# Patient Record
Sex: FEMALE | Race: WHITE | Employment: UNEMPLOYED | URBAN - METROPOLITAN AREA
[De-identification: names, ages, dates, MRNs, and addresses within clinical notes are randomized per-mention and may not be internally consistent; named-entity substitution may affect disease eponyms.]

---

## 2020-05-21 ENCOUNTER — HOSPITAL ENCOUNTER (OUTPATIENT)
Age: 70
Setting detail: SPECIMEN
Discharge: HOME | End: 2020-05-21
Payer: MEDICARE

## 2020-05-21 LAB
INR BLD: 1.5
PROTHROMBIN TIME: 18.3 SEC (ref 11.5–14.2)

## 2020-05-21 PROCEDURE — 85610 PROTHROMBIN TIME: CPT

## 2020-11-03 PROBLEM — I25.10 CAD (CORONARY ARTERY DISEASE): Status: RESOLVED | Noted: 2020-11-03 | Resolved: 2020-11-03

## 2021-10-14 ENCOUNTER — HOSPITAL ENCOUNTER (OUTPATIENT)
Age: 71
Setting detail: SPECIMEN
Discharge: HOME | End: 2021-10-14

## 2021-10-14 LAB
INR BLD: 1.4
PROTHROMBIN TIME: 17 SEC (ref 11.5–14.2)

## 2021-10-14 PROCEDURE — P9603 ONE-WAY ALLOW PRORATED MILES: HCPCS

## 2021-10-14 PROCEDURE — 85610 PROTHROMBIN TIME: CPT

## 2021-10-14 PROCEDURE — 36415 COLL VENOUS BLD VENIPUNCTURE: CPT

## 2021-10-15 ENCOUNTER — HOSPITAL ENCOUNTER (OUTPATIENT)
Age: 71
Setting detail: SPECIMEN
Discharge: HOME | End: 2021-10-15

## 2021-10-15 LAB
ANION GAP SERPL CALCULATED.3IONS-SCNC: 15 MMOL/L (ref 9–17)
BNP INTERPRETATION: ABNORMAL
BUN BLDV-MCNC: 64 MG/DL (ref 8–23)
BUN/CREAT BLD: ABNORMAL (ref 9–20)
CALCIUM SERPL-MCNC: 9.2 MG/DL (ref 8.6–10.4)
CHLORIDE BLD-SCNC: 96 MMOL/L (ref 98–107)
CO2: 33 MMOL/L (ref 20–31)
CREAT SERPL-MCNC: 2.09 MG/DL (ref 0.5–0.9)
GFR AFRICAN AMERICAN: 28 ML/MIN
GFR NON-AFRICAN AMERICAN: 23 ML/MIN
GFR SERPL CREATININE-BSD FRML MDRD: ABNORMAL ML/MIN/{1.73_M2}
GFR SERPL CREATININE-BSD FRML MDRD: ABNORMAL ML/MIN/{1.73_M2}
GLUCOSE BLD-MCNC: 237 MG/DL (ref 70–99)
HCT VFR BLD CALC: 32.9 % (ref 36.3–47.1)
HEMOGLOBIN: 9.2 G/DL (ref 11.9–15.1)
MCH RBC QN AUTO: 30.9 PG (ref 25.2–33.5)
MCHC RBC AUTO-ENTMCNC: 28 G/DL (ref 28.4–34.8)
MCV RBC AUTO: 110.4 FL (ref 82.6–102.9)
NRBC AUTOMATED: 0 PER 100 WBC
PDW BLD-RTO: 15.8 % (ref 11.8–14.4)
PLATELET # BLD: 195 K/UL (ref 138–453)
PMV BLD AUTO: 11.1 FL (ref 8.1–13.5)
POTASSIUM SERPL-SCNC: 5.4 MMOL/L (ref 3.7–5.3)
PRO-BNP: ABNORMAL PG/ML
RBC # BLD: 2.98 M/UL (ref 3.95–5.11)
SODIUM BLD-SCNC: 144 MMOL/L (ref 135–144)
WBC # BLD: 9.1 K/UL (ref 3.5–11.3)

## 2021-10-15 PROCEDURE — 80048 BASIC METABOLIC PNL TOTAL CA: CPT

## 2021-10-15 PROCEDURE — 85027 COMPLETE CBC AUTOMATED: CPT

## 2021-10-15 PROCEDURE — 36415 COLL VENOUS BLD VENIPUNCTURE: CPT

## 2021-10-15 PROCEDURE — P9603 ONE-WAY ALLOW PRORATED MILES: HCPCS

## 2021-10-15 PROCEDURE — 83880 ASSAY OF NATRIURETIC PEPTIDE: CPT

## 2021-10-18 ENCOUNTER — HOSPITAL ENCOUNTER (OUTPATIENT)
Age: 71
Setting detail: SPECIMEN
Discharge: HOME | End: 2021-10-18
Payer: COMMERCIAL

## 2021-10-18 LAB
INR BLD: 1.2
PROTHROMBIN TIME: 12.8 SEC (ref 9.1–12.3)

## 2021-10-18 PROCEDURE — 36415 COLL VENOUS BLD VENIPUNCTURE: CPT

## 2021-10-18 PROCEDURE — 85610 PROTHROMBIN TIME: CPT

## 2021-10-18 PROCEDURE — P9603 ONE-WAY ALLOW PRORATED MILES: HCPCS

## 2021-10-21 ENCOUNTER — HOSPITAL ENCOUNTER (OUTPATIENT)
Age: 71
Setting detail: SPECIMEN
Discharge: HOME | End: 2021-10-21
Payer: COMMERCIAL

## 2021-10-21 LAB
INR BLD: 1.1
PROTHROMBIN TIME: 11.5 SEC (ref 9.1–12.3)

## 2021-10-21 PROCEDURE — 85610 PROTHROMBIN TIME: CPT

## 2021-10-21 PROCEDURE — 36415 COLL VENOUS BLD VENIPUNCTURE: CPT

## 2021-10-21 PROCEDURE — P9603 ONE-WAY ALLOW PRORATED MILES: HCPCS

## 2021-10-25 ENCOUNTER — HOSPITAL ENCOUNTER (OUTPATIENT)
Age: 71
Setting detail: SPECIMEN
Discharge: HOME | End: 2021-10-25
Payer: COMMERCIAL

## 2021-10-25 LAB
ANION GAP SERPL CALCULATED.3IONS-SCNC: 13 MMOL/L (ref 9–17)
BUN BLDV-MCNC: 67 MG/DL (ref 8–23)
BUN/CREAT BLD: ABNORMAL (ref 9–20)
CALCIUM SERPL-MCNC: 9.5 MG/DL (ref 8.6–10.4)
CHLORIDE BLD-SCNC: 100 MMOL/L (ref 98–107)
CO2: 33 MMOL/L (ref 20–31)
CREAT SERPL-MCNC: 1.81 MG/DL (ref 0.5–0.9)
GFR AFRICAN AMERICAN: 33 ML/MIN
GFR NON-AFRICAN AMERICAN: 27 ML/MIN
GFR SERPL CREATININE-BSD FRML MDRD: ABNORMAL ML/MIN/{1.73_M2}
GFR SERPL CREATININE-BSD FRML MDRD: ABNORMAL ML/MIN/{1.73_M2}
GLUCOSE BLD-MCNC: 133 MG/DL (ref 70–99)
INR BLD: 1.6
POTASSIUM SERPL-SCNC: 5.3 MMOL/L (ref 3.7–5.3)
PROTHROMBIN TIME: 16.5 SEC (ref 9.1–12.3)
SODIUM BLD-SCNC: 146 MMOL/L (ref 135–144)

## 2021-10-25 PROCEDURE — 36415 COLL VENOUS BLD VENIPUNCTURE: CPT

## 2021-10-25 PROCEDURE — P9603 ONE-WAY ALLOW PRORATED MILES: HCPCS

## 2021-10-25 PROCEDURE — 80048 BASIC METABOLIC PNL TOTAL CA: CPT

## 2021-10-25 PROCEDURE — 85610 PROTHROMBIN TIME: CPT

## 2021-10-28 ENCOUNTER — HOSPITAL ENCOUNTER (OUTPATIENT)
Age: 71
Setting detail: SPECIMEN
Discharge: HOME | End: 2021-10-28
Payer: COMMERCIAL

## 2021-10-28 LAB
ANION GAP SERPL CALCULATED.3IONS-SCNC: 12 MMOL/L (ref 9–17)
BUN BLDV-MCNC: 83 MG/DL (ref 8–23)
BUN/CREAT BLD: ABNORMAL (ref 9–20)
CALCIUM SERPL-MCNC: 9.1 MG/DL (ref 8.6–10.4)
CHLORIDE BLD-SCNC: 105 MMOL/L (ref 98–107)
CO2: 28 MMOL/L (ref 20–31)
CREAT SERPL-MCNC: 2 MG/DL (ref 0.5–0.9)
GFR AFRICAN AMERICAN: 30 ML/MIN
GFR NON-AFRICAN AMERICAN: 24 ML/MIN
GFR SERPL CREATININE-BSD FRML MDRD: ABNORMAL ML/MIN/{1.73_M2}
GFR SERPL CREATININE-BSD FRML MDRD: ABNORMAL ML/MIN/{1.73_M2}
GLUCOSE BLD-MCNC: 193 MG/DL (ref 70–99)
HCT VFR BLD CALC: 27.7 % (ref 36.3–47.1)
HEMOGLOBIN: 8 G/DL (ref 11.9–15.1)
INR BLD: 2.5
MCH RBC QN AUTO: 30.7 PG (ref 25.2–33.5)
MCHC RBC AUTO-ENTMCNC: 28.9 G/DL (ref 28.4–34.8)
MCV RBC AUTO: 106.1 FL (ref 82.6–102.9)
NRBC AUTOMATED: 0 PER 100 WBC
PDW BLD-RTO: 14.8 % (ref 11.8–14.4)
PLATELET # BLD: 137 K/UL (ref 138–453)
PMV BLD AUTO: 11.2 FL (ref 8.1–13.5)
POTASSIUM SERPL-SCNC: 4.9 MMOL/L (ref 3.7–5.3)
PROTHROMBIN TIME: 25.2 SEC (ref 9.1–12.3)
RBC # BLD: 2.61 M/UL (ref 3.95–5.11)
SODIUM BLD-SCNC: 145 MMOL/L (ref 135–144)
WBC # BLD: 4.8 K/UL (ref 3.5–11.3)

## 2021-10-28 PROCEDURE — 80048 BASIC METABOLIC PNL TOTAL CA: CPT

## 2021-10-28 PROCEDURE — 85610 PROTHROMBIN TIME: CPT

## 2021-10-28 PROCEDURE — P9603 ONE-WAY ALLOW PRORATED MILES: HCPCS

## 2021-10-28 PROCEDURE — 36415 COLL VENOUS BLD VENIPUNCTURE: CPT

## 2021-10-28 PROCEDURE — 85027 COMPLETE CBC AUTOMATED: CPT

## 2021-11-01 ENCOUNTER — HOSPITAL ENCOUNTER (OUTPATIENT)
Age: 71
Setting detail: SPECIMEN
Discharge: HOME | End: 2021-11-01
Payer: COMMERCIAL

## 2021-11-01 LAB
ANION GAP SERPL CALCULATED.3IONS-SCNC: 11 MMOL/L (ref 9–17)
BUN BLDV-MCNC: 84 MG/DL (ref 8–23)
BUN/CREAT BLD: ABNORMAL (ref 9–20)
CALCIUM SERPL-MCNC: 9.3 MG/DL (ref 8.6–10.4)
CHLORIDE BLD-SCNC: 102 MMOL/L (ref 98–107)
CO2: 29 MMOL/L (ref 20–31)
CREAT SERPL-MCNC: 2.06 MG/DL (ref 0.5–0.9)
GFR AFRICAN AMERICAN: 29 ML/MIN
GFR NON-AFRICAN AMERICAN: 24 ML/MIN
GFR SERPL CREATININE-BSD FRML MDRD: ABNORMAL ML/MIN/{1.73_M2}
GFR SERPL CREATININE-BSD FRML MDRD: ABNORMAL ML/MIN/{1.73_M2}
GLUCOSE BLD-MCNC: 120 MG/DL (ref 70–99)
INR BLD: 3.3
POTASSIUM SERPL-SCNC: 5 MMOL/L (ref 3.7–5.3)
PROTHROMBIN TIME: 31.9 SEC (ref 9.1–12.3)
SODIUM BLD-SCNC: 142 MMOL/L (ref 135–144)

## 2021-11-01 PROCEDURE — 85610 PROTHROMBIN TIME: CPT

## 2021-11-01 PROCEDURE — 36415 COLL VENOUS BLD VENIPUNCTURE: CPT

## 2021-11-01 PROCEDURE — P9603 ONE-WAY ALLOW PRORATED MILES: HCPCS

## 2021-11-01 PROCEDURE — 80048 BASIC METABOLIC PNL TOTAL CA: CPT

## 2021-11-02 ENCOUNTER — HOSPITAL ENCOUNTER (OUTPATIENT)
Age: 71
Setting detail: SPECIMEN
Discharge: HOME | End: 2021-11-02
Payer: COMMERCIAL

## 2021-11-02 LAB
INR BLD: 3.9
PROTHROMBIN TIME: 37.1 SEC (ref 9.1–12.3)

## 2021-11-02 PROCEDURE — 85610 PROTHROMBIN TIME: CPT

## 2021-11-02 PROCEDURE — 36415 COLL VENOUS BLD VENIPUNCTURE: CPT

## 2021-11-02 PROCEDURE — P9603 ONE-WAY ALLOW PRORATED MILES: HCPCS

## 2021-11-03 ENCOUNTER — HOSPITAL ENCOUNTER (OUTPATIENT)
Age: 71
Setting detail: SPECIMEN
Discharge: HOME | End: 2021-11-03
Payer: COMMERCIAL

## 2021-11-03 LAB
INR BLD: 2.9
PROTHROMBIN TIME: 28.8 SEC (ref 9.1–12.3)

## 2021-11-03 PROCEDURE — P9603 ONE-WAY ALLOW PRORATED MILES: HCPCS

## 2021-11-03 PROCEDURE — 36415 COLL VENOUS BLD VENIPUNCTURE: CPT

## 2021-11-03 PROCEDURE — 85610 PROTHROMBIN TIME: CPT

## 2021-11-04 ENCOUNTER — HOSPITAL ENCOUNTER (OUTPATIENT)
Age: 71
Setting detail: SPECIMEN
Discharge: HOME | End: 2021-11-04
Payer: COMMERCIAL

## 2021-11-04 LAB
INR BLD: 2.4
PROTHROMBIN TIME: 24.2 SEC (ref 9.1–12.3)

## 2021-11-04 PROCEDURE — 36415 COLL VENOUS BLD VENIPUNCTURE: CPT

## 2021-11-04 PROCEDURE — P9603 ONE-WAY ALLOW PRORATED MILES: HCPCS

## 2021-11-04 PROCEDURE — 85610 PROTHROMBIN TIME: CPT

## 2021-11-08 ENCOUNTER — HOSPITAL ENCOUNTER (OUTPATIENT)
Age: 71
Setting detail: SPECIMEN
Discharge: HOME | End: 2021-11-08
Payer: COMMERCIAL

## 2021-11-08 LAB
INR BLD: 3.8
PROTHROMBIN TIME: 36 SEC (ref 9.1–12.3)

## 2021-11-08 PROCEDURE — P9603 ONE-WAY ALLOW PRORATED MILES: HCPCS

## 2021-11-08 PROCEDURE — 36415 COLL VENOUS BLD VENIPUNCTURE: CPT

## 2021-11-08 PROCEDURE — 85610 PROTHROMBIN TIME: CPT

## 2021-11-09 ENCOUNTER — HOSPITAL ENCOUNTER (OUTPATIENT)
Age: 71
Setting detail: SPECIMEN
Discharge: HOME | End: 2021-11-09
Payer: COMMERCIAL

## 2021-11-09 LAB
ANION GAP SERPL CALCULATED.3IONS-SCNC: 13 MMOL/L (ref 9–17)
BUN BLDV-MCNC: 104 MG/DL (ref 8–23)
BUN/CREAT BLD: ABNORMAL (ref 9–20)
CALCIUM SERPL-MCNC: 8.8 MG/DL (ref 8.6–10.4)
CHLORIDE BLD-SCNC: 104 MMOL/L (ref 98–107)
CO2: 21 MMOL/L (ref 20–31)
CREAT SERPL-MCNC: 2.28 MG/DL (ref 0.5–0.9)
GFR AFRICAN AMERICAN: 25 ML/MIN
GFR NON-AFRICAN AMERICAN: 21 ML/MIN
GFR SERPL CREATININE-BSD FRML MDRD: ABNORMAL ML/MIN/{1.73_M2}
GFR SERPL CREATININE-BSD FRML MDRD: ABNORMAL ML/MIN/{1.73_M2}
GLUCOSE BLD-MCNC: 155 MG/DL (ref 70–99)
HCT VFR BLD CALC: 26.3 % (ref 36.3–47.1)
HEMOGLOBIN: 8 G/DL (ref 11.9–15.1)
INR BLD: 4.4
MCH RBC QN AUTO: 31 PG (ref 25.2–33.5)
MCHC RBC AUTO-ENTMCNC: 30.4 G/DL (ref 28.4–34.8)
MCV RBC AUTO: 101.9 FL (ref 82.6–102.9)
NRBC AUTOMATED: 0 PER 100 WBC
PDW BLD-RTO: 14.4 % (ref 11.8–14.4)
PLATELET # BLD: 185 K/UL (ref 138–453)
PMV BLD AUTO: 11.2 FL (ref 8.1–13.5)
POTASSIUM SERPL-SCNC: 5.5 MMOL/L (ref 3.7–5.3)
PROTHROMBIN TIME: 41.9 SEC (ref 9.1–12.3)
RBC # BLD: 2.58 M/UL (ref 3.95–5.11)
SODIUM BLD-SCNC: 138 MMOL/L (ref 135–144)
WBC # BLD: 6 K/UL (ref 3.5–11.3)

## 2021-11-09 PROCEDURE — 80048 BASIC METABOLIC PNL TOTAL CA: CPT

## 2021-11-09 PROCEDURE — P9603 ONE-WAY ALLOW PRORATED MILES: HCPCS

## 2021-11-09 PROCEDURE — 85027 COMPLETE CBC AUTOMATED: CPT

## 2021-11-09 PROCEDURE — 85610 PROTHROMBIN TIME: CPT

## 2021-11-09 PROCEDURE — 36415 COLL VENOUS BLD VENIPUNCTURE: CPT

## 2021-11-10 ENCOUNTER — HOSPITAL ENCOUNTER (INPATIENT)
Age: 71
LOS: 34 days | Discharge: OTHER INSTITUTION | DRG: 004 | End: 2021-12-14
Attending: EMERGENCY MEDICINE | Admitting: FAMILY MEDICINE
Payer: COMMERCIAL

## 2021-11-10 ENCOUNTER — HOSPITAL ENCOUNTER (OUTPATIENT)
Age: 71
Setting detail: SPECIMEN
Discharge: HOME | End: 2021-11-10
Payer: COMMERCIAL

## 2021-11-10 DIAGNOSIS — M48.02 STENOSIS OF CERVICAL SPINE: ICD-10-CM

## 2021-11-10 DIAGNOSIS — N39.0 URINARY TRACT INFECTION IN ELDERLY PATIENT: ICD-10-CM

## 2021-11-10 DIAGNOSIS — N17.9 AKI (ACUTE KIDNEY INJURY) (HCC): Primary | ICD-10-CM

## 2021-11-10 DIAGNOSIS — D64.9 ANEMIA, UNSPECIFIED TYPE: ICD-10-CM

## 2021-11-10 DIAGNOSIS — M54.16 LUMBAR RADICULOPATHY: ICD-10-CM

## 2021-11-10 LAB
-: NORMAL
ABSOLUTE EOS #: 0.3 K/UL (ref 0–0.4)
ABSOLUTE IMMATURE GRANULOCYTE: ABNORMAL K/UL (ref 0–0.3)
ABSOLUTE LYMPH #: 1.5 K/UL (ref 1–4.8)
ABSOLUTE MONO #: 0.7 K/UL (ref 0.1–1.3)
AMORPHOUS: NORMAL
ANION GAP SERPL CALCULATED.3IONS-SCNC: 8 MMOL/L (ref 9–17)
BACTERIA: NORMAL
BASOPHILS # BLD: 0 % (ref 0–2)
BASOPHILS ABSOLUTE: 0 K/UL (ref 0–0.2)
BILIRUBIN URINE: NEGATIVE
BUN BLDV-MCNC: 109 MG/DL (ref 8–23)
BUN/CREAT BLD: ABNORMAL (ref 9–20)
CALCIUM SERPL-MCNC: 8.7 MG/DL (ref 8.6–10.4)
CASTS UA: NORMAL /LPF
CHLORIDE BLD-SCNC: 105 MMOL/L (ref 98–107)
CO2: 26 MMOL/L (ref 20–31)
COLOR: YELLOW
COMMENT UA: ABNORMAL
CREAT SERPL-MCNC: 2.66 MG/DL (ref 0.5–0.9)
CRYSTALS, UA: NORMAL /HPF
DIFFERENTIAL TYPE: ABNORMAL
EOSINOPHILS RELATIVE PERCENT: 4 % (ref 0–4)
EPITHELIAL CELLS UA: NORMAL /HPF
GFR AFRICAN AMERICAN: 21 ML/MIN
GFR NON-AFRICAN AMERICAN: 18 ML/MIN
GFR SERPL CREATININE-BSD FRML MDRD: ABNORMAL ML/MIN/{1.73_M2}
GFR SERPL CREATININE-BSD FRML MDRD: ABNORMAL ML/MIN/{1.73_M2}
GLUCOSE BLD-MCNC: 204 MG/DL (ref 70–99)
GLUCOSE URINE: NEGATIVE
HCT VFR BLD CALC: 22.4 % (ref 36–46)
HEMOGLOBIN: 7.2 G/DL (ref 12–16)
IMMATURE GRANULOCYTES: ABNORMAL %
INR BLD: 3.1
KETONES, URINE: NEGATIVE
LEUKOCYTE ESTERASE, URINE: ABNORMAL
LYMPHOCYTES # BLD: 24 % (ref 24–44)
MAGNESIUM: 1.9 MG/DL (ref 1.6–2.6)
MCH RBC QN AUTO: 30.7 PG (ref 26–34)
MCHC RBC AUTO-ENTMCNC: 32.3 G/DL (ref 31–37)
MCV RBC AUTO: 95 FL (ref 80–100)
MONOCYTES # BLD: 11 % (ref 1–7)
MUCUS: NORMAL
NITRITE, URINE: NEGATIVE
NRBC AUTOMATED: ABNORMAL PER 100 WBC
OTHER OBSERVATIONS UA: NORMAL
PDW BLD-RTO: 15.5 % (ref 11.5–14.9)
PH UA: 6 (ref 5–8)
PLATELET # BLD: 207 K/UL (ref 150–450)
PLATELET ESTIMATE: ABNORMAL
PMV BLD AUTO: 8.1 FL (ref 6–12)
POTASSIUM SERPL-SCNC: 5 MMOL/L (ref 3.7–5.3)
PROTEIN UA: ABNORMAL
PROTHROMBIN TIME: 30.6 SEC (ref 9.1–12.3)
RBC # BLD: 2.36 M/UL (ref 4–5.2)
RBC # BLD: ABNORMAL 10*6/UL
RBC UA: NORMAL /HPF
RENAL EPITHELIAL, UA: NORMAL /HPF
SEG NEUTROPHILS: 61 % (ref 36–66)
SEGMENTED NEUTROPHILS ABSOLUTE COUNT: 3.9 K/UL (ref 1.3–9.1)
SODIUM BLD-SCNC: 139 MMOL/L (ref 135–144)
SPECIFIC GRAVITY UA: 1.01 (ref 1–1.03)
TRICHOMONAS: NORMAL
TURBIDITY: ABNORMAL
URINE HGB: ABNORMAL
UROBILINOGEN, URINE: NORMAL
WBC # BLD: 6.4 K/UL (ref 3.5–11)
WBC # BLD: ABNORMAL 10*3/UL
WBC UA: NORMAL /HPF
YEAST: NORMAL

## 2021-11-10 PROCEDURE — 6360000002 HC RX W HCPCS: Performed by: STUDENT IN AN ORGANIZED HEALTH CARE EDUCATION/TRAINING PROGRAM

## 2021-11-10 PROCEDURE — 81001 URINALYSIS AUTO W/SCOPE: CPT

## 2021-11-10 PROCEDURE — 96365 THER/PROPH/DIAG IV INF INIT: CPT

## 2021-11-10 PROCEDURE — 83735 ASSAY OF MAGNESIUM: CPT

## 2021-11-10 PROCEDURE — 2580000003 HC RX 258: Performed by: STUDENT IN AN ORGANIZED HEALTH CARE EDUCATION/TRAINING PROGRAM

## 2021-11-10 PROCEDURE — 36415 COLL VENOUS BLD VENIPUNCTURE: CPT

## 2021-11-10 PROCEDURE — 99285 EMERGENCY DEPT VISIT HI MDM: CPT

## 2021-11-10 PROCEDURE — 85025 COMPLETE CBC W/AUTO DIFF WBC: CPT

## 2021-11-10 PROCEDURE — 80048 BASIC METABOLIC PNL TOTAL CA: CPT

## 2021-11-10 PROCEDURE — 85610 PROTHROMBIN TIME: CPT

## 2021-11-10 PROCEDURE — 2060000000 HC ICU INTERMEDIATE R&B

## 2021-11-10 PROCEDURE — 87086 URINE CULTURE/COLONY COUNT: CPT

## 2021-11-10 PROCEDURE — P9603 ONE-WAY ALLOW PRORATED MILES: HCPCS

## 2021-11-10 PROCEDURE — 93005 ELECTROCARDIOGRAM TRACING: CPT | Performed by: STUDENT IN AN ORGANIZED HEALTH CARE EDUCATION/TRAINING PROGRAM

## 2021-11-10 RX ORDER — ACETAMINOPHEN 325 MG/1
650 TABLET ORAL EVERY 4 HOURS PRN
Status: ON HOLD | COMMUNITY
End: 2021-12-14 | Stop reason: HOSPADM

## 2021-11-10 RX ORDER — WARFARIN SODIUM 3 MG/1
3 TABLET ORAL NIGHTLY
Status: ON HOLD | COMMUNITY
End: 2021-12-14 | Stop reason: HOSPADM

## 2021-11-10 RX ORDER — INSULIN GLARGINE 100 [IU]/ML
22 INJECTION, SOLUTION SUBCUTANEOUS NIGHTLY
COMMUNITY

## 2021-11-10 RX ORDER — HYDROCODONE BITARTRATE AND ACETAMINOPHEN 5; 325 MG/1; MG/1
1 TABLET ORAL EVERY 6 HOURS PRN
Status: ON HOLD | COMMUNITY
End: 2021-12-14 | Stop reason: HOSPADM

## 2021-11-10 RX ORDER — ALBUTEROL SULFATE 2.5 MG/3ML
2.5 SOLUTION RESPIRATORY (INHALATION) EVERY 6 HOURS PRN
Status: ON HOLD | COMMUNITY
End: 2021-12-14 | Stop reason: HOSPADM

## 2021-11-10 RX ORDER — FERROUS SULFATE 325(65) MG
325 TABLET ORAL
COMMUNITY

## 2021-11-10 RX ORDER — MECLIZINE HCL 12.5 MG/1
12.5 TABLET ORAL 2 TIMES DAILY PRN
Status: ON HOLD | COMMUNITY
End: 2021-12-14 | Stop reason: HOSPADM

## 2021-11-10 RX ORDER — FAMOTIDINE 20 MG/1
20 TABLET, FILM COATED ORAL DAILY
COMMUNITY

## 2021-11-10 RX ORDER — 0.9 % SODIUM CHLORIDE 0.9 %
500 INTRAVENOUS SOLUTION INTRAVENOUS ONCE
Status: COMPLETED | OUTPATIENT
Start: 2021-11-10 | End: 2021-11-10

## 2021-11-10 RX ORDER — ATORVASTATIN CALCIUM 80 MG/1
80 TABLET, FILM COATED ORAL DAILY
Status: ON HOLD | COMMUNITY
End: 2021-12-14 | Stop reason: HOSPADM

## 2021-11-10 RX ADMIN — CEFTRIAXONE SODIUM 1000 MG: 1 INJECTION, POWDER, FOR SOLUTION INTRAMUSCULAR; INTRAVENOUS at 19:12

## 2021-11-10 RX ADMIN — SODIUM CHLORIDE 500 ML: 0.9 INJECTION, SOLUTION INTRAVENOUS at 18:17

## 2021-11-10 NOTE — ED NOTES
Patient resident at [facility]. Patient being treated for non healing wound right groin due to angiogram surgery back in Feb. Nursing staff reports patient's BUN and CR has steadily increasing over the past several weeks. Nephrologist wanted patient seen at ED. Patient came in with no complaints at this time. Patient arrived with PICC line to MANAV no longer receiving iv antibiotics but has been receiving iv fluids through line.      Cally Leon RN  11/10/21 1858

## 2021-11-10 NOTE — ED PROVIDER NOTES
[facility] ED  Emergency Department Encounter  Emergency Medicine Resident     Pt Name: Leilani Golden  MRN: [id number]  [place] 1950  Date of evaluation: 11/10/21  PCP:  Chuyita Harrell, [address] [place] [address]       Chief Complaint   Patient presents with    Abnormal Lab     low hemoglobin and high CR       HISTORY OFPRESENT ILLNESS  (Location/Symptom, Timing/Onset, Context/Setting, Quality, Duration, Modifying Ricky Mcbride.)      Leilani Golden is a 66 y.o. female who presents with slowly increasing BUN/creatinine and low hemoglobin. Patient is a resident at [facility] due to chronic wounds of the right groin from angio. She gets labs daily at [facility] and the providers have noticed that her BUN and creatinine have been increasing above her baseline. She has CKD with baseline creatinine 1.5 but yesterday her creatinine was 2.28,  and hemoglobin 8. The providers at the facility contacted her nephrologist who recommended evaluation in the emergency department. Patient herself is a poor historian, but her  says that she has had some increasing confusion over the past few days. Patient denies any abdominal pain, nausea, vomiting, fevers, chills, chest pain, shortness of breath.     PAST MEDICAL / SURGICAL / SOCIAL / FAMILY HISTORY      has a past medical history of Anemia, unspecified, CAD (coronary artery disease), Chronic kidney disease, CKD (chronic kidney disease) stage 3, GFR 30-59 ml/min ([facility]), Closed fracture of dorsal (thoracic) vertebra without mention of spinal cord injury, Coronary atherosclerosis of unspecified type of vessel, native or graft, Depression with anxiety, Esophageal reflux, HTN (hypertension), Hyperkalemia, Hyperlipidemia, Intervertebral cervical disc disorder with myelopathy, cervical region, Lower back pain, Lumbar radiculopathy, Microalbuminuria, Muscle weakness, Pain in limb, Stenosis of cervical spine, Tethered cord syndrome 12.5 mg by mouth 2 times daily as needed for Dizziness   Yes Historical Provider, MD   warfarin (COUMADIN) 3 MG tablet Take 3 mg by mouth nightly   Yes Historical Provider, MD   carvedilol (COREG) 6.25 MG tablet Take 6.25 mg by mouth 2 times daily (with meals). Yes Historical Provider, MD   aspirin 81 MG chewable tablet Take 81 mg by mouth daily    Yes Historical Provider, MD   bumetanide (BUMEX) 1 MG tablet Take 1 mg by mouth daily   11/10/21  Historical Provider, MD       REVIEW OFSYSTEMS    (2-9 systems for level 4, 10 or more for level 5)      Review of Systems   Constitutional: Negative for appetite change, chills, fatigue and fever. HENT: Negative for congestion, rhinorrhea, sneezing and sore throat. Eyes: Negative for visual disturbance. Respiratory: Negative for cough and shortness of breath. Cardiovascular: Negative for chest pain and leg swelling. Gastrointestinal: Negative for abdominal pain, diarrhea, nausea and vomiting. Genitourinary: Negative for dysuria. Musculoskeletal: Negative for myalgias, neck pain and neck stiffness. Skin: Negative for rash and wound. Neurological: Negative for dizziness, syncope, light-headedness and headaches. Psychiatric/Behavioral: Negative for dysphoric mood and suicidal ideas. PHYSICAL EXAM   (up to 7 for level 4, 8 or more forlevel 5)      INITIAL VITALS:   Vitals:    11/10/21 1625   BP: (!) 126/55   Pulse: 88   Resp: 18   Temp: 97.8 °F (36.6 °C)   SpO2: 100%        Physical Exam  Vitals and nursing note reviewed. Constitutional:       General: She is not in acute distress. Appearance: Normal appearance. She is not ill-appearing or diaphoretic. HENT:      Head: Normocephalic. Nose: Nose normal.      Mouth/Throat:      Mouth: Mucous membranes are moist.      Pharynx: Oropharynx is clear. Eyes:      Extraocular Movements: Extraocular movements intact.       Conjunctiva/sclera: Conjunctivae normal.      Pupils: Pupils are equal, round, and reactive to light. Cardiovascular:      Rate and Rhythm: Normal rate and regular rhythm. Pulses: Normal pulses. Heart sounds: Normal heart sounds. Pulmonary:      Effort: Pulmonary effort is normal. No respiratory distress. Breath sounds: Normal breath sounds. No wheezing or rales. Chest:      Chest wall: No tenderness. Abdominal:      General: There is no distension. Palpations: Abdomen is soft. Tenderness: There is no abdominal tenderness. There is no guarding or rebound. Musculoskeletal:         General: Normal range of motion. Cervical back: Normal range of motion and neck supple. Skin:     General: Skin is warm. Capillary Refill: Capillary refill takes less than 2 seconds. Comments: Chronic wound to right inguinal region that has no evidence of infection, no erythema, edema or discharge   Neurological:      General: No focal deficit present. Mental Status: She is alert and oriented to person, place, and time. Psychiatric:         Mood and Affect: Mood normal.         Behavior: Behavior normal.         DIFFERENTIAL  DIAGNOSIS     Initial MDM/Plan: 66 y.o. female who presents with worsening kidney function found on incidental labs at nursing home. Patient was sent in by her nephrologist for evaluation due to increasing creatinine and BUN. Patient has no complaints. Vital signs within normal limits. Physical examination notable only for chronic right inguinal wound with no evidence of infection. Plan to obtain CBC, BMP, urinalysis to reassess kidney function. Will discuss patient with nephrology and consider admission. Patient has a medical history of CKD with baseline creatinine 1.5 and is on Bumex, so it is possible that this her worsening kidney function is due to nephrotoxic effects of Bumex. Also considering urinary retention as a patient says that she had a Kaur catheter in place recently due to urinary retention.   We will do bedside bladder scan    DIAGNOSTIC RESULTS / EMERGENCYDEPARTMENT COURSE / MDM     LABS:  Labs Reviewed   CBC WITH AUTO DIFFERENTIAL - Abnormal; Notable for the following components:       Result Value    RBC 2.36 (*)     Hemoglobin 7.2 (*)     Hematocrit 22.4 (*)     RDW 15.5 (*)     Monocytes 11 (*)     All other components within normal limits   BASIC METABOLIC PANEL - Abnormal; Notable for the following components:    Glucose 204 (*)      (*)     CREATININE 2.66 (*)     Anion Gap 8 (*)     GFR Non- 18 (*)     GFR  21 (*)     All other components within normal limits   URINE RT REFLEX TO CULTURE - Abnormal; Notable for the following components:    Turbidity UA Cloudy (*)     Urine Hgb LARGE (*)     Protein, UA 2+ (*)     Leukocyte Esterase, Urine LARGE (*)     All other components within normal limits   CULTURE, URINE   MAGNESIUM   MICROSCOPIC URINALYSIS         RADIOLOGY:  No results found. EKG  EKG Interpretation    Interpreted by emergency department physician    Rhythm: atrial fibrillation - controlled  Rate: normal  Axis: left  Ectopy: none  Conduction: right bundle branch block (complete) and left anterior fasciclar block  ST Segments: normal  T Waves: non specific changes  Q Waves: none    Clinical Impression: non-specific EKG    Haroldo Sampson MD      All EKG's are interpreted by the Emergency Department Physicianwho either signs or Co-signs this chart in the absence of a cardiologist.    EMERGENCY DEPARTMENT COURSE:  ED Course as of 11/10/21 2021   Wed Nov 10, 2021   1728 Creatinine(!): 2.66  Bedside bladder scan showing 81 cc of urine within the bladder [JT]   1728 BUN(!!): 109 [JT]   2017 Hemoglobin Quant(!): 7.2  Suspect secondary to chronic kidney disease. Patient has no evidence of active bleeding at this time, will not transfuse [JT]   2018 Urinalysis showing too numerous to count white blood cells but no bacteria.   The patient has been on antibiotics so I suspect the patient still has a urinary tract infection despite lack of bacteria. Initiated treatment of Rocephin [JT]   2019 Plan to admit to internal medicine for JAYLYN, urinary tract infection and anemia [JT]      ED Course User Index  [JT] Tristan Servin MD       PROCEDURES:  None    CONSULTS:  IP CONSULT TO NEPHROLOGY      FINAL IMPRESSION      1. JAYLYN (acute kidney injury) ([facility] [address]    2. Urinary tract infection in elderly patient    3. Anemia, unspecified type        DISPOSITION / PLAN     DISPOSITION Decision To Admit 11/10/2021 08:10:05 PM      PATIENT REFERRED TO:  No follow-up provider specified.     DISCHARGE MEDICATIONS:  Current Discharge Medication List          Tristan Servin MD  Emergency Medicine Resident    (Please note that portions of this note were completed with a voice recognition program.Efforts were made to edit the dictations but occasionally words are mis-transcribed.)        Tristan Servin MD  Resident  11/11/21 0140

## 2021-11-11 LAB
CHLORIDE, UR: 37 MMOL/L
CREATININE URINE: 63.2 MG/DL (ref 28–217)
CULTURE: NORMAL
EKG ATRIAL RATE: 105 BPM
EKG Q-T INTERVAL: 376 MS
EKG QRS DURATION: 144 MS
EKG QTC CALCULATION (BAZETT): 477 MS
EKG R AXIS: -58 DEGREES
EKG T AXIS: 107 DEGREES
EKG VENTRICULAR RATE: 97 BPM
EOSINOPHIL,URINE: NORMAL
GLUCOSE BLD-MCNC: 164 MG/DL (ref 65–105)
GLUCOSE BLD-MCNC: 186 MG/DL (ref 65–105)
GLUCOSE BLD-MCNC: 193 MG/DL (ref 65–105)
GLUCOSE BLD-MCNC: 209 MG/DL (ref 65–105)
GLUCOSE BLD-MCNC: 247 MG/DL (ref 65–105)
Lab: NORMAL
SODIUM,UR: 51 MMOL/L
SPECIMEN DESCRIPTION: NORMAL
TOTAL CK: 50 U/L (ref 26–192)
TOTAL PROTEIN, URINE: 91 MG/DL
URINE TOTAL PROTEIN CREATININE RATIO: 1.44 (ref 0–0.2)

## 2021-11-11 PROCEDURE — 82436 ASSAY OF URINE CHLORIDE: CPT

## 2021-11-11 PROCEDURE — 2580000003 HC RX 258: Performed by: FAMILY MEDICINE

## 2021-11-11 PROCEDURE — 6370000000 HC RX 637 (ALT 250 FOR IP): Performed by: FAMILY MEDICINE

## 2021-11-11 PROCEDURE — 86225 DNA ANTIBODY NATIVE: CPT

## 2021-11-11 PROCEDURE — 82550 ASSAY OF CK (CPK): CPT

## 2021-11-11 PROCEDURE — 84300 ASSAY OF URINE SODIUM: CPT

## 2021-11-11 PROCEDURE — 86038 ANTINUCLEAR ANTIBODIES: CPT

## 2021-11-11 PROCEDURE — 36415 COLL VENOUS BLD VENIPUNCTURE: CPT

## 2021-11-11 PROCEDURE — 82570 ASSAY OF URINE CREATININE: CPT

## 2021-11-11 PROCEDURE — 87205 SMEAR GRAM STAIN: CPT

## 2021-11-11 PROCEDURE — 82947 ASSAY GLUCOSE BLOOD QUANT: CPT

## 2021-11-11 PROCEDURE — 2060000000 HC ICU INTERMEDIATE R&B

## 2021-11-11 PROCEDURE — 93010 ELECTROCARDIOGRAM REPORT: CPT | Performed by: INTERNAL MEDICINE

## 2021-11-11 PROCEDURE — C9113 INJ PANTOPRAZOLE SODIUM, VIA: HCPCS | Performed by: INTERNAL MEDICINE

## 2021-11-11 PROCEDURE — 2580000003 HC RX 258: Performed by: INTERNAL MEDICINE

## 2021-11-11 PROCEDURE — 84156 ASSAY OF PROTEIN URINE: CPT

## 2021-11-11 PROCEDURE — 6360000002 HC RX W HCPCS: Performed by: INTERNAL MEDICINE

## 2021-11-11 RX ORDER — ASPIRIN 81 MG/1
81 TABLET, CHEWABLE ORAL DAILY
Status: DISCONTINUED | OUTPATIENT
Start: 2021-11-11 | End: 2021-12-14 | Stop reason: HOSPADM

## 2021-11-11 RX ORDER — FAMOTIDINE 20 MG/1
10 TABLET, FILM COATED ORAL DAILY
Status: DISCONTINUED | OUTPATIENT
Start: 2021-11-11 | End: 2021-11-11

## 2021-11-11 RX ORDER — INSULIN GLARGINE 100 [IU]/ML
22 INJECTION, SOLUTION SUBCUTANEOUS NIGHTLY
Status: DISCONTINUED | OUTPATIENT
Start: 2021-11-11 | End: 2021-12-14 | Stop reason: HOSPADM

## 2021-11-11 RX ORDER — SODIUM CHLORIDE 9 MG/ML
25 INJECTION, SOLUTION INTRAVENOUS PRN
Status: DISCONTINUED | OUTPATIENT
Start: 2021-11-11 | End: 2021-12-14 | Stop reason: HOSPADM

## 2021-11-11 RX ORDER — SODIUM CHLORIDE 0.9 % (FLUSH) 0.9 %
5-40 SYRINGE (ML) INJECTION EVERY 12 HOURS SCHEDULED
Status: DISCONTINUED | OUTPATIENT
Start: 2021-11-11 | End: 2021-12-14 | Stop reason: HOSPADM

## 2021-11-11 RX ORDER — ACETAMINOPHEN 325 MG/1
650 TABLET ORAL EVERY 4 HOURS PRN
Status: DISCONTINUED | OUTPATIENT
Start: 2021-11-11 | End: 2021-12-09 | Stop reason: SDUPTHER

## 2021-11-11 RX ORDER — PANTOPRAZOLE SODIUM 40 MG/10ML
40 INJECTION, POWDER, LYOPHILIZED, FOR SOLUTION INTRAVENOUS DAILY
Status: DISCONTINUED | OUTPATIENT
Start: 2021-11-11 | End: 2021-11-15

## 2021-11-11 RX ORDER — ATORVASTATIN CALCIUM 80 MG/1
80 TABLET, FILM COATED ORAL DAILY
Status: DISCONTINUED | OUTPATIENT
Start: 2021-11-11 | End: 2021-12-03

## 2021-11-11 RX ORDER — SODIUM CHLORIDE 9 MG/ML
INJECTION, SOLUTION INTRAVENOUS CONTINUOUS
Status: DISCONTINUED | OUTPATIENT
Start: 2021-11-11 | End: 2021-11-16

## 2021-11-11 RX ORDER — OXYCODONE HYDROCHLORIDE AND ACETAMINOPHEN 5; 325 MG/1; MG/1
1 TABLET ORAL EVERY 6 HOURS PRN
Status: DISCONTINUED | OUTPATIENT
Start: 2021-11-11 | End: 2021-12-14 | Stop reason: HOSPADM

## 2021-11-11 RX ORDER — SODIUM CHLORIDE 0.9 % (FLUSH) 0.9 %
5-40 SYRINGE (ML) INJECTION PRN
Status: DISCONTINUED | OUTPATIENT
Start: 2021-11-11 | End: 2021-12-14 | Stop reason: HOSPADM

## 2021-11-11 RX ORDER — ONDANSETRON 4 MG/1
4 TABLET, ORALLY DISINTEGRATING ORAL EVERY 8 HOURS PRN
Status: DISCONTINUED | OUTPATIENT
Start: 2021-11-11 | End: 2021-12-14 | Stop reason: HOSPADM

## 2021-11-11 RX ORDER — SODIUM CHLORIDE 9 MG/ML
10 INJECTION INTRAVENOUS DAILY
Status: DISCONTINUED | OUTPATIENT
Start: 2021-11-11 | End: 2021-11-15

## 2021-11-11 RX ORDER — CARVEDILOL 6.25 MG/1
6.25 TABLET ORAL 2 TIMES DAILY WITH MEALS
Status: DISCONTINUED | OUTPATIENT
Start: 2021-11-11 | End: 2021-11-19

## 2021-11-11 RX ORDER — ONDANSETRON 2 MG/ML
4 INJECTION INTRAMUSCULAR; INTRAVENOUS EVERY 6 HOURS PRN
Status: DISCONTINUED | OUTPATIENT
Start: 2021-11-11 | End: 2021-12-14 | Stop reason: HOSPADM

## 2021-11-11 RX ADMIN — ATORVASTATIN CALCIUM 80 MG: 80 TABLET, FILM COATED ORAL at 08:37

## 2021-11-11 RX ADMIN — CARVEDILOL 6.25 MG: 6.25 TABLET, FILM COATED ORAL at 08:37

## 2021-11-11 RX ADMIN — SODIUM CHLORIDE, PRESERVATIVE FREE 10 ML: 5 INJECTION INTRAVENOUS at 21:05

## 2021-11-11 RX ADMIN — CEFTRIAXONE SODIUM 1000 MG: 1 INJECTION, POWDER, FOR SOLUTION INTRAMUSCULAR; INTRAVENOUS at 15:09

## 2021-11-11 RX ADMIN — SODIUM CHLORIDE, PRESERVATIVE FREE 10 ML: 5 INJECTION INTRAVENOUS at 08:37

## 2021-11-11 RX ADMIN — ASPIRIN 81 MG: 81 TABLET, CHEWABLE ORAL at 08:37

## 2021-11-11 RX ADMIN — INSULIN LISPRO 4 UNITS: 100 INJECTION, SOLUTION INTRAVENOUS; SUBCUTANEOUS at 11:32

## 2021-11-11 RX ADMIN — FAMOTIDINE 10 MG: 20 TABLET, FILM COATED ORAL at 08:37

## 2021-11-11 RX ADMIN — INSULIN LISPRO 2 UNITS: 100 INJECTION, SOLUTION INTRAVENOUS; SUBCUTANEOUS at 17:53

## 2021-11-11 RX ADMIN — INSULIN GLARGINE 22 UNITS: 100 INJECTION, SOLUTION SUBCUTANEOUS at 01:16

## 2021-11-11 RX ADMIN — INSULIN LISPRO 2 UNITS: 100 INJECTION, SOLUTION INTRAVENOUS; SUBCUTANEOUS at 01:16

## 2021-11-11 RX ADMIN — PANTOPRAZOLE SODIUM 40 MG: 40 INJECTION, POWDER, FOR SOLUTION INTRAVENOUS at 21:02

## 2021-11-11 RX ADMIN — INSULIN GLARGINE 22 UNITS: 100 INJECTION, SOLUTION SUBCUTANEOUS at 23:05

## 2021-11-11 RX ADMIN — CARVEDILOL 6.25 MG: 6.25 TABLET, FILM COATED ORAL at 17:53

## 2021-11-11 RX ADMIN — INSULIN LISPRO 1 UNITS: 100 INJECTION, SOLUTION INTRAVENOUS; SUBCUTANEOUS at 23:04

## 2021-11-11 RX ADMIN — Medication 10 ML: at 21:03

## 2021-11-11 RX ADMIN — SODIUM CHLORIDE: 9 INJECTION, SOLUTION INTRAVENOUS at 13:44

## 2021-11-11 RX ADMIN — INSULIN LISPRO 2 UNITS: 100 INJECTION, SOLUTION INTRAVENOUS; SUBCUTANEOUS at 08:38

## 2021-11-11 ASSESSMENT — ENCOUNTER SYMPTOMS
ABDOMINAL PAIN: 0
RHINORRHEA: 0
VOMITING: 0
COUGH: 0
SHORTNESS OF BREATH: 0
NAUSEA: 0
SORE THROAT: 0
DIARRHEA: 0

## 2021-11-11 ASSESSMENT — PAIN SCALES - GENERAL
PAINLEVEL_OUTOF10: 0

## 2021-11-11 NOTE — CONSULTS
Department of Internal Medicine  Nephrology Harpreet Chowdhury MD   Consult Note    Reason for consultation: Management of acute kidney injury. Consulting physician: Corrina Marie MD.    History of present illness: This is a 66 y.o. female with a significant past medical history of Type 2 diabetes mellitus, coronary artery disease, lumbar radiculopathy, Hyperlipidemia and chronic kidney disease stage III [baseline serum creatinine 1.5 mg/dL], who presented from [facility] for further evaluation of acute kidney injury. She was sent to [facility] after developing nonhealing wound s/p cardiac catheterization via right femoral approach with right groin wound. She had been on Bumex in the ECF and creatinine and BUN have been rising and Bumex was decreased to a lowest dose of 0.5 mg p.o. twice daily. She has had regular blood work performed in the [facility] and F contacted nephrologist after finding abnormal laboratory studies. She was noted to have a BUN/creatinine 104/2.28 mg/dL with hemoglobin 8 g/dL. She has mild confusion but is not in obvious respiratory distress. Patient has no known allergies.     Past Medical History:   Diagnosis Date    Anemia, unspecified     CAD (coronary artery disease)     Chronic kidney disease     CKD (chronic kidney disease) stage 3, GFR 30-59 ml/min ([facility])     Closed fracture of dorsal (thoracic) vertebra without mention of spinal cord injury     Coronary atherosclerosis of unspecified type of vessel, native or graft     Depression with anxiety     Esophageal reflux     HTN (hypertension)     Hyperkalemia     Hyperlipidemia     Intervertebral cervical disc disorder with myelopathy, cervical region     Lower back pain     Lumbar radiculopathy     Microalbuminuria     Muscle weakness     Pain in limb     Stenosis of cervical spine     Tethered cord syndrome (HCC)     Type II or unspecified type diabetes mellitus without mention of complication, not stated as uncontrolled     Urinary calculus     UTI (lower urinary tract infection)        Scheduled Meds:   aspirin  81 mg Oral Daily    atorvastatin  80 mg Oral Daily    carvedilol  6.25 mg Oral BID WC    famotidine  10 mg Oral Daily    insulin glargine  22 Units SubCUTAneous Nightly    insulin lispro  0-12 Units SubCUTAneous TID WC    insulin lispro  0-6 Units SubCUTAneous Nightly    sodium chloride flush  5-40 mL IntraVENous 2 times per day     Continuous Infusions:   sodium chloride       PRN Meds:.sodium chloride flush, sodium chloride, acetaminophen, ondansetron **OR** ondansetron, oxyCODONE-acetaminophen    Family History   Problem Relation Age of Onset    High Blood Pressure Other         Social History     Socioeconomic History    Marital status:      Spouse name: None    Number of children: None    Years of education: None    Highest education level: None   Occupational History    None   Tobacco Use    Smoking status: Never Smoker    Smokeless tobacco: None   Substance and Sexual Activity    Alcohol use: No     Alcohol/week: 0.0 standard drinks    Drug use: No    Sexual activity: None   Other Topics Concern    None   Social History Narrative    None     Social Determinants of Health     Financial Resource Strain:     Difficulty of Paying Living Expenses: Not on file   Food Insecurity:     Worried About Running Out of Food in the Last Year: Not on file    Katya of Food in the Last Year: Not on file   Transportation Needs:     Lack of Transportation (Medical): Not on file    Lack of Transportation (Non-Medical):  Not on file   Physical Activity:     Days of Exercise per Week: Not on file    Minutes of Exercise per Session: Not on file   Stress:     Feeling of Stress : Not on file   Social Connections:     Frequency of Communication with Friends and Family: Not on file    Frequency of Social Gatherings with Friends and Family: Not on file    Attends Sikhism Services: Not on file    Active Member of Clubs or Organizations: Not on file    Attends Club or Organization Meetings: Not on file    Marital Status: Not on file   Intimate Partner Violence:     Fear of Current or Ex-Partner: Not on file    Emotionally Abused: Not on file    Physically Abused: Not on file    Sexually Abused: Not on file   Housing Stability:     Unable to Pay for Housing in the Last Year: Not on file    Number of Ivettmorajeev in the Last Year: Not on file    Unstable Housing in the Last Year: Not on file     Review of systems: Unobtainable due to confusion. Physical Exam:    VITALS:  /60   Pulse 81   Temp 97.4 °F (36.3 °C) (Oral)   Resp 16   Wt 188 lb (85.3 kg)   SpO2 100%   BMI 33.30 kg/m²   24HR INTAKE/OUTPUT:    Intake/Output Summary (Last 24 hours) at 11/11/2021 1242  Last data filed at 11/11/2021 1204  Gross per 24 hour   Intake 840 ml   Output 945 ml   Net -105 ml       Constitutional: alert, appears stated age and cooperative    Skin: Skin color, texture, turgor normal. No rashes or lesions    Head: Normocephalic, without obvious abnormality, atraumatic     Cardiovascular/Edema: regular rate and rhythm, S1, S2 normal, no murmur, click, rub or gallop    Respiratory: Lungs: clear to auscultation bilaterally    Abdomen: soft, non-tender; bowel sounds normal; no masses,  no organomegaly    Back: symmetric, no curvature. ROM normal. No CVA tenderness. Extremities: edema 1+; Right groin nondraining ulcer.     Neuro:  Grossly normal      CBC:   Recent Labs     11/09/21  0710 11/10/21  1700   WBC 6.0 6.4   HGB 8.0* 7.2*    207     BMP:    Recent Labs     11/09/21  0710 11/10/21  1700    139   K 5.5* 5.0    105   CO2 21 26   * 109*   CREATININE 2.28* 2.66*   GLUCOSE 155* 204*       Lab Results   Component Value Date    NITRU NEGATIVE 11/10/2021    COLORU Yellow 11/10/2021    PHUR 6.0 11/10/2021    WBCUA TOO NUMEROUS TO COUNT 11/10/2021    RBCUA None 11/10/2021    MUCUS NOT REPORTED 11/10/2021    TRICHOMONAS NOT REPORTED 11/10/2021    YEAST NOT REPORTED 11/10/2021    BACTERIA NOT REPORTED 11/10/2021    CLARITYU Clear 09/07/2016    SPECGRAV 1.011 11/10/2021    LEUKOCYTESUR LARGE 11/10/2021    UROBILINOGEN Normal 11/10/2021    BILIRUBINUR NEGATIVE 11/10/2021    BLOODU Positive 09/07/2016    GLUCOSEU NEGATIVE 11/10/2021    KETUA NEGATIVE 11/10/2021    AMORPHOUS NOT REPORTED 11/10/2021     IMPRESSION/RECOMMENDATIONS:      1. Acute kidney injury - most consistent with prerenal azotemia from diuretic therapy. She does not have indications for acute hemodialysis. Plan: Hold Bumex. IV fluid 0.9 normal saline at 50 mL/h. Avoid nephrotoxic agents. Strict input and output documentation. Basic metabolic profile daily. 2.  Systemic hypertension - blood pressure control is adequate. Prognosis is guarded. Thank you very much for the courtesy of this consultation.     Bianca Granados MD FACP  Attending Nephrologist  11/11/2021 12:32 PM

## 2021-11-11 NOTE — H&P
[facility]    HISTORY AND PHYSICAL EXAMINATION            Date:   11/11/2021  Patient name:  Weston Martinez  Date of admission:  11/10/2021  4:23 PM  MRN:   [id number]  Account:  [de-identified]  YOB: 1950  PCP:    Araceli Marinelli DO  Room:   2095/2095-01  Code Status:    Full Code    Chief Complaint:     Chief Complaint   Patient presents with    Abnormal Lab     low hemoglobin and high CR       History Obtained From:     patient, electronic medical record    History of Present Illness:     Weston Martinez is a 66 y.o. Non- / non  female who presents with Abnormal Lab (low hemoglobin and high CR)   Patient was sent to the hospital from SNF because she was found to have elevated creatinine and low hemoglobin. Patient does have h/o CKD stage 3, creatinine was found to be 2.66. Patient states that she feels fatigued but denies any vomiting or diarrhea. Hemoglobin was 7.2, it was 8 late last month and 9.2 before that. She does have history of GI bleed, was worked up  At [address] [place] [address],[address] in May 2020. She underwent capsule endoscopy and enteroscopy. She denies any active bleeding. Patient has history of nonhealing wound of the left groin and also congestive heart failure  Iron studies were done 4 weeks ago, iron and iron saturation were mildly low. TIBC and ferritin normal. Folate and B12 levels normal.  Urinalysis positive for leucocyte esterase.  Does have h/o recurrent UTI      Past Medical History:     Past Medical History:   Diagnosis Date    Anemia, unspecified     CAD (coronary artery disease)     Chronic kidney disease     CKD (chronic kidney disease) stage 3, GFR 30-59 ml/min ([facility])     Closed fracture of dorsal (thoracic) vertebra without mention of spinal cord injury     Coronary atherosclerosis of unspecified type of vessel, native or graft     Depression with anxiety     Esophageal reflux     HTN (hypertension)  Hyperkalemia     Hyperlipidemia     Intervertebral cervical disc disorder with myelopathy, cervical region     Lower back pain     Lumbar radiculopathy     Microalbuminuria     Muscle weakness     Pain in limb     Stenosis of cervical spine     Tethered cord syndrome (HCC)     Type II or unspecified type diabetes mellitus without mention of complication, not stated as uncontrolled     Urinary calculus     UTI (lower urinary tract infection)         Past Surgical History:     Past Surgical History:   Procedure Laterality Date    ANGIOPLASTY      COLON SURGERY      JOINT REPLACEMENT          Medications Prior to Admission:     Prior to Admission medications    Medication Sig Start Date End Date Taking? Authorizing Provider   acetaminophen (TYLENOL) 325 MG tablet Take 650 mg by mouth every 4 hours as needed for Pain (do not exceed 4000 mg daily)   Yes Historical Provider, MD   albuterol (PROVENTIL) (2.5 MG/3ML) 0.083% nebulizer solution Take 2.5 mg by nebulization every 6 hours as needed for Shortness of Breath   Yes Historical Provider, MD   atorvastatin (LIPITOR) 80 MG tablet Take 80 mg by mouth daily   Yes Historical Provider, MD   famotidine (PEPCID) 20 MG tablet Take 20 mg by mouth daily   Yes Historical Provider, MD   ferrous sulfate (IRON 325) 325 (65 Fe) MG tablet Take 325 mg by mouth daily (with breakfast)   Yes Historical Provider, MD   HYDROcodone-acetaminophen (NORCO) 5-325 MG per tablet Take 1 tablet by mouth every 6 hours as needed for Pain.  Indications: until 11/13/21   Yes Historical Provider, MD   insulin lispro (HUMALOG) 100 UNIT/ML injection vial Inject into the skin 4 times daily (before meals and nightly) Per sliding scale:   150-200 = 2 units  201-250 = 4 units  251-300 = 6 units  301-350 = 8 units  351-400 = 10 units   Yes Historical Provider, MD   insulin glargine (LANTUS SOLOSTAR) 100 UNIT/ML injection pen Inject 22 Units into the skin nightly   Yes Historical Provider, MD meclizine (ANTIVERT) 12.5 MG tablet Take 12.5 mg by mouth 2 times daily as needed for Dizziness   Yes Historical Provider, MD   warfarin (COUMADIN) 3 MG tablet Take 3 mg by mouth nightly   Yes Historical Provider, MD   carvedilol (COREG) 6.25 MG tablet Take 6.25 mg by mouth 2 times daily (with meals). Yes Historical Provider, MD   aspirin 81 MG chewable tablet Take 81 mg by mouth daily    Yes Historical Provider, MD   bumetanide (BUMEX) 1 MG tablet Take 1 mg by mouth daily   11/10/21  Historical Provider, MD        Allergies:     Patient has no known allergies. Social History:     Tobacco:    reports that she has never smoked. She does not have any smokeless tobacco history on file. Alcohol:      reports no history of alcohol use. Drug Use:  reports no history of drug use. Family History:     Family History   Problem Relation Age of Onset    High Blood Pressure Other        Review of Systems:     Positive and Negative as described in HPI.     CONSTITUTIONAL:  negative for fevers, chills  RESPIRATORY:  negative for shortness of breath, cough, congestion, wheezing  CARDIOVASCULAR:  negative for chest pain, palpitations  GASTROINTESTINAL:  negative for  vomiting, diarrhea, change in bowel habits, abdominal pain   GENITOURINARY:  negative for hematuria  HEMATOLOGIC/LYMPHATIC:  Positive  for swelling/edema   ALLERGIC/IMMUNOLOGIC:  negative for urticaria , itching  ENDOCRINE:  negative increase in drinking, increase in urination, hot or cold intolerance  NEUROLOGICAL:  negative for headaches, dizziness, lightheadedness, numbness, pain, tingling extremities  BEHAVIOR/PSYCH:  negative for depression, anxiety    Physical Exam:   /60   Pulse 81   Temp 97.4 °F (36.3 °C) (Oral)   Resp 16   Wt 188 lb (85.3 kg)   SpO2 100%   BMI 33.30 kg/m²   Temp (24hrs), Av.6 °F (36.4 °C), Min:97.3 °F (36.3 °C), Max:97.8 °F (36.6 °C)    Recent Labs     21  0056 21  0832   POCGLU 247* 186* Intake/Output Summary (Last 24 hours) at 11/11/2021 0844  Last data filed at 11/11/2021 0755  Gross per 24 hour   Intake 480 ml   Output 925 ml   Net -445 ml       General Appearance: alert, in no acute distress.  On oxygen via nasal cannula  Mental status: oriented to person, place, and time  Head: normocephalic, atraumatic  Eye: no icterus, redness, pupils equal and reactive  Mouth: mucous membranes moist  Neck: supple,   Lungs: Bilateral equal air entry, clear to ausculation, no wheezing, rales or rhonchi, normal effort  Cardiovascular: normal rate, regular rhythm,   Abdomen: Soft, ostomy present  Neurologic: There are no new focal motor or sensory deficits  Extremities: LLE edema present  Psych: normal affect    Investigations:      Laboratory Testing:  Recent Results (from the past 24 hour(s))   CBC Auto Differential    Collection Time: 11/10/21  5:00 PM   Result Value Ref Range    WBC 6.4 3.5 - 11.0 k/uL    RBC 2.36 (L) 4.0 - 5.2 m/uL    Hemoglobin 7.2 (L) 12.0 - 16.0 g/dL    Hematocrit 22.4 (L) 36 - 46 %    MCV 95.0 80 - 100 fL    MCH 30.7 26 - 34 pg    MCHC 32.3 31 - 37 g/dL    RDW 15.5 (H) 11.5 - 14.9 %    Platelets 105 033 - 074 k/uL    MPV 8.1 6.0 - 12.0 fL    NRBC Automated NOT REPORTED per 100 WBC    Differential Type NOT REPORTED     Immature Granulocytes NOT REPORTED 0 %    Absolute Immature Granulocyte NOT REPORTED 0.00 - 0.30 k/uL    WBC Morphology NOT REPORTED     RBC Morphology NOT REPORTED     Platelet Estimate NOT REPORTED     Seg Neutrophils 61 36 - 66 %    Lymphocytes 24 24 - 44 %    Monocytes 11 (H) 1 - 7 %    Eosinophils % 4 0 - 4 %    Basophils 0 0 - 2 %    Segs Absolute 3.90 1.3 - 9.1 k/uL    Absolute Lymph # 1.50 1.0 - 4.8 k/uL    Absolute Mono # 0.70 0.1 - 1.3 k/uL    Absolute Eos # 0.30 0.0 - 0.4 k/uL    Basophils Absolute 0.00 0.0 - 0.2 k/uL   Basic Metabolic Prof    Collection Time: 11/10/21  5:00 PM   Result Value Ref Range    Glucose 204 (H) 70 - 99 mg/dL     (HH) 8 - 23 mg/dL    CREATININE 2.66 (H) 0.50 - 0.90 mg/dL    Bun/Cre Ratio NOT REPORTED 9 - 20    Calcium 8.7 8.6 - 10.4 mg/dL    Sodium 139 135 - 144 mmol/L    Potassium 5.0 3.7 - 5.3 mmol/L    Chloride 105 98 - 107 mmol/L    CO2 26 20 - 31 mmol/L    Anion Gap 8 (L) 9 - 17 mmol/L    GFR Non-African American 18 (L) >60 mL/min    GFR  21 (L) >60 mL/min    GFR Comment          GFR Staging NOT REPORTED    Magnesium    Collection Time: 11/10/21  5:00 PM   Result Value Ref Range    Magnesium 1.9 1.6 - 2.6 mg/dL   EKG 12 Lead    Collection Time: 11/10/21  5:33 PM   Result Value Ref Range    Ventricular Rate 97 BPM    Atrial Rate 105 BPM    QRS Duration 144 ms    Q-T Interval 376 ms    QTc Calculation (Bazett) 477 ms    R Axis -58 degrees    T Axis 107 degrees   Urinalysis Reflex to Culture    Collection Time: 11/10/21  6:11 PM    Specimen: Urine, clean catch   Result Value Ref Range    Color, UA Yellow Yellow    Turbidity UA Cloudy (A) Clear    Glucose, Ur NEGATIVE NEGATIVE    Bilirubin Urine NEGATIVE NEGATIVE    Ketones, Urine NEGATIVE NEGATIVE    Specific [place], UA 1.011 1.000 - 1.030    Urine Hgb LARGE (A) NEGATIVE    pH, UA 6.0 5.0 - 8.0    Protein, UA 2+ (A) NEGATIVE    Urobilinogen, Urine Normal Normal    Nitrite, Urine NEGATIVE NEGATIVE    Leukocyte Esterase, Urine LARGE (A) NEGATIVE    Urinalysis Comments NOT REPORTED    Microscopic Urinalysis    Collection Time: 11/10/21  6:11 PM   Result Value Ref Range    -          WBC, UA TOO NUMEROUS TO COUNT /HPF    RBC, UA None /HPF    Casts UA NOT REPORTED /LPF    Crystals, UA NOT REPORTED None /HPF    Epithelial Cells UA NOT REPORTED /HPF    Renal Epithelial, UA NOT REPORTED 0 /HPF    Bacteria, UA NOT REPORTED None    Mucus, UA NOT REPORTED None    Trichomonas, UA NOT REPORTED None    Amorphous, UA NOT REPORTED None    Other Observations UA NOT REPORTED NOT REQ.     Yeast, UA NOT REPORTED None   POC Glucose Fingerstick    Collection Time: 11/11/21 12:56 AM   Result Value Ref Range    POC Glucose 247 (H) 65 - 105 mg/dL   POC Glucose Fingerstick    Collection Time: 11/11/21  8:32 AM   Result Value Ref Range    POC Glucose 186 (H) 65 - 105 mg/dL       Imaging/Diagnostics:  No results found. Assessment :      Hospital Problems           Last Modified POA    JAYLYN (acute kidney injury) ([facility] [facility] 11/10/2021 Yes          Plan:     Patient status inpatient in the Progressive Unit/Step down     acute kidney injury, history of chronic kidney disease, currently on normal saline at 50 mL/hour per Nephrology. Appreciate recommendations. CKD workup in progress. UTI, history of recurrent urinary tract infection, continue Rocephin, urine culture pending   anemia, based on previous iron studies appear to be of chronic disease. Does have history of previous GI bleed, was worked up at [address] [place] [address],[address] in May 2020. Check FOBT. Change Pepcid to Protonix. Hold ASA for now   nonhealing left groin wound-  Wound care  5. DM- Lantus 22 units +ISS  6. Chronic CHF- Coreg  7. Dyslipidemia- Lipitor  8. Supportive care    Consultations:   IP CONSULT TO NEPHROLOGY  IP CONSULT TO PRIMARY CARE PROVIDER     Patient is admitted as inpatient status because of co-morbidities listed above, severity of signs and symptoms as outlined, requirement for current medical therapies and most importantly because of direct risk to patient if care not provided in a hospital setting. Expected length of stay > 48 hours.     Zara Samaniego MD  11/11/2021  8:44 AM    Copy sent to Dr. Megan Johnson DO

## 2021-11-11 NOTE — ED PROVIDER NOTES
[facility] EMERGENCY DEPARTMENT ENCOUNTER   ATTENDING ATTESTATION     Pt Name: Smitha Cavazos  MRN: [id number]  [place] 1950  Date of evaluation: 11/10/21       Smitha Cavazos is a 66 y.o. female who presents with Abnormal Lab (low hemoglobin and high CR)      MDM: Patient presents with worsening creatinine on diuretics. Patient appears to have whites in her urine and is still on antibiotics. Considering worsening renal function with history of urinary retention and recent urinary tract infection as well as diuresis the patient will be admitted for further monitoring of her renal function and nephrology evaluation      Vitals:   Vitals:    11/10/21 1625   BP: (!) 126/55   Pulse: 88   Resp: 18   Temp: 97.8 °F (36.6 °C)   TempSrc: Oral   SpO2: 100%   Weight: 188 lb (85.3 kg)         I personally evaluated and examined the patient in conjunction with the resident and agree with the assessment, treatment plan, and disposition of the patient as recorded by the resident. I performed a history and physical examination of the patient and discussed management with the resident. I reviewed the residents note and agree with the documented findings and plan of care. Any areas of disagreement are noted on the chart. I was personally present for the key portions of any procedures. I have documented in the chart those procedures where I was not present during the key portions. I have personally reviewed all images and agree with the resident's interpretation. I have reviewed the emergency nurses triage note. I agree with the chief complaint, past medical history, past surgical history, allergies, medications, social and family history as documented unless otherwise noted.     Impression:   Acute on chronic renal failure, UTI  Anemia    Disposition:  Admit medically stable condition    The care is provided during an unprecedented national emergency due to the novel coronavirus, COVID [facility], DO  Attending Emergency Physician         Aime Warren,   11/10/21 2041

## 2021-11-11 NOTE — PROGRESS NOTES
Medication History completed:    New medications: warfarin, meclizine, Humalog, Lantus, Norco, ferrous sulfate, famotidine, atorvastatin, albuterol nebulizer solution, acetaminophen    Medications discontinued: Januvia, pravastatin, Levemir, esomeprazole, clopidogrel, amlodipine    Changes to dosing: none    Stated allergies: NKDA    Other pertinent information: Medications confirmed with facility list [facility]).      Thank you,  Jeff Roberts, PharmD, BCPS  [phone]

## 2021-11-11 NOTE — CARE COORDINATION
CASE MANAGEMENT NOTE:    Admission Date:  11/10/2021 Michael Pacheco is a 66 y.o.  female    Admitted for : JAYLYN (acute kidney injury) ([facility] [facility] [N17.9]  Urinary tract infection in elderly patient [N39.0]  Anemia, unspecified type [D64.9]    Writer reviewed chart. PCP:  Dr. Rachel Moreira:  Joslyn Proffer:  in nursing home - [facility]. Does patient go to outpatient dialysis: No  If yes, location and chair time: n/a    Is patient currently receiving oral anticoagulation therapy? Yes - Coumadin        Discharge Plan:  LSW following for return to [facility]. Pre-cert needed to return. PT/OT ordered.                  Electronically signed by: Mich Marin RN on 11/11/2021 at 4:32 PM

## 2021-11-11 NOTE — ED NOTES
Report given to Alexus Doan RN from PCU. Report method by phone   The following was reviewed with receiving RN:   Current vital signs:  BP (!) 106/59   Pulse 99   Temp 97.7 °F (36.5 °C) (Oral)   Resp 27   Wt 188 lb (85.3 kg)   SpO2 100%   BMI 33.30 kg/m²                MEWS Score: 2     Any medication or safety alerts were reviewed. Any pending diagnostics and notifications were also reviewed, as well as any safety concerns or issues, abnormal labs, abnormal imaging, and abnormal assessment findings. Questions were answered.             Octavio Byrd RN  11/10/21 7029

## 2021-11-11 NOTE — DISCHARGE INSTR - COC
Continuity of Care Form    Patient Name: Marely Garnica   :  1950  MRN:  [id number]    Admit date:  11/10/2021  Discharge date:  21    Code Status Order: Full Code   Advance Directives:      Admitting Physician:  Lorena Addison MD  PCP: Eulalio Murillo DO    Discharging Nurse: [facility] Unit/Room#: 2014  Discharging Unit Phone Number: [phone]    Emergency Contact:   Extended Emergency Contact Information  Primary Emergency Contact: Marlon Franklin  Address: [address] [place] [address], [address] [place] [address]  Home Phone: [phone]  Relation: Spouse  Secondary Emergency Contact: georgia dumont  Mobile Phone: [phone]  Relation: Brother/Sister    Past Surgical History:  Past Surgical History:   Procedure Laterality Date    ANGIOPLASTY      COLON SURGERY      JOINT REPLACEMENT         Immunization History: There is no immunization history on file for this patient.     Active Problems:  Patient Active Problem List   Diagnosis Code    Chronic kidney disease (CKD), stage III (moderate) ([facility]) N18.30    Hyperkalemia E87.5    Type II or unspecified type diabetes mellitus without mention of complication, not stated as uncontrolled E11.9    Hyperlipidemia E78.5    CKD (chronic kidney disease) stage 3, GFR 30-59 ml/min ([facility]) N18.30    Microalbuminuria R80.9    Urinary calculus N20.9    Tethered cord syndrome ([facility]) Q06.8    Stenosis of cervical spine M48.02    Lumbar radiculopathy M54.16    Lower back pain M54.50    HTN (hypertension) I10    Esophageal reflux K21.9    Depression with anxiety F41.8    Coronary atherosclerosis I25.10    Closed fracture of thoracic vertebra ([facility]) S22.009A    Intervertebral cervical disc disorder with myelopathy, cervical region M50.00    Pain in limb M79.609    Anemia D64.9    JAYLYN (acute kidney injury) ([facility] [address] [facility] N17.9       Isolation/Infection:   Isolation            No Isolation          Patient Infection Status       None to display            Nurse 1210   Dressing/Treatment Betadine swabs/povidone iodine 11/29/21 1210   Offloading for Diabetic Foot Ulcers Yes (type) 11/29/21 0800   Dressing Change Due 11/23/21 11/27/21 1600   Wound Length (cm) 0.8 cm 11/29/21 1210   Wound Width (cm) 1.1 cm 11/29/21 1210   Wound Depth (cm) 0 cm 11/29/21 1210   Wound Surface Area (cm^2) 0.88 cm^2 11/29/21 1210   Change in Wound Size % (l*w) 47.62 11/29/21 1210   Wound Volume (cm^3) 0 cm^3 11/29/21 1210   Wound Healing % 100 11/29/21 1210   Wound Assessment Eschar dry 11/29/21 1210   Drainage Amount None 11/29/21 1210   Drainage Description Other (Comment) 11/22/21 0400   Odor None 11/29/21 1210   Yudith-wound Assessment Dry/flaky 11/29/21 1210   Margins Attached edges 11/29/21 1210   Wound Thickness Description not for Pressure Injury Partial thickness 11/29/21 0800   Number of days: 16       Wound 11/12/21 Foot Left; Medial (Active)   Wound Image   11/29/21 1210   Wound Etiology Arterial 11/29/21 1210   Dressing Status New dressing applied 11/29/21 1210   Wound Cleansed Betadine/povidone iodine 11/29/21 1210   Dressing/Treatment Betadine swabs/povidone iodine 11/29/21 1210   Offloading for Diabetic Foot Ulcers Yes (type) 11/29/21 0800   Dressing Change Due 11/23/21 11/27/21 1600   Wound Length (cm) 2.4 cm 11/29/21 1210   Wound Width (cm) 3.2 cm 11/29/21 1210   Wound Depth (cm) 0 cm 11/29/21 1210   Wound Surface Area (cm^2) 7.68 cm^2 11/29/21 1210   Change in Wound Size % (l*w) 8.57 11/29/21 1210   Wound Volume (cm^3) 0 cm^3 11/29/21 1210   Wound Healing % 100 11/29/21 1210   Wound Assessment Eschar dry 11/29/21 1210   Drainage Amount None 11/29/21 1210   Drainage Description Other (Comment) 11/22/21 0400   Odor None 11/29/21 1210   Yudith-wound Assessment Dry/flaky 11/29/21 1210   Margins Attached edges 11/29/21 1210   Wound Thickness Description not for Pressure Injury Partial thickness 11/29/21 0800   Number of days: 16       Wound 11/12/21 Heel Left; Proximal (Active)   Wound 1210   Margins Defined edges 11/29/21 1210   Number of days: 6       Right groin wound: Cleanse with saline, pat dry. Apply opticell ag to wound, cover with island dressing. Change daily and as needed if loose or soiled. Left medial foot, left lateral foot: Keep dry. Paint with betadine swab daily. No foam dressings- we want to keep these intact due to ischemia    Bilateral arms (weeping edema): Cleanse with soap and water, pat dry. Apply ABD pads and wrap with roll gauze. Change daily and as needed if loose or soiled. Left heel proximal/distal wounds: cleanse with saline, pat dry. Apply opticell ag to wounds, cover with ABD, wrap with roll gauze. Change daily and as needed if loose or soiled. Elimination:  Continence: Bowel: No  Bladder: No  Urinary Catheter: None   Colostomy/Ileostomy/Ileal Conduit: Yes  Colostomy RLQ-Stomal Appliance: 1 piece  Colostomy RLQ-Stoma  Assessment: Pink  Colostomy RLQ-Peristomal Assessment: Clean, Intact  Colostomy RLQ-Stool Appearance: Watery  Colostomy RLQ-Stool Color: Black  Colostomy RLQ-Stool Amount: Small  Colostomy RLQ-Output (mL): 20 ml    Date of Last BM: ***    Intake/Output Summary (Last 24 hours) at 11/11/2021 1636  Last data filed at 11/11/2021 1622  Gross per 24 hour   Intake 840 ml   Output 1245 ml   Net -405 ml     I/O last 3 completed shifts:   In: 840 [P.O.:840]  Out: 945 [Urine:425; Stool:520]    Safety Concerns:     Aspiration    Impairments/Disabilities:      Speech    Nutrition Therapy:  Current Nutrition Therapy:   - Tube Feedings:  Renal    Routes of Feeding: Gastrostomy Tube  Liquids: No Liquids  Daily Fluid Restriction: no  Last Modified Barium Swallow with Video (Video Swallowing Test): not done    Treatments at the Time of Hospital Discharge:   Respiratory Treatments:   Oxygen Therapy:    Ventilator:    - Ventilator Settings:    Vt Ordered: 450 mL  Rate Set: 20 bmp  FiO2 : (S) 28 %    PEEP/CPAP: 5  Pressure Support: (S) 10 cmH20    Rehab Therapies: Physical Therapy and Occupational Therapy  Weight Bearing Status/Restrictions: Non weightbearing   Other Medical Equipment (for information only, NOT a DME order):  {EQUIPMENT:[id number]}  Other Treatments: skilled nursing assessment, medication education and monitoring    Patient's personal belongings (please select all that are sent with patient):  None    RN SIGNATURE:  Electronically signed by Clinton Keita RN on 12/14/21 at 2:07 PM EST    CASE MANAGEMENT/SOCIAL WORK SECTION    Inpatient Status Date: 11/10/2021    Readmission Risk Assessment Score:  Readmission Risk              Risk of Unplanned Readmission:  16           Discharging to Facility/ Tricia  [address], [address] [facility]  [phone]      Dialysis Facility (if applicable)   Name:  Address:  Dialysis Schedule:  Phone:  Fax:    / signature: Electronically signed by YURI Steinberg on 12/14//21 at 12:32 PM EST    PHYSICIAN SECTION    Prognosis: Fair    Condition at Discharge: Stable    Rehab Potential (if transferring to Rehab): Fair    Recommended Labs or Other Treatments After Discharge:     Physician Certification: I certify the above information and transfer of Leilani Golden  is necessary for the continuing treatment of the diagnosis listed and that she requires LTAC for less 30 days.      Update Admission H&P: No change in H&P    PHYSICIAN SIGNATURE:  Electronically signed by aDna Ortega MD on 12/7/21 at 1:04 PM EST

## 2021-11-12 ENCOUNTER — APPOINTMENT (OUTPATIENT)
Dept: ULTRASOUND IMAGING | Age: 71
DRG: 004 | End: 2021-11-12
Payer: COMMERCIAL

## 2021-11-12 ENCOUNTER — APPOINTMENT (OUTPATIENT)
Dept: CT IMAGING | Age: 71
DRG: 004 | End: 2021-11-12
Payer: COMMERCIAL

## 2021-11-12 PROBLEM — D50.9 IRON DEFICIENCY ANEMIA: Status: ACTIVE | Noted: 2021-11-12

## 2021-11-12 LAB
-: ABNORMAL
ALBUMIN SERPL-MCNC: 2.6 G/DL (ref 3.5–5.2)
AMORPHOUS: ABNORMAL
ANION GAP SERPL CALCULATED.3IONS-SCNC: 11 MMOL/L (ref 9–17)
ANTI DNA DOUBLE STRANDED: 1.2 IU/ML
ANTI-NUCLEAR ANTIBODY (ANA): NEGATIVE
BACTERIA: ABNORMAL
BILIRUBIN URINE: NEGATIVE
BUN BLDV-MCNC: 97 MG/DL (ref 8–23)
BUN/CREAT BLD: ABNORMAL (ref 9–20)
CALCIUM SERPL-MCNC: 8.6 MG/DL (ref 8.6–10.4)
CASTS UA: ABNORMAL /LPF
CHLORIDE BLD-SCNC: 107 MMOL/L (ref 98–107)
CO2: 22 MMOL/L (ref 20–31)
COLOR: YELLOW
COMMENT UA: ABNORMAL
CREAT SERPL-MCNC: 2.59 MG/DL (ref 0.5–0.9)
CRYSTALS, UA: ABNORMAL /HPF
DATE, STOOL #1: ABNORMAL
DATE, STOOL #2: ABNORMAL
DATE, STOOL #3: ABNORMAL
ENA ANTIBODIES SCREEN: 0.6 U/ML
EPITHELIAL CELLS UA: ABNORMAL /HPF
GFR AFRICAN AMERICAN: 22 ML/MIN
GFR NON-AFRICAN AMERICAN: 18 ML/MIN
GFR SERPL CREATININE-BSD FRML MDRD: ABNORMAL ML/MIN/{1.73_M2}
GFR SERPL CREATININE-BSD FRML MDRD: ABNORMAL ML/MIN/{1.73_M2}
GLUCOSE BLD-MCNC: 119 MG/DL (ref 65–105)
GLUCOSE BLD-MCNC: 176 MG/DL (ref 70–99)
GLUCOSE BLD-MCNC: 186 MG/DL (ref 65–105)
GLUCOSE BLD-MCNC: 194 MG/DL (ref 65–105)
GLUCOSE BLD-MCNC: 232 MG/DL (ref 65–105)
GLUCOSE URINE: NEGATIVE
HEMOCCULT SP1 STL QL: POSITIVE
HEMOCCULT SP2 STL QL: ABNORMAL
HEMOCCULT SP3 STL QL: ABNORMAL
KETONES, URINE: NEGATIVE
LEUKOCYTE ESTERASE, URINE: ABNORMAL
MUCUS: ABNORMAL
NITRITE, URINE: NEGATIVE
OTHER OBSERVATIONS UA: ABNORMAL
PH UA: 6 (ref 5–8)
PHOSPHORUS: 4 MG/DL (ref 2.6–4.5)
POTASSIUM SERPL-SCNC: 5.3 MMOL/L (ref 3.7–5.3)
PROTEIN UA: ABNORMAL
RBC UA: ABNORMAL /HPF
RENAL EPITHELIAL, UA: ABNORMAL /HPF
SODIUM BLD-SCNC: 140 MMOL/L (ref 135–144)
SPECIFIC GRAVITY UA: 1.01 (ref 1–1.03)
TIME, STOOL #1: ABNORMAL
TIME, STOOL #2: ABNORMAL
TIME, STOOL #3: ABNORMAL
TRICHOMONAS: ABNORMAL
TURBIDITY: ABNORMAL
URINE HGB: ABNORMAL
UROBILINOGEN, URINE: NORMAL
WBC UA: ABNORMAL /HPF
YEAST: ABNORMAL

## 2021-11-12 PROCEDURE — 99213 OFFICE O/P EST LOW 20 MIN: CPT

## 2021-11-12 PROCEDURE — 97530 THERAPEUTIC ACTIVITIES: CPT

## 2021-11-12 PROCEDURE — 6370000000 HC RX 637 (ALT 250 FOR IP): Performed by: FAMILY MEDICINE

## 2021-11-12 PROCEDURE — 2060000000 HC ICU INTERMEDIATE R&B

## 2021-11-12 PROCEDURE — 74176 CT ABD & PELVIS W/O CONTRAST: CPT

## 2021-11-12 PROCEDURE — C9113 INJ PANTOPRAZOLE SODIUM, VIA: HCPCS | Performed by: INTERNAL MEDICINE

## 2021-11-12 PROCEDURE — 87086 URINE CULTURE/COLONY COUNT: CPT

## 2021-11-12 PROCEDURE — 76770 US EXAM ABDO BACK WALL COMP: CPT

## 2021-11-12 PROCEDURE — 2580000003 HC RX 258: Performed by: INTERNAL MEDICINE

## 2021-11-12 PROCEDURE — 97166 OT EVAL MOD COMPLEX 45 MIN: CPT

## 2021-11-12 PROCEDURE — 82270 OCCULT BLOOD FECES: CPT

## 2021-11-12 PROCEDURE — 97162 PT EVAL MOD COMPLEX 30 MIN: CPT

## 2021-11-12 PROCEDURE — 36415 COLL VENOUS BLD VENIPUNCTURE: CPT

## 2021-11-12 PROCEDURE — 80069 RENAL FUNCTION PANEL: CPT

## 2021-11-12 PROCEDURE — 6360000002 HC RX W HCPCS: Performed by: INTERNAL MEDICINE

## 2021-11-12 PROCEDURE — 82947 ASSAY GLUCOSE BLOOD QUANT: CPT

## 2021-11-12 PROCEDURE — 81001 URINALYSIS AUTO W/SCOPE: CPT

## 2021-11-12 RX ORDER — FERROUS SULFATE 325(65) MG
325 TABLET ORAL
Status: DISCONTINUED | OUTPATIENT
Start: 2021-11-13 | End: 2021-12-14 | Stop reason: HOSPADM

## 2021-11-12 RX ADMIN — CARVEDILOL 6.25 MG: 6.25 TABLET, FILM COATED ORAL at 17:17

## 2021-11-12 RX ADMIN — CARVEDILOL 6.25 MG: 6.25 TABLET, FILM COATED ORAL at 07:50

## 2021-11-12 RX ADMIN — INSULIN LISPRO 2 UNITS: 100 INJECTION, SOLUTION INTRAVENOUS; SUBCUTANEOUS at 20:11

## 2021-11-12 RX ADMIN — INSULIN LISPRO 2 UNITS: 100 INJECTION, SOLUTION INTRAVENOUS; SUBCUTANEOUS at 12:30

## 2021-11-12 RX ADMIN — INSULIN GLARGINE 22 UNITS: 100 INJECTION, SOLUTION SUBCUTANEOUS at 20:11

## 2021-11-12 RX ADMIN — CARVEDILOL 6.25 MG: 6.25 TABLET, FILM COATED ORAL at 09:08

## 2021-11-12 RX ADMIN — SODIUM CHLORIDE: 9 INJECTION, SOLUTION INTRAVENOUS at 23:23

## 2021-11-12 RX ADMIN — INSULIN LISPRO 2 UNITS: 100 INJECTION, SOLUTION INTRAVENOUS; SUBCUTANEOUS at 17:17

## 2021-11-12 RX ADMIN — ATORVASTATIN CALCIUM 80 MG: 80 TABLET, FILM COATED ORAL at 07:50

## 2021-11-12 RX ADMIN — CEFTRIAXONE SODIUM 1000 MG: 1 INJECTION, POWDER, FOR SOLUTION INTRAMUSCULAR; INTRAVENOUS at 17:17

## 2021-11-12 RX ADMIN — ASPIRIN 81 MG: 81 TABLET, CHEWABLE ORAL at 07:51

## 2021-11-12 RX ADMIN — PANTOPRAZOLE SODIUM 40 MG: 40 INJECTION, POWDER, FOR SOLUTION INTRAVENOUS at 07:50

## 2021-11-12 RX ADMIN — Medication 10 ML: at 07:50

## 2021-11-12 NOTE — PROGRESS NOTES
[address] [place] [address]    Progress Note    11/12/2021    9:08 AM    Name:   Adonay Cherry  MRN:     [id number]     Acct:      [de-identified]   Room:   2095/2095-01   Day:  2  Admit Date:  11/10/2021  4:23 PM    PCP:   Shakira Mota DO  Code Status:  Full Code    Subjective:     C/C:   Chief Complaint   Patient presents with    Abnormal Lab     low hemoglobin and high CR     Interval History Status: not changed. Patient admitted with elevated creatinine and anemia  States that she continues to feel fatigued  Denies any pain  Occult blood is positive  Afebrile  BP stable  Respiratory status stable on 2 liters oxygen via nasal cannula (baseline)  Hemoglobin not available for today- will check tomorrow  Creatinine unchanged  Renal US showed-  Moderate to severe right hydronephrosis and mild to moderate left hydronephrosis   CT abdomen-  Showed severe right hydroureteronephrosis      Review of Systems:     Constitutional:  negative for chills, fevers, sweats  Respiratory:  negative for cough, dyspnea on exertion, shortness of breath, wheezing  Cardiovascular:  negative for chest pain, chest pressure/discomfort,palpitations  Gastrointestinal:  negative for abdominal pain, constipation, diarrhea, nausea, vomiting  Neurological:  negative for dizziness, headache    Medications:      Allergies:  No Known Allergies    Current Meds:   Scheduled Meds:    aspirin  81 mg Oral Daily    atorvastatin  80 mg Oral Daily    carvedilol  6.25 mg Oral BID WC    insulin glargine  22 Units SubCUTAneous Nightly    insulin lispro  0-12 Units SubCUTAneous TID WC    insulin lispro  0-6 Units SubCUTAneous Nightly    sodium chloride flush  5-40 mL IntraVENous 2 times per day    cefTRIAXone (ROCEPHIN) IV  1,000 mg IntraVENous Q24H    pantoprazole  40 mg IntraVENous Daily    And    sodium chloride (PF)  10 mL IntraVENous Daily     Continuous Infusions:    sodium chloride      sodium chloride 100 mL/hr at 21 0907     PRN Meds: sodium chloride flush, sodium chloride, acetaminophen, ondansetron **OR** ondansetron, oxyCODONE-acetaminophen    Data:     Past Medical History:   has a past medical history of Anemia, unspecified, CAD (coronary artery disease), Chronic kidney disease, CKD (chronic kidney disease) stage 3, GFR 30-59 ml/min ([facility]), Closed fracture of dorsal (thoracic) vertebra without mention of spinal cord injury, Coronary atherosclerosis of unspecified type of vessel, native or graft, Depression with anxiety, Esophageal reflux, HTN (hypertension), Hyperkalemia, Hyperlipidemia, Intervertebral cervical disc disorder with myelopathy, cervical region, Lower back pain, Lumbar radiculopathy, Microalbuminuria, Muscle weakness, Pain in limb, Stenosis of cervical spine, Tethered cord syndrome ([facility] [address] Type II or unspecified type diabetes mellitus without mention of complication, not stated as uncontrolled, Urinary calculus, and UTI (lower urinary tract infection). Social History:   reports that she has never smoked. She does not have any smokeless tobacco history on file. She reports that she does not drink alcohol and does not use drugs. Family History:   Family History   Problem Relation Age of Onset    High Blood Pressure Other        Vitals:  /64   Pulse 89   Temp 97.3 °F (36.3 °C)   Resp 16   Wt 204 lb 2.3 oz (92.6 kg)   SpO2 100%   BMI 36.16 kg/m²   Temp (24hrs), Av.5 °F (36.4 °C), Min:97.3 °F (36.3 °C), Max:97.8 °F (36.6 °C)    Recent Labs     21  1059 21  1555 21  2134 21  0638   POCGLU 209* 193* 164* 119*       I/O (24Hr):     Intake/Output Summary (Last 24 hours) at 2021 0908  Last data filed at 2021 0907  Gross per 24 hour   Intake 660 ml   Output 1070 ml   Net -410 ml       Labs:  Hematology:  Recent Labs     11/10/21  0725 11/10/21  1700   WBC  --  6.4   RBC  --  2.36*   HGB  --  7.2*   HCT  --  22.4*   MCV  --  95.0   MCH --  30.7   MCHC  --  32.3   RDW  --  15.5*   PLT  --  207   MPV  --  8.1   INR 3.1  --      Chemistry:  Recent Labs     11/10/21  1700 11/11/21  1300     --    K 5.0  --      --    CO2 26  --    GLUCOSE 204*  --    *  --    CREATININE 2.66*  --    MG 1.9  --    ANIONGAP 8*  --    LABGLOM 18*  --    GFRAA 21*  --    CALCIUM 8.7  --    CKTOTAL  --  50     Recent Labs     11/11/21  0056 11/11/21  0832 11/11/21  1059 11/11/21  1555 11/11/21  2134 11/12/21  0638   POCGLU 247* 186* 209* 193* 164* 119*     ABG:No results found for: POCPH, PHART, PH, POCPCO2, UGT5KCC, PCO2, POCPO2, PO2ART, PO2, POCHCO3, MAL4WHT, HCO3, NBEA, PBEA, BEART, BE, THGBART, THB, ZUY7RYX, JYBN0FLB, B4QBXURB, O2SAT, FIO2  Lab Results   Component Value Date/Time    SPECIAL NOT REPORTED 11/10/2021 06:11 PM     Lab Results   Component Value Date/Time    CULTURE NO SIGNIFICANT GROWTH 11/10/2021 06:11 PM       Radiology:  US RETROPERITONEAL COMPLETE    Result Date: 11/12/2021  1. Moderate to severe right and mild-to-moderate left hydronephrosis. 2. Exam limited due to patient's body habitus. Physical Examination:        General appearance:  alert, cooperative and no distress  Mental Status:  oriented to person, place and time and normal affect  Lungs:  clear to auscultation bilaterally, normal effort  Heart:  regular rate and rhythm, no murmur  Abdomen:  soft, nontender, nondistended, normal bowel sounds,   Extremities:   Right lower extremity edema present    Assessment:        Hospital Problems           Last Modified POA    JAYLYN (acute kidney injury) ([facility] [address] 11/10/2021 Yes          Plan:      1. Acute kidney injury, history of chronic kidney disease, currently on normal saline at 100 mL/hour per Nephrology. Appreciate recommendations. CKD workup in progress. 2.  Severe right hydronephrosis, UTI, history of recurrent urinary tract infection, continue Rocephin, urine culture pending.  Urology consult  3. anemia, based on previous iron studies appear to be of chronic disease. Does have history of previous GI bleed, was worked up at [address] [place] [address],[address] in May 2020. FOBT positive. Continue  Protonix. Hold ASA. GI consult. Continue to monitor Hb  4. nonhealing left groin wound-  Wound care  5. DM- Lantus 22 units +ISS. Glucose control fair  6. Chronic CHF- Coreg  7. Dyslipidemia- Lipitor  8. Supportive care  9.  I called the patient's  and updated him as per patient request, he voiced understanding.  Kade Cornejo MD  11/12/2021  9:08 AM

## 2021-11-12 NOTE — PLAN OF CARE
Problem: SAFETY  Goal: Free from accidental physical injury  11/12/2021 1533 by Sarika Dumont RN  Outcome: Ongoing   Pt assessed as a fall risk this shift. Remains free from falls and accidental injury at this time. Fall precautions in place, including falling star sign and fall risk band on pt. Floor free from obstacles, and bed is locked and in lowest position. Adequate lighting provided. Pt encouraged to call before getting OOB for any need. Bed alarm activated. Will continue to monitor needs during hourly rounding, and reinforce education on use of call light.     Problem: DAILY CARE  Goal: Daily care needs are met  11/12/2021 1533 by Sarika Dumont RN  Outcome: Ongoing     Problem: PAIN  Goal: Patient's pain/discomfort is manageable  Outcome: Ongoing   Patient had no c/o pain this shift    Problem: SKIN INTEGRITY  Goal: Skin integrity is maintained or improved  11/12/2021 1533 by Sarika Dumont RN  Outcome: Ongoing     Problem: Falls - Risk of:  Goal: Will remain free from falls  Description: Will remain free from falls  11/12/2021 1533 by Sarika Dumont RN  Outcome: Ongoing     Problem: Skin Integrity:  Goal: Will show no infection signs and symptoms  Description: Will show no infection signs and symptoms  11/12/2021 1533 by Sarika Dumont RN  Outcome: Ongoing

## 2021-11-12 NOTE — PLAN OF CARE
Problem: SAFETY  Goal: Free from accidental physical injury  Outcome: Ongoing     Problem: DAILY CARE  Goal: Daily care needs are met  Outcome: Ongoing     Problem: SKIN INTEGRITY  Goal: Skin integrity is maintained or improved  Outcome: Ongoing     Problem: Falls - Risk of:  Goal: Will remain free from falls  Description: Will remain free from falls  Outcome: Ongoing     Problem: Skin Integrity:  Goal: Will show no infection signs and symptoms  Description: Will show no infection signs and symptoms  Outcome: Ongoing

## 2021-11-12 NOTE — CARE COORDINATION
Social work: Referred pt with spouse approval to [place] [address] program [phone]  for assessment after [facility] short rehab stay. When pt is ready for return to [facility] Will need precert. . Asked [facility] to start precert. No new hens will be needed. Will need james and Rx along with precert at discharge. This weekend for chapito Cohnneva Teena is on call [phone].   Mahogany nascimento

## 2021-11-12 NOTE — PROGRESS NOTES
Department of Internal Medicine  Nephrology W[place]o Shirley MD  Progress Note    Reason for consultation: Management of acute kidney injury. Consulting physician: Cynthia Paige MD.    Interval history: Patient was seen and examined today and she does not have any new complaints. She remains generally uncomfortable with general body aches but no increased shortness of breath. Right groin wound dressing has been exchanged. She is nonoliguric and BUN trended down slightly on IV fluid at 50 mL/h. History of present illness: This is a 66 y.o. female with a significant past medical history of Type 2 diabetes mellitus, coronary artery disease, lumbar radiculopathy, Hyperlipidemia and chronic kidney disease stage III [baseline serum creatinine 1.5 mg/dL], who presented from [facility] for further evaluation of acute kidney injury. She was sent to [facility] after developing nonhealing wound s/p cardiac catheterization via right femoral approach with right groin wound. She had been on Bumex in the ECF and creatinine and BUN have been rising and Bumex was decreased to a lowest dose of 0.5 mg p.o. twice daily. She has had regular blood work performed in the [facility] and ECF contacted nephrologist after finding abnormal laboratory studies. She was noted to have a BUN/creatinine 104/2.28 mg/dL with hemoglobin 8 g/dL. She has mild confusion but is not in obvious respiratory distress.     Scheduled Meds:   aspirin  81 mg Oral Daily    atorvastatin  80 mg Oral Daily    carvedilol  6.25 mg Oral BID     insulin glargine  22 Units SubCUTAneous Nightly    insulin lispro  0-12 Units SubCUTAneous TID WC    insulin lispro  0-6 Units SubCUTAneous Nightly    sodium chloride flush  5-40 mL IntraVENous 2 times per day    cefTRIAXone (ROCEPHIN) IV  1,000 mg IntraVENous Q24H    pantoprazole  40 mg IntraVENous Daily    And    sodium chloride (PF)  10 mL IntraVENous Daily     Continuous Infusions:   sodium chloride      sodium chloride 100 mL/hr at 11/12/21 0907     PRN Meds:.sodium chloride flush, sodium chloride, acetaminophen, ondansetron **OR** ondansetron, oxyCODONE-acetaminophen    Physical Exam:    VITALS:  /69   Pulse 84   Temp 97.7 °F (36.5 °C) (Oral)   Resp 18   Wt 204 lb 2.3 oz (92.6 kg)   SpO2 98%   BMI 36.16 kg/m²   24HR INTAKE/OUTPUT:      Intake/Output Summary (Last 24 hours) at 11/12/2021 1247  Last data filed at 11/12/2021 1881  Gross per 24 hour   Intake 300 ml   Output 1050 ml   Net -750 ml     Constitutional: alert, appears stated age and cooperative    Skin: Skin color, texture, turgor normal. No rashes or lesions    Head: Normocephalic, without obvious abnormality, atraumatic     Cardiovascular/Edema: regular rate and rhythm, S1, S2 normal, no murmur, click, rub or gallop    Respiratory: Lungs: clear to auscultation bilaterally    Abdomen: soft, non-tender; bowel sounds normal; no masses,  no organomegaly    Back: symmetric, no curvature. ROM normal. No CVA tenderness. Extremities: edema 1+; Right groin nondraining ulcer.     Neuro:  Grossly normal    CBC:   Recent Labs     11/10/21  1700   WBC 6.4   HGB 7.2*        BMP:    Recent Labs     11/10/21  1700 11/12/21  0851    140   K 5.0 5.3    107   CO2 26 22   * 97*   CREATININE 2.66* 2.59*   GLUCOSE 204* 176*     Lab Results   Component Value Date    NITRU NEGATIVE 11/10/2021    COLORU Yellow 11/10/2021    PHUR 6.0 11/10/2021    WBCUA TOO NUMEROUS TO COUNT 11/10/2021    RBCUA None 11/10/2021    MUCUS NOT REPORTED 11/10/2021    TRICHOMONAS NOT REPORTED 11/10/2021    YEAST NOT REPORTED 11/10/2021    BACTERIA NOT REPORTED 11/10/2021    CLARITYU Clear 09/07/2016    SPECGRAV 1.011 11/10/2021    LEUKOCYTESUR LARGE 11/10/2021    UROBILINOGEN Normal 11/10/2021    BILIRUBINUR NEGATIVE 11/10/2021    BLOODU Positive 09/07/2016    GLUCOSEU NEGATIVE 11/10/2021    KETUA NEGATIVE 11/10/2021    AMORPHOUS NOT REPORTED 11/10/2021     IMPRESSION/RECOMMENDATIONS:      1. Acute kidney injury - most consistent with prerenal azotemia from diuretic therapy as well as obstructive nephropathy secondary to bilateral hydronephrosis. Renal ultrasound showed moderate to severe right hydronephrosis and mild to moderate left hydronephrosis. She does not have indications for acute hemodialysis. Plan: Continue to hold Bumex. Increase IV fluid 0.9 normal saline to 100 mL/h. Urology consultation as patient may need bilateral ureteral stents. Avoid nephrotoxic agents. Strict input and output documentation. Basic metabolic profile daily. 2.  Systemic hypertension - blood pressure control is adequate. 3.  Subnephrotic range proteinuria [random urine protein/creatinine ratio 1.44] - will benefit from from ACE inhibitor or angiotensin receptor blockers once stable. Prognosis is guarded.     Luda Solis MD FACP  Attending Nephrologist  11/12/2021 12:47 PM

## 2021-11-12 NOTE — PROGRESS NOTES
Physical Therapy    Facility/Department: [facility] PROGRESSIVE CARE  Initial Assessment    NAME: Alyson Montiel  : 1950  MRN: [id number]    Date of Service: 2021    Discharge Recommendations:  Patient would benefit from continued therapy after discharge   PT Equipment Recommendations  Equipment Needed:  (TBD)    Assessment   Body structures, Functions, Activity limitations: Decreased functional mobility ; Decreased endurance; Decreased ROM; Decreased strength; Decreased cognition  Assessment: continue per POC to progress as tolerated. Pt is a genesis lift transfer at nursing home. This therapist spoke to wound care nurse and recommended bilateral boots for positioning in bed. Kayla OT/R recommended boots to nurse Dana Simpson. Treatment Diagnosis: impaired mobility due to debiliatation  Specific instructions for Next Treatment: instruct in LE exercise, positioning and turning schedule; practice rolling  Prognosis: Guarded  Decision Making: Medium Complexity  History: admitted due to JAYLYN  Exam: ROM, MMT, bed mobility  Clinical Presentation: max x 1 to roll either direction using rail, dep x 2 to boost up in bed, FALL RISK, non-ambulatory, GENESIS LIFT at nursing home, has colostomy, recommend boots for positioning  PT Education: Goals; PT Role; Plan of Care  Barriers to Learning: eye sight  REQUIRES PT FOLLOW UP: Yes  Activity Tolerance  Activity Tolerance: Patient limited by endurance; Patient limited by fatigue       Patient Diagnosis(es): The primary encounter diagnosis was JAYLYN (acute kidney injury) ([facility] [address] Diagnoses of Urinary tract infection in elderly patient and Anemia, unspecified type were also pertinent to this visit.      has a past medical history of Anemia, unspecified, CAD (coronary artery disease), Chronic kidney disease, CKD (chronic kidney disease) stage 3, GFR 30-59 ml/min ([facility]), Closed fracture of dorsal (thoracic) vertebra without mention of spinal cord injury, Coronary atherosclerosis of unspecified type of vessel, native or graft, Depression with anxiety, Esophageal reflux, HTN (hypertension), Hyperkalemia, Hyperlipidemia, Intervertebral cervical disc disorder with myelopathy, cervical region, Lower back pain, Lumbar radiculopathy, Microalbuminuria, Muscle weakness, Pain in limb, Stenosis of cervical spine, Tethered cord syndrome (HCC), Type II or unspecified type diabetes mellitus without mention of complication, not stated as uncontrolled, Urinary calculus, and UTI (lower urinary tract infection). has a past surgical history that includes Colon surgery; angioplasty; and joint replacement. Restrictions  Restrictions/Precautions  Restrictions/Precautions: Fall Risk (O2 at 2 L, colostomy, PICC, external urinary catheter)  Required Braces or Orthoses?: No  Implants present? : Metal implants (cardiac stents, left hip titanium nail)  Position Activity Restriction  Other position/activity restrictions: up w/ assist  Vision/Hearing  Vision: Impaired  Vision Exceptions: Wears glasses at all times  Hearing: Exceptions to [facility]  Hearing Exceptions: Hard of hearing/hearing concerns     Subjective  General  Patient assessed for rehabilitation services?: Yes  Additional Pertinent Hx: Dev Espitia is a 66 y.o. female who presents with slowly increasing BUN/creatinine and low hemoglobin. Patient is a resident at [facility] due to chronic wounds of the right groin from angio. Response To Previous Treatment: Not applicable  Family / Caregiver Present: Yes (Pt's spouse supplied the above information by phone as pt was talking to him by phone when therapists entered the room.   Pt gave consent to allow therapist to speak to spouse.)  Referring Practitioner: Dr. Cara Veliz  Referral Date : 11/11/21  Diagnosis: JAYLYN  Follows Commands: Impaired (confused at times)  Other (Comment): OK per nurse Anh Hood to proceed w/ PT evaluation  Subjective  Subjective: pt reports that she has had her colostomy since third hospitalizations; Using GENESIS LIFT at SNF, dangles at the EOB w/ 2 assist at [facility])  Active : No  IADL Comments: sleeps in a lazy boy on the first floor at home  Additional Comments: Pt's spouse supplied the above information by phone as pt was talking to him by phone when therapists entered the room. Pt gave consent to allow therapist to speak to spouse. Pt has been at [facility] for therapy for around 1 1/2- 2 months  prior to this admission. Spouse reports that she has been in and out of hospitals or nursing homes since 1-. Pt has not been receiving skilled therapy st nursing home due to insurance issues per spouse. Plan is eventual D/C back home w/ spouse after completing therapy.   Cognition        Objective     Observation/Palpation  Observation: O2 at 2 L, colostomy, PICC, evternal urinary catheter, edema R LE, wound dressings on 1st and 5th metatarsal L foot    AROM RLE (degrees)  RLE AROM: Exceptions  R SLR: unable  R Hip Flexion 0-125: 0-20  R Hip ABduction 0-45: 0-10  R Hip ADduction 0-10: neutral  R Knee Flexion 0-145: 0-20  R Knee Extension 0: 0  R Ankle Dorsiflexion 0-20: appears neutral  R Ankle Plantar Flexion 0-45: 0-20  AROM LLE (degrees)  LLE AROM : Exceptions  L SLR: 0-25  L Hip Flexion 0-125: 0-40  L Hip ABduction 0-45: 0-45  L Hip ADduction 0-10: neutral  L Knee Flexion 0-145: 0-80  L Knee Extension 0: 0  L Ankle Dorsiflexion 0-20: WFL  L Ankle Plantar Flexion 0-45: WFL  AROM RUE (degrees)  RUE General AROM: see OT for UE assessment  AROM LUE (degrees)  LUE General AROM: see OT for UE assessment  Strength RLE  Strength RLE: Exception  R Hip Flexion: 1+/5  R Hip ABduction: 2-/5  R Hip ADduction: 2-/5  R Knee Flexion: 2-/5  R Knee Extension: 2/5  R Ankle Dorsiflexion: 2-/5  R Ankle Plantar flexion: 2+/5  Strength LLE  L Hip Flexion: 2+/5  L Hip ABduction: 3+/5  L Hip ADduction: 3+/5  L Knee Flexion: 2+/5  L Knee Extension: 2+/5  L Ankle Dorsiflexion: 3+/5  L Ankle Plantar Flexion: 3+/5  Strength RUE  Comment: see OT for UE assessment  Strength LUE  Comment: see OT for UE assessment     Sensation  Overall Sensation Status: WFL (denies)  Bed mobility  Rolling to Left: Maximum assistance  Rolling to Right: Maximum assistance  Scooting: Dependent/Total; 2 Person assistance (to boost up in bed)  Comment: uses rail to assist rolling either direction  Transfers  Comment: EGNESIS LIFT USED AT NURSING HOME  Ambulation  Ambulation?: No (non-ambulatory, uses W/C; GNEESIS LIFT USED AT [address])              Plan   Plan  Times per week: 3-5 treatments/ 5 days  Times per day:  (3-5 treatments/ 5 days)  Specific instructions for Next Treatment: instruct in LE exercise, positioning and turning schedule; practice rolling  Current Treatment Recommendations: Strengthening, ROM, Safety Education & Training, Endurance Training, Positioning  Safety Devices  Type of devices:  All fall risk precautions in place, Bed alarm in place, Call light within reach, Patient at risk for falls, Left in bed, Nurse notified (nurse Gavi Ortega)    G-Code       OutComes Score                                                  AM-PAC Score     AM-PAC Inpatient Mobility without Stair Climbing Raw Score : 6 (11/12/21 0919)  AM-PAC Inpatient without Stair Climbing T-Scale Score : 26.48 (11/12/21 0919)  Mobility Inpatient CMS 0-100% Score: 92.18 (11/12/21 0919)  Mobility Inpatient without Stair CMS G-Code Modifier : CM (11/12/21 0919)       Goals  Short term goals  Time Frame for Short term goals: 3-5 treatments/ 5 days  Short term goal 1: pt to tolerate 1/2 hour for therapuetic exercise keeping O2 stats above 90%  Short term goal 2: pt to demonstrate good technique and understanding for LE  ROM, general LE strengthening and positioning  Short term goal 3: pt to demonstrate good technique and understanding for turning schedule to prevent pressure sores  Short term goal 4: pt to demonstrate ability to roll either direction in bed using rail w/ mod x 1 for position change and pressure relief  Short term goal 5: progress to W/C mobility to allow pt to propel 30-50' w/ supervision (advance to W/C by patrick lift)  Patient Goals   Patient goals : eventual return home w/ spouse       Therapy Time   Individual Concurrent Group Co-treatment   Time In 0919         Time Out 0957         Minutes 38         Timed Code Treatment Minutes: 88 [facility], PT

## 2021-11-12 NOTE — CONSULTS
[facility] Nurse  Consult Note       NAME:  Libby Ibanez RECORD NUMBER:  [id number]  AGE: 66 y.o.    GENDER: female  : 1950  TODAY'S DATE:  2021    Subjective:     Reason for Wound [employer] [facility] and Assessment: right groin wound      Stephanie Groom is a 66 y.o. female referred by:   [x] Physician  [] Nursing  [] Other:       Wound Identification:  Wound Type: non-healing surgical  Contributing Factors: diabetes, poor glucose control, decreased mobility, arterial insufficiency and decreased tissue oxygenation    Wound History: pt had I&D to right groin on 21, has been following with [facility] wound care   Current Wound Care Treatment:  Saline wet to dry    Patient Goal of Care:  [x] Wound Healing  [] Odor Control  [] Palliative Care  [] Pain Control   [] Other:         PAST MEDICAL HISTORY        Diagnosis Date    Anemia, unspecified     CAD (coronary artery disease)     Chronic kidney disease     CKD (chronic kidney disease) stage 3, GFR 30-59 ml/min ([facility])     Closed fracture of dorsal (thoracic) vertebra without mention of spinal cord injury     Coronary atherosclerosis of unspecified type of vessel, native or graft     Depression with anxiety     Esophageal reflux     HTN (hypertension)     Hyperkalemia     Hyperlipidemia     Intervertebral cervical disc disorder with myelopathy, cervical region     Lower back pain     Lumbar radiculopathy     Microalbuminuria     Muscle weakness     Pain in limb     Stenosis of cervical spine     Tethered cord syndrome (HCC)     Type II or unspecified type diabetes mellitus without mention of complication, not stated as uncontrolled     Urinary calculus     UTI (lower urinary tract infection)        PAST SURGICAL HISTORY    Past Surgical History:   Procedure Laterality Date    ANGIOPLASTY      COLON SURGERY      JOINT REPLACEMENT         FAMILY HISTORY    Family History   Problem Relation Age of Onset    High Blood Pressure Other        SOCIAL HISTORY    Social History     Tobacco Use    Smoking status: Never Smoker    Smokeless tobacco: Not on file   Substance Use Topics    Alcohol use: No     Alcohol/week: 0.0 standard drinks    Drug use: No       ALLERGIES    No Known Allergies        Objective:      /69   Pulse 84   Temp 97.7 °F (36.5 °C) (Oral)   Resp 18   Wt 204 lb 2.3 oz (92.6 kg)   SpO2 98%   BMI 36.16 kg/m²       LABS    CBC:   Lab Results   Component Value Date    WBC 6.4 11/10/2021    RBC 2.36 11/10/2021    RBC 4.14 08/31/2016    HGB 7.2 11/10/2021     CMP:  Albumin:    Lab Results   Component Value Date    LABALBU 2.6 11/12/2021     PT/INR:    Lab Results   Component Value Date    PROTIME 30.6 11/10/2021    INR 3.1 11/10/2021     HgBA1c:    Lab Results   Component Value Date    LABA1C 7.8 03/29/2013     PTT: No components found for: LABPTT    Review of Systems    Assessment:     Physical Exam      Harrison Risk Score: Harrison Scale Score: 16    Patient Active Problem List   Diagnosis Code    Chronic kidney disease (CKD), stage III (moderate) ([facility]) N18.30    Hyperkalemia E87.5    Type II or unspecified type diabetes mellitus without mention of complication, not stated as uncontrolled E11.9    Hyperlipidemia E78.5    CKD (chronic kidney disease) stage 3, GFR 30-59 ml/min ([facility]) N18.30    Microalbuminuria R80.9    Urinary calculus N20.9    Tethered cord syndrome ([facility]) Q06.8    Stenosis of cervical spine M48.02    Lumbar radiculopathy M54.16    Lower back pain M54.50    HTN (hypertension) I10    Esophageal reflux K21.9    Depression with anxiety F41.8    Coronary atherosclerosis I25.10    Closed fracture of thoracic vertebra ([facility]) S22.009A    Intervertebral cervical disc disorder with myelopathy, cervical region M50.00    Pain in limb M79.609    Anemia D64.9    JAYLYN (acute kidney injury) ([facility]) N17.9       Patient Active Problem List   Diagnosis    Chronic kidney disease (CKD), stage III (moderate) ([facility])    Hyperkalemia    Type II or unspecified type diabetes mellitus without mention of complication, not stated as uncontrolled    Hyperlipidemia    CKD (chronic kidney disease) stage 3, GFR 30-59 ml/min ([facility])    Microalbuminuria    Urinary calculus    Tethered cord syndrome ([facility])    Stenosis of cervical spine    Lumbar radiculopathy    Lower back pain    HTN (hypertension)    Esophageal reflux    Depression with anxiety    Coronary atherosclerosis    Closed fracture of thoracic vertebra ([facility])    Intervertebral cervical disc disorder with myelopathy, cervical region    Pain in limb    Anemia    JAYLYN (acute kidney injury) ([facility])       Measurements:  Wound 11/11/21 Femoral Anterior; Right (Active)   Wound Image   11/12/21 1330   Wound Etiology Non-Healing Surgical 11/12/21 1330   Dressing Status Old drainage noted 11/12/21 1330   Wound Cleansed Cleansed with saline 11/12/21 1330   Dressing/Treatment Hydrofiber Ag; Silicone border 47/32/82 1330   Dressing Change Due 11/11/21 11/11/21 1630   Wound Length (cm) 7.1 cm 11/12/21 1330   Wound Width (cm) 1.5 cm 11/12/21 1330   Wound Depth (cm) 2.3 cm 11/12/21 1330   Wound Surface Area (cm^2) 10.65 cm^2 11/12/21 1330   Wound Volume (cm^3) 24.495 cm^3 11/12/21 1330   Wound Assessment Pink/red 11/12/21 1330   Drainage Amount Moderate 11/12/21 1330   Drainage Description Serosanguinous 11/12/21 1330   Odor None 11/12/21 1330   Yudith-wound Assessment Blanchable erythema 11/12/21 1330   Margins Defined edges 11/12/21 1330   Number of days: 1       Wound 11/12/21 Foot Left; Lateral (Active)   Wound Image   11/12/21 1330   Wound Etiology Arterial 11/12/21 1330   Dressing Status Dry 11/12/21 1330   Wound Cleansed Betadine/povidone iodine 11/12/21 1330   Dressing/Treatment Betadine swabs/povidone iodine 11/12/21 1330   Wound Length (cm) 1.2 cm 11/12/21 1330   Wound Width (cm) 1.4 cm 11/12/21 1330   Wound Depth (cm) 0.1 cm 11/12/21 1330   Wound Surface Area (cm^2) 1.68 cm^2 11/12/21 1330   Wound Volume (cm^3) 0.168 cm^3 11/12/21 1330   Wound Assessment Eschar dry 11/12/21 1330   Drainage Amount None 11/12/21 1330   Odor None 11/12/21 1330   Yudith-wound Assessment Dry/flaky 11/12/21 1330   Margins Attached edges 11/12/21 1330   Number of days: 0       Wound 11/12/21 Foot Left; Medial (Active)   Wound Image   11/12/21 1330   Wound Etiology Arterial 11/12/21 1330   Dressing Status Dry 11/12/21 1330   Wound Cleansed Betadine/povidone iodine 11/12/21 1330   Dressing/Treatment Betadine swabs/povidone iodine 11/12/21 1330   Wound Length (cm) 2.4 cm 11/12/21 1330   Wound Width (cm) 3.5 cm 11/12/21 1330   Wound Depth (cm) 0.1 cm 11/12/21 1330   Wound Surface Area (cm^2) 8.4 cm^2 11/12/21 1330   Wound Volume (cm^3) 0.84 cm^3 11/12/21 1330   Wound Assessment Eschar dry 11/12/21 1330   Drainage Amount None 11/12/21 1330   Odor None 11/12/21 1330   Yudith-wound Assessment Dry/flaky 11/12/21 1330   Margins Attached edges 11/12/21 1330   Number of days: 0       Wound 11/12/21 Heel Left (Active)   Wound Image   11/12/21 1330   Wound Etiology Arterial 11/12/21 1330   Dressing Status Dry 11/12/21 1330   Wound Cleansed Betadine/povidone iodine 11/12/21 1330   Dressing/Treatment Betadine swabs/povidone iodine 11/12/21 1330   Wound Length (cm) 0.5 cm 11/12/21 1330   Wound Width (cm) 0.5 cm 11/12/21 1330   Wound Depth (cm) 0.1 cm 11/12/21 1330   Wound Surface Area (cm^2) 0.25 cm^2 11/12/21 1330   Wound Volume (cm^3) 0.025 cm^3 11/12/21 1330   Wound Assessment Eschar dry 11/12/21 1330   Drainage Amount None 11/12/21 1330   Odor None 11/12/21 1330   Yudith-wound Assessment Dry/flaky 11/12/21 1330   Margins Attached edges 11/12/21 1330   Number of days: 0     [facility] nurse consult for right groin wound. Pt was admitted on 11/10 for increasing BUN/creatinine and low hemoglobin. According to previous notes, [facility] [place] for left foot gangrene.  She had an injury to her right femoral artery and developed a large right groin hematoma. Ultrasound showed a large pseudoaneurysm. An I&D of the wound was performed on 9/30. Right groin wound is red and clean, no slough or devitalized tissue noted. There is a moderate amount of serosanguinous drainage. In addition, pt also has some arterial wounds to the left medial and lateral foot and left heel. These are all covered in firmly adherent black eschar. No drainage or odor. They were being treated at wound care with betadine swabs. Recommend continuing this. Response to treatment:  Well tolerated by patient. Plan:     Plan of Care:     Right groin wound: Cleanse with saline, pat dry. Apply opticell ag to wound, cover with island dressing. Change daily and as needed if loose or soiled. Left medial foot, left lateral foot, left heel: Keep dry. Paint with betadine swab daily. No foam dressings- we want to keep these intact due to ischemia    -Turn every 2 hours    -Float heels off of bed with pillows under calves    -Sacral foam dressing to sacrococcygeal area. Peel back dressing, inspect skin beneath, re-secure. Change every 72 hours and prn wrinkles, soilage. Discontinue if repeatedly soiled by incontinence.     -Routine incontinence care with foaming cleanser and zinc oxide cream. Apply zinc oxide cream BID and prn incontinence. Use moisture wicking under pads vs cloth backed briefs    -Static air overlay. Check inflation each shift by sliding hand under the air overlay. Feel for the patient's heaviest/ most dependant body part. Ideally 1/2 to 1\" of air will be between your hand and the patient's body. Add air prn. Specialty Bed Required : N/A   [] Low Air Loss   [] Pressure Redistribution  [] Fluid Immersion  [] Bariatric  [] Total Pressure Relief  [] Other:     Current Diet: ADULT DIET;  Regular; 3 carb choices (45 gm/meal); 60 to 80 gm  Dietician consult:  Yes    Discharge Plan:  Placement for patient upon discharge: skilled nursing   Patient appropriate for Outpatient [address]: Yes- pt established with [facility] wound care- will need to follow up with them after discharge    Patient/Caregiver Teaching:  Level of patientunderstanding able to:     [x] Indicates understanding       [] Needs reinforcement  [] Unsuccessful      [x] Verbal Understanding  [] Demonstrated understanding       [] No evidence of learning  [] Refused teaching         [] N/A       Electronically signed by Mirian Page RN on  11/12/2021 at 2:06 PM

## 2021-11-12 NOTE — PROGRESS NOTES
[address] [facility]    Occupational Therapy Evaluation  Date: 21  Patient Name: Leilani Golden       Room:   MRN: [id number]  Account: [de-identified]   : 1950  (66 y.o.) Gender: female     Discharge Recommendations:  Further Occupational Therapy is recommended upon facility discharge. Referring Practitioner: Paulo Cohen MD  Diagnosis: JAYLYN, UTI, anemia       Treatment Diagnosis: Impaired self-care status. Past Medical History:  has a past medical history of Anemia, unspecified, CAD (coronary artery disease), Chronic kidney disease, CKD (chronic kidney disease) stage 3, GFR 30-59 ml/min ([facility]), Closed fracture of dorsal (thoracic) vertebra without mention of spinal cord injury, Coronary atherosclerosis of unspecified type of vessel, native or graft, Depression with anxiety, Esophageal reflux, HTN (hypertension), Hyperkalemia, Hyperlipidemia, Intervertebral cervical disc disorder with myelopathy, cervical region, Lower back pain, Lumbar radiculopathy, Microalbuminuria, Muscle weakness, Pain in limb, Stenosis of cervical spine, Tethered cord syndrome ([facility]), Type II or unspecified type diabetes mellitus without mention of complication, not stated as uncontrolled, Urinary calculus, and UTI (lower urinary tract infection). Past Surgical History:   has a past surgical history that includes Colon surgery; angioplasty; and joint replacement.     Restrictions  Restrictions/Precautions: Fall Risk (O2 at 2 L, colostomy, PICC, external urinary catheter)  Implants present? : Metal implants (cardiac stents, left hip titanium nail)  Other position/activity restrictions: up w/ assist  Required Braces or Orthoses?: No     Vitals  Temp: 97.7 °F (36.5 °C)  Pulse: 84  Resp: 18  BP: 115/69  Weight: 204 lb 2.3 oz (92.6 kg)  BMI (Calculated): 0  Oxygen Therapy  SpO2: 98 %  Pulse Oximeter Device Mode: Intermittent  Pulse Oximeter Device Location: Finger  O2 Device: Nasal cannula  O2 Flow Rate (L/min): 2 L/min  Level of Consciousness: Alert (0)    Subjective  Subjective: \"Well if they had better rails here. These rails are for shit\" patient states in regard to rolling while supine. Patient is agreeable to pariticpate in rolling to the best of her ability.   Overall Orientation Status: Impaired  Orientation Level: Oriented to place, Oriented to situation, Oriented to person, Disoriented to time  Vision  Vision: Impaired  Vision Exceptions: Wears glasses at all times, Cataracts  Hearing  Hearing: Exceptions to [facility]  Hearing Exceptions: Hard of hearing/hearing concerns  Social/Functional History  Lives With: Spouse  Type of Home: House  Home Layout: Multi-level, Able to Live on Main level with bedroom/bathroom, Performs ADL's on one level (story and a 1/2)  Home Access: Ramped entrance, Stairs to enter without rails (temporary ramp- will be taken out in February)  Entrance Stairs - Number of Steps: 3 steps to enter (underneath ramp now- hand rails for steps removed due to ramp); temporary ramp to be removed in February  Entrance Stairs - Rails: None  Bathroom Shower/Tub: Tub/Shower unit, Curtain, Shower chair with back  [employer]: Handicap height  Bathroom Equipment: Shower chair, Grab bars in shower  Home Equipment: Rolling walkerAdis 41 Help From: Family  ADL Assistance: Needs assistance (assisted occassionally by spouse for  dressing; states that she was independent bathing and toileting prior to hospitalizartion per spouse; needs assist at SNF, uses brief at SNF, has colostomy- needs assist at nursing home)  Homemaking Assistance: Needs assistance (shared duties w/ spouse prior to hospitalizations, full assist at [facility])  Homemaking Responsibilities: No  Ambulation Assistance: Needs assistance (uses W/C to propel self independently around the house; walked limited distances w/ walker independently at home prior to hospitalization; using W/C at nursing home and able to propel self independently, nonambulatory at SNF)  Transfer Assistance: Needs assistance (independent sit <> stand prior to hospitalizations; Using GENESIS LIFT at SNF, dangles at the EOB w/ 2 assist at [facility])  Active : No  IADL Comments: sleeps in a lazy boy on the first floor at home  Additional Comments: Pt's spouse supplied the above information by phone as pt was talking to him by phone when therapists entered the room. Pt gave consent to allow therapist to speak to spouse. Pt has been at [facility] for therapy for around 1 1/2- 2 months  prior to this admission. Spouse reports that she has been in and out of hospitals or nursing homes since 1-. Pt has not been receiving skilled therapy st nursing home due to insurance issues per spouse. Plan is eventual D/C back home w/ spouse after completing therapy. Objective  Vision - Basic Assessment  Prior Vision: Wears glasses all the time   Cognition  Overall Cognitive Status: WFL   Perception  Overall Perceptual Status: WFL  Sensation  Overall Sensation Status: WFL (denies)   ADL  Feeding: Setup  Grooming: Minimal assistance  UE Bathing: Moderate assistance  LE Bathing: Dependent/Total  UE Dressing: Maximum assistance  LE Dressing: Dependent/Total  Toileting: Dependent/Total  Additional Comments: ADL scores based on skilled observations and clinical reasoning unless otherwise noted. Patient requires significant assist with self-care due to weakness and decreased activity tolerance.     UE Function           LUE Strength  Gross LUE Strength: Exceptions to [facility]  L Hand General: 3+/5  LUE Strength Comment: Grossly 3+/5     LUE Tone: Normotonic  LUE PROM (degrees)  LUE PROM: WFL  LUE AROM (degrees)  LUE AROM : WFL  Left Hand PROM (degrees)  Left Hand PROM: WFL  Left Hand AROM (degrees)  Left Hand AROM: WFL  RUE Strength  Gross RUE Strength: Exceptions to [facility]  R Hand General: 3+/5  RUE Strength Comment: Shoulder 2-/5, elbow 3+/5      RUE Tone: Normotonic  RUE PROM (degrees)  RUE PROM: WFL  RUE AROM (degrees)  RUE AROM : Exceptions  RUE General AROM: Shoulder AROM ~50%, elbow WFL  Right Hand PROM (degrees)  Right Hand PROM: WFL  Right Hand AROM (degrees)  Right Hand AROM: WFL    Fine Motor Skills  Coordination  Movements Are Fluid And Coordinated: No  Coordination and Movement description: Left UE, Right UE, Fine motor impairments                           Mobility          Balance  Sitting Balance: Unable to assess(comment) (due to weakness/lethargy this date)  Standing Balance: Unable to assess(comment) (patient has not stood recently, deferred this date)        Bed mobility  Rolling to Left: Maximum assistance  Rolling to Right: Maximum assistance  Scooting: Dependent/Total; 2 Person assistance (to boost up in bed)  Comment: uses rail to assist rolling either direction        Functional Activity Tolerance  Functional Activity Tolerance: Tolerates 10 - 20 min exercise with multiple rests     Assessment  Assessment  Performance deficits / Impairments: Decreased functional mobility , Decreased ADL status, Decreased ROM, Decreased strength, Decreased safe awareness, Decreased endurance, Decreased balance, Decreased fine motor control  Treatment Diagnosis: Impaired self-care status.   Prognosis: Good  Decision Making: Medium Complexity  REQUIRES OT FOLLOW UP: Yes  Discharge Recommendations: Patient would benefit from continued therapy after discharge  Activity Tolerance: Patient Tolerated treatment well         Functional Outcome Measures  AM-PAC Daily Activity Inpatient   How much help for putting on and taking off regular lower body clothing?: Total  How much help for Bathing?: A Lot  How much help for Toileting?: Total  How much help for putting on and taking off regular upper body clothing?: A Lot  How much help for taking care of personal grooming?: A Little  How much help for eating meals?: A Little  AM-PAC Inpatient Daily Activity Raw Score: 12  AM-PAC Inpatient ADL T-Scale Score : 30.6  ADL Inpatient CMS 0-100% Score: 66.57  ADL Inpatient CMS G-Code Modifier : CL       Goals  Patient Goals   Patient goals : To return to SNF for continued therapy  Short term goals  Time Frame for Short term goals: By discharge  Short term goal 1: Patient will actively participate in 15+ minutes of self-care to the best of patient's ability with Minimal verbal cues for attention to task. Short term goal 2: Patient will actively participate in 15-25 minutes of therapeutic exercise/functional activities to promote increased IND with self-care and mobility. Short term goal 3: Patient will perform grooming with setup. Short term goal 4: Patient will perform bed mobility with Moderate assist x 2 to sit EOB for 5+ minutes with Min A while attempting to engage in self-care. Plan  Safety Devices  Safety Devices in place: Yes  Type of devices:  All fall risk precautions in place, Bed alarm in place, Call light within reach, Gait belt, Patient at risk for falls, Left in bed, Nurse notified (RN notified patient needs foot drop boots for in bed)     Plan  Times per week: 2-4  Times per day: Daily  Current Treatment Recommendations: Self-Care / ADL, Home Management Training, Strengthening, Balance Training, ROM, Endurance Training, Pain Management, Safety Education & Training, Patient/Caregiver Education & Training, Equipment Evaluation, Education, & procurement, Positioning          OT Individual Minutes  Time In: 2543  Time Out: 1010  Minutes: 50    Electronically signed by Selvin De La Garza OT on 11/12/21 at 3:24 PM EST

## 2021-11-12 NOTE — PROGRESS NOTES
RN spoke with Dr Ligia Alcazar (urology) regarding renal US results per Dr Rohini Islas. Physician ordered a STAT ct abdomen and pelvis without contrast for this patient.   RN spoke with Dr Marky Boss regarding physical therapy request for everardo byers to order per Dr Marky Boss  Electronically signed by Teo Campbell RN on 11/12/2021 at 2:42 PM

## 2021-11-13 LAB
-: NORMAL
ABSOLUTE EOS #: 0.2 K/UL (ref 0–0.4)
ABSOLUTE IMMATURE GRANULOCYTE: ABNORMAL K/UL (ref 0–0.3)
ABSOLUTE LYMPH #: 1.6 K/UL (ref 1–4.8)
ABSOLUTE MONO #: 0.6 K/UL (ref 0.1–1.3)
ABSOLUTE RETIC #: 0.07 M/UL (ref 0.02–0.1)
ALBUMIN SERPL-MCNC: 3 G/DL (ref 3.5–5.2)
ALBUMIN/GLOBULIN RATIO: ABNORMAL (ref 1–2.5)
ALP BLD-CCNC: 236 U/L (ref 35–104)
ALT SERPL-CCNC: 43 U/L (ref 5–33)
ANION GAP SERPL CALCULATED.3IONS-SCNC: 9 MMOL/L (ref 9–17)
AST SERPL-CCNC: 36 U/L
BASOPHILS # BLD: 0 % (ref 0–2)
BASOPHILS ABSOLUTE: 0 K/UL (ref 0–0.2)
BILIRUB SERPL-MCNC: 0.16 MG/DL (ref 0.3–1.2)
BILIRUBIN DIRECT: <0.08 MG/DL
BILIRUBIN, INDIRECT: ABNORMAL MG/DL (ref 0–1)
BUN BLDV-MCNC: 91 MG/DL (ref 8–23)
BUN/CREAT BLD: ABNORMAL (ref 9–20)
CALCIUM SERPL-MCNC: 8.1 MG/DL (ref 8.6–10.4)
CHLORIDE BLD-SCNC: 106 MMOL/L (ref 98–107)
CO2: 21 MMOL/L (ref 20–31)
CREAT SERPL-MCNC: 2.44 MG/DL (ref 0.5–0.9)
DATE, STOOL #1: ABNORMAL
DATE, STOOL #2: ABNORMAL
DATE, STOOL #3: ABNORMAL
DIFFERENTIAL TYPE: ABNORMAL
EOSINOPHILS RELATIVE PERCENT: 4 % (ref 0–4)
FERRITIN: 149 UG/L (ref 13–150)
FOLATE: 7.3 NG/ML
GFR AFRICAN AMERICAN: 24 ML/MIN
GFR NON-AFRICAN AMERICAN: 19 ML/MIN
GFR SERPL CREATININE-BSD FRML MDRD: ABNORMAL ML/MIN/{1.73_M2}
GFR SERPL CREATININE-BSD FRML MDRD: ABNORMAL ML/MIN/{1.73_M2}
GLOBULIN: ABNORMAL G/DL (ref 1.5–3.8)
GLUCOSE BLD-MCNC: 150 MG/DL (ref 65–105)
GLUCOSE BLD-MCNC: 157 MG/DL (ref 65–105)
GLUCOSE BLD-MCNC: 175 MG/DL (ref 65–105)
GLUCOSE BLD-MCNC: 201 MG/DL (ref 65–105)
GLUCOSE BLD-MCNC: 203 MG/DL (ref 70–99)
HCT VFR BLD CALC: 21.9 % (ref 36–46)
HEMOCCULT SP1 STL QL: POSITIVE
HEMOCCULT SP2 STL QL: ABNORMAL
HEMOCCULT SP3 STL QL: ABNORMAL
HEMOGLOBIN: 6.9 G/DL (ref 12–16)
IMMATURE GRANULOCYTES: ABNORMAL %
IMMATURE RETIC FRACT: ABNORMAL %
IRON SATURATION: 14 % (ref 20–55)
IRON: 28 UG/DL (ref 37–145)
LYMPHOCYTES # BLD: 28 % (ref 24–44)
MCH RBC QN AUTO: 30.5 PG (ref 26–34)
MCHC RBC AUTO-ENTMCNC: 31.6 G/DL (ref 31–37)
MCV RBC AUTO: 96.7 FL (ref 80–100)
MONOCYTES # BLD: 10 % (ref 1–7)
NRBC AUTOMATED: ABNORMAL PER 100 WBC
PDW BLD-RTO: 16.1 % (ref 11.5–14.9)
PLATELET # BLD: 157 K/UL (ref 150–450)
PLATELET ESTIMATE: ABNORMAL
PMV BLD AUTO: 9.1 FL (ref 6–12)
POTASSIUM SERPL-SCNC: 5 MMOL/L (ref 3.7–5.3)
RBC # BLD: 2.27 M/UL (ref 4–5.2)
RBC # BLD: ABNORMAL 10*6/UL
REASON FOR REJECTION: NORMAL
RETIC %: 2.9 % (ref 0.5–2)
RETIC HEMOGLOBIN: ABNORMAL PG (ref 28.2–35.7)
SEG NEUTROPHILS: 58 % (ref 36–66)
SEGMENTED NEUTROPHILS ABSOLUTE COUNT: 3.3 K/UL (ref 1.3–9.1)
SODIUM BLD-SCNC: 136 MMOL/L (ref 135–144)
TIME, STOOL #1: ABNORMAL
TIME, STOOL #2: ABNORMAL
TIME, STOOL #3: ABNORMAL
TOTAL IRON BINDING CAPACITY: 205 UG/DL (ref 250–450)
TOTAL PROTEIN: 6.1 G/DL (ref 6.4–8.3)
UNSATURATED IRON BINDING CAPACITY: 177 UG/DL (ref 112–347)
VITAMIN B-12: 877 PG/ML (ref 232–1245)
WBC # BLD: 5.7 K/UL (ref 3.5–11)
WBC # BLD: ABNORMAL 10*3/UL
ZZ NTE CLEAN UP: ORDERED TEST: NORMAL
ZZ NTE WITH NAME CLEAN UP: SPECIMEN SOURCE: NORMAL

## 2021-11-13 PROCEDURE — 80048 BASIC METABOLIC PNL TOTAL CA: CPT

## 2021-11-13 PROCEDURE — 82270 OCCULT BLOOD FECES: CPT

## 2021-11-13 PROCEDURE — 80076 HEPATIC FUNCTION PANEL: CPT

## 2021-11-13 PROCEDURE — 83540 ASSAY OF IRON: CPT

## 2021-11-13 PROCEDURE — 99223 1ST HOSP IP/OBS HIGH 75: CPT | Performed by: INTERNAL MEDICINE

## 2021-11-13 PROCEDURE — 85025 COMPLETE CBC W/AUTO DIFF WBC: CPT

## 2021-11-13 PROCEDURE — 97110 THERAPEUTIC EXERCISES: CPT

## 2021-11-13 PROCEDURE — 85045 AUTOMATED RETICULOCYTE COUNT: CPT

## 2021-11-13 PROCEDURE — 82728 ASSAY OF FERRITIN: CPT

## 2021-11-13 PROCEDURE — 2580000003 HC RX 258: Performed by: INTERNAL MEDICINE

## 2021-11-13 PROCEDURE — 36430 TRANSFUSION BLD/BLD COMPNT: CPT

## 2021-11-13 PROCEDURE — 86900 BLOOD TYPING SEROLOGIC ABO: CPT

## 2021-11-13 PROCEDURE — P9016 RBC LEUKOCYTES REDUCED: HCPCS

## 2021-11-13 PROCEDURE — 82746 ASSAY OF FOLIC ACID SERUM: CPT

## 2021-11-13 PROCEDURE — 86850 RBC ANTIBODY SCREEN: CPT

## 2021-11-13 PROCEDURE — 6360000002 HC RX W HCPCS: Performed by: INTERNAL MEDICINE

## 2021-11-13 PROCEDURE — 6370000000 HC RX 637 (ALT 250 FOR IP): Performed by: FAMILY MEDICINE

## 2021-11-13 PROCEDURE — 2060000000 HC ICU INTERMEDIATE R&B

## 2021-11-13 PROCEDURE — 82607 VITAMIN B-12: CPT

## 2021-11-13 PROCEDURE — C9113 INJ PANTOPRAZOLE SODIUM, VIA: HCPCS | Performed by: INTERNAL MEDICINE

## 2021-11-13 PROCEDURE — 36415 COLL VENOUS BLD VENIPUNCTURE: CPT

## 2021-11-13 PROCEDURE — 2580000003 HC RX 258: Performed by: FAMILY MEDICINE

## 2021-11-13 PROCEDURE — 83550 IRON BINDING TEST: CPT

## 2021-11-13 PROCEDURE — 86901 BLOOD TYPING SEROLOGIC RH(D): CPT

## 2021-11-13 PROCEDURE — 82947 ASSAY GLUCOSE BLOOD QUANT: CPT

## 2021-11-13 PROCEDURE — 86920 COMPATIBILITY TEST SPIN: CPT

## 2021-11-13 RX ORDER — SODIUM CHLORIDE 9 MG/ML
INJECTION, SOLUTION INTRAVENOUS PRN
Status: DISCONTINUED | OUTPATIENT
Start: 2021-11-13 | End: 2021-12-02 | Stop reason: SDUPTHER

## 2021-11-13 RX ADMIN — FERROUS SULFATE TAB 325 MG (65 MG ELEMENTAL FE) 325 MG: 325 (65 FE) TAB at 10:16

## 2021-11-13 RX ADMIN — SODIUM CHLORIDE: 9 INJECTION, SOLUTION INTRAVENOUS at 21:22

## 2021-11-13 RX ADMIN — INSULIN LISPRO 4 UNITS: 100 INJECTION, SOLUTION INTRAVENOUS; SUBCUTANEOUS at 11:43

## 2021-11-13 RX ADMIN — CARVEDILOL 6.25 MG: 6.25 TABLET, FILM COATED ORAL at 10:16

## 2021-11-13 RX ADMIN — CARVEDILOL 6.25 MG: 6.25 TABLET, FILM COATED ORAL at 17:12

## 2021-11-13 RX ADMIN — INSULIN LISPRO 4 UNITS: 100 INJECTION, SOLUTION INTRAVENOUS; SUBCUTANEOUS at 10:21

## 2021-11-13 RX ADMIN — ATORVASTATIN CALCIUM 80 MG: 80 TABLET, FILM COATED ORAL at 10:15

## 2021-11-13 RX ADMIN — INSULIN GLARGINE 22 UNITS: 100 INJECTION, SOLUTION SUBCUTANEOUS at 21:21

## 2021-11-13 RX ADMIN — INSULIN LISPRO 1 UNITS: 100 INJECTION, SOLUTION INTRAVENOUS; SUBCUTANEOUS at 21:20

## 2021-11-13 RX ADMIN — SODIUM CHLORIDE: 9 INJECTION, SOLUTION INTRAVENOUS at 10:41

## 2021-11-13 RX ADMIN — ASPIRIN 81 MG: 81 TABLET, CHEWABLE ORAL at 10:15

## 2021-11-13 RX ADMIN — PANTOPRAZOLE SODIUM 40 MG: 40 INJECTION, POWDER, FOR SOLUTION INTRAVENOUS at 10:15

## 2021-11-13 RX ADMIN — SODIUM CHLORIDE, PRESERVATIVE FREE 10 ML: 5 INJECTION INTRAVENOUS at 10:19

## 2021-11-13 RX ADMIN — SODIUM CHLORIDE, PRESERVATIVE FREE 10 ML: 5 INJECTION INTRAVENOUS at 21:21

## 2021-11-13 RX ADMIN — CEFTRIAXONE SODIUM 1000 MG: 1 INJECTION, POWDER, FOR SOLUTION INTRAMUSCULAR; INTRAVENOUS at 15:16

## 2021-11-13 RX ADMIN — Medication 10 ML: at 10:15

## 2021-11-13 RX ADMIN — INSULIN LISPRO 2 UNITS: 100 INJECTION, SOLUTION INTRAVENOUS; SUBCUTANEOUS at 17:12

## 2021-11-13 ASSESSMENT — ENCOUNTER SYMPTOMS
EYE ITCHING: 0
BLOOD IN STOOL: 0
VOMITING: 0
NAUSEA: 0
EYE REDNESS: 0
COLOR CHANGE: 0
COUGH: 0
CHEST TIGHTNESS: 0
SHORTNESS OF BREATH: 0
TROUBLE SWALLOWING: 0
ABDOMINAL PAIN: 0

## 2021-11-13 NOTE — PROGRESS NOTES
Physical Therapy  Facility/Department: [facility] PROGRESSIVE CARE  Daily Treatment Note  NAME: Dev Espitia  : 1950  MRN: [id number]    Date of Service: 2021    Discharge Recommendations:  Patient would benefit from continued therapy after discharge        Assessment   Body structures, Functions, Activity limitations: Decreased functional mobility ; Decreased endurance; Decreased ROM; Decreased strength; Decreased cognition  Specific instructions for Next Treatment: instruct in LE exercise, positioning and turning schedule; practice rolling  REQUIRES PT FOLLOW UP: Yes  Activity Tolerance  Activity Tolerance: Patient limited by endurance; Patient limited by fatigue     Patient Diagnosis(es): The primary encounter diagnosis was JAYLYN (acute kidney injury) ([facility] [address] Diagnoses of Urinary tract infection in elderly patient and Anemia, unspecified type were also pertinent to this visit. has a past medical history of Anemia, unspecified, CAD (coronary artery disease), Chronic kidney disease, CKD (chronic kidney disease) stage 3, GFR 30-59 ml/min ([facility]), Closed fracture of dorsal (thoracic) vertebra without mention of spinal cord injury, Coronary atherosclerosis of unspecified type of vessel, native or graft, Depression with anxiety, Esophageal reflux, HTN (hypertension), Hyperkalemia, Hyperlipidemia, Intervertebral cervical disc disorder with myelopathy, cervical region, Lower back pain, Lumbar radiculopathy, Microalbuminuria, Muscle weakness, Pain in limb, Stenosis of cervical spine, Tethered cord syndrome (HCC), Type II or unspecified type diabetes mellitus without mention of complication, not stated as uncontrolled, Urinary calculus, and UTI (lower urinary tract infection). has a past surgical history that includes Colon surgery; angioplasty; and joint replacement.     Restrictions  Restrictions/Precautions  Restrictions/Precautions: Fall Risk (O2 at 2L, colostomy, PICC, external urinary catheter)  Required Braces or Orthoses?: No  Implants present? : Metal implants (cardiac stents, L hip titanium nail)  Position Activity Restriction  Other position/activity restrictions: up w/ assist  Subjective   General  Additional Pertinent Hx: Rashel Armas is a 66 y.o. female who presents with slowly increasing BUN/creatinine and low hemoglobin. Patient is a resident at [facility] due to chronic wounds of the right groin from angio.   Response To Previous Treatment: Not applicable  Family / Caregiver Present: No  Referring Practitioner: Dr. Bingham Ed: Pt lying in bed on arrival, agreeable to bed exercises with PT  General Comment  Comments: Pt verbally upset in chain events of her health situation  Pain Screening  Patient Currently in Pain: Denies  Vital Signs  Patient Currently in Pain: Denies       Orientation     Cognition      Objective         Ambulation  Ambulation?: No (non-ambulatory, uses W/C; GENESIS LIFT USED AT [address])        Other exercises  Other exercises?: Yes  Other exercises 1: Supine LE ex's x 10 (mostly AAROM; LLE strength > RLE strength)  Other exercises 2: Supine UE AROM ex's x 10  Other exercises 3: Supine BLE manually applied calf stretch 2x20\" each  Other exercises 4: Educated and provided tactile cues on supine LE isometric ex's to maintain and improve LE strength        Goals  Short term goals  Time Frame for Short term goals: 3-5 treatments/ 5 days  Short term goal 1: pt to tolerate 1/2 hour for therapuetic exercise keeping O2 stats above 90%  Short term goal 2: pt to demonstrate good technique and understanding for LE  ROM, general LE strengthening and positioning  Short term goal 3: pt to demonstrate good technique and understanding for turning schedule to prevent pressure sores  Short term goal 4: pt to demonstrate ability to roll either direction in bed using rail w/ mod x 1 for position change and pressure relief  Short term goal 5: progress to W/C mobility to allow pt to propel 30-50' w/ supervision (advance to W/C by patrick lift)  Patient Goals   Patient goals : eventual return home w/ spouse    Plan    Plan  Times per week: 3-5 treatments/ 5 days  Times per day:  (3-5 treatments/ 5 days)  Specific instructions for Next Treatment: instruct in LE exercise, positioning and turning schedule; practice rolling  Current Treatment Recommendations: Strengthening, ROM, Safety Education & Training, Endurance Training, Positioning  Safety Devices  Type of devices:  All fall risk precautions in place, Bed alarm in place, Call light within reach, Patient at risk for falls, Left in bed, Nurse notified     Therapy Time   Individual Concurrent Group Co-treatment   Time In 1433         Time Out 1456         Minutes [address], PTA

## 2021-11-13 NOTE — PROGRESS NOTES
Progress Note    11/13/2021   1:00 PM    Name:  Michael Pacheco  MRN:    [id number]     Acct:     [de-identified]   Room:  2095/2095-01  IP Day: 3     Admit Date: 11/10/2021  4:23 PM  PCP: Barbara Almazan DO    Subjective:     C/C:   Chief Complaint   Patient presents with    Abnormal Lab     low hemoglobin and high CR       Interval History: Status: not changed. Patient denies chest pain shortness of breath cough nausea vomiting flank pain. Vital signs reviewed, stable. Recent labs reviewed creatinine 2.44, CBC pending today. Blood sugar readings stable    ROS:   all 10 systems reviewed and are negative except as noted    Review of Systems   Constitutional: Negative for chills and fatigue. HENT: Negative for drooling, mouth sores, sneezing and trouble swallowing. Eyes: Negative for redness and itching. Respiratory: Negative for cough, chest tightness and shortness of breath. Cardiovascular: Negative for chest pain, palpitations and leg swelling. Gastrointestinal: Negative for abdominal pain, blood in stool, nausea and vomiting. Endocrine: Negative for heat intolerance and polyphagia. Genitourinary: Negative for difficulty urinating, flank pain and pelvic pain. Musculoskeletal: Negative for arthralgias, joint swelling and neck stiffness. Skin: Negative for color change and pallor. Allergic/Immunologic: Negative for food allergies. Neurological: Negative for dizziness, seizures and headaches. Hematological: Does not bruise/bleed easily. Psychiatric/Behavioral: Negative for agitation, behavioral problems and suicidal ideas. The patient is not hyperactive. Medications:      Allergies: No Known Allergies    Current Meds: ferrous sulfate (IRON 325) tablet 325 mg, Daily with breakfast  [Held by provider] aspirin chewable tablet 81 mg, Daily  atorvastatin (LIPITOR) tablet 80 mg, Daily  carvedilol (COREG) tablet 6.25 mg, BID WC  insulin glargine (LANTUS) injection vial 22 Units, Nightly  insulin lispro (HUMALOG) injection vial 0-12 Units, TID WC  insulin lispro (HUMALOG) injection vial 0-6 Units, Nightly  sodium chloride flush 0.9 % injection 5-40 mL, 2 times per day  sodium chloride flush 0.9 % injection 5-40 mL, PRN  0.9 % sodium chloride infusion, PRN  acetaminophen (TYLENOL) tablet 650 mg, Q4H PRN  ondansetron (ZOFRAN-ODT) disintegrating tablet 4 mg, Q8H PRN   Or  ondansetron (ZOFRAN) injection 4 mg, Q6H PRN  oxyCODONE-acetaminophen (PERCOCET) 5-325 MG per tablet 1 tablet, Q6H PRN  0.9 % sodium chloride infusion, Continuous  cefTRIAXone (ROCEPHIN) 1000 mg IVPB in 50 mL D5W minibag, Q24H  pantoprazole (PROTONIX) injection 40 mg, Daily   And  sodium chloride (PF) 0.9 % injection 10 mL, Daily        Data:     Code Status:  Full Code    Family History   Problem Relation Age of Onset    High Blood Pressure Other        Social History     Socioeconomic History    Marital status:      Spouse name: Not on file    Number of children: Not on file    Years of education: Not on file    Highest education level: Not on file   Occupational History    Not on file   Tobacco Use    Smoking status: Never Smoker    Smokeless tobacco: Not on file   Substance and Sexual Activity    Alcohol use: No     Alcohol/week: 0.0 standard drinks    Drug use: No    Sexual activity: Not on file   Other Topics Concern    Not on file   Social History Narrative    Not on file     Social Determinants of Health     Financial Resource Strain:     Difficulty of Paying Living Expenses: Not on file   Food Insecurity:     Worried About Running Out of Food in the Last Year: Not on file    Katya of Food in the Last Year: Not on file   Transportation Needs:     Lack of Transportation (Medical): Not on file    Lack of Transportation (Non-Medical):  Not on file   Physical Activity:     Days of Exercise per Week: Not on file    Minutes of Exercise per Session: Not on file   Stress:     Feeling of Stress : Not on file   Social Connections:     Frequency of Communication with Friends and Family: Not on file    Frequency of Social Gatherings with Friends and Family: Not on file    Attends Roman [employer] Services: Not on file    Active Member of Clubs or Organizations: Not on file    Attends Club or Organization Meetings: Not on file    Marital Status: Not on file   Intimate Partner Violence:     Fear of Current or Ex-Partner: Not on file    Emotionally Abused: Not on file    Physically Abused: Not on file    Sexually Abused: Not on file   Housing Stability:     Unable to Pay for Housing in the Last Year: Not on file    Number of [place] in the Last Year: Not on file    Unstable Housing in the Last Year: Not on file       I/O (24Hr): Intake/Output Summary (Last 24 hours) at 11/13/2021 1300  Last data filed at 11/13/2021 1029  Gross per 24 hour   Intake 650 ml   Output 675 ml   Net -25 ml     Radiology:  CT ABDOMEN PELVIS WO CONTRAST Additional Contrast? None    Result Date: 11/12/2021  Severe right and mild left hydroureteronephrosis. No obstructing ureteral stone is seen. Wall thickening of the urinary bladder without significant urinary bladder wall distension. Outlet obstruction is a consideration. Right-sided ostomy. There is bowel wall thickening proximal to the ostomy, may suggest infectious or inflammatory enteritis. No drainable fluid collections or free air. No evidence of bowel obstruction. Moderate right pleural effusion. Bibasilar infiltrates or atelectasis. Clinical correlation to exclude pneumonia. US RETROPERITONEAL COMPLETE    Result Date: 11/12/2021  1. Moderate to severe right and mild-to-moderate left hydronephrosis. 2. Exam limited due to patient's body habitus.        Labs:  Recent Results (from the past 24 hour(s))   POC Glucose Fingerstick    Collection Time: 11/12/21  5:08 PM   Result Value Ref Range    POC Glucose 194 (H) 65 - 105 mg/dL   Urinalysis Reflex to Culture Collection Time: 11/12/21  6:45 PM    Specimen: Urine, clean catch   Result Value Ref Range    Color, UA Yellow Yellow    Turbidity UA Turbid (A) Clear    Glucose, Ur NEGATIVE NEGATIVE    Bilirubin Urine NEGATIVE NEGATIVE    Ketones, Urine NEGATIVE NEGATIVE    Specific Gravity, UA 1.012 1.000 - 1.030    Urine Hgb LARGE (A) NEGATIVE    pH, UA 6.0 5.0 - 8.0    Protein, UA 2+ (A) NEGATIVE    Urobilinogen, Urine Normal Normal    Nitrite, Urine NEGATIVE NEGATIVE    Leukocyte Esterase, Urine LARGE (A) NEGATIVE    Urinalysis Comments NOT REPORTED    Microscopic Urinalysis    Collection Time: 11/12/21  6:45 PM   Result Value Ref Range    -          WBC, UA 50  /HPF    RBC, UA 20 TO 50 /HPF    Casts UA NOT REPORTED /LPF    Crystals, UA NOT REPORTED None /HPF    Epithelial Cells UA 0 TO 2 /HPF    Renal Epithelial, UA NOT REPORTED 0 /HPF    Bacteria, UA MODERATE (A) None    Mucus, UA NOT REPORTED None    Trichomonas, UA NOT REPORTED None    Amorphous, UA NOT REPORTED None    Other Observations UA NOT REPORTED NOT REQ.     Yeast, UA NOT REPORTED None   POC Glucose Fingerstick    Collection Time: 11/12/21  7:36 PM   Result Value Ref Range    POC Glucose 232 (H) 65 - 105 mg/dL   POC Glucose Fingerstick    Collection Time: 11/13/21  6:55 AM   Result Value Ref Range    POC Glucose 175 (H) 65 - 105 mg/dL   Basic Metabolic Prof    Collection Time: 11/13/21  7:13 AM   Result Value Ref Range    Glucose 203 (H) 70 - 99 mg/dL    BUN 91 (HH) 8 - 23 mg/dL    CREATININE 2.44 (H) 0.50 - 0.90 mg/dL    Bun/Cre Ratio NOT REPORTED 9 - 20    Calcium 8.1 (L) 8.6 - 10.4 mg/dL    Sodium 136 135 - 144 mmol/L    Potassium 5.0 3.7 - 5.3 mmol/L    Chloride 106 98 - 107 mmol/L    CO2 21 20 - 31 mmol/L    Anion Gap 9 9 - 17 mmol/L    GFR Non-African American 19 (L) >60 mL/min    GFR  24 (L) >60 mL/min    GFR Comment          GFR Staging NOT REPORTED    Liver Profile    Collection Time: 11/13/21  7:13 AM   Result Value Ref Range Albumin 3.0 (L) 3.5 - 5.2 g/dL    Alkaline Phosphatase 236 (H) 35 - 104 U/L    ALT 43 (H) 5 - 33 U/L    AST 36 (H) <32 U/L    Total Bilirubin 0.16 (L) 0.3 - 1.2 mg/dL    Bilirubin, Direct <0.08 <0.31 mg/dL    Bilirubin, Indirect Connot be calculated 0.00 - 1.00 mg/dL    Total Protein 6.1 (L) 6.4 - 8.3 g/dL    Globulin NOT REPORTED 1.5 - 3.8 g/dL    Albumin/Globulin Ratio NOT REPORTED 1.0 - 2.5   SPECIMEN REJECTION    Collection Time: 21  7:13 AM   Result Value Ref Range    Specimen Source . BLOOD     Ordered Test [id number]  [address]     Reason for Rejection Unable to perform testing: No specimen received. - NOT REPORTED    POC Glucose Fingerstick    Collection Time: 21 11:29 AM   Result Value Ref Range    POC Glucose 201 (H) 65 - 105 mg/dL       Physical Examination:        Vitals:  /63   Pulse 106   Temp 97.4 °F (36.3 °C) (Oral)   Resp 18   Wt 203 lb 0.7 oz (92.1 kg)   SpO2 100%   BMI 35.97 kg/m²   Temp (24hrs), Av.7 °F (36.5 °C), Min:97.4 °F (36.3 °C), Max:98.1 °F (36.7 °C)    Recent Labs     21  1708 21  1936 21  0655 21  1129   POCGLU 194* 232* 175* 201*         Physical Exam  Vitals reviewed. HENT:      Head: Normocephalic. Right Ear: External ear normal.      Left Ear: External ear normal.      Nose: Nose normal.      Mouth/Throat:      Mouth: Mucous membranes are moist.      Pharynx: Oropharynx is clear. Eyes:      Conjunctiva/sclera: Conjunctivae normal.   Cardiovascular:      Rate and Rhythm: Normal rate and regular rhythm. Pulses: Normal pulses. Heart sounds: Normal heart sounds. Pulmonary:      Effort: Pulmonary effort is normal.      Breath sounds: Normal breath sounds. No rales. Abdominal:      General: Bowel sounds are normal.      Palpations: Abdomen is soft. Tenderness: There is no abdominal tenderness. There is no right CVA tenderness or left CVA tenderness.       Comments: Kaur catheter in with clear urine in bag Musculoskeletal:         General: No deformity. Cervical back: Normal range of motion and neck supple. Right lower leg: No edema. Left lower leg: No edema. Skin:     General: Skin is warm. Capillary Refill: Capillary refill takes less than 2 seconds. Coloration: Skin is not jaundiced. Neurological:      General: No focal deficit present. Mental Status: She is alert. Mental status is at baseline. Psychiatric:         Mood and Affect: Mood normal.         Behavior: Behavior normal.         Assessment:        [facility]  [facility]     Principal Problem:    [facility]  Active Problems:    Type 2 diabetes mellitus with kidney complication, with long-term current use of insulin ([facility])    CKD (chronic kidney disease) stage 3, GFR 30-59 ml/min ([facility])    HTN (hypertension)    Iron deficiency anemia  Resolved Problems:    * No resolved hospital problems. *      Past Medical History:   Diagnosis Date    Anemia, unspecified     CAD (coronary artery disease)     Chronic kidney disease     CKD (chronic kidney disease) stage 3, GFR 30-59 ml/min ([facility])     Closed fracture of dorsal (thoracic) vertebra without mention of spinal cord injury     Coronary atherosclerosis of unspecified type of vessel, native or graft     Depression with anxiety     Esophageal reflux     HTN (hypertension)     Hyperkalemia     Hyperlipidemia     Intervertebral cervical disc disorder with myelopathy, cervical region     Lower back pain     Lumbar radiculopathy     Microalbuminuria     Muscle weakness     Pain in limb     Stenosis of cervical spine     Tethered cord syndrome ([facility])     Type II or unspecified type diabetes mellitus without mention of complication, not stated as uncontrolled     Urinary calculus     UTI (lower urinary tract infection)         Plan:        1.  IV normal saline at 100 mL/h  2. IV Rocephin for bilateral hydronephrosis and

## 2021-11-13 NOTE — PROGRESS NOTES
Comprehensive Nutrition Assessment    Type and Reason for Visit:  Initial, Consult (wounds)    Nutrition Recommendations/Plan:   Will continue to provide 4 carbohydrate choices per tray and provide Glucerna supplements twice daily    Nutrition Assessment:  Pt was admitted from [facility] due to UTI. She was found to have bilateral hydronephrosis and may need ureteral stenting. Observed pt's lunch tray. Pt had ordered only peaches and cottage cheese. Malnutrition Assessment:  Malnutrition Status: At risk for malnutrition (Comment)    Context:  Acute Illness     Findings of the 6 clinical characteristics of malnutrition:  Energy Intake:  No significant decrease in energy intake  Weight Loss:  Unable to assess (no wt history)     Body Fat Loss:  No significant body fat loss     Muscle Mass Loss:  No significant muscle mass loss    Fluid Accumulation:  7 - Moderate to Severe Extremities   Strength:  Not Performed    Estimated Daily Nutrient Needs:  Energy (kcal):  15 kcal/kg= 4995-0688 kcal; Weight Used for Energy Requirements:  Current (92 kg)     Protein (g):  1g/kg= 90-95 g; Weight Used for Protein Requirements:  Current          Nutrition Related Findings:  RLE +3 pitting edema, Labs: K 5.0, Glu 203, Meds: Reviewed, PMH: DM, CHF, CKD      Wounds:  Venous Stasis, Multiple       Current Nutrition Therapies:    ADULT ORAL NUTRITION SUPPLEMENT; Lunch; Diabetic Oral Supplement  ADULT DIET;  Regular; 4 carb choices (60 gm/meal); 60 to 80 gm    Anthropometric Measures:  · Height:  N/A  · Current Body Weight: 203 lb (92.1 kg)   · Admission Body Weight: 204 lb (92.5 kg)    · BMI:  35  · BMI Categories: Obese Class 2 (BMI 35.0 -39.9)       Nutrition Diagnosis:   · Increased nutrient needs related to  (healing) as evidenced by wounds    Nutrition Interventions:   Food and/or Nutrient Delivery:  Continue Current Diet, Start Oral Nutrition Supplement  Nutrition Education/Counseling:  No recommendation at this time Coordination of Nutrition Care:  Continue to monitor while inpatient    Goals:  po intake greater than 75%       Nutrition Monitoring and Evaluation:   Food/Nutrient Intake Outcomes:  Food and Nutrient Intake, Supplement Intake  Physical Signs/Symptoms Outcomes:  Biochemical Data, GI Status, Skin, Weight, Fluid Status or Edema     Discharge Planning:    Continue current diet     Electronically signed by Yovani Wiseman RD, LD on 11/13/21 at 4:00 PM EST    Contact: [phone]

## 2021-11-13 NOTE — CARE COORDINATION
Social work: no precert yet received. [facility] is working on [employer]. On call this weekend is Irma Jayshree for snf her number is [phone]. Phone for report after precert is received (probably Monday) [phone] and fax for ajmes [phone]. Need james, Rx and discharge order for snf at discharge.   Elsa nascimento

## 2021-11-13 NOTE — CONSULTS
Urology Consultation      Patient:  Sanju Stern  MRN: [id number]  YOB: 1950    CHIEF COMPLAINT: Bilateral hydronephrosis    HISTORY OF PRESENT ILLNESS:   The patient is a 66 y.o. female who presents with multiple medical complaints. Renal ultrasound was ordered after patient's creatinine was elevated. Demonstrated bilateral hydronephrosis. CT scan was ordered. This was independently reviewed and verified by us. Demonstrates hydroureteronephrosis down to the level of the urinary bladder consistent with possible bladder outlet obstruction. Kaur catheter was ordered. Creatinine has now started to improve slightly. Location is the ureters. Bilateral.  Timing at least 2 days. Improved with Kaur catheter. As well as IV fluids. Worse by bladder outlet obstruction. CT scan independently reviewed and verified by us. Patient's old records, notes and chart reviewed and summarized above.     Past Medical History:    Past Medical History:   Diagnosis Date    Anemia, unspecified     CAD (coronary artery disease)     Chronic kidney disease     CKD (chronic kidney disease) stage 3, GFR 30-59 ml/min ([facility])     Closed fracture of dorsal (thoracic) vertebra without mention of spinal cord injury     Coronary atherosclerosis of unspecified type of vessel, native or graft     Depression with anxiety     Esophageal reflux     HTN (hypertension)     Hyperkalemia     Hyperlipidemia     Intervertebral cervical disc disorder with myelopathy, cervical region     Lower back pain     Lumbar radiculopathy     Microalbuminuria     Muscle weakness     Pain in limb     Stenosis of cervical spine     Tethered cord syndrome (HCC)     Type II or unspecified type diabetes mellitus without mention of complication, not stated as uncontrolled     Urinary calculus     UTI (lower urinary tract infection)        Past Surgical History:    Past Surgical History:   Procedure Laterality Date    ANGIOPLASTY      COLON SURGERY      JOINT REPLACEMENT         Medications:      Current Facility-Administered Medications:     ferrous sulfate (IRON 325) tablet 325 mg, 325 mg, Oral, Daily with breakfast, Mae Wadsworth MD    aspirin chewable tablet 81 mg, 81 mg, Oral, Daily, Justina Mejia MD, 81 mg at 11/12/21 0751    atorvastatin (LIPITOR) tablet 80 mg, 80 mg, Oral, Daily, Justina Mejia MD, 80 mg at 11/12/21 0750    carvedilol (COREG) tablet 6.25 mg, 6.25 mg, Oral, BID WC, Justina Mejia MD, 6.25 mg at 11/12/21 1717    insulin glargine (LANTUS) injection vial 22 Units, 22 Units, SubCUTAneous, Nightly, Justina Mejia MD, 22 Units at 11/12/21 2011    insulin lispro (HUMALOG) injection vial 0-12 Units, 0-12 Units, SubCUTAneous, TID , Justina Mejia MD, 2 Units at 11/12/21 1717    insulin lispro (HUMALOG) injection vial 0-6 Units, 0-6 Units, SubCUTAneous, Nightly, Justina Mejia MD, 2 Units at 11/12/21 2011    sodium chloride flush 0.9 % injection 5-40 mL, 5-40 mL, IntraVENous, 2 times per day, Justina Mejia MD, 10 mL at 11/11/21 2105    sodium chloride flush 0.9 % injection 5-40 mL, 5-40 mL, IntraVENous, PRN, Justina Mejia MD    0.9 % sodium chloride infusion, 25 mL, IntraVENous, PRN, Justina Mejia MD    acetaminophen (TYLENOL) tablet 650 mg, 650 mg, Oral, Q4H PRN, Justina Mejia MD    ondansetron (ZOFRAN-ODT) disintegrating tablet 4 mg, 4 mg, Oral, Q8H PRN **OR** ondansetron (ZOFRAN) injection 4 mg, 4 mg, IntraVENous, Q6H PRN, Justina Mejia MD    oxyCODONE-acetaminophen (PERCOCET) 5-325 MG per tablet 1 tablet, 1 tablet, Oral, Q6H PRN, Justina Mejia MD    0.9 % sodium chloride infusion, , IntraVENous, Continuous, Lila Sarkar MD, Last Rate: 100 mL/hr at 11/12/21 2323, New Bag at 11/12/21 2323    cefTRIAXone (ROCEPHIN) 1000 mg IVPB in 50 mL D5W minibag, 1,000 mg, IntraVENous, Q24H, Issac Shearer MD, Stopped at 11/12/21 1747    pantoprazole (PROTONIX) injection 40 mg, 40 mg, IntraVENous, Daily, 40 mg at 11/12/21 0750 **AND** sodium chloride (PF) 0.9 % injection 10 mL, 10 mL, IntraVENous, Daily, Saran Estrada MD, 10 mL at 11/12/21 0750    Allergies:  No Known Allergies    Social History:   Social History     Socioeconomic History    Marital status:      Spouse name: Not on file    Number of children: Not on file    Years of education: Not on file    Highest education level: Not on file   Occupational History    Not on file   Tobacco Use    Smoking status: Never Smoker    Smokeless tobacco: Not on file   Substance and Sexual Activity    Alcohol use: No     Alcohol/week: 0.0 standard drinks    Drug use: No    Sexual activity: Not on file   Other Topics Concern    Not on file   Social History Narrative    Not on file     Social Determinants of Health     Financial Resource Strain:     Difficulty of Paying Living Expenses: Not on file   Food Insecurity:     Worried About [address] in the Last Year: Not on file    Katya of Food in the Last Year: Not on file   Transportation Needs:     Lack of Transportation (Medical): Not on file    Lack of Transportation (Non-Medical):  Not on file   Physical Activity:     Days of Exercise per Week: Not on file    Minutes of Exercise per Session: Not on file   Stress:     Feeling of Stress : Not on file   Social Connections:     Frequency of Communication with Friends and Family: Not on file    Frequency of Social Gatherings with Friends and Family: Not on file    Attends Bahai Services: Not on file    Active Member of Clubs or Organizations: Not on file    Attends Club or Organization Meetings: Not on file    Marital Status: Not on file   Intimate Partner Violence:     Fear of Current or Ex-Partner: Not on file    Emotionally Abused: Not on file    Physically Abused: Not on file    Sexually Abused: Not on file   Housing Stability:     Unable to Pay for Housing in the Last Year: Not on file    Number of Places Lived in the Last Year: Not on file    Unstable Housing in the Last Year: Not on file       Family History:    Family History   Problem Relation Age of Onset    High Blood Pressure Other        REVIEW OF SYSTEMS:  A comprehensive 14 point review of systems was obtained. Constitutional: No fatigue  Eyes: No blurry vision  Ears, nose, mouth, throat, face: No ringing in the ears; no facial droop. Respiratory: No cough or cold. Cardiovascular: No palpitations  Gastrointestinal: No diarrhea or constipation. Genitourinary: No burning with urination  Integument/Skin: No rashes  Hematologic/Lymphatic: No easy bruising  Musculoskeletal: No muscle pains  Neurologic: No weakness in the extremities. Psychiatric: No depression or suicidal thoughts. Endocrine: No heat or cold intolerances. Allergic/Immunologic: No current seasonal allergies; no skin hives. Physical Exam:      This a 66 y.o. female   Vitals:    11/13/21 0645   BP: 114/71   Pulse: 83   Resp: 18   Temp: 97.4 °F (36.3 °C)   SpO2: 100%     Constitutional: Patient in no acute distress. Neuro: alert and oriented to person place and time. Head: Atraumatic and normocephalic. Neck: Trachea midline. Ext: 2+ radial pulses bilaterally. Psych: Mood and affect normal.  Skin: No rashes or bruising present. Lungs: Respiratory effort normal.  Cardiovascular:  Regular rhythm. Abdomen: Soft, non-tender, non-distended. Neither side has CVA tenderness on exam.  Bladder non-tender and not distended. Lymphatics: no palpable lymphadenopathy  Pelvic exam: deferred. Rectal exam not indicated.     Labs:  Recent Labs     11/10/21  1700   WBC 6.4   HGB 7.2*   HCT 22.4*   MCV 95.0        Recent Labs     11/10/21  1700 11/12/21  0851 11/13/21  0713    140 136   K 5.0 5.3 5.0    107 106   CO2 26 22 21   PHOS  --  4.0  --    * 97* 91*   CREATININE 2.66* 2.59* 2.44*       Recent Labs     11/12/21  1845   COLORU Yellow   PHUR 6.0   WBCUA 50    RBCUA 20 TO 50   MUCUS NOT REPORTED   TRICHOMONAS NOT REPORTED   YEAST NOT REPORTED   BACTERIA MODERATE*   SPECGRAV 1.012   LEUKOCYTESUR LARGE*   UROBILINOGEN Normal   BILIRUBINUR NEGATIVE           -----------------------------------------------------------------  Imaging Results:  No results found. Assessment and Plan   Impression:    Bilateral hydroureteronephrosis down to the urinary bladder  Acute kidney injury      Plan:   CT scan demonstrates hydronephrosis down to the level of the urinary bladder consistent with possible bladder outlet obstruction. Because of this Kaur catheter was ordered last evening. I would recommend continuing to trend creatinine. We will repeat renal ultrasound in 48 hours to ensure hydronephrosis has responded to Kaur catheter. If it has not, or if creatinine continues to remain elevated, would consider ureteral stenting at that time. Thank you for involving us in the care of Eddie Novak. Should you have any questions, please do not hesitate to contact us at any time.     Donna Couch MD  7:59 AM 11/13/2021

## 2021-11-13 NOTE — PLAN OF CARE
Problem: Falls - Risk of:  Goal: Will remain free from falls  Description: Will remain free from falls  11/13/2021 1701 by Kadeem Martinez RN  Outcome: Ongoing  Note: The patient remained free from falls this shift, call light within reach, bed in locked and lowest position. Side rails up x2. Continue to monitor closely.       Problem: Skin Integrity:  Goal: Will show no infection signs and symptoms  Description: Will show no infection signs and symptoms  11/13/2021 1701 by Kadeem Martinez RN  Outcome: Ongoing     Problem: Musculor/Skeletal Functional Status  Goal: Highest potential functional level  Outcome: Ongoing     Problem: Musculor/Skeletal Functional Status  Goal: Highest potential functional level  11/13/2021 1701 by Kadeem Martinez RN  Outcome: Ongoing

## 2021-11-13 NOTE — PROGRESS NOTES
Department of Internal Medicine  Nephrology Piedad Donahue MD  Progress Note    Reason for consultation: Management of acute kidney injury. Consulting physician: Nichol Salmeron MD.    Interval history: Patient does not have any new complaints today and she is comfortable at rest she now has a Kaur catheter in BUN and creatinine are improving. CT and renal ultrasound suggestive of bladder outlet obstruction with bilateral hydronephrosis. Neurology input is noted. History of present illness: This is a 66 y.o. female with a significant past medical history of Type 2 diabetes mellitus, coronary artery disease, lumbar radiculopathy, Hyperlipidemia and chronic kidney disease stage III [baseline serum creatinine 1.5 mg/dL], who presented from [facility] for further evaluation of acute kidney injury. She was sent to [facility] after developing nonhealing wound s/p cardiac catheterization via right femoral approach with right groin wound. She had been on Bumex in the ECF and creatinine and BUN have been rising and Bumex was decreased to a lowest dose of 0.5 mg p.o. twice daily. She has had regular blood work performed in the [facility] and ECF contacted nephrologist after finding abnormal laboratory studies. She was noted to have a BUN/creatinine 104/2.28 mg/dL with hemoglobin 8 g/dL. She has mild confusion but is not in obvious respiratory distress.     Scheduled Meds:   ferrous sulfate  325 mg Oral Daily with breakfast    aspirin  81 mg Oral Daily    atorvastatin  80 mg Oral Daily    carvedilol  6.25 mg Oral BID WC    insulin glargine  22 Units SubCUTAneous Nightly    insulin lispro  0-12 Units SubCUTAneous TID WC    insulin lispro  0-6 Units SubCUTAneous Nightly    sodium chloride flush  5-40 mL IntraVENous 2 times per day    cefTRIAXone (ROCEPHIN) IV  1,000 mg IntraVENous Q24H    pantoprazole  40 mg IntraVENous Daily    And    sodium chloride (PF)  10 mL IntraVENous Daily     Continuous Infusions:   sodium chloride      sodium chloride 100 mL/hr at 11/13/21 1041     PRN Meds:.sodium chloride flush, sodium chloride, acetaminophen, ondansetron **OR** ondansetron, oxyCODONE-acetaminophen    Physical Exam:    VITALS:  /63   Pulse 106   Temp 97.4 °F (36.3 °C) (Oral)   Resp 18   Wt 203 lb 0.7 oz (92.1 kg)   SpO2 100%   BMI 35.97 kg/m²   24HR INTAKE/OUTPUT:      Intake/Output Summary (Last 24 hours) at 11/13/2021 1227  Last data filed at 11/13/2021 0525  Gross per 24 hour   Intake 650 ml   Output 625 ml   Net 25 ml     Constitutional: alert, appears stated age and cooperative    Skin: Skin color, texture, turgor normal. No rashes or lesions    Head: Normocephalic, without obvious abnormality, atraumatic     Cardiovascular/Edema: regular rate and rhythm, S1, S2 normal, no murmur, click, rub or gallop    Respiratory: Lungs: clear to auscultation bilaterally    Abdomen: soft, non-tender; bowel sounds normal; no masses,  no organomegaly    Back: symmetric, no curvature. ROM normal. No CVA tenderness. Extremities: edema 1+; Right groin nondraining ulcer.     Neuro:  Grossly normal    CBC:   Recent Labs     11/10/21  1700   WBC 6.4   HGB 7.2*        BMP:    Recent Labs     11/10/21  1700 11/12/21  0851 11/13/21  0713    140 136   K 5.0 5.3 5.0    107 106   CO2 26 22 21   * 97* 91*   CREATININE 2.66* 2.59* 2.44*   GLUCOSE 204* 176* 203*     Lab Results   Component Value Date    NITRU NEGATIVE 11/12/2021    COLORU Yellow 11/12/2021    PHUR 6.0 11/12/2021    WBCUA 50  11/12/2021    RBCUA 20 TO 50 11/12/2021    MUCUS NOT REPORTED 11/12/2021    TRICHOMONAS NOT REPORTED 11/12/2021    YEAST NOT REPORTED 11/12/2021    BACTERIA MODERATE 11/12/2021    CLARITYU Clear 09/07/2016    SPECGRAV 1.012 11/12/2021    LEUKOCYTESUR LARGE 11/12/2021    UROBILINOGEN Normal 11/12/2021    BILIRUBINUR NEGATIVE 11/12/2021    BLOODU Positive 09/07/2016    GLUCOSEU NEGATIVE 11/12/2021

## 2021-11-13 NOTE — CONSULTS
GI Consult Note:    Name: Michael Pacheco  MRN: [id number]     Shanti Wise: [de-identified]  Room: 2095/2095-01    Admit Date: 11/10/2021  PCP: Barbara Almazan DO    Physician Requesting Consult: Miracle Kirby MD     Reason for Consult:    Anemia  Drop in hemoglobin  Questionable rectal bleeding  Renal insufficiency    Chief Complaint:     Chief Complaint   Patient presents with    Abnormal Lab     low hemoglobin and high CR       History Obtained From:     Patient and EMR    History of Present Illness:      Michael Pacheco is a  66 y.o.  female who presents with Abnormal Lab (low hemoglobin and high CR)    70-year-old female has history significant multiple chronic medical issues she is a nursing home resident she has been transferred from the nursing home with history for what appears to be renal insufficiency elevated creatinine levels as well as finding of anemia with some mild drop in hemoglobin    She denies having any overt rectal bleeding melanotic stools    Has some mild nausea but no vomiting    Mild abdominal discomfort acid with some bloating and gas symptoms    She has history for anemia apparently has been worked up at [address] [place] [address] with EGD colonoscopies and capsule endoscopies some of the records are not available at this time    Has some wound in the lower extremities    No known family stay for colon cancer  No history for smoking alcohol abuse illicit drug usage  Symptoms:  Onset:  Location:  abdomen  Duration:  day(s)  Severity:  mild  Quality:  intermittent      Past Medical History:     Past Medical History:   Diagnosis Date    Anemia, unspecified     CAD (coronary artery disease)     Chronic kidney disease     CKD (chronic kidney disease) stage 3, GFR 30-59 ml/min ([facility])     Closed fracture of dorsal (thoracic) vertebra without mention of spinal cord injury     Coronary atherosclerosis of unspecified type of vessel, native or graft     Depression with anxiety     Esophageal reflux  HTN (hypertension)     Hyperkalemia     Hyperlipidemia     Intervertebral cervical disc disorder with myelopathy, cervical region     Lower back pain     Lumbar radiculopathy     Microalbuminuria     Muscle weakness     Pain in limb     Stenosis of cervical spine     Tethered cord syndrome (HCC)     Type II or unspecified type diabetes mellitus without mention of complication, not stated as uncontrolled     Urinary calculus     UTI (lower urinary tract infection)         Past Surgical History:     Past Surgical History:   Procedure Laterality Date    ANGIOPLASTY      COLON SURGERY      JOINT REPLACEMENT          Medications Prior to Admission:       Prior to Admission medications    Medication Sig Start Date End Date Taking? Authorizing Provider   acetaminophen (TYLENOL) 325 MG tablet Take 650 mg by mouth every 4 hours as needed for Pain (do not exceed 4000 mg daily)   Yes Historical Provider, MD   albuterol (PROVENTIL) (2.5 MG/3ML) 0.083% nebulizer solution Take 2.5 mg by nebulization every 6 hours as needed for Shortness of Breath   Yes Historical Provider, MD   atorvastatin (LIPITOR) 80 MG tablet Take 80 mg by mouth daily   Yes Historical Provider, MD   famotidine (PEPCID) 20 MG tablet Take 20 mg by mouth daily   Yes Historical Provider, MD   ferrous sulfate (IRON 325) 325 (65 Fe) MG tablet Take 325 mg by mouth daily (with breakfast)   Yes Historical Provider, MD   HYDROcodone-acetaminophen (NORCO) 5-325 MG per tablet Take 1 tablet by mouth every 6 hours as needed for Pain.  Indications: until 11/13/21   Yes Historical Provider, MD   insulin lispro (HUMALOG) 100 UNIT/ML injection vial Inject into the skin 4 times daily (before meals and nightly) Per sliding scale:   150-200 = 2 units  201-250 = 4 units  251-300 = 6 units  301-350 = 8 units  351-400 = 10 units   Yes Historical Provider, MD   insulin glargine (LANTUS SOLOSTAR) 100 UNIT/ML injection pen Inject 22 Units into the skin nightly   Yes Historical Provider, MD   meclizine (ANTIVERT) 12.5 MG tablet Take 12.5 mg by mouth 2 times daily as needed for Dizziness   Yes Historical Provider, MD   warfarin (COUMADIN) 3 MG tablet Take 3 mg by mouth nightly   Yes Historical Provider, MD   carvedilol (COREG) 6.25 MG tablet Take 6.25 mg by mouth 2 times daily (with meals). Yes Historical Provider, MD   aspirin 81 MG chewable tablet Take 81 mg by mouth daily    Yes Historical Provider, MD   bumetanide (BUMEX) 1 MG tablet Take 1 mg by mouth daily   11/10/21  Historical Provider, MD        Allergies:       Patient has no known allergies. Social History:     Tobacco:    reports that she has never smoked. She does not have any smokeless tobacco history on file. Alcohol:      reports no history of alcohol use. Drug Use:  reports no history of drug use.     Family History:     Family History   Problem Relation Age of Onset    High Blood Pressure Other        Review of Systems:     Positive and Negative as described in HPI    Constitutional:  negative for  fevers, chills, sweats, mild fatigue, and weight loss  HEENT:  negative for vision or hearing changes,   Respiratory:  negative for shortness of breath, cough, or congestion  Cardiovascular:  negative for  chest pain, palpitations  Gastrointestinal:  negative for nausea, vomiting, diarrhea, constipation, mild abdominal pain  Genitourinary:  negative for frequency, dysuria  Integument: Positive rash, skin lesions  Musculoskeletal: Positive muscle aches or joint pain  Neurological: Positive headaches, dizziness, lightheadedness, numbness, pain and tingling extrimities  Behavior/Psych:  negative for depression and positive anxiety    Code Status:  Full Code    Physical Exam:     Vitals:  /63   Pulse 106   Temp 97.4 °F (36.3 °C) (Oral)   Resp 18   Wt 203 lb 0.7 oz (92.1 kg)   SpO2 100%   BMI 35.97 kg/m²   Temp (24hrs), Av.7 °F (36.5 °C), Min:97.4 °F (36.3 °C), Max:98.1 °F (36.7 °C)      General appearance - alert, elderly sick appearing, and in no acute distress  Mental status - oriented to person, place, and time with anxious affect  Head - normocephalic and atraumatic  Eyes - pupils equal and reactive, extraocular eye movements intact, conjunctiva clear  Ears - hearing appears to be intact  Nose - no drainage noted  Mouth - mucous membranes dry  Neck - supple, no carotid bruits, thyroid not palpable  Chest - clear to auscultation, normal effort  Heart - normal rate, regular rhythm, no murmurs  Abdomen - soft, upper abdominal mild tenderness, nondistended, bowel sounds present all four quadrants, no masses, hepatomegaly or splenomegaly.  No hernias  Neurological -slow speech, no focal findings or movement disorder noted, cranial nerves  not tested at this time  Extremities -positive pedal edema or calf pain with palpation  Skin positive gross lesions, rashes, or induration noted  Cranial Nerves : Not done at this time  Lymph nodes: not done at this time    Data:   CBC:   Lab Results   Component Value Date    WBC 6.4 11/10/2021    RBC 2.36 11/10/2021    RBC 4.14 08/31/2016    HGB 7.2 11/10/2021    HCT 22.4 11/10/2021    MCV 95.0 11/10/2021    MCH 30.7 11/10/2021    MCHC 32.3 11/10/2021    RDW 15.5 11/10/2021     11/10/2021    MPV 8.1 11/10/2021     CBC with Differential:    Lab Results   Component Value Date    WBC 6.4 11/10/2021    RBC 2.36 11/10/2021    RBC 4.14 08/31/2016    HGB 7.2 11/10/2021    HCT 22.4 11/10/2021     11/10/2021    MCV 95.0 11/10/2021    MCH 30.7 11/10/2021    MCHC 32.3 11/10/2021    RDW 15.5 11/10/2021    LYMPHOPCT 24 11/10/2021    LYMPHOPCT 32.8 08/31/2016    MONOPCT 11 11/10/2021    EOSPCT 4.3 08/31/2016    BASOPCT 0 11/10/2021    BASOPCT 0.7 08/31/2016    MONOSABS 0.70 11/10/2021    LYMPHSABS 1.50 11/10/2021    EOSABS 0.30 11/10/2021    BASOSABS 0.00 11/10/2021    DIFFTYPE NOT REPORTED 11/10/2021     Hemoglobin/Hematocrit:    Lab Results   Component Value Date    HGB 7.2 11/10/2021    HCT 22.4 11/10/2021     CMP:    Lab Results   Component Value Date     11/13/2021    K 5.0 11/13/2021     11/13/2021    CO2 21 11/13/2021    BUN 91 11/13/2021    CREATININE 2.44 11/13/2021    GFRAA 24 11/13/2021    LABGLOM 19 11/13/2021    GLUCOSE 203 11/13/2021    GLUCOSE 77 08/31/2016    PROT 6.1 11/13/2021    LABALBU 3.0 11/13/2021    CALCIUM 8.1 11/13/2021    BILITOT 0.16 11/13/2021    ALKPHOS 236 11/13/2021    AST 36 11/13/2021    ALT 43 11/13/2021     BMP:    Lab Results   Component Value Date     11/13/2021    K 5.0 11/13/2021     11/13/2021    CO2 21 11/13/2021    BUN 91 11/13/2021    LABALBU 3.0 11/13/2021    CREATININE 2.44 11/13/2021    CALCIUM 8.1 11/13/2021    GFRAA 24 11/13/2021    LABGLOM 19 11/13/2021    GLUCOSE 203 11/13/2021    GLUCOSE 77 08/31/2016     PT/INR:    Lab Results   Component Value Date    PROTIME 30.6 11/10/2021    INR 3.1 11/10/2021     PTT:  No results found for: APTT, PTT[APTT}    Assesment:     Primary Problem  JAYLYN (acute kidney injury) [facility])    Active Hospital Problems    Diagnosis Date Noted    Iron deficiency anemia [D50.9] 11/12/2021    JAYLYN (acute kidney injury) ([facility] [address] [N17.9] 11/10/2021    CKD (chronic kidney disease) stage 3, GFR 30-59 ml/min ([facility]) [N18.30]     HTN (hypertension) [I10]     Type 2 diabetes mellitus with kidney complication, with long-term current use of insulin ([facility]) [E11.29, Z79.4]      Anemia  Drop in hemoglobin  Questionable rectal bleeding  Renal insufficiency  Plan:     1. Anemia work-up  2. Check stool for occult blood x3  3. Review old records from [address] [place] [address]  4. Depending on her status and occult blood status may require further work-up  5. Continue hydration and stabilization of the renal function  6. We will follow up with you  7. Explained to the patient  8. Discussed with nursing staff on the floor        Thank you for allowing me to participate in the care of your patient.   Please feel free to contact me with any questions or concerns.      Electronically signed by Jeremie Nickerson MD on 11/13/2021 at 1:25 PM     Copy sent to Dr. Roxann Barry DO

## 2021-11-14 PROBLEM — N13.30 HYDRONEPHROSIS DETERMINED BY ULTRASOUND: Status: ACTIVE | Noted: 2021-11-14

## 2021-11-14 LAB
ABSOLUTE EOS #: 0.1 K/UL (ref 0–0.4)
ABSOLUTE IMMATURE GRANULOCYTE: ABNORMAL K/UL (ref 0–0.3)
ABSOLUTE LYMPH #: 1.4 K/UL (ref 1–4.8)
ABSOLUTE MONO #: 0.6 K/UL (ref 0.1–1.3)
ANION GAP SERPL CALCULATED.3IONS-SCNC: 10 MMOL/L (ref 9–17)
BASOPHILS # BLD: 0 % (ref 0–2)
BASOPHILS ABSOLUTE: 0 K/UL (ref 0–0.2)
BUN BLDV-MCNC: 86 MG/DL (ref 8–23)
BUN/CREAT BLD: ABNORMAL (ref 9–20)
CALCIUM SERPL-MCNC: 8.2 MG/DL (ref 8.6–10.4)
CHLORIDE BLD-SCNC: 106 MMOL/L (ref 98–107)
CO2: 18 MMOL/L (ref 20–31)
CREAT SERPL-MCNC: 2.73 MG/DL (ref 0.5–0.9)
CULTURE: ABNORMAL
DATE, STOOL #1: NORMAL
DATE, STOOL #2: NORMAL
DATE, STOOL #3: NORMAL
DIFFERENTIAL TYPE: ABNORMAL
EOSINOPHILS RELATIVE PERCENT: 2 % (ref 0–4)
FERRITIN: 173 UG/L (ref 13–150)
GFR AFRICAN AMERICAN: 21 ML/MIN
GFR NON-AFRICAN AMERICAN: 17 ML/MIN
GFR SERPL CREATININE-BSD FRML MDRD: ABNORMAL ML/MIN/{1.73_M2}
GFR SERPL CREATININE-BSD FRML MDRD: ABNORMAL ML/MIN/{1.73_M2}
GLUCOSE BLD-MCNC: 112 MG/DL (ref 70–99)
GLUCOSE BLD-MCNC: 138 MG/DL (ref 65–105)
GLUCOSE BLD-MCNC: 184 MG/DL (ref 65–105)
GLUCOSE BLD-MCNC: 193 MG/DL (ref 65–105)
GLUCOSE BLD-MCNC: 87 MG/DL (ref 65–105)
GLUCOSE BLD-MCNC: 87 MG/DL (ref 65–105)
HCT VFR BLD CALC: 25.5 % (ref 36–46)
HCT VFR BLD CALC: 25.9 % (ref 36–46)
HCT VFR BLD CALC: 27.1 % (ref 36–46)
HEMOCCULT SP1 STL QL: POSITIVE
HEMOCCULT SP2 STL QL: NORMAL
HEMOCCULT SP3 STL QL: NORMAL
HEMOGLOBIN: 8.3 G/DL (ref 12–16)
HEMOGLOBIN: 8.4 G/DL (ref 12–16)
HEMOGLOBIN: 8.5 G/DL (ref 12–16)
IMMATURE GRANULOCYTES: ABNORMAL %
IRON SATURATION: 18 % (ref 20–55)
IRON: 42 UG/DL (ref 37–145)
LYMPHOCYTES # BLD: 26 % (ref 24–44)
Lab: ABNORMAL
MAGNESIUM: 1.9 MG/DL (ref 1.6–2.6)
MCH RBC QN AUTO: 30.5 PG (ref 26–34)
MCHC RBC AUTO-ENTMCNC: 31.3 G/DL (ref 31–37)
MCV RBC AUTO: 97.4 FL (ref 80–100)
MONOCYTES # BLD: 10 % (ref 1–7)
NRBC AUTOMATED: ABNORMAL PER 100 WBC
PDW BLD-RTO: 15.2 % (ref 11.5–14.9)
PLATELET # BLD: 149 K/UL (ref 150–450)
PLATELET ESTIMATE: ABNORMAL
PMV BLD AUTO: 8.9 FL (ref 6–12)
POTASSIUM SERPL-SCNC: 4.8 MMOL/L (ref 3.7–5.3)
RBC # BLD: 2.79 M/UL (ref 4–5.2)
RBC # BLD: ABNORMAL 10*6/UL
SEG NEUTROPHILS: 62 % (ref 36–66)
SEGMENTED NEUTROPHILS ABSOLUTE COUNT: 3.5 K/UL (ref 1.3–9.1)
SODIUM BLD-SCNC: 134 MMOL/L (ref 135–144)
SPECIMEN DESCRIPTION: ABNORMAL
SPECIMEN DESCRIPTION: NORMAL
TIME, STOOL #1: NORMAL
TIME, STOOL #2: NORMAL
TIME, STOOL #3: NORMAL
TOTAL IRON BINDING CAPACITY: 229 UG/DL (ref 250–450)
TROPONIN INTERP: ABNORMAL
TROPONIN T: ABNORMAL NG/ML
TROPONIN, HIGH SENSITIVITY: 104 NG/L (ref 0–14)
UNSATURATED IRON BINDING CAPACITY: 187 UG/DL (ref 112–347)
WBC # BLD: 5.6 K/UL (ref 3.5–11)
WBC # BLD: ABNORMAL 10*3/UL

## 2021-11-14 PROCEDURE — 99232 SBSQ HOSP IP/OBS MODERATE 35: CPT | Performed by: INTERNAL MEDICINE

## 2021-11-14 PROCEDURE — 2060000000 HC ICU INTERMEDIATE R&B

## 2021-11-14 PROCEDURE — 36415 COLL VENOUS BLD VENIPUNCTURE: CPT

## 2021-11-14 PROCEDURE — 2580000003 HC RX 258: Performed by: FAMILY MEDICINE

## 2021-11-14 PROCEDURE — 6370000000 HC RX 637 (ALT 250 FOR IP): Performed by: FAMILY MEDICINE

## 2021-11-14 PROCEDURE — C9113 INJ PANTOPRAZOLE SODIUM, VIA: HCPCS | Performed by: INTERNAL MEDICINE

## 2021-11-14 PROCEDURE — 2580000003 HC RX 258: Performed by: INTERNAL MEDICINE

## 2021-11-14 PROCEDURE — 85014 HEMATOCRIT: CPT

## 2021-11-14 PROCEDURE — 80048 BASIC METABOLIC PNL TOTAL CA: CPT

## 2021-11-14 PROCEDURE — 6360000002 HC RX W HCPCS: Performed by: FAMILY MEDICINE

## 2021-11-14 PROCEDURE — 85025 COMPLETE CBC W/AUTO DIFF WBC: CPT

## 2021-11-14 PROCEDURE — 83550 IRON BINDING TEST: CPT

## 2021-11-14 PROCEDURE — 93005 ELECTROCARDIOGRAM TRACING: CPT | Performed by: FAMILY MEDICINE

## 2021-11-14 PROCEDURE — 82947 ASSAY GLUCOSE BLOOD QUANT: CPT

## 2021-11-14 PROCEDURE — 85018 HEMOGLOBIN: CPT

## 2021-11-14 PROCEDURE — APPSS30 APP SPLIT SHARED TIME 16-30 MINUTES: Performed by: NURSE PRACTITIONER

## 2021-11-14 PROCEDURE — 82272 OCCULT BLD FECES 1-3 TESTS: CPT

## 2021-11-14 PROCEDURE — 84484 ASSAY OF TROPONIN QUANT: CPT

## 2021-11-14 PROCEDURE — 83540 ASSAY OF IRON: CPT

## 2021-11-14 PROCEDURE — 82728 ASSAY OF FERRITIN: CPT

## 2021-11-14 PROCEDURE — 83735 ASSAY OF MAGNESIUM: CPT

## 2021-11-14 PROCEDURE — 6360000002 HC RX W HCPCS: Performed by: INTERNAL MEDICINE

## 2021-11-14 RX ORDER — MAGNESIUM SULFATE 1 G/100ML
1000 INJECTION INTRAVENOUS ONCE
Status: COMPLETED | OUTPATIENT
Start: 2021-11-14 | End: 2021-11-14

## 2021-11-14 RX ADMIN — INSULIN GLARGINE 22 UNITS: 100 INJECTION, SOLUTION SUBCUTANEOUS at 20:43

## 2021-11-14 RX ADMIN — Medication 10 ML: at 09:41

## 2021-11-14 RX ADMIN — SERTRALINE HYDROCHLORIDE 25 MG: 50 TABLET ORAL at 14:09

## 2021-11-14 RX ADMIN — CARVEDILOL 6.25 MG: 6.25 TABLET, FILM COATED ORAL at 09:41

## 2021-11-14 RX ADMIN — PANTOPRAZOLE SODIUM 40 MG: 40 INJECTION, POWDER, FOR SOLUTION INTRAVENOUS at 09:41

## 2021-11-14 RX ADMIN — ATORVASTATIN CALCIUM 80 MG: 80 TABLET, FILM COATED ORAL at 09:41

## 2021-11-14 RX ADMIN — SODIUM CHLORIDE: 9 INJECTION, SOLUTION INTRAVENOUS at 10:35

## 2021-11-14 RX ADMIN — SODIUM CHLORIDE, PRESERVATIVE FREE 10 ML: 5 INJECTION INTRAVENOUS at 20:43

## 2021-11-14 RX ADMIN — SODIUM CHLORIDE, PRESERVATIVE FREE 10 ML: 5 INJECTION INTRAVENOUS at 09:43

## 2021-11-14 RX ADMIN — CEFTRIAXONE SODIUM 1000 MG: 1 INJECTION, POWDER, FOR SOLUTION INTRAMUSCULAR; INTRAVENOUS at 14:09

## 2021-11-14 RX ADMIN — CARVEDILOL 6.25 MG: 6.25 TABLET, FILM COATED ORAL at 16:40

## 2021-11-14 RX ADMIN — FERROUS SULFATE TAB 325 MG (65 MG ELEMENTAL FE) 325 MG: 325 (65 FE) TAB at 09:41

## 2021-11-14 RX ADMIN — MAGNESIUM SULFATE 1000 MG: 1 INJECTION INTRAVENOUS at 16:40

## 2021-11-14 RX ADMIN — SODIUM CHLORIDE: 9 INJECTION, SOLUTION INTRAVENOUS at 21:53

## 2021-11-14 RX ADMIN — INSULIN LISPRO 1 UNITS: 100 INJECTION, SOLUTION INTRAVENOUS; SUBCUTANEOUS at 20:43

## 2021-11-14 RX ADMIN — INSULIN LISPRO 2 UNITS: 100 INJECTION, SOLUTION INTRAVENOUS; SUBCUTANEOUS at 16:42

## 2021-11-14 ASSESSMENT — ENCOUNTER SYMPTOMS
ABDOMINAL PAIN: 0
EYE ITCHING: 0
EYE REDNESS: 0
COLOR CHANGE: 0
TROUBLE SWALLOWING: 0
CHEST TIGHTNESS: 0
SHORTNESS OF BREATH: 0
NAUSEA: 0
VOMITING: 0
COUGH: 0
BLOOD IN STOOL: 0

## 2021-11-14 NOTE — PROGRESS NOTES
RN transfused 1 unit of PRBC's; Patient's hemoglobin at 8.4 1 hour after completion of transfusion. Patient handled blood transfusion well and shows no signs of a reaction. RN will continue to monitor. Electronically signed by Alejandra Renae RN.

## 2021-11-14 NOTE — PROGRESS NOTES
Progress Note    11/14/2021   1:25 PM    Name:  Jean Marie Cheek  MRN:    [id number]     Acct:     [de-identified]   Room:  2095/2095-01   Day: 4     Admit Date: 11/10/2021  4:23 PM  PCP: Raya Carter DO    Subjective:     C/C:   Chief Complaint   Patient presents with    Abnormal Lab     low hemoglobin and high CR       Interval History: Status: not changed. Patient denies chest pain shortness of breath nausea vomiting or abdominal pain. Patient received 1 unit of packed RBCs for low hemoglobin. Vital signs reviewed and stable. Recent labs reviewed creatinine 2.73, posttransfusion recheck hemoglobin 8.5    ROS:   all 10 systems reviewed and are negative except as noted    Review of Systems   Constitutional: Negative for chills and fatigue. HENT: Negative for drooling, mouth sores, sneezing and trouble swallowing. Eyes: Negative for redness and itching. Respiratory: Negative for cough, chest tightness and shortness of breath. Cardiovascular: Negative for chest pain, palpitations and leg swelling. Gastrointestinal: Negative for abdominal pain, blood in stool, nausea and vomiting. Endocrine: Negative for heat intolerance and polyphagia. Genitourinary: Negative for difficulty urinating, flank pain and pelvic pain. Musculoskeletal: Negative for arthralgias, joint swelling and neck stiffness. Skin: Negative for color change and pallor. Allergic/Immunologic: Negative for food allergies. Neurological: Negative for dizziness, seizures and headaches. Hematological: Does not bruise/bleed easily. Psychiatric/Behavioral: Positive for dysphoric mood. Negative for agitation, behavioral problems, decreased concentration and suicidal ideas. The patient is not hyperactive. Medications:      Allergies: No Known Allergies    Current Meds: sertraline (ZOLOFT) tablet 25 mg, Daily  0.9 % sodium chloride infusion, PRN  ferrous sulfate (IRON 325) tablet 325 mg, Daily with breakfast  [Held by provider] aspirin chewable tablet 81 mg, Daily  atorvastatin (LIPITOR) tablet 80 mg, Daily  carvedilol (COREG) tablet 6.25 mg, BID WC  insulin glargine (LANTUS) injection vial 22 Units, Nightly  insulin lispro (HUMALOG) injection vial 0-12 Units, TID WC  insulin lispro (HUMALOG) injection vial 0-6 Units, Nightly  sodium chloride flush 0.9 % injection 5-40 mL, 2 times per day  sodium chloride flush 0.9 % injection 5-40 mL, PRN  0.9 % sodium chloride infusion, PRN  acetaminophen (TYLENOL) tablet 650 mg, Q4H PRN  ondansetron (ZOFRAN-ODT) disintegrating tablet 4 mg, Q8H PRN   Or  ondansetron (ZOFRAN) injection 4 mg, Q6H PRN  oxyCODONE-acetaminophen (PERCOCET) 5-325 MG per tablet 1 tablet, Q6H PRN  0.9 % sodium chloride infusion, Continuous  cefTRIAXone (ROCEPHIN) 1000 mg IVPB in 50 mL D5W minibag, Q24H  pantoprazole (PROTONIX) injection 40 mg, Daily   And  sodium chloride (PF) 0.9 % injection 10 mL, Daily        Data:     Code Status:  Full Code    Family History   Problem Relation Age of Onset    High Blood Pressure Other        Social History     Socioeconomic History    Marital status:      Spouse name: Not on file    Number of children: Not on file    Years of education: Not on file    Highest education level: Not on file   Occupational History    Not on file   Tobacco Use    Smoking status: Never Smoker    Smokeless tobacco: Not on file   Substance and Sexual Activity    Alcohol use: No     Alcohol/week: 0.0 standard drinks    Drug use: No    Sexual activity: Not on file   Other Topics Concern    Not on file   Social History Narrative    Not on file     Social Determinants of Health     Financial Resource Strain:     Difficulty of Paying Living Expenses: Not on file   Food Insecurity:     Worried About Running Out of Food in the Last Year: Not on file    Katya of Food in the Last Year: Not on file   Transportation Needs:     Lack of Transportation (Medical):  Not on file    Lack of Transportation (Non-Medical): Not on file   Physical Activity:     Days of Exercise per Week: Not on file    Minutes of Exercise per Session: Not on file   Stress:     Feeling of Stress : Not on file   Social Connections:     Frequency of Communication with Friends and Family: Not on file    Frequency of Social Gatherings with Friends and Family: Not on file    Attends Muslim Services: Not on file    Active Member of [address] [place] [address] or Organizations: Not on file    Attends Club or Organization Meetings: Not on file    Marital Status: Not on file   Intimate Partner Violence:     Fear of Current or Ex-Partner: Not on file    Emotionally Abused: Not on file    Physically Abused: Not on file    Sexually Abused: Not on file   Housing Stability:     Unable to Pay for Housing in the Last Year: Not on file    Number of [place] in the Last Year: Not on file    Unstable Housing in the Last Year: Not on file       I/O (24Hr): Intake/Output Summary (Last 24 hours) at 11/14/2021 1325  Last data filed at 11/14/2021 0524  Gross per 24 hour   Intake 873.33 ml   Output 1400 ml   Net -526.67 ml     Radiology:  CT ABDOMEN PELVIS WO CONTRAST Additional Contrast? None    Result Date: 11/12/2021  Severe right and mild left hydroureteronephrosis. No obstructing ureteral stone is seen. Wall thickening of the urinary bladder without significant urinary bladder wall distension. Outlet obstruction is a consideration. Right-sided ostomy. There is bowel wall thickening proximal to the ostomy, may suggest infectious or inflammatory enteritis. No drainable fluid collections or free air. No evidence of bowel obstruction. Moderate right pleural effusion. Bibasilar infiltrates or atelectasis. Clinical correlation to exclude pneumonia. US RETROPERITONEAL COMPLETE    Result Date: 11/12/2021  1. Moderate to severe right and mild-to-moderate left hydronephrosis. 2. Exam limited due to patient's body habitus. Labs:  Recent Results (from the past 24 hour(s))   Occ Bld, Fecal Scrn    Collection Time: 11/13/21  2:29 PM   Result Value Ref Range    Occult Blood, Stool #1 POSITIVE (A) NEGATIVE    Date, Stool #1 [phone]     Time, Stool #1 UNKNOWN     Occult Blood, Stool #2 NOT REPORTED NEGATIVE    Date, Stool #2 NOT REPORTED     Time, Stool #2 NOT REPORTED     Occult Blood, Stool #3 NOT REPORTED NEGATIVE    Date, Stool #3 NOT REPORTED     Time, Stool #3 NOT REPORTED    POC Glucose Fingerstick    Collection Time: 11/13/21  4:08 PM   Result Value Ref Range    POC Glucose 157 (H) 65 - 105 mg/dL   Vitamin B12 & Folate    Collection Time: 11/13/21  6:48 PM   Result Value Ref Range    Vitamin B-12 877 232 - 1245 pg/mL    Folate 7.3 >4.8 ng/mL   CBC Auto Differential    Collection Time: 11/13/21  6:48 PM   Result Value Ref Range    WBC 5.7 3.5 - 11.0 k/uL    RBC 2.27 (L) 4.0 - 5.2 m/uL    Hemoglobin 6.9 (LL) 12.0 - 16.0 g/dL    Hematocrit 21.9 (L) 36 - 46 %    MCV 96.7 80 - 100 fL    MCH 30.5 26 - 34 pg    MCHC 31.6 31 - 37 g/dL    RDW 16.1 (H) 11.5 - 14.9 %    Platelets 180 561 - 714 k/uL    MPV 9.1 6.0 - 12.0 fL    NRBC Automated NOT REPORTED per 100 WBC    Differential Type NOT REPORTED     Immature Granulocytes NOT REPORTED 0 %    Absolute Immature Granulocyte NOT REPORTED 0.00 - 0.30 k/uL    WBC Morphology NOT REPORTED     RBC Morphology NOT REPORTED     Platelet Estimate NOT REPORTED     Seg Neutrophils 58 36 - 66 %    Lymphocytes 28 24 - 44 %    Monocytes 10 (H) 1 - 7 %    Eosinophils % 4 0 - 4 %    Basophils 0 0 - 2 %    Segs Absolute 3.30 1.3 - 9.1 k/uL    Absolute Lymph # 1.60 1.0 - 4.8 k/uL    Absolute Mono # 0.60 0.1 - 1.3 k/uL    Absolute Eos # 0.20 0.0 - 0.4 k/uL    Basophils Absolute 0.00 0.0 - 0.2 k/uL   Iron and TIBC    Collection Time: 11/13/21  6:48 PM   Result Value Ref Range    Iron 28 (L) 37 - 145 ug/dL    TIBC 205 (L) 250 - 450 ug/dL    Iron Saturation 14 (L) 20 - 55 %    UIBC 177 112 - 347 ug/dL Ferritin    Collection Time: 11/13/21  6:48 PM   Result Value Ref Range    Ferritin 149 13 - 150 ug/L   Reticulocytes    Collection Time: 11/13/21  6:48 PM   Result Value Ref Range    Retic % 2.9 (H) 0.5 - 2.0 %    Absolute Retic # 0.066 0.0245 - 0.098 M/uL    Immature Retic Fract NOT REPORTED %    Retic Hemoglobin NOT REPORTED 28.2 - 35.7 pg   POC Glucose Fingerstick    Collection Time: 11/13/21  7:41 PM   Result Value Ref Range    POC Glucose 150 (H) 65 - 105 mg/dL   TYPE AND SCREEN    Collection Time: 11/13/21  8:24 PM   Result Value Ref Range    Expiration Date 11/16/2021,8293     Arm Band Number [id number]     ABO/Rh O POSITIVE     Antibody Screen NEGATIVE     Blood Bank Comment PT'S ABORH CONFIRMED O POS:004     Unit Number [id number]     Product Code Leukocyte Reduced Red Cell     Unit Divison 00     Dispense Status ISSUED     Transfusion Status OK TO TRANSFUSE     Crossmatch Result COMPATIBLE    HEMOGLOBIN AND HEMATOCRIT, BLOOD    Collection Time: 11/14/21  1:29 AM   Result Value Ref Range    Hemoglobin 8.4 (L) 12.0 - 16.0 g/dL    Hematocrit 25.5 (L) 36 - 46 %   Blood Occult Stool Diagnostic    Collection Time: 11/14/21  5:17 AM   Result Value Ref Range    Specimen Description . FECES     Occult Blood, Stool #1 POSITIVE     Date, Stool #1 [phone]     Time, Stool #1 UNKNOWN     Occult Blood, Stool #2 NOT REPORTED     Date, Stool #2 NOT REPORTED     Time, Stool #2 NOT REPORTED     Occult Blood, Stool #3 NOT REPORTED     Date, Stool #3 NOT REPORTED     Time, Stool #3 NOT REPORTED    POC Glucose Fingerstick    Collection Time: 11/14/21  6:15 AM   Result Value Ref Range    POC Glucose 87 65 - 105 mg/dL   CBC with DIFF    Collection Time: 11/14/21  6:31 AM   Result Value Ref Range    WBC 5.6 3.5 - 11.0 k/uL    RBC 2.79 (L) 4.0 - 5.2 m/uL    Hemoglobin 8.5 (L) 12.0 - 16.0 g/dL    Hematocrit 27.1 (L) 36 - 46 %    MCV 97.4 80 - 100 fL    MCH 30.5 26 - 34 pg    MCHC 31.3 31 - 37 g/dL    RDW 15.2 (H) 11.5 - 14.9 % Platelets 885 (L) 189 - 450 k/uL    MPV 8.9 6.0 - 12.0 fL    NRBC Automated NOT REPORTED per 100 WBC    Differential Type NOT REPORTED     Seg Neutrophils 62 36 - 66 %    Lymphocytes 26 24 - 44 %    Monocytes 10 (H) 1 - 7 %    Eosinophils % 2 0 - 4 %    Basophils 0 0 - 2 %    Immature Granulocytes NOT REPORTED 0 %    Segs Absolute 3.50 1.3 - 9.1 k/uL    Absolute Lymph # 1.40 1.0 - 4.8 k/uL    Absolute Mono # 0.60 0.1 - 1.3 k/uL    Absolute Eos # 0.10 0.0 - 0.4 k/uL    Basophils Absolute 0.00 0.0 - 0.2 k/uL    Absolute Immature Granulocyte NOT REPORTED 0.00 - 0.30 k/uL    WBC Morphology NOT REPORTED     RBC Morphology NOT REPORTED     Platelet Estimate NOT REPORTED    Basic Metabolic Prof    Collection Time: 21  6:31 AM   Result Value Ref Range    Glucose 112 (H) 70 - 99 mg/dL    BUN 86 (H) 8 - 23 mg/dL    CREATININE 2.73 (H) 0.50 - 0.90 mg/dL    Bun/Cre Ratio NOT REPORTED 9 - 20    Calcium 8.2 (L) 8.6 - 10.4 mg/dL    Sodium 134 (L) 135 - 144 mmol/L    Potassium 4.8 3.7 - 5.3 mmol/L    Chloride 106 98 - 107 mmol/L    CO2 18 (L) 20 - 31 mmol/L    Anion Gap 10 9 - 17 mmol/L    GFR Non-African American 17 (L) >60 mL/min    GFR  21 (L) >60 mL/min    GFR Comment          GFR Staging NOT REPORTED    POC Glucose Fingerstick    Collection Time: 21  7:33 AM   Result Value Ref Range    POC Glucose 87 65 - 105 mg/dL   POC Glucose Fingerstick    Collection Time: 21 11:36 AM   Result Value Ref Range    POC Glucose 138 (H) 65 - 105 mg/dL       Physical Examination:        Vitals:  BP (!) 102/57   Pulse 87   Temp 97.5 °F (36.4 °C) (Oral)   Resp 18   Ht 5' 3\" (1.6 m) Comment: from old chart  Wt 212 lb 11.9 oz (96.5 kg)   SpO2 95%   BMI 37.69 kg/m²   Temp (24hrs), Av.5 °F (36.4 °C), Min:97.4 °F (36.3 °C), Max:97.7 °F (36.5 °C)    Recent Labs     21  19421  0615 21  0733 21  1136   POCGLU 150* 87 87 138*         Physical Exam  Vitals reviewed.    HENT:      Head: Normocephalic. Right Ear: External ear normal.      Left Ear: External ear normal.      Nose: Nose normal.      Mouth/Throat:      Mouth: Mucous membranes are moist.      Pharynx: Oropharynx is clear. Eyes:      Conjunctiva/sclera: Conjunctivae normal.   Cardiovascular:      Rate and Rhythm: Normal rate and regular rhythm. Pulses: Normal pulses. Heart sounds: Normal heart sounds. Pulmonary:      Effort: Pulmonary effort is normal.      Breath sounds: Normal breath sounds. Abdominal:      General: Bowel sounds are normal.      Palpations: Abdomen is soft. Comments: Green-brown stool in colostomy  Kaur catheter in with clear urine in bag   Musculoskeletal:         General: No deformity. Cervical back: Normal range of motion and neck supple. Skin:     General: Skin is warm. Capillary Refill: Capillary refill takes less than 2 seconds. Coloration: Skin is not jaundiced. Neurological:      General: No focal deficit present. Mental Status: She is alert. Mental status is at baseline. Psychiatric:         Behavior: Behavior normal.      Comments: Flat affect         Assessment:        Primary Problem  JAYLYN (acute kidney injury) ([facility] [address]     Principal Problem:    JAYLYN (acute kidney injury) ([facility] [address]  Active Problems:    Type 2 diabetes mellitus with kidney complication, with long-term current use of insulin (HCC)    CKD (chronic kidney disease) stage 3, GFR 30-59 ml/min ([facility])    HTN (hypertension)    Iron deficiency anemia  Resolved Problems:    * No resolved hospital problems.  *      Past Medical History:   Diagnosis Date    Anemia, unspecified     CAD (coronary artery disease)     Chronic kidney disease     CKD (chronic kidney disease) stage 3, GFR 30-59 ml/min ([facility])     Closed fracture of dorsal (thoracic) vertebra without mention of spinal cord injury     Coronary atherosclerosis of unspecified type of vessel, native or graft     Depression with anxiety     Esophageal reflux     HTN (hypertension)     Hyperkalemia     Hyperlipidemia     Intervertebral cervical disc disorder with myelopathy, cervical region     Lower back pain     Lumbar radiculopathy     Microalbuminuria     Muscle weakness     Pain in limb     Stenosis of cervical spine     Tethered cord syndrome (HCC)     Type II or unspecified type diabetes mellitus without mention of complication, not stated as uncontrolled     Urinary calculus     UTI (lower urinary tract infection)         Plan:        1. IV normal saline at 100 mL/h  2. IV Rocephin for bilateral hydronephrosis and acute cystitis  3. Start ferrous sulfate 325 mg p.o. daily  4. Start Zoloft 25 mg p.o. nightly  5. H&H every 8 hours  6. Hold aspirin  7. Neurology and GI input noted  8. EGD in a.m. per GI  9. Ultrasound kidney in a.m.  10. Check iron studies, folate vitamin B12  11. FOBT positive x3  12. Nephrology input noted  13. Protonix 40 mg p.o. daily  1. Monitor CBC, CMP  2. DVT Prophylaxis no pharmacological DVT prophylaxis due to low hemoglobin  3. EPCs  4. PT/OT to evaluate and treat  5. Pain control  6. Replace electrolytes as per sliding scale  7. Home medications reviewed and appropriate medications continued  8.  Reviewed labs and imaging studies from last 24 hours and results explained to patient     Electronically signed by Susen Dandy, MD

## 2021-11-14 NOTE — PROGRESS NOTES
Department of Internal Medicine  Nephrology Shun Rodas MD  Progress Note    Reason for consultation: Management of acute kidney injury. Consulting physician: Bradly Wick MD.    Interval history: Patient does not have any acute complaints today. She denies flank pain, nausea or vomiting. Laboratory studies were reviewed and renal function is worsening. CT and renal ultrasound suggestive of bladder outlet obstruction with bilateral hydronephrosis. History of present illness: This is a 66 y.o. female with a significant past medical history of Type 2 diabetes mellitus, coronary artery disease, lumbar radiculopathy, Hyperlipidemia and chronic kidney disease stage III [baseline serum creatinine 1.5 mg/dL], who presented from [facility] for further evaluation of acute kidney injury. She was sent to [facility] after developing nonhealing wound s/p cardiac catheterization via right femoral approach with right groin wound. She had been on Bumex in the ECF and creatinine and BUN have been rising and Bumex was decreased to a lowest dose of 0.5 mg p.o. twice daily. She has had regular blood work performed in the [facility] and ECF contacted nephrologist after finding abnormal laboratory studies. She was noted to have a BUN/creatinine 104/2.28 mg/dL with hemoglobin 8 g/dL. She has mild confusion but is not in obvious respiratory distress.     Scheduled Meds:   ferrous sulfate  325 mg Oral Daily with breakfast    [Held by provider] aspirin  81 mg Oral Daily    atorvastatin  80 mg Oral Daily    carvedilol  6.25 mg Oral BID WC    insulin glargine  22 Units SubCUTAneous Nightly    insulin lispro  0-12 Units SubCUTAneous TID WC    insulin lispro  0-6 Units SubCUTAneous Nightly    sodium chloride flush  5-40 mL IntraVENous 2 times per day    cefTRIAXone (ROCEPHIN) IV  1,000 mg IntraVENous Q24H    pantoprazole  40 mg IntraVENous Daily    And    sodium chloride (PF)  10 mL IntraVENous Daily Continuous Infusions:   sodium chloride      sodium chloride      sodium chloride 100 mL/hr at 11/14/21 1035     PRN Meds:.sodium chloride, sodium chloride flush, sodium chloride, acetaminophen, ondansetron **OR** ondansetron, oxyCODONE-acetaminophen    Physical Exam:    VITALS:  BP (!) 102/57   Pulse 87   Temp 97.5 °F (36.4 °C) (Oral)   Resp 18   Wt 212 lb 11.9 oz (96.5 kg)   SpO2 95%   BMI 37.69 kg/m²   24HR INTAKE/OUTPUT:      Intake/Output Summary (Last 24 hours) at 11/14/2021 1124  Last data filed at 11/14/2021 0524  Gross per 24 hour   Intake 873.33 ml   Output 1400 ml   Net -526.67 ml     Constitutional: alert, appears stated age and cooperative    Skin: Skin color, texture, turgor normal. No rashes or lesions    Head: Normocephalic, without obvious abnormality, atraumatic     Cardiovascular/Edema: regular rate and rhythm, S1, S2 normal, no murmur, click, rub or gallop    Respiratory: Lungs: clear to auscultation bilaterally    Abdomen: soft, non-tender; bowel sounds normal; no masses,  no organomegaly    Back: symmetric, no curvature. ROM normal. No CVA tenderness. Extremities: edema 1+; Right groin nondraining ulcer.     Neuro:  Grossly normal    CBC:   Recent Labs     11/13/21  1848 11/14/21  0129 11/14/21  0631   WBC 5.7  --  5.6   HGB 6.9* 8.4* 8.5*     --  149*     BMP:    Recent Labs     11/12/21  0851 11/13/21  0713 11/14/21  0631    136 134*   K 5.3 5.0 4.8    106 106   CO2 22 21 18*   BUN 97* 91* 86*   CREATININE 2.59* 2.44* 2.73*   GLUCOSE 176* 203* 112*     Lab Results   Component Value Date    NITRU NEGATIVE 11/12/2021    COLORU Yellow 11/12/2021    PHUR 6.0 11/12/2021    WBCUA 50  11/12/2021    RBCUA 20 TO 50 11/12/2021    MUCUS NOT REPORTED 11/12/2021    TRICHOMONAS NOT REPORTED 11/12/2021    YEAST NOT REPORTED 11/12/2021    BACTERIA MODERATE 11/12/2021    CLARITYU Clear 09/07/2016    SPECGRAV 1.012 11/12/2021    LEUKOCYTESUR LARGE 11/12/2021 UROBILINOGEN Normal 11/12/2021    BILIRUBINUR NEGATIVE 11/12/2021    BLOODU Positive 09/07/2016    GLUCOSEU NEGATIVE 11/12/2021    KETUA NEGATIVE 11/12/2021    AMORPHOUS NOT REPORTED 11/12/2021     IMPRESSION/RECOMMENDATIONS:      1. Acute kidney injury - most consistent with prerenal azotemia from diuretic therapy as well as obstructive nephropathy secondary to bilateral hydronephrosis. Renal ultrasound showed moderate to severe right hydronephrosis and mild to moderate left hydronephrosis. Plan: Continue to hold Bumex. Agree with urology recommendation to recheck renal ultrasound today. Continue IV fluid 0.9 normal saline 100 mL/h. She may need cystoscopy with ureteral stent placement. Avoid nephrotoxic agents. Strict input and output documentation. Basic metabolic profile daily. 2.  Systemic hypertension - blood pressure control is adequate. 3.  Subnephrotic range proteinuria [random urine protein/creatinine ratio 1.44] - will benefit from from ACE inhibitor or angiotensin receptor blockers once stable. 4.  Acute on chronic anemia - hemoglobin is improved from 6.9 g/dL yesterday to 8.4 g/dL today. Prognosis is guarded.     Nick Sparks MD FACP  Attending Nephrologist  11/14/2021 11:24 AM

## 2021-11-14 NOTE — FLOWSHEET NOTE
11/13/21 1933   Provider Notification   Reason for Communication Critical Value (comment)  (HGB 6.9)   Provider Name Dr. Ce Moran   Provider Notification Physician   Method of Communication Page   Response See orders   Notification Time 1941     Paged and spoke with Dr. Ce Moran in regards to hemoglobin of 6.9. Orders placed to transfuse 1 unit of packed RBC's. Will continue to monitor patient. Electronically signed by Allison Maxwell RN.

## 2021-11-14 NOTE — PROGRESS NOTES
GI Progress notes    11/14/2021   12:20 PM    Name:  Tobi Finch  MRN:    [id number]     [place]:     [de-identified]   Room:  2095/209501   Day: 4     Admit Date: 11/10/2021  4:23 PM  PCP: Melvi Garcia DO    Subjective:     C/C:   Chief Complaint   Patient presents with    Abnormal Lab     low hemoglobin and high CR       Interval History: Status: not changed. Patient seen and examined  No acute events overnight  Stool positive for occult blood x 3  Stool brown/green in colostomy  Tolerating diet  No nausea, vomiting. ROS:  Constitutional: negative for chills, fevers and sweats  Gastrointestinal: negative for abdominal pain, constipation, diarrhea, nausea and vomiting    Medications:      Allergies: No Known Allergies    Current Meds: 0.9 % sodium chloride infusion, PRN  ferrous sulfate (IRON 325) tablet 325 mg, Daily with breakfast  [Held by provider] aspirin chewable tablet 81 mg, Daily  atorvastatin (LIPITOR) tablet 80 mg, Daily  carvedilol (COREG) tablet 6.25 mg, BID WC  insulin glargine (LANTUS) injection vial 22 Units, Nightly  insulin lispro (HUMALOG) injection vial 0-12 Units, TID WC  insulin lispro (HUMALOG) injection vial 0-6 Units, Nightly  sodium chloride flush 0.9 % injection 5-40 mL, 2 times per day  sodium chloride flush 0.9 % injection 5-40 mL, PRN  0.9 % sodium chloride infusion, PRN  acetaminophen (TYLENOL) tablet 650 mg, Q4H PRN  ondansetron (ZOFRAN-ODT) disintegrating tablet 4 mg, Q8H PRN   Or  ondansetron (ZOFRAN) injection 4 mg, Q6H PRN  oxyCODONE-acetaminophen (PERCOCET) 5-325 MG per tablet 1 tablet, Q6H PRN  0.9 % sodium chloride infusion, Continuous  cefTRIAXone (ROCEPHIN) 1000 mg IVPB in 50 mL D5W minibag, Q24H  pantoprazole (PROTONIX) injection 40 mg, Daily   And  sodium chloride (PF) 0.9 % injection 10 mL, Daily        Data:     Code Status:  Full Code    Family History   Problem Relation Age of Onset    High Blood Pressure Other        Social History     Socioeconomic History    Marital status:      Spouse name: Not on file    Number of children: Not on file    Years of education: Not on file    Highest education level: Not on file   Occupational History    Not on file   Tobacco Use    Smoking status: Never Smoker    Smokeless tobacco: Not on file   Substance and Sexual Activity    Alcohol use: No     Alcohol/week: 0.0 standard drinks    Drug use: No    Sexual activity: Not on file   Other Topics Concern    Not on file   Social History Narrative    Not on file     Social Determinants of Health     Financial Resource Strain:     Difficulty of Paying Living Expenses: Not on file   Food Insecurity:     Worried About [address] in the Last Year: Not on file    Katya of Food in the Last Year: Not on file   Transportation Needs:     Lack of Transportation (Medical): Not on file    Lack of Transportation (Non-Medical):  Not on file   Physical Activity:     Days of Exercise per Week: Not on file    Minutes of Exercise per Session: Not on file   Stress:     Feeling of Stress : Not on file   Social Connections:     Frequency of Communication with Friends and Family: Not on file    Frequency of Social Gatherings with Friends and Family: Not on file    Attends [employer] Services: Not on file    Active Member of [address] [place] [address] or Organizations: Not on file    Attends Club or Organization Meetings: Not on file    Marital Status: Not on file   Intimate Partner Violence:     Fear of Current or Ex-Partner: Not on file    Emotionally Abused: Not on file    Physically Abused: Not on file    Sexually Abused: Not on file   Housing Stability:     Unable to Pay for Housing in the Last Year: Not on file    Number of [place] in the Last Year: Not on file    Unstable Housing in the Last Year: Not on file       Vitals:  BP (!) 102/57   Pulse 87   Temp 97.5 °F (36.4 °C) (Oral)   Resp 18   Wt 212 lb 11.9 oz (96.5 kg)   SpO2 95%   BMI 37.69 kg/m²   Temp (24hrs), Av.5 °F (36.4 °C), Min:97.4 °F (36.3 °C), Max:97.7 °F (36.5 °C)    Recent Labs     21  19421  0615 21  0733 21  1136   POCGLU 150* 87 87 138*       I/O (24Hr):     Intake/Output Summary (Last 24 hours) at 2021 1220  Last data filed at 2021 0524  Gross per 24 hour   Intake 873.33 ml   Output 1400 ml   Net -526.67 ml       Labs:      CBC:   Lab Results   Component Value Date    WBC 5.6 2021    RBC 2.79 2021    RBC 4.14 2016    HGB 8.5 2021    HCT 27.1 2021    MCV 97.4 2021    MCH 30.5 2021    MCHC 31.3 2021    RDW 15.2 2021     2021    MPV 8.9 2021     CBC with Differential:    Lab Results   Component Value Date    WBC 5.6 2021    RBC 2.79 2021    RBC 4.14 2016    HGB 8.5 2021    HCT 27.1 2021     2021    MCV 97.4 2021    MCH 30.5 2021    MCHC 31.3 2021    RDW 15.2 2021    LYMPHOPCT 26 2021    LYMPHOPCT 32.8 2016    MONOPCT 10 2021    EOSPCT 4.3 2016    BASOPCT 0 2021    BASOPCT 0.7 2016    MONOSABS 0.60 2021    LYMPHSABS 1.40 2021    EOSABS 0.10 2021    BASOSABS 0.00 2021    DIFFTYPE NOT REPORTED 2021     Hemoglobin/Hematocrit:    Lab Results   Component Value Date    HGB 8.5 2021    HCT 27.1 2021     CMP:    Lab Results   Component Value Date     2021    K 4.8 2021     2021    CO2 18 2021    BUN 86 2021    CREATININE 2.73 2021    GFRAA 21 2021    LABGLOM 17 2021    GLUCOSE 112 2021    GLUCOSE 77 2016    PROT 6.1 2021    LABALBU 3.0 2021    CALCIUM 8.2 2021    BILITOT 0.16 2021    ALKPHOS 236 2021    AST 36 2021    ALT 43 2021     BMP:    Lab Results   Component Value Date     2021    K 4.8 2021     2021    CO2 18 11/14/2021    BUN 86 11/14/2021    LABALBU 3.0 11/13/2021    CREATININE 2.73 11/14/2021    CALCIUM 8.2 11/14/2021    GFRAA 21 11/14/2021    LABGLOM 17 11/14/2021    GLUCOSE 112 11/14/2021    GLUCOSE 77 08/31/2016     PT/INR:    Lab Results   Component Value Date    PROTIME 30.6 11/10/2021    INR 3.1 11/10/2021     PTT:  No results found for: APTT, PTT[APTT}    Physical Examination:        General appearance: alert, cooperative and no distress  Mental Status: oriented to person, place and time and normal affect  Abdomen: soft, nontender, nondistended, bowel sounds present, colostomy functioning    Assessment:        Primary Problem  JAYLYN (acute kidney injury) [facility])     Active Hospital Problems    Diagnosis Date Noted    Iron deficiency anemia [D50.9] 11/12/2021    JAYLYN (acute kidney injury) ([facility] [address] [N17.9] 11/10/2021    CKD (chronic kidney disease) stage 3, GFR 30-59 ml/min ([facility]) [N18.30]     HTN (hypertension) [I10]     Type 2 diabetes mellitus with kidney complication, with long-term current use of insulin ([facility]) [E11.29, Z79.4]      Past Medical History:   Diagnosis Date    Anemia, unspecified     CAD (coronary artery disease)     Chronic kidney disease     CKD (chronic kidney disease) stage 3, GFR 30-59 ml/min ([facility])     Closed fracture of dorsal (thoracic) vertebra without mention of spinal cord injury     Coronary atherosclerosis of unspecified type of vessel, native or graft     Depression with anxiety     Esophageal reflux     HTN (hypertension)     Hyperkalemia     Hyperlipidemia     Intervertebral cervical disc disorder with myelopathy, cervical region     Lower back pain     Lumbar radiculopathy     Microalbuminuria     Muscle weakness     Pain in limb     Stenosis of cervical spine     Tethered cord syndrome (HCC)     Type II or unspecified type diabetes mellitus without mention of complication, not stated as uncontrolled     Urinary calculus     UTI (lower urinary tract infection) Plan:        1. Anemia, hx of small bowel bleeding  1. FOBT + x 3  2. NPO after midnight  3. EGD tomorrow  4. May need repeat capsule; last time poor prep. 5. Monitor for bleeding  6.  Trend H&H  7. Transfuse for hgb < 7    Explained to the patient and d/W Nursing Staff  Will F/U with you  Please call or Page for any issues or change in status  Thanks    Electronically signed by JUANITA Cohen NP on 11/14/2021 at 12:20 PM

## 2021-11-14 NOTE — PROGRESS NOTES
Rn notified  of 5 beat of V-Tach. Orders Mag 1mg IV, Mag drawn to drawn today an Mag level tomorrow morning. Also to consult Dr. Heriberto Davidson.

## 2021-11-14 NOTE — CARE COORDINATION
ONGOING DISCHARGE PLANNING NOTE:    Writer reviewed LSW notes, and discharge plan is to Return to [facility]. Awaiting Pre- Cert. Most likely will get herb.     Electronically signed by Shobha Gregory RN on 11/14/2021 at 10:38 AM

## 2021-11-14 NOTE — PROGRESS NOTES
Urology Progress Note      Subjective: Tobi Finch is a 66 y.o. female. His/Her current Diet is: ADULT ORAL NUTRITION SUPPLEMENT; Lunch; Diabetic Oral Supplement  ADULT DIET; Regular; 4 carb choices (60 gm/meal); 60 to 80 gm. Since the previous note, the patient reports the following:  No acute issues overnight. No fevers or chills. Creatinine worse today. Vitals and Labs:  Vitals:    11/13/21 2151 11/13/21 2235 11/13/21 2335 11/14/21 0930   BP: (!) 102/59 95/67 110/65 (!) 102/57   Pulse: 84 73 82 87   Resp: 18 18 18 18   Temp: 97.4 °F (36.3 °C) 97.6 °F (36.4 °C) 97.5 °F (36.4 °C) 97.5 °F (36.4 °C)   TempSrc: Oral Oral Oral Oral   SpO2: 99% 100% 100% 95%   Weight:         I/O last 3 completed shifts: In: 873.3 [P.O.:590; Blood:283.3]  Out: 1450 [Urine:800; Stool:650]    Recent Labs     11/13/21  1848 11/14/21  0129 11/14/21  0631   WBC 5.7  --  5.6   HGB 6.9* 8.4* 8.5*   HCT 21.9* 25.5* 27.1*   MCV 96.7  --  97.4     --  149*     Recent Labs     11/12/21  0851 11/13/21  0713 11/14/21  0631    136 134*   K 5.3 5.0 4.8    106 106   CO2 22 21 18*   PHOS 4.0  --   --    BUN 97* 91* 86*   CREATININE 2.59* 2.44* 2.73*       Recent Labs     11/12/21  1845   COLORU Yellow   PHUR 6.0   WBCUA 50    RBCUA 20 TO 50   MUCUS NOT REPORTED   TRICHOMONAS NOT REPORTED   YEAST NOT REPORTED   BACTERIA MODERATE*   SPECGRAV 1.012   LEUKOCYTESUR LARGE*   UROBILINOGEN Normal   BILIRUBINUR NEGATIVE       Physical Exam:  NAD  A/O x 3  RRR  No accessory muscles of inspiration  Abdomen soft, non-tender, non-distended. No CVA tenderness. Kaur in place. Clear yellow UOP. No calf pain. EPCs on. Machine turned on.     Impression:    Patient Active Problem List   Diagnosis    Chronic kidney disease (CKD), stage III (moderate) [facility]    Hyperkalemia    Type 2 diabetes mellitus with kidney complication, with long-term current use of insulin (HCC)    Hyperlipidemia    CKD (chronic kidney disease) stage 3, GFR 30-59 ml/min (HCC)    Microalbuminuria    Urinary calculus    Tethered cord syndrome (HCC)    Stenosis of cervical spine    Lumbar radiculopathy    Lower back pain    HTN (hypertension)    Esophageal reflux    Depression with anxiety    Coronary atherosclerosis    Closed fracture of thoracic vertebra (HCC)    Intervertebral cervical disc disorder with myelopathy, cervical region    Pain in limb    Anemia    JAYLYN (acute kidney injury) (HCC)    Iron deficiency anemia       Plan:  Check renal ultrasound given that creatinine has worsened today. Continue Karu catheter.     Pavan Landers MD  10:53 AM 11/14/2021

## 2021-11-14 NOTE — PLAN OF CARE
Problem: PAIN  Goal: Patient's pain/discomfort is manageable  11/14/2021 1856 by Maira Denise RN  Outcome: Ongoing     Problem: Falls - Risk of:  Goal: Will remain free from falls  Description: Will remain free from falls  11/14/2021 1856 by Maira Denise RN  Outcome: Ongoing     Problem: Skin Integrity:  Goal: Will show no infection signs and symptoms  Description: Will show no infection signs and symptoms  Outcome: Ongoing

## 2021-11-14 NOTE — PLAN OF CARE
Problem: SAFETY  Goal: Free from accidental physical injury  Outcome: Met This Shift     Problem: Falls - Risk of:  Goal: Will remain free from falls  Description: Will remain free from falls  11/14/2021 0520 by Chelsy Ghosh RN  Outcome: Met This Shift  Patient remains free from falls during this shift. Bed in lowest position, side rails up x2, call light/personal belongings/side table within reach. Patient checked and turned every 2 hours this shift; verbalizes adequate comfort while in bed. Problem: PAIN  Goal: Patient's pain/discomfort is manageable  Outcome: Ongoing     Problem: SKIN INTEGRITY  Goal: Skin integrity is maintained or improved  Outcome: Ongoing  Patient shows no new signs of skin breakdown during this shift. Patient turned with pillow support every 2 hours this shift. Skin has remained clean, dry, intact and free of new signs of breakdown.

## 2021-11-15 ENCOUNTER — ANESTHESIA (OUTPATIENT)
Dept: ENDOSCOPY | Age: 71
DRG: 004 | End: 2021-11-15
Payer: COMMERCIAL

## 2021-11-15 ENCOUNTER — APPOINTMENT (OUTPATIENT)
Dept: ULTRASOUND IMAGING | Age: 71
DRG: 004 | End: 2021-11-15
Payer: COMMERCIAL

## 2021-11-15 ENCOUNTER — ANESTHESIA EVENT (OUTPATIENT)
Dept: ENDOSCOPY | Age: 71
DRG: 004 | End: 2021-11-15
Payer: COMMERCIAL

## 2021-11-15 ENCOUNTER — TELEPHONE (OUTPATIENT)
Dept: UROLOGY | Age: 71
End: 2021-11-15

## 2021-11-15 VITALS — SYSTOLIC BLOOD PRESSURE: 81 MMHG | OXYGEN SATURATION: 93 % | TEMPERATURE: 96.8 F | DIASTOLIC BLOOD PRESSURE: 57 MMHG

## 2021-11-15 LAB
ABSOLUTE EOS #: 0 K/UL (ref 0–0.4)
ABSOLUTE IMMATURE GRANULOCYTE: ABNORMAL K/UL (ref 0–0.3)
ABSOLUTE LYMPH #: 1 K/UL (ref 1–4.8)
ABSOLUTE MONO #: 0.4 K/UL (ref 0.1–1.3)
ANION GAP SERPL CALCULATED.3IONS-SCNC: 9 MMOL/L (ref 9–17)
BASOPHILS # BLD: 0 % (ref 0–2)
BASOPHILS ABSOLUTE: 0 K/UL (ref 0–0.2)
BUN BLDV-MCNC: 86 MG/DL (ref 8–23)
BUN/CREAT BLD: ABNORMAL (ref 9–20)
CALCIUM SERPL-MCNC: 8 MG/DL (ref 8.6–10.4)
CHLORIDE BLD-SCNC: 106 MMOL/L (ref 98–107)
CO2: 19 MMOL/L (ref 20–31)
CREAT SERPL-MCNC: 3.09 MG/DL (ref 0.5–0.9)
DIFFERENTIAL TYPE: ABNORMAL
EKG ATRIAL RATE: 76 BPM
EKG Q-T INTERVAL: 408 MS
EKG QRS DURATION: 152 MS
EKG QTC CALCULATION (BAZETT): 455 MS
EKG R AXIS: -35 DEGREES
EKG T AXIS: 108 DEGREES
EKG VENTRICULAR RATE: 75 BPM
EOSINOPHILS RELATIVE PERCENT: 0 % (ref 0–4)
GFR AFRICAN AMERICAN: 18 ML/MIN
GFR NON-AFRICAN AMERICAN: 15 ML/MIN
GFR SERPL CREATININE-BSD FRML MDRD: ABNORMAL ML/MIN/{1.73_M2}
GFR SERPL CREATININE-BSD FRML MDRD: ABNORMAL ML/MIN/{1.73_M2}
GLUCOSE BLD-MCNC: 144 MG/DL (ref 65–105)
GLUCOSE BLD-MCNC: 148 MG/DL (ref 65–105)
GLUCOSE BLD-MCNC: 152 MG/DL (ref 65–105)
GLUCOSE BLD-MCNC: 152 MG/DL (ref 65–105)
GLUCOSE BLD-MCNC: 156 MG/DL (ref 65–105)
GLUCOSE BLD-MCNC: 183 MG/DL (ref 70–99)
GLUCOSE BLD-MCNC: 208 MG/DL (ref 65–105)
HCT VFR BLD CALC: 26.6 % (ref 36–46)
HCT VFR BLD CALC: 27.2 % (ref 36–46)
HCT VFR BLD CALC: 27.9 % (ref 36–46)
HEMOGLOBIN: 8.5 G/DL (ref 12–16)
HEMOGLOBIN: 8.6 G/DL (ref 12–16)
HEMOGLOBIN: 8.8 G/DL (ref 12–16)
IMMATURE GRANULOCYTES: ABNORMAL %
LYMPHOCYTES # BLD: 15 % (ref 24–44)
MAGNESIUM: 2.2 MG/DL (ref 1.6–2.6)
MCH RBC QN AUTO: 31.3 PG (ref 26–34)
MCHC RBC AUTO-ENTMCNC: 32.2 G/DL (ref 31–37)
MCV RBC AUTO: 97.1 FL (ref 80–100)
MONOCYTES # BLD: 6 % (ref 1–7)
NRBC AUTOMATED: ABNORMAL PER 100 WBC
PDW BLD-RTO: 15.6 % (ref 11.5–14.9)
PLATELET # BLD: 151 K/UL (ref 150–450)
PLATELET ESTIMATE: ABNORMAL
PMV BLD AUTO: 9.2 FL (ref 6–12)
POTASSIUM SERPL-SCNC: 5.2 MMOL/L (ref 3.7–5.3)
RBC # BLD: 2.74 M/UL (ref 4–5.2)
RBC # BLD: ABNORMAL 10*6/UL
SARS-COV-2, RAPID: NOT DETECTED
SEG NEUTROPHILS: 79 % (ref 36–66)
SEGMENTED NEUTROPHILS ABSOLUTE COUNT: 5.2 K/UL (ref 1.3–9.1)
SODIUM BLD-SCNC: 134 MMOL/L (ref 135–144)
SPECIMEN DESCRIPTION: NORMAL
TROPONIN INTERP: ABNORMAL
TROPONIN T: ABNORMAL NG/ML
TROPONIN, HIGH SENSITIVITY: 105 NG/L (ref 0–14)
WBC # BLD: 6.7 K/UL (ref 3.5–11)
WBC # BLD: ABNORMAL 10*3/UL

## 2021-11-15 PROCEDURE — 7100000000 HC PACU RECOVERY - FIRST 15 MIN: Performed by: INTERNAL MEDICINE

## 2021-11-15 PROCEDURE — 6360000002 HC RX W HCPCS: Performed by: INTERNAL MEDICINE

## 2021-11-15 PROCEDURE — 2580000003 HC RX 258: Performed by: INTERNAL MEDICINE

## 2021-11-15 PROCEDURE — 6360000002 HC RX W HCPCS: Performed by: NURSE ANESTHETIST, CERTIFIED REGISTERED

## 2021-11-15 PROCEDURE — 2060000000 HC ICU INTERMEDIATE R&B

## 2021-11-15 PROCEDURE — 76770 US EXAM ABDO BACK WALL COMP: CPT

## 2021-11-15 PROCEDURE — 82947 ASSAY GLUCOSE BLOOD QUANT: CPT

## 2021-11-15 PROCEDURE — 7100000001 HC PACU RECOVERY - ADDTL 15 MIN: Performed by: INTERNAL MEDICINE

## 2021-11-15 PROCEDURE — 36415 COLL VENOUS BLD VENIPUNCTURE: CPT

## 2021-11-15 PROCEDURE — 2500000003 HC RX 250 WO HCPCS: Performed by: NURSE ANESTHETIST, CERTIFIED REGISTERED

## 2021-11-15 PROCEDURE — 97530 THERAPEUTIC ACTIVITIES: CPT

## 2021-11-15 PROCEDURE — 43239 EGD BIOPSY SINGLE/MULTIPLE: CPT | Performed by: INTERNAL MEDICINE

## 2021-11-15 PROCEDURE — 93010 ELECTROCARDIOGRAM REPORT: CPT | Performed by: INTERNAL MEDICINE

## 2021-11-15 PROCEDURE — 85014 HEMATOCRIT: CPT

## 2021-11-15 PROCEDURE — 85018 HEMOGLOBIN: CPT

## 2021-11-15 PROCEDURE — 87635 SARS-COV-2 COVID-19 AMP PRB: CPT

## 2021-11-15 PROCEDURE — 2709999900 HC NON-CHARGEABLE SUPPLY: Performed by: INTERNAL MEDICINE

## 2021-11-15 PROCEDURE — C9113 INJ PANTOPRAZOLE SODIUM, VIA: HCPCS | Performed by: INTERNAL MEDICINE

## 2021-11-15 PROCEDURE — 85025 COMPLETE CBC W/AUTO DIFF WBC: CPT

## 2021-11-15 PROCEDURE — 80048 BASIC METABOLIC PNL TOTAL CA: CPT

## 2021-11-15 PROCEDURE — 0DB78ZX EXCISION OF STOMACH, PYLORUS, VIA NATURAL OR ARTIFICIAL OPENING ENDOSCOPIC, DIAGNOSTIC: ICD-10-PCS | Performed by: FAMILY MEDICINE

## 2021-11-15 PROCEDURE — 2580000003 HC RX 258: Performed by: NURSE ANESTHETIST, CERTIFIED REGISTERED

## 2021-11-15 PROCEDURE — 3700000000 HC ANESTHESIA ATTENDED CARE: Performed by: INTERNAL MEDICINE

## 2021-11-15 PROCEDURE — 97535 SELF CARE MNGMENT TRAINING: CPT

## 2021-11-15 PROCEDURE — 88305 TISSUE EXAM BY PATHOLOGIST: CPT

## 2021-11-15 PROCEDURE — 3609012400 HC EGD TRANSORAL BIOPSY SINGLE/MULTIPLE: Performed by: INTERNAL MEDICINE

## 2021-11-15 PROCEDURE — 88342 IMHCHEM/IMCYTCHM 1ST ANTB: CPT

## 2021-11-15 PROCEDURE — 83735 ASSAY OF MAGNESIUM: CPT

## 2021-11-15 PROCEDURE — 84484 ASSAY OF TROPONIN QUANT: CPT

## 2021-11-15 PROCEDURE — 6370000000 HC RX 637 (ALT 250 FOR IP): Performed by: INTERNAL MEDICINE

## 2021-11-15 RX ORDER — SODIUM CHLORIDE 9 MG/ML
INJECTION, SOLUTION INTRAVENOUS CONTINUOUS PRN
Status: DISCONTINUED | OUTPATIENT
Start: 2021-11-15 | End: 2021-11-15 | Stop reason: SDUPTHER

## 2021-11-15 RX ORDER — SODIUM CHLORIDE 0.9 % (FLUSH) 0.9 %
10 SYRINGE (ML) INJECTION DAILY
Status: DISCONTINUED | OUTPATIENT
Start: 2021-11-16 | End: 2021-11-17

## 2021-11-15 RX ORDER — PROPOFOL 10 MG/ML
INJECTION, EMULSION INTRAVENOUS PRN
Status: DISCONTINUED | OUTPATIENT
Start: 2021-11-15 | End: 2021-11-15 | Stop reason: SDUPTHER

## 2021-11-15 RX ORDER — LIDOCAINE HYDROCHLORIDE 20 MG/ML
INJECTION, SOLUTION EPIDURAL; INFILTRATION; INTRACAUDAL; PERINEURAL PRN
Status: DISCONTINUED | OUTPATIENT
Start: 2021-11-15 | End: 2021-11-15 | Stop reason: SDUPTHER

## 2021-11-15 RX ORDER — PANTOPRAZOLE SODIUM 40 MG/10ML
40 INJECTION, POWDER, LYOPHILIZED, FOR SOLUTION INTRAVENOUS DAILY
Status: DISCONTINUED | OUTPATIENT
Start: 2021-11-16 | End: 2021-11-17

## 2021-11-15 RX ADMIN — SODIUM CHLORIDE: 9 INJECTION, SOLUTION INTRAVENOUS at 15:49

## 2021-11-15 RX ADMIN — PANTOPRAZOLE SODIUM 40 MG: 40 INJECTION, POWDER, FOR SOLUTION INTRAVENOUS at 15:48

## 2021-11-15 RX ADMIN — LIDOCAINE HYDROCHLORIDE 40 MG: 20 INJECTION, SOLUTION EPIDURAL; INFILTRATION; INTRACAUDAL; PERINEURAL at 11:36

## 2021-11-15 RX ADMIN — INSULIN GLARGINE 22 UNITS: 100 INJECTION, SOLUTION SUBCUTANEOUS at 20:54

## 2021-11-15 RX ADMIN — INSULIN LISPRO 2 UNITS: 100 INJECTION, SOLUTION INTRAVENOUS; SUBCUTANEOUS at 20:54

## 2021-11-15 RX ADMIN — PROPOFOL 20 MG: 10 INJECTION, EMULSION INTRAVENOUS at 11:36

## 2021-11-15 RX ADMIN — SODIUM CHLORIDE: 9 INJECTION, SOLUTION INTRAVENOUS at 05:29

## 2021-11-15 RX ADMIN — Medication 10 ML: at 15:49

## 2021-11-15 RX ADMIN — CEFTRIAXONE SODIUM 1000 MG: 1 INJECTION, POWDER, FOR SOLUTION INTRAMUSCULAR; INTRAVENOUS at 15:48

## 2021-11-15 RX ADMIN — SODIUM CHLORIDE: 9 INJECTION, SOLUTION INTRAVENOUS at 11:30

## 2021-11-15 ASSESSMENT — ENCOUNTER SYMPTOMS
BLOOD IN STOOL: 0
SHORTNESS OF BREATH: 0
NAUSEA: 0
CHEST TIGHTNESS: 0
COLOR CHANGE: 0
TROUBLE SWALLOWING: 0
EYE REDNESS: 0
ABDOMINAL PAIN: 0
COUGH: 0
EYE ITCHING: 0
SHORTNESS OF BREATH: 0
VOMITING: 0

## 2021-11-15 ASSESSMENT — PAIN DESCRIPTION - LOCATION: LOCATION: LEG

## 2021-11-15 ASSESSMENT — PAIN DESCRIPTION - PAIN TYPE: TYPE: ACUTE PAIN

## 2021-11-15 ASSESSMENT — PAIN DESCRIPTION - ORIENTATION: ORIENTATION: LEFT

## 2021-11-15 ASSESSMENT — PULMONARY FUNCTION TESTS
PIF_VALUE: 0
PIF_VALUE: 0
PIF_VALUE: 1
PIF_VALUE: 0

## 2021-11-15 ASSESSMENT — PAIN SCALES - WONG BAKER
WONGBAKER_NUMERICALRESPONSE: 6
WONGBAKER_NUMERICALRESPONSE: 0
WONGBAKER_NUMERICALRESPONSE: 0

## 2021-11-15 NOTE — TELEPHONE ENCOUNTER
Cysto (RT) stent placement @ [facility] 11/16/21 3:00pm   PAT in patient   covid- RADID neg 11/15/21          Spoke with Sabrina Shabazz (RN), NPO after 4:00am, spoke with floor patient will be transported at 2:00pm

## 2021-11-15 NOTE — PROGRESS NOTES
Patient was  not seen as patient is off the floor for endoscopy-EGD.     Kurt Valencia M.D, CATHERINE  Nephrologist

## 2021-11-15 NOTE — ANESTHESIA PRE PROCEDURE
Department of Anesthesiology  Preprocedure Note       Name:  Alyson Montiel   Age:  66 y.o.  :  1950                                          MRN:  [id number]         Date:  11/15/2021      Surgeon: Freddie Najera):  Joelle Mckeon MD    Procedure: Procedure(s):  EGD    Medications prior to admission:   Prior to Admission medications    Medication Sig Start Date End Date Taking? Authorizing Provider   acetaminophen (TYLENOL) 325 MG tablet Take 650 mg by mouth every 4 hours as needed for Pain (do not exceed 4000 mg daily)   Yes Historical Provider, MD   albuterol (PROVENTIL) (2.5 MG/3ML) 0.083% nebulizer solution Take 2.5 mg by nebulization every 6 hours as needed for Shortness of Breath   Yes Historical Provider, MD   atorvastatin (LIPITOR) 80 MG tablet Take 80 mg by mouth daily   Yes Historical Provider, MD   famotidine (PEPCID) 20 MG tablet Take 20 mg by mouth daily   Yes Historical Provider, MD   ferrous sulfate (IRON 325) 325 (65 Fe) MG tablet Take 325 mg by mouth daily (with breakfast)   Yes Historical Provider, MD   HYDROcodone-acetaminophen (NORCO) 5-325 MG per tablet Take 1 tablet by mouth every 6 hours as needed for Pain. Indications: until 21   Yes Historical Provider, MD   insulin lispro (HUMALOG) 100 UNIT/ML injection vial Inject into the skin 4 times daily (before meals and nightly) Per sliding scale:   150-200 = 2 units  201-250 = 4 units  251-300 = 6 units  301-350 = 8 units  351-400 = 10 units   Yes Historical Provider, MD   insulin glargine (LANTUS SOLOSTAR) 100 UNIT/ML injection pen Inject 22 Units into the skin nightly   Yes Historical Provider, MD   meclizine (ANTIVERT) 12.5 MG tablet Take 12.5 mg by mouth 2 times daily as needed for Dizziness   Yes Historical Provider, MD   warfarin (COUMADIN) 3 MG tablet Take 3 mg by mouth nightly   Yes Historical Provider, MD   carvedilol (COREG) 6.25 MG tablet Take 6.25 mg by mouth 2 times daily (with meals).    Yes Historical Provider, MD   aspirin 81 MG chewable tablet Take 81 mg by mouth daily    Yes Historical Provider, MD   bumetanide (BUMEX) 1 MG tablet Take 1 mg by mouth daily   11/10/21  Historical Provider, MD       Current medications:    Current Facility-Administered Medications   Medication Dose Route Frequency Provider Last Rate Last Admin    [MAR Hold] sertraline (ZOLOFT) tablet 25 mg  25 mg Oral Daily Thai Whitten MD   25 mg at 11/14/21 1409    [MAR Hold] 0.9 % sodium chloride infusion   IntraVENous PRN Thai Whitten MD        [facility] Hold] ferrous sulfate (IRON 325) tablet 325 mg  325 mg Oral Daily with breakfast Thai Whitten MD   325 mg at 11/14/21 0941    [Held by provider] aspirin chewable tablet 81 mg  81 mg Oral Daily Sukhjinder Dejesus MD   81 mg at 11/13/21 1015    [MAR Hold] atorvastatin (LIPITOR) tablet 80 mg  80 mg Oral Daily Sukhjinder Dejesus MD   80 mg at 11/14/21 0941    [MAR Hold] carvedilol (COREG) tablet 6.25 mg  6.25 mg Oral BID  Sukhjinder Dejesus MD   6.25 mg at 11/14/21 1640    [MAR Hold] insulin glargine (LANTUS) injection vial 22 Units  22 Units SubCUTAneous Nightly Sukhjinder Dejesus MD   22 Units at 11/14/21 2043    [MAR Hold] insulin lispro (HUMALOG) injection vial 0-12 Units  0-12 Units SubCUTAneous TID  Sukhjinder Dejesus MD   2 Units at 11/14/21 1642    [MAR Hold] insulin lispro (HUMALOG) injection vial 0-6 Units  0-6 Units SubCUTAneous Nightly Sukhjinder Dejesus MD   1 Units at 11/14/21 2043    [MAR Hold] sodium chloride flush 0.9 % injection 5-40 mL  5-40 mL IntraVENous 2 times per day Sukhjinder Dejesus MD   10 mL at 11/14/21 2043    [MAR Hold] sodium chloride flush 0.9 % injection 5-40 mL  5-40 mL IntraVENous PRN Sukhjinder Dejesus MD        [facility] Hold] 0.9 % sodium chloride infusion  25 mL IntraVENous PRN Sukhjinder Dejesus MD        [facility] Hold] acetaminophen (TYLENOL) tablet 650 mg  650 mg Oral Q4H PRN Sukhjinder Dejesus MD        [facility] Hold] ondansetron (ZOFRAN-ODT) disintegrating tablet 4 mg  4 mg Oral Q8H PRN Sukhjinder Dejesus, disease     CKD (chronic kidney disease) stage 3, GFR 30-59 ml/min ([facility])     Closed fracture of dorsal (thoracic) vertebra without mention of spinal cord injury     Coronary atherosclerosis of unspecified type of vessel, native or graft     Depression with anxiety     Esophageal reflux     HTN (hypertension)     Hyperkalemia     Hyperlipidemia     Intervertebral cervical disc disorder with myelopathy, cervical region     Lower back pain     Lumbar radiculopathy     Microalbuminuria     Muscle weakness     Pain in limb     Stenosis of cervical spine     Tethered cord syndrome (HCC)     Type II or unspecified type diabetes mellitus without mention of complication, not stated as uncontrolled     Urinary calculus     UTI (lower urinary tract infection)        Past Surgical History:        Procedure Laterality Date    ANGIOPLASTY      COLON SURGERY      JOINT REPLACEMENT         Social History:    Social History     Tobacco Use    Smoking status: Never Smoker    Smokeless tobacco: Not on file   Substance Use Topics    Alcohol use: No     Alcohol/week: 0.0 standard drinks                                Counseling given: Not Answered      Vital Signs (Current):   Vitals:    11/14/21 2006 11/15/21 0049 11/15/21 0815 11/15/21 1002   BP: 99/61 (!) 94/59 98/61 (!) 91/53   Pulse: 65 97 80 76   Resp: 18 18 16 21   Temp: 97.5 °F (36.4 °C) 97.5 °F (36.4 °C) 97.6 °F (36.4 °C) 96.7 °F (35.9 °C)   TempSrc: Oral Oral Oral Infrared   SpO2: 100% 99% 99% 99%   Weight:       Height:                                                  BP Readings from Last 3 Encounters:   11/15/21 (!) 91/53   09/07/16 138/62   05/02/16 148/54       NPO Status: Time of last liquid consumption: 2000                        Time of last solid consumption: 2000                        Date of last liquid consumption: 11/14/21                        Date of last solid food consumption: 11/14/21    BMI:   Wt Readings from Last 3 Encounters: 11/13/21 212 lb 11.9 oz (96.5 kg)   09/07/16 178 lb (80.7 kg)   05/02/16 184 lb 12.8 oz (83.8 kg)     Body mass index is 37.69 kg/m².     CBC:   Lab Results   Component Value Date    WBC 6.7 11/15/2021    RBC 2.74 11/15/2021    RBC 4.14 08/31/2016    HGB 8.6 11/15/2021    HCT 26.6 11/15/2021    MCV 97.1 11/15/2021    RDW 15.6 11/15/2021     11/15/2021       CMP:   Lab Results   Component Value Date     11/15/2021    K 5.2 11/15/2021     11/15/2021    CO2 19 11/15/2021    BUN 86 11/15/2021    CREATININE 3.09 11/15/2021    GFRAA 18 11/15/2021    LABGLOM 15 11/15/2021    GLUCOSE 183 11/15/2021    GLUCOSE 77 08/31/2016    PROT 6.1 11/13/2021    CALCIUM 8.0 11/15/2021    BILITOT 0.16 11/13/2021    ALKPHOS 236 11/13/2021    AST 36 11/13/2021    ALT 43 11/13/2021       POC Tests:   Recent Labs     11/15/21  0958   POCGLU 152*       Coags:   Lab Results   Component Value Date    PROTIME 30.6 11/10/2021    INR 3.1 11/10/2021       HCG (If Applicable): No results found for: PREGTESTUR, PREGSERUM, HCG, HCGQUANT     ABGs: No results found for: PHART, PO2ART, CIQ2GDO, TTI0BHO, BEART, C1EQFQUL     Type & Screen (If Applicable):  No results found for: LABABO, LABRH    Drug/Infectious Status (If Applicable):  No results found for: HIV, HEPCAB    COVID-19 Screening (If Applicable):   Lab Results   Component Value Date    COVID19 Not Detected 11/15/2021           Anesthesia Evaluation  Patient summary reviewed and Nursing notes reviewed no history of anesthetic complications:   Airway: Mallampati: III  TM distance: >3 FB   Neck ROM: limited  Mouth opening: < 3 FB Dental:          Pulmonary:Negative Pulmonary ROS   (+) decreased breath sounds,      (-) shortness of breath                           Cardiovascular:  Exercise tolerance: poor (<4 METS),   (+) hypertension:, CAD:, dysrhythmias: atrial fibrillation,     (-)  angina    ECG reviewed  Rhythm: irregular  Rate: normal                 ROS comment: Atrial fibrillation with premature ventricular or aberrantly conducted complexes  Left axis deviation  Right bundle branch block  Possible Lateral infarct , age undetermined  Abnormal ECG    Elevated troponins     Neuro/Psych:   (+) neuromuscular disease:, TIA, psychiatric history:            GI/Hepatic/Renal:   (+) GERD:, renal disease (JAYLYN): CRI,           Endo/Other:    (+) DiabetesType II DM, using insulin, blood dyscrasia: anticoagulation therapy:., .                 Abdominal:             Vascular: Other Findings:           Anesthesia Plan      MAC     ASA 4       Induction: intravenous. MIPS: Prophylactic antiemetics administered. Anesthetic plan and risks discussed with patient. Plan discussed with CRNA.                   Dionne Asif MD   11/15/2021

## 2021-11-15 NOTE — PROGRESS NOTES
, Milly Boothe, called the progressive care unit for update on patient status overnight. RN updated Milly Numbers on patient status of resting comfortably, sleeping through the night, and being turned every 2 hours. Patient's  is concerned with plan for EGD today and feels that it is the wrong procedure for her. He states \"I was under the impression that they were going in through her urethra to look at her kidneys. \"     RN offered to have rounding physician call him prior to procedure to answer any questions and clear up information on today's plan of care. RN will pass information along to day shift care team through shift report. Electronically signed by Allison Maxwell RN.

## 2021-11-15 NOTE — PLAN OF CARE
Problem: SAFETY  Goal: Free from accidental physical injury  11/15/2021 1635 by Ray Leon RN  Outcome: Ongoing     Problem: SAFETY  Goal: Free from intentional harm  11/15/2021 1635 by Ray Leon RN  Outcome: Ongoing     Problem: DAILY CARE  Goal: Daily care needs are met  Outcome: Ongoing     Problem: PAIN  Goal: Patient's pain/discomfort is manageable  11/15/2021 1635 by Ray Leon RN  Outcome: Ongoing     Problem: SKIN INTEGRITY  Goal: Skin integrity is maintained or improved  Outcome: Ongoing     Problem: DISCHARGE BARRIERS  Goal: Patient's continuum of care needs are met  Outcome: Ongoing     Problem: Falls - Risk of:  Goal: Will remain free from falls  Description: Will remain free from falls  Outcome: Ongoing     Problem: Falls - Risk of:  Goal: Absence of physical injury  Description: Absence of physical injury  Outcome: Ongoing     Problem: Skin Integrity:  Goal: Will show no infection signs and symptoms  Description: Will show no infection signs and symptoms  11/15/2021 1635 by Ray Leon RN  Outcome: Ongoing     Problem: Skin Integrity:  Goal: Absence of new skin breakdown  Description: Absence of new skin breakdown  11/15/2021 1635 by Ray Leon RN  Outcome: Ongoing     Problem: Musculor/Skeletal Functional Status  Goal: Highest potential functional level  Outcome: Ongoing     Problem: Musculor/Skeletal Functional Status  Goal: Absence of falls  Outcome: Ongoing     Problem: Musculor/Skeletal Functional Status  Goal: Highest potential functional level  Outcome: Ongoing     Problem: Musculor/Skeletal Functional Status  Goal: Absence of falls  Outcome: Ongoing

## 2021-11-15 NOTE — PROGRESS NOTES
LETTY spoke to Hendricks Councilman from [facility]. Pre-cert will be started in the morning with this afternoon's therapy notes.

## 2021-11-15 NOTE — PROGRESS NOTES
Nutrition Note    Supplements were changed to Ensure Clear at breakfast, Nepro at lunch due to K 5.2.     Electronically signed by Shaka Trivedi RD, MESFIN on 11/15/21 at 2:58 PM EST    Contact: [phone]

## 2021-11-15 NOTE — PROGRESS NOTES
[address]   OCCUPATIONAL THERAPY MISSED TREATMENT NOTE   INPATIENT   Date: 11/15/21  Patient Name: Tesfaye Mt       Room: Rosibel Nath  MRN: [id number]   Account #: [de-identified]    : 1950  (66 y.o.)  Gender: female   Referring Practitioner: Demarcus Zurita MD  Diagnosis: JAYLYN, UTI, anemia             REASON FOR MISSED TREATMENT:  Patient at testing and/or off the floor   -   Testing EGD Will make additional attempts in PM for priority therapy treatment. Clin Johnathan Black and SACHIN Jerome aware of attempt and plan to return.         EBONIE Murillo

## 2021-11-15 NOTE — PROGRESS NOTES
Upper gastrointestinal endoscopy (N/A, 11/15/2021). Restrictions  Restrictions/Precautions  Restrictions/Precautions: Fall Risk (O2 at 2L, colostomy, PICC, external urinary catheter)  Required Braces or Orthoses?: No  Implants present? : Metal implants (cardiac stents, L hip titanium nail)  Position Activity Restriction  Other position/activity restrictions: up w/ assist  Subjective   General  Chart Reviewed: Yes  Additional Pertinent Hx: Robert Merlos is a 66 y.o. female who presents with slowly increasing BUN/creatinine and low hemoglobin. Patient is a resident at  [facility] due to chronic wounds of the right groin from angio. Response To Previous Treatment: Patient with no complaints from previous session. Family / Caregiver Present: No  Referring Practitioner: Dr. Renard Kiran: Patient laying in bed upon arrival; agreeable to therapy   General Comment  Comments: co-treat with ELIANA Mccall   Pain Screening  Patient Currently in Pain: Yes  Pain Assessment  Pain Assessment: Faces  Larkin-Baker Pain Rating: Hurts even more  Pain Type: Acute pain  Pain Location: Leg  Pain Orientation: Left  Vital Signs  Patient Currently in Pain: Yes  Oxygen Therapy  O2 Device: Nasal cannula  O2 Flow Rate (L/min): 1 L/min       Orientation  Orientation  Overall Orientation Status: Impaired  Objective   Bed mobility  Rolling to Left: Maximum assistance; 2 Person assistance  Rolling to Right: Maximum assistance; 2 Person assistance  Supine to Sit: Dependent/Total; 2 Person assistance  Sit to Supine: Maximum assistance  Scooting: Dependent/Total  Transfers  Comment: GENESIS LIFT USED AT NURSING HOME  Ambulation  Ambulation?: No (non-ambulatory, uses W/C; GENESIS LIFT USED AT NURSING HOME)        Other exercises  Other exercises?: Yes  Other exercises 1: Supine LE ex's x 10  Other exercises 2: seated EOB ~6 minutes with Rlateral lean. Patient CGA when supported with B UEs.  Patient flucctuated between Min-Max A unsupported. Patient complsining of L leg pain and requesting to lay back down   Other exercises 3: bed mobility x2      Goals  Short term goals  Time Frame for Short term goals: 3-5 treatments/ 5 days  Short term goal 1: pt to tolerate 1/2 hour for therapuetic exercise keeping O2 stats above 90%  Short term goal 2: pt to demonstrate good technique and understanding for LE  ROM, general LE strengthening and positioning  Short term goal 3: pt to demonstrate good technique and understanding for turning schedule to prevent pressure sores  Short term goal 4: pt to demonstrate ability to roll either direction in bed using rail w/ mod x 1 for position change and pressure relief  Short term goal 5: progress to W/C mobility to allow pt to propel 30-50' w/ supervision (advance to W/C by patrick lift)  Patient Goals   Patient goals : eventual return home w/ spouse    Plan    Plan  Times per week: 3-5 treatments/ 5 days  Times per day:  (3-5 treatments/ 5 days)  Specific instructions for Next Treatment: instruct in LE exercise, positioning and turning schedule; practice rolling  Current Treatment Recommendations: Strengthening, ROM, Safety Education & Training, Endurance Training, Positioning  Safety Devices  Type of devices:  All fall risk precautions in place, Bed alarm in place, Call light within reach, Patient at risk for falls, Left in bed, Nurse notified        11/15/21 1637   PT Individual Minutes   Time In [id number]   Time Out 1439   Minutes 28     Electronically signed by Gloria Bae PTA on 11/15/21 at 4:38 PM EST

## 2021-11-15 NOTE — CONSULTS
Cardiology Consult           Date of Admission:  11/10/2021  Date of Consultation:  11/15/2021      PCP:  Nesha Cuello DO      Chief Complaint:  V-tach, 5 beat run    History of Present Illness:  Molly Hernandez is a 66 y.o. female who presents with  Elevated creatinine and low hemoglobin. Creatinine was found to  Be 2.66. Hemoglobin on admission was 7.2. She does have a history of a GI bleed. She has a significant cardiac history for chronic atrial fibrillation, hypertension, chronic systolic heart failure with an EF of 35-40%,  And ASCVD with previous stent placement. She also has a history of diabetes, anemia, and chronic kidney disease. Patient is a poor historian all history obtained from chart review and bedside RN. Patient is going for endoscopy today. PMH:   has a past medical history of Anemia, unspecified, CAD (coronary artery disease), Chronic kidney disease, CKD (chronic kidney disease) stage 3, GFR 30-59 ml/min ([facility]), Closed fracture of dorsal (thoracic) vertebra without mention of spinal cord injury, Coronary atherosclerosis of unspecified type of vessel, native or graft, Depression with anxiety, Esophageal reflux, HTN (hypertension), Hyperkalemia, Hyperlipidemia, Intervertebral cervical disc disorder with myelopathy, cervical region, Lower back pain, Lumbar radiculopathy, Microalbuminuria, Muscle weakness, Pain in limb, Stenosis of cervical spine, Tethered cord syndrome (HCC), Type II or unspecified type diabetes mellitus without mention of complication, not stated as uncontrolled, Urinary calculus, and UTI (lower urinary tract infection). PSH:   has a past surgical history that includes Colon surgery; angioplasty; and joint replacement. Allergies:  No Known Allergies     Home Meds:    Prior to Admission medications    Medication Sig Start Date End Date Taking?  Authorizing Provider   acetaminophen (TYLENOL) 325 MG tablet Take 650 mg by mouth every 4 hours as needed for Pain (do not exceed 4000 mg daily)   Yes Historical Provider, MD   albuterol (PROVENTIL) (2.5 MG/3ML) 0.083% nebulizer solution Take 2.5 mg by nebulization every 6 hours as needed for Shortness of Breath   Yes Historical Provider, MD   atorvastatin (LIPITOR) 80 MG tablet Take 80 mg by mouth daily   Yes Historical Provider, MD   famotidine (PEPCID) 20 MG tablet Take 20 mg by mouth daily   Yes Historical Provider, MD   ferrous sulfate (IRON 325) 325 (65 Fe) MG tablet Take 325 mg by mouth daily (with breakfast)   Yes Historical Provider, MD   HYDROcodone-acetaminophen (NORCO) 5-325 MG per tablet Take 1 tablet by mouth every 6 hours as needed for Pain. Indications: until 11/13/21   Yes Historical Provider, MD   insulin lispro (HUMALOG) 100 UNIT/ML injection vial Inject into the skin 4 times daily (before meals and nightly) Per sliding scale:   150-200 = 2 units  201-250 = 4 units  251-300 = 6 units  301-350 = 8 units  351-400 = 10 units   Yes Historical Provider, MD   insulin glargine (LANTUS SOLOSTAR) 100 UNIT/ML injection pen Inject 22 Units into the skin nightly   Yes Historical Provider, MD   meclizine (ANTIVERT) 12.5 MG tablet Take 12.5 mg by mouth 2 times daily as needed for Dizziness   Yes Historical Provider, MD   warfarin (COUMADIN) 3 MG tablet Take 3 mg by mouth nightly   Yes Historical Provider, MD   carvedilol (COREG) 6.25 MG tablet Take 6.25 mg by mouth 2 times daily (with meals).    Yes Historical Provider, MD   aspirin 81 MG chewable tablet Take 81 mg by mouth daily    Yes Historical Provider, MD   bumetanide (BUMEX) 1 MG tablet Take 1 mg by mouth daily   11/10/21  Historical Provider, MD        [facility] Meds:    Current Facility-Administered Medications   Medication Dose Route Frequency Provider Last Rate Last Admin    [facility] Hold] sertraline (ZOLOFT) tablet 25 mg  25 mg Oral Daily Margaret Martin MD   25 mg at 11/14/21 1409    [MAR Hold] 0.9 % sodium chloride infusion   IntraVENous PRN Jayashree Bailon MD        [facility] Hold] ferrous sulfate (IRON 325) tablet 325 mg  325 mg Oral Daily with breakfast Jayashree Bailon MD   325 mg at 11/14/21 0941    [Held by provider] aspirin chewable tablet 81 mg  81 mg Oral Daily Wendie Goldmann, MD   81 mg at 11/13/21 1015    [MAR Hold] atorvastatin (LIPITOR) tablet 80 mg  80 mg Oral Daily Wendie Goldmann, MD   80 mg at 11/14/21 0941    [MAR Hold] carvedilol (COREG) tablet 6.25 mg  6.25 mg Oral BID  Wendie Goldmann, MD   6.25 mg at 11/14/21 1640    [MAR Hold] insulin glargine (LANTUS) injection vial 22 Units  22 Units SubCUTAneous Nightly Wendie Goldmann, MD   22 Units at 11/14/21 2043    [MAR Hold] insulin lispro (HUMALOG) injection vial 0-12 Units  0-12 Units SubCUTAneous TID  Wendie Goldmann, MD   2 Units at 11/14/21 1642    [MAR Hold] insulin lispro (HUMALOG) injection vial 0-6 Units  0-6 Units SubCUTAneous Nightly Wendie Goldmann, MD   1 Units at 11/14/21 2043    [MAR Hold] sodium chloride flush 0.9 % injection 5-40 mL  5-40 mL IntraVENous 2 times per day Wendie Goldmann, MD   10 mL at 11/14/21 2043    [MAR Hold] sodium chloride flush 0.9 % injection 5-40 mL  5-40 mL IntraVENous PRN Wendie Goldmann, MD        [facility] Hold] 0.9 % sodium chloride infusion  25 mL IntraVENous PRN Wendie Goldmann, MD        [facility] Hold] acetaminophen (TYLENOL) tablet 650 mg  650 mg Oral Q4H PRN Wendie Goldmann, MD        [facility] Hold] ondansetron (ZOFRAN-ODT) disintegrating tablet 4 mg  4 mg Oral Q8H PRN Wendie Goldmann, MD        Or   [place] [facility] Hold] ondansetron (ZOFRAN) injection 4 mg  4 mg IntraVENous Q6H PRN Wendie Goldmann, MD        [facility] Hold] oxyCODONE-acetaminophen (PERCOCET) 5-325 MG per tablet 1 tablet  1 tablet Oral Q6H PRN Wendie Goldmann, MD        [facility] Hold] 0.9 % sodium chloride infusion   IntraVENous Continuous Ya Romo  mL/hr at 11/15/21 0546 Rate Verify at 11/15/21 0546    [MAR Hold] cefTRIAXone (ROCEPHIN) 1000 mg IVPB in 50 mL D5W minibag  1,000 mg IntraVENous Q24H Anastasia Fernández MD   Stopped at 11/14/21 1439    [MAR Hold] pantoprazole (PROTONIX) injection 40 mg  40 mg IntraVENous Daily Anastasia Fernández MD   40 mg at 11/14/21 0941    And    [MAR Hold] sodium chloride (PF) 0.9 % injection 10 mL  10 mL IntraVENous Daily Anastasia Fernández MD   10 mL at 11/14/21 0941     Facility-Administered Medications Ordered in Other Encounters   Medication Dose Route Frequency Provider Last Rate Last Admin    0.9 % sodium chloride infusion   IntraVENous Continuous PRN Walworth Decent., APRN - CRNA   New Bag at 11/15/21 1130    lidocaine PF 2 % injection   IntraVENous PRN Walworth Decent., APRN - CRNA   40 mg at 11/15/21 1136    propofol injection   IntraVENous PRN Walworth Decent., APRN - CRNA   20 mg at 11/15/21 1136       Social History:       TOBACCO:   reports that she has never smoked. She does not have any smokeless tobacco history on file. ETOH:   reports no history of alcohol use. DRUGS:  reports no history of drug use. OCCUPATION:          Family Histroy:         Problem Relation Age of Onset    High Blood Pressure Other            Review of Systems:   · Constitutional: there has been no unanticipated weight loss. There's been no change in energy level, sleep pattern, or activity level. · Eyes: No visual changes or diplopia. No scleral icterus. · ENT: No Headaches, hearing loss or vertigo. No mouth sores or sore throat. · Cardiovascular: No chest pain, dyspnea on exertion, palpitations or loss of consciousness. No cough, hemoptysis, pleuritic pain, or phlebitis. · Respiratory: No cough or wheezing, no sputum production. No hematemesis. · Gastrointestinal: No abdominal pain, appetite loss, blood in stools. No change in bowel or bladder habits. · Genitourinary: No dysuria, trouble voiding, or hematuria. · Musculoskeletal:  No gait disturbance, weakness or joint complaints. · Integumentary: No rash or pruritis.   · Neurological: No headache, diplopia, change in muscle strength, numbness or tingling. No change in gait, balance, coordination, mood, affect, memory, mentation, behavior. · Psychiatric: No anxiety, or depression. · Endocrine: No temperature intolerance. No excessive thirst, fluid intake, or urination. No tremor. · Hematologic/Lymphatic: No abnormal bruising or bleeding, blood clots or swollen lymph nodes. · Allergic/Immunologic: No nasal congestion or hives. Physical Exam    Vital Signs: BP (!) 91/53   Pulse 76   Temp 96.7 °F (35.9 °C) (Infrared)   Resp 21   Ht 5' 3\" (1.6 m) Comment: from old chart  Wt 212 lb 11.9 oz (96.5 kg)   SpO2 99%   BMI 37.69 kg/m²  O2 Flow Rate (L/min): 2 L/min     Admission Weight: 188 lb (85.3 kg)     General appearance: Awake, Alert Cooperative    Head: Normocephalic, without obvious abnormality, atraumatic    Eyes: Conjunctivae/corneas clear. PERRL, EOM's intact. Fundi benign    Neck: no adenopathy, no carotid bruit, no JVD, supple, symmetrical, trachea midline and thyroid: not enlarged, symmetric, no tenderness/mass/nodules    Lungs: clear to auscultation bilaterally    Heart: regular rate and rhythm, S1, S2 normal, no murmur, click, rub or gallop    Abdomen: Soft, non-tender. Bowel sounds normal. No masses,  no organomegaly    Extremities: extremities normal, atraumatic, no cyanosis or edema    Skin: Skin color, texture, turgor normal. No rashes or lesions    Neurologic: Grossly normal        MEDICAL DECISION MAKING/TESTING    Cardiac Cath:        Echo/Stress:        EKG:        CXR:         Labs:      CBC:   Recent Labs     11/13/21  1848 11/14/21  0129 11/14/21  0631 11/14/21  2127 11/15/21  0747   WBC 5.7  --  5.6  --  6.7   HGB 6.9*   < > 8.5* 8.3* 8.6*   HCT 21.9*   < > 27.1* 25.9* 26.6*   MCV 96.7  --  97.4  --  97.1     --  149*  --  151    < > = values in this interval not displayed.      BMP:   Recent Labs     11/13/21  0713 11/14/21  0631 11/15/21  0747    134* 134*   K 5.0 4.8 5.2    106 106   CO2 21 18* 19*   BUN 91* 86* 86*   CREATININE 2.44* 2.73* 3.09*     PT/INR: No results for input(s): PROTIME, INR in the last 72 hours. APTT: No results for input(s): APTT in the last 72 hours. MAG:   Recent Labs     11/14/21  1659 11/15/21  0747   MG 1.9 2.2     D Dimer: No results for input(s): DDIMER in the last 72 hours. Troponin T   Recent Labs     11/14/21  1659 11/15/21  0747   TROPONINT NOT REPORTED NOT REPORTED     ProBNP Invalid input(s): PRO-BNP          Diagnosis:  Principal Problem:    JAYLYN (acute kidney injury) ([facility]  Active Problems:    Type 2 diabetes mellitus with kidney complication, with long-term current use of insulin ([facility])    CKD (chronic kidney disease) stage 3, GFR 30-59 ml/min ([facility])    HTN (hypertension)    Iron deficiency anemia    Hydronephrosis determined by ultrasound  Resolved Problems:    * No resolved hospital problems. *        Plan:      Patient had 5 beat run  Of wide complex tachycardia. Asymptomatic at the time. No recurrence. In the setting of reduced hemoglobin of 8.6, and creatinine of 3.09. Continue to monitor and replace electrolytes per sliding scale. Continue telemetry monitoring. Continue beta-blocker when able to take p.o.. Continue supportive care.   Will check echocardiogram.

## 2021-11-15 NOTE — PROGRESS NOTES
[facility]    INPATIENT OCCUPATIONAL THERAPY  PROGRESS NOTE  Date: 11/15/2021  Patient Name: Citlali Juarez      Room: -  MRN: [id number]    : 1950  (76 y.o.) Gender: female     Discharge Recommendations:  Further Occupational Therapy is recommended upon facility discharge. Referring Practitioner: Thang Hassan MD  Diagnosis: JAYLYN, UTI, anemia  General  Chart Reviewed: Yes, Orders, Progress Notes  Patient assessed for rehabilitation services?: Yes  Family / Caregiver Present:  (patient's spouse present on phone to answer questions PRN)  Referring Practitioner: Thang Hassan MD  Diagnosis: JAYLYN, UTI, anemia    Restrictions  Restrictions/Precautions: Fall Risk (O2 at 2L, colostomy, PICC, external urinary catheter)  Implants present? : Metal implants (cardiac stents, L hip titanium nail)  Other position/activity restrictions: up w/ assist  Required Braces or Orthoses?: No      Subjective  Subjective: \"I want to get better. Thank you for helping me. \"  Comments: Pt pleasant an. She had a EGD this AM and is fatigued but still cooperative. Spouse requested to speak with writer regarding addressing standing once appropriate; writer educated within scope and patients current level of function. She was able to tolerate sitting EOB ~6minutes with fluctuating assistance, Min<>Max with no UE support, CGA<>Min with BUE support. Demos poor L hand grasp; right lateral lean and c/o pain in back and L hip. Her absorbent pad was saturated and she was utilizing a kenny; therapies assisting with repositioning as well.   Patient Currently in Pain: Denies  Overall Orientation Status: Impaired     Pain Assessment  Larkin-Pollock Pain Rating: No hurt    Objective  Cognition  Overall Cognitive Status: WFL  Bed mobility  Rolling to Left: Maximum assistance; 2 Person assistance (G initiation for improve hip rotation)  Rolling to Right: Maximum assistance; 2 Person assistance (G initiation for improve hip rotation)  Supine to Sit: Dependent/Total (shows good initiation with LLE, difficulty with RLE)  Sit to Supine: Maximum assistance (pt completes trunk per self using bedrail)  Scooting: Dependent/Total  Comment: heavy VC for breathing upon exertion; extensive assistance required, poor grasp with L hand on rails, [place] required for placement, TA to maintain side lying , rolling bilateral directions x3 for toileting/linen replacement  Balance  Sitting Balance: Maximum assistance (bilateral bed rails CGA<>Min, no UE support CGA<>Max)  Standing Balance: Unable to assess(comment) (patient has not stood recently, deferred this date)         ADL  Feeding: Setup  Grooming: Minimal assistance  UE Bathing: Moderate assistance  LE Bathing: Dependent/Total  UE Dressing: Maximum assistance  LE Dressing: Dependent/Total  Toileting: Dependent/Total  Additional Comments: ADL scores based on skilled observations and clinical reasoning unless otherwise noted. Patient requires significant assist with self-care due to weakness and decreased activity tolerance. Kaur catheter;  TA for bowel mgt/linen change. Transfers  Transfer Comments: deferred for safety                             Assessment  Performance deficits / Impairments: Decreased functional mobility ; Decreased ADL status; Decreased ROM; Decreased strength; Decreased safe awareness; Decreased endurance; Decreased balance; Decreased fine motor control  Assessment: resume OT POC  Prognosis: Good  Discharge Recommendations: Patient would benefit from continued therapy after discharge  Activity Tolerance: Patient Tolerated treatment well  Safety Devices in place: Yes  Type of devices: All fall risk precautions in place; Bed alarm in place; Call light within reach; Gait belt; Patient at risk for falls;  Left in bed; Nurse notified (RN notified patient needs foot drop boots for in bed)             Patient Education:   OT POC, noted above  Learner:patient  Method:

## 2021-11-15 NOTE — ANESTHESIA POSTPROCEDURE EVALUATION
Department of Anesthesiology  Postprocedure Note    Patient: Trinidad Stephens  MRN: [id number]  YOB: 1950  Date of evaluation: 11/15/2021  Time:  12:22 PM     Procedure Summary     Date: 11/15/21 Room / Location: [place] [address] 04 / [facility] ENDO    Anesthesia Start: 3741 Anesthesia Stop: 6198    Procedure: EGD BIOPSY (N/A Esophagus) Diagnosis: (ANEMIA)    Surgeons: Delmer Barajas MD Responsible Provider: Radha Marinelli MD    Anesthesia Type: MAC ASA Status: 4          Anesthesia Type: MAC    Mane Phase I: Mane Score: 9    Mane Phase II:      Last vitals: Reviewed and per EMR flowsheets.        Anesthesia Post Evaluation    Comments: POST- ANESTHESIA EVALUATION       Pt Name: Trinidad Stephens  MRN: [id number]  YOB: 1950  Date of evaluation: 11/15/2021  Time:  12:22 PM      BP (!) 99/50   Pulse 83   Temp 97.2 °F (36.2 °C) (Infrared)   Resp 17   Ht 5' 3\" (1.6 m) Comment: from old chart  Wt 212 lb 11.9 oz (96.5 kg)   SpO2 95%   BMI 37.69 kg/m²      Consciousness Level  Awake  Cardiopulmonary Status  Stable  Pain Adequately Treated YES  Nausea / Vomiting  NO  Adequate Hydration  YES  Anesthesia Related Complications NONE      Electronically signed by Peter Isbell MD on 11/15/2021 at 12:22 PM

## 2021-11-15 NOTE — PLAN OF CARE
S/p EGD. Noted gastritis.   Continue PPI therapy  Follow-up biopsies  May need repeat capsule outpatient  Hgb stable  May d/c per GI  GI signing off  Electronically signed by JUANITA Benitez NP on 11/15/2021 at 1:38 PM

## 2021-11-15 NOTE — PROGRESS NOTES
Physical Therapy  DATE: 11/15/2021    NAME: Alyson Montiel  MRN: [id number]   : 1950    Patient not seen this date for Physical Therapy due to:      [] Cancel by RN or physician due to:    [] Hemodialysis    [] Critical Lab Value Level     [] Blood transfusion in progress    [] Acute or unstable cardiovascular status   _MAP < 55 or more than >115  _HR < 40 or > 130    [] Acute or unstable pulmonary status   -FiO2 > 60%   _RR < 5 or >40    _O2 sats < 85%    [] Strict Bedrest    [] Off Unit for surgery or procedure    [x] Off Unit for testing; patient having EGD ; will continue to follow      [] Pending imaging to R/O fracture    [] Refusal by Patient      [] Other      [] PT being discontinued at this time. Patient independent. No further needs. [] PT being discontinued at this time as the patient has been transferred to hospice care. No further needs.     Electronically signed by Liliya Vyas PTA on 11/15/21 at 8:54 AM EST

## 2021-11-15 NOTE — PROGRESS NOTES
Urology Progress Note    Subjective: No new issues urologically overnight. Morning creatinine is pending. Kaur catheter is in place draining. Patient has a renal ultrasound scheduled for today which is pending. Patient Vitals for the past 24 hrs:   BP Temp Temp src Pulse Resp SpO2 Height   11/15/21 0049 (!) 94/59 97.5 °F (36.4 °C) Oral 97 18 99 % --   11/14/21 2006 99/61 97.5 °F (36.4 °C) Oral 65 18 100 % --   11/14/21 1640 100/64 -- -- 93 -- -- --   11/14/21 1215 -- -- -- -- -- -- 5' 3\" (1.6 m)   11/14/21 0930 (!) 102/57 97.5 °F (36.4 °C) Oral 87 18 95 % --       Intake/Output Summary (Last 24 hours) at 11/15/2021 0749  Last data filed at 11/15/2021 0546  Gross per 24 hour   Intake 1104.67 ml   Output 750 ml   Net 354.67 ml       Recent Labs     11/13/21  1848 11/13/21  1848 11/14/21  0129 11/14/21  0631 11/14/21 2127   WBC 5.7  --   --  5.6  --    HGB 6.9*   < > 8.4* 8.5* 8.3*   HCT 21.9*   < > 25.5* 27.1* 25.9*   MCV 96.7  --   --  97.4  --      --   --  149*  --     < > = values in this interval not displayed.      Recent Labs     11/12/21  0851 11/13/21  0713 11/14/21  0631    136 134*   K 5.3 5.0 4.8    106 106   CO2 22 21 18*   PHOS 4.0  --   --    BUN 97* 91* 86*   CREATININE 2.59* 2.44* 2.73*       Recent Labs     11/12/21 1845   COLORU Yellow   PHUR 6.0   WBCUA 50    RBCUA 20 TO 48   MUCUS NOT REPORTED   TRICHOMONAS NOT REPORTED   YEAST NOT REPORTED   BACTERIA MODERATE*   SPECGRAV 1.012   LEUKOCYTESUR LARGE*   UROBILINOGEN Normal   BILIRUBINUR NEGATIVE       Additional Lab/culture results:    Physical Exam: Kaur catheter draining    Interval Imaging Findings:    Impression:    Patient Active Problem List   Diagnosis    Chronic kidney disease (CKD), stage III (moderate) (HCC)    Hyperkalemia    Type 2 diabetes mellitus with kidney complication, with long-term current use of insulin (HCC)    Hyperlipidemia    CKD (chronic kidney disease) stage 3, GFR 30-59 ml/min ([facility] [address]    Microalbuminuria    Urinary calculus    Tethered cord syndrome (HCC)    Stenosis of cervical spine    Lumbar radiculopathy    Lower back pain    HTN (hypertension)    Esophageal reflux    Depression with anxiety    Coronary atherosclerosis    Closed fracture of thoracic vertebra (HCC)    Intervertebral cervical disc disorder with myelopathy, cervical region    Pain in limb    Anemia    JAYLYN (acute kidney injury) (HCC)    Iron deficiency anemia    Hydronephrosis determined by ultrasound       Plan: Maintain Kaur catheter. Will check renal ultrasound and today's labs. If patient has persistent hydronephrosis she may need bilateral stent drainage. Continue n.p.o. for now.     Emmy Maria MD  7:49 AM 11/15/2021

## 2021-11-15 NOTE — PROGRESS NOTES
Progress Note    11/15/2021   3:40 PM    Name:  Trinidad Stephens  MRN:    [id number]     Acct:     [de-identified]   Room:  2095/2095-01  IP Day: 5     Admit Date: 11/10/2021  4:23 PM  PCP: Sánchez Brewer DO    Subjective:     C/C:   Chief Complaint   Patient presents with    Abnormal Lab     low hemoglobin and high CR       Interval History: Status: not changed. Patient is seen s/p EGD. Denies chest pain shortness of breath nausea vomiting abdominal pain or diarrhea. Patient had one episode of NSVT yesterday. Vital signs reviewed and stable. Recent labs reviewed creatinine 3.09, troponin 104,105, hemoglobin 8.5    ROS:   all 10 systems reviewed and are negative except as noted    Review of Systems   Constitutional: Negative for chills and fatigue. HENT: Negative for drooling, mouth sores, sneezing and trouble swallowing. Eyes: Negative for redness and itching. Respiratory: Negative for cough, chest tightness and shortness of breath. Cardiovascular: Negative for chest pain, palpitations and leg swelling. Gastrointestinal: Negative for abdominal pain, blood in stool, nausea and vomiting. Endocrine: Negative for heat intolerance and polyphagia. Genitourinary: Negative for difficulty urinating, flank pain and pelvic pain. Musculoskeletal: Negative for arthralgias, joint swelling and neck stiffness. Skin: Negative for color change and pallor. Allergic/Immunologic: Negative for food allergies. Neurological: Negative for dizziness, seizures and headaches. Hematological: Does not bruise/bleed easily. Psychiatric/Behavioral: Negative for agitation, behavioral problems and suicidal ideas. The patient is not hyperactive. Medications:      Allergies: No Known Allergies    Current Meds: perflutren lipid microspheres (DEFINITY) injection 2.2 mg, ONCE PRN  sertraline (ZOLOFT) tablet 25 mg, Daily  0.9 % sodium chloride infusion, PRN  ferrous sulfate (IRON 325) tablet 325 mg, Daily with breakfast  [Held by provider] aspirin chewable tablet 81 mg, Daily  atorvastatin (LIPITOR) tablet 80 mg, Daily  carvedilol (COREG) tablet 6.25 mg, BID WC  insulin glargine (LANTUS) injection vial 22 Units, Nightly  insulin lispro (HUMALOG) injection vial 0-12 Units, TID WC  insulin lispro (HUMALOG) injection vial 0-6 Units, Nightly  sodium chloride flush 0.9 % injection 5-40 mL, 2 times per day  sodium chloride flush 0.9 % injection 5-40 mL, PRN  0.9 % sodium chloride infusion, PRN  acetaminophen (TYLENOL) tablet 650 mg, Q4H PRN  ondansetron (ZOFRAN-ODT) disintegrating tablet 4 mg, Q8H PRN   Or  ondansetron (ZOFRAN) injection 4 mg, Q6H PRN  oxyCODONE-acetaminophen (PERCOCET) 5-325 MG per tablet 1 tablet, Q6H PRN  0.9 % sodium chloride infusion, Continuous  cefTRIAXone (ROCEPHIN) 1000 mg IVPB in 50 mL D5W minibag, Q24H  pantoprazole (PROTONIX) injection 40 mg, Daily   And  sodium chloride (PF) 0.9 % injection 10 mL, Daily        Data:     Code Status:  Full Code    Family History   Problem Relation Age of Onset    High Blood Pressure Other        Social History     Socioeconomic History    Marital status:      Spouse name: Not on file    Number of children: Not on file    Years of education: Not on file    Highest education level: Not on file   Occupational History    Not on file   Tobacco Use    Smoking status: Never Smoker    Smokeless tobacco: Not on file   Substance and Sexual Activity    Alcohol use: No     Alcohol/week: 0.0 standard drinks    Drug use: No    Sexual activity: Not on file   Other Topics Concern    Not on file   Social History Narrative    Not on file     Social Determinants of Health     Financial Resource Strain:     Difficulty of Paying Living Expenses: Not on file   Food Insecurity:     Worried About Running Out of Food in the Last Year: Not on file    Katya of Food in the Last Year: Not on file   Transportation Needs:     Lack of Transportation (Medical):  Not on file  Lack of Transportation (Non-Medical): Not on file   Physical Activity:     Days of Exercise per Week: Not on file    Minutes of Exercise per Session: Not on file   Stress:     Feeling of Stress : Not on file   Social Connections:     Frequency of Communication with Friends and Family: Not on file    Frequency of Social Gatherings with Friends and Family: Not on file    Attends Church Services: Not on file    Active Member of [address] [place] [address] or Organizations: Not on file    Attends Club or Organization Meetings: Not on file    Marital Status: Not on file   Intimate Partner Violence:     Fear of Current or Ex-Partner: Not on file    Emotionally Abused: Not on file    Physically Abused: Not on file    Sexually Abused: Not on file   Housing Stability:     Unable to Pay for Housing in the Last Year: Not on file    Number of [place] in the Last Year: Not on file    Unstable Housing in the Last Year: Not on file       I/O (24Hr): Intake/Output Summary (Last 24 hours) at 11/15/2021 1540  Last data filed at 11/15/2021 1210  Gross per 24 hour   Intake 1254.67 ml   Output 750 ml   Net 504.67 ml     Radiology:  CT ABDOMEN PELVIS WO CONTRAST Additional Contrast? None    Result Date: 11/12/2021  Severe right and mild left hydroureteronephrosis. No obstructing ureteral stone is seen. Wall thickening of the urinary bladder without significant urinary bladder wall distension. Outlet obstruction is a consideration. Right-sided ostomy. There is bowel wall thickening proximal to the ostomy, may suggest infectious or inflammatory enteritis. No drainable fluid collections or free air. No evidence of bowel obstruction. Moderate right pleural effusion. Bibasilar infiltrates or atelectasis. Clinical correlation to exclude pneumonia. US RETROPERITONEAL COMPLETE    Result Date: 11/12/2021  1. Moderate to severe right and mild-to-moderate left hydronephrosis. 2. Exam limited due to patient's body habitus. Labs:  Recent Results (from the past 24 hour(s))   POC Glucose Fingerstick    Collection Time: 11/14/21  4:00 PM   Result Value Ref Range    POC Glucose 193 (H) 65 - 105 mg/dL   Magnesium    Collection Time: 11/14/21  4:59 PM   Result Value Ref Range    Magnesium 1.9 1.6 - 2.6 mg/dL   Troponin    Collection Time: 11/14/21  4:59 PM   Result Value Ref Range    Troponin, High Sensitivity 104 (HH) 0 - 14 ng/L    Troponin T NOT REPORTED <0.03 ng/mL    Troponin Interp NOT REPORTED    EKG 12 Lead    Collection Time: 11/14/21  7:03 PM   Result Value Ref Range    Ventricular Rate 75 BPM    Atrial Rate 76 BPM    QRS Duration 152 ms    Q-T Interval 408 ms    QTc Calculation (Bazett) 455 ms    R Axis -35 degrees    T Axis 108 degrees   POC Glucose Fingerstick    Collection Time: 11/14/21  8:08 PM   Result Value Ref Range    POC Glucose 184 (H) 65 - 105 mg/dL   Hgb/Hct    Collection Time: 11/14/21  9:27 PM   Result Value Ref Range    Hemoglobin 8.3 (L) 12.0 - 16.0 g/dL    Hematocrit 25.9 (L) 36 - 46 %   CBC with DIFF    Collection Time: 11/15/21  7:47 AM   Result Value Ref Range    WBC 6.7 3.5 - 11.0 k/uL    RBC 2.74 (L) 4.0 - 5.2 m/uL    Hemoglobin 8.6 (L) 12.0 - 16.0 g/dL    Hematocrit 26.6 (L) 36 - 46 %    MCV 97.1 80 - 100 fL    MCH 31.3 26 - 34 pg    MCHC 32.2 31 - 37 g/dL    RDW 15.6 (H) 11.5 - 14.9 %    Platelets 469 470 - 451 k/uL    MPV 9.2 6.0 - 12.0 fL    NRBC Automated NOT REPORTED per 100 WBC    Differential Type NOT REPORTED     Seg Neutrophils 79 (H) 36 - 66 %    Lymphocytes 15 (L) 24 - 44 %    Monocytes 6 1 - 7 %    Eosinophils % 0 0 - 4 %    Basophils 0 0 - 2 %    Immature Granulocytes NOT REPORTED 0 %    Segs Absolute 5.20 1.3 - 9.1 k/uL    Absolute Lymph # 1.00 1.0 - 4.8 k/uL    Absolute Mono # 0.40 0.1 - 1.3 k/uL    Absolute Eos # 0.00 0.0 - 0.4 k/uL    Basophils Absolute 0.00 0.0 - 0.2 k/uL    Absolute Immature Granulocyte NOT REPORTED 0.00 - 0.30 k/uL    WBC Morphology NOT REPORTED     RBC Morphology NOT REPORTED     Platelet Estimate NOT REPORTED    Basic Metabolic Prof    Collection Time: 11/15/21  7:47 AM   Result Value Ref Range    Glucose 183 (H) 70 - 99 mg/dL    BUN 86 (H) 8 - 23 mg/dL    CREATININE 3.09 (H) 0.50 - 0.90 mg/dL    Bun/Cre Ratio NOT REPORTED 9 - 20    Calcium 8.0 (L) 8.6 - 10.4 mg/dL    Sodium 134 (L) 135 - 144 mmol/L    Potassium 5.2 3.7 - 5.3 mmol/L    Chloride 106 98 - 107 mmol/L    CO2 19 (L) 20 - 31 mmol/L    Anion Gap 9 9 - 17 mmol/L    GFR Non-African American 15 (L) >60 mL/min    GFR  18 (L) >60 mL/min    GFR Comment          GFR Staging NOT REPORTED    Magnesium    Collection Time: 11/15/21  7:47 AM   Result Value Ref Range    Magnesium 2.2 1.6 - 2.6 mg/dL   TROPONIN    Collection Time: 11/15/21  7:47 AM   Result Value Ref Range    Troponin, High Sensitivity 105 (HH) 0 - 14 ng/L    Troponin T NOT REPORTED <0.03 ng/mL    Troponin Interp NOT REPORTED    POC Glucose Fingerstick    Collection Time: 11/15/21  8:22 AM   Result Value Ref Range    POC Glucose 156 (H) 65 - 105 mg/dL   COVID-19, Rapid    Collection Time: 11/15/21  9:51 AM    Specimen: Nasopharyngeal Swab   Result Value Ref Range    Specimen Description . NASOPHARYNGEAL SWAB     SARS-CoV-2, Rapid Not Detected Not Detected   POC Glucose Fingerstick    Collection Time: 11/15/21  9:58 AM   Result Value Ref Range    POC Glucose 152 (H) 65 - 105 mg/dL   POC Glucose Fingerstick    Collection Time: 11/15/21 11:48 AM   Result Value Ref Range    POC Glucose 148 (H) 65 - 105 mg/dL   POC Glucose Fingerstick    Collection Time: 11/15/21 12:39 PM   Result Value Ref Range    POC Glucose 152 (H) 65 - 105 mg/dL   Hgb/Hct    Collection Time: 11/15/21  1:39 PM   Result Value Ref Range    Hemoglobin 8.5 (L) 12.0 - 16.0 g/dL    Hematocrit 27.2 (L) 36 - 46 %       Physical Examination:        Vitals:  BP (S) (!) 99/55   Pulse 76   Temp 96.8 °F (36 °C) (Axillary)   Resp 18   Ht 5' 3\" (1.6 m) Comment: from old chart  Wt 212 lb 11.9 oz (96.5 kg)   SpO2 99%   BMI 37.69 kg/m²   Temp (24hrs), Av.1 °F (36.2 °C), Min:96.7 °F (35.9 °C), Max:97.6 °F (36.4 °C)    Recent Labs     11/15/21  0822 11/15/21  0958 11/15/21  1148 11/15/21  1239   POCGLU 156* 152* 148* 152*         Physical Exam  Vitals reviewed. HENT:      Head: Normocephalic. Right Ear: External ear normal.      Left Ear: External ear normal.      Nose: Nose normal.      Mouth/Throat:      Mouth: Mucous membranes are moist.      Pharynx: Oropharynx is clear. Eyes:      Conjunctiva/sclera: Conjunctivae normal.   Cardiovascular:      Rate and Rhythm: Normal rate and regular rhythm. Pulses: Normal pulses. Heart sounds: Normal heart sounds. Pulmonary:      Effort: Pulmonary effort is normal.      Breath sounds: Normal breath sounds. Abdominal:      General: Bowel sounds are normal.      Palpations: Abdomen is soft. Comments: Kaur catheter in with clear urine   Musculoskeletal:         General: No deformity. Cervical back: Normal range of motion and neck supple. Right lower leg: No edema. Left lower leg: No edema. Skin:     General: Skin is warm. Capillary Refill: Capillary refill takes less than 2 seconds. Coloration: Skin is not jaundiced. Neurological:      General: No focal deficit present. Mental Status: She is alert. Mental status is at baseline. Psychiatric:         Mood and Affect: Mood normal.         Behavior: Behavior normal.         Assessment:        Primary Problem  JAYLYN (acute kidney injury) ([facility] [address]     Principal Problem:    JAYLYN (acute kidney injury) ([facility] [address]  Active Problems:    Type 2 diabetes mellitus with kidney complication, with long-term current use of insulin ([facility])    CKD (chronic kidney disease) stage 3, GFR 30-59 ml/min ([facility])    HTN (hypertension)    Iron deficiency anemia    Hydronephrosis determined by ultrasound  Resolved Problems:    * No resolved hospital problems.  *      Past Medical History: Diagnosis Date    Anemia, unspecified     CAD (coronary artery disease)     Chronic kidney disease     CKD (chronic kidney disease) stage 3, GFR 30-59 ml/min ([facility]     Closed fracture of dorsal (thoracic) vertebra without mention of spinal cord injury     Coronary atherosclerosis of unspecified type of vessel, native or graft     Depression with anxiety     Esophageal reflux     HTN (hypertension)     Hyperkalemia     Hyperlipidemia     Intervertebral cervical disc disorder with myelopathy, cervical region     Lower back pain     Lumbar radiculopathy     Microalbuminuria     Muscle weakness     Pain in limb     Stenosis of cervical spine     Tethered cord syndrome (HCC)     Type II or unspecified type diabetes mellitus without mention of complication, not stated as uncontrolled     Urinary calculus     UTI (lower urinary tract infection)         Plan:        1. IV normal saline at 100 mL/h  2. EGD per GI today. EGD report reviewed showed gastritis. Continue Protonix 40 mg p.o. daily  3. 2D echo today  4. IV Rocephin for bilateral hydronephrosis and acute cystitis  5. Start ferrous sulfate 325 mg p.o. daily  6. Start Zoloft 25 mg p.o. nightly  7. H&H every 8 hours  8. Hold aspirin  9. Neurology and GI input noted  10. EGD in a.m. per GI  11. Ultrasound kidney today  12. Nephrology GI and cardiology input noted  1. Monitor CBC, CMP  2. DVT Prophylaxis no pharmacological DVT prophylaxis due to low hemoglobin  3. EPCs  4. PT/OT to evaluate and treat  5. Pain control  6. Replace electrolytes as per sliding scale  7. Home medications reviewed and appropriate medications continued  8.  Reviewed labs and imaging studies from last 24 hours and results explained to patient       Electronically signed by Link Persaud MD

## 2021-11-15 NOTE — PLAN OF CARE
Problem: SAFETY  Goal: Free from accidental physical injury  Outcome: Met This Shift  Patient remains free from falls during this shift. Bed in lowest position, side rails up x2, call light/personal belongings/side table within reach. Patient calls out appropriately with call light for assistance with personal needs. Goal: Free from intentional harm  Outcome: Met This Shift     Problem: PAIN  Goal: Patient's pain/discomfort is manageable  11/15/2021 0321 by Clemens Aase, RN  Outcome: Ongoing     Problem: Skin Integrity:  Goal: Will show no infection signs and symptoms  Description: Will show no infection signs and symptoms  11/15/2021 0321 by Clemens Aase, RN  Outcome: Ongoing  Goal: Absence of new skin breakdown  Description: Absence of new skin breakdown  Outcome: Ongoing  Patient shows no new signs of skin breakdown during this shift. Patient has been checked and turned every 2 hours this shift; Skin has remained clean, dry and intact. Robert Han

## 2021-11-16 ENCOUNTER — ANESTHESIA EVENT (OUTPATIENT)
Dept: OPERATING ROOM | Age: 71
DRG: 004 | End: 2021-11-16
Payer: COMMERCIAL

## 2021-11-16 ENCOUNTER — ANESTHESIA (OUTPATIENT)
Dept: OPERATING ROOM | Age: 71
DRG: 004 | End: 2021-11-16
Payer: COMMERCIAL

## 2021-11-16 ENCOUNTER — APPOINTMENT (OUTPATIENT)
Dept: NON INVASIVE DIAGNOSTICS | Age: 71
DRG: 004 | End: 2021-11-16
Payer: COMMERCIAL

## 2021-11-16 ENCOUNTER — APPOINTMENT (OUTPATIENT)
Dept: GENERAL RADIOLOGY | Age: 71
DRG: 004 | End: 2021-11-16
Payer: COMMERCIAL

## 2021-11-16 VITALS — SYSTOLIC BLOOD PRESSURE: 115 MMHG | OXYGEN SATURATION: 93 % | DIASTOLIC BLOOD PRESSURE: 59 MMHG

## 2021-11-16 PROBLEM — I48.11 LONGSTANDING PERSISTENT ATRIAL FIBRILLATION (HCC): Status: ACTIVE | Noted: 2021-11-16

## 2021-11-16 LAB
ABSOLUTE EOS #: 0 K/UL (ref 0–0.4)
ABSOLUTE EOS #: 0 K/UL (ref 0–0.4)
ABSOLUTE IMMATURE GRANULOCYTE: ABNORMAL K/UL (ref 0–0.3)
ABSOLUTE IMMATURE GRANULOCYTE: ABNORMAL K/UL (ref 0–0.3)
ABSOLUTE LYMPH #: 0.9 K/UL (ref 1–4.8)
ABSOLUTE LYMPH #: 1.1 K/UL (ref 1–4.8)
ABSOLUTE MONO #: 0.5 K/UL (ref 0.1–1.3)
ABSOLUTE MONO #: 0.6 K/UL (ref 0.1–1.3)
ALBUMIN SERPL-MCNC: 3.1 G/DL (ref 3.5–5.2)
ALBUMIN/GLOBULIN RATIO: ABNORMAL (ref 1–2.5)
ALLEN TEST: ABNORMAL
ALLEN TEST: ABNORMAL
ALP BLD-CCNC: 213 U/L (ref 35–104)
ALT SERPL-CCNC: 31 U/L (ref 5–33)
ANION GAP SERPL CALCULATED.3IONS-SCNC: 10 MMOL/L (ref 9–17)
ANION GAP SERPL CALCULATED.3IONS-SCNC: 12 MMOL/L (ref 9–17)
ANION GAP SERPL CALCULATED.3IONS-SCNC: 13 MMOL/L (ref 9–17)
AST SERPL-CCNC: 17 U/L
BASOPHILS # BLD: 0 % (ref 0–2)
BASOPHILS # BLD: 0 % (ref 0–2)
BASOPHILS ABSOLUTE: 0 K/UL (ref 0–0.2)
BASOPHILS ABSOLUTE: 0 K/UL (ref 0–0.2)
BILIRUB SERPL-MCNC: 0.21 MG/DL (ref 0.3–1.2)
BUN BLDV-MCNC: 86 MG/DL (ref 8–23)
BUN BLDV-MCNC: 86 MG/DL (ref 8–23)
BUN BLDV-MCNC: 87 MG/DL (ref 8–23)
BUN/CREAT BLD: ABNORMAL (ref 9–20)
CALCIUM SERPL-MCNC: 8.1 MG/DL (ref 8.6–10.4)
CALCIUM SERPL-MCNC: 8.2 MG/DL (ref 8.6–10.4)
CALCIUM SERPL-MCNC: 8.3 MG/DL (ref 8.6–10.4)
CARBOXYHEMOGLOBIN: 1.3 % (ref 0–5)
CARBOXYHEMOGLOBIN: 2.5 % (ref 0–5)
CHLORIDE BLD-SCNC: 105 MMOL/L (ref 98–107)
CHLORIDE BLD-SCNC: 106 MMOL/L (ref 98–107)
CHLORIDE BLD-SCNC: 108 MMOL/L (ref 98–107)
CO2: 15 MMOL/L (ref 20–31)
CO2: 16 MMOL/L (ref 20–31)
CO2: 16 MMOL/L (ref 20–31)
CREAT SERPL-MCNC: 3.39 MG/DL (ref 0.5–0.9)
CREAT SERPL-MCNC: 3.41 MG/DL (ref 0.5–0.9)
CREAT SERPL-MCNC: 3.6 MG/DL (ref 0.5–0.9)
DIFFERENTIAL TYPE: ABNORMAL
DIFFERENTIAL TYPE: ABNORMAL
EOSINOPHILS RELATIVE PERCENT: 0 % (ref 0–4)
EOSINOPHILS RELATIVE PERCENT: 0 % (ref 0–4)
FIO2: ABNORMAL
FIO2: ABNORMAL
GFR AFRICAN AMERICAN: 15 ML/MIN
GFR AFRICAN AMERICAN: 16 ML/MIN
GFR AFRICAN AMERICAN: 16 ML/MIN
GFR NON-AFRICAN AMERICAN: 12 ML/MIN
GFR NON-AFRICAN AMERICAN: 13 ML/MIN
GFR NON-AFRICAN AMERICAN: 13 ML/MIN
GFR SERPL CREATININE-BSD FRML MDRD: ABNORMAL ML/MIN/{1.73_M2}
GLUCOSE BLD-MCNC: 101 MG/DL (ref 65–105)
GLUCOSE BLD-MCNC: 103 MG/DL (ref 65–105)
GLUCOSE BLD-MCNC: 103 MG/DL (ref 70–99)
GLUCOSE BLD-MCNC: 106 MG/DL (ref 65–105)
GLUCOSE BLD-MCNC: 128 MG/DL (ref 70–99)
GLUCOSE BLD-MCNC: 131 MG/DL (ref 70–99)
GLUCOSE BLD-MCNC: 93 MG/DL (ref 65–105)
HBV SURFACE AB TITR SER: <3.5 MIU/ML
HCO3 ARTERIAL: 16.2 MMOL/L (ref 22–26)
HCO3 ARTERIAL: 16.3 MMOL/L (ref 22–26)
HCT VFR BLD CALC: 27.7 % (ref 36–46)
HCT VFR BLD CALC: 27.8 % (ref 36–46)
HCT VFR BLD CALC: 27.9 % (ref 36–46)
HEMOGLOBIN: 8.6 G/DL (ref 12–16)
HEMOGLOBIN: 8.9 G/DL (ref 12–16)
HEMOGLOBIN: 8.9 G/DL (ref 12–16)
HEPATITIS B CORE TOTAL ANTIBODY: NONREACTIVE
HEPATITIS B SURFACE ANTIGEN: NONREACTIVE
IMMATURE GRANULOCYTES: ABNORMAL %
IMMATURE GRANULOCYTES: ABNORMAL %
INR BLD: 1.3
LACTIC ACID: 0.6 MMOL/L (ref 0.5–2.2)
LACTIC ACID: 0.6 MMOL/L (ref 0.5–2.2)
LV EF: 33 %
LVEF MODALITY: NORMAL
LYMPHOCYTES # BLD: 12 % (ref 24–44)
LYMPHOCYTES # BLD: 14 % (ref 24–44)
MCH RBC QN AUTO: 31 PG (ref 26–34)
MCH RBC QN AUTO: 31.1 PG (ref 26–34)
MCH RBC QN AUTO: 31.4 PG (ref 26–34)
MCHC RBC AUTO-ENTMCNC: 31.2 G/DL (ref 31–37)
MCHC RBC AUTO-ENTMCNC: 31.8 G/DL (ref 31–37)
MCHC RBC AUTO-ENTMCNC: 31.9 G/DL (ref 31–37)
MCV RBC AUTO: 97.7 FL (ref 80–100)
MCV RBC AUTO: 98.3 FL (ref 80–100)
MCV RBC AUTO: 99.5 FL (ref 80–100)
METHEMOGLOBIN: 0.8 % (ref 0–1.9)
METHEMOGLOBIN: ABNORMAL % (ref 0–1.9)
MODE: ABNORMAL
MODE: ABNORMAL
MONOCYTES # BLD: 6 % (ref 1–7)
MONOCYTES # BLD: 8 % (ref 1–7)
NEGATIVE BASE EXCESS, ART: 15 MMOL/L (ref 0–2)
NEGATIVE BASE EXCESS, ART: 15.2 MMOL/L (ref 0–2)
NOTIFICATION TIME: ABNORMAL
NOTIFICATION TIME: ABNORMAL
NOTIFICATION: ABNORMAL
NOTIFICATION: ABNORMAL
NRBC AUTOMATED: ABNORMAL PER 100 WBC
O2 DEVICE/FLOW/%: ABNORMAL
O2 DEVICE/FLOW/%: ABNORMAL
O2 SAT, ARTERIAL: 94.6 % (ref 95–98)
O2 SAT, ARTERIAL: 95.7 % (ref 95–98)
OXYHEMOGLOBIN: ABNORMAL % (ref 95–98)
OXYHEMOGLOBIN: ABNORMAL % (ref 95–98)
PARTIAL THROMBOPLASTIN TIME: 30.6 SEC (ref 24–36)
PATIENT TEMP: 37
PATIENT TEMP: 37.1
PCO2 ARTERIAL: 65.7 MMHG (ref 35–45)
PCO2 ARTERIAL: 66.4 MMHG (ref 35–45)
PCO2, ART, TEMP ADJ: ABNORMAL (ref 35–45)
PCO2, ART, TEMP ADJ: ABNORMAL (ref 35–45)
PDW BLD-RTO: 15.9 % (ref 11.5–14.9)
PDW BLD-RTO: 16.1 % (ref 11.5–14.9)
PDW BLD-RTO: 16.2 % (ref 11.5–14.9)
PEEP/CPAP: ABNORMAL
PEEP/CPAP: ABNORMAL
PH ARTERIAL: 7 (ref 7.35–7.45)
PH ARTERIAL: 7 (ref 7.35–7.45)
PH, ART, TEMP ADJ: ABNORMAL (ref 7.35–7.45)
PH, ART, TEMP ADJ: ABNORMAL (ref 7.35–7.45)
PLATELET # BLD: 153 K/UL (ref 150–450)
PLATELET # BLD: 158 K/UL (ref 150–450)
PLATELET # BLD: 163 K/UL (ref 150–450)
PLATELET ESTIMATE: ABNORMAL
PLATELET ESTIMATE: ABNORMAL
PMV BLD AUTO: 9 FL (ref 6–12)
PMV BLD AUTO: 9.3 FL (ref 6–12)
PMV BLD AUTO: 9.4 FL (ref 6–12)
PO2 ARTERIAL: 92.8 MMHG (ref 80–100)
PO2 ARTERIAL: 94 MMHG (ref 80–100)
PO2, ART, TEMP ADJ: ABNORMAL MMHG (ref 80–100)
PO2, ART, TEMP ADJ: ABNORMAL MMHG (ref 80–100)
POSITIVE BASE EXCESS, ART: ABNORMAL MMOL/L (ref 0–2)
POSITIVE BASE EXCESS, ART: ABNORMAL MMOL/L (ref 0–2)
POTASSIUM SERPL-SCNC: 5 MMOL/L (ref 3.7–5.3)
POTASSIUM SERPL-SCNC: 5.2 MMOL/L (ref 3.7–5.3)
POTASSIUM SERPL-SCNC: 5.4 MMOL/L (ref 3.7–5.3)
PROTHROMBIN TIME: 16.2 SEC (ref 11.8–14.6)
PSV: ABNORMAL
PSV: ABNORMAL
PT. POSITION: ABNORMAL
PT. POSITION: ABNORMAL
RBC # BLD: 2.79 M/UL (ref 4–5.2)
RBC # BLD: 2.83 M/UL (ref 4–5.2)
RBC # BLD: 2.85 M/UL (ref 4–5.2)
RBC # BLD: ABNORMAL 10*6/UL
RBC # BLD: ABNORMAL 10*6/UL
RESPIRATORY RATE: 16
RESPIRATORY RATE: 20
SAMPLE SITE: ABNORMAL
SAMPLE SITE: ABNORMAL
SEG NEUTROPHILS: 78 % (ref 36–66)
SEG NEUTROPHILS: 82 % (ref 36–66)
SEGMENTED NEUTROPHILS ABSOLUTE COUNT: 5 K/UL (ref 1.3–9.1)
SEGMENTED NEUTROPHILS ABSOLUTE COUNT: 8 K/UL (ref 1.3–9.1)
SET RATE: ABNORMAL
SET RATE: ABNORMAL
SODIUM BLD-SCNC: 133 MMOL/L (ref 135–144)
SODIUM BLD-SCNC: 134 MMOL/L (ref 135–144)
SODIUM BLD-SCNC: 134 MMOL/L (ref 135–144)
TEXT FOR RESPIRATORY: ABNORMAL
TEXT FOR RESPIRATORY: ABNORMAL
TOTAL HB: ABNORMAL G/DL (ref 12–16)
TOTAL HB: ABNORMAL G/DL (ref 12–16)
TOTAL PROTEIN: 6.2 G/DL (ref 6.4–8.3)
TOTAL RATE: ABNORMAL
TOTAL RATE: ABNORMAL
VT: ABNORMAL
VT: ABNORMAL
WBC # BLD: 6.6 K/UL (ref 3.5–11)
WBC # BLD: 7.6 K/UL (ref 3.5–11)
WBC # BLD: 9.8 K/UL (ref 3.5–11)
WBC # BLD: ABNORMAL 10*3/UL
WBC # BLD: ABNORMAL 10*3/UL

## 2021-11-16 PROCEDURE — 6370000000 HC RX 637 (ALT 250 FOR IP): Performed by: UROLOGY

## 2021-11-16 PROCEDURE — 2709999900 HC NON-CHARGEABLE SUPPLY: Performed by: UROLOGY

## 2021-11-16 PROCEDURE — 2500000003 HC RX 250 WO HCPCS: Performed by: INTERNAL MEDICINE

## 2021-11-16 PROCEDURE — 86704 HEP B CORE ANTIBODY TOTAL: CPT

## 2021-11-16 PROCEDURE — 93306 TTE W/DOPPLER COMPLETE: CPT

## 2021-11-16 PROCEDURE — 2500000003 HC RX 250 WO HCPCS: Performed by: NURSE ANESTHETIST, CERTIFIED REGISTERED

## 2021-11-16 PROCEDURE — 3209999900 FLUORO FOR SURGICAL PROCEDURES

## 2021-11-16 PROCEDURE — 82805 BLOOD GASES W/O2 SATURATION: CPT

## 2021-11-16 PROCEDURE — 85025 COMPLETE CBC W/AUTO DIFF WBC: CPT

## 2021-11-16 PROCEDURE — C2617 STENT, NON-COR, TEM W/O DEL: HCPCS | Performed by: UROLOGY

## 2021-11-16 PROCEDURE — 2580000003 HC RX 258: Performed by: FAMILY MEDICINE

## 2021-11-16 PROCEDURE — 2580000003 HC RX 258: Performed by: INTERNAL MEDICINE

## 2021-11-16 PROCEDURE — 85027 COMPLETE CBC AUTOMATED: CPT

## 2021-11-16 PROCEDURE — 36415 COLL VENOUS BLD VENIPUNCTURE: CPT

## 2021-11-16 PROCEDURE — 2580000003 HC RX 258: Performed by: NURSE ANESTHETIST, CERTIFIED REGISTERED

## 2021-11-16 PROCEDURE — 83605 ASSAY OF LACTIC ACID: CPT

## 2021-11-16 PROCEDURE — 85730 THROMBOPLASTIN TIME PARTIAL: CPT

## 2021-11-16 PROCEDURE — 6360000004 HC RX CONTRAST MEDICATION: Performed by: INTERNAL MEDICINE

## 2021-11-16 PROCEDURE — C1758 CATHETER, URETERAL: HCPCS | Performed by: UROLOGY

## 2021-11-16 PROCEDURE — 36600 WITHDRAWAL OF ARTERIAL BLOOD: CPT

## 2021-11-16 PROCEDURE — 87040 BLOOD CULTURE FOR BACTERIA: CPT

## 2021-11-16 PROCEDURE — 3700000000 HC ANESTHESIA ATTENDED CARE: Performed by: UROLOGY

## 2021-11-16 PROCEDURE — 3600000002 HC SURGERY LEVEL 2 BASE: Performed by: UROLOGY

## 2021-11-16 PROCEDURE — 80053 COMPREHEN METABOLIC PANEL: CPT

## 2021-11-16 PROCEDURE — 3600000012 HC SURGERY LEVEL 2 ADDTL 15MIN: Performed by: UROLOGY

## 2021-11-16 PROCEDURE — 2500000003 HC RX 250 WO HCPCS: Performed by: ANESTHESIOLOGY

## 2021-11-16 PROCEDURE — 7100000000 HC PACU RECOVERY - FIRST 15 MIN: Performed by: UROLOGY

## 2021-11-16 PROCEDURE — C1769 GUIDE WIRE: HCPCS | Performed by: UROLOGY

## 2021-11-16 PROCEDURE — 2500000003 HC RX 250 WO HCPCS: Performed by: FAMILY MEDICINE

## 2021-11-16 PROCEDURE — 3700000001 HC ADD 15 MINUTES (ANESTHESIA): Performed by: UROLOGY

## 2021-11-16 PROCEDURE — 6360000002 HC RX W HCPCS: Performed by: INTERNAL MEDICINE

## 2021-11-16 PROCEDURE — C9113 INJ PANTOPRAZOLE SODIUM, VIA: HCPCS | Performed by: INTERNAL MEDICINE

## 2021-11-16 PROCEDURE — 82947 ASSAY GLUCOSE BLOOD QUANT: CPT

## 2021-11-16 PROCEDURE — 6370000000 HC RX 637 (ALT 250 FOR IP): Performed by: INTERNAL MEDICINE

## 2021-11-16 PROCEDURE — 86317 IMMUNOASSAY INFECTIOUS AGENT: CPT

## 2021-11-16 PROCEDURE — 7100000001 HC PACU RECOVERY - ADDTL 15 MIN: Performed by: UROLOGY

## 2021-11-16 PROCEDURE — 87340 HEPATITIS B SURFACE AG IA: CPT

## 2021-11-16 PROCEDURE — 71045 X-RAY EXAM CHEST 1 VIEW: CPT

## 2021-11-16 PROCEDURE — 6370000000 HC RX 637 (ALT 250 FOR IP): Performed by: FAMILY MEDICINE

## 2021-11-16 PROCEDURE — 94660 CPAP INITIATION&MGMT: CPT

## 2021-11-16 PROCEDURE — 85610 PROTHROMBIN TIME: CPT

## 2021-11-16 PROCEDURE — 2000000000 HC ICU R&B

## 2021-11-16 PROCEDURE — 80048 BASIC METABOLIC PNL TOTAL CA: CPT

## 2021-11-16 PROCEDURE — 0T768DZ DILATION OF RIGHT URETER WITH INTRALUMINAL DEVICE, VIA NATURAL OR ARTIFICIAL OPENING ENDOSCOPIC: ICD-10-PCS | Performed by: FAMILY MEDICINE

## 2021-11-16 DEVICE — URETERAL STENT
Type: IMPLANTABLE DEVICE | Site: URETER | Status: FUNCTIONAL
Brand: POLARIS™ ULTRA

## 2021-11-16 RX ORDER — MIDODRINE HYDROCHLORIDE 5 MG/1
5 TABLET ORAL 3 TIMES DAILY PRN
Status: DISCONTINUED | OUTPATIENT
Start: 2021-11-16 | End: 2021-11-16

## 2021-11-16 RX ORDER — 0.9 % SODIUM CHLORIDE 0.9 %
250 INTRAVENOUS SOLUTION INTRAVENOUS ONCE
Status: COMPLETED | OUTPATIENT
Start: 2021-11-16 | End: 2021-11-16

## 2021-11-16 RX ORDER — NOREPINEPHRINE BIT/0.9 % NACL 16MG/250ML
2-100 INFUSION BOTTLE (ML) INTRAVENOUS CONTINUOUS
Status: DISCONTINUED | OUTPATIENT
Start: 2021-11-16 | End: 2021-11-16

## 2021-11-16 RX ORDER — LIDOCAINE HYDROCHLORIDE 20 MG/ML
JELLY TOPICAL PRN
Status: DISCONTINUED | OUTPATIENT
Start: 2021-11-16 | End: 2021-11-16 | Stop reason: HOSPADM

## 2021-11-16 RX ORDER — DEXTROSE MONOHYDRATE 25 G/50ML
25 INJECTION, SOLUTION INTRAVENOUS ONCE
Status: COMPLETED | OUTPATIENT
Start: 2021-11-16 | End: 2021-11-16

## 2021-11-16 RX ORDER — MIDODRINE HYDROCHLORIDE 5 MG/1
5 TABLET ORAL 3 TIMES DAILY
Status: DISCONTINUED | OUTPATIENT
Start: 2021-11-16 | End: 2021-11-16

## 2021-11-16 RX ORDER — SODIUM POLYSTYRENE SULFONATE 15 G/60ML
15 SUSPENSION ORAL; RECTAL ONCE
Status: DISCONTINUED | OUTPATIENT
Start: 2021-11-16 | End: 2021-12-14 | Stop reason: HOSPADM

## 2021-11-16 RX ORDER — KETAMINE HYDROCHLORIDE 50 MG/ML
INJECTION, SOLUTION, CONCENTRATE INTRAMUSCULAR; INTRAVENOUS PRN
Status: DISCONTINUED | OUTPATIENT
Start: 2021-11-16 | End: 2021-11-16 | Stop reason: SDUPTHER

## 2021-11-16 RX ORDER — SODIUM CHLORIDE 9 MG/ML
INJECTION, SOLUTION INTRAVENOUS CONTINUOUS PRN
Status: DISCONTINUED | OUTPATIENT
Start: 2021-11-16 | End: 2021-11-16 | Stop reason: SDUPTHER

## 2021-11-16 RX ORDER — NOREPINEPHRINE BIT/0.9 % NACL 16MG/250ML
2-100 INFUSION BOTTLE (ML) INTRAVENOUS CONTINUOUS
Status: DISCONTINUED | OUTPATIENT
Start: 2021-11-16 | End: 2021-11-20

## 2021-11-16 RX ORDER — 0.9 % SODIUM CHLORIDE 0.9 %
1000 INTRAVENOUS SOLUTION INTRAVENOUS ONCE
Status: COMPLETED | OUTPATIENT
Start: 2021-11-16 | End: 2021-11-16

## 2021-11-16 RX ORDER — DEXTROSE MONOHYDRATE 25 G/50ML
25 INJECTION, SOLUTION INTRAVENOUS PRN
Status: DISCONTINUED | OUTPATIENT
Start: 2021-11-16 | End: 2021-11-16

## 2021-11-16 RX ADMIN — DEXTROSE MONOHYDRATE 25 G: 25 INJECTION, SOLUTION INTRAVENOUS at 12:20

## 2021-11-16 RX ADMIN — SODIUM CHLORIDE: 9 INJECTION, SOLUTION INTRAVENOUS at 16:33

## 2021-11-16 RX ADMIN — SODIUM BICARBONATE: 84 INJECTION, SOLUTION INTRAVENOUS at 20:41

## 2021-11-16 RX ADMIN — KETAMINE HYDROCHLORIDE 10 MG: 50 INJECTION, SOLUTION INTRAMUSCULAR; INTRAVENOUS at 16:40

## 2021-11-16 RX ADMIN — INSULIN HUMAN 10 UNITS: 100 INJECTION, SOLUTION PARENTERAL at 12:20

## 2021-11-16 RX ADMIN — PANTOPRAZOLE SODIUM 40 MG: 40 INJECTION, POWDER, FOR SOLUTION INTRAVENOUS at 12:16

## 2021-11-16 RX ADMIN — KETAMINE HYDROCHLORIDE 5 MG: 50 INJECTION, SOLUTION INTRAMUSCULAR; INTRAVENOUS at 16:38

## 2021-11-16 RX ADMIN — PERFLUTREN 2.2 MG: 6.52 INJECTION, SUSPENSION INTRAVENOUS at 13:41

## 2021-11-16 RX ADMIN — KETAMINE HYDROCHLORIDE 5 MG: 50 INJECTION, SOLUTION INTRAMUSCULAR; INTRAVENOUS at 16:39

## 2021-11-16 RX ADMIN — Medication 4 MCG/MIN: at 16:30

## 2021-11-16 RX ADMIN — SODIUM CHLORIDE: 9 INJECTION, SOLUTION INTRAVENOUS at 01:19

## 2021-11-16 RX ADMIN — SODIUM BICARBONATE: 84 INJECTION, SOLUTION INTRAVENOUS at 18:46

## 2021-11-16 RX ADMIN — ACETAMINOPHEN 650 MG: 325 TABLET, FILM COATED ORAL at 22:33

## 2021-11-16 RX ADMIN — SODIUM CHLORIDE 250 ML: 9 INJECTION, SOLUTION INTRAVENOUS at 12:16

## 2021-11-16 RX ADMIN — SODIUM CHLORIDE 1000 ML: 9 INJECTION, SOLUTION INTRAVENOUS at 20:40

## 2021-11-16 RX ADMIN — KETAMINE HYDROCHLORIDE 10 MG: 50 INJECTION, SOLUTION INTRAMUSCULAR; INTRAVENOUS at 16:46

## 2021-11-16 RX ADMIN — MIDODRINE HYDROCHLORIDE 5 MG: 5 TABLET ORAL at 03:33

## 2021-11-16 RX ADMIN — CEFEPIME HYDROCHLORIDE 1000 MG: 1 INJECTION, POWDER, FOR SOLUTION INTRAMUSCULAR; INTRAVENOUS at 20:52

## 2021-11-16 RX ADMIN — SODIUM CHLORIDE 250 ML: 9 INJECTION, SOLUTION INTRAVENOUS at 03:33

## 2021-11-16 RX ADMIN — SODIUM CHLORIDE, PRESERVATIVE FREE 10 ML: 5 INJECTION INTRAVENOUS at 12:16

## 2021-11-16 RX ADMIN — MIDODRINE HYDROCHLORIDE 5 MG: 5 TABLET ORAL at 08:15

## 2021-11-16 ASSESSMENT — PULMONARY FUNCTION TESTS
PIF_VALUE: 1
PIF_VALUE: 0
PIF_VALUE: 0
PIF_VALUE: 1
PIF_VALUE: 0
PIF_VALUE: 1
PIF_VALUE: 0
PIF_VALUE: 1
PIF_VALUE: 1
PIF_VALUE: 0
PIF_VALUE: 1
PIF_VALUE: 1
PIF_VALUE: 0
PIF_VALUE: 1

## 2021-11-16 ASSESSMENT — ENCOUNTER SYMPTOMS
VOMITING: 0
SHORTNESS OF BREATH: 0
SHORTNESS OF BREATH: 0
ABDOMINAL PAIN: 0
COUGH: 0
NAUSEA: 0
BLOOD IN STOOL: 0
EYE REDNESS: 0
TROUBLE SWALLOWING: 0
EYE ITCHING: 0
CHEST TIGHTNESS: 0
COLOR CHANGE: 0

## 2021-11-16 ASSESSMENT — PAIN SCALES - GENERAL
PAINLEVEL_OUTOF10: 2
PAINLEVEL_OUTOF10: 0

## 2021-11-16 NOTE — BRIEF OP NOTE
Brief Postoperative Note      Patient: Dev Espitia  YOB: 1950  MRN: [id number]    Date of Procedure: 11/16/2021    Pre-Op Diagnosis: right hydronephrosis    Post-Op Diagnosis: Same       Procedure(s):  CYSTOSCOPY URETERAL STENT INSERTION    Surgeon(s):  Yusuf Gutierrez MD    Assistant:  * No surgical staff found *    Anesthesia: General    Estimated Blood Loss (mL): Minimal    Complications: None    Specimens:   * No specimens in log *    Implants:  Implant Name Type Inv. Item Serial No.  Lot No. LRB No. Used Action   STENT URET 6FR L26CM PERCFLX HYDR+ DBL PGTL THRD 2  STENT URET 6FR L26CM PERCFLX HYDR+ DBL PGTL THRD 2  [employer] [facility]-  Right 1 Implanted         Drains:   Colostomy RLQ (Active)   Stomal Appliance 1 piece 11/16/21 1345   Stoma  Assessment [place] 11/16/21 1345   Peristomal Assessment Clean; Intact 11/16/21 1345   Treatment Site care 11/16/21 1345   Stool Appearance Loose 11/16/21 1345   Stool Color Brown 11/16/21 1345   Stool Amount Small 11/16/21 1345   Output (mL) 75 ml 11/16/21 1345       Urethral Catheter (Active)   Catheter Indications Urinary retention (acute or chronic), continuous bladder irrigation or bladder outlet obstruction 11/16/21 1345   Site Assessment No urethral drainage 11/16/21 1345   Urine Color Yellow 11/16/21 1345   Urine Appearance Cloudy 11/16/21 1345   Urine Odor Other (Comment) 11/13/21 0525   Output (mL) 400 mL 11/15/21 0546       [REMOVED] External Urinary Catheter (Removed)   Catheter changed  Yes 11/11/21 0056   Urine Color Yellow 11/12/21 0447   Urine Appearance Cloudy 11/12/21 0447   Urine Odor Other (Comment) 11/12/21 0447   Output (mL) 400 mL 11/12/21 0447   Suction 40 mmgHg continuous 11/11/21 0033   Placement Replaced 11/11/21 0033   Skin Assessment No Injury 11/11/21 0033       Findings: right ureteral stent placed.      Electronically signed by Yusuf Gutierrez MD on 11/16/2021 at 4:55 PM

## 2021-11-16 NOTE — PROGRESS NOTES
Dr. Sia Smith updated that patient is going to down to surgery. He asked if while down there can a central line be placed due to lack of IV access and need for multiple IV medications. RN called down to pre-op and spoke with nurse that Dr would like a central line placed if possible. Nurse said she was going to run over and let anesthesia know before patient got down there.

## 2021-11-16 NOTE — PROGRESS NOTES
Urology Progress Note    Subjective: she continues to have right flank pain. Patient Vitals for the past 24 hrs:   BP Temp Temp src Pulse Resp SpO2 Weight   11/16/21 1530 (!) 90/46 -- -- -- -- -- --   11/16/21 1504 (!) 92/58 -- -- -- -- -- --   11/16/21 1345 82/61 -- -- 84 17 99 % --   11/16/21 1300 (!) 93/49 97.5 °F (36.4 °C) Oral -- -- -- --   11/16/21 1045 (!) 90/56 -- -- -- 16 99 % --   11/16/21 0700 (!) 87/56 97.6 °F (36.4 °C) Oral 87 17 98 % --   11/16/21 0430 116/89 -- -- 84 -- -- --   11/16/21 0215 84/60 97.7 °F (36.5 °C) Oral 83 17 -- --   11/16/21 0030 (!) 78/51 97.7 °F (36.5 °C) Oral 76 18 99 % 222 lb 0.1 oz (100.7 kg)   11/15/21 2020 100/62 97.4 °F (36.3 °C) Oral 73 18 -- --   11/15/21 1945 (!) 90/58 -- Oral 83 16 98 % --   11/15/21 1700 (!) 96/55 -- -- 72 -- -- --       Intake/Output Summary (Last 24 hours) at 11/16/2021 1543  Last data filed at 11/16/2021 0700  Gross per 24 hour   Intake 240 ml   Output 475 ml   Net -235 ml       Recent Labs     11/15/21  0747 11/15/21  1339 11/15/21  2132 11/16/21  0625 11/16/21  1421   WBC 6.7  --   --  6.6 7.6   HGB 8.6*   < > 8.8* 8.6* 8.9*   HCT 26.6*   < > 27.9* 27.7* 27.9*   MCV 97.1  --   --  99.5 97.7     --   --  153 158    < > = values in this interval not displayed. Recent Labs     11/15/21  0747 11/16/21  0625 11/16/21  1421   * 134* 134*   K 5.2 5.4* 5.0    108* 106   CO2 19* 16* 16*   BUN 86* 87* 86*   CREATININE 3.09* 3.39* 3.41*       No results for input(s): COLORU, PHUR, LABCAST, WBCUA, RBCUA, MUCUS, TRICHOMONAS, YEAST, BACTERIA, CLARITYU, SPECGRAV, LEUKOCYTESUR, UROBILINOGEN, BILIRUBINUR, BLOODU in the last 72 hours. Invalid input(s): NITRATE, GLUCOSEUKETONESUAMORPHOUS    Additional Lab/culture results:    Physical Exam: patient looks uncomfortable. Akur in place, urine clear.      Interval Imaging Findings:    Impression:    Patient Active Problem List   Diagnosis    Chronic kidney disease (CKD), stage III (moderate) (HCC)    Hyperkalemia    Type 2 diabetes mellitus with kidney complication, with long-term current use of insulin (HCC)    Hyperlipidemia    CKD (chronic kidney disease) stage 3, GFR 30-59 ml/min (HCC)    Microalbuminuria    Urinary calculus    Tethered cord syndrome (HCC)    Stenosis of cervical spine    Lumbar radiculopathy    Lower back pain    HTN (hypertension)    Esophageal reflux    Depression with anxiety    ASCVD (arteriosclerotic cardiovascular disease)    Closed fracture of thoracic vertebra (HCC)    Intervertebral cervical disc disorder with myelopathy, cervical region    Pain in limb    Anemia    JAYLYN (acute kidney injury) (HCC)    Iron deficiency anemia    Hydronephrosis determined by ultrasound    Longstanding persistent atrial fibrillation ([address]       Plan:     Cr has not improved with kenny in place. ultrasound shows persistent right hydro.    Cysto, right stent today    Elizabeth Peoples MD  3:43 PM 11/16/2021

## 2021-11-16 NOTE — PLAN OF CARE
Problem: SAFETY  Goal: Free from intentional harm  11/16/2021 0244 by Michelle Gregorio RN  Outcome: Ongoing  Note: Pt free from intentional harm     Problem: SKIN INTEGRITY  Goal: Skin integrity is maintained or improved  11/16/2021 0244 by Michelle Gregorio RN  Outcome: Ongoing  Note: Pts skin integrity stable      Problem: Falls - Risk of:  Goal: Absence of physical injury  Description: Absence of physical injury  11/16/2021 0244 by Michelle Gregorio RN  Outcome: Ongoing  Note: Pt absent of any physical injury     Problem: Skin Integrity:  Goal: Absence of new skin breakdown  Description: Absence of new skin breakdown  11/16/2021 0244 by Michelle Gregorio RN  Outcome: Ongoing  Note: Pt absent from any new skin breakdown

## 2021-11-16 NOTE — ANESTHESIA POSTPROCEDURE EVALUATION
Department of Anesthesiology  Postprocedure Note    Patient: Tobi Finch  MRN: [id number]  YOB: 1950  Date of evaluation: 11/16/2021  Time:  6:29 PM     Procedure Summary     Date: 11/16/21 Room / Location: [address] [place] [address] / [facility]    Anesthesia Start: 8299 Anesthesia Stop: 1802    Procedure: [address] (Right Ureter) Diagnosis: (ACUTE KIDNEY INJURY)    Surgeons: Tiara Poole MD Responsible Provider: Steve Short MD    Anesthesia Type: MAC ASA Status: 4 - Emergent          Anesthesia Type: MAC    Mane Phase I: Mane Score: 8    Mane Phase II:      Last vitals: Reviewed and per EMR flowsheets. Anesthesia Post Evaluation    Comments: POST- ANESTHESIA EVALUATION       Pt Name: Tobi Finch  MRN: [id number]  YOB: 1950  Date of evaluation: 11/16/2021  Time:  6:29 PM      BP (!) 108/58   Pulse 96   Temp 98.4 °F (36.9 °C) (Infrared)   Resp 28   Ht 5' 3\" (1.6 m) Comment: from old chart  Wt 222 lb 0.1 oz (100.7 kg)   SpO2 98%   BMI 39.33 kg/m²      Consciousness Level  Awake  Cardiopulmonary Status  Stable  Pain Adequately Treated YES  Nausea / Vomiting  NO  Adequate Hydration  YES  Anesthesia Related Complications NONE     I discussed with Dr. Greta Ruiz, patient does not need a Central line at this time because she has a functioning PICC line and 18G peripheral IV in her Basilic vein. He agreed and also agreed with higher level of care and requested pulmonology consult. ABG results were relayed to Dr. Greta Ruiz as well.      Electronically signed by Steve Short MD on 11/16/2021 at 6:29 PM

## 2021-11-16 NOTE — PROGRESS NOTES
Spoke with Dr. Bradley Manley anesthesiologist about patients low BP. Dr. Bradley Manley spoke with Dr. Laura Martines who states this procedure is emergent. Dr. Bradley Manley would like to start patient on levophed prior to surgery. RN explained that levophed cannot be initiated on progressive. Dr. Bradley Manley states that is fine, she will order it and have it started downstairs. Page out for Kaity Garcia to update him.

## 2021-11-16 NOTE — PROGRESS NOTES
Progress Note    11/16/2021   2:14 PM    Name:  Weston Martinez  MRN:    [id number]     Acct:     [de-identified]   Room:  2095/2095-01  IP Day: 6     Admit Date: 11/10/2021  4:23 PM  PCP: Araceli Marinelli, DO    Subjective:     C/C:   Chief Complaint   Patient presents with    Abnormal Lab     low hemoglobin and high CR       Interval History: Status: not changed. Patient has increased generalized weakness. Patient denies chest pain shortness of breath nausea vomiting or abdominal pain. Recent labs reviewed heart rate stable relatively low blood pressure readings noted. Heart rate stable. Recent labs reviewed, creatinine elevated at 3.39 potassium elevated at 5.4, hemoglobin 8.6    ROS:   all 10 systems reviewed and are negative except as noted    Review of Systems   Constitutional: Positive for fatigue. Negative for chills. HENT: Negative for drooling, mouth sores, sneezing and trouble swallowing. Eyes: Negative for redness and itching. Respiratory: Negative for cough, chest tightness and shortness of breath. Cardiovascular: Negative for chest pain, palpitations and leg swelling. Gastrointestinal: Negative for abdominal pain, blood in stool, nausea and vomiting. Endocrine: Negative for heat intolerance and polyphagia. Genitourinary: Negative for difficulty urinating, flank pain and pelvic pain. Musculoskeletal: Negative for arthralgias, joint swelling and neck stiffness. Skin: Negative for color change and pallor. Allergic/Immunologic: Negative for food allergies. Neurological: Negative for dizziness, seizures and headaches. Hematological: Does not bruise/bleed easily. Psychiatric/Behavioral: Negative for agitation, behavioral problems and suicidal ideas. The patient is not hyperactive. Medications:      Allergies: No Known Allergies    Current Meds: sodium bicarbonate 75 mEq in sodium chloride 0.45 % 1,000 mL infusion, Continuous  sodium polystyrene (KAYEXALATE) 15 GM/60ML suspension 15 g, Once  midodrine (PROAMATINE) tablet 5 mg, TID  pantoprazole (PROTONIX) injection 40 mg, Daily   And  sodium chloride flush 0.9 % injection 10 mL, Daily  sertraline (ZOLOFT) tablet 25 mg, Daily  0.9 % sodium chloride infusion, PRN  ferrous sulfate (IRON 325) tablet 325 mg, Daily with breakfast  [Held by provider] aspirin chewable tablet 81 mg, Daily  atorvastatin (LIPITOR) tablet 80 mg, Daily  carvedilol (COREG) tablet 6.25 mg, BID WC  insulin glargine (LANTUS) injection vial 22 Units, Nightly  insulin lispro (HUMALOG) injection vial 0-12 Units, TID WC  insulin lispro (HUMALOG) injection vial 0-6 Units, Nightly  sodium chloride flush 0.9 % injection 5-40 mL, 2 times per day  sodium chloride flush 0.9 % injection 5-40 mL, PRN  0.9 % sodium chloride infusion, PRN  acetaminophen (TYLENOL) tablet 650 mg, Q4H PRN  ondansetron (ZOFRAN-ODT) disintegrating tablet 4 mg, Q8H PRN   Or  ondansetron (ZOFRAN) injection 4 mg, Q6H PRN  oxyCODONE-acetaminophen (PERCOCET) 5-325 MG per tablet 1 tablet, Q6H PRN  cefTRIAXone (ROCEPHIN) 1000 mg IVPB in 50 mL D5W minibag, Q24H        Data:     Code Status:  Full Code    Family History   Problem Relation Age of Onset    High Blood Pressure Other        Social History     Socioeconomic History    Marital status:      Spouse name: Not on file    Number of children: Not on file    Years of education: Not on file    Highest education level: Not on file   Occupational History    Not on file   Tobacco Use    Smoking status: Never Smoker    Smokeless tobacco: Not on file   Substance and Sexual Activity    Alcohol use: No     Alcohol/week: 0.0 standard drinks    Drug use: No    Sexual activity: Not on file   Other Topics Concern    Not on file   Social History Narrative    Not on file     Social Determinants of Health     Financial Resource Strain:     Difficulty of Paying Living Expenses: Not on file   Food Insecurity:     Worried About Running Out of Food in the Last Year: Not on file    Ran Out of Food in the Last Year: Not on file   Transportation Needs:     Lack of Transportation (Medical): Not on file    Lack of Transportation (Non-Medical): Not on file   Physical Activity:     Days of Exercise per Week: Not on file    Minutes of Exercise per Session: Not on file   Stress:     Feeling of Stress : Not on file   Social Connections:     Frequency of Communication with Friends and Family: Not on file    Frequency of Social Gatherings with Friends and Family: Not on file    Attends Rastafarian Services: Not on file    Active Member of [address] [place] [address] [employer] or Organizations: Not on file    Attends Club or Organization Meetings: Not on file    Marital Status: Not on file   Intimate Partner Violence:     Fear of Current or Ex-Partner: Not on file    Emotionally Abused: Not on file    Physically Abused: Not on file    Sexually Abused: Not on file   Housing Stability:     Unable to Pay for Housing in the Last Year: Not on file    Number of [place] in the Last Year: Not on file    Unstable Housing in the Last Year: Not on file       I/O (24Hr): Intake/Output Summary (Last 24 hours) at 11/16/2021 1414  Last data filed at 11/16/2021 0700  Gross per 24 hour   Intake 240 ml   Output 475 ml   Net -235 ml     Radiology:  CT ABDOMEN PELVIS WO CONTRAST Additional Contrast? None    Result Date: 11/12/2021  Severe right and mild left hydroureteronephrosis. No obstructing ureteral stone is seen. Wall thickening of the urinary bladder without significant urinary bladder wall distension. Outlet obstruction is a consideration. Right-sided ostomy. There is bowel wall thickening proximal to the ostomy, may suggest infectious or inflammatory enteritis. No drainable fluid collections or free air. No evidence of bowel obstruction. Moderate right pleural effusion. Bibasilar infiltrates or atelectasis. Clinical correlation to exclude pneumonia.      US RENAL COMPLETE    Result Date: 11/15/2021  1. Mild to moderate right-sided hydronephrosis. 2. Limited renal ultrasound. No left-sided hydronephrosis. US RETROPERITONEAL COMPLETE    Result Date: 11/12/2021  1. Moderate to severe right and mild-to-moderate left hydronephrosis. 2. Exam limited due to patient's body habitus.        Labs:  Recent Results (from the past 24 hour(s))   POC Glucose Fingerstick    Collection Time: 11/15/21  4:11 PM   Result Value Ref Range    POC Glucose 144 (H) 65 - 105 mg/dL   POC Glucose Fingerstick    Collection Time: 11/15/21  7:52 PM   Result Value Ref Range    POC Glucose 208 (H) 65 - 105 mg/dL   Hgb/Hct    Collection Time: 11/15/21  9:32 PM   Result Value Ref Range    Hemoglobin 8.8 (L) 12.0 - 16.0 g/dL    Hematocrit 27.9 (L) 36 - 46 %   CBC with DIFF    Collection Time: 11/16/21  6:25 AM   Result Value Ref Range    WBC 6.6 3.5 - 11.0 k/uL    RBC 2.79 (L) 4.0 - 5.2 m/uL    Hemoglobin 8.6 (L) 12.0 - 16.0 g/dL    Hematocrit 27.7 (L) 36 - 46 %    MCV 99.5 80 - 100 fL    MCH 31.0 26 - 34 pg    MCHC 31.2 31 - 37 g/dL    RDW 16.1 (H) 11.5 - 14.9 %    Platelets 170 702 - 609 k/uL    MPV 9.4 6.0 - 12.0 fL    NRBC Automated NOT REPORTED per 100 WBC    Differential Type NOT REPORTED     Seg Neutrophils 78 (H) 36 - 66 %    Lymphocytes 14 (L) 24 - 44 %    Monocytes 8 (H) 1 - 7 %    Eosinophils % 0 0 - 4 %    Basophils 0 0 - 2 %    Immature Granulocytes NOT REPORTED 0 %    Segs Absolute 5.00 1.3 - 9.1 k/uL    Absolute Lymph # 0.90 (L) 1.0 - 4.8 k/uL    Absolute Mono # 0.50 0.1 - 1.3 k/uL    Absolute Eos # 0.00 0.0 - 0.4 k/uL    Basophils Absolute 0.00 0.0 - 0.2 k/uL    Absolute Immature Granulocyte NOT REPORTED 0.00 - 0.30 k/uL    WBC Morphology NOT REPORTED     RBC Morphology NOT REPORTED     Platelet Estimate NOT REPORTED    Basic Metabolic Prof    Collection Time: 11/16/21  6:25 AM   Result Value Ref Range    Glucose 128 (H) 70 - 99 mg/dL    BUN 87 (H) 8 - 23 mg/dL    CREATININE 3.39 (H) 0.50 - 0.90 mg/dL    Bun/Cre Ratio NOT REPORTED 9 - 20    Calcium 8.3 (L) 8.6 - 10.4 mg/dL    Sodium 134 (L) 135 - 144 mmol/L    Potassium 5.4 (H) 3.7 - 5.3 mmol/L    Chloride 108 (H) 98 - 107 mmol/L    CO2 16 (L) 20 - 31 mmol/L    Anion Gap 10 9 - 17 mmol/L    GFR Non-African American 13 (L) >60 mL/min    GFR  16 (L) >60 mL/min    GFR Comment          GFR Staging NOT REPORTED    POC Glucose Fingerstick    Collection Time: 21  6:55 AM   Result Value Ref Range    POC Glucose 101 65 - 105 mg/dL   POC Glucose Fingerstick    Collection Time: 21 11:29 AM   Result Value Ref Range    POC Glucose 103 65 - 105 mg/dL       Physical Examination:        Vitals:  BP 82/61   Pulse 84   Temp 97.5 °F (36.4 °C) (Oral)   Resp 17   Ht 5' 3\" (1.6 m) Comment: from old chart  Wt 222 lb 0.1 oz (100.7 kg)   SpO2 99%   BMI 39.33 kg/m²   Temp (24hrs), Av.6 °F (36.4 °C), Min:97.4 °F (36.3 °C), Max:97.7 °F (36.5 °C)    Recent Labs     11/15/21  1611 11/15/21  1952 21  0655 21  1129   POCGLU 144* 208* 101 103         Physical Exam  Vitals reviewed. HENT:      Head: Normocephalic. Right Ear: External ear normal.      Left Ear: External ear normal.      Nose: Nose normal.      Mouth/Throat:      Mouth: Mucous membranes are moist.      Pharynx: Oropharynx is clear. Eyes:      Conjunctiva/sclera: Conjunctivae normal.   Cardiovascular:      Rate and Rhythm: Normal rate and regular rhythm. Pulses: Normal pulses. Heart sounds: Normal heart sounds. Pulmonary:      Effort: Pulmonary effort is normal.      Breath sounds: Normal breath sounds. No rales. Abdominal:      General: Bowel sounds are normal.      Palpations: Abdomen is soft. Comments: In place with brown soft stool   Musculoskeletal:         General: No deformity. Cervical back: Normal range of motion and neck supple. Right lower leg: No edema. Left lower leg: No edema. Skin:     General: Skin is warm.       Capillary Refill: Capillary refill takes less than 2 seconds. Coloration: Skin is not jaundiced. Neurological:      General: No focal deficit present. Mental Status: She is alert. Mental status is at baseline. Psychiatric:         Mood and Affect: Mood normal.         Behavior: Behavior normal.         Assessment:        Primary Problem  JAYLYN (acute kidney injury) ([facility]     Principal Problem:    [facility]) ([facility]  Active Problems:    Type 2 diabetes mellitus with kidney complication, with long-term current use of insulin ([facility])    CKD (chronic kidney disease) stage 3, GFR 30-59 ml/min ([facility])    HTN (hypertension)    ASCVD (arteriosclerotic cardiovascular disease)    Iron deficiency anemia    Hydronephrosis determined by ultrasound    Longstanding persistent atrial fibrillation ([facility]  Resolved Problems:    * No resolved hospital problems. *      Past Medical History:   Diagnosis Date    Anemia, unspecified     CAD (coronary artery disease)     Chronic kidney disease     CKD (chronic kidney disease) stage 3, GFR 30-59 ml/min ([facility])     Closed fracture of dorsal (thoracic) vertebra without mention of spinal cord injury     Coronary atherosclerosis of unspecified type of vessel, native or graft     Depression with anxiety     Esophageal reflux     HTN (hypertension)     Hyperkalemia     Hyperlipidemia     Intervertebral cervical disc disorder with myelopathy, cervical region     Lower back pain     Lumbar radiculopathy     Microalbuminuria     Muscle weakness     Pain in limb     Stenosis of cervical spine     Tethered cord syndrome ([facility])     Type II or unspecified type diabetes mellitus without mention of complication, not stated as uncontrolled     Urinary calculus     UTI (lower urinary tract infection)         Plan:        1. Bicarb drip at 75 ml/h per nephrology  2. NovoLog regular 10 units IV plus D50 1 amp now  3. Recheck potassium in 3 hours  4.  IV normal saline to 250 mg bolus now  5. Ureteral stent per urology today  6. Midodrine 5 mg p.o. 3 times a day  7. 2D report pending  1. PPI Protonix 40 mg p.o. daily  2. DVT Prophylaxis pharmacological DVT prophylaxis due to low hemoglobin. 3. CBC, CMP  4. EPCs  5. PT/OT to evaluate and treat  6. Replace electrolytes as per sliding scale  7. Home medications reviewed and appropriate medications continued  8.  Reviewed labs and imaging studies from last 24 hours and results explained to patient      Electronically signed by Link Persaud MD

## 2021-11-16 NOTE — PROGRESS NOTES
Department of Internal Medicine  Nephrology Jose R Blakn MD  Progress Note    Reason for consultation: Management of acute kidney injury. Consulting physician: Steven Storm MD.    Interval history: Patient Is more lethargic today. Patient has had persistent hypotension-received a bolus of saline this morning and on midodrine 5 mg 3 times daily. She has worsening renal function with creatinine up to 3.39 BUN 86 7 with metabolic acidosis and hyperkalemia. Lactic acid is 0.6. Urine output has been marginal less than 400 cc yesterday. She denies flank pain, nausea or vomiting. CT and renal ultrasound suggestive of bladder outlet obstruction with bilateral hydronephrosis. Repeat ultrasound today shows mild to moderate right-sided hydronephrosis and no left-sided hydronephrosis after decompression      History of present illness: This is a 66 y.o. female with a significant past medical history of Type 2 diabetes mellitus, coronary artery disease, lumbar radiculopathy, Hyperlipidemia and chronic kidney disease stage III [baseline serum creatinine 1.5 mg/dL], who presented from [facility] for further evaluation of acute kidney injury. She was sent to [facility] after developing nonhealing wound s/p cardiac catheterization via right femoral approach with right groin wound. She had been on Bumex in the ECF and creatinine and BUN have been rising and Bumex was decreased to a lowest dose of 0.5 mg p.o. twice daily. She has had regular blood work performed in the [facility] and ECF contacted nephrologist after finding abnormal laboratory studies. She was noted to have a BUN/creatinine 104/2.28 mg/dL with hemoglobin 8 g/dL. She has mild confusion but is not in obvious respiratory distress.     Scheduled Meds:   sodium polystyrene  15 g Oral Once    midodrine  7.5 mg Oral TID    pantoprazole  40 mg IntraVENous Daily    And    sodium chloride flush  10 mL IntraVENous Daily    sertraline  25 mg Oral Daily    ferrous sulfate  325 mg Oral Daily with breakfast    [Held by provider] aspirin  81 mg Oral Daily    atorvastatin  80 mg Oral Daily    carvedilol  6.25 mg Oral BID WC    insulin glargine  22 Units SubCUTAneous Nightly    insulin lispro  0-12 Units SubCUTAneous TID WC    insulin lispro  0-6 Units SubCUTAneous Nightly    sodium chloride flush  5-40 mL IntraVENous 2 times per day    cefTRIAXone (ROCEPHIN) IV  1,000 mg IntraVENous Q24H     Continuous Infusions:   sodium bicarbonate infusion      sodium chloride      sodium chloride       PRN Meds:.sodium chloride, sodium chloride flush, sodium chloride, acetaminophen, ondansetron **OR** ondansetron, oxyCODONE-acetaminophen    Physical Exam:    VITALS:  BP 82/61   Pulse 84   Temp 97.5 °F (36.4 °C) (Oral)   Resp 17   Ht 5' 3\" (1.6 m) Comment: from old chart  Wt 222 lb 0.1 oz (100.7 kg)   SpO2 99%   BMI 39.33 kg/m²   24HR INTAKE/OUTPUT:      Intake/Output Summary (Last 24 hours) at 11/16/2021 1536  Last data filed at 11/16/2021 0700  Gross per 24 hour   Intake 240 ml   Output 475 ml   Net -235 ml     Constitutional: alert, appears stated age and cooperative    Skin: Skin color, texture, turgor normal. No rashes or lesions    Head: Normocephalic, without obvious abnormality, atraumatic     Cardiovascular/Edema: regular rate and rhythm, S1, S2 normal, no murmur, click, rub or gallop    Respiratory: Lungs: clear to auscultation bilaterally    Abdomen: soft, non-tender; bowel sounds normal; no masses,  no organomegaly    Back: symmetric, no curvature. ROM normal. No CVA tenderness. Extremities: edema 1+; Right groin nondraining ulcer. Neuro:  Grossly normal    CBC:   Recent Labs     11/15/21  0747 11/15/21  1339 11/15/21  2132 11/16/21  0625 11/16/21  1421   WBC 6.7  --   --  6.6 7.6   HGB 8.6*   < > 8.8* 8.6* 8.9*     --   --  153 158    < > = values in this interval not displayed.      BMP:    Recent Labs     11/15/21  0747 11/16/21  0625 11/16/21  1421   * 134* 134*   K 5.2 5.4* 5.0    108* 106   CO2 19* 16* 16*   BUN 86* 87* 86*   CREATININE 3.09* 3.39* 3.41*   GLUCOSE 183* 128* 131*     Lab Results   Component Value Date    NITRU NEGATIVE 11/12/2021    COLORU Yellow 11/12/2021    PHUR 6.0 11/12/2021    WBCUA 50  11/12/2021    RBCUA 20 TO 50 11/12/2021    MUCUS NOT REPORTED 11/12/2021    TRICHOMONAS NOT REPORTED 11/12/2021    YEAST NOT REPORTED 11/12/2021    BACTERIA MODERATE 11/12/2021    CLARITYU Clear 09/07/2016    SPECGRAV 1.012 11/12/2021    LEUKOCYTESUR LARGE 11/12/2021    UROBILINOGEN Normal 11/12/2021    BILIRUBINUR NEGATIVE 11/12/2021    BLOODU Positive 09/07/2016    GLUCOSEU NEGATIVE 11/12/2021    KETUA NEGATIVE 11/12/2021    AMORPHOUS NOT REPORTED 11/12/2021     IMPRESSION/RECOMMENDATIONS:      1. Acute kidney injury - most consistent with Ischemic ATN and  obstructive nephropathy secondary to bilateral hydronephrosis. Renal ultrasound showed moderate to severe right hydronephrosis and mild to moderate left hydronephrosis. Patient is oliguric    Plan:  Start IV bicarbonate drip at 75 cc/h  She will require  cystoscopy with ureteral stent placement. Avoid nephrotoxic agents. Strict input and output documentation. Basic metabolic profile daily. 2. Hypotension-Increase midodrine to 7.5 mg 3 times daily-Rule out sepsis -Check ABG/Lactic acid,Cultures repeat BMP and CBC    3. Subnephrotic range proteinuria [random urine protein/creatinine ratio 1.44] -    4. Acute on chronic anemia - hemoglobin stable at 8.9 gm/dl. 5.Anion gap metabolic acidosis secondary to renal failure-Lactic acid is within normal    6. Hyperkalemia-D50 insulin given and Kayexalate 15 g po  Prognosis is guarded. Patient is going to the OR for stent placement today by urology-Will follow urology recommendations.       MD CATHERINE Sharma  Attending Nephrologist  11/16/2021 3:36 PM

## 2021-11-16 NOTE — PROGRESS NOTES
Physical Therapy        Physical Therapy Cancel Note      DATE: 2021    NAME: Jani Lockett  MRN: [id number]   : 1950      Patient not seen this date for Physical Therapy due to: Pt unavailable with Resp Therapy.           Electronically signed by Abisai Vieira PTA on 2021 at 3:28 PM

## 2021-11-16 NOTE — PROGRESS NOTES
Progress Note    Patient Name:  Dl Rider    :  2021 12:07 PM      SUBJECTIVE       Ms. Darius Eckert  has no chest pain, shortness of breath, palpitations, nausea or vomiting      OBJECTIVE     Vital signs:    BP (!) 90/56   Pulse 87   Temp 97.6 °F (36.4 °C) (Oral)   Resp 16   Ht 5' 3\" (1.6 m) Comment: from old chart  Wt 222 lb 0.1 oz (100.7 kg)   SpO2 99%   BMI 39.33 kg/m²  2 L/min      Admit Weight:  188 lb (85.3 kg)    Last 3 weights: Wt Readings from Last 3 Encounters:   21 222 lb 0.1 oz (100.7 kg)   16 178 lb (80.7 kg)   16 184 lb 12.8 oz (83.8 kg)       BMI: Body mass index is 39.33 kg/m². Input/Output:       Intake/Output Summary (Last 24 hours) at 2021 1207  Last data filed at 2021 0700  Gross per 24 hour   Intake 340 ml   Output 475 ml   Net -135 ml         Exam:     General appearance: awake and alert moves all ext   Lungs: no rhonchi, no wheezes, no rales  Heart: S1 and S2 no murmur  Abdomen: positive bowel sounds, no bruits, no masses  Extremities: warm and dry, no cyanosis, no clubbing        Laboratory Studies:     CBC:   Recent Labs     11/14/21  0631 11/14/21  2127 11/15/21  0747 11/15/21  0747 11/15/21  1339 11/15/21  2132 21  0625   WBC 5.6  --  6.7  --   --   --  6.6   HGB 8.5*   < > 8.6*   < > 8.5* 8.8* 8.6*   HCT 27.1*   < > 26.6*   < > 27.2* 27.9* 27.7*   MCV 97.4  --  97.1  --   --   --  99.5   *  --  151  --   --   --  153    < > = values in this interval not displayed. BMP:   Recent Labs     2131 11/15/21  0747 21  0625   * 134* 134*   K 4.8 5.2 5.4*    106 108*   CO2 18* 19* 16*   BUN 86* 86* 87*   CREATININE 2.73* 3.09* 3.39*     PT/INR: No results for input(s): PROTIME, INR in the last 72 hours. APTT: No results for input(s): APTT in the last 72 hours.   MAG:   Recent Labs     21  1659 11/15/21  0747   MG 1.9 2.2     D Dimer: No results for input(s): DDIMER in the last 72 hours.  Troponin  No results for input(s): TROPONINI in the last 72 hours. Recent Labs     21  1659 11/15/21  0747   TROPONINT NOT REPORTED NOT REPORTED      BNP No results for input(s): BNP in the last 72 hours. No results for input(s): PROBNP in the last 72 hours. Pulse Ox: SpO2  Av %  Min: 95 %  Max: 99 %  Supplemental O2: O2 Flow Rate (L/min): 2 L/min     Current Meds:    sodium chloride  250 mL IntraVENous Once    insulin regular  10 Units IntraVENous Once    dextrose  25 g IntraVENous Once    pantoprazole  40 mg IntraVENous Daily    And    sodium chloride flush  10 mL IntraVENous Daily    sertraline  25 mg Oral Daily    ferrous sulfate  325 mg Oral Daily with breakfast    [Held by provider] aspirin  81 mg Oral Daily    atorvastatin  80 mg Oral Daily    carvedilol  6.25 mg Oral BID WC    insulin glargine  22 Units SubCUTAneous Nightly    insulin lispro  0-12 Units SubCUTAneous TID WC    insulin lispro  0-6 Units SubCUTAneous Nightly    sodium chloride flush  5-40 mL IntraVENous 2 times per day    cefTRIAXone (ROCEPHIN) IV  1,000 mg IntraVENous Q24H     Continuous Infusions:    sodium chloride      sodium chloride      sodium chloride 100 mL/hr at 21 0119            ASSESSMENT     Principal Problem:    JAYLYN (acute kidney injury) ([facility] [address]  Active Problems:    Type 2 diabetes mellitus with kidney complication, with long-term current use of insulin ([facility])    CKD (chronic kidney disease) stage 3, GFR 30-59 ml/min ([facility])    HTN (hypertension)    Iron deficiency anemia    Hydronephrosis determined by ultrasound  Resolved Problems:    * No resolved hospital problems. *      PLAN       Patient went for EGD yesterday that noted gastritis. May need capsule study as outpatient. Still awaiting echocardiogram.  Patient remains off blood thinner, hemoglobin is 8.6. Will need GIs input on when okay to resume. No further ectopy on telemetry.   Remains in controlled atrial fibrillation. Continue to trend and replace electrolytes per sliding scale. Continue supportive care.

## 2021-11-16 NOTE — ANESTHESIA PRE PROCEDURE
Department of Anesthesiology  Preprocedure Note       Name:  Rose Floyd   Age:  66 y.o.  :  1950                                          MRN:  [id number]         Date:  2021      Surgeon: Hao Priest):  Nicole Mohamud MD    Procedure: Procedure(s):  CYSTOSCOPY URETERAL STENT INSERTION    Medications prior to admission:   Prior to Admission medications    Medication Sig Start Date End Date Taking? Authorizing Provider   acetaminophen (TYLENOL) 325 MG tablet Take 650 mg by mouth every 4 hours as needed for Pain (do not exceed 4000 mg daily)    Historical Provider, MD   albuterol (PROVENTIL) (2.5 MG/3ML) 0.083% nebulizer solution Take 2.5 mg by nebulization every 6 hours as needed for Shortness of Breath    Historical Provider, MD   atorvastatin (LIPITOR) 80 MG tablet Take 80 mg by mouth daily    Historical Provider, MD   famotidine (PEPCID) 20 MG tablet Take 20 mg by mouth daily    Historical Provider, MD   ferrous sulfate (IRON 325) 325 (65 Fe) MG tablet Take 325 mg by mouth daily (with breakfast)    Historical Provider, MD   HYDROcodone-acetaminophen (NORCO) 5-325 MG per tablet Take 1 tablet by mouth every 6 hours as needed for Pain. Indications: until 21    Historical Provider, MD   insulin lispro (HUMALOG) 100 UNIT/ML injection vial Inject into the skin 4 times daily (before meals and nightly) Per sliding scale:   150-200 = 2 units  201-250 = 4 units  251-300 = 6 units  301-350 = 8 units  351-400 = 10 units    Historical Provider, MD   insulin glargine (LANTUS SOLOSTAR) 100 UNIT/ML injection pen Inject 22 Units into the skin nightly    Historical Provider, MD   meclizine (ANTIVERT) 12.5 MG tablet Take 12.5 mg by mouth 2 times daily as needed for Dizziness    Historical Provider, MD   warfarin (COUMADIN) 3 MG tablet Take 3 mg by mouth nightly    Historical Provider, MD   carvedilol (COREG) 6.25 MG tablet Take 6.25 mg by mouth 2 times daily (with meals).     Historical Provider, MD   aspirin 81 MG chewable tablet Take 81 mg by mouth daily     Historical Provider, MD   bumetanide (BUMEX) 1 MG tablet Take 1 mg by mouth daily   11/10/21  Historical Provider, MD       Current medications:    No current facility-administered medications for this visit. No current outpatient medications on file.      Facility-Administered Medications Ordered in Other Visits   Medication Dose Route Frequency Provider Last Rate Last Admin    [MAR Hold] sodium bicarbonate 75 mEq in sodium chloride 0.45 % 1,000 mL infusion   IntraVENous Continuous Serena Remy MD        [facility] Hold] sodium polystyrene (KAYEXALATE) 15 GM/60ML suspension 15 g  15 g Oral Once Serena Remy MD        [facility] Hold] midodrine (PROAMATINE) tablet 7.5 mg  7.5 mg Oral TID Shereen Mohs Da Janice Hummer, MD        norepinephrine (LEVOPHED) 16 mg in sodium chloride 0.9 % 250 mL infusion  2-100 mcg/min IntraVENous Continuous Steve Short MD        [facility] Hold] pantoprazole (PROTONIX) injection 40 mg  40 mg IntraVENous Daily Miguel Garcia MD   40 mg at 11/16/21 1216    And    [MAR Hold] sodium chloride flush 0.9 % injection 10 mL  10 mL IntraVENous Daily Miguel Garcia MD   10 mL at 11/16/21 1216    [MAR Hold] sertraline (ZOLOFT) tablet 25 mg  25 mg Oral Daily Miguel Garcia MD   25 mg at 11/14/21 1409    [MAR Hold] 0.9 % sodium chloride infusion   IntraVENous PRN Miguel Garcia MD        [facility] Hold] ferrous sulfate (IRON 325) tablet 325 mg  325 mg Oral Daily with breakfast Miguel Garcia MD   325 mg at 11/14/21 0941    [Held by provider] aspirin chewable tablet 81 mg  81 mg Oral Daily Neeraj Burger MD   81 mg at 11/13/21 1015    [MAR Hold] atorvastatin (LIPITOR) tablet 80 mg  80 mg Oral Daily Miguel Garcia MD   80 mg at 11/14/21 0941    [MAR Hold] carvedilol (COREG) tablet 6.25 mg  6.25 mg Oral BID  Margaret Martin MD   6.25 mg at 11/14/21 1640    [MAR Hold] insulin glargine (LANTUS) injection vial 22 Units  22 Units SubCUTAneous Nightly Vance Velasquez MD   22 Units at 11/15/21 2054    [MAR Hold] insulin lispro (HUMALOG) injection vial 0-12 Units  0-12 Units SubCUTAneous TID WC Vance Velasquez MD   2 Units at 11/14/21 1642    [MAR Hold] insulin lispro (HUMALOG) injection vial 0-6 Units  0-6 Units SubCUTAneous Nightly Vance Velasquez MD   2 Units at 11/15/21 2054    [MAR Hold] sodium chloride flush 0.9 % injection 5-40 mL  5-40 mL IntraVENous 2 times per day Vance Velasquez MD   10 mL at 11/14/21 2043    [MAR Hold] sodium chloride flush 0.9 % injection 5-40 mL  5-40 mL IntraVENous PRN Vance Velasquez MD        [facility] Hold] 0.9 % sodium chloride infusion  25 mL IntraVENous PRN Vance Velasquez MD        [facility] Hold] acetaminophen (TYLENOL) tablet 650 mg  650 mg Oral Q4H PRN Vance Velasquez MD        [facility] Hold] ondansetron (ZOFRAN-ODT) disintegrating tablet 4 mg  4 mg Oral Q8H PRN Vance Velasquez MD        Or   [facility] Hold] ondansetron [facility]) injection 4 mg  4 mg IntraVENous Q6H PRN Vance Velasquez MD        [facility] Hold] oxyCODONE-acetaminophen (PERCOCET) 5-325 MG per tablet 1 tablet  1 tablet Oral Q6H PRN Vance Velasquez MD        [facility] Hold] cefTRIAXone (ROCEPHIN) 1000 mg IVPB in 50 mL D5W minibag  1,000 mg IntraVENous Q24H Vance Velasquez MD   Stopped at 11/15/21 1718       Allergies:  No Known Allergies    Problem List:    Patient Active Problem List   Diagnosis Code    Chronic kidney disease (CKD), stage III (moderate) ([facility] [address] N18.30    Hyperkalemia E87.5    Type 2 diabetes mellitus with kidney complication, with long-term current use of insulin ([facility]) E11.29, Z79.4    Hyperlipidemia E78.5    CKD (chronic kidney disease) stage 3, GFR 30-59 ml/min (HCC) N18.30    Microalbuminuria R80.9    Urinary calculus N20.9    Tethered cord syndrome (HCC) Q06.8    Stenosis of cervical spine M48.02    Lumbar radiculopathy M54.16    Lower back pain M54.50    HTN (hypertension) I10    Esophageal reflux K21.9    Depression with anxiety F41.8    ASCVD (arteriosclerotic cardiovascular disease) I25.10    Closed fracture of thoracic vertebra ([address] S22.009A    Intervertebral cervical disc disorder with myelopathy, cervical region M50.00    Pain in limb M79.609    Anemia D64.9    JAYLYN (acute kidney injury) ([facility] N17.9    Iron deficiency anemia D50.9    Hydronephrosis determined by ultrasound N13.30    Longstanding persistent atrial fibrillation ([facility]) I48.11       Past Medical History:        Diagnosis Date    Anemia, unspecified     CAD (coronary artery disease)     Chronic kidney disease     CKD (chronic kidney disease) stage 3, GFR 30-59 ml/min ([facility])     Closed fracture of dorsal (thoracic) vertebra without mention of spinal cord injury     Coronary atherosclerosis of unspecified type of vessel, native or graft     Depression with anxiety     Esophageal reflux     HTN (hypertension)     Hyperkalemia     Hyperlipidemia     Intervertebral cervical disc disorder with myelopathy, cervical region     Lower back pain     Lumbar radiculopathy     Microalbuminuria     Muscle weakness     Pain in limb     Stenosis of cervical spine     Tethered cord syndrome ([facility])     Type II or unspecified type diabetes mellitus without mention of complication, not stated as uncontrolled     Urinary calculus     UTI (lower urinary tract infection)        Past Surgical History:        Procedure Laterality Date    ANGIOPLASTY      COLON SURGERY      JOINT REPLACEMENT      UPPER GASTROINTESTINAL ENDOSCOPY N/A 11/15/2021    EGD BIOPSY performed by Luis Eduardo Davis MD at [facility] ENDO       Social History:    Social History     Tobacco Use    Smoking status: Never Smoker    Smokeless tobacco: Not on file   Substance Use Topics    Alcohol use: No     Alcohol/week: 0.0 standard drinks                                Counseling given: Not Answered      Vital Signs (Current): There were no vitals filed for this visit.                                            BP Readings from Last 3 Encounters:   11/16/21 (!) 90/53   11/15/21 (!) 81/57   09/07/16 138/62       NPO Status:                                                                                 BMI:   Wt Readings from Last 3 Encounters:   11/16/21 222 lb 0.1 oz (100.7 kg)   09/07/16 178 lb (80.7 kg)   05/02/16 184 lb 12.8 oz (83.8 kg)     There is no height or weight on file to calculate BMI.    CBC:   Lab Results   Component Value Date    WBC 7.6 11/16/2021    RBC 2.85 11/16/2021    RBC 4.14 08/31/2016    HGB 8.9 11/16/2021    HCT 27.9 11/16/2021    MCV 97.7 11/16/2021    RDW 15.9 11/16/2021     11/16/2021       CMP:   Lab Results   Component Value Date     11/16/2021    K 5.0 11/16/2021     11/16/2021    CO2 16 11/16/2021    BUN 86 11/16/2021    CREATININE 3.41 11/16/2021    GFRAA 16 11/16/2021    LABGLOM 13 11/16/2021    GLUCOSE 131 11/16/2021    GLUCOSE 77 08/31/2016    PROT 6.1 11/13/2021    CALCIUM 8.2 11/16/2021    BILITOT 0.16 11/13/2021    ALKPHOS 236 11/13/2021    AST 36 11/13/2021    ALT 43 11/13/2021       POC Tests:   Recent Labs     11/16/21  1129   POCGLU 103       Coags:   Lab Results   Component Value Date    PROTIME 30.6 11/10/2021    INR 3.1 11/10/2021       HCG (If Applicable): No results found for: PREGTESTUR, PREGSERUM, HCG, HCGQUANT     ABGs: No results found for: PHART, PO2ART, NOM7SIO, NVY4BPW, BEART, X6ZDBJZZ     Type & Screen (If Applicable):  No results found for: LABABO, LABRH    Drug/Infectious Status (If Applicable):  No results found for: HIV, HEPCAB    COVID-19 Screening (If Applicable):   Lab Results   Component Value Date    COVID19 Not Detected 11/15/2021           Anesthesia Evaluation  Patient summary reviewed and Nursing notes reviewed no history of anesthetic complications:   Airway: Mallampati: III  TM distance: >3 FB   Neck ROM: limited  Mouth opening: < 3 FB Dental:          Pulmonary:Negative Pulmonary ROS   (+) decreased breath sounds,      (-) shortness

## 2021-11-16 NOTE — PROGRESS NOTES
[address] [facility]    OCCUPATIONAL THERAPY MISSED TREATMENT NOTE   INPATIENT   Date: 21  Patient Name: Carmel Castro       Room: 0457/2668-80  MRN: [id number]   Account #: [de-identified]    : 1950  (66 y.o.)  Gender: female   Referring Practitioner: Dania Gallo MD  Diagnosis: JAYLYN, UTI, anemia             REASON FOR MISSED TREATMENT:  Hold treatment per nursing request   -   Other - Change in medical status; awaiting various testing.          Becca Ramirez EBONIE

## 2021-11-16 NOTE — PROGRESS NOTES
Page out for Dr. Dill Half regarding patients BP and potassium. Dr. Tracey Paulino also made aware of both.

## 2021-11-17 ENCOUNTER — APPOINTMENT (OUTPATIENT)
Dept: GENERAL RADIOLOGY | Age: 71
DRG: 004 | End: 2021-11-17
Payer: COMMERCIAL

## 2021-11-17 ENCOUNTER — APPOINTMENT (OUTPATIENT)
Dept: INTERVENTIONAL RADIOLOGY/VASCULAR | Age: 71
DRG: 004 | End: 2021-11-17
Payer: COMMERCIAL

## 2021-11-17 ENCOUNTER — APPOINTMENT (OUTPATIENT)
Dept: CT IMAGING | Age: 71
DRG: 004 | End: 2021-11-17
Payer: COMMERCIAL

## 2021-11-17 LAB
ABSOLUTE EOS #: 0 K/UL (ref 0–0.4)
ABSOLUTE IMMATURE GRANULOCYTE: ABNORMAL K/UL (ref 0–0.3)
ABSOLUTE LYMPH #: 1 K/UL (ref 1–4.8)
ABSOLUTE MONO #: 0.6 K/UL (ref 0.1–1.3)
ALLEN TEST: ABNORMAL
ALLEN TEST: ABNORMAL
ANION GAP SERPL CALCULATED.3IONS-SCNC: 11 MMOL/L (ref 9–17)
APPEARANCE FLUID: NORMAL
BASOPHILS # BLD: 0 % (ref 0–2)
BASOPHILS ABSOLUTE: 0 K/UL (ref 0–0.2)
BUN BLDV-MCNC: 85 MG/DL (ref 8–23)
BUN/CREAT BLD: ABNORMAL (ref 9–20)
CALCIUM SERPL-MCNC: 7.9 MG/DL (ref 8.6–10.4)
CARBOXYHEMOGLOBIN: 0.8 % (ref 0–5)
CARBOXYHEMOGLOBIN: 1.4 % (ref 0–5)
CHLORIDE BLD-SCNC: 111 MMOL/L (ref 98–107)
CO2: 16 MMOL/L (ref 20–31)
COLOR FLUID: YELLOW
CREAT SERPL-MCNC: 3.53 MG/DL (ref 0.5–0.9)
DIFFERENTIAL TYPE: ABNORMAL
EOSINOPHILS RELATIVE PERCENT: 0 % (ref 0–4)
FIO2: ABNORMAL
FIO2: ABNORMAL
GFR AFRICAN AMERICAN: 15 ML/MIN
GFR NON-AFRICAN AMERICAN: 13 ML/MIN
GFR SERPL CREATININE-BSD FRML MDRD: ABNORMAL ML/MIN/{1.73_M2}
GFR SERPL CREATININE-BSD FRML MDRD: ABNORMAL ML/MIN/{1.73_M2}
GLUCOSE BLD-MCNC: 108 MG/DL (ref 65–105)
GLUCOSE BLD-MCNC: 111 MG/DL (ref 65–105)
GLUCOSE BLD-MCNC: 111 MG/DL (ref 70–99)
GLUCOSE BLD-MCNC: 118 MG/DL (ref 65–105)
GLUCOSE BLD-MCNC: 88 MG/DL (ref 65–105)
HCO3 ARTERIAL: 15.3 MMOL/L (ref 22–26)
HCO3 ARTERIAL: 22.9 MMOL/L (ref 22–26)
HCT VFR BLD CALC: 26.7 % (ref 36–46)
HEMOGLOBIN: 8.3 G/DL (ref 12–16)
IMMATURE GRANULOCYTES: ABNORMAL %
LACTATE DEHYDROGENASE, FLUID: 64 U/L
LYMPHOCYTES # BLD: 12 % (ref 24–44)
MAGNESIUM: 2 MG/DL (ref 1.6–2.6)
MCH RBC QN AUTO: 30.2 PG (ref 26–34)
MCHC RBC AUTO-ENTMCNC: 30.9 G/DL (ref 31–37)
MCV RBC AUTO: 97.8 FL (ref 80–100)
METHEMOGLOBIN: 1.3 % (ref 0–1.9)
METHEMOGLOBIN: ABNORMAL % (ref 0–1.9)
MODE: ABNORMAL
MODE: ABNORMAL
MONOCYTES # BLD: 7 % (ref 1–7)
NEGATIVE BASE EXCESS, ART: 13.5 MMOL/L (ref 0–2)
NEGATIVE BASE EXCESS, ART: 5.1 MMOL/L (ref 0–2)
NOTIFICATION TIME: ABNORMAL
NOTIFICATION TIME: ABNORMAL
NOTIFICATION: ABNORMAL
NOTIFICATION: ABNORMAL
NRBC AUTOMATED: ABNORMAL PER 100 WBC
O2 DEVICE/FLOW/%: ABNORMAL
O2 DEVICE/FLOW/%: ABNORMAL
O2 SAT, ARTERIAL: 87.4 % (ref 95–98)
O2 SAT, ARTERIAL: 96.9 % (ref 95–98)
OXYHEMOGLOBIN: ABNORMAL % (ref 95–98)
OXYHEMOGLOBIN: ABNORMAL % (ref 95–98)
PATIENT TEMP: 37
PATIENT TEMP: 37.1
PCO2 ARTERIAL: 43.9 MMHG (ref 35–45)
PCO2 ARTERIAL: 58.1 MMHG (ref 35–45)
PCO2, ART, TEMP ADJ: ABNORMAL (ref 35–45)
PCO2, ART, TEMP ADJ: ABNORMAL (ref 35–45)
PDW BLD-RTO: 15.3 % (ref 11.5–14.9)
PEEP/CPAP: ABNORMAL
PEEP/CPAP: ABNORMAL
PH ARTERIAL: 7.15 (ref 7.35–7.45)
PH ARTERIAL: 7.2 (ref 7.35–7.45)
PH FLUID: 7.5
PH, ART, TEMP ADJ: ABNORMAL (ref 7.35–7.45)
PH, ART, TEMP ADJ: ABNORMAL (ref 7.35–7.45)
PLATELET # BLD: 141 K/UL (ref 150–450)
PLATELET ESTIMATE: ABNORMAL
PMV BLD AUTO: 8.8 FL (ref 6–12)
PO2 ARTERIAL: 55.8 MMHG (ref 80–100)
PO2 ARTERIAL: 99.8 MMHG (ref 80–100)
PO2, ART, TEMP ADJ: ABNORMAL MMHG (ref 80–100)
PO2, ART, TEMP ADJ: ABNORMAL MMHG (ref 80–100)
POSITIVE BASE EXCESS, ART: ABNORMAL MMOL/L (ref 0–2)
POSITIVE BASE EXCESS, ART: ABNORMAL MMOL/L (ref 0–2)
POTASSIUM SERPL-SCNC: 4.8 MMOL/L (ref 3.7–5.3)
PSV: ABNORMAL
PSV: ABNORMAL
PT. POSITION: ABNORMAL
PT. POSITION: ABNORMAL
RBC # BLD: 2.73 M/UL (ref 4–5.2)
RBC # BLD: ABNORMAL 10*6/UL
RBC FLUID: 491 /MM3
RESPIRATORY RATE: 16
RESPIRATORY RATE: 19
SAMPLE SITE: ABNORMAL
SAMPLE SITE: ABNORMAL
SEG NEUTROPHILS: 81 % (ref 36–66)
SEGMENTED NEUTROPHILS ABSOLUTE COUNT: 7 K/UL (ref 1.3–9.1)
SET RATE: ABNORMAL
SET RATE: ABNORMAL
SODIUM BLD-SCNC: 138 MMOL/L (ref 135–144)
SPECIMEN TYPE: NORMAL
SURGICAL PATHOLOGY REPORT: NORMAL
TEXT FOR RESPIRATORY: ABNORMAL
TEXT FOR RESPIRATORY: ABNORMAL
TOTAL HB: ABNORMAL G/DL (ref 12–16)
TOTAL HB: ABNORMAL G/DL (ref 12–16)
TOTAL PROTEIN, BODY FLUID: 2 G/DL
TOTAL RATE: ABNORMAL
TOTAL RATE: ABNORMAL
VT: ABNORMAL
VT: ABNORMAL
WBC # BLD: 8.6 K/UL (ref 3.5–11)
WBC # BLD: ABNORMAL 10*3/UL
WBC FLUID: 110 /MM3

## 2021-11-17 PROCEDURE — 94761 N-INVAS EAR/PLS OXIMETRY MLT: CPT

## 2021-11-17 PROCEDURE — 02HV33Z INSERTION OF INFUSION DEVICE INTO SUPERIOR VENA CAVA, PERCUTANEOUS APPROACH: ICD-10-PCS | Performed by: FAMILY MEDICINE

## 2021-11-17 PROCEDURE — 2709999900 IR NONTUNNELED VASCULAR CATHETER > 5 YEARS

## 2021-11-17 PROCEDURE — 76937 US GUIDE VASCULAR ACCESS: CPT | Performed by: RADIOLOGY

## 2021-11-17 PROCEDURE — 6370000000 HC RX 637 (ALT 250 FOR IP): Performed by: UROLOGY

## 2021-11-17 PROCEDURE — 83986 ASSAY PH BODY FLUID NOS: CPT

## 2021-11-17 PROCEDURE — 88112 CYTOPATH CELL ENHANCE TECH: CPT

## 2021-11-17 PROCEDURE — 84157 ASSAY OF PROTEIN OTHER: CPT

## 2021-11-17 PROCEDURE — C9113 INJ PANTOPRAZOLE SODIUM, VIA: HCPCS | Performed by: FAMILY MEDICINE

## 2021-11-17 PROCEDURE — 2580000003 HC RX 258: Performed by: INTERNAL MEDICINE

## 2021-11-17 PROCEDURE — 6360000002 HC RX W HCPCS: Performed by: INTERNAL MEDICINE

## 2021-11-17 PROCEDURE — 6360000002 HC RX W HCPCS: Performed by: FAMILY MEDICINE

## 2021-11-17 PROCEDURE — 80048 BASIC METABOLIC PNL TOTAL CA: CPT

## 2021-11-17 PROCEDURE — 82947 ASSAY GLUCOSE BLOOD QUANT: CPT

## 2021-11-17 PROCEDURE — 51798 US URINE CAPACITY MEASURE: CPT

## 2021-11-17 PROCEDURE — 2580000003 HC RX 258: Performed by: UROLOGY

## 2021-11-17 PROCEDURE — 83735 ASSAY OF MAGNESIUM: CPT

## 2021-11-17 PROCEDURE — 2709999900 IR GUIDED THORACENTESIS PLEURAL

## 2021-11-17 PROCEDURE — 2000000000 HC ICU R&B

## 2021-11-17 PROCEDURE — 36600 WITHDRAWAL OF ARTERIAL BLOOD: CPT

## 2021-11-17 PROCEDURE — 6360000002 HC RX W HCPCS: Performed by: RADIOLOGY

## 2021-11-17 PROCEDURE — 77001 FLUOROGUIDE FOR VEIN DEVICE: CPT | Performed by: RADIOLOGY

## 2021-11-17 PROCEDURE — 83615 LACTATE (LD) (LDH) ENZYME: CPT

## 2021-11-17 PROCEDURE — 85025 COMPLETE CBC W/AUTO DIFF WBC: CPT

## 2021-11-17 PROCEDURE — 70450 CT HEAD/BRAIN W/O DYE: CPT

## 2021-11-17 PROCEDURE — 87075 CULTR BACTERIA EXCEPT BLOOD: CPT

## 2021-11-17 PROCEDURE — 87070 CULTURE OTHR SPECIMN AEROBIC: CPT

## 2021-11-17 PROCEDURE — 82805 BLOOD GASES W/O2 SATURATION: CPT

## 2021-11-17 PROCEDURE — 36556 INSERT NON-TUNNEL CV CATH: CPT | Performed by: RADIOLOGY

## 2021-11-17 PROCEDURE — 88305 TISSUE EXAM BY PATHOLOGIST: CPT

## 2021-11-17 PROCEDURE — 2580000003 HC RX 258: Performed by: FAMILY MEDICINE

## 2021-11-17 PROCEDURE — 6360000002 HC RX W HCPCS: Performed by: UROLOGY

## 2021-11-17 PROCEDURE — C9113 INJ PANTOPRAZOLE SODIUM, VIA: HCPCS | Performed by: UROLOGY

## 2021-11-17 PROCEDURE — 5A1D70Z PERFORMANCE OF URINARY FILTRATION, INTERMITTENT, LESS THAN 6 HOURS PER DAY: ICD-10-PCS | Performed by: FAMILY MEDICINE

## 2021-11-17 PROCEDURE — 2700000000 HC OXYGEN THERAPY PER DAY

## 2021-11-17 PROCEDURE — 87205 SMEAR GRAM STAIN: CPT

## 2021-11-17 PROCEDURE — 90935 HEMODIALYSIS ONE EVALUATION: CPT

## 2021-11-17 PROCEDURE — 2500000003 HC RX 250 WO HCPCS: Performed by: INTERNAL MEDICINE

## 2021-11-17 PROCEDURE — 32555 ASPIRATE PLEURA W/ IMAGING: CPT | Performed by: RADIOLOGY

## 2021-11-17 PROCEDURE — 36415 COLL VENOUS BLD VENIPUNCTURE: CPT

## 2021-11-17 PROCEDURE — 71045 X-RAY EXAM CHEST 1 VIEW: CPT

## 2021-11-17 RX ORDER — PANTOPRAZOLE SODIUM 40 MG/10ML
40 INJECTION, POWDER, LYOPHILIZED, FOR SOLUTION INTRAVENOUS 2 TIMES DAILY
Status: DISCONTINUED | OUTPATIENT
Start: 2021-11-17 | End: 2021-12-14 | Stop reason: HOSPADM

## 2021-11-17 RX ORDER — HEPARIN SODIUM 5000 [USP'U]/ML
INJECTION, SOLUTION INTRAVENOUS; SUBCUTANEOUS
Status: COMPLETED | OUTPATIENT
Start: 2021-11-17 | End: 2021-11-17

## 2021-11-17 RX ORDER — POTASSIUM CHLORIDE 20 MEQ/1
40 TABLET, EXTENDED RELEASE ORAL PRN
Status: DISCONTINUED | OUTPATIENT
Start: 2021-11-17 | End: 2021-12-01

## 2021-11-17 RX ORDER — MAGNESIUM SULFATE 1 G/100ML
1000 INJECTION INTRAVENOUS PRN
Status: DISCONTINUED | OUTPATIENT
Start: 2021-11-17 | End: 2021-12-14 | Stop reason: HOSPADM

## 2021-11-17 RX ORDER — POTASSIUM CHLORIDE 7.45 MG/ML
10 INJECTION INTRAVENOUS PRN
Status: DISCONTINUED | OUTPATIENT
Start: 2021-11-17 | End: 2021-12-01

## 2021-11-17 RX ORDER — DEXTROSE MONOHYDRATE 50 MG/ML
100 INJECTION, SOLUTION INTRAVENOUS PRN
Status: DISCONTINUED | OUTPATIENT
Start: 2021-11-17 | End: 2021-12-14 | Stop reason: HOSPADM

## 2021-11-17 RX ORDER — SODIUM CHLORIDE 0.9 % (FLUSH) 0.9 %
10 SYRINGE (ML) INJECTION 2 TIMES DAILY
Status: DISCONTINUED | OUTPATIENT
Start: 2021-11-17 | End: 2021-12-14 | Stop reason: HOSPADM

## 2021-11-17 RX ORDER — DEXTROSE MONOHYDRATE 25 G/50ML
12.5 INJECTION, SOLUTION INTRAVENOUS PRN
Status: DISCONTINUED | OUTPATIENT
Start: 2021-11-17 | End: 2021-12-14 | Stop reason: HOSPADM

## 2021-11-17 RX ORDER — ACETAMINOPHEN 325 MG/1
650 TABLET ORAL EVERY 4 HOURS PRN
Status: DISCONTINUED | OUTPATIENT
Start: 2021-11-17 | End: 2021-11-24 | Stop reason: SDUPTHER

## 2021-11-17 RX ORDER — NICOTINE POLACRILEX 4 MG
15 LOZENGE BUCCAL PRN
Status: DISCONTINUED | OUTPATIENT
Start: 2021-11-17 | End: 2021-12-14 | Stop reason: HOSPADM

## 2021-11-17 RX ADMIN — SODIUM CHLORIDE, PRESERVATIVE FREE 10 ML: 5 INJECTION INTRAVENOUS at 09:09

## 2021-11-17 RX ADMIN — FERROUS SULFATE TAB 325 MG (65 MG ELEMENTAL FE) 325 MG: 325 (65 FE) TAB at 09:22

## 2021-11-17 RX ADMIN — SODIUM CHLORIDE, PRESERVATIVE FREE 10 ML: 5 INJECTION INTRAVENOUS at 22:38

## 2021-11-17 RX ADMIN — HEPARIN SODIUM 1.2 ML: 5000 INJECTION INTRAVENOUS; SUBCUTANEOUS at 14:20

## 2021-11-17 RX ADMIN — Medication 1.3 ML: at 19:39

## 2021-11-17 RX ADMIN — HEPARIN SODIUM 1.3 ML: 5000 INJECTION INTRAVENOUS; SUBCUTANEOUS at 14:21

## 2021-11-17 RX ADMIN — CEFEPIME HYDROCHLORIDE 1000 MG: 1 INJECTION, POWDER, FOR SOLUTION INTRAMUSCULAR; INTRAVENOUS at 20:34

## 2021-11-17 RX ADMIN — CARVEDILOL 6.25 MG: 6.25 TABLET, FILM COATED ORAL at 09:09

## 2021-11-17 RX ADMIN — MIDODRINE HYDROCHLORIDE 7.5 MG: 5 TABLET ORAL at 15:57

## 2021-11-17 RX ADMIN — SERTRALINE HYDROCHLORIDE 25 MG: 50 TABLET ORAL at 09:04

## 2021-11-17 RX ADMIN — MIDODRINE HYDROCHLORIDE 7.5 MG: 5 TABLET ORAL at 09:04

## 2021-11-17 RX ADMIN — PANTOPRAZOLE SODIUM 40 MG: 40 INJECTION, POWDER, FOR SOLUTION INTRAVENOUS at 09:04

## 2021-11-17 RX ADMIN — Medication 1.2 ML: at 19:39

## 2021-11-17 RX ADMIN — PANTOPRAZOLE SODIUM 40 MG: 40 INJECTION, POWDER, FOR SOLUTION INTRAVENOUS at 20:34

## 2021-11-17 RX ADMIN — ATORVASTATIN CALCIUM 80 MG: 80 TABLET, FILM COATED ORAL at 09:04

## 2021-11-17 RX ADMIN — SODIUM CHLORIDE, PRESERVATIVE FREE 10 ML: 5 INJECTION INTRAVENOUS at 20:35

## 2021-11-17 ASSESSMENT — PAIN SCALES - GENERAL
PAINLEVEL_OUTOF10: 0

## 2021-11-17 ASSESSMENT — ENCOUNTER SYMPTOMS
EYE REDNESS: 0
COUGH: 0
NAUSEA: 0
COLOR CHANGE: 0
ABDOMINAL PAIN: 0
SHORTNESS OF BREATH: 0
CHEST TIGHTNESS: 0
TROUBLE SWALLOWING: 0
EYE ITCHING: 0
VOMITING: 0
BLOOD IN STOOL: 0

## 2021-11-17 NOTE — BRIEF OP NOTE
Brief Postoperative Note Non Tunneled HD Catheter and Thoracentesis    Dev Espitia  YOB: 1950  [id number]    Pre-operative Diagnosis: Chronic Renal Failure                                            RT Pleural effusion     Post-operative Diagnosis: Same    Procedure: Nontunneled Dialysis Catheter and right thoracentesis    Anesthesia: 2%Lidocaine Local Injection     Surgeons/Assistants: Bina Cowan MD    Estimated Blood Loss: Minimal    Complications: none    13 Fr x 15 cm non tunneled HD Catheter placed Site:  Right Internal Jugular Vein. Dressing applied. May use HD cath for dialysis. Ultrasound guided right thoracentesis performed. 1000 ml clear yellow fluid obtained. Dressing applied. Chest x-ray ordered.   Specimens: were obtained      Electronically signed by Bina Cowan MD on 11/17/2021 at 2:54 PM

## 2021-11-17 NOTE — PLAN OF CARE
Problem: DAILY CARE  Goal: Daily care needs are met  Outcome: Met This Shift   Patient and staff currently meeting all patient's daily care needs. Patient encouraged to participate and complete all ADLs per self. Will continue to monitor for opportunities for patient to meet all ADLs per self. Problem: PAIN  Goal: Patient's pain/discomfort is manageable  Outcome: Met This Shift  Pt medicated with pain medication prn. Assessed all pain characteristics including level, type, location, frequency, and onset. Non-pharmacologic interventions offered to pt as well. Pt states pain is tolerable at this time. Will continue to monitor. Problem: SKIN INTEGRITY  Goal: Skin integrity is maintained or improved  Outcome: Met This Shift  Skin assessment complete. Turned and repositioned every two hours. Area kept free from moisture. Proper nourishment and fluids encouraged, as appropriate. Will continue to monitor for additional needs and changes in skin breakdown. Problem: Falls - Risk of:  Goal: Will remain free from falls  Description: Will remain free from falls  Outcome: Met This Shift   Pt assessed as a fall risk this shift. Remains free from falls and accidental injury at this time. Fall precautions in place, including falling star sign and fall risk band on pt. Floor free from obstacles, and bed is locked and in lowest position. Adequate lighting provided. Pt encouraged to call before getting OOB for any need. Bed alarm activated. Will continue to monitor needs during hourly rounding, and reinforce education on use of call light.

## 2021-11-17 NOTE — PROGRESS NOTES
Department of Internal Medicine  Nephrology Juan Dyson MD  Progress Note    Reason for consultation: Management of acute kidney injury. Consulting physician: Lui Jaquez MD.    Interval history: Patient was transferred to the ICU yesterday after placement of ureteral stents by urology. She required Levophed pressors due to hypotension. She required intermittent BiPAP overnight and currently on nasal cannula oxygen. She is anuric less than 20 cc of urine overnight. Renal function isWorsening with BUN/creatinine 85 and 3.5 and serum bicarbonate of 16. Chest x-ray yesterday showed moderate right pleural effusion and associated airspace disease. Pneumonia cannot be excluded. She is currently off Levophed. ABG this morning shows pH of 7.15, PCO2 43, PO2 55, bicarbonate of 15 and O2 sats of 87. History of present illness: This is a 66 y.o. female with a significant past medical history of Type 2 diabetes mellitus, coronary artery disease, lumbar radiculopathy, Hyperlipidemia and chronic kidney disease stage III [baseline serum creatinine 1.5 mg/dL], who presented from [facility] for further evaluation of acute kidney injury. She was sent to [facility] after developing nonhealing wound s/p cardiac catheterization via right femoral approach with right groin wound. She had been on Bumex in the ECF and creatinine and BUN have been rising and Bumex was decreased to a lowest dose of 0.5 mg p.o. twice daily. She has had regular blood work performed in the [facility] and ECF contacted nephrologist after finding abnormal laboratory studies. She was noted to have a BUN/creatinine 104/2.28 mg/dL with hemoglobin 8 g/dL. She has mild confusion but is not in obvious respiratory distress.     Scheduled Meds:   sodium polystyrene  15 g Oral Once    midodrine  7.5 mg Oral TID    cefepime  1,000 mg IntraVENous Q24H    pantoprazole  40 mg IntraVENous Daily    And    sodium chloride flush  10 mL 86* 86* 85*   CREATININE 3.41* 3.60* 3.53*   GLUCOSE 131* 103* 111*     Lab Results   Component Value Date    NITRU NEGATIVE 11/12/2021    COLORU Yellow 11/12/2021    PHUR 6.0 11/12/2021    WBCUA 50  11/12/2021    RBCUA 20 TO 50 11/12/2021    MUCUS NOT REPORTED 11/12/2021    TRICHOMONAS NOT REPORTED 11/12/2021    YEAST NOT REPORTED 11/12/2021    BACTERIA MODERATE 11/12/2021    CLARITYU Clear 09/07/2016    SPECGRAV 1.012 11/12/2021    LEUKOCYTESUR LARGE 11/12/2021    UROBILINOGEN Normal 11/12/2021    BILIRUBINUR NEGATIVE 11/12/2021    BLOODU Positive 09/07/2016    GLUCOSEU NEGATIVE 11/12/2021    KETUA NEGATIVE 11/12/2021    AMORPHOUS NOT REPORTED 11/12/2021     IMPRESSION/RECOMMENDATIONS:      1. Acute kidney injury - most consistent with Ischemic ATN and  obstructive nephropathy secondary to bilateral hydronephrosis. Renal ultrasound showed moderate to severe right hydronephrosis and mild to moderate left hydronephrosis. Patient is anuric Renal function is worsening    Plan:  Decrease IV bicarbonate drip to 50 cc/hour. Avoid nephrotoxic agents. Strict input and output documentation. Basic metabolic profile daily. 2. Hypotension-continue  midodrine  7.5 mg 3 times daily-Follow blood and urine cultures. Continue IV cefepime-Currently off Levophed    3. Bilateral hydronephrosis status post He returnsstent placement yesterday    4. Acute on chronic anemia - hemoglobin stable at 8.3  gm/dl. 5.Severe acute respiratory acidosis and Anion gap metabolic acidosis secondary to renal failure-Lactic acid is normal.  The metabolic acidosis persists-Will require acute dialysis for correction    6. Hyperkalemia-Stable. 7.Right pleural effusion rule out parapneumonic effusion-Plan for IR guided  thoracentesis today    8. Cardiomyopathy with systolic dysfunction EF of 30-35 %        Patient remains anuric with  risk for uremic complications. In addition patient has persistent metabolic acidosis,  Will consult general surgery For placement of a Jose Carlos catheter for acute dialysis today. Prognosis is guarded.           MD CATHERINE Pedroza  Attending Nephrologist  11/17/2021 11:17 AM

## 2021-11-17 NOTE — CARE COORDINATION
LETTY spoke with Rosalinda Fernandez in admissions with [facility] and she reported that they are still following this patient and will start the pre-cert when she is closer to ready.

## 2021-11-17 NOTE — CONSULTS
[address] [facility]                 [address] [facility], [address]                                  CONSULTATION    PATIENT NAME: Meme Agustin                   :        1950  MED REC NO:   [id number]                              ROOM:       2004  ACCOUNT NO:   [de-identified]                           ADMIT DATE: 11/10/2021  PROVIDER:     Sukhjinder Godfrey    CONSULT DATE:  2021    REASON FOR CONSULTATION:  Shock, shortness of breath, metabolic and  respiratory acidosis. HISTORY OF PRESENT ILLNESS:  The patient is a 77-year-old female. I  received a phone call because the patient was stat transferred from  [facility] to ICU. I was notified by the ICU nurse. She told me that  the patient was confused. The patient's arterial blood gas revealed pH  of 6.99, pCO2 66.4, pO2 of 94. The patient was placed on BiPAP. Repeat  ABG, pH of 7.00, pCO2 65, pO2 of 92. The patient was coming around  reportedly. It was found out that the patient does indeed have severe  acute kidney injury and currently on non anion gap metabolic acidosis. Bicarb has been given by patient's nephrologist.  The patient was placed  on BiPAP. Reportedly had decent response to the BiPAP with a repeat ABG  pH of 7.15, pCO2 of 43, pO2 of 55.8. With the metabolic acidosis, the  patient's bicarb was increased by nephrology. Currently, she is not  using any accessory muscle use, no abdominal breathing. She appears to  be calm. O2 saturation on 2 liters saturating 94%. Chest x-ray  revealed large pleural effusion. The patient is confused. She is not  able to give much of the history although answering yes and no  questions. When I tried to sit her up to listen to her lungs, she  screamed. She said she has horrible back pain. I was able to round  with the bedside nurse present. PAST MEDICAL HISTORY:  Notable for multiple medical conditions. It  includes acute kidney injury. Systolic heart failure. I found a 2D  echo in [facility]. [facility] 10/17/2020, ejection fraction was 35 to 40%. The patient with history of chronic kidney disease with acute kidney  injury, history of esophageal reflux, hypertension, hyperkalemia,  hyperlipidemia, history of severe lower back pain, history of type 2  diabetes mellitus, history of urinary calculus. PAST SURGICAL HISTORY:  The patient had multiple surgeries. The patient  had an EGD with biopsy on 11/15. The patient had a cystoscopy with  right stent placement by Dr. Nabil Manjarrez earlier yesterday. ALLERGIES:  None. MEDICATIONS:  She is not on any pulmonary medications per se. SOCIAL HISTORY:  According to computer, there is no tobacco history. REVIEW OF SYSTEMS:  Not obtainable at this time. PHYSICAL EXAMINATION:  GENERAL:  The patient is an ill-appearing 57-year-old female resting  quietly. VITAL SIGNS:  Systolic blood pressure currently 127. The patient was in  the 80s overnight requiring Levophed which had been weaned off after  fluid boluses and bicarb replacement therapy. Respirations 17-21, heart  rate  O2 saturation 96% on 2 liters. Her BMI is 40.6. HEENT:  No supraclavicular lymph node enlargement. LUNGS:  Decreased breath sounds at the bases on the right, one-third  from the base to apex on the right, coarse on the left. CARDIOVASCULAR:  Regular rhythm. Heart tones distant. No obvious  murmur. ABDOMEN:  Soft. EXTREMITIES:  Edema present. LABORATORY RESULTS:  BUN and creatinine now 85/3. 53. Serum bicarbonate  16. Anion gap 11. White count 8.6, hemoglobin 8.3, hematocrit 26.7,  platelet count is 471. Arterial blood gas as above. IMPRESSION:  1. Acute respiratory failure with hypoxemia. 2.  Severe metabolic and respiratory acidosis now metabolic acidosis. 3.  Shock, resolving. 4.  Acute kidney injury. 5.  Morbid obesity. 6.  Recent cystoscopy with stent placement. PLAN:  1.   Continue the patient

## 2021-11-17 NOTE — FLOWSHEET NOTE
Patient's , Bharath Watson, called the [facility] office;   stated that he is concerned about his wife and her health. The  talked about the vaccination not being effective and he is not going to get the shots. He talked about his past job experiences. He shared the history of his wife's illness. Writer had to end conversation, Mr. Feng Díaz would liked to be called again tomorrow. SC will follow as needed. 11/17/21 1613   Encounter Summary   Services provided to: Family   Referral/Consult From: Family   Support System Spouse   Continue Visiting   (11/17/21)   Complexity of Encounter Moderate   Length of Encounter 30 minutes   Grief and Life Adjustment   Type Adjustment to illness   Assessment Anxious; Approachable; Helplessness   Intervention Active listening; Explored feelings, thoughts, concerns; Sustaining presence/ Ministry of presence; Discussed illness/injury and it's impact; Nurtured hope   Outcome Engaged in conversation;  Shared life review; Expressed feelings/needs/concerns; Receptive

## 2021-11-17 NOTE — FLOWSHEET NOTE
Patient's , Chas Reyes, called the [facility] office;  stated he \"is a wreck and needs to talk\";  explained he has not seen patient since February because of his stance on refusing to wear masks;  talked about his political views on covid, being vaccinated, the government; patient also talked about a show he broadcasted two years ago expressing his political views;  stated he \"has no one to talk to\" because he is an only child; writer tried to redirect  to discuss patient and his concerns about her current medical circumstances;  continued to talk about himself, and his need to talk; listening presence and support;     11/17/21 1540   Encounter Summary   Services provided to: Family   Referral/Consult From: Family   Support System Spouse   Continue Visiting   (11/17/21)   Complexity of Encounter Moderate   Length of Encounter 30 minutes   Spiritual Assessment Completed Yes   Grief and Life Adjustment   Type Adjustment to illness   Assessment Approachable; Anxious; Helplessness   Intervention Active listening; Explored feelings, thoughts, concerns; Sustaining presence/ Ministry of presence; Discussed belief system/Mu-ism practices/lucio; Discussed illness/injury and it's impact   Outcome Expressed gratitude; Engaged in conversation;  Shared life review; Expressed feelings/needs/concerns; Receptive

## 2021-11-17 NOTE — PROGRESS NOTES
Progress Note    11/17/2021   1:25 PM    Name:  Sabi Ayala  MRN:    [id number]     Acct:     [de-identified]   Room:  2004/2004-01  IP Day: 7     Admit Date: 11/10/2021  4:23 PM  PCP: Vandana Maloney DO    Subjective:     C/C:   Chief Complaint   Patient presents with    Abnormal Lab     low hemoglobin and high CR       Interval History: Status: not changed. Patient is status post right right ureteral stent. Pain is well controlled. Patient shortness of breath patient is on 2 L of oxygen via nasal cannula. Relatively low blood pressure readings noted heart rate stable. Recent labs reviewed sodium 138 creatinine increasing at 3.53, hemoglobin 8.3    ROS:   all 10 systems reviewed and are negative except as noted    Review of Systems   Constitutional: Positive for fatigue. Negative for chills. HENT: Negative for drooling, mouth sores, sneezing and trouble swallowing. Eyes: Negative for redness and itching. Respiratory: Negative for cough, chest tightness and shortness of breath. Cardiovascular: Negative for chest pain, palpitations and leg swelling. Gastrointestinal: Negative for abdominal pain, blood in stool, nausea and vomiting. Endocrine: Negative for heat intolerance and polyphagia. Genitourinary: Negative for difficulty urinating, flank pain and pelvic pain. Musculoskeletal: Negative for arthralgias, joint swelling and neck stiffness. Skin: Negative for color change and pallor. Allergic/Immunologic: Negative for food allergies. Neurological: Negative for dizziness, seizures and headaches. Hematological: Does not bruise/bleed easily. Psychiatric/Behavioral: Negative for agitation, behavioral problems and suicidal ideas. The patient is not hyperactive. Medications:      Allergies: No Known Allergies    Current Meds: pantoprazole (PROTONIX) injection 40 mg, BID   And  sodium chloride flush 0.9 % injection 10 mL, BID  potassium chloride (KLOR-CON M) extended release tablet 40 mEq, PRN   Or  potassium bicarb-citric acid (EFFER-K) effervescent tablet 40 mEq, PRN   Or  potassium chloride 10 mEq/100 mL IVPB (Peripheral Line), PRN  magnesium sulfate 1000 mg in dextrose 5% 100 mL IVPB, PRN  glucose (GLUTOSE) 40 % oral gel 15 g, PRN  dextrose 50 % IV solution, PRN  glucagon (rDNA) injection 1 mg, PRN  dextrose 5 % solution, PRN  sodium polystyrene (KAYEXALATE) 15 GM/60ML suspension 15 g, Once  midodrine (PROAMATINE) tablet 7.5 mg, TID  norepinephrine (LEVOPHED) 16 mg in sodium chloride 0.9 % 250 mL infusion, Continuous  sodium bicarbonate 150 mEq in dextrose 5 % and 0.9 % NaCl 1,000 mL infusion, Continuous  cefepime (MAXIPIME) 1000 mg IVPB minibag, Q24H  sertraline (ZOLOFT) tablet 25 mg, Daily  0.9 % sodium chloride infusion, PRN  ferrous sulfate (IRON 325) tablet 325 mg, Daily with breakfast  [Held by provider] aspirin chewable tablet 81 mg, Daily  atorvastatin (LIPITOR) tablet 80 mg, Daily  carvedilol (COREG) tablet 6.25 mg, BID WC  insulin glargine (LANTUS) injection vial 22 Units, Nightly  insulin lispro (HUMALOG) injection vial 0-12 Units, TID WC  insulin lispro (HUMALOG) injection vial 0-6 Units, Nightly  sodium chloride flush 0.9 % injection 5-40 mL, 2 times per day  sodium chloride flush 0.9 % injection 5-40 mL, PRN  0.9 % sodium chloride infusion, PRN  acetaminophen (TYLENOL) tablet 650 mg, Q4H PRN  ondansetron (ZOFRAN-ODT) disintegrating tablet 4 mg, Q8H PRN   Or  ondansetron (ZOFRAN) injection 4 mg, Q6H PRN  oxyCODONE-acetaminophen (PERCOCET) 5-325 MG per tablet 1 tablet, Q6H PRN        Data:     Code Status:  Full Code    Family History   Problem Relation Age of Onset    High Blood Pressure Other        Social History     Socioeconomic History    Marital status:      Spouse name: Not on file    Number of children: Not on file    Years of education: Not on file    Highest education level: Not on file   Occupational History    Not on file   Tobacco Use    Smoking status: Never Smoker    Smokeless tobacco: Not on file   Substance and Sexual Activity    Alcohol use: No     Alcohol/week: 0.0 standard drinks    Drug use: No    Sexual activity: Not on file   Other Topics Concern    Not on file   Social History Narrative    Not on file     Social Determinants of Health     Financial Resource Strain:     Difficulty of Paying Living Expenses: Not on file   Food Insecurity:     Worried About Running Out of Food in the Last Year: Not on file    Katya of Food in the Last Year: Not on file   Transportation Needs:     Lack of Transportation (Medical): Not on file    Lack of Transportation (Non-Medical): Not on file   Physical Activity:     Days of Exercise per Week: Not on file    Minutes of Exercise per Session: Not on file   Stress:     Feeling of Stress : Not on file   Social Connections:     Frequency of Communication with Friends and Family: Not on file    Frequency of Social Gatherings with Friends and Family: Not on file    Attends Hoahaoism Services: Not on file    Active Member of [address] [place] [address] CDP or Organizations: Not on file    Attends Club or Organization Meetings: Not on file    Marital Status: Not on file   Intimate Partner Violence:     Fear of Current or Ex-Partner: Not on file    Emotionally Abused: Not on file    Physically Abused: Not on file    Sexually Abused: Not on file   Housing Stability:     Unable to Pay for Housing in the Last Year: Not on file    Number of [place] in the Last Year: Not on file    Unstable Housing in the Last Year: Not on file       I/O (24Hr): Intake/Output Summary (Last 24 hours) at 11/17/2021 1325  Last data filed at 11/17/2021 1012  Gross per 24 hour   Intake 2372.21 ml   Output 80 ml   Net 2292.21 ml     Radiology:  CT ABDOMEN PELVIS WO CONTRAST Additional Contrast? None    Result Date: 11/12/2021  Severe right and mild left hydroureteronephrosis. No obstructing ureteral stone is seen.   Wall thickening of the urinary bladder without significant urinary bladder wall distension. Outlet obstruction is a consideration. Right-sided ostomy. There is bowel wall thickening proximal to the ostomy, may suggest infectious or inflammatory enteritis. No drainable fluid collections or free air. No evidence of bowel obstruction. Moderate right pleural effusion. Bibasilar infiltrates or atelectasis. Clinical correlation to exclude pneumonia. US RENAL COMPLETE    Result Date: 11/15/2021  1. Mild to moderate right-sided hydronephrosis. 2. Limited renal ultrasound. No left-sided hydronephrosis. XR CHEST PORTABLE    Result Date: 11/16/2021  Moderate right pleural effusion and associated airspace disease. Pneumonia cannot be excluded. US RETROPERITONEAL COMPLETE    Result Date: 11/12/2021  1. Moderate to severe right and mild-to-moderate left hydronephrosis. 2. Exam limited due to patient's body habitus.        Labs:  Recent Results (from the past 24 hour(s))   Lactic Acid    Collection Time: 11/16/21  2:21 PM   Result Value Ref Range    Lactic Acid 0.6 0.5 - 2.2 mmol/L   CBC    Collection Time: 11/16/21  2:21 PM   Result Value Ref Range    WBC 7.6 3.5 - 11.0 k/uL    RBC 2.85 (L) 4.0 - 5.2 m/uL    Hemoglobin 8.9 (L) 12.0 - 16.0 g/dL    Hematocrit 27.9 (L) 36 - 46 %    MCV 97.7 80 - 100 fL    MCH 31.1 26 - 34 pg    MCHC 31.8 31 - 37 g/dL    RDW 15.9 (H) 11.5 - 14.9 %    Platelets 991 586 - 145 k/uL    MPV 9.3 6.0 - 12.0 fL    NRBC Automated NOT REPORTED per 100 WBC   BASIC METABOLIC PANEL    Collection Time: 11/16/21  2:21 PM   Result Value Ref Range    Glucose 131 (H) 70 - 99 mg/dL    BUN 86 (H) 8 - 23 mg/dL    CREATININE 3.41 (H) 0.50 - 0.90 mg/dL    Bun/Cre Ratio NOT REPORTED 9 - 20    Calcium 8.2 (L) 8.6 - 10.4 mg/dL    Sodium 134 (L) 135 - 144 mmol/L    Potassium 5.0 3.7 - 5.3 mmol/L    Chloride 106 98 - 107 mmol/L    CO2 16 (L) 20 - 31 mmol/L    Anion Gap 12 9 - 17 mmol/L    GFR Non-African American 13 (L) >60 mL/min    GFR  16 (L) >60 mL/min    GFR Comment          GFR Staging NOT REPORTED    HEPATITIS B SURFACE ANTIBODY    Collection Time: 11/16/21  2:21 PM   Result Value Ref Range    Hep B S Ab <3.50 <10 mIU/mL   Hep B Surf Ag    Collection Time: 11/16/21  2:21 PM   Result Value Ref Range    Hepatitis B Surface Ag NONREACTIVE NONREACTIVE   HEPATITIS B CORE ANTIBODY, TOTAL    Collection Time: 11/16/21  2:21 PM   Result Value Ref Range    Hep B Core Total Ab NONREACTIVE NONREACTIVE   POC Glucose Fingerstick    Collection Time: 11/16/21  4:14 PM   Result Value Ref Range    POC Glucose 106 (H) 65 - 105 mg/dL   Arterial Blood Gases    Collection Time: 11/16/21  6:10 PM   Result Value Ref Range    pH, Arterial 6.995 (LL) 7.350 - 7.450    pCO2, Arterial 66.4 (HH) 35.0 - 45.0 mmHg    pO2, Arterial 94.0 80.0 - 100.0 mmHg    HCO3, Arterial 16.2 (L) 22.0 - 26.0 mmol/L    Positive Base Excess, Art NOT REPORTED 0.0 - 2.0 mmol/L    Negative Base Excess, Art 15.2 (H) 0.0 - 2.0 mmol/L    O2 Sat, Arterial 94.6 (L) 95 - 98 %    Total Hb NOT REPORTED 12.0 - 16.0 g/dl    Oxyhemoglobin NOT REPORTED 95.0 - 98.0 %    Carboxyhemoglobin 1.3 0 - 5 %    Methemoglobin 0.8 0.0 - 1.9 %    Pt Temp 37.0     pH, Art, Temp Adj NOT REPORTED 7.350 - 7.450    pCO2, Art, Temp Adj NOT REPORTED 35.0 - 45.0    pO2, Art, Temp Adj NOT REPORTED 80.0 - 100.0 mmHg    O2 Device/Flow/% Cannula     Respiratory Rate 20     Rolf Test PASS     Sample Site Right Radial Artery     Pt.  Position SEMI-FOWLERS     Mode NOT REPORTED     Set Rate NOT REPORTED     Total Rate NOT REPORTED     VT NOT REPORTED     FIO2 NOT REPORTED     Peep/Cpap NOT REPORTED     PSV NOT REPORTED     Text for Respiratory 3LPM CANNULA RESULTS TO DR. Gunner Fernandez     NOTIFICATION NOT REPORTED     NOTIFICATION TIME NOT REPORTED    Arterial Blood Gases    Collection Time: 11/16/21  8:08 PM   Result Value Ref Range    pH, Arterial 7.002 (LL) 7.350 - 7.450    pCO2, Arterial 65.7 (HH) 35.0 - 45.0 mmHg    pO2, Arterial 92.8 80.0 - 100.0 mmHg    HCO3, Arterial 16.3 (L) 22.0 - 26.0 mmol/L    Positive Base Excess, Art NOT REPORTED 0.0 - 2.0 mmol/L    Negative Base Excess, Art 15.0 (H) 0.0 - 2.0 mmol/L    O2 Sat, Arterial 95.7 95 - 98 %    Total Hb NOT REPORTED 12.0 - 16.0 g/dl    Oxyhemoglobin NOT REPORTED 95.0 - 98.0 %    Carboxyhemoglobin 2.5 0 - 5 %    Methemoglobin NOT REPORTED 0.0 - 1.9 %    Pt Temp 37.1     pH, Art, Temp Adj NOT REPORTED 7.350 - 7.450    pCO2, Art, Temp Adj NOT REPORTED 35.0 - 45.0    pO2, Art, Temp Adj NOT REPORTED 80.0 - 100.0 mmHg    O2 Device/Flow/% Cannula     Respiratory Rate 16     Rolf Test PASS     Sample Site Right Radial Artery     Pt. Position SEMI-FOWLERS     Mode NOT REPORTED     Set Rate NOT REPORTED     Total Rate NOT REPORTED     VT NOT REPORTED     FIO2 NOT REPORTED     Peep/Cpap NOT REPORTED     PSV NOT REPORTED     Text for Respiratory RESULT TO RN     NOTIFICATION NOT REPORTED     NOTIFICATION TIME NOT REPORTED    Culture, Blood 1    Collection Time: 11/16/21  8:23 PM    Specimen: Blood   Result Value Ref Range    Specimen Description . BLOOD MIDLINE NO VOL GIVEN     Special Requests NOT REPORTED     Culture NO GROWTH 8 HOURS    Comprehensive Metabolic Panel w/ Reflex to MG    Collection Time: 11/16/21  8:27 PM   Result Value Ref Range    Glucose 103 (H) 70 - 99 mg/dL    BUN 86 (H) 8 - 23 mg/dL    CREATININE 3.60 (H) 0.50 - 0.90 mg/dL    Bun/Cre Ratio NOT REPORTED 9 - 20    Calcium 8.1 (L) 8.6 - 10.4 mg/dL    Sodium 133 (L) 135 - 144 mmol/L    Potassium 5.2 3.7 - 5.3 mmol/L    Chloride 105 98 - 107 mmol/L    CO2 15 (L) 20 - 31 mmol/L    Anion Gap 13 9 - 17 mmol/L    Alkaline Phosphatase 213 (H) 35 - 104 U/L    ALT 31 5 - 33 U/L    AST 17 <32 U/L    Total Bilirubin 0.21 (L) 0.3 - 1.2 mg/dL    Total Protein 6.2 (L) 6.4 - 8.3 g/dL    Albumin 3.1 (L) 3.5 - 5.2 g/dL    Albumin/Globulin Ratio NOT REPORTED 1.0 - 2.5    GFR Non- 12 (L) >60 mL/min    GFR  15 (L) >60 mL/min    GFR Comment          GFR Staging NOT REPORTED    CBC with DIFF    Collection Time: 11/16/21  8:27 PM   Result Value Ref Range    WBC 9.8 3.5 - 11.0 k/uL    RBC 2.83 (L) 4.0 - 5.2 m/uL    Hemoglobin 8.9 (L) 12.0 - 16.0 g/dL    Hematocrit 27.8 (L) 36 - 46 %    MCV 98.3 80 - 100 fL    MCH 31.4 26 - 34 pg    MCHC 31.9 31 - 37 g/dL    RDW 16.2 (H) 11.5 - 14.9 %    Platelets 040 378 - 974 k/uL    MPV 9.0 6.0 - 12.0 fL    NRBC Automated NOT REPORTED per 100 WBC    Differential Type NOT REPORTED     Immature Granulocytes NOT REPORTED 0 %    Absolute Immature Granulocyte NOT REPORTED 0.00 - 0.30 k/uL    WBC Morphology NOT REPORTED     RBC Morphology NOT REPORTED     Platelet Estimate NOT REPORTED     Seg Neutrophils 82 (H) 36 - 66 %    Lymphocytes 12 (L) 24 - 44 %    Monocytes 6 1 - 7 %    Eosinophils % 0 0 - 4 %    Basophils 0 0 - 2 %    Segs Absolute 8.00 1.3 - 9.1 k/uL    Absolute Lymph # 1.10 1.0 - 4.8 k/uL    Absolute Mono # 0.60 0.1 - 1.3 k/uL    Absolute Eos # 0.00 0.0 - 0.4 k/uL    Basophils Absolute 0.00 0.0 - 0.2 k/uL   Lactic Acid    Collection Time: 11/16/21  8:27 PM   Result Value Ref Range    Lactic Acid 0.6 0.5 - 2.2 mmol/L   APTT    Collection Time: 11/16/21  8:27 PM   Result Value Ref Range    PTT 30.6 24.0 - 36.0 sec   Protime-INR    Collection Time: 11/16/21  8:27 PM   Result Value Ref Range    Protime 16.2 (H) 11.8 - 14.6 sec    INR 1.3    POC Glucose Fingerstick    Collection Time: 11/16/21  8:32 PM   Result Value Ref Range    POC Glucose 93 65 - 105 mg/dL   Culture, Blood 1    Collection Time: 11/16/21  8:35 PM    Specimen: Blood   Result Value Ref Range    Specimen Description . BLOOD R AC NO VOL GIVEN     Special Requests NOT REPORTED     Culture NO GROWTH 8 HOURS    POC Glucose Fingerstick    Collection Time: 11/17/21 12:17 AM   Result Value Ref Range    POC Glucose 111 (H) 65 - 105 mg/dL   Arterial Blood Gases    Collection Time: 11/17/21  4:45 AM   Result Value Ref Range    pH, Arterial 7.151 (LL) 7.350 - 7.450    pCO2, Arterial 43.9 35.0 - 45.0 mmHg    pO2, Arterial 55.8 (LL) 80.0 - 100.0 mmHg    HCO3, Arterial 15.3 (L) 22.0 - 26.0 mmol/L    Positive Base Excess, Art NOT REPORTED 0.0 - 2.0 mmol/L    Negative Base Excess, Art 13.5 (H) 0.0 - 2.0 mmol/L    O2 Sat, Arterial 87.4 (LL) 95 - 98 %    Total Hb NOT REPORTED 12.0 - 16.0 g/dl    Oxyhemoglobin NOT REPORTED 95.0 - 98.0 %    Carboxyhemoglobin 0.8 0 - 5 %    Methemoglobin 1.3 0.0 - 1.9 %    Pt Temp 37.1     pH, Art, Temp Adj NOT REPORTED 7.350 - 7.450    pCO2, Art, Temp Adj NOT REPORTED 35.0 - 45.0    pO2, Art, Temp Adj NOT REPORTED 80.0 - 100.0 mmHg    O2 Device/Flow/% BIPAP     Respiratory Rate 19     Rolf Test PASS     Sample Site Right Radial Artery     Pt.  Position SEMI-FOWLERS     Mode BIPAP     Set Rate NOT REPORTED     Total Rate NOT REPORTED     VT NOT REPORTED     FIO2 NOT REPORTED     Peep/Cpap NOT REPORTED     PSV NOT REPORTED     Text for Respiratory RESULT TO RN     NOTIFICATION NOT REPORTED     NOTIFICATION TIME NOT REPORTED    CBC with DIFF    Collection Time: 11/17/21  6:07 AM   Result Value Ref Range    WBC 8.6 3.5 - 11.0 k/uL    RBC 2.73 (L) 4.0 - 5.2 m/uL    Hemoglobin 8.3 (L) 12.0 - 16.0 g/dL    Hematocrit 26.7 (L) 36 - 46 %    MCV 97.8 80 - 100 fL    MCH 30.2 26 - 34 pg    MCHC 30.9 (L) 31 - 37 g/dL    RDW 15.3 (H) 11.5 - 14.9 %    Platelets 970 (L) 764 - 450 k/uL    MPV 8.8 6.0 - 12.0 fL    NRBC Automated NOT REPORTED per 100 WBC    Differential Type NOT REPORTED     Immature Granulocytes NOT REPORTED 0 %    Absolute Immature Granulocyte NOT REPORTED 0.00 - 0.30 k/uL    WBC Morphology NOT REPORTED     RBC Morphology NOT REPORTED     Platelet Estimate NOT REPORTED     Seg Neutrophils 81 (H) 36 - 66 %    Lymphocytes 12 (L) 24 - 44 %    Monocytes 7 1 - 7 %    Eosinophils % 0 0 - 4 %    Basophils 0 0 - 2 %    Segs Absolute 7.00 1.3 - 9.1 k/uL    Absolute Lymph # 1.00 1.0 - 4.8 k/uL    Absolute Mono # 0.60 0.1 - 1.3 k/uL    Absolute Eos # 0.00 0.0 - 0.4 k/uL    Basophils Absolute 0.00 0.0 - 0.2 k/uL   Basic Metabolic Prof    Collection Time: 21  6:07 AM   Result Value Ref Range    Glucose 111 (H) 70 - 99 mg/dL    BUN 85 (H) 8 - 23 mg/dL    CREATININE 3.53 (H) 0.50 - 0.90 mg/dL    Bun/Cre Ratio NOT REPORTED 9 - 20    Calcium 7.9 (L) 8.6 - 10.4 mg/dL    Sodium 138 135 - 144 mmol/L    Potassium 4.8 3.7 - 5.3 mmol/L    Chloride 111 (H) 98 - 107 mmol/L    CO2 16 (L) 20 - 31 mmol/L    Anion Gap 11 9 - 17 mmol/L    GFR Non-African American 13 (L) >60 mL/min    GFR African American 15 (L) >60 mL/min    GFR Comment          GFR Staging NOT REPORTED    Magnesium    Collection Time: 21  6:07 AM   Result Value Ref Range    Magnesium 2.0 1.6 - 2.6 mg/dL   POC Glucose Fingerstick    Collection Time: 21 11:39 AM   Result Value Ref Range    POC Glucose 118 (H) 65 - 105 mg/dL       Physical Examination:        Vitals:  BP (!) 107/49   Pulse 83   Temp 98 °F (36.7 °C) (Oral)   Resp 19   Ht 5' 3\" (1.6 m) Comment: from old chart  Wt 227 lb 15.3 oz (103.4 kg)   SpO2 98%   BMI 40.38 kg/m²   Temp (24hrs), Av.8 °F (36.6 °C), Min:97.4 °F (36.3 °C), Max:98.4 °F (36.9 °C)    Recent Labs     21  1614 21  2032 21  0017 21  1139   POCGLU 106* 93 111* 118*         Physical Exam  Vitals reviewed. HENT:      Head: Normocephalic. Right Ear: External ear normal.      Left Ear: External ear normal.      Nose: Nose normal.      Mouth/Throat:      Mouth: Mucous membranes are moist.      Pharynx: Oropharynx is clear. Eyes:      Conjunctiva/sclera: Conjunctivae normal.   Cardiovascular:      Rate and Rhythm: Normal rate and regular rhythm. Pulses: Normal pulses. Heart sounds: Normal heart sounds. Pulmonary:      Effort: Pulmonary effort is normal.      Breath sounds: Examination of the right-lower field reveals decreased breath sounds. Decreased breath sounds present.    Abdominal:      General: Bowel sounds are normal.      Palpations: Abdomen is soft. Musculoskeletal:         General: No deformity. Cervical back: Normal range of motion and neck supple. Right lower leg: No edema. Left lower leg: No edema. Skin:     General: Skin is warm. Capillary Refill: Capillary refill takes less than 2 seconds. Coloration: Skin is not jaundiced. Findings: No lesion or rash. Neurological:      General: No focal deficit present. Mental Status: She is alert. Mental status is at baseline. Psychiatric:         Mood and Affect: Mood normal.         Behavior: Behavior normal.         Assessment:        Primary Problem  JAYLYN (acute kidney injury) ([facility]     Principal Problem:    [facility]  Active Problems:    Type 2 diabetes mellitus with kidney complication, with long-term current use of insulin ([facility])    CKD (chronic kidney disease) stage 3, GFR 30-59 ml/min ([facility])    HTN (hypertension)    ASCVD (arteriosclerotic cardiovascular disease)    Iron deficiency anemia    Hydronephrosis determined by ultrasound    Longstanding persistent atrial fibrillation ([facility] [facility]  Resolved Problems:    * No resolved hospital problems.  *      Past Medical History:   Diagnosis Date    Anemia, unspecified     CAD (coronary artery disease)     Chronic kidney disease     CKD (chronic kidney disease) stage 3, GFR 30-59 ml/min ([facility])     Closed fracture of dorsal (thoracic) vertebra without mention of spinal cord injury     Coronary atherosclerosis of unspecified type of vessel, native or graft     Depression with anxiety     Esophageal reflux     HTN (hypertension)     Hyperkalemia     Hyperlipidemia     Intervertebral cervical disc disorder with myelopathy, cervical region     Lower back pain     Lumbar radiculopathy     Microalbuminuria     Muscle weakness     Pain in limb     Stenosis of cervical spine     Tethered cord syndrome (HCC)     Type II or unspecified type diabetes mellitus without mention of complication, not stated as uncontrolled     Urinary calculus     UTI (lower urinary tract infection)         Plan:        1. Bicarb drip at 75 ml/h per nephrology  2. IV cefepime  3.  blood culture pending  4.  urine culture pending  5.  thoracocentesis per IR today  6. Jose Carlos catheter  Today per IR hemodialysis  7. 2D report pending  1. PPI Protonix 40 mg p.o. daily  2. DVT Prophylaxis pharmacological DVT prophylaxis due to low hemoglobin. 3. CBC, CMP  4. EPCs  5. PT/OT to evaluate and treat  6. Replace electrolytes as per sliding scale  7. Home medications reviewed and appropriate medications continued  8.  Reviewed labs and imaging studies from last 24 hours and results explained to patient       Electronically signed by Holly Montiel MD

## 2021-11-17 NOTE — PROGRESS NOTES
Writer spoke with Dr. Carl Garcia regarding labs and no urine output.  Dr. Carl Garcia states to keep bicarb running at 75 mL/hr and will re-assess in the AM.

## 2021-11-17 NOTE — PROGRESS NOTES
Writer spoke with Dr. Anna Carroll regarding consult for pulmonology. Orders received for new consult to pulmonology.

## 2021-11-17 NOTE — PROGRESS NOTES
Writer spoke with Dr. Laura Silveira regarding morning ABGs. Dr. Laura Silveira states ok to take off bipap and put on pt normal 2L O2 via nasal cannula.

## 2021-11-17 NOTE — PROGRESS NOTES
Pt arrives to room 2004 via bed from PACU. Pt placed on telemetry and SpO2. VSS. Pt a&o x3. Pt on 3L O2. Assessment completed. Pt oriented to room.

## 2021-11-17 NOTE — PROGRESS NOTES
RN spoke to Dr. Mirian Oliva about patients low urine output. Orders received for renal ultrasound tomorrow. Cont to monitor patients output over night. Dialysis in room now dialyzing patient.

## 2021-11-17 NOTE — FLOWSHEET NOTE
Medical update provided by Michael Goldstein; no family present     11/17/21 1233   Encounter Summary   Services provided to: Patient   Referral/Consult From: Rounding   Complexity of Encounter Low   Length of Encounter 15 minutes   Spiritual/Roman [employer]   Type Spiritual support   Assessment Sleeping   Intervention Prayer   Outcome Did not respond

## 2021-11-17 NOTE — SEDATION DOCUMENTATION
Ultrasound in use. Area prepped and draped. Numbed and accessed. Temp dialysis cath placed per Dr. Izaguirre Palm. Sutures and dressing placed. Flushed with heparin as ordered.

## 2021-11-17 NOTE — PROGRESS NOTES
Writer spoke with Dr. Dunia Chan regarding ABG results. Pt placed on bipap. Will continue to monitor.

## 2021-11-17 NOTE — PROGRESS NOTES
HEMODIALYSIS PRE-TREATMENT NOTE    Patient Identifiers prior to treatment: Name, , MRN    Isolation Required: No                      Isolation Type: N/A       (please document if patient is being managed as a PUI/COVID-19 patient)        Hepatitis status:                           Date Drawn                             Result  Hepatitis B Surface Antigen 21     Negative                     Hepatitis B Surface Antibody 21 Negative        Hepatitis B Core Antibody 21 Negative          How was Hepatitis Status verified: Epic     Was a copy of the labs you documented provided to facility for the patient's chart:     Hemodialysis orders verified: Yes    Access Within normal limits ( I.e. s/s of infection,...): Non Tunneled CVC to right neck newly placed this date. WNL. Aspirates and flushes well.    Pre-Assessment completed: Yes  Pre-dialysis report received [facility]  Time: 6851

## 2021-11-17 NOTE — PROGRESS NOTES
RN spoke to patients sister Kita Shelley and updated her on patients care thus far. All questions and concerns answered at this time.

## 2021-11-17 NOTE — PLAN OF CARE
Problem: SAFETY  Goal: Free from accidental physical injury  Outcome: Ongoing  Goal: Free from intentional harm  Outcome: Ongoing     Problem: DAILY CARE  Goal: Daily care needs are met  Outcome: Ongoing     Problem: PAIN  Goal: Patient's pain/discomfort is manageable  Outcome: Ongoing     Problem: SKIN INTEGRITY  Goal: Skin integrity is maintained or improved  Outcome: Ongoing     Problem: KNOWLEDGE DEFICIT  Goal: Patient/S.O. demonstrates understanding of disease process, treatment plan, medications, and discharge instructions. Outcome: Ongoing     Problem: DISCHARGE BARRIERS  Goal: Patient's continuum of care needs are met  Outcome: Ongoing     Problem: Falls - Risk of:  Goal: Will remain free from falls  Outcome: Ongoing  Note: Pt remains free from falls this shift. Bed in low position with wheels locked and siderails x2. Bed alarm activated. Will continue to monitor. Goal: Absence of physical injury  Outcome: Ongoing     Problem: Skin Integrity:  Goal: Will show no infection signs and symptoms  Outcome: Ongoing  Goal: Absence of new skin breakdown  Outcome: Ongoing  Note: Skin assessment as documented. No new breakdown noted this shift. Pt repositioned q2h. Will continue to monitor.       Problem: Musculor/Skeletal Functional Status  Goal: Highest potential functional level  Outcome: Ongoing  Goal: Absence of falls  Outcome: Ongoing     Problem: Musculor/Skeletal Functional Status  Goal: Highest potential functional level  Outcome: Ongoing  Goal: Absence of falls  Outcome: Ongoing     Problem: Nutrition  Goal: Optimal nutrition therapy  Outcome: Ongoing     Problem: Pain:  Goal: Pain level will decrease  Outcome: Ongoing  Goal: Control of acute pain  Outcome: Ongoing  Goal: Control of chronic pain  Outcome: Ongoing     Problem: Gas Exchange - Impaired:  Goal: Levels of oxygenation will improve  Outcome: Ongoing     Problem: Fluid Volume - Imbalance:  Goal: Absence of imbalanced fluid volume signs and symptoms  Outcome: Ongoing

## 2021-11-17 NOTE — OP NOTE
[address]                 [address] [facility] [facility]                                OPERATIVE REPORT    PATIENT NAME: Aga Melgoza                   :        1950  MED REC NO:   [id number]                              ROOM:       [facility]  ACCOUNT NO:   [de-identified]                           ADMIT DATE: 11/10/2021  PROVIDER:     Toby Wolf    DATE OF PROCEDURE:  2021    PREOPERATIVE DIAGNOSIS:  Right hydronephrosis. POSTOPERATIVE DIAGNOSIS:  Right hydronephrosis. PROCEDURE PERFORMED:  Cystoscopy and right ureteral stent placement. SURGEON:  Toby Wolf MD    ANESTHESIA:  MAC.    COMPLICATIONS:  NONE.    ESTIMATED BLOOD LOSS:  None. DRAINS:  6 x 26 stent and 16-[place] Kaur catheter. HISTORY OF PRESENT ILLNESS:  The patient is a 68-year-old female who was  seen over the weekend by urology. She had a CT scan done, which  demonstrated bilateral hydronephrosis, right worse than left. A Kaur  catheter was placed. The left hydronephrosis resolved. Her creatinine  continued to worsen and she was found to have persistent right hydro. She has been having issues with hypotension as well and has been on  antibiotics for presumed infection. She is here for stent placement. PROCEDURE IN DETAIL:  The patient was brought back to the operating room  and laid in the operating table in the supine position. Once MAC  anesthesia was obtained, she was placed in the dorsal lithotomy position  and prepped and draped in the usual sterile fashion. A time-out was  performed. She was properly identified. Antibiotics were administered. The cystoscope was inserted through the urethra into the bladder without  any difficulty. Immediately, the cloudy urine was seen. The right  ureteral orifice was identified and appeared more patent than one would  typically see.   I was able to advance the wire up the right ureteral  orifice into the collecting system. The position of wire was confirmed  with fluoroscopy. A 6 x 26 stent was then advanced over the wire in the  urinary collecting system. Good proximal curl was seen in renal pelvis  and a good distal curl was seen in the bladder. No strings were left on  the stent. Hydronephrotic urine drained from the stent. A Kaur  catheter was then replaced and the scope was removed. The procedure was  completed. She tolerated it relatively well. She will be admitted to  the ICU. She is currently on pressor support and would need definitive  cysto with retrograde and possible ureteroscopy as an outpatient.         Mallory Riley    D: 11/16/2021 23:12:31       T: 11/16/2021 23:15:05     [email or web address]  Job#: [id number]     Doc#: [id number]    CC:

## 2021-11-17 NOTE — PROGRESS NOTES
RN spoke to Dr. Gina Cody in regards to patients low urine output and most recent labs. Pt. Will need acute dialysis. Orders received to consult general surgery. Pt. Will need a quintan placed.  If general surgery is unable to do, IR can be consulted to place

## 2021-11-17 NOTE — PROGRESS NOTES
RN spoke to patients  and sister and updated them on patients care today.          All questions and concerns answered at this time

## 2021-11-17 NOTE — SEDATION DOCUMENTATION
Pt placed on left side for thoracentesis. Ultrasound in use. Pt prepped and draped. Numbed and accessed. 1 liter Clear light yellow fluid obtained. Access removed and dressing placed. Pt tolerated well.

## 2021-11-17 NOTE — PROGRESS NOTES
Progress Note    Patient Name:  Saleem Cheatham    :  2021 8:10 AM      SUBJECTIVE       Ms. Kayla Lugo   Is on BiPAP support. Does not respond appropriately to questions. OBJECTIVE     Vital signs:    BP (!) 107/46   Pulse 99   Temp 97.4 °F (36.3 °C)   Resp 17   Ht 5' 3\" (1.6 m) Comment: from old chart  Wt 228 lb 13.4 oz (103.8 kg)   SpO2 100%   BMI 40.54 kg/m²  3 L/min      Admit Weight:  188 lb (85.3 kg)    Last 3 weights: Wt Readings from Last 3 Encounters:   21 228 lb 13.4 oz (103.8 kg)   16 178 lb (80.7 kg)   16 184 lb 12.8 oz (83.8 kg)       BMI: Body mass index is 40.54 kg/m². Input/Output:       Intake/Output Summary (Last 24 hours) at 2021 0810  Last data filed at 2021 0513  Gross per 24 hour   Intake 2122.21 ml   Output 80 ml   Net 2042.21 ml         Exam:     General appearance:   Does not respond to questions   Lungs:  Diminished throughoutno rhonchi, no wheezes, no rales  Heart: S1 and S2 no murmur  Abdomen: positive bowel sounds, no bruits, no masses  Extremities: warm and dry, no cyanosis, no clubbing        Laboratory Studies:     CBC:   Recent Labs     21  1421 21  0607   WBC 7.6 9.8 8.6   HGB 8.9* 8.9* 8.3*   HCT 27.9* 27.8* 26.7*   MCV 97.7 98.3 97.8    163 141*     BMP:   Recent Labs     21  1421 21  0607   * 133* 138   K 5.0 5.2 4.8    105 111*   CO2 16* 15* 16*   BUN 86* 86* 85*   CREATININE 3.41* 3.60* 3.53*     PT/INR:   Recent Labs     21   PROTIME 16.2*   INR 1.3     APTT:   Recent Labs     21   APTT 30.6     MAG:   Recent Labs     11/14/21  1659 11/15/21  0747   MG 1.9 2.2     D Dimer: No results for input(s): DDIMER in the last 72 hours. Troponin  No results for input(s): TROPONINI in the last 72 hours.         Recent Labs     11/14/21  1659 11/15/21  0747   TROPONINT NOT REPORTED NOT REPORTED      BNP No results for input(s): BNP in the last 72 hours. No results for input(s): PROBNP in the last 72 hours. Pulse Ox: SpO2  Av.3 %  Min: 90 %  Max: 100 %  Supplemental O2: O2 Flow Rate (L/min): 3 L/min     Current Meds:    sodium polystyrene  15 g Oral Once    midodrine  7.5 mg Oral TID    cefepime  1,000 mg IntraVENous Q24H    pantoprazole  40 mg IntraVENous Daily    And    sodium chloride flush  10 mL IntraVENous Daily    sertraline  25 mg Oral Daily    ferrous sulfate  325 mg Oral Daily with breakfast    [Held by provider] aspirin  81 mg Oral Daily    atorvastatin  80 mg Oral Daily    carvedilol  6.25 mg Oral BID WC    insulin glargine  22 Units SubCUTAneous Nightly    insulin lispro  0-12 Units SubCUTAneous TID WC    insulin lispro  0-6 Units SubCUTAneous Nightly    sodium chloride flush  5-40 mL IntraVENous 2 times per day     Continuous Infusions:    norepinephrine 2 mcg/min (21 0630)    IV infusion builder 75 mL/hr at 21    sodium chloride      sodium chloride              ASSESSMENT     Principal Problem:    JAYLYN (acute kidney injury) ([facility] [address]  Active Problems:    Type 2 diabetes mellitus with kidney complication, with long-term current use of insulin ([facility])    CKD (chronic kidney disease) stage 3, GFR 30-59 ml/min ([facility])    HTN (hypertension)    ASCVD (arteriosclerotic cardiovascular disease)    Iron deficiency anemia    Hydronephrosis determined by ultrasound    Longstanding persistent atrial fibrillation ([facility] [address]  Resolved Problems:    * No resolved hospital problems. *      PLAN       Patient underwent cystoscopy with ureteral stent insertion on 2021. Patient was then transferred to ICU. She remains on BiPAP support at this time, pulmonary consult is pending. She continues to have sinus rhythm with ectopy on telemetry. Continue beta-blocker, continue midodrine for hypotension. Creatinine is elevated today at 3.53, recommend Nephrology consult.   Hemoglobin 8.3, management per primary care. Continue telemetry, continue to trend and replace electrolytes per sliding scale.   Continue supportive care

## 2021-11-18 ENCOUNTER — APPOINTMENT (OUTPATIENT)
Dept: ULTRASOUND IMAGING | Age: 71
DRG: 004 | End: 2021-11-18
Payer: COMMERCIAL

## 2021-11-18 ENCOUNTER — APPOINTMENT (OUTPATIENT)
Dept: MRI IMAGING | Age: 71
DRG: 004 | End: 2021-11-18
Payer: COMMERCIAL

## 2021-11-18 ENCOUNTER — APPOINTMENT (OUTPATIENT)
Dept: GENERAL RADIOLOGY | Age: 71
DRG: 004 | End: 2021-11-18
Payer: COMMERCIAL

## 2021-11-18 LAB
ABO/RH: NORMAL
ABSOLUTE EOS #: 0 K/UL (ref 0–0.4)
ABSOLUTE IMMATURE GRANULOCYTE: ABNORMAL K/UL (ref 0–0.3)
ABSOLUTE LYMPH #: 0.9 K/UL (ref 1–4.8)
ABSOLUTE MONO #: 0.6 K/UL (ref 0.1–1.3)
ALBUMIN SERPL-MCNC: 2.6 G/DL (ref 3.5–5.2)
ALBUMIN/GLOBULIN RATIO: ABNORMAL (ref 1–2.5)
ALP BLD-CCNC: 175 U/L (ref 35–104)
ALT SERPL-CCNC: 31 U/L (ref 5–33)
AMMONIA: 35 UMOL/L (ref 11–51)
ANION GAP SERPL CALCULATED.3IONS-SCNC: 9 MMOL/L (ref 9–17)
ANTIBODY SCREEN: NEGATIVE
ARM BAND NUMBER: NORMAL
AST SERPL-CCNC: 21 U/L
BASO FLUID: ABNORMAL %
BASOPHILS # BLD: 0 % (ref 0–2)
BASOPHILS ABSOLUTE: 0 K/UL (ref 0–0.2)
BILIRUB SERPL-MCNC: 0.27 MG/DL (ref 0.3–1.2)
BLD PROD TYP BPU: NORMAL
BLOOD BANK COMMENT: NORMAL
BUN BLDV-MCNC: 61 MG/DL (ref 8–23)
BUN/CREAT BLD: ABNORMAL (ref 9–20)
CALCIUM SERPL-MCNC: 7.6 MG/DL (ref 8.6–10.4)
CHLORIDE BLD-SCNC: 109 MMOL/L (ref 98–107)
CO2: 23 MMOL/L (ref 20–31)
CREAT SERPL-MCNC: 3.24 MG/DL (ref 0.5–0.9)
CROSSMATCH RESULT: NORMAL
DIFFERENTIAL TYPE: ABNORMAL
DISPENSE STATUS BLOOD BANK: NORMAL
EOSINOPHIL FLUID: ABNORMAL %
EOSINOPHILS RELATIVE PERCENT: 0 % (ref 0–4)
EXPIRATION DATE: NORMAL
FLUID DIFF COMMENT: ABNORMAL
GFR AFRICAN AMERICAN: 17 ML/MIN
GFR NON-AFRICAN AMERICAN: 14 ML/MIN
GFR SERPL CREATININE-BSD FRML MDRD: ABNORMAL ML/MIN/{1.73_M2}
GFR SERPL CREATININE-BSD FRML MDRD: ABNORMAL ML/MIN/{1.73_M2}
GLUCOSE BLD-MCNC: 75 MG/DL (ref 65–105)
GLUCOSE BLD-MCNC: 85 MG/DL (ref 65–105)
GLUCOSE BLD-MCNC: 99 MG/DL (ref 70–99)
HCT VFR BLD CALC: 26 % (ref 36–46)
HEMOGLOBIN: 8.4 G/DL (ref 12–16)
IMMATURE GRANULOCYTES: ABNORMAL %
LYMPHOCYTES # BLD: 12 % (ref 24–44)
LYMPHOCYTES, BODY FLUID: 39 %
MAGNESIUM: 1.9 MG/DL (ref 1.6–2.6)
MCH RBC QN AUTO: 31 PG (ref 26–34)
MCHC RBC AUTO-ENTMCNC: 32.2 G/DL (ref 31–37)
MCV RBC AUTO: 96.2 FL (ref 80–100)
MONOCYTE, FLUID: ABNORMAL %
MONOCYTES # BLD: 8 % (ref 1–7)
NEUTROPHIL, FLUID: 20 %
NRBC AUTOMATED: ABNORMAL PER 100 WBC
OTHER CELLS FLUID: 41 %
PDW BLD-RTO: 15.6 % (ref 11.5–14.9)
PLATELET # BLD: 108 K/UL (ref 150–450)
PLATELET ESTIMATE: ABNORMAL
PMV BLD AUTO: 9.1 FL (ref 6–12)
POTASSIUM SERPL-SCNC: 4.1 MMOL/L (ref 3.7–5.3)
RBC # BLD: 2.7 M/UL (ref 4–5.2)
RBC # BLD: ABNORMAL 10*6/UL
SEG NEUTROPHILS: 80 % (ref 36–66)
SEGMENTED NEUTROPHILS ABSOLUTE COUNT: 5.9 K/UL (ref 1.3–9.1)
SODIUM BLD-SCNC: 141 MMOL/L (ref 135–144)
TOTAL CK: 38 U/L (ref 26–192)
TOTAL PROTEIN: 5.4 G/DL (ref 6.4–8.3)
TRANSFUSION STATUS: NORMAL
UNIT DIVISION: 0
UNIT NUMBER: NORMAL
URIC ACID: 6.6 MG/DL (ref 2.4–5.7)
WBC # BLD: 7.4 K/UL (ref 3.5–11)
WBC # BLD: ABNORMAL 10*3/UL

## 2021-11-18 PROCEDURE — 36415 COLL VENOUS BLD VENIPUNCTURE: CPT

## 2021-11-18 PROCEDURE — 70551 MRI BRAIN STEM W/O DYE: CPT

## 2021-11-18 PROCEDURE — 2580000003 HC RX 258: Performed by: FAMILY MEDICINE

## 2021-11-18 PROCEDURE — 2500000003 HC RX 250 WO HCPCS: Performed by: INTERNAL MEDICINE

## 2021-11-18 PROCEDURE — 84550 ASSAY OF BLOOD/URIC ACID: CPT

## 2021-11-18 PROCEDURE — 6370000000 HC RX 637 (ALT 250 FOR IP): Performed by: UROLOGY

## 2021-11-18 PROCEDURE — 82140 ASSAY OF AMMONIA: CPT

## 2021-11-18 PROCEDURE — 94761 N-INVAS EAR/PLS OXIMETRY MLT: CPT

## 2021-11-18 PROCEDURE — 83735 ASSAY OF MAGNESIUM: CPT

## 2021-11-18 PROCEDURE — 82550 ASSAY OF CK (CPK): CPT

## 2021-11-18 PROCEDURE — 2700000000 HC OXYGEN THERAPY PER DAY

## 2021-11-18 PROCEDURE — 90935 HEMODIALYSIS ONE EVALUATION: CPT

## 2021-11-18 PROCEDURE — 99223 1ST HOSP IP/OBS HIGH 75: CPT | Performed by: PSYCHIATRY & NEUROLOGY

## 2021-11-18 PROCEDURE — 82947 ASSAY GLUCOSE BLOOD QUANT: CPT

## 2021-11-18 PROCEDURE — 2580000003 HC RX 258: Performed by: INTERNAL MEDICINE

## 2021-11-18 PROCEDURE — 71045 X-RAY EXAM CHEST 1 VIEW: CPT

## 2021-11-18 PROCEDURE — 94660 CPAP INITIATION&MGMT: CPT

## 2021-11-18 PROCEDURE — 80053 COMPREHEN METABOLIC PANEL: CPT

## 2021-11-18 PROCEDURE — C9113 INJ PANTOPRAZOLE SODIUM, VIA: HCPCS | Performed by: FAMILY MEDICINE

## 2021-11-18 PROCEDURE — 76770 US EXAM ABDO BACK WALL COMP: CPT

## 2021-11-18 PROCEDURE — 2580000003 HC RX 258: Performed by: UROLOGY

## 2021-11-18 PROCEDURE — 2000000000 HC ICU R&B

## 2021-11-18 PROCEDURE — 6360000002 HC RX W HCPCS: Performed by: FAMILY MEDICINE

## 2021-11-18 PROCEDURE — 85025 COMPLETE CBC W/AUTO DIFF WBC: CPT

## 2021-11-18 PROCEDURE — 6360000002 HC RX W HCPCS: Performed by: INTERNAL MEDICINE

## 2021-11-18 RX ORDER — METOPROLOL TARTRATE 5 MG/5ML
5 INJECTION INTRAVENOUS EVERY 6 HOURS
Status: DISCONTINUED | OUTPATIENT
Start: 2021-11-18 | End: 2021-11-19 | Stop reason: ALTCHOICE

## 2021-11-18 RX ADMIN — METOPROLOL TARTRATE 5 MG: 1 INJECTION, SOLUTION INTRAVENOUS at 21:05

## 2021-11-18 RX ADMIN — MIDODRINE HYDROCHLORIDE 7.5 MG: 5 TABLET ORAL at 20:10

## 2021-11-18 RX ADMIN — PANTOPRAZOLE SODIUM 40 MG: 40 INJECTION, POWDER, FOR SOLUTION INTRAVENOUS at 08:41

## 2021-11-18 RX ADMIN — SODIUM CHLORIDE, PRESERVATIVE FREE 10 ML: 5 INJECTION INTRAVENOUS at 08:40

## 2021-11-18 RX ADMIN — METOPROLOL TARTRATE 5 MG: 1 INJECTION, SOLUTION INTRAVENOUS at 14:43

## 2021-11-18 RX ADMIN — PANTOPRAZOLE SODIUM 40 MG: 40 INJECTION, POWDER, FOR SOLUTION INTRAVENOUS at 20:09

## 2021-11-18 RX ADMIN — SODIUM CHLORIDE, PRESERVATIVE FREE 10 ML: 5 INJECTION INTRAVENOUS at 20:09

## 2021-11-18 RX ADMIN — SODIUM CHLORIDE, PRESERVATIVE FREE 10 ML: 5 INJECTION INTRAVENOUS at 08:41

## 2021-11-18 RX ADMIN — SODIUM BICARBONATE: 84 INJECTION, SOLUTION INTRAVENOUS at 04:13

## 2021-11-18 RX ADMIN — CEFEPIME HYDROCHLORIDE 1000 MG: 1 INJECTION, POWDER, FOR SOLUTION INTRAMUSCULAR; INTRAVENOUS at 20:10

## 2021-11-18 ASSESSMENT — PAIN SCALES - GENERAL
PAINLEVEL_OUTOF10: 0

## 2021-11-18 ASSESSMENT — PAIN SCALES - WONG BAKER: WONGBAKER_NUMERICALRESPONSE: 2

## 2021-11-18 NOTE — PROGRESS NOTES
RN spoke to Dr. Sheryl Yo. Saleem to put in NG tube after dialysis. NG saleem to use for meds and tube feed.

## 2021-11-18 NOTE — PROGRESS NOTES
Writer spoke with Dr. Emse Greene MD on call for Dr. Norma Lindsey, regarding new consult for pt change in mental status and stroke alert called. Writer reported results of CT to Dr. Esme Greene and relayed neurointerventionalist recommendations. Dr. Esme Greene gave orders for MRI brain w/o contrast for the AM and states to hold off on giving IV depakote at this time.

## 2021-11-18 NOTE — PROGRESS NOTES
Progress Note    11/18/2021   12:20 PM    Name:  Molly Hernandez  MRN:    [id number]     [place]:     [de-identified]   Room:  2004/2004-01  IP Day: 8     Admit Date: 11/10/2021  4:23 PM  PCP: Nesha Cuello DO    Subjective:     C/C:   Chief Complaint   Patient presents with    Abnormal Lab     low hemoglobin and high CR       Interval History: Status: not changed. Patient was not responding to verbal commands last night and stroke protocol was called. Stat CT head was unremarkable for acute changes. Patient required Levophed yesterday for vasopressor support. Currently patient opens eyes but not respond to verbal commands. Patient has poor urine output patient received right thoracocentesis with 1 L of fluid removal.  Vital signs reviewed relatively low blood pressure readings noted heart rate stable. Recent labs reviewed creatinine 3.24, hemoglobin  8.4    ROS:   all 10 systems reviewed and are negative except as noted    Review of Systems   Unable to perform ROS: Mental status change       Medications:      Allergies: No Known Allergies    Current Meds: pantoprazole (PROTONIX) injection 40 mg, BID   And  sodium chloride flush 0.9 % injection 10 mL, BID  potassium chloride (KLOR-CON M) extended release tablet 40 mEq, PRN   Or  potassium bicarb-citric acid (EFFER-K) effervescent tablet 40 mEq, PRN   Or  potassium chloride 10 mEq/100 mL IVPB (Peripheral Line), PRN  magnesium sulfate 1000 mg in dextrose 5% 100 mL IVPB, PRN  glucose (GLUTOSE) 40 % oral gel 15 g, PRN  dextrose 50 % IV solution, PRN  glucagon (rDNA) injection 1 mg, PRN  dextrose 5 % solution, PRN  acetaminophen (TYLENOL) tablet 650 mg, Q4H PRN  anticoagulant sodium citrate 4 % injection 1.3 mL, PRN  anticoagulant sodium citrate 4 % injection 1.2 mL, PRN  sodium polystyrene (KAYEXALATE) 15 GM/60ML suspension 15 g, Once  midodrine (PROAMATINE) tablet 7.5 mg, TID  norepinephrine (LEVOPHED) 16 mg in sodium chloride 0.9 % 250 mL infusion, Continuous  sodium bicarbonate 150 mEq in dextrose 5 % and 0.9 % NaCl 1,000 mL infusion, Continuous  cefepime (MAXIPIME) 1000 mg IVPB minibag, Q24H  sertraline (ZOLOFT) tablet 25 mg, Daily  0.9 % sodium chloride infusion, PRN  ferrous sulfate (IRON 325) tablet 325 mg, Daily with breakfast  [Held by provider] aspirin chewable tablet 81 mg, Daily  atorvastatin (LIPITOR) tablet 80 mg, Daily  carvedilol (COREG) tablet 6.25 mg, BID WC  insulin glargine (LANTUS) injection vial 22 Units, Nightly  insulin lispro (HUMALOG) injection vial 0-12 Units, TID WC  insulin lispro (HUMALOG) injection vial 0-6 Units, Nightly  sodium chloride flush 0.9 % injection 5-40 mL, 2 times per day  sodium chloride flush 0.9 % injection 5-40 mL, PRN  0.9 % sodium chloride infusion, PRN  acetaminophen (TYLENOL) tablet 650 mg, Q4H PRN  ondansetron (ZOFRAN-ODT) disintegrating tablet 4 mg, Q8H PRN   Or  ondansetron (ZOFRAN) injection 4 mg, Q6H PRN  oxyCODONE-acetaminophen (PERCOCET) 5-325 MG per tablet 1 tablet, Q6H PRN        Data:     Code Status:  Full Code    Family History   Problem Relation Age of Onset    High Blood Pressure Other        Social History     Socioeconomic History    Marital status:      Spouse name: Not on file    Number of children: Not on file    Years of education: Not on file    Highest education level: Not on file   Occupational History    Not on file   Tobacco Use    Smoking status: Never Smoker    Smokeless tobacco: Not on file   Substance and Sexual Activity    Alcohol use: No     Alcohol/week: 0.0 standard drinks    Drug use: No    Sexual activity: Not on file   Other Topics Concern    Not on file   Social History Narrative    Not on file     Social Determinants of Health     Financial Resource Strain:     Difficulty of Paying Living Expenses: Not on file   Food Insecurity:     Worried About Running Out of Food in the Last Year: Not on file    Katya of Food in the Last Year: Not on file Transportation Needs:     Lack of Transportation (Medical): Not on file    Lack of Transportation (Non-Medical): Not on file   Physical Activity:     Days of Exercise per Week: Not on file    Minutes of Exercise per Session: Not on file   Stress:     Feeling of Stress : Not on file   Social Connections:     Frequency of Communication with Friends and Family: Not on file    Frequency of Social Gatherings with Friends and Family: Not on file    Attends Yazidism Services: Not on file    Active Member of [address] [place] [address] [employer] or Organizations: Not on file    Attends Club or Organization Meetings: Not on file    Marital Status: Not on file   Intimate Partner Violence:     Fear of Current or Ex-Partner: Not on file    Emotionally Abused: Not on file    Physically Abused: Not on file    Sexually Abused: Not on file   Housing Stability:     Unable to Pay for Housing in the Last Year: Not on file    Number of [place] in the Last Year: Not on file    Unstable Housing in the Last Year: Not on file       I/O (24Hr): Intake/Output Summary (Last 24 hours) at 11/18/2021 1220  Last data filed at 11/18/2021 0537  Gross per 24 hour   Intake 1544.52 ml   Output 1070 ml   Net 474.52 ml     Radiology:  CT ABDOMEN PELVIS WO CONTRAST Additional Contrast? None    Result Date: 11/12/2021  Severe right and mild left hydroureteronephrosis. No obstructing ureteral stone is seen. Wall thickening of the urinary bladder without significant urinary bladder wall distension. Outlet obstruction is a consideration. Right-sided ostomy. There is bowel wall thickening proximal to the ostomy, may suggest infectious or inflammatory enteritis. No drainable fluid collections or free air. No evidence of bowel obstruction. Moderate right pleural effusion. Bibasilar infiltrates or atelectasis. Clinical correlation to exclude pneumonia. CT HEAD WO CONTRAST    Result Date: 11/17/2021  No acute intracranial abnormality.  Chronic microvascular disease and chronic lacunar infarcts. Critical results were called by Dr. Karma Johnson to Dr Tamanna Langston on 11/17/2021 at [address] RENAL COMPLETE    Result Date: 11/15/2021  1. Mild to moderate right-sided hydronephrosis. 2. Limited renal ultrasound. No left-sided hydronephrosis. XR CHEST PORTABLE    Result Date: 11/17/2021  1. No pneumothorax evident following thoracentesis. 2. Low lung volumes. Significantly improved pneumatization lower right lung with minimal pleural effusion bilateral evident. 3. Calcific atherosclerosis aorta. 4. Cardiomegaly. XR CHEST PORTABLE    Result Date: 11/16/2021  Moderate right pleural effusion and associated airspace disease. Pneumonia cannot be excluded. US RETROPERITONEAL COMPLETE    Result Date: 11/12/2021  1. Moderate to severe right and mild-to-moderate left hydronephrosis. 2. Exam limited due to patient's body habitus. IR NONTUNNELED VASCULAR CATHETER > 5 YEARS    Result Date: 11/17/2021  Successful ultrasound guided non-tunneled right IJ temporary hemodialysis catheter placement. Catheter is ready for dialysis use at this time. IR GUIDED THORACENTESIS PLEURAL    Result Date: 11/17/2021  Successful ultrasound guided right thoracentesis. Labs:  Recent Results (from the past 24 hour(s))   LACTATE DEHYDROGENASE, BODY FLUID    Collection Time: 11/17/21  2:40 PM   Result Value Ref Range    Specimen Type PLEURAL FLUID     LD, Fluid 64 U/L   PROTEIN, BODY FLUID    Collection Time: 11/17/21  2:40 PM   Result Value Ref Range    Specimen Type PLEURAL FLUID     Total Protein, Body Fluid 2.0 g/dL   Body fluid cell count    Collection Time: 11/17/21  2:40 PM   Result Value Ref Range    Color, Fluid Yellow     Appearance, Fluid Turbid     WBC, Fluid 110 /mm3    RBC, Fluid 491 /mm3    Specimen Type PLEURAL FLUID    Cult,Fluid    Collection Time: 11/17/21  2:40 PM    Specimen: Pleural Fluid;  Body Fluid   Result Value Ref Range    Specimen Description Brittanie Crape PLEURAL FLUID     Special Requests NOT REPORTED     Direct Exam NO NEUTROPHILS SEEN     Direct Exam NO BACTERIA SEEN     Direct Exam       Gram stain made from cytocentrifuged specimen. Organisms and cells will be concentrated. Culture NO GROWTH 13 HOURS    PH, BODY FLUID    Collection Time: 11/17/21  2:40 PM   Result Value Ref Range    Specimen Type PLEURAL FLUID     pH, Fluid 7.5    Differential, Body Fluid    Collection Time: 11/17/21  2:40 PM   Result Value Ref Range    Fluid Diff Comment PENDING     Neutrophil Count, Fluid 20 (H) 0 %    Lymphocytes, Body Fluid 39 (H) 0 %    Monocyte Count, Fluid      0 %    Eos, Fluid      0 %    Basos, Fluid      0 %    Other Cells, Fluid 41 (H) 0 %   POC Glucose Fingerstick    Collection Time: 11/17/21  6:18 PM   Result Value Ref Range    POC Glucose 108 (H) 65 - 105 mg/dL   POC Glucose Fingerstick    Collection Time: 11/17/21  8:29 PM   Result Value Ref Range    POC Glucose 88 65 - 105 mg/dL   Arterial Blood Gases    Collection Time: 11/17/21  9:15 PM   Result Value Ref Range    pH, Arterial 7.204 (LL) 7.350 - 7.450    pCO2, Arterial 58.1 (HH) 35.0 - 45.0 mmHg    pO2, Arterial 99.8 80.0 - 100.0 mmHg    HCO3, Arterial 22.9 22.0 - 26.0 mmol/L    Positive Base Excess, Art NOT REPORTED 0.0 - 2.0 mmol/L    Negative Base Excess, Art 5.1 (H) 0.0 - 2.0 mmol/L    O2 Sat, Arterial 96.9 95 - 98 %    Total Hb NOT REPORTED 12.0 - 16.0 g/dl    Oxyhemoglobin NOT REPORTED 95.0 - 98.0 %    Carboxyhemoglobin 1.4 0 - 5 %    Methemoglobin NOT REPORTED 0.0 - 1.9 %    Pt Temp 37.0     pH, Art, Temp Adj NOT REPORTED 7.350 - 7.450    pCO2, Art, Temp Adj NOT REPORTED 35.0 - 45.0    pO2, Art, Temp Adj NOT REPORTED 80.0 - 100.0 mmHg    O2 Device/Flow/% PENDING     Respiratory Rate 16     Rolf Test PASS     Sample Site Left Radial Artery     Pt.  Position SEMI-FOWLERS     Mode PENDING     Set Rate PENDING     Total Rate PENDING     VT PENDING     FIO2 PENDING     Peep/Cpap PENDING     PSV PENDING Text for Respiratory PT ON 2LPM 02, RESULTS TO RN     NOTIFICATION NOT REPORTED     NOTIFICATION TIME NOT REPORTED    CBC with DIFF    Collection Time: 11/18/21  5:01 AM   Result Value Ref Range    WBC 7.4 3.5 - 11.0 k/uL    RBC 2.70 (L) 4.0 - 5.2 m/uL    Hemoglobin 8.4 (L) 12.0 - 16.0 g/dL    Hematocrit 26.0 (L) 36 - 46 %    MCV 96.2 80 - 100 fL    MCH 31.0 26 - 34 pg    MCHC 32.2 31 - 37 g/dL    RDW 15.6 (H) 11.5 - 14.9 %    Platelets 386 (L) 466 - 450 k/uL    MPV 9.1 6.0 - 12.0 fL    NRBC Automated NOT REPORTED per 100 WBC    Differential Type NOT REPORTED     Seg Neutrophils 80 (H) 36 - 66 %    Lymphocytes 12 (L) 24 - 44 %    Monocytes 8 (H) 1 - 7 %    Eosinophils % 0 0 - 4 %    Basophils 0 0 - 2 %    Immature Granulocytes NOT REPORTED 0 %    Segs Absolute 5.90 1.3 - 9.1 k/uL    Absolute Lymph # 0.90 (L) 1.0 - 4.8 k/uL    Absolute Mono # 0.60 0.1 - 1.3 k/uL    Absolute Eos # 0.00 0.0 - 0.4 k/uL    Basophils Absolute 0.00 0.0 - 0.2 k/uL    Absolute Immature Granulocyte NOT REPORTED 0.00 - 0.30 k/uL    WBC Morphology NOT REPORTED     RBC Morphology NOT REPORTED     Platelet Estimate NOT REPORTED    Comprehensive Metabolic Panel w/ Reflex to MG    Collection Time: 11/18/21  5:01 AM   Result Value Ref Range    Glucose 99 70 - 99 mg/dL    BUN 61 (H) 8 - 23 mg/dL    CREATININE 3.24 (H) 0.50 - 0.90 mg/dL    Bun/Cre Ratio NOT REPORTED 9 - 20    Calcium 7.6 (L) 8.6 - 10.4 mg/dL    Sodium 141 135 - 144 mmol/L    Potassium 4.1 3.7 - 5.3 mmol/L    Chloride 109 (H) 98 - 107 mmol/L    CO2 23 20 - 31 mmol/L    Anion Gap 9 9 - 17 mmol/L    Alkaline Phosphatase 175 (H) 35 - 104 U/L    ALT 31 5 - 33 U/L    AST 21 <32 U/L    Total Bilirubin 0.27 (L) 0.3 - 1.2 mg/dL    Total Protein 5.4 (L) 6.4 - 8.3 g/dL    Albumin 2.6 (L) 3.5 - 5.2 g/dL    Albumin/Globulin Ratio NOT REPORTED 1.0 - 2.5    GFR Non- 14 (L) >60 mL/min    GFR  17 (L) >60 mL/min    GFR Comment          GFR Staging NOT REPORTED Magnesium    Collection Time: 21  5:01 AM   Result Value Ref Range    Magnesium 1.9 1.6 - 2.6 mg/dL   CK    Collection Time: 21  5:01 AM   Result Value Ref Range    Total CK 38 26 - 192 U/L   Uric Acid    Collection Time: 21  5:01 AM   Result Value Ref Range    Uric Acid 6.6 (H) 2.4 - 5.7 mg/dL   POC Glucose Fingerstick    Collection Time: 21  8:46 AM   Result Value Ref Range    POC Glucose 85 65 - 105 mg/dL   Ammonia    Collection Time: 21 11:21 AM   Result Value Ref Range    Ammonia 35 11 - 51 umol/L       Physical Examination:        Vitals:  /73   Pulse 83   Temp 97.6 °F (36.4 °C)   Resp 25   Ht 5' 3\" (1.6 m) Comment: from old chart  Wt 213 lb 13.5 oz (97 kg)   SpO2 98%   BMI 37.88 kg/m²   Temp (24hrs), Av.5 °F (36.4 °C), Min:97.3 °F (36.3 °C), Max:97.6 °F (36.4 °C)    Recent Labs     21  1139 21  1818 219 21  0846   POCGLU 118* 108* 88 85         Physical Exam  Vitals reviewed. HENT:      Head: Normocephalic. Right Ear: External ear normal.      Left Ear: External ear normal.      Nose: Nose normal.      Mouth/Throat:      Mouth: Mucous membranes are moist.      Pharynx: Oropharynx is clear. Eyes:      Conjunctiva/sclera: Conjunctivae normal.   Cardiovascular:      Rate and Rhythm: Normal rate and regular rhythm. Pulses: Normal pulses. Heart sounds: Normal heart sounds. Pulmonary:      Effort: Pulmonary effort is normal.      Breath sounds: Normal breath sounds. Abdominal:      General: Bowel sounds are normal.      Palpations: Abdomen is soft. Musculoskeletal:      Cervical back: Normal range of motion and neck supple. Right lower leg: Edema (1+ ) present. Left lower leg: Edema (1+) present. Skin:     General: Skin is warm. Capillary Refill: Capillary refill takes less than 2 seconds. Coloration: Skin is not jaundiced.    Neurological:      Comments: Opens eyes but does not follow verbal commands         Assessment:        Primary Problem  JAYLYN (acute kidney injury) ([facility]     Principal Problem:    [facility]  Active Problems:    Type 2 diabetes mellitus with kidney complication, with long-term current use of insulin ([facility])    CKD (chronic kidney disease) stage 3, GFR 30-59 ml/min ([facility])    HTN (hypertension)    ASCVD (arteriosclerotic cardiovascular disease)    Iron deficiency anemia    Hydronephrosis determined by ultrasound    Longstanding persistent atrial fibrillation ([facility]  Resolved Problems:    * No resolved hospital problems. *      Past Medical History:   Diagnosis Date    Anemia, unspecified     CAD (coronary artery disease)     Chronic kidney disease     CKD (chronic kidney disease) stage 3, GFR 30-59 ml/min ([facility])     Closed fracture of dorsal (thoracic) vertebra without mention of spinal cord injury     Coronary atherosclerosis of unspecified type of vessel, native or graft     Depression with anxiety     Esophageal reflux     HTN (hypertension)     Hyperkalemia     Hyperlipidemia     Intervertebral cervical disc disorder with myelopathy, cervical region     Lower back pain     Lumbar radiculopathy     Microalbuminuria     Muscle weakness     Pain in limb     Stenosis of cervical spine     Tethered cord syndrome ([facility])     Type II or unspecified type diabetes mellitus without mention of complication, not stated as uncontrolled     Urinary calculus     UTI (lower urinary tract infection)         Plan:        1. Hemodialysis per nephrology today  2. IV cefepime  3. NPO  4. D5 normal saline with bicarb at 30 mL/h  5. Per neurology input  6. NG for tube feed per dietitian  7.  blood culture no growth for 2 days  8.  urine culture pending  9. MRI brain today  10. Discussed with pulmonologist Dr. Luh Garcia and nephrologist Dr. Demetra Hatch on the floor  11. 2D report pending  12. Discussed with patient sisters and floor nurse at bedside  1.  PPI Protonix 40 mg p.o. daily  2. DVT Prophylaxis pharmacological DVT prophylaxis due to low hemoglobin. 3. CBC, CMP  4. EPCs  5. PT/OT to evaluate and treat  6. Replace electrolytes as per sliding scale  7. Home medications reviewed and appropriate medications continued  8.  Reviewed labs and imaging studies from last 24 hours and results explained to patient      Electronically signed by Clyde Leach MD

## 2021-11-18 NOTE — PROGRESS NOTES
Writer spoke with Dr. Chico Arevalo to provide update on pt condition and stroke alert called as well as neurointerventionalist recommendations. Dr. Chico Arevalo gave orders to consult neurology.

## 2021-11-18 NOTE — PROGRESS NOTES
ICU Progress Note (Non-Vent)  [facility]    Patient - Eddie Novak,  Age - 66 y.o.    - 1950      Room Number -    MRN -  [id number]   [facility]  Date of Admission -  11/10/2021  4:23 PM    Events of Past 24 Hours   Patient does not appear to be in distress. Currently she is off Levophed. Mean arterial pressure is 66 mmHg with systolic 237. She does not respond quickly. Vitals    height is 5' 3\" (1.6 m) and weight is 213 lb 13.5 oz (97 kg). Her axillary temperature is 97.4 °F (36.3 °C). Her blood pressure is 113/77 and her pulse is 78. Her respiration is 20 and oxygen saturation is 100%.        Temperature Range: Temp: 97.4 °F (36.3 °C) Temp  Av.5 °F (36.4 °C)  Min: 97.3 °F (36.3 °C)  Max: 98 °F (36.7 °C)  BP Range:  Systolic (01JIO), XVV:687 , Min:89 , MWS:524     Diastolic (94AQW), KPY:53, Min:33, Max:80    Pulse Range: Pulse  Av.5  Min: 72  Max: 94  Respiration Range: Resp  Av.1  Min: 12  Max: 29  Current Pulse Ox[de-identified]  SpO2: 100 %  24HR Pulse Ox Range:  SpO2  Av.3 %  Min: 94 %  Max: 100 %  Oxygen Amount and Delivery: O2 Flow Rate (L/min): 2 L/min    Wt Readings from Last 3 Encounters:   21 213 lb 13.5 oz (97 kg)   16 178 lb (80.7 kg)   16 184 lb 12.8 oz (83.8 kg)     I/O       Intake/Output Summary (Last 24 hours) at 2021 1106  Last data filed at 2021 0537  Gross per 24 hour   Intake 1544.52 ml   Output 1070 ml   Net 474.52 ml     DRAIN/TUBE OUTPUT       Invasive Lines   ICP PRESSURE RANGE  No data recorded  CVP PRESSURE RANGE  No data recorded      Medications      pantoprazole  40 mg IntraVENous BID    And    sodium chloride flush  10 mL IntraVENous BID    sodium polystyrene  15 g Oral Once    midodrine  7.5 mg Oral TID    cefepime  1,000 mg IntraVENous Q24H    sertraline  25 mg Oral Daily    ferrous sulfate  325 mg Oral Daily with breakfast    [Held by provider] aspirin  81 mg Oral Daily    atorvastatin  80 mg Oral Daily    carvedilol  6.25 mg Oral BID     insulin glargine  22 Units SubCUTAneous Nightly    insulin lispro  0-12 Units SubCUTAneous TID WC    insulin lispro  0-6 Units SubCUTAneous Nightly    sodium chloride flush  5-40 mL IntraVENous 2 times per day     potassium chloride **OR** potassium alternative oral replacement **OR** potassium chloride, magnesium sulfate, glucose, dextrose, glucagon (rDNA), dextrose, acetaminophen, anticoagulant sodium citrate, anticoagulant sodium citrate, sodium chloride, sodium chloride flush, sodium chloride, acetaminophen, ondansetron **OR** ondansetron, oxyCODONE-acetaminophen  IV Drips/Infusions   dextrose      norepinephrine Stopped (11/18/21 0502)    IV infusion builder 50 mL/hr at 11/18/21 0413    sodium chloride      sodium chloride         Diet/Nutrition   ADULT DIET; Regular; 5 carb choices (75 gm/meal); Low Sodium (2 gm)  ADULT ORAL NUTRITION SUPPLEMENT; Breakfast, Lunch, Dinner; Low Calorie/High Protein Oral Supplement    Exam      Constitutional - Alert, arousable  General Appearance  well developed, well nourished  HEENT -normocephalic, atraumatic. Lungs - Chest expands equally, no wheezes, rales or rhonchi. Cardiovascular - Heart sounds are normal.  normal rate and rhythm regular, no murmur, gallop or rub.   Abdomen - soft, nontender, nondistended,  Skin - no bruising or bleeding  Extremities - no cyanosis, clubbing     Lab Results   CBC     Lab Results   Component Value Date    WBC 7.4 11/18/2021    RBC 2.70 11/18/2021    RBC 4.14 08/31/2016    HGB 8.4 11/18/2021    HCT 26.0 11/18/2021     11/18/2021    MCV 96.2 11/18/2021    MCH 31.0 11/18/2021    MCHC 32.2 11/18/2021    RDW 15.6 11/18/2021    LYMPHOPCT 12 11/18/2021    LYMPHOPCT 32.8 08/31/2016    MONOPCT 8 11/18/2021    EOSPCT 4.3 08/31/2016    BASOPCT 0 11/18/2021    BASOPCT 0.7 08/31/2016 MONOSABS 0.60 11/18/2021    LYMPHSABS 0.90 11/18/2021    EOSABS 0.00 11/18/2021    BASOSABS 0.00 11/18/2021    DIFFTYPE NOT REPORTED 11/18/2021       BMP   Lab Results   Component Value Date     11/18/2021    K 4.1 11/18/2021     11/18/2021    CO2 23 11/18/2021    BUN 61 11/18/2021    CREATININE 3.24 11/18/2021    GLUCOSE 99 11/18/2021    GLUCOSE 77 08/31/2016       LFTS  Lab Results   Component Value Date    ALKPHOS 175 11/18/2021    ALT 31 11/18/2021    AST 21 11/18/2021    PROT 5.4 11/18/2021    BILITOT 0.27 11/18/2021    BILIDIR <0.08 11/13/2021    IBILI Connot be calculated 11/13/2021    LABALBU 2.6 11/18/2021       ABG ABGs:   Lab Results   Component Value Date    PHART 7.204 11/17/2021    PO2ART 99.8 11/17/2021    TIF3PIB 58.1 11/17/2021       Lab Results   Component Value Date    MODE PENDING 11/17/2021         INR  Recent Labs     11/16/21 2027   PROTIME 16.2*   INR 1.3       APTT  Recent Labs     11/16/21 2027   APTT 30.6       Lactic Acid  Lab Results   Component Value Date    LACTA 0.6 11/16/2021    LACTA 0.6 11/16/2021        BNP   No results for input(s): BNP in the last 72 hours. Cultures   Cultures x2, November 16, negative to date  Pleural fluid Gram stain negative for bacteria. No neutrophils seen. Radiology     CXR      Chest x-ray post right IJ Jose Carlos catheter placement no pneumothorax    SYSTEMS ASSESSMENT    IMPRESSION:  1. Acute respiratory failure with hypoxemia. 2.  Severe metabolic and respiratory acidosis now metabolic acidosis. 3.  Shock, resolving. 4.  Acute kidney injury. 5.  Morbid obesity. 6.  Recent cystoscopy with stent placement. 7.  Anemia. With questionable rectal bleed---- GI following   8. Hypoalbuminemia  9. Thrombocytopenia     Pleural effusion drained yesterday. Transudate of effusion with no evidence of pleural space infection  currently on Maxipime  Currently off Levophed. EPC cuffs.   Dialysis per renal  Ammonia level ordered    Critical Care Time  0  min    Electronically signed by Rosalinda Underwood MD on 11/18/2021 at 11:06 AM

## 2021-11-18 NOTE — PROGRESS NOTES
Progress Note    Patient Name:  Rosales Gordon    :  2021 8:08 AM      SUBJECTIVE       Ms. Leidy Shelley    Is resting in bed. Does not answer questions appropriately. Difficult to arouse. OBJECTIVE     Vital signs:    /77   Pulse 78   Temp 97.4 °F (36.3 °C) (Axillary)   Resp 21   Ht 5' 3\" (1.6 m) Comment: from old chart  Wt 213 lb 13.5 oz (97 kg)   SpO2 100%   BMI 37.88 kg/m²  2 L/min      Admit Weight:  188 lb (85.3 kg)    Last 3 weights: Wt Readings from Last 3 Encounters:   21 213 lb 13.5 oz (97 kg)   16 178 lb (80.7 kg)   16 184 lb 12.8 oz (83.8 kg)       BMI: Body mass index is 37.88 kg/m². Input/Output:       Intake/Output Summary (Last 24 hours) at 2021 0808  Last data filed at 2021 0537  Gross per 24 hour   Intake 1794.52 ml   Output 1070 ml   Net 724.52 ml         Exam:     General appearance:    lethargic  Lungs: no rhonchi, no wheezes, no rales  Heart: S1 and S2 no murmur  Abdomen: positive bowel sounds, no bruits, no masses  Extremities: warm and dry, no cyanosis, no clubbing        Laboratory Studies:     CBC:   Recent Labs     21  0607 21  0501   WBC 9.8 8.6 7.4   HGB 8.9* 8.3* 8.4*   HCT 27.8* 26.7* 26.0*   MCV 98.3 97.8 96.2    141* 108*     BMP:   Recent Labs     21  0607 21  0501   * 138 141   K 5.2 4.8 4.1    111* 109*   CO2 15* 16* 23   BUN 86* 85* 61*   CREATININE 3.60* 3.53* 3.24*     PT/INR:   Recent Labs     21   PROTIME 16.2*   INR 1.3     APTT:   Recent Labs     21   APTT 30.6     MAG:   Recent Labs     21  0607 21  0501   MG 2.0 1.9     D Dimer: No results for input(s): DDIMER in the last 72 hours. Troponin  No results for input(s): TROPONINI in the last 72 hours. No results for input(s): TROPONINT in the last 72 hours. BNP No results for input(s): BNP in the last 72 hours.           No results for input(s): PROBNP in the last 72 hours. Pulse Ox: SpO2  Av.3 %  Min: 94 %  Max: 100 %  Supplemental O2: O2 Flow Rate (L/min): 2 L/min     Current Meds:    pantoprazole  40 mg IntraVENous BID    And    sodium chloride flush  10 mL IntraVENous BID    sodium polystyrene  15 g Oral Once    midodrine  7.5 mg Oral TID    cefepime  1,000 mg IntraVENous Q24H    sertraline  25 mg Oral Daily    ferrous sulfate  325 mg Oral Daily with breakfast    [Held by provider] aspirin  81 mg Oral Daily    atorvastatin  80 mg Oral Daily    carvedilol  6.25 mg Oral BID WC    insulin glargine  22 Units SubCUTAneous Nightly    insulin lispro  0-12 Units SubCUTAneous TID WC    insulin lispro  0-6 Units SubCUTAneous Nightly    sodium chloride flush  5-40 mL IntraVENous 2 times per day     Continuous Infusions:    dextrose      norepinephrine Stopped (21 0502)    IV infusion builder 50 mL/hr at 21 0413    sodium chloride      sodium chloride              ASSESSMENT     Principal Problem:    JAYLYN (acute kidney injury) ([facility]  Active Problems:    Type 2 diabetes mellitus with kidney complication, with long-term current use of insulin ([facility])    CKD (chronic kidney disease) stage 3, GFR 30-59 ml/min ([facility])    HTN (hypertension)    ASCVD (arteriosclerotic cardiovascular disease)    Iron deficiency anemia    Hydronephrosis determined by ultrasound    Longstanding persistent atrial fibrillation ([facility]  Resolved Problems:    * No resolved hospital problems. *      PLAN      patient is now on nasal cannula O2. With urgent this a.m. does not answer questions. Hemoglobin remains low, creatinine remains elevated. Continue beta-blocker and statin therapy. Anticoagulation remains on hold due to labile hemoglobin. Continue supportive care, guarded prognosis.

## 2021-11-18 NOTE — PROGRESS NOTES
RN at bedside with Dr. Dorinda Paz. Family present (pt. Two sisters). Family updated on call thus far. All questions and concerns answered. Orders received to change diet order to NPO due to patients decrease in mentation and order an ammonia level.     Pt. May need NG placed for nutrition

## 2021-11-18 NOTE — PROGRESS NOTES
Writer spoke with pt , Danika Sender, and provided with update. Maria R assisted writer to fill out MRI screening form. All questions answered. Maria R tearful at this time and states \"Geraldine is my only support I have no one else. \" Writer provided support to Danika Sender at this time. All questions and concerns addressed.

## 2021-11-18 NOTE — PROGRESS NOTES
-- -- -- -- 19 99 % --   11/17/21 2115 -- -- -- 83 23 99 % --   11/17/21 2100 (!) 94/51 -- -- 87 19 100 % --   11/17/21 2045 (!) 112/34 -- -- 74 17 100 % --   11/17/21 2030 (!) 98/40 -- -- 75 16 99 % --   11/17/21 2015 -- -- -- 82 15 98 % --   11/17/21 2000 (!) 99/33 -- -- 75 14 98 % --   11/17/21 1945 (!) 97/45 97.4 °F (36.3 °C) Oral 75 16 99 % --   11/17/21 1937 94/66 97.3 °F (36.3 °C) -- 78 17 100 % 226 lb 13.7 oz (102.9 kg)   11/17/21 1930 (!) 89/47 -- -- 94 -- -- --   11/17/21 1922 -- -- -- -- 16 99 % --   11/17/21 1915 (!) 96/44 -- -- 79 -- -- --   11/17/21 1900 (!) 103/51 -- -- 92 18 99 % --   11/17/21 1845 (!) 106/36 -- -- 75 18 99 % --   11/17/21 1830 (!) 111/46 -- -- 83 19 99 % --   11/17/21 1815 115/66 -- -- 85 20 99 % --   11/17/21 1800 115/76 -- -- 86 18 99 % --   11/17/21 1745 (!) 105/54 -- -- 80 17 98 % --   11/17/21 1730 (!) 109/50 -- -- 81 -- -- --   11/17/21 1715 (!) 92/54 97.3 °F (36.3 °C) -- 72 15 99 % 227 lb 15.3 oz (103.4 kg)   11/17/21 1630 (!) 119/47 -- -- 93 19 98 % --   11/17/21 1615 (!) 113/49 97.4 °F (36.3 °C) Oral 76 25 98 % --   11/17/21 1600 (!) 104/46 -- -- 82 15 99 % --   11/17/21 1554 -- -- -- -- 12 97 % --   11/17/21 1545 109/70 -- -- 83 22 94 % --   11/17/21 1530 96/73 -- -- 84 22 94 % --   11/17/21 1515 -- -- -- 81 21 97 % --   11/17/21 1500 118/78 -- -- -- -- -- --   11/17/21 1449 (!) 100/56 -- -- 81 19 99 % --   11/17/21 1432 (!) 90/54 -- -- 81 20 100 % --   11/17/21 1405 (!) 90/53 -- -- 84 18 100 % --   11/17/21 1233 -- -- -- -- 19 98 % --   11/17/21 1215 (!) 107/49 98 °F (36.7 °C) Oral 83 20 98 % 227 lb 15.3 oz (103.4 kg)   11/17/21 0911 -- -- -- -- 21 96 % --   11/17/21 0845 137/63 97.8 °F (36.6 °C) Oral 99 20 99 % --   11/17/21 0715 (!) 107/46 -- -- 99 17 100 % --   11/17/21 0700 (!) 112/47 -- -- 100 17 100 % --   11/17/21 0645 (!) 110/56 -- -- 82 13 100 % --       Intake/Output Summary (Last 24 hours) at 11/18/2021 0644  Last data filed at 11/18/2021 0537  Gross per 24 hour   Intake 1794.52 ml   Output 1070 ml   Net 724.52 ml       Recent Labs     11/16/21 2027 11/17/21  0607 11/18/21  0501   WBC 9.8 8.6 7.4   HGB 8.9* 8.3* 8.4*   HCT 27.8* 26.7* 26.0*   MCV 98.3 97.8 96.2    141* 108*     Recent Labs     11/16/21 2027 11/17/21  0607 11/18/21  0501   * 138 141   K 5.2 4.8 4.1    111* 109*   CO2 15* 16* 23   BUN 86* 85* 61*   CREATININE 3.60* 3.53* 3.24*       No results for input(s): COLORU, PHUR, LABCAST, WBCUA, RBCUA, MUCUS, TRICHOMONAS, YEAST, BACTERIA, CLARITYU, SPECGRAV, LEUKOCYTESUR, UROBILINOGEN, BILIRUBINUR, BLOODU in the last 72 hours. Invalid input(s): NITRATE, GLUCOSEUKETONESUAMORPHOUS    Additional Lab/culture results:    Physical Exam: Kaur catheter draining. Interval Imaging Findings:    Impression:    Patient Active Problem List   Diagnosis    Chronic kidney disease (CKD), stage III (moderate) ([facility]    Hyperkalemia    Type 2 diabetes mellitus with kidney complication, with long-term current use of insulin ([facility])    Hyperlipidemia    CKD (chronic kidney disease) stage 3, GFR 30-59 ml/min ([facility])    Microalbuminuria    Urinary calculus    Tethered cord syndrome ([facility])    Stenosis of cervical spine    Lumbar radiculopathy    Lower back pain    HTN (hypertension)    Esophageal reflux    Depression with anxiety    ASCVD (arteriosclerotic cardiovascular disease)    Closed fracture of thoracic vertebra ([facility])    Intervertebral cervical disc disorder with myelopathy, cervical region    Pain in limb    Anemia    JAYLYN (acute kidney injury) ([address]    Iron deficiency anemia    Hydronephrosis determined by ultrasound    Longstanding persistent atrial fibrillation ([address]       Plan: Maintain Kaur catheter and stent for now. Following along with you. No other urological intervention recommended at this time.     Jeanna Middleton MD  6:44 AM 11/18/2021

## 2021-11-18 NOTE — PLAN OF CARE
Problem: SAFETY  Goal: Free from accidental physical injury  Outcome: Ongoing  Goal: Free from intentional harm  Outcome: Ongoing     Problem: DAILY CARE  Goal: Daily care needs are met  11/18/2021 0619 by Deandra Castro RN  Outcome: Ongoing  11/17/2021 1710 by Erica Ramirez RN  Outcome: Met This Shift     Problem: PAIN  Goal: Patient's pain/discomfort is manageable  11/18/2021 0619 by Deandra Castro RN  Outcome: Ongoing  11/17/2021 1710 by Erica Ramirez RN  Outcome: Met This Shift     Problem: SKIN INTEGRITY  Goal: Skin integrity is maintained or improved  11/18/2021 0619 by Deandra Castro RN  Outcome: Ongoing  11/17/2021 1710 by Erica Ramirez RN  Outcome: Met This Shift     Problem: KNOWLEDGE DEFICIT  Goal: Patient/S.O. demonstrates understanding of disease process, treatment plan, medications, and discharge instructions.   Outcome: Ongoing     Problem: DISCHARGE BARRIERS  Goal: Patient's continuum of care needs are met  Outcome: Ongoing     Problem: Falls - Risk of:  Goal: Will remain free from falls  11/18/2021 0619 by Deandra Castro RN  Outcome: Ongoing  11/17/2021 1710 by Erica Ramirez RN  Outcome: Met This Shift  Goal: Absence of physical injury  Outcome: Ongoing     Problem: Skin Integrity:  Goal: Will show no infection signs and symptoms  Outcome: Ongoing  Goal: Absence of new skin breakdown  11/18/2021 0619 by Deandra Castro RN  Outcome: Ongoing  11/17/2021 1710 by Erica Ramirez RN  Outcome: Met This Shift     Problem: Musculor/Skeletal Functional Status  Goal: Highest potential functional level  Outcome: Ongoing  Goal: Absence of falls  Outcome: Ongoing     Problem: Musculor/Skeletal Functional Status  Goal: Highest potential functional level  Outcome: Ongoing  Goal: Absence of falls  Outcome: Ongoing     Problem: Nutrition  Goal: Optimal nutrition therapy  Outcome: Ongoing     Problem: Pain:  Goal: Pain level will decrease  Outcome: Ongoing  Goal: Control of acute pain  Outcome: Ongoing  Goal: Control of chronic pain  Outcome: Ongoing     Problem: Fluid Volume - Imbalance:  Goal: Absence of imbalanced fluid volume signs and symptoms  11/18/2021 0619 by Kadie Avila RN  Outcome: Ongoing  11/17/2021 1710 by Lynn Yarbrough RN  Outcome: Ongoing     Problem: Gas Exchange - Impaired:  Goal: Levels of oxygenation will improve  Outcome: Ongoing

## 2021-11-18 NOTE — CARE COORDINATION
DISCHARGE PLANNING NOTE:    LSW following for return to [facility]. Pt had hazel cath placed 11/17 and received HD 11/17 and 11/18. Pt had thoracentesis with 1000ml removed on 11/17. Pt to  have MRI to day following stroke alert overnight. Pt is on O2 at 2lpm NC. Currently on IV cefepime.       Following for needs//JF

## 2021-11-18 NOTE — PROGRESS NOTES
Department of Internal Medicine  Nephrology Jorge Cole MD  Progress Note    Reason for consultation: Management of acute kidney injury. Consulting physician: Marcelo Chun MD.    Interval history: Patient was transferred to the ICU 2 days ago for hypotension after right ureteral stent placement. She required Levophed transiently. Stroke alert was called overnight as patient was poorly responsive- no acute intracranial findings noted. Acute dialysis was started yesterday for uremia -patient is anuric. Patient has increasing edema. Patient also required right thoracentesis for right pleural effusion with 1 L removed. History of present illness: This is a 66 y.o. female with a significant past medical history of Type 2 diabetes mellitus, coronary artery disease, lumbar radiculopathy, Hyperlipidemia and chronic kidney disease stage III [baseline serum creatinine 1.5 mg/dL], who presented from [facility] for further evaluation of acute kidney injury. She was sent to [facility] after developing nonhealing wound s/p cardiac catheterization via right refill Levophed has been discontinued approach with right groin wound. She had been on Bumex in the ECF and creatinine and BUN have been rising and Bumex was decreased to a lowest dose of 0.5 mg p.o. twice daily. She has had regular blood work performed in the [facility] and ECF contacted nephrologist after finding abnormal laboratory studies. She was noted to have a BUN/creatinine 104/2.28 mg/dL with hemoglobin 8 g/dL. She has mild confusion but is not in obvious respiratory distress.     Scheduled Meds:   pantoprazole  40 mg IntraVENous BID    And    sodium chloride flush  10 mL IntraVENous BID    sodium polystyrene  15 g Oral Once    midodrine  7.5 mg Oral TID    cefepime  1,000 mg IntraVENous Q24H    sertraline  25 mg Oral Daily    ferrous sulfate  325 mg Oral Daily with breakfast    [Held by provider] aspirin  81 mg Oral Daily    atorvastatin  80 mg Oral Daily    carvedilol  6.25 mg Oral BID     insulin glargine  22 Units SubCUTAneous Nightly    insulin lispro  0-12 Units SubCUTAneous TID WC    insulin lispro  0-6 Units SubCUTAneous Nightly    sodium chloride flush  5-40 mL IntraVENous 2 times per day     Continuous Infusions:   dextrose      norepinephrine Stopped (11/18/21 0502)    IV infusion builder 50 mL/hr at 11/18/21 0413    sodium chloride      sodium chloride       PRN Meds:.potassium chloride **OR** potassium alternative oral replacement **OR** potassium chloride, magnesium sulfate, glucose, dextrose, glucagon (rDNA), dextrose, acetaminophen, anticoagulant sodium citrate, anticoagulant sodium citrate, sodium chloride, sodium chloride flush, sodium chloride, acetaminophen, ondansetron **OR** ondansetron, oxyCODONE-acetaminophen    Physical Exam:    VITALS:  /77   Pulse 78   Temp 97.4 °F (36.3 °C) (Axillary)   Resp 20   Ht 5' 3\" (1.6 m) Comment: from old chart  Wt 213 lb 13.5 oz (97 kg)   SpO2 100%   BMI 37.88 kg/m²   24HR INTAKE/OUTPUT:      Intake/Output Summary (Last 24 hours) at 11/18/2021 1018  Last data filed at 11/18/2021 0537  Gross per 24 hour   Intake 1544.52 ml   Output 1070 ml   Net 474.52 ml     Constitutional: Lethargic but oriented to person and place on nasal cannula oxygen    Skin: Skin color, texture, turgor normal. No rashes or lesions    Head: Normocephalic, without obvious abnormality, atraumatic     Cardiovascular/Edema:S1 and S2 heard regular    Respiratory: Lungs:Diminished air entry basal rales    Abdomen: soft, non-tender; bowel sounds normal; no masses,  no organomegaly    Back: symmetric, no curvature. ROM normal. No CVA tenderness.     Extremities: 2+ generalized edema    Neuro:  Grossly normal    CBC:   Recent Labs     11/16/21 2027 11/17/21  0607 11/18/21  0501   WBC 9.8 8.6 7.4   HGB 8.9* 8.3* 8.4*    141* 108*     BMP:    Recent Labs     11/16/21 2027 11/17/21  4492 11/18/21  0501   * 138 141   K 5.2 4.8 4.1    111* 109*   CO2 15* 16* 23   BUN 86* 85* 61*   CREATININE 3.60* 3.53* 3.24*   GLUCOSE 103* 111* 99     Lab Results   Component Value Date    NITRU NEGATIVE 11/12/2021    COLORU Yellow 11/12/2021    PHUR 6.0 11/12/2021    WBCUA 50  11/12/2021    RBCUA 20 TO 50 11/12/2021    MUCUS NOT REPORTED 11/12/2021    TRICHOMONAS NOT REPORTED 11/12/2021    YEAST NOT REPORTED 11/12/2021    BACTERIA MODERATE 11/12/2021    CLARITYU Clear 09/07/2016    SPECGRAV 1.012 11/12/2021    LEUKOCYTESUR LARGE 11/12/2021    UROBILINOGEN Normal 11/12/2021    BILIRUBINUR NEGATIVE 11/12/2021    BLOODU Positive 09/07/2016    GLUCOSEU NEGATIVE 11/12/2021    KETUA NEGATIVE 11/12/2021    AMORPHOUS NOT REPORTED 11/12/2021     IMPRESSION/RECOMMENDATIONS:      1. Acute kidney injury - most consistent with Ischemic ATN and  obstructive nephropathy secondary to bilateral hydronephrosis. Renal ultrasound showed moderate to severe right hydronephrosis and mild to moderate left hydronephrosis. Patient is anuric Renal function is worsening  Started on acute dialysis on 11/17/2021. Plan: We will plan for acute dialysis second treatment today-attempt ultrafiltration of 2 to 2.5 kg as tolerated  Decrease IV fluids to 30 cc an hour  Avoid nephrotoxic agents. Strict input and output documentation. Basic metabolic profile daily. Renal ultrasound to assess for resolution of right hydronephrosis    2. Hypotension-continue  midodrine  7.5 mg 3 times daily when  mentation improves. 3. Bilateral hydronephrosis status post cystoscopy with right ureteral  stent placement. 4.  Anemia with thrombocytopenia-check LDH/retics  and haptoglobin-anticoagulant on hold    5. Anion gap metabolic acidosis improved with dialysis    6. Hyperkalemia-resolved    7. Right pleural effusion rule out parapneumonic effusion-s/p right thoracentesis    8.   Anasarca secondary to cardiomyopathy-EF of 30-35 %and renal failure -patient remains anuric    9. Acute metabolic encephalopathy uremia contributing    Prognosis is guarded.           MD CATHERINE Acevedo  Attending Nephrologist  11/18/2021 10:18 AM

## 2021-11-18 NOTE — FLOWSHEET NOTE
*Patient was not very responsive, her eyes were open, but did not say anything    *Writer said a prayer for patient    *Patient's sisters were sitting by her bedside         11/18/21 1147   Encounter Summary   Services provided to: Patient and family together   Referral/Consult From: Family   Support System Spouse   Continue Visiting   (11/18/21)   Complexity of Encounter Moderate   Length of Encounter 15 minutes   Spiritual Assessment Completed Yes   Grief and Life Adjustment   Type Adjustment to illness   Assessment Unable to respond   Intervention Prayer; Sustaining presence/ Ministry of presence   Outcome Receptive

## 2021-11-18 NOTE — PROGRESS NOTES
Comprehensive Nutrition Assessment    Type and Reason for Visit:  Reassess    Nutrition Recommendations/Plan:   Recommend Nepro 35 ml per hour to provide 1512 kcal kcal, 68 g protein  Recommend 1 protein modular daily. Combined, these will provide 1616 kcal, 94 g protein    Nutrition Assessment:  Pt is s/p (11/16) cystoscopy and ureteral stent. Post HD wt (11/17) was 102.9 kg. Pt sis consuming less than 50%. 1347: Pt made npo, consult received for initiation of Tube Feed. Malnutrition Assessment:  Malnutrition Status: At risk for malnutrition (Comment)    Context:  Acute Illness     Findings of the 6 clinical characteristics of malnutrition:  Energy Intake:  No significant decrease in energy intake  Weight Loss:  Unable to assess (no wt history)     Body Fat Loss:  No significant body fat loss     Muscle Mass Loss:  No significant muscle mass loss    Fluid Accumulation:  7 - Moderate to Severe Extremities   Strength:  Not Performed    Estimated Daily Nutrient Needs:  Energy (kcal):  15 kcal/kg= 8265-9510 kcal; Weight Used for Energy Requirements:  Current (92 kg)     Protein (g):  1g/kg= 90-95 g; Weight Used for Protein Requirements:  Current          Nutrition Related Findings:  RUE +3Pitting edema, LUE +2Pitting, BLE +1 pitting edema, Labs: K 4.1, Glu 99 (11/12) Phos 4.0, +smear from colostomy      Wounds:  Venous Stasis, Multiple       Current Nutrition Therapies:    ADULT ORAL NUTRITION SUPPLEMENT; Breakfast; Renal Oral Supplement  ADULT DIET; Regular; 5 carb choices (75 gm/meal); Low Sodium (2 gm)  ADULT ORAL NUTRITION SUPPLEMENT; Breakfast, Lunch, Dinner;  Low Calorie/High Protein Oral Supplement    Anthropometric Measures:  · Height: 5' 3\" (160 cm) (from old chart)  · Current Body Weight: 213 lb (96.6 kg)   · Admission Body Weight: 204 lb (92.5 kg)    Ideal Body Weight: 115 lbs;    Nutrition Diagnosis:   · Increased nutrient needs related to  (healing) as evidenced by wounds    Nutrition Interventions:   Food and/or Nutrient Delivery:  Modify Current Diet, Modify Oral Nutrition Supplement  Nutrition Education/Counseling:  No recommendation at this time   Coordination of Nutrition Care:  Continue to monitor while inpatient    Goals:  po intake greater than 75%       Nutrition Monitoring and Evaluation:   Food/Nutrient Intake Outcomes:  Food and Nutrient Intake, Supplement Intake  Physical Signs/Symptoms Outcomes:  Biochemical Data, GI Status, Skin, Weight, Fluid Status or Edema     Discharge Planning:    Continue current diet     Electronically signed by Abelina Lefort, RD, LD on 11/18/21 at 10:52 AM EST    Contact: [phone]

## 2021-11-18 NOTE — PROGRESS NOTES
Physical Therapy          Physical Therapy Cancel Note      DATE: 2021    NAME: Robert Merlos  MRN: [id number]   : 1950      Patient not seen this date for Physical Therapy due to:    Hold per RN. Will check back tomorrow.     Electronically signed by Ayaan Díaz PTA on 2021 at 3:46 PM

## 2021-11-18 NOTE — PROGRESS NOTES
Writer spoke with Dr. Lucy Yanez, neurointerventionalist, regarding stroke alert and pt currently down to CT. Neurointerventionalist recommending neurology consult and 2000 mg of IV depakote at this time.

## 2021-11-18 NOTE — PROGRESS NOTES
HEMODIALYSIS POST TREATMENT NOTE    Treatment time ordered: 2 Hours    Actual treatment time: 2 Hours    UltraFiltration Goal: 500 ml  UltraFiltration Removed: 500 ml      Pre Treatment weight: 103.4 kg  Post Treatment weight: 102.9 kg  Estimated Dry Weight:     Access used:     Central Venous Catheter:          Tunneled or Non-tunneled: Non Tunneled           Site: Right Neck          Access Flow: Good with lines reversed      Internal Access:       AV Fistula or AV Graft:          Site:        Access Flow:        Sign and symptoms of infection:        If YES:     Medications or blood products given: None    Chronic outpatient schedule: JAYLYN, Treatment # 1    Chronic outpatient unit: N/A    Summary of response to treatment: First hemodialysis treatment ran uneventful. Patient tolerated without need for BP interventions. Patient not on any pressor drips during dialysis. target fluid goal met at 500 ml removed per orders. lines ran well at 300 BFR in reverse. Explain if orders NOT met, was physician notified:      Tiffanie Marshall faxed to patient unit/ placed in patient chart: Yes    Post assessment completed: Yes    Report given to: Lazarus Palmer RN    * Intra-treatment documented Safety Checks include the followin) Access and face visible at all times. 2) All connections and blood lines are secure with no kinks. 3) NVL alarm engaged. 4) Hemosafe device applied (if applicable). 5) No collapse of Arterial or Venous blood chambers. 6) All blood lines / pump segments in the air detectors.

## 2021-11-18 NOTE — PROGRESS NOTES
HEMODIALYSIS POST TREATMENT NOTE    Treatment time ordered: 2.5Hrs    Actual treatment time: 2.5Hrs    UltraFiltration Goal: 1-2Kgs  UltraFiltration Removed: 2Kgs      Pre Treatment weight: 97Kgs  Post Treatment weight: 95Kgs  Estimated Dry Weight: JAYLYN not yet established    Access used:     Central Venous Catheter:          Tunneled or Non-tunneled: Non-Tunneled           Site: R neck          Access Flow: Good      Internal Access:       AV Fistula or AV Graft: N/A         Site: N/A       Access Flow: N/A       Sign and symptoms of infection: No       If YES: N/A    Medications or blood products given: N/A    Chronic outpatient schedule: JAYLYN trx#2 2021    Chronic outpatient unit: JAYLYN not yet established    Summary of response to treatment: Tolerated fairly well with BP and Hr dropping at start of trx, floor RN re-initiated Levo & Bipap and patient tolerated the rest of HD with no issues and 2Kgs of fluid removal.    Explain if orders NOT met, was physician notified:N/A      ACES flowsheet faxed to patient unit/ placed in patient chart: yes    Post assessment completed: yes by Hossein Nava RN    Report given to: [address] [place] [address] documented Safety Checks include the followin) Access and face visible at all times. 2) All connections and blood lines are secure with no kinks. 3) NVL alarm engaged. 4) Hemosafe device applied (if applicable). 5) No collapse of Arterial or Venous blood chambers. 6) All blood lines / pump segments in the air detectors.

## 2021-11-18 NOTE — PLAN OF CARE
Problem: DAILY CARE  Goal: Daily care needs are met  11/18/2021 1707 by Itzel Jean Baptiste RN  Outcome: Met This Shift  Patient and staff currently meeting all patient's daily care needs. Patient encouraged to participate and complete all ADLs per self. Will continue to monitor for opportunities for patient to meet all ADLs per self. Problem: SKIN INTEGRITY  Goal: Skin integrity is maintained or improved  11/18/2021 1707 by Itzel Jean Baptiste RN  Outcome: Met This Shift   Skin assessment complete. Coccyx reddened. Sensicare applied PRN. Turned and repositioned every two hours. Area kept free from moisture. Proper nourishment and fluids encouraged, as appropriate. Will continue to monitor for additional needs and changes in skin breakdown. Problem: Falls - Risk of:  Goal: Will remain free from falls  Description: Will remain free from falls  11/18/2021 1707 by Itzel Jean Baptiste RN  Outcome: Met This Shift  Pt assessed as a fall risk this shift. Remains free from falls and accidental injury at this time. Fall precautions in place, including falling star sign and fall risk band on pt. Floor free from obstacles, and bed is locked and in lowest position. Adequate lighting provided. Pt encouraged to call before getting OOB for any need. Bed alarm activated. Will continue to monitor needs during hourly rounding, and reinforce education on use of call light. Problem: Gas Exchange - Impaired:  Goal: Levels of oxygenation will improve  Description: Levels of oxygenation will improve  11/18/2021 1707 by Itzel Jean Baptiste RN  Outcome: Met This Shift  Patient maintained a patent airway this shift      Problem: Fluid Volume - Imbalance:  Goal: Absence of imbalanced fluid volume signs and symptoms  Description: Absence of imbalanced fluid volume signs and symptoms  11/18/2021 1707 by Itzel Jean Baptiste RN  Outcome: Ongoing  Patient still very swollen.

## 2021-11-18 NOTE — PLAN OF CARE
Please fill out the MRI Screening form and fax to dept @ [phone]. Any questions. .please call [facility] @ [phone]. MRI exam will be scheduled after receiving the completed screening form.  Thank you!!

## 2021-11-18 NOTE — PROGRESS NOTES
HEMODIALYSIS PRE-TREATMENT NOTE    Patient Identifiers prior to treatment: Name//MRN    Isolation Required: No                      Isolation Type: N/A       (please document if patient is being managed as a PUI/COVID-19 patient)        Hepatitis status:                           Date Drawn                             Result  Hepatitis B Surface Antigen 2021     NEG                     Hepatitis B Surface Antibody 2021 NEG        Hepatitis B Core Antibody 2021 NEG          How was Hepatitis Status verified: Epic chart     Was a copy of the labs you documented provided to facility for the patient's chart: yes    Hemodialysis orders verified: yes    Access Within normal limits ( I.e. s/s of infection,...): yes     Pre-Assessment completed: yes by Yobani Miner RN    Pre-dialysis report received from: Lisette Lott                      Time: 11:40

## 2021-11-18 NOTE — CARE COORDINATION
[facility] is continuing to follow this patient. Pre-cert to be started once the patient is closer to being ready for DC to Rehab. LETTY did speak with Glenda Officer in admissions and updated her on the patient condition. no indicators present

## 2021-11-18 NOTE — FLOWSHEET NOTE
*Writer called patient's  and he stated that he was a nervous wreck. Writer tried to give him some different resources to call for his present needs. He stated that he could only call once a day and they would only talk to him for ten minutes. Spiritual Care will follow as needed. 11/18/21 1238   Encounter Summary   Services provided to: Family   Referral/Consult From: Family   Support System Spouse   Continue Visiting   (11/18/21)   Complexity of Encounter Moderate   Length of Encounter 45 minutes   Grief and Life Adjustment   Type Adjustment to illness   Assessment Anxious; Fearful; Despair   Intervention Active listening; Explored feelings, thoughts, concerns; Nurtured hope; Sustaining presence/ Ministry of presence; Discussed illness/injury and it's impact   Outcome Coping;  Shared life review; Engaged in conversation

## 2021-11-18 NOTE — PLAN OF CARE
Nutrition Problem #1: Increased nutrient needs  Intervention: Food and/or Nutrient Delivery: Modify Current Diet, Modify Oral Nutrition Supplement  Nutritional Goals: po intake greater than 75%

## 2021-11-18 NOTE — CONSULTS
[facility] Neurology   IN-PATIENT SERVICE      NEUROLOGY CONSULT  NOTE            Date:   11/18/2021  Patient name:  Carmel Castro  Date of admission:  11/10/2021  YOB: 1950      Chief Complaint:     Chief Complaint   Patient presents with    Abnormal Lab     low hemoglobin and high CR       Reason for Consult:      Decreased level of responsiveness    History of Present Illness: The patient is a 66 y.o. female who presents with Abnormal Lab (low hemoglobin and high CR)  . The patient was seen and examined and the chart was reviewed. Patient initially admitted on 11/10 found to have severe acute kidney injury, anemia. Patient evaluated by urology for bilateral hydronephrosis. In addition she required PRBC transfusion for anemia and was evaluated by GI. She underwent EGD eventually and found to have gastritis. Taken for cystoscopy with right ureteral stent placement on 11/16 by urology. Patient was taken to medical ICU after procedure. At that time noted to be alert and oriented x3 per nursing report on evening of 11/16. Patient had worsening ABG with pH of 6.99 at 1 point and critical care pulmonology was consulted. BUN and creatinine also continue to worsen, nephrology following. Patient underwent acute hemodialysis on the evening of 11/17 due to worsening BUN and creatinine. In addition she underwent thoracentesis due to large right pleural effusion by IR. Patient then had stroke alert called around 2100 due to decreased level of responsiveness. She underwent CT head with no acute abnormalities. Recommendation was for MRI brain. This morning patient continues to be very lethargic. She will open her eyes to voice but does not track or follow commands. She does minimally grimace to pain when deep nailbed pressure is listed in all 4 extremities. Sisters are at bedside. Patient has been weaned off of Levophed at this time.   She has not received any sedating medications this morning. Past Medical History:     Past Medical History:   Diagnosis Date    Anemia, unspecified     CAD (coronary artery disease)     Chronic kidney disease     CKD (chronic kidney disease) stage 3, GFR 30-59 ml/min ([facility])     Closed fracture of dorsal (thoracic) vertebra without mention of spinal cord injury     Coronary atherosclerosis of unspecified type of vessel, native or graft     Depression with anxiety     Esophageal reflux     HTN (hypertension)     Hyperkalemia     Hyperlipidemia     Intervertebral cervical disc disorder with myelopathy, cervical region     Lower back pain     Lumbar radiculopathy     Microalbuminuria     Muscle weakness     Pain in limb     Stenosis of cervical spine     Tethered cord syndrome (HCC)     Type II or unspecified type diabetes mellitus without mention of complication, not stated as uncontrolled     Urinary calculus     UTI (lower urinary tract infection)         Past Surgical History:     Past Surgical History:   Procedure Laterality Date    ANGIOPLASTY      COLON SURGERY      CYSTOSCOPY Right 11/16/2021    CYSTOSCOPY URETERAL STENT INSERTION performed by Carlos A Markham MD at [address] [place] [address] IR NONTUNNELED VASCULAR CATHETER  11/17/2021    IR NONTUNNELED VASCULAR CATHETER 11/17/2021 [facility] SPECIAL PROCEDURES    JOINT REPLACEMENT      UPPER GASTROINTESTINAL ENDOSCOPY N/A 11/15/2021    EGD BIOPSY performed by Dianna Esposito MD at [facility]        Medications Prior to Admission:     Prior to Admission medications    Medication Sig Start Date End Date Taking?  Authorizing Provider   acetaminophen (TYLENOL) 325 MG tablet Take 650 mg by mouth every 4 hours as needed for Pain (do not exceed 4000 mg daily)   Yes Historical Provider, MD   albuterol (PROVENTIL) (2.5 MG/3ML) 0.083% nebulizer solution Take 2.5 mg by nebulization every 6 hours as needed for Shortness of Breath   Yes Historical Provider, MD   atorvastatin (LIPITOR) 80 MG tablet Take 80 mg by mouth daily   Yes Historical Provider, MD   famotidine (PEPCID) 20 MG tablet Take 20 mg by mouth daily   Yes Historical Provider, MD   ferrous sulfate (IRON 325) 325 (65 Fe) MG tablet Take 325 mg by mouth daily (with breakfast)   Yes Historical Provider, MD   HYDROcodone-acetaminophen (NORCO) 5-325 MG per tablet Take 1 tablet by mouth every 6 hours as needed for Pain. Indications: until 11/13/21   Yes Historical Provider, MD   insulin lispro (HUMALOG) 100 UNIT/ML injection vial Inject into the skin 4 times daily (before meals and nightly) Per sliding scale:   150-200 = 2 units  201-250 = 4 units  251-300 = 6 units  301-350 = 8 units  351-400 = 10 units   Yes Historical Provider, MD   insulin glargine (LANTUS SOLOSTAR) 100 UNIT/ML injection pen Inject 22 Units into the skin nightly   Yes Historical Provider, MD   meclizine (ANTIVERT) 12.5 MG tablet Take 12.5 mg by mouth 2 times daily as needed for Dizziness   Yes Historical Provider, MD   warfarin (COUMADIN) 3 MG tablet Take 3 mg by mouth nightly   Yes Historical Provider, MD   carvedilol (COREG) 6.25 MG tablet Take 6.25 mg by mouth 2 times daily (with meals). Yes Historical Provider, MD   aspirin 81 MG chewable tablet Take 81 mg by mouth daily    Yes Historical Provider, MD   bumetanide (BUMEX) 1 MG tablet Take 1 mg by mouth daily   11/10/21  Historical Provider, MD        Allergies:     Patient has no known allergies. Social History:     Tobacco:    reports that she has never smoked. She does not have any smokeless tobacco history on file. Alcohol:      reports no history of alcohol use. Drug Use:  reports no history of drug use. Family History:     Family History   Problem Relation Age of Onset    High Blood Pressure Other        Review of Systems:     Unable to obtain secondary to lethargic state.     Physical Exam:   /77   Pulse 78   Temp 97.4 °F (36.3 °C) (Axillary)   Resp 20   Ht 5' 3\" (1.6 m) Comment: from old chart  Wt 213 lb 13.5 oz (97 kg) SpO2 100%   BMI 37.88 kg/m²   Temp (24hrs), Av.5 °F (36.4 °C), Min:97.3 °F (36.3 °C), Max:98 °F (36.7 °C)        General examination:      General Appearance: Awake, well appearing, nontoxic, and in no acute distress  HEENT: Normocephalic, atraumatic, IJ Jose Carlos catheter in place  Neck: supple, no carotid bruits, (-) nuchal rigidity  Lungs:  Respirations unlabored, chest wall no deformity  Cardiovascular: normal rate, regular rhythm  Abdomen: Soft, nontender, nondistended, normal bowel sounds  Skin: No gross lesions, rashes, bruising or bleeding on exposed skin area  Extremities:  peripheral pulses palpable, no cyanosis, clubbing or edema      Neurological examination:    Mental status   Patient is lethargic. Eyes open to sternal rub and voice. Off sedation. Not following commands.       Cranial nerves   Pupil response:            Present     Oculocephalic reflex:   Present     Corneal Reflex:            Present     Facial grimace to pin:  Present        Motor and sensory function  No spontaneous limb movements  No posturing    Minimal withdrawal to deep nail bed pressure in all extremities    Tone: Normal      DTR symmetric 1/4 throughout  Plantar response: Downgoing  (-) Jarrell's sign bilaterally    Gait  Not tested due to lethargic state           Diagnostics:      Laboratory Testing:  CBC:   Recent Labs     21  0607 21  0501   WBC 9.8 8.6 7.4   HGB 8.9* 8.3* 8.4*    141* 108*     BMP:    Recent Labs     21  0607 21  0501   * 138 141   K 5.2 4.8 4.1    111* 109*   CO2 15* 16* 23   BUN 86* 85* 61*   CREATININE 3.60* 3.53* 3.24*   GLUCOSE 103* 111* 99         Lab Results   Component Value Date    ALT 31 2021    AST 21 2021    INR 1.3 2021    LABA1C 7.8 (H) 2013    [id number] 877 2021       Imaging/Diagnostics:    CT head: No acute intracranial abnormalities    MRI brain: Pending     I personally reviewed all of the above medications, clinical laboratory, imaging and other diagnostic tests. Impression:      Acute metabolic encephalopathy in the setting of hypotension requiring vasopressors, acute kidney injury, metabolic acidosis, recent cystoscopy with right ureteral stent placement, anemia  Atrial fibrillation; currently not on anticoagulation secondary to anemia    Plan:     MRI brain without contrast; patient at risk for stroke as she is not anticoagulated currently  Continue current treatment of JAYLYN as per nephrology recommendations  Critical care recommendations regarding respiratory failure  Patient will likely require time to improve neurologically assuming her MRI brain is negative due to multiple metabolic derangements  Discussed with family at bedside  We will follow       Thank you for this very interesting consultation.       Electronically signed by Patricia Castano DO on 11/18/2021 at 9:47 AM      Patricia Castano [address] [place] [address]

## 2021-11-18 NOTE — PROGRESS NOTES
RN spoke to Dr. Abraham Lozano in regards to patient having runs of V-tach (see strips) while on dialysis. Patient has missed two doeses of scheduled PO coreg due to patient being to lethargic to take, risk for aspiration. Orders received to hold PO coreg for now until patient is able to resume oral intake. Add IV Metoprolol 5 mg q 6hr.    Hold for SBP <110 and HR <60      (See orders)

## 2021-11-19 ENCOUNTER — APPOINTMENT (OUTPATIENT)
Dept: GENERAL RADIOLOGY | Age: 71
DRG: 004 | End: 2021-11-19
Payer: COMMERCIAL

## 2021-11-19 LAB
ABSOLUTE EOS #: 0 K/UL (ref 0–0.4)
ABSOLUTE IMMATURE GRANULOCYTE: ABNORMAL K/UL (ref 0–0.3)
ABSOLUTE LYMPH #: 1.2 K/UL (ref 1–4.8)
ABSOLUTE MONO #: 0.7 K/UL (ref 0.1–1.3)
ABSOLUTE RETIC #: 0.06 M/UL (ref 0.02–0.1)
ALLEN TEST: ABNORMAL
ANION GAP SERPL CALCULATED.3IONS-SCNC: 9 MMOL/L (ref 9–17)
BASOPHILS # BLD: 0 % (ref 0–2)
BASOPHILS ABSOLUTE: 0 K/UL (ref 0–0.2)
BUN BLDV-MCNC: 45 MG/DL (ref 8–23)
BUN/CREAT BLD: ABNORMAL (ref 9–20)
CALCIUM SERPL-MCNC: 7.6 MG/DL (ref 8.6–10.4)
CARBOXYHEMOGLOBIN: 0.7 % (ref 0–5)
CHLORIDE BLD-SCNC: 106 MMOL/L (ref 98–107)
CHLORIDE, UR: 64 MMOL/L
CO2: 25 MMOL/L (ref 20–31)
CREAT SERPL-MCNC: 3.08 MG/DL (ref 0.5–0.9)
CREATININE URINE: 73.9 MG/DL (ref 28–217)
DIFFERENTIAL TYPE: ABNORMAL
EOSINOPHILS RELATIVE PERCENT: 0 % (ref 0–4)
FIO2: ABNORMAL
GFR AFRICAN AMERICAN: 18 ML/MIN
GFR NON-AFRICAN AMERICAN: 15 ML/MIN
GFR SERPL CREATININE-BSD FRML MDRD: ABNORMAL ML/MIN/{1.73_M2}
GFR SERPL CREATININE-BSD FRML MDRD: ABNORMAL ML/MIN/{1.73_M2}
GLUCOSE BLD-MCNC: 104 MG/DL (ref 65–105)
GLUCOSE BLD-MCNC: 114 MG/DL (ref 70–99)
GLUCOSE BLD-MCNC: 122 MG/DL (ref 65–105)
GLUCOSE BLD-MCNC: 140 MG/DL (ref 65–105)
GLUCOSE BLD-MCNC: 93 MG/DL (ref 65–105)
HAPTOGLOBIN: 125 MG/DL (ref 30–200)
HCO3 ARTERIAL: 26.9 MMOL/L (ref 22–26)
HCT VFR BLD CALC: 24.8 % (ref 36–46)
HEMOGLOBIN: 8 G/DL (ref 12–16)
IMMATURE GRANULOCYTES: ABNORMAL %
IMMATURE RETIC FRACT: ABNORMAL %
LACTATE DEHYDROGENASE: 175 U/L (ref 135–214)
LYMPHOCYTES # BLD: 20 % (ref 24–44)
MAGNESIUM: 1.9 MG/DL (ref 1.6–2.6)
MCH RBC QN AUTO: 30.9 PG (ref 26–34)
MCHC RBC AUTO-ENTMCNC: 32.2 G/DL (ref 31–37)
MCV RBC AUTO: 95.8 FL (ref 80–100)
METHEMOGLOBIN: 0.9 % (ref 0–1.9)
MODE: ABNORMAL
MONOCYTES # BLD: 12 % (ref 1–7)
NEGATIVE BASE EXCESS, ART: 0.4 MMOL/L (ref 0–2)
NOTIFICATION TIME: ABNORMAL
NOTIFICATION: ABNORMAL
NRBC AUTOMATED: ABNORMAL PER 100 WBC
O2 DEVICE/FLOW/%: ABNORMAL
O2 SAT, ARTERIAL: 94.5 % (ref 95–98)
OXYHEMOGLOBIN: ABNORMAL % (ref 95–98)
PATIENT TEMP: 37
PCO2 ARTERIAL: 61.6 MMHG (ref 35–45)
PCO2, ART, TEMP ADJ: ABNORMAL (ref 35–45)
PDW BLD-RTO: 15.9 % (ref 11.5–14.9)
PEEP/CPAP: ABNORMAL
PH ARTERIAL: 7.25 (ref 7.35–7.45)
PH, ART, TEMP ADJ: ABNORMAL (ref 7.35–7.45)
PLATELET # BLD: 68 K/UL (ref 150–450)
PLATELET ESTIMATE: ABNORMAL
PMV BLD AUTO: 9.3 FL (ref 6–12)
PO2 ARTERIAL: 81 MMHG (ref 80–100)
PO2, ART, TEMP ADJ: ABNORMAL MMHG (ref 80–100)
POSITIVE BASE EXCESS, ART: ABNORMAL MMOL/L (ref 0–2)
POTASSIUM SERPL-SCNC: 3.7 MMOL/L (ref 3.7–5.3)
POTASSIUM, UR: 9.9 MMOL/L
PSV: ABNORMAL
PT. POSITION: ABNORMAL
RBC # BLD: 2.58 M/UL (ref 4–5.2)
RBC # BLD: ABNORMAL 10*6/UL
RESPIRATORY RATE: 24
RETIC %: 2.5 % (ref 0.5–2)
RETIC HEMOGLOBIN: ABNORMAL PG (ref 28.2–35.7)
SAMPLE SITE: ABNORMAL
SEG NEUTROPHILS: 68 % (ref 36–66)
SEGMENTED NEUTROPHILS ABSOLUTE COUNT: 3.8 K/UL (ref 1.3–9.1)
SET RATE: ABNORMAL
SODIUM BLD-SCNC: 140 MMOL/L (ref 135–144)
SODIUM,UR: 103 MMOL/L
SURGICAL PATHOLOGY REPORT: NORMAL
TEXT FOR RESPIRATORY: ABNORMAL
TOTAL HB: ABNORMAL G/DL (ref 12–16)
TOTAL RATE: ABNORMAL
VT: ABNORMAL
WBC # BLD: 5.7 K/UL (ref 3.5–11)
WBC # BLD: ABNORMAL 10*3/UL

## 2021-11-19 PROCEDURE — 2500000003 HC RX 250 WO HCPCS: Performed by: INTERNAL MEDICINE

## 2021-11-19 PROCEDURE — 83010 ASSAY OF HAPTOGLOBIN QUANT: CPT

## 2021-11-19 PROCEDURE — 85045 AUTOMATED RETICULOCYTE COUNT: CPT

## 2021-11-19 PROCEDURE — 83735 ASSAY OF MAGNESIUM: CPT

## 2021-11-19 PROCEDURE — 36600 WITHDRAWAL OF ARTERIAL BLOOD: CPT

## 2021-11-19 PROCEDURE — 84300 ASSAY OF URINE SODIUM: CPT

## 2021-11-19 PROCEDURE — 90935 HEMODIALYSIS ONE EVALUATION: CPT

## 2021-11-19 PROCEDURE — 82805 BLOOD GASES W/O2 SATURATION: CPT

## 2021-11-19 PROCEDURE — 6370000000 HC RX 637 (ALT 250 FOR IP): Performed by: UROLOGY

## 2021-11-19 PROCEDURE — 2580000003 HC RX 258: Performed by: UROLOGY

## 2021-11-19 PROCEDURE — 2000000000 HC ICU R&B

## 2021-11-19 PROCEDURE — 94761 N-INVAS EAR/PLS OXIMETRY MLT: CPT

## 2021-11-19 PROCEDURE — 2580000003 HC RX 258: Performed by: INTERNAL MEDICINE

## 2021-11-19 PROCEDURE — 82947 ASSAY GLUCOSE BLOOD QUANT: CPT

## 2021-11-19 PROCEDURE — 99232 SBSQ HOSP IP/OBS MODERATE 35: CPT | Performed by: PSYCHIATRY & NEUROLOGY

## 2021-11-19 PROCEDURE — 82570 ASSAY OF URINE CREATININE: CPT

## 2021-11-19 PROCEDURE — 71045 X-RAY EXAM CHEST 1 VIEW: CPT

## 2021-11-19 PROCEDURE — 84133 ASSAY OF URINE POTASSIUM: CPT

## 2021-11-19 PROCEDURE — 85025 COMPLETE CBC W/AUTO DIFF WBC: CPT

## 2021-11-19 PROCEDURE — 6360000002 HC RX W HCPCS: Performed by: INTERNAL MEDICINE

## 2021-11-19 PROCEDURE — 2700000000 HC OXYGEN THERAPY PER DAY

## 2021-11-19 PROCEDURE — 94660 CPAP INITIATION&MGMT: CPT

## 2021-11-19 PROCEDURE — 80048 BASIC METABOLIC PNL TOTAL CA: CPT

## 2021-11-19 PROCEDURE — 36415 COLL VENOUS BLD VENIPUNCTURE: CPT

## 2021-11-19 PROCEDURE — 6360000002 HC RX W HCPCS: Performed by: FAMILY MEDICINE

## 2021-11-19 PROCEDURE — C9113 INJ PANTOPRAZOLE SODIUM, VIA: HCPCS | Performed by: FAMILY MEDICINE

## 2021-11-19 PROCEDURE — 86022 PLATELET ANTIBODIES: CPT

## 2021-11-19 PROCEDURE — 97110 THERAPEUTIC EXERCISES: CPT

## 2021-11-19 PROCEDURE — P9047 ALBUMIN (HUMAN), 25%, 50ML: HCPCS | Performed by: INTERNAL MEDICINE

## 2021-11-19 PROCEDURE — 2580000003 HC RX 258: Performed by: FAMILY MEDICINE

## 2021-11-19 PROCEDURE — 83615 LACTATE (LD) (LDH) ENZYME: CPT

## 2021-11-19 PROCEDURE — 6370000000 HC RX 637 (ALT 250 FOR IP): Performed by: INTERNAL MEDICINE

## 2021-11-19 PROCEDURE — 82436 ASSAY OF URINE CHLORIDE: CPT

## 2021-11-19 PROCEDURE — 6370000000 HC RX 637 (ALT 250 FOR IP): Performed by: NURSE PRACTITIONER

## 2021-11-19 RX ORDER — CARVEDILOL 3.12 MG/1
3.12 TABLET ORAL 2 TIMES DAILY WITH MEALS
Status: DISCONTINUED | OUTPATIENT
Start: 2021-11-19 | End: 2021-12-14 | Stop reason: HOSPADM

## 2021-11-19 RX ORDER — SODIUM CHLORIDE 450 MG/100ML
INJECTION, SOLUTION INTRAVENOUS CONTINUOUS
Status: DISCONTINUED | OUTPATIENT
Start: 2021-11-19 | End: 2021-11-20

## 2021-11-19 RX ORDER — MIDODRINE HYDROCHLORIDE 5 MG/1
5 TABLET ORAL
Status: COMPLETED | OUTPATIENT
Start: 2021-11-19 | End: 2021-11-19

## 2021-11-19 RX ORDER — ALBUMIN (HUMAN) 12.5 G/50ML
25 SOLUTION INTRAVENOUS ONCE
Status: COMPLETED | OUTPATIENT
Start: 2021-11-19 | End: 2021-11-19

## 2021-11-19 RX ORDER — ALBUMIN (HUMAN) 12.5 G/50ML
25 SOLUTION INTRAVENOUS
Status: COMPLETED | OUTPATIENT
Start: 2021-11-19 | End: 2021-11-19

## 2021-11-19 RX ADMIN — SERTRALINE HYDROCHLORIDE 25 MG: 50 TABLET ORAL at 09:40

## 2021-11-19 RX ADMIN — CEFEPIME HYDROCHLORIDE 1000 MG: 1 INJECTION, POWDER, FOR SOLUTION INTRAMUSCULAR; INTRAVENOUS at 20:17

## 2021-11-19 RX ADMIN — MIDODRINE HYDROCHLORIDE 7.5 MG: 5 TABLET ORAL at 20:15

## 2021-11-19 RX ADMIN — CARVEDILOL 3.12 MG: 3.12 TABLET, FILM COATED ORAL at 10:01

## 2021-11-19 RX ADMIN — FERROUS SULFATE TAB 325 MG (65 MG ELEMENTAL FE) 325 MG: 325 (65 FE) TAB at 12:52

## 2021-11-19 RX ADMIN — Medication 1.3 ML: at 15:37

## 2021-11-19 RX ADMIN — PANTOPRAZOLE SODIUM 40 MG: 40 INJECTION, POWDER, FOR SOLUTION INTRAVENOUS at 20:28

## 2021-11-19 RX ADMIN — PANTOPRAZOLE SODIUM 40 MG: 40 INJECTION, POWDER, FOR SOLUTION INTRAVENOUS at 09:39

## 2021-11-19 RX ADMIN — SODIUM CHLORIDE 30 ML/HR: 4.5 INJECTION, SOLUTION INTRAVENOUS at 12:55

## 2021-11-19 RX ADMIN — CARVEDILOL 3.12 MG: 3.12 TABLET, FILM COATED ORAL at 20:15

## 2021-11-19 RX ADMIN — METOPROLOL TARTRATE 5 MG: 1 INJECTION, SOLUTION INTRAVENOUS at 09:42

## 2021-11-19 RX ADMIN — SODIUM CHLORIDE, PRESERVATIVE FREE 10 ML: 5 INJECTION INTRAVENOUS at 20:28

## 2021-11-19 RX ADMIN — SODIUM CHLORIDE, PRESERVATIVE FREE 10 ML: 5 INJECTION INTRAVENOUS at 10:11

## 2021-11-19 RX ADMIN — MIDODRINE HYDROCHLORIDE 7.5 MG: 5 TABLET ORAL at 15:25

## 2021-11-19 RX ADMIN — ALBUMIN (HUMAN) 25 G: 0.25 INJECTION, SOLUTION INTRAVENOUS at 12:47

## 2021-11-19 RX ADMIN — MIDODRINE HYDROCHLORIDE 5 MG: 5 TABLET ORAL at 12:52

## 2021-11-19 RX ADMIN — Medication 1.2 ML: at 15:37

## 2021-11-19 RX ADMIN — ALBUMIN (HUMAN) 25 G: 0.25 INJECTION, SOLUTION INTRAVENOUS at 13:32

## 2021-11-19 RX ADMIN — ATORVASTATIN CALCIUM 80 MG: 80 TABLET, FILM COATED ORAL at 09:40

## 2021-11-19 RX ADMIN — SODIUM BICARBONATE: 84 INJECTION, SOLUTION INTRAVENOUS at 05:48

## 2021-11-19 RX ADMIN — SODIUM CHLORIDE, PRESERVATIVE FREE 10 ML: 5 INJECTION INTRAVENOUS at 23:03

## 2021-11-19 RX ADMIN — MIDODRINE HYDROCHLORIDE 7.5 MG: 5 TABLET ORAL at 09:40

## 2021-11-19 RX ADMIN — INSULIN LISPRO 2 UNITS: 100 INJECTION, SOLUTION INTRAVENOUS; SUBCUTANEOUS at 12:54

## 2021-11-19 ASSESSMENT — PAIN SCALES - GENERAL: PAINLEVEL_OUTOF10: 0

## 2021-11-19 NOTE — PROGRESS NOTES
[facility]    INPATIENT OCCUPATIONAL THERAPY  PROGRESS NOTE  Date: 2021  Patient Name: Carmel Castro      Room:   MRN: [id number]    : 1950  (76 y.o.) Gender: female     Discharge Recommendations:  Further Occupational Therapy is recommended upon facility discharge. Referring Practitioner: Dania Gallo MD  Diagnosis: JAYLYN, UTI, anemia  General  Chart Reviewed: Yes, Progress Notes  Patient assessed for rehabilitation services?: Yes  Family / Caregiver Present:  (patient's spouse present on phone to answer questions PRN)  Referring Practitioner: Dania Gallo MD  Diagnosis: JAYLYN, UTI, anemia    Restrictions  Restrictions/Precautions: Fall Risk (O2 at 2L, colostomy, PICC, external urinary catheter)  Implants present? : Metal implants (cardiac stents, L hip titanium nail)  Other position/activity restrictions: up w/ assist  Required Braces or Orthoses?: No      Subjective  Subjective: Pt opens eyes with heavy tactile cues and sternal rubs. Otherwise eyes remain closed throughout tx. Comments: SACHIN mcgee's tx. Patient Currently in Pain: No  Overall Orientation Status: Impaired  Orientation Level: Unable to assess          Objective  Cognition  Overall Cognitive Status: WFL               ADL  Feeding: Dependent/Total  Grooming: Dependent/Total  UE Bathing: Dependent/Total  LE Bathing: Dependent/Total  UE Dressing: Dependent/Total  LE Dressing: Dependent/Total  Toileting: Dependent/Total  Additional Comments: ADL scores based on skilled observations and clinical reasoning unless otherwise noted. Patient requires significant assist with self-care due to weakness and decreased activity tolerance. Kaur catheter;  TA for bowel mgt/linen change. Type of ROM/Therapeutic Exercise  Type of ROM/Therapeutic Exercise: PROM  Comment: Pt instruction in BUE PROM for facilitation of decreased edema in BUEs and improved functional responses.  Pt tolerates distal UE exercises and is resistive to shouler movements. Completed X12 each. MONROY provided max audio and tactile cues throughout tx with pt intermittenly opening eyes. Exercises  Elbow Flexion: x  Elbow Extension: x  Supination: x  Pronation: x  Wrist Flexion: x  Wrist Extension: x  Finger Flexion: x  Finger Extension: x                          Assessment  Performance deficits / Impairments: Decreased functional mobility ; Decreased ADL status; Decreased ROM; Decreased strength; Decreased safe awareness; Decreased endurance; Decreased balance; Decreased fine motor control  Assessment: resume OT POC  Prognosis: Good  Discharge Recommendations: Patient would benefit from continued therapy after discharge  Activity Tolerance: Patient limited by fatigue  Safety Devices in place: Yes  Type of devices: Left in bed; Call light within reach; Nurse notified             Patient Education: OT POC, importance of daily ROM and participation in therapy for optimal rehab. Learner:patient  Method: explanation       Outcome: needs reinforcement     Plan  Safety Devices  Safety Devices in place: Yes  Type of devices: Left in bed, Call light within reach, Nurse notified  Plan  Times per week: 2-4  Times per day: Daily  Current Treatment Recommendations: Self-Care / ADL, Home Management Training, Strengthening, Balance Training, ROM, Endurance Training, Pain Management, Safety Education & Training, Patient/Caregiver Education & Training, Equipment Evaluation, Education, & procurement, Positioning      Goals  Short term goals  Time Frame for Short term goals: By discharge  Short term goal 1: Patient will actively participate in 15+ minutes of self-care to the best of patient's ability with Minimal verbal cues for attention to task. Short term goal 2: Patient will actively participate in 15-25 minutes of therapeutic exercise/functional activities to promote increased IND with self-care and mobility.   Short term goal 3: Patient will perform grooming with setup. Short term goal 4: Patient will perform bed mobility with Moderate assist x 2 to sit EOB for 5+ minutes with Min A while attempting to engage in self-care.     OT Individual Minutes  Time In: 7682  Time Out: 6603  Minutes: 13      Electronically signed by FABIANA Ellison on 11/19/21 at 1:10 PM EST

## 2021-11-19 NOTE — PROGRESS NOTES
Writer at bedside with Dr. Dennis Mckeon. Updated on NGT placement. Orders received to re-start PO coreg 3.125 mg BID.

## 2021-11-19 NOTE — PLAN OF CARE
Problem: SAFETY  Goal: Free from accidental physical injury  Outcome: Ongoing  Goal: Free from intentional harm  Outcome: Ongoing     Problem: DAILY CARE  Goal: Daily care needs are met  11/19/2021 0350 by Walt Renae RN  Outcome: Ongoing  11/18/2021 1707 by Jayson Amezquita RN  Outcome: Met This Shift     Problem: PAIN  Goal: Patient's pain/discomfort is manageable  Outcome: Ongoing     Problem: SKIN INTEGRITY  Goal: Skin integrity is maintained or improved  11/19/2021 0350 by Walt Renae RN  Outcome: Ongoing  11/18/2021 1707 by Jayson Amezquita RN  Outcome: Met This Shift     Problem: KNOWLEDGE DEFICIT  Goal: Patient/S.O. demonstrates understanding of disease process, treatment plan, medications, and discharge instructions.   Outcome: Ongoing     Problem: DISCHARGE BARRIERS  Goal: Patient's continuum of care needs are met  Outcome: Ongoing     Problem: Falls - Risk of:  Goal: Will remain free from falls  11/19/2021 0350 by Walt Renae RN  Outcome: Ongoing  11/18/2021 1707 by Jayson Amezquita RN  Outcome: Met This Shift  Goal: Absence of physical injury  Outcome: Ongoing     Problem: Skin Integrity:  Goal: Will show no infection signs and symptoms  Outcome: Ongoing  Goal: Absence of new skin breakdown  11/19/2021 0350 by Walt Renae RN  Outcome: Ongoing  11/18/2021 1707 by Jayson Amezquita RN  Outcome: Met This Shift     Problem: Musculor/Skeletal Functional Status  Goal: Highest potential functional level  Outcome: Ongoing  Goal: Absence of falls  Outcome: Ongoing     Problem: Musculor/Skeletal Functional Status  Goal: Highest potential functional level  Outcome: Ongoing  Goal: Absence of falls  Outcome: Ongoing     Problem: Nutrition  Goal: Optimal nutrition therapy  Outcome: Ongoing     Problem: Pain:  Goal: Pain level will decrease  Outcome: Ongoing  Goal: Control of acute pain  Outcome: Ongoing  Goal: Control of chronic pain  Outcome: Ongoing     Problem: Gas Exchange - Impaired:  Goal: Levels of oxygenation will improve  11/19/2021 0350 by Ulises Silveira RN  Outcome: Ongoing  11/18/2021 1707 by Cornelio Grimes RN  Outcome: Met This Shift     Problem: Fluid Volume - Imbalance:  Goal: Absence of imbalanced fluid volume signs and symptoms  11/19/2021 0350 by Ulises Silveira RN  Outcome: Ongoing  11/18/2021 1707 by Cornelio Grimes RN  Outcome: Ongoing

## 2021-11-19 NOTE — CARE COORDINATION
[facility] still following this patient and they will start pre-cert once the patient is closer to her DC.

## 2021-11-19 NOTE — PROGRESS NOTES
Physical Therapy        Physical Therapy Cancel Note      DATE: 2021    NAME: Molly Hernandez  MRN: [id number]   : 1950      Patient not seen this date for Physical Therapy due to:    Hemodialysis:      Electronically signed by Jann Vogel PTA on 2021 at 4:47 PM

## 2021-11-19 NOTE — PROGRESS NOTES
ICU Progress Note (Non-Vent)  [facility]    Patient - Alyson Montiel,  Age - 66 y.o.    - 1950      Room Number -    MRN -  [id number]   Acct # - [de-identified]  Date of Admission -  11/10/2021  4:23 PM    Events of Past 24 Hours   Patient she was nodding her head appropriately when I examined her but not following commands sharply. No accessory muscle use no abdominal breathing    Vitals    height is 5' 3\" (1.6 m) and weight is 207 lb 14.3 oz (94.3 kg). Her axillary temperature is 97.6 °F (36.4 °C). Her blood pressure is 122/71 and her pulse is 80. Her respiration is 23 and oxygen saturation is 97%.        Temperature Range: Temp: 97.6 °F (36.4 °C) Temp  Av.6 °F (36.4 °C)  Min: 97.3 °F (36.3 °C)  Max: 97.8 °F (36.6 °C)  BP Range:  Systolic (70ORS), DYM:973 , Min:52 , BGP:142     Diastolic (62XWI), AKW:92, Min:16, Max:83    Pulse Range: Pulse  Av.6  Min: 67  Max: 102  Respiration Range: Resp  Av.8  Min: 14  Max: 29  Current Pulse Ox[de-identified]  SpO2: 97 %  24HR Pulse Ox Range:  SpO2  Av.3 %  Min: 93 %  Max: 100 %  Oxygen Amount and Delivery: O2 Flow Rate (L/min): 2 L/min    Wt Readings from Last 3 Encounters:   21 207 lb 14.3 oz (94.3 kg)   16 178 lb (80.7 kg)   16 184 lb 12.8 oz (83.8 kg)     I/O       Intake/Output Summary (Last 24 hours) at 2021 1109  Last data filed at 2021 0800  Gross per 24 hour   Intake 1269.55 ml   Output 2560 ml   Net -1290.45 ml     DRAIN/TUBE OUTPUT       Invasive Lines   ICP PRESSURE RANGE  No data recorded  CVP PRESSURE RANGE  No data recorded      Medications      carvedilol  3.125 mg Oral BID WC    pantoprazole  40 mg IntraVENous BID    And    sodium chloride flush  10 mL IntraVENous BID    sodium polystyrene  15 g Oral Once    midodrine  7.5 mg Oral TID    cefepime  1,000 mg IntraVENous Q24H    sertraline  25 mg Oral Daily    ferrous sulfate  325 mg Oral Daily with breakfast    [Held by provider] aspirin  81 mg Oral Daily    atorvastatin  80 mg Oral Daily    insulin glargine  22 Units SubCUTAneous Nightly    insulin lispro  0-12 Units SubCUTAneous TID WC    insulin lispro  0-6 Units SubCUTAneous Nightly    sodium chloride flush  5-40 mL IntraVENous 2 times per day     potassium chloride **OR** potassium alternative oral replacement **OR** potassium chloride, magnesium sulfate, glucose, dextrose, glucagon (rDNA), dextrose, acetaminophen, anticoagulant sodium citrate, anticoagulant sodium citrate, sodium chloride, sodium chloride flush, sodium chloride, acetaminophen, ondansetron **OR** ondansetron, oxyCODONE-acetaminophen  IV Drips/Infusions   sodium chloride      dextrose      norepinephrine Stopped (11/19/21 0002)    sodium chloride      sodium chloride         Diet/Nutrition   Diet NPO  ADULT TUBE FEEDING; Nasogastric; Renal Formula; Continuous; 10; Yes; 10; Q 6 hours; 35; 30; Q 4 hours; Protein; give 1 protein modular daily at noon    Exam      Constitutional - Alert, arousable  General Appearance  well developed, well nourished  HEENT -normocephalic, atraumatic. Lungs - Chest expands equally, no wheezes  Cardiovascular - Heart sounds are normal.  normal rate and rhythm regular, no murmur, gallop or rub.   Abdomen - soft, nontender,  Skin - no bruising or bleeding  Extremities - no cyanosis, clubbing    Lab Results   CBC     Lab Results   Component Value Date    WBC 5.7 11/19/2021    RBC 2.58 11/19/2021    RBC 4.14 08/31/2016    HGB 8.0 11/19/2021    HCT 24.8 11/19/2021    PLT 68 11/19/2021    MCV 95.8 11/19/2021    MCH 30.9 11/19/2021    MCHC 32.2 11/19/2021    RDW 15.9 11/19/2021    LYMPHOPCT 20 11/19/2021    LYMPHOPCT 32.8 08/31/2016    MONOPCT 12 11/19/2021    EOSPCT 4.3 08/31/2016    BASOPCT 0 11/19/2021    BASOPCT 0.7 08/31/2016    MONOSABS 0.70 11/19/2021    LYMPHSABS 1.20 11/19/2021    EOSABS 0.00 mmHg  2D echo reveals evidence of a right to left intra-atrial shunt  Cardiology evaluation suggested      Gastrointestinal       Renal   Dialysis planned for today. Infectious Disease       Hematology/Oncology   On Maxipime    Endocrine       Social/Spiritual/DNR/Disposition/Other   Following closely with other services. Patient's  Mr. Wesley Riley, updated. --- Home # [phone], cell # [phone]. Prognosis is somewhat guarded. Extensive conversation took place. Multiple questions answered. Patient is a full code. Critical Care Time   45 min    Electronically signed by Camilo Moore MD on 11/19/2021 at 11:09 AM      Of note, we did check a chest x-ray during dialysis. No acute change from the film done yesterday.

## 2021-11-19 NOTE — PROGRESS NOTES
Progress Note  Date:2021       Room:  Patient Cristina Mcbride     YOB: 1950     Age:71 y.o. Subjective    Subjective     No acute events overnight, reviewed with bedside RN. Patient drowsy,  brieflyawakens with verbal stimuli. She denies new or progressive symptoms overnight. Telemetry reviewed, rate controlled Afib with occasional PVCs. Review of Systems     See HPI      Objective         Vitals Last 24 Hours:  TEMPERATURE:  Temp  Av.6 °F (36.4 °C)  Min: 97.3 °F (36.3 °C)  Max: 97.8 °F (36.6 °C)  RESPIRATIONS RANGE: Resp  Av.8  Min: 14  Max: 29  PULSE OXIMETRY RANGE: SpO2  Av.3 %  Min: 93 %  Max: 100 %  PULSE RANGE: Pulse  Av.5  Min: 67  Max: 102  BLOOD PRESSURE RANGE: Systolic (95YPN), JFE:933 , Min:52 , LQI:610   ; Diastolic (66FWX), UVR:71, Min:16, Max:83    I/O (24Hr):     Intake/Output Summary (Last 24 hours) at 2021 0949  Last data filed at 2021 0600  Gross per 24 hour   Intake 1239.55 ml   Output 2560 ml   Net -1320.45 ml     Objective  Labs/Imaging/Diagnostics    Labs:  CBC:  Recent Labs     21  0607 21  0501 21  0502   WBC 8.6 7.4 5.7   RBC 2.73* 2.70* 2.58*   HGB 8.3* 8.4* 8.0*   HCT 26.7* 26.0* 24.8*   MCV 97.8 96.2 95.8   RDW 15.3* 15.6* 15.9*   * 108* 68*     CHEMISTRIES:  Recent Labs     21  0607 21  0501 21  0502    141 140   K 4.8 4.1 3.7   * 109* 106   CO2 16* 23 25   BUN 85* 61* 45*   CREATININE 3.53* 3.24* 3.08*   GLUCOSE 111* 99 114*   MG 2.0 1.9 1.9     PT/INR:  Recent Labs     21   PROTIME 16.2*   INR 1.3     APTT:  Recent Labs     21   APTT 30.6     LIVER PROFILE:  Recent Labs     21  0501   AST 17 21   ALT 31 31   BILITOT 0.21* 0.27*   ALKPHOS 213* 175*       Imaging Last 24 Hours:  CT HEAD WO CONTRAST    Result Date: 2021  EXAMINATION: CT OF THE HEAD WITHOUT CONTRAST  2021 9:35 pm TECHNIQUE: CT of the head was performed without the administration of intravenous contrast. Dose modulation, iterative reconstruction, and/or weight based adjustment of the mA/kV was utilized to reduce the radiation dose to as low as reasonably achievable. COMPARISON: None. HISTORY: ORDERING SYSTEM PROVIDED HISTORY: Stroke alert TECHNOLOGIST PROVIDED HISTORY: stoke alert Reason for Exam: Stroke alert Acuity: Unknown Type of Exam: Unknown FINDINGS: BRAIN/VENTRICLES: There is no acute intracranial hemorrhage, mass effect or midline shift. No abnormal extra-axial fluid collection. The gray-white differentiation is maintained without evidence of an acute infarct. There is no evidence of hydrocephalus. Involutional changes and chronic small vessel ischemic disease. Right caudate head lacunar infarct. Left lentiform nucleus hypoattenuation also likely a lacunar infarct. There are vascular calcifications ORBITS: The visualized portion of the orbits demonstrate no acute abnormality. SINUSES: The visualized paranasal sinuses and mastoid air cells demonstrate no acute abnormality. SOFT TISSUES/SKULL:  No acute abnormality of the visualized skull or soft tissues. No acute intracranial abnormality. Chronic microvascular disease and chronic lacunar infarcts. Critical results were called by Dr. Adames Poster to Dr Jeane Doss on 11/17/2021 at 22:0. XR CHEST PORTABLE    Result Date: 11/18/2021  EXAMINATION: ONE XRAY VIEW OF THE CHEST 11/18/2021 3:59 pm COMPARISON: 11/17/2021 HISTORY: ORDERING SYSTEM PROVIDED HISTORY: NG placement TECHNOLOGIST PROVIDED HISTORY: NG placement Reason for Exam: ng placement Acuity: Unknown Type of Exam: Unknown FINDINGS: The heart is enlarged. The lungs are clear. No pneumothorax or significant pleural effusion is seen. The tip of the nasogastric tube overlies the stomach. The right internal jugular vein central venous catheter is well positioned, with the tip overlying the distal SVC.   Tip of the right arm PICC line overlies the distal SVC. 1. Tip of the newly inserted NG tube overlies the gastric body. . 2. The right IJ CVC and right arm PICC line remain well positioned. 3.  No acute cardiopulmonary abnormality. US RETROPERITONEAL COMPLETE    Result Date: 11/18/2021  EXAMINATION: RETROPERITONEAL ULTRASOUND OF THE KIDNEYS AND URINARY BLADDER 11/18/2021 COMPARISON: 11/12/2021, 11/15/2021 HISTORY: ORDERING SYSTEM PROVIDED HISTORY: low urine out put TECHNOLOGIST PROVIDED HISTORY: low urine out put FINDINGS: Kidneys: Somewhat limited exam due to patient body habitus. The right kidney measures 11 cm in length and the left kidney measures 9.6 cm in length. Kidneys demonstrate normal cortical echogenicity. No evidence of hydronephrosis or intrarenal stones. Resolution of hydronephrosis since prior studies. Small right pleural effusion. Bladder: Kaur catheter in an apparently decompressed urinary bladder. No acute findings. No hydronephrosis. Small right pleural effusion. IR NONTUNNELED VASCULAR CATHETER > 5 YEARS    Result Date: 11/17/2021  PROCEDURE: ULTRASOUND GUIDED VASCULAR ACCESS. FLUOROSCOPY GUIDANCE FOR PLACEMENT OF A NON-TUNNELED HEMODIALYSIS CATHETER. 11/17/2021. HISTORY: ORDERING SYSTEM PROVIDED HISTORY: JAYLYN TECHNOLOGIST PROVIDED HISTORY: JAYLYN In need of hemodialysis. FLUOROSCOPY DOSE AND TYPE OR TIME AND EXPOSURES: Fluoroscopy time-13 seconds D AP-120 cGy cm squared SEDATION: Local anesthesia TECHNIQUE: Informed consent was obtained after a detailed explanation of the procedure including risks, benefits, and alternatives. Universal protocol was observed. The right neck was prepped and draped in sterile fashion using maximum sterile barrier technique. Local anesthesia was achieved with lidocaine. A micropuncture needle was used to access the right internal jugular vein using ultrasound guidance. An ultrasound image demonstrating patency of the vein with needle tip located within it.  An image was obtained and stored in PACs. Under fluoroscopy guidance, a 0.035 guidewire was used to place a 13 [place] x 15 cm duo split temporary hemodialysis catheter after fascial tract dilation. Catheter tip position in the upper right atrium demonstrated. The catheter flushed easily and there was a good blood return. The catheter was sutured to the skin. The catheter was locked with heparinized saline. The patient tolerated the procedure well and there were no immediate complications. FINDINGS: As above, good catheter tip position in the upper right atrium. Right-sided PICC identified, with tip in the mid SVC. Successful ultrasound guided non-tunneled right IJ temporary hemodialysis catheter placement. Catheter is ready for dialysis use at this time. MRI BRAIN WO CONTRAST    Result Date: 11/18/2021  EXAMINATION: MRI OF THE BRAIN WITHOUT CONTRAST  11/18/2021 7:00 pm TECHNIQUE: Multiplanar multisequence MRI of the brain was performed without the administration of intravenous contrast. COMPARISON: Head CT of 11/17/2021 HISTORY: ORDERING SYSTEM PROVIDED HISTORY: altered mental status TECHNOLOGIST PROVIDED HISTORY: altered mental status Reason for Exam: AMS FINDINGS: There is no acute infarction, intracranial hemorrhage, or intracranial mass lesion. No mass effect, midline shift, or extra-axial collection is noted. There are mild nonspecific foci of periventricular and subcortical cerebral white matter T2/FLAIR hyperintensity, most likely representing chronic microangiopathic disease in this age group. The brain parenchyma is otherwise normal. The pituitary gland is normal in appearance. The cerebellar tonsils are in normal position. The ventricles, sulci, and cisterns are prominent suggestive of generalized volume loss. The intracranial flow voids are preserved. The globes and orbits are within normal limits. Mild mucosal thickening of bilateral mastoid air cells and right sphenoid sinus.   The visualized extracranial structures including paranasal sinuses and mastoid air cells are otherwise unremarkable. There is no acute infarction, intracranial hemorrhage, or intracranial mass lesion. Mild chronic microangiopathic ischemic disease. Mild generalized volume loss. Mild mucosal thickening of bilateral mastoid air cells and right sphenoid sinus. IR GUIDED THORACENTESIS PLEURAL    Result Date: 11/17/2021  PROCEDURE: ULTRASOUNDGUIDED RIGHT THORACENTESIS 11/17/2021 HISTORY: ORDERING SYSTEM PROVIDED HISTORY: Large pleural effusion. Patient not able to sit up because of severe pain. Patient confused TECHNOLOGIST PROVIDED HISTORY: Discussed with Dr. Ariela Mcqueen who will attempt CT-guided thoracentesis on the right for diagnostic and therapeutic reasons. Reason for exam:->Large pleural effusion. Patient not able to sit up because of severe pain. Patient confused Which side should the procedure be performed?->Right TECHNIQUE: Informed consent was obtained after a detailed explanation of the procedure including risks, benefits, and alternatives. Universal protocol was performed. With the patient in the left side decubitus position, the right chest was prepped and draped in sterile fashion and local anesthesia was achieved with lidocaine. A 5 Citizen of [place] needle sheath was advanced under ultrasound guidance into pleural effusion and thoracentesis was performed. The patient tolerated the procedure well. FINDINGS: A total of 1000 mL was removed. Fluid was straw-colored and non turbid. Successful ultrasound guided right thoracentesis.          Physical Examination:   General appearance -  Drowsy, opens eyes to name,  Minimal participation in conversation, no acute distress  Chest - decreased air entry noted  Throughout, no obvious rhonchi Basil Santos Thana Dat  Heart - irregularly irregular rhythm with rate  Control, no obvious murmur  Abdomen -  Soft, obese  Extremities -  No significant peripheral edema        Assessment//Plan Longstanding persistent Afib - rate controlled - OAC at home, currently on hold 2/2 severe anemia, GI bleed    NICMP, EF 20-25% on TTE 11/2021  Chronic HFrEF    Right pleural effusion s/p thoracentesis w/ 1 L out on 11/17/2021    Reported ASCVD with prior PCI - details unknown - nuclear stress test 09/2021 with large fixed severe anteroseptal /apical defect c/w  Previous infarct, no ischemia    Chronic mild, flat, troponin elevation     H/O declined ICD 03/2019 per chart review    Chronic RBBB    PAD w/ occlusive BLE dz - managed by [facility] vascular service    JAYLYN/CKD - new dialysis this admission    Type 2 DM    Obesity           Weaning Levophed   Hold carvedilol and metoprolol if patient requiring pressor support   Patient on midodrine  SBP 85- 123 overnight,  Mean -120  Anticoagulation remains on hold in setting of persistent anemia. Further orders/ plan as per Dr. Shannon Graves  Please see attestation      Electronically signed by JUANITA Best CNP on 11/19/21 at 9:31 AM EST         ATTENDING:    Patient seen and examined personally this morning in the ICU. KELLI notes reviewed as charted. Discussed with night nurse at the time of my evaluation. Started on NG tube feedings last night. Had hemodialysis and 2.5 kg taken off on November 18, 2021. Atrial fibrillation at 77 beats per minute with occasional PVCs. /78. Right IJ triple-lumen catheter in place. Chest diminished air entry. CVS irregular rate and rhythm. Extremities revealed persistent right greater than left lower leg edema. Skin warm and dry. Off of IV Levophed. MRI of the brain was reportedly negative. Labs and meds were reviewed.       A/P:  ASCAD, nonischemic cardiomyopathy, chronic systolic heart failure due to severe left ventricular systolic dysfunction, persistent atrial fibrillation, PVCs, nonsustained ventricular tachycardia, right bundle branch block, acute on chronic kidney injury requiring hemodialysis, intermittent hypotension requiring IV vasopressors and currently off of IV Levophed, other problems as charted. Will start on Coreg 3.125 mg b.i.d. with holding for systolic blood pressure less than 100 as patient's blood pressure is better and she is off IV vasopressors. Further supportive care as you are doing. Hemodialysis per nephrologist.      Will follow. Thanks.       Issac Jovel MD, [facility]

## 2021-11-19 NOTE — PROGRESS NOTES
HEMODIALYSIS PRE-TREATMENT NOTE    Patient Identifiers prior to treatment: Name,      Isolation Required: no                      Isolation Type:        (please document if patient is being managed as a PUI/COVID-19 patient)        Hepatitis status:                           Date Drawn                             Result  Hepatitis B Surface Antigen 21     negative                     Hepatitis B Surface Antibody 21 negative        Hepatitis B Core Antibody 21 negative          How was Hepatitis Status verified: Epic     Was a copy of the labs you documented provided to facility for the patient's chart: yes    Hemodialysis orders verified: yes    Access Within normal limits ( I.e. s/s of infection,...): WNL     Pre-Assessment completed: yes    Pre-dialysis report received from: Kenn Parnell RN                       Time: 1200

## 2021-11-19 NOTE — PROGRESS NOTES
[facility] [place] [facility]   IN-PATIENT SERVICE      NEUROLOGY PROGRESS  NOTE            Date:   11/19/2021  Patient name:  Jazmine Wong  Date of admission:  11/10/2021  YOB: 1950      Interval History:     No significant changes neurologically. Eyes are open does not track consistently. She did squeeze my hands on both sides. Did not wiggle toes. Undergoing hemodialysis session x3 now. Creatinine still greater than three. MRI brain no acute process with chronic ischemic white matter changes. Patient with hemoglobin of 8.0 continues to be off anticoagulation for history of A. fib. ABG today 7.248, PCO2 61, bicarb 26, O2 sat 94    History of Present Illness: The patient is a 66 y.o. female who presents with Abnormal Lab (low hemoglobin and high CR)  . The patient was seen and examined and the chart was reviewed. Patient initially admitted on 11/10 found to have severe acute kidney injury, anemia. Patient evaluated by urology for bilateral hydronephrosis. In addition she required PRBC transfusion for anemia and was evaluated by GI. She underwent EGD eventually and found to have gastritis. Taken for cystoscopy with right ureteral stent placement on 11/16 by urology. Patient was taken to medical ICU after procedure. At that time noted to be alert and oriented x3 per nursing report on evening of 11/16. Patient had worsening ABG with pH of 6.99 at 1 point and critical care pulmonology was consulted. BUN and creatinine also continue to worsen, nephrology following. Patient underwent acute hemodialysis on the evening of 11/17 due to worsening BUN and creatinine. In addition she underwent thoracentesis due to large right pleural effusion by IR. Patient then had stroke alert called around 2100 due to decreased level of responsiveness. She underwent CT head with no acute abnormalities. Recommendation was for MRI brain.     This morning patient continues to be very lethargic.   She will open her eyes to voice but does not track or follow commands. She does minimally grimace to pain when deep nailbed pressure is listed in all 4 extremities. Sisters are at bedside. Patient has been weaned off of Levophed at this time. She has not received any sedating medications this morning.     Past Medical History:     Past Medical History:   Diagnosis Date    Anemia, unspecified     CAD (coronary artery disease)     Chronic kidney disease     CKD (chronic kidney disease) stage 3, GFR 30-59 ml/min ([facility])     Closed fracture of dorsal (thoracic) vertebra without mention of spinal cord injury     Coronary atherosclerosis of unspecified type of vessel, native or graft     Depression with anxiety     Esophageal reflux     HTN (hypertension)     Hyperkalemia     Hyperlipidemia     Intervertebral cervical disc disorder with myelopathy, cervical region     Lower back pain     Lumbar radiculopathy     Microalbuminuria     Muscle weakness     Pain in limb     Stenosis of cervical spine     Tethered cord syndrome ([facility])     Type II or unspecified type diabetes mellitus without mention of complication, not stated as uncontrolled     Urinary calculus     UTI (lower urinary tract infection)         Past Surgical History:     Past Surgical History:   Procedure Laterality Date    ANGIOPLASTY      COLON SURGERY      CYSTOSCOPY Right 11/16/2021    CYSTOSCOPY URETERAL STENT INSERTION performed by Zay Hillman MD at [address] [place] [address] Drive IR NONTUNNELED VASCULAR CATHETER  11/17/2021    IR NONTUNNELED VASCULAR CATHETER 11/17/2021 [facility] SPECIAL PROCEDURES    JOINT REPLACEMENT      UPPER GASTROINTESTINAL ENDOSCOPY N/A 11/15/2021    EGD BIOPSY performed by Teo Medina MD at [facility]        Medications during admission:      carvedilol  3.125 mg Oral BID WC    pantoprazole  40 mg IntraVENous BID    And    sodium chloride flush  10 mL IntraVENous BID    sodium polystyrene  15 g Oral Once    midodrine  7.5 mg Oral TID    cefepime  1,000 mg IntraVENous Q24H    sertraline  25 mg Oral Daily    ferrous sulfate  325 mg Oral Daily with breakfast    [Held by provider] aspirin  81 mg Oral Daily    atorvastatin  80 mg Oral Daily    insulin glargine  22 Units SubCUTAneous Nightly    insulin lispro  0-12 Units SubCUTAneous TID WC    insulin lispro  0-6 Units SubCUTAneous Nightly    sodium chloride flush  5-40 mL IntraVENous 2 times per day         Physical Exam:   /71   Pulse 80   Temp 97.6 °F (36.4 °C) (Axillary)   Resp 23   Ht 5' 3\" (1.6 m) Comment: from old chart  Wt 207 lb 14.3 oz (94.3 kg)   SpO2 97%   BMI 36.83 kg/m²   Temp (24hrs), Av.6 °F (36.4 °C), Min:97.3 °F (36.3 °C), Max:97.8 °F (36.6 °C)          Neurological examination:      Mental status    Patient is lethargic. Eyes open to sternal rub and voice, she did track minimally at times. Off sedation. Not following commands.       Cranial nerves    Pupil response:            Present     Oculocephalic reflex:   Present     Corneal Reflex:            Present     Facial grimace to pin:  Present         Motor and sensory function  No spontaneous limb movements  No posturing  Squeeze hands bilaterally when tested. Did not wiggle toes.   Minimal withdrawal to deep nail bed pressure in all extremities     Tone: Normal      DTR symmetric 1/4 throughout  Plantar response: Downgoing  (-) Jarrell's sign bilaterally    Gait  Not tested due to lethargic state               Diagnostics:      Laboratory Testing:  CBC:   Recent Labs     21  0607 21  0501 21  0502   WBC 8.6 7.4 5.7   HGB 8.3* 8.4* 8.0*   * 108* 68*     BMP:    Recent Labs     21  0607 21  0501 21  0502    141 140   K 4.8 4.1 3.7   * 109* 106   CO2 16* 23 25   BUN 85* 61* 45*   CREATININE 3.53* 3.24* 3.08*   GLUCOSE 111* 99 114*         Lab Results   Component Value Date    ALT 31 2021    AST 21 2021    INR 1.3 2021    LABA1C 7.8 (H) 03/29/2013    [id number] 11/13/2021       Imaging/Diagnostics:      MRI brain: No acute intracranial abnormalities. Chronic ischemic white matter changes. I personally reviewed all of the above medications, clinical laboratory, imaging and other diagnostic tests. Impression:      Acute metabolic encephalopathy in the setting of hypotension requiring vasopressors, acute kidney injury, metabolic acidosis, recent cystoscopy with right ureteral stent placement, anemia  Atrial fibrillation; currently not on anticoagulation secondary to anemia    Plan:     Continue current treatments of metabolic derangements  Patient will need time to improve neurologically due to multiple metabolic derangements as above. We will hold off any further neurodiagnostic testing.   We will follow        Electronically signed by Keiry Lehman DO on 11/19/2021 at 12:07 PM      Keiry Lehman [address] [place] [address]  Neurology

## 2021-11-19 NOTE — PROGRESS NOTES
Department of Internal Medicine  Nephrology Ron Wolf MD  Progress Note    Reason for consultation: Management of acute kidney injury. Consulting physician: Malina Barton MD.    Interval history: Patient seen and examined in the ICU today. S/P  right ureteral stent placement with renal ultrasound on 11/18/2021 showing improvement in hydronephrosis. However patient remains anuric and remains confused and lethargic. She had dialysis yesterday with 2 kg removed. MRI brain showed no acute intracranial abnormality. Patient is started on tube feeding. History of present illness: This is a 66 y.o. female with a significant past medical history of Type 2 diabetes mellitus, coronary artery disease, lumbar radiculopathy, Hyperlipidemia and chronic kidney disease stage III [baseline serum creatinine 1.5 mg/dL], who presented from [facility] for further evaluation of acute kidney injury. She was sent to [facility] after developing nonhealing wound s/p cardiac catheterization via right refill Levophed has been discontinued approach with right groin wound. She had been on Bumex in the ECF and creatinine and BUN have been rising and Bumex was decreased to a lowest dose of 0.5 mg p.o. twice daily. She has had regular blood work performed in the [facility] and F contacted nephrologist after finding abnormal laboratory studies. She was noted to have a BUN/creatinine 104/2.28 mg/dL with hemoglobin 8 g/dL. She has mild confusion but is not in obvious respiratory distress.     Scheduled Meds:   carvedilol  3.125 mg Oral BID WC    pantoprazole  40 mg IntraVENous BID    And    sodium chloride flush  10 mL IntraVENous BID    sodium polystyrene  15 g Oral Once    midodrine  7.5 mg Oral TID    cefepime  1,000 mg IntraVENous Q24H    sertraline  25 mg Oral Daily    ferrous sulfate  325 mg Oral Daily with breakfast    [Held by provider] aspirin  81 mg Oral Daily    atorvastatin  80 mg Oral Daily CO2 16* 23 25   BUN 85* 61* 45*   CREATININE 3.53* 3.24* 3.08*   GLUCOSE 111* 99 114*     Lab Results   Component Value Date    NITRU NEGATIVE 11/12/2021    COLORU Yellow 11/12/2021    PHUR 6.0 11/12/2021    WBCUA 50  11/12/2021    RBCUA 20 TO 50 11/12/2021    MUCUS NOT REPORTED 11/12/2021    TRICHOMONAS NOT REPORTED 11/12/2021    YEAST NOT REPORTED 11/12/2021    BACTERIA MODERATE 11/12/2021    CLARITYU Clear 09/07/2016    SPECGRAV 1.012 11/12/2021    LEUKOCYTESUR LARGE 11/12/2021    UROBILINOGEN Normal 11/12/2021    BILIRUBINUR NEGATIVE 11/12/2021    BLOODU Positive 09/07/2016    GLUCOSEU NEGATIVE 11/12/2021    KETUA NEGATIVE 11/12/2021    AMORPHOUS NOT REPORTED 11/12/2021     IMPRESSION/RECOMMENDATIONS:      1. Acute kidney injury - most consistent with Ischemic ATN and  obstructive nephropathy secondary to bilateral hydronephrosis. Renal ultrasound showed moderate to severe right hydronephrosis and mild to moderate left hydronephrosis. Renal ultrasound 11/18/2021 shows improvement in hydronephrosis -however patient remains anuric. Patient was started on acute dialysis on 11/17/2021-completed 2 treatments of dialysis    Plan: We will plan for  3rd  Treatment of  acute dialysistoday-attempt ultrafiltration of 2 to 2.5 kg as tolerated  Discontinue bicarbonate drip  Avoid nephrotoxic agents. Strict input and output documentation. Basic metabolic profile daily. We will obtain a renal artery duplex and renal vein scan  due to persistent anuria. Given anemia and thrombocytopenia, will rule out renal microangiopathy-check LDH reticulocyte and haptoglobin. 2. Hypotension-stable    3. Bilateral hydronephrosis status post cystoscopy with right ureteral  stent placement. 4.  Anemia with thrombocytopenia-    5. Anion gap metabolic acidosis improved with dialysis    6. Right pleural effusion rule out parapneumonic effusion-s/p right thoracentesis    7.   Anasarca secondary to cardiomyopathy-EF of 30-35 %and renal failure -patient remains anuric    8. Acute metabolic encephalopathy- uremia contributing-rule out CO2 retention-arterial  blood gas ordered    Prognosis is guarded.           MD CATHERINE Clement  Attending Nephrologist  11/19/2021 10:41 AM

## 2021-11-19 NOTE — PROGRESS NOTES
HEMODIALYSIS POST TREATMENT NOTE    Treatment time ordered: 3hrs    Actual treatment time: 3hrs    UltraFiltration Goal: 2000-2500ml  UltraFiltration Removed: 2100ml      Pre Treatment weight: 94.3kg  Post Treatment weight: Bed needs zero out, post wt more than pre wt. Estimated Dry Weight:     Access used:     Central Venous Catheter:          Tunneled or Non-tunneled: non           Site: Rt neck           Access Flow: good      Internal Access:       AV Fistula or AV Graft:          Site:        Access Flow:        Sign and symptoms of infection: none       If YES:     Medications or blood products given: 25g Albumin given x2, 5mg Midodrine given x1    Chronic outpatient schedule: JAYLYN    Chronic outpatient unit:     Summary of response to treatment: Pt tolerated tx well, uf goal reduced during tx and BP meds given for BP support. Explain if orders NOT met, was physician notified:      ACES flowsheet faxed to patient unit/ placed in patient chart: yes    Post assessment completed: yes    Report given to: Vega Galvan RN      * Intra-treatment documented Safety Checks include the followin) Access and face visible at all times. 2) All connections and blood lines are secure with no kinks. 3) NVL alarm engaged. 4) Hemosafe device applied (if applicable). 5) No collapse of Arterial or Venous blood chambers. 6) All blood lines / pump segments in the air detectors.

## 2021-11-19 NOTE — FLOWSHEET NOTE
11/19/21 1723   Encounter Summary   Services provided to: Patient not available  (patient [facility] nurse)

## 2021-11-19 NOTE — PROGRESS NOTES
Vascular tech called to state that patient must be NPO for 8 hours prior to ordered renal artery duplex scan, tube feed to be stopped at midnight for scan tomorrow morning. She also states that the renal vein can be seen on the renal artery scan so both tests do not need to be ordered separately. Writer to D/C renal vein scan.

## 2021-11-19 NOTE — PLAN OF CARE
Problem: SAFETY  Goal: Free from accidental physical injury  Outcome: Ongoing     Problem: DAILY CARE  Goal: Daily care needs are met  Outcome: Ongoing     Problem: PAIN  Goal: Patient's pain/discomfort is manageable  Outcome: Ongoing     Problem: SKIN INTEGRITY  Goal: Skin integrity is maintained or improved  Outcome: Ongoing   No new skin breakdown noted.    Problem: Falls - Risk of:  Goal: Will remain free from falls  Description: Will remain free from falls  Outcome: Ongoing   No falls this shift  Problem: Skin Integrity:  Goal: Will show no infection signs and symptoms  Description: Will show no infection signs and symptoms  Outcome: Ongoing     Problem: Musculor/Skeletal Functional Status  Goal: Highest potential functional level  Outcome: Ongoing     Problem: Pain:  Goal: Pain level will decrease  Description: Pain level will decrease  Outcome: Ongoing

## 2021-11-19 NOTE — PROGRESS NOTES
Progress Note    11/19/2021   11:42 AM    Name:  Dl Rider  MRN:    [id number]     Acct:     [de-identified]   Room:  2004/2004-01  IP Day: 9     Admit Date: 11/10/2021  4:23 PM  PCP: Loco White DO    Subjective:     C/C:   Chief Complaint   Patient presents with    Abnormal Lab     low hemoglobin and high CR       Interval History: Status: not changed. Patient is still not responding to verbal commands  Very lethargic  On hemodialysis    ROS:   all 10 systems reviewed and are negative except as noted    Review of Systems   Unable to perform ROS: Patient unresponsive       Medications:      Allergies: No Known Allergies    Current Meds: carvedilol (COREG) tablet 3.125 mg, BID WC  0.45 % sodium chloride infusion, Continuous  pantoprazole (PROTONIX) injection 40 mg, BID   And  sodium chloride flush 0.9 % injection 10 mL, BID  potassium chloride (KLOR-CON M) extended release tablet 40 mEq, PRN   Or  potassium bicarb-citric acid (EFFER-K) effervescent tablet 40 mEq, PRN   Or  potassium chloride 10 mEq/100 mL IVPB (Peripheral Line), PRN  magnesium sulfate 1000 mg in dextrose 5% 100 mL IVPB, PRN  glucose (GLUTOSE) 40 % oral gel 15 g, PRN  dextrose 50 % IV solution, PRN  glucagon (rDNA) injection 1 mg, PRN  dextrose 5 % solution, PRN  acetaminophen (TYLENOL) tablet 650 mg, Q4H PRN  anticoagulant sodium citrate 4 % injection 1.3 mL, PRN  anticoagulant sodium citrate 4 % injection 1.2 mL, PRN  sodium polystyrene (KAYEXALATE) 15 GM/60ML suspension 15 g, Once  midodrine (PROAMATINE) tablet 7.5 mg, TID  norepinephrine (LEVOPHED) 16 mg in sodium chloride 0.9 % 250 mL infusion, Continuous  cefepime (MAXIPIME) 1000 mg IVPB minibag, Q24H  sertraline (ZOLOFT) tablet 25 mg, Daily  0.9 % sodium chloride infusion, PRN  ferrous sulfate (IRON 325) tablet 325 mg, Daily with breakfast  [Held by provider] aspirin chewable tablet 81 mg, Daily  atorvastatin (LIPITOR) tablet 80 mg, Daily  insulin glargine (LANTUS) injection vial 22 Units, Nightly  insulin lispro (HUMALOG) injection vial 0-12 Units, TID WC  insulin lispro (HUMALOG) injection vial 0-6 Units, Nightly  sodium chloride flush 0.9 % injection 5-40 mL, 2 times per day  sodium chloride flush 0.9 % injection 5-40 mL, PRN  0.9 % sodium chloride infusion, PRN  acetaminophen (TYLENOL) tablet 650 mg, Q4H PRN  ondansetron (ZOFRAN-ODT) disintegrating tablet 4 mg, Q8H PRN   Or  ondansetron (ZOFRAN) injection 4 mg, Q6H PRN  oxyCODONE-acetaminophen (PERCOCET) 5-325 MG per tablet 1 tablet, Q6H PRN        Data:     Code Status:  Full Code    Family History   Problem Relation Age of Onset    High Blood Pressure Other        Social History     Socioeconomic History    Marital status:      Spouse name: Not on file    Number of children: Not on file    Years of education: Not on file    Highest education level: Not on file   Occupational History    Not on file   Tobacco Use    Smoking status: Never Smoker    Smokeless tobacco: Not on file   Substance and Sexual Activity    Alcohol use: No     Alcohol/week: 0.0 standard drinks    Drug use: No    Sexual activity: Not on file   Other Topics Concern    Not on file   Social History Narrative    Not on file     Social Determinants of Health     Financial Resource Strain:     Difficulty of Paying Living Expenses: Not on file   Food Insecurity:     Worried About Running Out of Food in the Last Year: Not on file    Katya of Food in the Last Year: Not on file   Transportation Needs:     Lack of Transportation (Medical): Not on file    Lack of Transportation (Non-Medical):  Not on file   Physical Activity:     Days of Exercise per Week: Not on file    Minutes of Exercise per Session: Not on file   Stress:     Feeling of Stress : Not on file   Social Connections:     Frequency of Communication with Friends and Family: Not on file    Frequency of Social Gatherings with Friends and Family: Not on file    Attends Catholic Services: Not on file    Active Member of Clubs or Organizations: Not on file    Attends Club or Organization Meetings: Not on file    Marital Status: Not on file   Intimate Partner Violence:     Fear of Current or Ex-Partner: Not on file    Emotionally Abused: Not on file    Physically Abused: Not on file    Sexually Abused: Not on file   Housing Stability:     Unable to Pay for Housing in the Last Year: Not on file    Number of [place] in the Last Year: Not on file    Unstable Housing in the Last Year: Not on file       I/O (24Hr): Intake/Output Summary (Last 24 hours) at 11/19/2021 1142  Last data filed at 11/19/2021 0800  Gross per 24 hour   Intake 1269.55 ml   Output 2560 ml   Net -1290.45 ml     Radiology:  CT ABDOMEN PELVIS WO CONTRAST Additional Contrast? None    Result Date: 11/12/2021  Severe right and mild left hydroureteronephrosis. No obstructing ureteral stone is seen. Wall thickening of the urinary bladder without significant urinary bladder wall distension. Outlet obstruction is a consideration. Right-sided ostomy. There is bowel wall thickening proximal to the ostomy, may suggest infectious or inflammatory enteritis. No drainable fluid collections or free air. No evidence of bowel obstruction. Moderate right pleural effusion. Bibasilar infiltrates or atelectasis. Clinical correlation to exclude pneumonia. CT HEAD WO CONTRAST    Result Date: 11/17/2021  No acute intracranial abnormality. Chronic microvascular disease and chronic lacunar infarcts. Critical results were called by Dr. Minh Dominguez to Dr Teresa Teran on 11/17/2021 at [address] RENAL COMPLETE    Result Date: 11/15/2021  1. Mild to moderate right-sided hydronephrosis. 2. Limited renal ultrasound. No left-sided hydronephrosis. XR CHEST PORTABLE    Result Date: 11/18/2021  1. Tip of the newly inserted NG tube overlies the gastric body. . 2. The right IJ CVC and right arm PICC line remain well positioned.  3.  No acute cardiopulmonary abnormality. XR CHEST PORTABLE    Result Date: 11/17/2021  1. No pneumothorax evident following thoracentesis. 2. Low lung volumes. Significantly improved pneumatization lower right lung with minimal pleural effusion bilateral evident. 3. Calcific atherosclerosis aorta. 4. Cardiomegaly. XR CHEST PORTABLE    Result Date: 11/16/2021  Moderate right pleural effusion and associated airspace disease. Pneumonia cannot be excluded. US RETROPERITONEAL COMPLETE    Result Date: 11/18/2021  No acute findings. No hydronephrosis. Small right pleural effusion. IR NONTUNNELED VASCULAR CATHETER > 5 YEARS    Result Date: 11/17/2021  Successful ultrasound guided non-tunneled right IJ temporary hemodialysis catheter placement. Catheter is ready for dialysis use at this time. MRI BRAIN WO CONTRAST    Result Date: 11/18/2021  There is no acute infarction, intracranial hemorrhage, or intracranial mass lesion. Mild chronic microangiopathic ischemic disease. Mild generalized volume loss. Mild mucosal thickening of bilateral mastoid air cells and right sphenoid sinus. IR GUIDED THORACENTESIS PLEURAL    Result Date: 11/17/2021  Successful ultrasound guided right thoracentesis.        Labs:  Recent Results (from the past 24 hour(s))   POC Glucose Fingerstick    Collection Time: 11/18/21  8:03 PM   Result Value Ref Range    POC Glucose 75 65 - 105 mg/dL   POC Glucose Fingerstick    Collection Time: 11/19/21 12:23 AM   Result Value Ref Range    POC Glucose 93 65 - 105 mg/dL   CBC with DIFF    Collection Time: 11/19/21  5:02 AM   Result Value Ref Range    WBC 5.7 3.5 - 11.0 k/uL    RBC 2.58 (L) 4.0 - 5.2 m/uL    Hemoglobin 8.0 (L) 12.0 - 16.0 g/dL    Hematocrit 24.8 (L) 36 - 46 %    MCV 95.8 80 - 100 fL    MCH 30.9 26 - 34 pg    MCHC 32.2 31 - 37 g/dL    RDW 15.9 (H) 11.5 - 14.9 %    Platelets 68 (L) 429 - 450 k/uL    MPV 9.3 6.0 - 12.0 fL    NRBC Automated NOT REPORTED per 100 WBC    Differential Type NOT REPORTED     Immature Granulocytes NOT REPORTED 0 %    Absolute Immature Granulocyte NOT REPORTED 0.00 - 0.30 k/uL    WBC Morphology NOT REPORTED     RBC Morphology NOT REPORTED     Platelet Estimate NOT REPORTED     Seg Neutrophils 68 (H) 36 - 66 %    Lymphocytes 20 (L) 24 - 44 %    Monocytes 12 (H) 1 - 7 %    Eosinophils % 0 0 - 4 %    Basophils 0 0 - 2 %    Segs Absolute 3.80 1.3 - 9.1 k/uL    Absolute Lymph # 1.20 1.0 - 4.8 k/uL    Absolute Mono # 0.70 0.1 - 1.3 k/uL    Absolute Eos # 0.00 0.0 - 0.4 k/uL    Basophils Absolute 0.00 0.0 - 0.2 k/uL   Magnesium    Collection Time: 21  5:02 AM   Result Value Ref Range    Magnesium 1.9 1.6 - 2.6 mg/dL   Basic Metabolic Panel w/ Reflex to MG    Collection Time: 21  5:02 AM   Result Value Ref Range    Glucose 114 (H) 70 - 99 mg/dL    BUN 45 (H) 8 - 23 mg/dL    CREATININE 3.08 (H) 0.50 - 0.90 mg/dL    Bun/Cre Ratio NOT REPORTED 9 - 20    Calcium 7.6 (L) 8.6 - 10.4 mg/dL    Sodium 140 135 - 144 mmol/L    Potassium 3.7 3.7 - 5.3 mmol/L    Chloride 106 98 - 107 mmol/L    CO2 25 20 - 31 mmol/L    Anion Gap 9 9 - 17 mmol/L    GFR Non-African American 15 (L) >60 mL/min    GFR  18 (L) >60 mL/min    GFR Comment          GFR Staging NOT REPORTED    POC Glucose Fingerstick    Collection Time: 21 11:15 AM   Result Value Ref Range    POC Glucose 140 (H) 65 - 105 mg/dL       Physical Examination:        Vitals:  /71   Pulse 80   Temp 97.6 °F (36.4 °C) (Axillary)   Resp 23   Ht 5' 3\" (1.6 m) Comment: from old chart  Wt 207 lb 14.3 oz (94.3 kg)   SpO2 97%   BMI 36.83 kg/m²   Temp (24hrs), Av.6 °F (36.4 °C), Min:97.3 °F (36.3 °C), Max:97.8 °F (36.6 °C)    Recent Labs     21  0846 21  2003 21  0023 21  1115   POCGLU 85 75 93 140*         Physical Exam  Vitals and nursing note reviewed. Constitutional:       Appearance: She is ill-appearing. Interventions: Face mask in place.    HENT:      Right Ear: External ear normal.      Left Ear: External ear normal.      Nose: Nose normal.      Mouth/Throat:      Mouth: Mucous membranes are moist.      Pharynx: Oropharynx is clear. Eyes:      General: No scleral icterus. Neck:      Vascular: Carotid bruit present. Cardiovascular:      Rate and Rhythm: Rhythm irregular. Pulses: Normal pulses. Heart sounds: No murmur heard. Musculoskeletal:      Cervical back: Neck supple. Right lower leg: Edema present. Left lower leg: Edema present. Lymphadenopathy:      Cervical: No cervical adenopathy. Skin:     Coloration: Skin is not jaundiced. Neurological:      Mental Status: She is lethargic. Assessment:        Primary Problem  JAYLYN (acute kidney injury) ([facility] [address]     Principal Problem:    JAYLYN (acute kidney injury) ([facility] [address]  Active Problems:    Type 2 diabetes mellitus with kidney complication, with long-term current use of insulin ([facility])    CKD (chronic kidney disease) stage 3, GFR 30-59 ml/min ([facility])    HTN (hypertension)    ASCVD (arteriosclerotic cardiovascular disease)    Iron deficiency anemia    Hydronephrosis determined by ultrasound    Longstanding persistent atrial fibrillation ([facility] [address]  Resolved Problems:    * No resolved hospital problems.  *      Past Medical History:   Diagnosis Date    Anemia, unspecified     CAD (coronary artery disease)     Chronic kidney disease     CKD (chronic kidney disease) stage 3, GFR 30-59 ml/min ([facility])     Closed fracture of dorsal (thoracic) vertebra without mention of spinal cord injury     Coronary atherosclerosis of unspecified type of vessel, native or graft     Depression with anxiety     Esophageal reflux     HTN (hypertension)     Hyperkalemia     Hyperlipidemia     Intervertebral cervical disc disorder with myelopathy, cervical region     Lower back pain     Lumbar radiculopathy     Microalbuminuria     Muscle weakness     Pain in limb     Stenosis of cervical spine     Tethered cord syndrome ([facility] [address]     Type II or unspecified type diabetes mellitus without mention of complication, not stated as uncontrolled     Urinary calculus     UTI (lower urinary tract infection)         Plan:        1. Hemodialysis per nephrology today  2. IV cefepime  3. NPO  4. D5 normal saline with bicarb at 30 mL/h  5. Per neurology input  6. NG for tube feed per dietitian  7.  blood culture no growth for 2 days  8.  urine culture pending  2D report pending  9. Discussed with patient sisters and floor nurse at bedside  1. PPI Protonix 40 mg p.o. daily  2. DVT Prophylaxis pharmacological DVT prophylaxis due to low hemoglobin. 3. CBC, CMP  4. EPCs  5. PT/OT to evaluate and treat  6. Replace electrolytes as per sliding scale  7. Home medications reviewed and appropriate medications continued  8.  Reviewed labs and imaging studies from last 24 hours and results    Electronically signed by Sukhjinder Dejesus MD

## 2021-11-20 ENCOUNTER — APPOINTMENT (OUTPATIENT)
Dept: GENERAL RADIOLOGY | Age: 71
DRG: 004 | End: 2021-11-20
Payer: COMMERCIAL

## 2021-11-20 LAB
ABSOLUTE BANDS #: 0.6 K/UL (ref 0–1)
ABSOLUTE EOS #: 0 K/UL (ref 0–0.4)
ABSOLUTE IMMATURE GRANULOCYTE: ABNORMAL K/UL (ref 0–0.3)
ABSOLUTE LYMPH #: 1.96 K/UL (ref 1–4.8)
ABSOLUTE MONO #: 0.68 K/UL (ref 0.1–1.3)
ALLEN TEST: ABNORMAL
ALLEN TEST: ABNORMAL
ANION GAP SERPL CALCULATED.3IONS-SCNC: 9 MMOL/L (ref 9–17)
BANDS: 8 % (ref 0–10)
BASOPHILS # BLD: 0 % (ref 0–2)
BASOPHILS ABSOLUTE: 0 K/UL (ref 0–0.2)
BUN BLDV-MCNC: 30 MG/DL (ref 8–23)
BUN/CREAT BLD: ABNORMAL (ref 9–20)
CALCIUM SERPL-MCNC: 8.1 MG/DL (ref 8.6–10.4)
CARBOXYHEMOGLOBIN: 0.6 % (ref 0–5)
CARBOXYHEMOGLOBIN: 1.1 % (ref 0–5)
CHLORIDE BLD-SCNC: 101 MMOL/L (ref 98–107)
CO2: 26 MMOL/L (ref 20–31)
COMPLEMENT C3: 80 MG/DL (ref 90–180)
COMPLEMENT C4: 29 MG/DL (ref 10–40)
CREAT SERPL-MCNC: 2.5 MG/DL (ref 0.5–0.9)
DIFFERENTIAL TYPE: ABNORMAL
EOSINOPHILS RELATIVE PERCENT: 0 % (ref 0–4)
FIO2: 100
FIO2: 30
FREE KAPPA/LAMBDA RATIO: 1.36 (ref 0.26–1.65)
GFR AFRICAN AMERICAN: 23 ML/MIN
GFR NON-AFRICAN AMERICAN: 19 ML/MIN
GFR SERPL CREATININE-BSD FRML MDRD: ABNORMAL ML/MIN/{1.73_M2}
GFR SERPL CREATININE-BSD FRML MDRD: ABNORMAL ML/MIN/{1.73_M2}
GLUCOSE BLD-MCNC: 102 MG/DL (ref 65–105)
GLUCOSE BLD-MCNC: 104 MG/DL (ref 65–105)
GLUCOSE BLD-MCNC: 117 MG/DL (ref 70–99)
GLUCOSE BLD-MCNC: 152 MG/DL (ref 65–105)
GLUCOSE BLD-MCNC: 165 MG/DL (ref 65–105)
HCO3 ARTERIAL: 26.8 MMOL/L (ref 22–26)
HCO3 ARTERIAL: 27.7 MMOL/L (ref 22–26)
HCT VFR BLD CALC: 24.5 % (ref 36–46)
HEMOGLOBIN: 8 G/DL (ref 12–16)
HEPARIN INDUCED PLATELET ANTIBODY: 0.13 O.D. (ref 0–0.4)
IGA: 202 MG/DL (ref 70–400)
IGG: 847 MG/DL (ref 700–1600)
IGM: 43 MG/DL (ref 40–230)
IMMATURE GRANULOCYTES: ABNORMAL %
KAPPA FREE LIGHT CHAINS QNT: 5.68 MG/DL (ref 0.37–1.94)
LAMBDA FREE LIGHT CHAINS QNT: 4.17 MG/DL (ref 0.57–2.63)
LYMPHOCYTES # BLD: 26 % (ref 24–44)
MAGNESIUM: 1.8 MG/DL (ref 1.6–2.6)
MCH RBC QN AUTO: 30.8 PG (ref 26–34)
MCHC RBC AUTO-ENTMCNC: 32.5 G/DL (ref 31–37)
MCV RBC AUTO: 94.8 FL (ref 80–100)
METHEMOGLOBIN: 0.7 % (ref 0–1.9)
METHEMOGLOBIN: 0.7 % (ref 0–1.9)
MODE: ABNORMAL
MODE: ABNORMAL
MONOCYTES # BLD: 9 % (ref 1–7)
MORPHOLOGY: ABNORMAL
NEGATIVE BASE EXCESS, ART: 0.7 MMOL/L (ref 0–2)
NEGATIVE BASE EXCESS, ART: ABNORMAL MMOL/L (ref 0–2)
NOTIFICATION TIME: ABNORMAL
NOTIFICATION TIME: ABNORMAL
NOTIFICATION: ABNORMAL
NOTIFICATION: ABNORMAL
NRBC AUTOMATED: ABNORMAL PER 100 WBC
NUCLEATED RED BLOOD CELLS: 2 PER 100 WBC
O2 DEVICE/FLOW/%: ABNORMAL
O2 DEVICE/FLOW/%: ABNORMAL
O2 SAT, ARTERIAL: 79.9 % (ref 95–98)
O2 SAT, ARTERIAL: 96.2 % (ref 95–98)
OXYHEMOGLOBIN: ABNORMAL % (ref 95–98)
OXYHEMOGLOBIN: ABNORMAL % (ref 95–98)
PATIENT TEMP: 37
PATIENT TEMP: 37
PCO2 ARTERIAL: 52.4 MMHG (ref 35–45)
PCO2 ARTERIAL: 63.2 MMHG (ref 35–45)
PCO2, ART, TEMP ADJ: ABNORMAL (ref 35–45)
PCO2, ART, TEMP ADJ: ABNORMAL (ref 35–45)
PDW BLD-RTO: 16 % (ref 11.5–14.9)
PEEP/CPAP: 7
PEEP/CPAP: ABNORMAL
PH ARTERIAL: 7.24 (ref 7.35–7.45)
PH ARTERIAL: 7.33 (ref 7.35–7.45)
PH, ART, TEMP ADJ: ABNORMAL (ref 7.35–7.45)
PH, ART, TEMP ADJ: ABNORMAL (ref 7.35–7.45)
PLATELET # BLD: 49 K/UL (ref 150–450)
PLATELET ESTIMATE: ABNORMAL
PMV BLD AUTO: 9.3 FL (ref 6–12)
PO2 ARTERIAL: 48 MMHG (ref 80–100)
PO2 ARTERIAL: 93 MMHG (ref 80–100)
PO2, ART, TEMP ADJ: ABNORMAL MMHG (ref 80–100)
PO2, ART, TEMP ADJ: ABNORMAL MMHG (ref 80–100)
POSITIVE BASE EXCESS, ART: 1.8 MMOL/L (ref 0–2)
POSITIVE BASE EXCESS, ART: ABNORMAL MMOL/L (ref 0–2)
POTASSIUM SERPL-SCNC: 3.7 MMOL/L (ref 3.7–5.3)
PSV: ABNORMAL
PSV: ABNORMAL
PT. POSITION: ABNORMAL
PT. POSITION: ABNORMAL
RBC # BLD: 2.58 M/UL (ref 4–5.2)
RBC # BLD: ABNORMAL 10*6/UL
RESPIRATORY RATE: 16
RESPIRATORY RATE: 24
SAMPLE SITE: ABNORMAL
SAMPLE SITE: ABNORMAL
SEG NEUTROPHILS: 57 % (ref 36–66)
SEGMENTED NEUTROPHILS ABSOLUTE COUNT: 4.31 K/UL (ref 1.3–9.1)
SET RATE: 16
SET RATE: 20
SODIUM BLD-SCNC: 136 MMOL/L (ref 135–144)
TEXT FOR RESPIRATORY: ABNORMAL
TEXT FOR RESPIRATORY: ABNORMAL
TOTAL HB: ABNORMAL G/DL (ref 12–16)
TOTAL HB: ABNORMAL G/DL (ref 12–16)
TOTAL RATE: 16
TOTAL RATE: 24
VT: 500
VT: ABNORMAL
WBC # BLD: 7.5 K/UL (ref 3.5–11)
WBC # BLD: ABNORMAL 10*3/UL

## 2021-11-20 PROCEDURE — 6370000000 HC RX 637 (ALT 250 FOR IP): Performed by: NURSE PRACTITIONER

## 2021-11-20 PROCEDURE — 2700000000 HC OXYGEN THERAPY PER DAY

## 2021-11-20 PROCEDURE — 2500000003 HC RX 250 WO HCPCS: Performed by: INTERNAL MEDICINE

## 2021-11-20 PROCEDURE — 85025 COMPLETE CBC W/AUTO DIFF WBC: CPT

## 2021-11-20 PROCEDURE — 92950 HEART/LUNG RESUSCITATION CPR: CPT

## 2021-11-20 PROCEDURE — 86664 EPSTEIN-BARR NUCLEAR ANTIGEN: CPT

## 2021-11-20 PROCEDURE — 93975 VASCULAR STUDY: CPT

## 2021-11-20 PROCEDURE — 36600 WITHDRAWAL OF ARTERIAL BLOOD: CPT

## 2021-11-20 PROCEDURE — 31500 INSERT EMERGENCY AIRWAY: CPT

## 2021-11-20 PROCEDURE — 82947 ASSAY GLUCOSE BLOOD QUANT: CPT

## 2021-11-20 PROCEDURE — 94660 CPAP INITIATION&MGMT: CPT

## 2021-11-20 PROCEDURE — 2580000003 HC RX 258: Performed by: UROLOGY

## 2021-11-20 PROCEDURE — 84165 PROTEIN E-PHORESIS SERUM: CPT

## 2021-11-20 PROCEDURE — 6370000000 HC RX 637 (ALT 250 FOR IP): Performed by: UROLOGY

## 2021-11-20 PROCEDURE — 86162 COMPLEMENT TOTAL (CH50): CPT

## 2021-11-20 PROCEDURE — 83735 ASSAY OF MAGNESIUM: CPT

## 2021-11-20 PROCEDURE — 94002 VENT MGMT INPAT INIT DAY: CPT

## 2021-11-20 PROCEDURE — 86225 DNA ANTIBODY NATIVE: CPT

## 2021-11-20 PROCEDURE — 99223 1ST HOSP IP/OBS HIGH 75: CPT | Performed by: INTERNAL MEDICINE

## 2021-11-20 PROCEDURE — 82805 BLOOD GASES W/O2 SATURATION: CPT

## 2021-11-20 PROCEDURE — 84155 ASSAY OF PROTEIN SERUM: CPT

## 2021-11-20 PROCEDURE — 86160 COMPLEMENT ANTIGEN: CPT

## 2021-11-20 PROCEDURE — 6360000002 HC RX W HCPCS: Performed by: FAMILY MEDICINE

## 2021-11-20 PROCEDURE — 6360000002 HC RX W HCPCS: Performed by: INTERNAL MEDICINE

## 2021-11-20 PROCEDURE — 71045 X-RAY EXAM CHEST 1 VIEW: CPT

## 2021-11-20 PROCEDURE — 2580000003 HC RX 258: Performed by: FAMILY MEDICINE

## 2021-11-20 PROCEDURE — 89220 SPUTUM SPECIMEN COLLECTION: CPT

## 2021-11-20 PROCEDURE — 99231 SBSQ HOSP IP/OBS SF/LOW 25: CPT | Performed by: PSYCHIATRY & NEUROLOGY

## 2021-11-20 PROCEDURE — 86334 IMMUNOFIX E-PHORESIS SERUM: CPT

## 2021-11-20 PROCEDURE — 88185 FLOWCYTOMETRY/TC ADD-ON: CPT

## 2021-11-20 PROCEDURE — 82784 ASSAY IGA/IGD/IGG/IGM EACH: CPT

## 2021-11-20 PROCEDURE — 87070 CULTURE OTHR SPECIMN AEROBIC: CPT

## 2021-11-20 PROCEDURE — 83516 IMMUNOASSAY NONANTIBODY: CPT

## 2021-11-20 PROCEDURE — 80048 BASIC METABOLIC PNL TOTAL CA: CPT

## 2021-11-20 PROCEDURE — 86038 ANTINUCLEAR ANTIBODIES: CPT

## 2021-11-20 PROCEDURE — 5A1955Z RESPIRATORY VENTILATION, GREATER THAN 96 CONSECUTIVE HOURS: ICD-10-PCS | Performed by: FAMILY MEDICINE

## 2021-11-20 PROCEDURE — 86665 EPSTEIN-BARR CAPSID VCA: CPT

## 2021-11-20 PROCEDURE — 87205 SMEAR GRAM STAIN: CPT

## 2021-11-20 PROCEDURE — 36415 COLL VENOUS BLD VENIPUNCTURE: CPT

## 2021-11-20 PROCEDURE — 2580000003 HC RX 258: Performed by: INTERNAL MEDICINE

## 2021-11-20 PROCEDURE — C9113 INJ PANTOPRAZOLE SODIUM, VIA: HCPCS | Performed by: FAMILY MEDICINE

## 2021-11-20 PROCEDURE — 83883 ASSAY NEPHELOMETRY NOT SPEC: CPT

## 2021-11-20 PROCEDURE — 88184 FLOWCYTOMETRY/ TC 1 MARKER: CPT

## 2021-11-20 PROCEDURE — 86663 EPSTEIN-BARR ANTIBODY: CPT

## 2021-11-20 PROCEDURE — 2000000000 HC ICU R&B

## 2021-11-20 PROCEDURE — 6360000002 HC RX W HCPCS: Performed by: STUDENT IN AN ORGANIZED HEALTH CARE EDUCATION/TRAINING PROGRAM

## 2021-11-20 PROCEDURE — 90935 HEMODIALYSIS ONE EVALUATION: CPT

## 2021-11-20 RX ORDER — NOREPINEPHRINE BIT/0.9 % NACL 16MG/250ML
2-100 INFUSION BOTTLE (ML) INTRAVENOUS CONTINUOUS
Status: DISCONTINUED | OUTPATIENT
Start: 2021-11-20 | End: 2021-11-21

## 2021-11-20 RX ORDER — PROPOFOL 10 MG/ML
10 INJECTION, EMULSION INTRAVENOUS CONTINUOUS
Status: DISCONTINUED | OUTPATIENT
Start: 2021-11-20 | End: 2021-12-14 | Stop reason: HOSPADM

## 2021-11-20 RX ORDER — SODIUM CITRATE 4 % (5 ML)
1.3 SYRINGE (ML) MISCELLANEOUS PRN
Status: DISCONTINUED | OUTPATIENT
Start: 2021-11-20 | End: 2021-11-24

## 2021-11-20 RX ORDER — MINERAL OIL AND WHITE PETROLATUM 150; 830 MG/G; MG/G
OINTMENT OPHTHALMIC EVERY 4 HOURS
Status: DISCONTINUED | OUTPATIENT
Start: 2021-11-20 | End: 2021-12-04

## 2021-11-20 RX ORDER — PROPOFOL 10 MG/ML
INJECTION, EMULSION INTRAVENOUS
Status: DISPENSED
Start: 2021-11-20 | End: 2021-11-21

## 2021-11-20 RX ORDER — FENTANYL CITRATE 50 UG/ML
50 INJECTION, SOLUTION INTRAMUSCULAR; INTRAVENOUS EVERY 30 MIN PRN
Status: DISCONTINUED | OUTPATIENT
Start: 2021-11-20 | End: 2021-12-14 | Stop reason: HOSPADM

## 2021-11-20 RX ORDER — FENTANYL CITRATE 50 UG/ML
INJECTION, SOLUTION INTRAMUSCULAR; INTRAVENOUS
Status: COMPLETED | OUTPATIENT
Start: 2021-11-20 | End: 2021-11-20

## 2021-11-20 RX ORDER — NOREPINEPHRINE BIT/0.9 % NACL 16MG/250ML
INFUSION BOTTLE (ML) INTRAVENOUS
Status: DISPENSED
Start: 2021-11-20 | End: 2021-11-21

## 2021-11-20 RX ORDER — POLYVINYL ALCOHOL 14 MG/ML
1 SOLUTION/ DROPS OPHTHALMIC EVERY 4 HOURS
Status: DISCONTINUED | OUTPATIENT
Start: 2021-11-20 | End: 2021-12-04

## 2021-11-20 RX ORDER — CHLORHEXIDINE GLUCONATE 0.12 MG/ML
15 RINSE ORAL 2 TIMES DAILY
Status: DISCONTINUED | OUTPATIENT
Start: 2021-11-20 | End: 2021-12-14 | Stop reason: HOSPADM

## 2021-11-20 RX ORDER — PROPOFOL 10 MG/ML
5-50 INJECTION, EMULSION INTRAVENOUS
Status: DISCONTINUED | OUTPATIENT
Start: 2021-11-20 | End: 2021-11-20

## 2021-11-20 RX ORDER — EPINEPHRINE 1 MG/ML
INJECTION, SOLUTION, CONCENTRATE INTRAVENOUS
Status: COMPLETED | OUTPATIENT
Start: 2021-11-20 | End: 2021-11-20

## 2021-11-20 RX ORDER — SODIUM CITRATE 4 % (5 ML)
1.2 SYRINGE (ML) MISCELLANEOUS PRN
Status: DISCONTINUED | OUTPATIENT
Start: 2021-11-20 | End: 2021-11-24

## 2021-11-20 RX ADMIN — PANTOPRAZOLE SODIUM 40 MG: 40 INJECTION, POWDER, FOR SOLUTION INTRAVENOUS at 09:27

## 2021-11-20 RX ADMIN — SERTRALINE HYDROCHLORIDE 25 MG: 50 TABLET ORAL at 09:28

## 2021-11-20 RX ADMIN — Medication 1.3 ML: at 19:58

## 2021-11-20 RX ADMIN — MIDODRINE HYDROCHLORIDE 7.5 MG: 5 TABLET ORAL at 13:59

## 2021-11-20 RX ADMIN — MIDODRINE HYDROCHLORIDE 7.5 MG: 5 TABLET ORAL at 09:28

## 2021-11-20 RX ADMIN — EPINEPHRINE 0.1 MG: 1 INJECTION, SOLUTION, CONCENTRATE INTRAVENOUS at 19:00

## 2021-11-20 RX ADMIN — SODIUM CHLORIDE, PRESERVATIVE FREE 10 ML: 5 INJECTION INTRAVENOUS at 21:00

## 2021-11-20 RX ADMIN — INSULIN LISPRO 2 UNITS: 100 INJECTION, SOLUTION INTRAVENOUS; SUBCUTANEOUS at 17:53

## 2021-11-20 RX ADMIN — CARVEDILOL 3.12 MG: 3.12 TABLET, FILM COATED ORAL at 09:28

## 2021-11-20 RX ADMIN — FENTANYL CITRATE 100 MCG: 50 INJECTION, SOLUTION INTRAMUSCULAR; INTRAVENOUS at 19:05

## 2021-11-20 RX ADMIN — FERROUS SULFATE TAB 325 MG (65 MG ELEMENTAL FE) 325 MG: 325 (65 FE) TAB at 09:29

## 2021-11-20 RX ADMIN — CARVEDILOL 3.12 MG: 3.12 TABLET, FILM COATED ORAL at 17:53

## 2021-11-20 RX ADMIN — CEFEPIME HYDROCHLORIDE 1000 MG: 1 INJECTION, POWDER, FOR SOLUTION INTRAMUSCULAR; INTRAVENOUS at 21:00

## 2021-11-20 RX ADMIN — Medication 1.2 ML: at 19:59

## 2021-11-20 RX ADMIN — SODIUM CHLORIDE, PRESERVATIVE FREE 10 ML: 5 INJECTION INTRAVENOUS at 09:29

## 2021-11-20 RX ADMIN — ATORVASTATIN CALCIUM 80 MG: 80 TABLET, FILM COATED ORAL at 09:29

## 2021-11-20 RX ADMIN — SODIUM CHLORIDE, PRESERVATIVE FREE 10 ML: 5 INJECTION INTRAVENOUS at 09:31

## 2021-11-20 RX ADMIN — PROPOFOL 10 MCG/KG/MIN: 10 INJECTION, EMULSION INTRAVENOUS at 19:45

## 2021-11-20 ASSESSMENT — ENCOUNTER SYMPTOMS
EYE REDNESS: 0
COUGH: 0
BLOOD IN STOOL: 0
CHEST TIGHTNESS: 0
NAUSEA: 0
ABDOMINAL PAIN: 0
SHORTNESS OF BREATH: 0
COLOR CHANGE: 0
VOMITING: 0
TROUBLE SWALLOWING: 0
EYE ITCHING: 0

## 2021-11-20 ASSESSMENT — PAIN SCALES - GENERAL
PAINLEVEL_OUTOF10: 0

## 2021-11-20 ASSESSMENT — PULMONARY FUNCTION TESTS
PIF_VALUE: 30
PIF_VALUE: 28

## 2021-11-20 NOTE — PROGRESS NOTES
ICU Progress Note (Non-Vent)  [facility]    Patient - Courtney Alberto,  Age - 66 y.o.    - 1950      Room Number -    MRN -  [id number]   Acct # - [de-identified]  Date of Admission -  11/10/2021  4:23 PM    Events of Past 24 Hours   Patient currently on BiPAP, getting volumes of 250. I adjusted the BiPAP to 18/8 with a backup rate of 20. Patient is arousable, appears to be weak; received dialysis yesterday    Vitals    height is 5' 3\" (1.6 m) and weight is 205 lb 7.5 oz (93.2 kg). Her axillary temperature is 97.4 °F (36.3 °C). Her blood pressure is 123/69 and her pulse is 84. Her respiration is 18 and oxygen saturation is 100%.        Temperature Range: Temp: 97.4 °F (36.3 °C) Temp  Av.8 °F (36.6 °C)  Min: 97 °F (36.1 °C)  Max: 98.6 °F (37 °C)  BP Range:  Systolic (67PHC), DIJ:863 , Min:79 , VWJ:331     Diastolic (74FMK), KVK:35, Min:32, Max:85    Pulse Range: Pulse  Av  Min: 65  Max: 87  Respiration Range: Resp  Av.5  Min: 15  Max: 26  Current Pulse Ox[de-identified]  SpO2: 100 %  24HR Pulse Ox Range:  SpO2  Av.5 %  Min: 91 %  Max: 100 %  Oxygen Amount and Delivery: O2 Flow Rate (L/min): 2 L/min    Wt Readings from Last 3 Encounters:   21 205 lb 7.5 oz (93.2 kg)   16 178 lb (80.7 kg)   16 184 lb 12.8 oz (83.8 kg)     I/O       Intake/Output Summary (Last 24 hours) at 2021 0647  Last data filed at 2021 0400  Gross per 24 hour   Intake 1043.5 ml   Output 2860 ml   Net -1816.5 ml     DRAIN/TUBE OUTPUT       Invasive Lines   ICP PRESSURE RANGE  No data recorded  CVP PRESSURE RANGE  No data recorded      Medications      carvedilol  3.125 mg Oral BID WC    pantoprazole  40 mg IntraVENous BID    And    sodium chloride flush  10 mL IntraVENous BID    sodium polystyrene  15 g Oral Once    midodrine  7.5 mg Oral TID    cefepime  1,000 mg IntraVENous Q24H    sertraline  25 mg Oral Daily    ferrous sulfate  325 mg Oral Daily with breakfast    [Held by provider] aspirin  81 mg Oral Daily    atorvastatin  80 mg Oral Daily    insulin glargine  22 Units SubCUTAneous Nightly    insulin lispro  0-12 Units SubCUTAneous TID     insulin lispro  0-6 Units SubCUTAneous Nightly    sodium chloride flush  5-40 mL IntraVENous 2 times per day     potassium chloride **OR** potassium alternative oral replacement **OR** potassium chloride, magnesium sulfate, glucose, dextrose, glucagon (rDNA), dextrose, acetaminophen, anticoagulant sodium citrate, anticoagulant sodium citrate, sodium chloride, sodium chloride flush, sodium chloride, acetaminophen, ondansetron **OR** ondansetron, oxyCODONE-acetaminophen  IV Drips/Infusions   sodium chloride 30 mL/hr (11/19/21 1255)    dextrose      norepinephrine Stopped (11/19/21 0002)    sodium chloride      sodium chloride         Diet/Nutrition   Diet NPO    Exam      Constitutional -lethargic but arousable and responds appropriately  General Appearance  well developed, well nourished, obese  HEENT -normocephalic, atraumatic. PERRLA  Lungs - Chest expands equally, diminished bilaterally   Cardiovascular - Heart sounds are normal.  normal rate and rhythm regular, no murmur, gallop or rub. Abdomen - soft, nontender, nondistended, no masses or organomegaly  Neurologic - CN II-XII are grossly intact.  There are no focal motor deficits  Skin - no bruising or bleeding  Extremities - no cyanosis, clubbing; generalized edema  Lab Results   CBC     Lab Results   Component Value Date    WBC 7.7 11/20/2021    RBC 2.58 11/20/2021    RBC 4.14 08/31/2016    HGB 8.0 11/20/2021    HCT 24.5 11/20/2021    PLT 49 11/20/2021    MCV 94.8 11/20/2021    MCH 30.8 11/20/2021    MCHC 32.5 11/20/2021    RDW 16.0 11/20/2021    LYMPHOPCT PENDING 11/20/2021    LYMPHOPCT 32.8 08/31/2016    MONOPCT PENDING 11/20/2021    EOSPCT 4.3 08/31/2016    BASOPCT PENDING 11/20/2021 BASOPCT 0.7 08/31/2016    MONOSABS PENDING 11/20/2021    LYMPHSABS PENDING 11/20/2021    EOSABS PENDING 11/20/2021    BASOSABS PENDING 11/20/2021    DIFFTYPE NOT REPORTED 11/20/2021       BMP   Lab Results   Component Value Date     11/20/2021    K 3.7 11/20/2021     11/20/2021    CO2 26 11/20/2021    BUN 30 11/20/2021    CREATININE 2.50 11/20/2021    GLUCOSE 117 11/20/2021    GLUCOSE 77 08/31/2016       LFTS  Lab Results   Component Value Date    ALKPHOS 175 11/18/2021    ALT 31 11/18/2021    AST 21 11/18/2021    PROT 5.4 11/18/2021    BILITOT 0.27 11/18/2021    BILIDIR <0.08 11/13/2021    IBILI Connot be calculated 11/13/2021    LABALBU 2.6 11/18/2021       ABG ABGs:   Lab Results   Component Value Date    PHART 7.248 11/19/2021    PO2ART 81.0 11/19/2021    LGG4AFG 61.6 11/19/2021       Lab Results   Component Value Date    MODE NOT REPORTED 11/19/2021         INR  No results for input(s): PROTIME, INR in the last 72 hours. APTT  No results for input(s): APTT in the last 72 hours. Lactic Acid  Lab Results   Component Value Date    LACTA 0.6 11/16/2021    LACTA 0.6 11/16/2021        BNP   No results for input(s): BNP in the last 72 hours. Cultures       Radiology     CXR            Bibasilar atelectasis with small pleural effusions and mild interstitial markings    Right PICC line and right IJ    SYSTEMS ASSESSMENT    Acute on chronic hypoxic and hypercapnic respiratory failure  Acute kidney injury-requiring hemodialysis  Morbid obesity  Anemia, chronic disease and possible rectal bleed  Hypoalbuminemia  Thrombocytopenia  Nonischemic cardiomyopathy with EF of 30 to 35% possible right to left interatrial shunt  Atrial fibrillation    Neuro   Mental status waxing and waning but currently seems to be reasonable    Respiratory   Wean oxygen as tolerated.  Keep O2 sat > 88%  Recheck ABG, BiPAP settings adjusted    Cardiovascular   Off pressors  On midodrine  Gastrointestinal   Continue tube feeds    Renal   Hopefully, dialysis will improve function in terms of acidosis    Infectious Disease   Remains on cefepime    Hematology/Oncology   Not on any anticoagulation    Endocrine   Blood sugars reasonable    Social/Spiritual/DNR/Disposition/Other   Remains full code    Critical Care Time   35 min    Electronically signed by Carola Bo MD on 11/20/2021 at 6:47 AM

## 2021-11-20 NOTE — PROGRESS NOTES
Progress Note    11/20/2021   1:58 PM    Name:  Alyson Montiel  MRN:    [id number]     Acct:     [de-identified]   Room:  2004/2004-01  IP Day: 8     Admit Date: 11/10/2021  4:23 PM  PCP: Oracio Ogden DO    Subjective:     C/C:   Chief Complaint   Patient presents with    Abnormal Lab     low hemoglobin and high CR       Interval History: Status: not changed. Patient is alert and oriented to self and place. Patient is on 2 L of oxygen via nasal cannula with an oxygen saturation of 95%. blood pressure and heart rate stable patient is afebrile. Recent labs reviewed creatinine 2.50, hemoglobin 8, platelets 49. LDH within normal limit haptoglobin 125, absolute reticulocyte count within normal limit    ROS:   all 10 systems reviewed and are negative except as noted    Review of Systems   Constitutional: Negative for chills and fatigue. HENT: Negative for drooling, mouth sores, sneezing and trouble swallowing. Eyes: Negative for redness and itching. Respiratory: Negative for cough, chest tightness and shortness of breath. Cardiovascular: Negative for chest pain, palpitations and leg swelling. Gastrointestinal: Negative for abdominal pain, blood in stool, nausea and vomiting. Endocrine: Negative for heat intolerance and polyphagia. Genitourinary: Negative for difficulty urinating, flank pain and pelvic pain. Musculoskeletal: Negative for arthralgias, joint swelling and neck stiffness. Skin: Negative for color change and pallor. Allergic/Immunologic: Negative for food allergies. Neurological: Negative for dizziness, seizures and headaches. Hematological: Does not bruise/bleed easily. Psychiatric/Behavioral: Negative for agitation, behavioral problems, confusion, decreased concentration and suicidal ideas. The patient is not hyperactive. Medications:      Allergies: No Known Allergies    Current Meds: carvedilol (COREG) tablet 3.125 mg, BID WC  pantoprazole (PROTONIX) injection 40 mg, BID   And  sodium chloride flush 0.9 % injection 10 mL, BID  potassium chloride (KLOR-CON M) extended release tablet 40 mEq, PRN   Or  potassium bicarb-citric acid (EFFER-K) effervescent tablet 40 mEq, PRN   Or  potassium chloride 10 mEq/100 mL IVPB (Peripheral Line), PRN  magnesium sulfate 1000 mg in dextrose 5% 100 mL IVPB, PRN  glucose (GLUTOSE) 40 % oral gel 15 g, PRN  dextrose 50 % IV solution, PRN  glucagon (rDNA) injection 1 mg, PRN  dextrose 5 % solution, PRN  acetaminophen (TYLENOL) tablet 650 mg, Q4H PRN  anticoagulant sodium citrate 4 % injection 1.3 mL, PRN  anticoagulant sodium citrate 4 % injection 1.2 mL, PRN  sodium polystyrene (KAYEXALATE) 15 GM/60ML suspension 15 g, Once  midodrine (PROAMATINE) tablet 7.5 mg, TID  cefepime (MAXIPIME) 1000 mg IVPB minibag, Q24H  sertraline (ZOLOFT) tablet 25 mg, Daily  0.9 % sodium chloride infusion, PRN  ferrous sulfate (IRON 325) tablet 325 mg, Daily with breakfast  [Held by provider] aspirin chewable tablet 81 mg, Daily  atorvastatin (LIPITOR) tablet 80 mg, Daily  insulin glargine (LANTUS) injection vial 22 Units, Nightly  insulin lispro (HUMALOG) injection vial 0-12 Units, TID WC  insulin lispro (HUMALOG) injection vial 0-6 Units, Nightly  sodium chloride flush 0.9 % injection 5-40 mL, 2 times per day  sodium chloride flush 0.9 % injection 5-40 mL, PRN  0.9 % sodium chloride infusion, PRN  acetaminophen (TYLENOL) tablet 650 mg, Q4H PRN  ondansetron (ZOFRAN-ODT) disintegrating tablet 4 mg, Q8H PRN   Or  ondansetron (ZOFRAN) injection 4 mg, Q6H PRN  oxyCODONE-acetaminophen (PERCOCET) 5-325 MG per tablet 1 tablet, Q6H PRN        Data:     Code Status:  Full Code    Family History   Problem Relation Age of Onset    High Blood Pressure Other        Social History     Socioeconomic History    Marital status:      Spouse name: Not on file    Number of children: Not on file    Years of education: Not on file    Highest education level: Not on file Occupational History    Not on file   Tobacco Use    Smoking status: Never Smoker    Smokeless tobacco: Not on file   Substance and Sexual Activity    Alcohol use: No     Alcohol/week: 0.0 standard drinks    Drug use: No    Sexual activity: Not on file   Other Topics Concern    Not on file   Social History Narrative    Not on file     Social Determinants of Health     Financial Resource Strain:     Difficulty of Paying Living Expenses: Not on file   Food Insecurity:     Worried About Running Out of Food in the Last Year: Not on file    Katya of Food in the Last Year: Not on file   Transportation Needs:     Lack of Transportation (Medical): Not on file    Lack of Transportation (Non-Medical): Not on file   Physical Activity:     Days of Exercise per Week: Not on file    Minutes of Exercise per Session: Not on file   Stress:     Feeling of Stress : Not on file   Social Connections:     Frequency of Communication with Friends and Family: Not on file    Frequency of Social Gatherings with Friends and Family: Not on file    Attends Cheondo[employer] Services: Not on file    Active Member of [address] [place] [address] or Organizations: Not on file    Attends Club or Organization Meetings: Not on file    Marital Status: Not on file   Intimate Partner Violence:     Fear of Current or Ex-Partner: Not on file    Emotionally Abused: Not on file    Physically Abused: Not on file    Sexually Abused: Not on file   Housing Stability:     Unable to Pay for Housing in the Last Year: Not on file    Number of [place] in the Last Year: Not on file    Unstable Housing in the Last Year: Not on file       I/O (24Hr): Intake/Output Summary (Last 24 hours) at 11/20/2021 1358  Last data filed at 11/20/2021 0800  Gross per 24 hour   Intake 1083.5 ml   Output 2860 ml   Net -1776.5 ml     Radiology:  CT HEAD WO CONTRAST    Result Date: 11/17/2021  No acute intracranial abnormality.  Chronic microvascular disease and chronic lacunar infarcts. Critical results were called by Dr. Cris Sevilla to Dr Naeem Bush on 11/17/2021 at [address] RENAL COMPLETE    Result Date: 11/15/2021  1. Mild to moderate right-sided hydronephrosis. 2. Limited renal ultrasound. No left-sided hydronephrosis. XR CHEST PORTABLE    Result Date: 11/19/2021  Basilar opacities likely represent atelectasis with small pleural effusions. XR CHEST PORTABLE    Result Date: 11/18/2021  1. Tip of the newly inserted NG tube overlies the gastric body. . 2. The right IJ CVC and right arm PICC line remain well positioned. 3.  No acute cardiopulmonary abnormality. XR CHEST PORTABLE    Result Date: 11/17/2021  1. No pneumothorax evident following thoracentesis. 2. Low lung volumes. Significantly improved pneumatization lower right lung with minimal pleural effusion bilateral evident. 3. Calcific atherosclerosis aorta. 4. Cardiomegaly. XR CHEST PORTABLE    Result Date: 11/16/2021  Moderate right pleural effusion and associated airspace disease. Pneumonia cannot be excluded. US RETROPERITONEAL COMPLETE    Result Date: 11/18/2021  No acute findings. No hydronephrosis. Small right pleural effusion. IR NONTUNNELED VASCULAR CATHETER > 5 YEARS    Result Date: 11/17/2021  Successful ultrasound guided non-tunneled right IJ temporary hemodialysis catheter placement. Catheter is ready for dialysis use at this time. MRI BRAIN WO CONTRAST    Result Date: 11/18/2021  There is no acute infarction, intracranial hemorrhage, or intracranial mass lesion. Mild chronic microangiopathic ischemic disease. Mild generalized volume loss. Mild mucosal thickening of bilateral mastoid air cells and right sphenoid sinus. IR GUIDED THORACENTESIS PLEURAL    Result Date: 11/17/2021  Successful ultrasound guided right thoracentesis.        Labs:  Recent Results (from the past 24 hour(s))   POC Glucose Fingerstick    Collection Time: 11/19/21  5:39 PM   Result Value Ref Range    POC Glucose 104 65 - 105 mg/dL   POC Glucose Fingerstick    Collection Time: 11/19/21  8:13 PM   Result Value Ref Range    POC Glucose 122 (H) 65 - 105 mg/dL   Electrolytes urine random    Collection Time: 11/19/21 11:00 PM   Result Value Ref Range    Chloride, Ur 64 mmol/L    Potassium, Ur 9.9 mmol/L    Sodium,Ur 103 mmol/L   Creatinine, Random Urine    Collection Time: 11/19/21 11:00 PM   Result Value Ref Range    Creatinine, Ur 73.9 28.0 - 217.0 mg/dL   CBC with DIFF    Collection Time: 11/20/21  5:30 AM   Result Value Ref Range    WBC 7.5 3.5 - 11.0 k/uL    RBC 2.58 (L) 4.0 - 5.2 m/uL    Hemoglobin 8.0 (L) 12.0 - 16.0 g/dL    Hematocrit 24.5 (L) 36 - 46 %    MCV 94.8 80 - 100 fL    MCH 30.8 26 - 34 pg    MCHC 32.5 31 - 37 g/dL    RDW 16.0 (H) 11.5 - 14.9 %    Platelets 49 (L) 570 - 450 k/uL    MPV 9.3 6.0 - 12.0 fL    NRBC Automated NOT REPORTED per 100 WBC    Differential Type NOT REPORTED     Immature Granulocytes NOT REPORTED 0 %    Absolute Immature Granulocyte NOT REPORTED 0.00 - 0.30 k/uL    WBC Morphology NOT REPORTED     RBC Morphology NOT REPORTED     Platelet Estimate NOT REPORTED     Seg Neutrophils 57 36 - 66 %    Lymphocytes 26 24 - 44 %    Monocytes 9 (H) 1 - 7 %    Eosinophils % 0 0 - 4 %    Basophils 0 0 - 2 %    Bands 8 0 - 10 %    nRBC 2 (H) 0 per 100 WBC    Segs Absolute 4.31 1.3 - 9.1 k/uL    Absolute Lymph # 1.96 1.0 - 4.8 k/uL    Absolute Mono # 0.68 0.1 - 1.3 k/uL    Absolute Eos # 0.00 0.0 - 0.4 k/uL    Basophils Absolute 0.00 0.0 - 0.2 k/uL    Absolute Bands # 0.60 0.0 - 1.0 k/uL    Morphology ANISOCYTOSIS PRESENT     Morphology HYPOCHROMIA PRESENT     Morphology 1+ POLYCHROMASIA    Magnesium    Collection Time: 11/20/21  5:30 AM   Result Value Ref Range    Magnesium 1.8 1.6 - 2.6 mg/dL   Basic Metabolic Panel w/ Reflex to MG    Collection Time: 11/20/21  5:30 AM   Result Value Ref Range    Glucose 117 (H) 70 - 99 mg/dL    BUN 30 (H) 8 - 23 mg/dL    CREATININE 2.50 (H) 0.50 - 0.90 mg/dL    Bun/Cre Ratio Min:97 °F (36.1 °C), Max:98.1 °F (36.7 °C)    Recent Labs     11/19/21  1739 11/19/21 2013 11/20/21  0800 11/20/21  1119   POCGLU 104 122* 104 102         Physical Exam  Vitals reviewed. HENT:      Head: Normocephalic. Right Ear: External ear normal.      Left Ear: External ear normal.      Nose: Nose normal.      Mouth/Throat:      Mouth: Mucous membranes are moist.      Pharynx: Oropharynx is clear. Eyes:      Conjunctiva/sclera: Conjunctivae normal.   Cardiovascular:      Rate and Rhythm: Normal rate and regular rhythm. Pulses: Normal pulses. Heart sounds: Normal heart sounds. Pulmonary:      Effort: Pulmonary effort is normal.      Breath sounds: Examination of the right-lower field reveals rales. Rales present. Abdominal:      General: Bowel sounds are normal.      Palpations: Abdomen is soft. Musculoskeletal:         General: No deformity. Cervical back: Normal range of motion and neck supple. Right lower leg: Edema (1+) present. Left lower leg: Edema (1+) present. Skin:     General: Skin is warm. Capillary Refill: Capillary refill takes less than 2 seconds. Coloration: Skin is not jaundiced. Neurological:      General: No focal deficit present. Mental Status: She is alert. Mental status is at baseline. Psychiatric:         Mood and Affect: Mood normal.         Behavior: Behavior normal.         Assessment:        Primary Problem  JAYLYN (acute kidney injury) ([facility]     Principal Problem:    JAYLYN (acute kidney injury) ([facility]  Active Problems:    Type 2 diabetes mellitus with kidney complication, with long-term current use of insulin ([facility])    CKD (chronic kidney disease) stage 3, GFR 30-59 ml/min ([facility])    HTN (hypertension)    ASCVD (arteriosclerotic cardiovascular disease)    Iron deficiency anemia    Hydronephrosis determined by ultrasound    Longstanding persistent atrial fibrillation ([facility]  Resolved Problems:    * No resolved hospital problems. *      Past Medical History:   Diagnosis Date    Anemia, unspecified     CAD (coronary artery disease)     Chronic kidney disease     CKD (chronic kidney disease) stage 3, GFR 30-59 ml/min ([facility])     Closed fracture of dorsal (thoracic) vertebra without mention of spinal cord injury     Coronary atherosclerosis of unspecified type of vessel, native or graft     Depression with anxiety     Esophageal reflux     HTN (hypertension)     Hyperkalemia     Hyperlipidemia     Intervertebral cervical disc disorder with myelopathy, cervical region     Lower back pain     Lumbar radiculopathy     Microalbuminuria     Muscle weakness     Pain in limb     Stenosis of cervical spine     Tethered cord syndrome (HCC)     Type II or unspecified type diabetes mellitus without mention of complication, not stated as uncontrolled     Urinary calculus     UTI (lower urinary tract infection)         Plan:        1. IV cefepime  1. Blood culture no growth for 3 days  2. Thoracocentesis fluid culture pending  3. Midodrine 7.5 mg p.o. 3 times a day  4. Hemodialysis today per nephrology  5. Check peripheral smear for schistocytes  6. Consult oncology for thrombocytopenia and anemia  7. MRI brain report reviewed and unremarkable  8. Renal ultrasound report from 11/18/2021 shows improvement of hydronephrosis. 9. DVT Prophylaxis no pharmacological DVT prophylaxis due to thrombocytopenia and low hemoglobin  10. EPCs  11. PT/OT to evaluate and treat  12. Pain control  13. Replace electrolytes as per sliding scale  14. Home medications reviewed and appropriate medications continued  15.  Reviewed labs and imaging studies from last 24 hours and results explained to patient      Electronically signed by Lebron Leon MD

## 2021-11-20 NOTE — PLAN OF CARE
Problem: SAFETY  Goal: Free from accidental physical injury  11/20/2021 0542 by Rick Amaro RN  Outcome: Met This Shift     Problem: DAILY CARE  Goal: Daily care needs are met  11/20/2021 0542 by Rick Amaro RN  Outcome: Met This Shift     Problem: PAIN  Goal: Patient's pain/discomfort is manageable  11/20/2021 0542 by Rick Amaro RN  Outcome: Met This Shift     Problem: SKIN INTEGRITY  Goal: Skin integrity is maintained or improved  11/20/2021 0542 by Rick Amaro RN  Outcome: Ongoing  Note: No new skin breakdown noted     Problem: Musculor/Skeletal Functional Status  Goal: Highest potential functional level  11/20/2021 0542 by Rick Amaro RN  Outcome: Ongoing  Note: Pt squeezes nurses hand once this shift but does not release, no spontaneous movement noted from pt's legs     Problem: Nutrition  Goal: Optimal nutrition therapy  11/20/2021 0542 by Rick Amaro RN  Outcome: Ongoing  Note: Pt NPO currently for testing today, tubefeed will restart after procedure     Problem: Gas Exchange - Impaired:  Goal: Levels of oxygenation will improve  Description: Levels of oxygenation will improve  11/20/2021 0542 by Rick Amaro RN  Outcome: Ongoing  Note: Continuous pulse ox monitored, CXR, ABG results noted, Tani Jeffery continues as ordered     Problem: Fluid Volume - Imbalance:  Goal: Absence of imbalanced fluid volume signs and symptoms  Description: Absence of imbalanced fluid volume signs and symptoms  11/20/2021 0542 by Rick Amaro RN  Outcome: Ongoing  Note: Intake and output and daily weights monitored, Dialysis treatments as ordered, lab work monitored

## 2021-11-20 NOTE — PROGRESS NOTES
TECHNIQUE: CT of the head was performed without the administration of intravenous contrast. Dose modulation, iterative reconstruction, and/or weight based adjustment of the mA/kV was utilized to reduce the radiation dose to as low as reasonably achievable. COMPARISON: None. HISTORY: ORDERING SYSTEM PROVIDED HISTORY: Stroke alert TECHNOLOGIST PROVIDED HISTORY: stoke alert Reason for Exam: Stroke alert Acuity: Unknown Type of Exam: Unknown FINDINGS: BRAIN/VENTRICLES: There is no acute intracranial hemorrhage, mass effect or midline shift. No abnormal extra-axial fluid collection. The gray-white differentiation is maintained without evidence of an acute infarct. There is no evidence of hydrocephalus. Involutional changes and chronic small vessel ischemic disease. Right caudate head lacunar infarct. Left lentiform nucleus hypoattenuation also likely a lacunar infarct. There are vascular calcifications ORBITS: The visualized portion of the orbits demonstrate no acute abnormality. SINUSES: The visualized paranasal sinuses and mastoid air cells demonstrate no acute abnormality. SOFT TISSUES/SKULL:  No acute abnormality of the visualized skull or soft tissues. No acute intracranial abnormality. Chronic microvascular disease and chronic lacunar infarcts. Critical results were called by Dr. Festus Luong to Dr Charli Jean on 11/17/2021 at 22:0. XR CHEST PORTABLE    Result Date: 11/18/2021  EXAMINATION: ONE XRAY VIEW OF THE CHEST 11/18/2021 3:59 pm COMPARISON: 11/17/2021 HISTORY: ORDERING SYSTEM PROVIDED HISTORY: NG placement TECHNOLOGIST PROVIDED HISTORY: NG placement Reason for Exam: ng placement Acuity: Unknown Type of Exam: Unknown FINDINGS: The heart is enlarged. The lungs are clear. No pneumothorax or significant pleural effusion is seen. The tip of the nasogastric tube overlies the stomach. The right internal jugular vein central venous catheter is well positioned, with the tip overlying the distal SVC.   Tip of the right arm PICC line overlies the distal SVC. 1. Tip of the newly inserted NG tube overlies the gastric body. . 2. The right IJ CVC and right arm PICC line remain well positioned. 3.  No acute cardiopulmonary abnormality. US RETROPERITONEAL COMPLETE    Result Date: 11/18/2021  EXAMINATION: RETROPERITONEAL ULTRASOUND OF THE KIDNEYS AND URINARY BLADDER 11/18/2021 COMPARISON: 11/12/2021, 11/15/2021 HISTORY: ORDERING SYSTEM PROVIDED HISTORY: low urine out put TECHNOLOGIST PROVIDED HISTORY: low urine out put FINDINGS: Kidneys: Somewhat limited exam due to patient body habitus. The right kidney measures 11 cm in length and the left kidney measures 9.6 cm in length. Kidneys demonstrate normal cortical echogenicity. No evidence of hydronephrosis or intrarenal stones. Resolution of hydronephrosis since prior studies. Small right pleural effusion. Bladder: Kaur catheter in an apparently decompressed urinary bladder. No acute findings. No hydronephrosis. Small right pleural effusion. IR NONTUNNELED VASCULAR CATHETER > 5 YEARS    Result Date: 11/17/2021  PROCEDURE: ULTRASOUND GUIDED VASCULAR ACCESS. FLUOROSCOPY GUIDANCE FOR PLACEMENT OF A NON-TUNNELED HEMODIALYSIS CATHETER. 11/17/2021. HISTORY: ORDERING SYSTEM PROVIDED HISTORY: JAYLYN TECHNOLOGIST PROVIDED HISTORY: JAYLYN In need of hemodialysis. FLUOROSCOPY DOSE AND TYPE OR TIME AND EXPOSURES: Fluoroscopy time-13 seconds D AP-120 cGy cm squared SEDATION: Local anesthesia TECHNIQUE: Informed consent was obtained after a detailed explanation of the procedure including risks, benefits, and alternatives. Universal protocol was observed. The right neck was prepped and draped in sterile fashion using maximum sterile barrier technique. Local anesthesia was achieved with lidocaine. A micropuncture needle was used to access the right internal jugular vein using ultrasound guidance. An ultrasound image demonstrating patency of the vein with needle tip located within it. An image was obtained and stored in PACs. Under fluoroscopy guidance, a 0.035 guidewire was used to place a 13 [place] x 15 cm duo split temporary hemodialysis catheter after fascial tract dilation. Catheter tip position in the upper right atrium demonstrated. The catheter flushed easily and there was a good blood return. The catheter was sutured to the skin. The catheter was locked with heparinized saline. The patient tolerated the procedure well and there were no immediate complications. FINDINGS: As above, good catheter tip position in the upper right atrium. Right-sided PICC identified, with tip in the mid SVC. Successful ultrasound guided non-tunneled right IJ temporary hemodialysis catheter placement. Catheter is ready for dialysis use at this time. MRI BRAIN WO CONTRAST    Result Date: 11/18/2021  EXAMINATION: MRI OF THE BRAIN WITHOUT CONTRAST  11/18/2021 7:00 pm TECHNIQUE: Multiplanar multisequence MRI of the brain was performed without the administration of intravenous contrast. COMPARISON: Head CT of 11/17/2021 HISTORY: ORDERING SYSTEM PROVIDED HISTORY: altered mental status TECHNOLOGIST PROVIDED HISTORY: altered mental status Reason for Exam: AMS FINDINGS: There is no acute infarction, intracranial hemorrhage, or intracranial mass lesion. No mass effect, midline shift, or extra-axial collection is noted. There are mild nonspecific foci of periventricular and subcortical cerebral white matter T2/FLAIR hyperintensity, most likely representing chronic microangiopathic disease in this age group. The brain parenchyma is otherwise normal. The pituitary gland is normal in appearance. The cerebellar tonsils are in normal position. The ventricles, sulci, and cisterns are prominent suggestive of generalized volume loss. The intracranial flow voids are preserved. The globes and orbits are within normal limits. Mild mucosal thickening of bilateral mastoid air cells and right sphenoid sinus.   The visualized extracranial structures including paranasal sinuses and mastoid air cells are otherwise unremarkable. There is no acute infarction, intracranial hemorrhage, or intracranial mass lesion. Mild chronic microangiopathic ischemic disease. Mild generalized volume loss. Mild mucosal thickening of bilateral mastoid air cells and right sphenoid sinus. IR GUIDED THORACENTESIS PLEURAL    Result Date: 11/17/2021  PROCEDURE: ULTRASOUNDGUIDED RIGHT THORACENTESIS 11/17/2021 HISTORY: ORDERING SYSTEM PROVIDED HISTORY: Large pleural effusion. Patient not able to sit up because of severe pain. Patient confused TECHNOLOGIST PROVIDED HISTORY: Discussed with Dr. Angely Crouch who will attempt CT-guided thoracentesis on the right for diagnostic and therapeutic reasons. Reason for exam:->Large pleural effusion. Patient not able to sit up because of severe pain. Patient confused Which side should the procedure be performed?->Right TECHNIQUE: Informed consent was obtained after a detailed explanation of the procedure including risks, benefits, and alternatives. Universal protocol was performed. With the patient in the left side decubitus position, the right chest was prepped and draped in sterile fashion and local anesthesia was achieved with lidocaine. A 5 [place]bourgish needle sheath was advanced under ultrasound guidance into pleural effusion and thoracentesis was performed. The patient tolerated the procedure well. FINDINGS: A total of 1000 mL was removed. Fluid was straw-colored and non turbid. Successful ultrasound guided right thoracentesis.          Physical Examination   flat no issues   scattered rhonchi   irregular regular   sleeping   head normocephalic and atraumatic      Assessment//Plan             Longstanding persistent Afib - rate controlled - OAC at home, currently on hold 2/2 severe anemia, GI bleed    NICMP, EF 20-25% on TTE 11/2021  Chronic HFrEF    Right pleural effusion s/p thoracentesis w/ 1 L out on 11/17/2021    Reported ASCVD with prior PCI - details unknown - nuclear stress test 09/2021 with large fixed severe anteroseptal /apical defect c/w  Previous infarct, no ischemia    Chronic mild, flat, troponin elevation     H/O declined ICD 03/2019 per chart review    Chronic RBBB    PAD w/ occlusive BLE dz - managed [facility]    JAYLYN/CKD - new dialysis this admission    Type 2 DM    Obesity            off pressors     continue to monitor

## 2021-11-20 NOTE — CARE COORDINATION
Waiting on notice for when the pre-cert should get started for this patient to return to [facility].

## 2021-11-20 NOTE — PLAN OF CARE
Problem: DAILY CARE  Goal: Daily care needs are met  11/20/2021 1736 by Erica Ramirez RN  Outcome: Met This Shift   Patient and staff currently meeting all patient's daily care needs. Patient encouraged to participate and complete all ADLs per self. Will continue to monitor for opportunities for patient to meet all ADLs per self. Problem: PAIN  Goal: Patient's pain/discomfort is manageable  11/20/2021 1736 by Erica Ramirez RN  Outcome: Met This Shift   Pt medicated with pain medication prn. Assessed all pain characteristics including level, type, location, frequency, and onset. Non-pharmacologic interventions offered to pt as well. Pt states pain is tolerable at this time. Will continue to monitor. Problem: Falls - Risk of:  Goal: Will remain free from falls  Description: Will remain free from falls  Outcome: Met This Shift  Pt assessed as a fall risk this shift. Remains free from falls and accidental injury at this time. Fall precautions in place, including falling star sign and fall risk band on pt. Floor free from obstacles, and bed is locked and in lowest position. Adequate lighting provided. Pt encouraged to call before getting OOB for any need. Bed alarm activated. Will continue to monitor needs during hourly rounding, and reinforce education on use of call light. Problem: Skin Integrity:  Goal: Absence of new skin breakdown  Description: Absence of new skin breakdown  Outcome: Met This Shift   Skin assessment complete. Jimmy Chambers applied PRN. Turned and repositioned every two hours. Area kept free from moisture. Proper nourishment and fluids encouraged, as appropriate. Will continue to monitor for additional needs and changes in skin breakdown.    Problem: Nutrition  Goal: Optimal nutrition therapy  11/20/2021 1736 by Erica Ramirez RN  Outcome: Ongoing   Patient still on tube feed

## 2021-11-20 NOTE — CONSULTS
Today's Date: 11/20/2021  Patient Name: Weston Martinez  Date of admission: 11/10/2021  4:23 PM  Patient's age: 66 y.o., 1950  Admission Dx: JAYLYN (acute kidney injury) ([facility] [address] [N17.9]  Urinary tract infection in elderly patient [N39.0]  Anemia, unspecified type [D64.9]    Reason for Consult: TCP  Requesting Physician: Rosa Linda MD    CHIEF COMPLAINT:    Chief Complaint   Patient presents with    Abnormal Lab     low hemoglobin and high CR       History Obtained From:  patient and chart    HISTORY OF PRESENT ILLNESS:      Weston Martinez is a 66 y.o. female who is admitted to the hospital on 11/10/2021  for acute kidney injury and anemia. Patient has history of chronic kidney disease, chronic anemia and recent history of GI bleed with negative endoscopies at [facility]. She was transferred from nursing home for elevated creatinine and drop in her hemoglobin. Her stool for occult blood on 11/14/2021 was positive. She was evaluated by gastroenterology. Underwent upper endoscopy which showed gastritis. She also was noted to have hydronephrosis on the right side therefore was evaluated by urology and underwent cystoscopy with ureteral stent placement on 11/16/2021. Patient was evaluated by nephrology for her acute kidney injury and was started on acute dialysis on 11/17/2021. Her hemoglobin is stable at 8.0 however her platelets started dropping since admission and today it dropped to 49 therefore hematology was consulted for evaluation of her low platelets. Her iron studies suggest anemia of chronic disease. Her haptoglobin is 125 and LDH is not elevated  Her HIT panel came back negative.     Past Medical History:   has a past medical history of Anemia, unspecified, CAD (coronary artery disease), Chronic kidney disease, CKD (chronic kidney disease) stage 3, GFR 30-59 ml/min ([facility]), Closed fracture of dorsal (thoracic) vertebra without mention of spinal cord injury, Coronary atherosclerosis of unspecified type of vessel, native or graft, Depression with anxiety, Esophageal reflux, HTN (hypertension), Hyperkalemia, Hyperlipidemia, Intervertebral cervical disc disorder with myelopathy, cervical region, Lower back pain, Lumbar radiculopathy, Microalbuminuria, Muscle weakness, Pain in limb, Stenosis of cervical spine, Tethered cord syndrome (HCC), Type II or unspecified type diabetes mellitus without mention of complication, not stated as uncontrolled, Urinary calculus, and UTI (lower urinary tract infection). Past Surgical History:   has a past surgical history that includes Colon surgery; angioplasty; joint replacement; Upper gastrointestinal endoscopy (N/A, 11/15/2021); Cystoscopy (Right, 11/16/2021); and IR NONTUNNELED VASCULAR CATHETER > 5 YEARS (11/17/2021). Medications:    Prior to Admission medications    Medication Sig Start Date End Date Taking? Authorizing Provider   acetaminophen (TYLENOL) 325 MG tablet Take 650 mg by mouth every 4 hours as needed for Pain (do not exceed 4000 mg daily)   Yes Historical Provider, MD   albuterol (PROVENTIL) (2.5 MG/3ML) 0.083% nebulizer solution Take 2.5 mg by nebulization every 6 hours as needed for Shortness of Breath   Yes Historical Provider, MD   atorvastatin (LIPITOR) 80 MG tablet Take 80 mg by mouth daily   Yes Historical Provider, MD   famotidine (PEPCID) 20 MG tablet Take 20 mg by mouth daily   Yes Historical Provider, MD   ferrous sulfate (IRON 325) 325 (65 Fe) MG tablet Take 325 mg by mouth daily (with breakfast)   Yes Historical Provider, MD   HYDROcodone-acetaminophen (NORCO) 5-325 MG per tablet Take 1 tablet by mouth every 6 hours as needed for Pain.  Indications: until 11/13/21   Yes Historical Provider, MD   insulin lispro (HUMALOG) 100 UNIT/ML injection vial Inject into the skin 4 times daily (before meals and nightly) Per sliding scale:   150-200 = 2 units  201-250 = 4 units  251-300 = 6 units  301-350 = 8 units  351-400 = 10 units   Yes Historical Provider, MD   insulin glargine (LANTUS SOLOSTAR) 100 UNIT/ML injection pen Inject 22 Units into the skin nightly   Yes Historical Provider, MD   meclizine (ANTIVERT) 12.5 MG tablet Take 12.5 mg by mouth 2 times daily as needed for Dizziness   Yes Historical Provider, MD   warfarin (COUMADIN) 3 MG tablet Take 3 mg by mouth nightly   Yes Historical Provider, MD   carvedilol (COREG) 6.25 MG tablet Take 6.25 mg by mouth 2 times daily (with meals).    Yes Historical Provider, MD   aspirin 81 MG chewable tablet Take 81 mg by mouth daily    Yes Historical Provider, MD   bumetanide (BUMEX) 1 MG tablet Take 1 mg by mouth daily   11/10/21  Historical Provider, MD     Current Facility-Administered Medications   Medication Dose Route Frequency Provider Last Rate Last Admin    carvedilol (COREG) tablet 3.125 mg  3.125 mg Oral BID  Juan Pablo Bautista, APRN - CNP   3.125 mg at 11/20/21 5516    0.45 % sodium chloride infusion   IntraVENous Continuous Fide Granaods MD 30 mL/hr at 11/19/21 1255 30 mL/hr at 11/19/21 1255    pantoprazole (PROTONIX) injection 40 mg  40 mg IntraVENous BID Nettie Biares MD   40 mg at 11/20/21 9428    And    sodium chloride flush 0.9 % injection 10 mL  10 mL IntraVENous BID Nettie Baires MD   10 mL at 11/20/21 0931    potassium chloride (KLOR-CON M) extended release tablet 40 mEq  40 mEq Oral PRN Nettie Baires MD        Or    potassium bicarb-citric acid (EFFER-K) effervescent tablet 40 mEq  40 mEq Oral PRN Nettie Baires MD        Or    potassium chloride 10 mEq/100 mL IVPB (Peripheral Line)  10 mEq IntraVENous PRN Nettie Baires MD        magnesium sulfate 1000 mg in dextrose 5% 100 mL IVPB  1,000 mg IntraVENous PRN Nettie Baires MD        glucose (GLUTOSE) 40 % oral gel 15 g  15 g Oral PRN Nettie Baries MD        dextrose 50 % IV solution  12.5 g IntraVENous PRN Nettie Baires MD        glucagon (rDNA) injection 1 mg  1 mg IntraMUSCular PRN Bart Gonzalez MD        dextrose 5 % solution  100 mL/hr IntraVENous PRN Bart Gonzalez MD        acetaminophen (TYLENOL) tablet 650 mg  650 mg Oral Q4H PRN Brenda Atkins MD        anticoagulant sodium citrate 4 % injection 1.3 mL  1.3 mL IntraCATHeter PRN [place] Darwin MD   1.3 mL at 11/19/21 1537    anticoagulant sodium citrate 4 % injection 1.2 mL  1.2 mL IntraCATHeter PRN Johnson Ventura MD   1.2 mL at 11/19/21 1537    sodium polystyrene (KAYEXALATE) 15 GM/60ML suspension 15 g  15 g Oral Once Mahogany Burris MD        midodrine (PROAMATINE) tablet 7.5 mg  7.5 mg Oral TID Mahogany Burris MD   7.5 mg at 11/20/21 7619    cefepime (MAXIPIME) 1000 mg IVPB minibag  1,000 mg IntraVENous Q24H [place] Jaylin Pisano MD   Stopped at 11/19/21 2047    sertraline (ZOLOFT) tablet 25 mg  25 mg Oral Daily Jeb Gonzales MD   25 mg at 11/20/21 0928    0.9 % sodium chloride infusion   IntraVENous PRN Mahogany Burris MD        ferrous sulfate (IRON 325) tablet 325 mg  325 mg Oral Daily with breakfast Mahogany Burris MD   325 mg at 11/20/21 0929    [Held by provider] aspirin chewable tablet 81 mg  81 mg Oral Daily Jeb Gonzales MD   81 mg at 11/13/21 1015    atorvastatin (LIPITOR) tablet 80 mg  80 mg Oral Daily Jeb Gonzales MD   80 mg at 11/20/21 0929    insulin glargine (LANTUS) injection vial 22 Units  22 Units SubCUTAneous Nightly Jeb Gonzales MD   22 Units at 11/15/21 2054    insulin lispro (HUMALOG) injection vial 0-12 Units  0-12 Units SubCUTAneous TID WC Jeb Gonzales MD   2 Units at 11/19/21 1254    insulin lispro (HUMALOG) injection vial 0-6 Units  0-6 Units SubCUTAneous Nightly Jeb Gonzales MD   2 Units at 11/15/21 2054    sodium chloride flush 0.9 % injection 5-40 mL  5-40 mL IntraVENous 2 times per day Mahogany Burris MD   10 mL at 11/20/21 0929    sodium chloride flush 0.9 % injection 5-40 mL  5-40 mL IntraVENous PRN Jeb °C) (Axillary)   Resp 23   Ht 5' 3\" (1.6 m) Comment: from old chart  Wt 205 lb 7.5 oz (93.2 kg)   SpO2 99%   BMI 36.40 kg/m²    Temp (24hrs), Av.7 °F (36.5 °C), Min:97 °F (36.1 °C), Max:98.1 °F (36.7 °C)    General appearance - well appearing, no in pain or distress   Mental status -appears lethargic  Eyes - pupils equal and reactive, extraocular eye movements intact   Ears - bilateral TM's and external ear canals normal   Mouth - mucous membranes moist, pharynx normal without lesions   Neck - supple, no significant adenopathy   Lymphatics - no palpable lymphadenopathy, no hepatosplenomegaly   Chest - clear to auscultation, no wheezes, rales or rhonchi, symmetric air entry   Heart - normal rate, regular rhythm, normal S1, S2, no murmurs  Abdomen - soft, nontender, nondistended, no masses or organomegaly   Neurological -lethargic nonfocal  Musculoskeletal - no joint tenderness, deformity or swelling   Extremities - peripheral pulses normal,+++ pedal edema, no clubbing or cyanosis   Skin -multiple skin bruises noted    DATA:    Labs:   CBC:   Recent Labs     21  0502 21  0530   WBC 5.7 7.5   HGB 8.0* 8.0*   HCT 24.8* 24.5*   PLT 68* 49*     BMP:   Recent Labs     21  0502 21  0530    136   K 3.7 3.7   CO2 25 26   BUN 45* 30*   CREATININE 3.08* 2.50*   LABGLOM 15* 19*   GLUCOSE 114* 117*     PT/INR: No results for input(s): PROTIME, INR in the last 72 hours. IMAGING DATA:  XR CHEST PORTABLE   Final Result   Basilar opacities likely represent atelectasis with small pleural effusions. MRI BRAIN WO CONTRAST   Final Result      There is no acute infarction, intracranial hemorrhage, or intracranial mass   lesion. Mild chronic microangiopathic ischemic disease. Mild generalized volume loss. Mild mucosal thickening of bilateral mastoid air cells and right sphenoid   sinus. XR CHEST PORTABLE   Final Result   1.  Tip of the newly inserted NG tube overlies the gastric body. .   2. The right IJ CVC and right arm PICC line remain well positioned. 3.  No acute cardiopulmonary abnormality. US RETROPERITONEAL COMPLETE   Final Result   No acute findings. No hydronephrosis. Small right pleural effusion. CT HEAD WO CONTRAST   Final Result   No acute intracranial abnormality. Chronic microvascular disease and chronic lacunar infarcts. Critical results were called by Dr. Mingo Weinstein to  [id number]  [address] on 11/17/2021 at   22:0. XR CHEST PORTABLE   Final Result   1. No pneumothorax evident following thoracentesis. 2. Low lung volumes. Significantly improved pneumatization lower right lung   with minimal pleural effusion bilateral evident. 3. Calcific atherosclerosis aorta. 4. Cardiomegaly. IR GUIDED THORACENTESIS PLEURAL   Final Result   Successful ultrasound guided right thoracentesis. IR NONTUNNELED VASCULAR CATHETER > 5 YEARS   Final Result   Successful ultrasound guided non-tunneled right IJ temporary hemodialysis   catheter placement. Catheter is ready for dialysis use at this time. FLUORO FOR SURGICAL PROCEDURES   Final Result      XR CHEST PORTABLE   Final Result   Moderate right pleural effusion and associated airspace disease. Pneumonia   cannot be excluded. US RENAL COMPLETE   Final Result   1. Mild to moderate right-sided hydronephrosis. 2. Limited renal ultrasound. No left-sided hydronephrosis. CT ABDOMEN PELVIS WO CONTRAST Additional Contrast? None   Final Result   Severe right and mild left hydroureteronephrosis. No obstructing ureteral   stone is seen. Wall thickening of the urinary bladder without significant   urinary bladder wall distension. Outlet obstruction is a consideration. Right-sided ostomy. There is bowel wall thickening proximal to the ostomy,   may suggest infectious or inflammatory enteritis. No drainable fluid   collections or free air.   No evidence of bowel obstruction. Moderate right pleural effusion. Bibasilar infiltrates or atelectasis. Clinical correlation to exclude   pneumonia. US RETROPERITONEAL COMPLETE   Final Result   1. Moderate to severe right and mild-to-moderate left hydronephrosis. 2. Exam limited due to patient's body habitus. VL Renal Arterial Duplex Complete    (Results Pending)   VL Renal Vein Scan Bilat    (Results Pending)       Primary Problem  JAYLYN (acute kidney injury) [facility])    Active Hospital Problems    Diagnosis Date Noted    [facility] [I48.11] 11/16/2021    Hydronephrosis determined by ultrasound [N13.30] 11/14/2021    Iron deficiency anemia [D50.9] 11/12/2021    [facility] [N17.9] 11/10/2021    CKD (chronic kidney disease) stage 3, GFR 30-59 ml/min ([facility]) [N18.30]     HTN (hypertension) [I10]     Type 2 diabetes mellitus with kidney complication, with long-term current use of insulin ([facility]) [E11.29, Z79.4]     ASCVD (arteriosclerotic cardiovascular disease) [I25.10]        IMPRESSION:   1. Acute kidney injury and started on dialysis by nephrology  2. Anemia, stool occult positive for blood and had upper endoscopy showing gastritis. Anemia could be related to chronic disease and also renal disease and aggravated by GI bleed  3. Thrombocytopenia  4. Acute encephalopathy  5. Bilateral hydronephrosis  6. Cardiomyopathy with EF of 30-35%  7. Generalized anasarca    RECOMMENDATIONS:  1. I reviewed the laboratory data, imaging studies, diagnosis, other treatment recommendations  2. Her anemia appears to be multifactorial with acute drop possibly from GI bleed  3. Transfuse PRBC if hemoglobin less than 7  4. No evidence of hemolysis noted as per the labs  5. Her HIT panel is negative  6. I will get peripheral blood smear.   Her thrombocytopenia possibly related to acute bone marrow suppression however underlying bone marrow pathology cannot be ruled out  7. Monitor for symptoms of bleeding  8. Hold off anticoagulation  9. Monitor counts closely  10. We will follow    Discussed with pt and Nurse. Thank you for asking us to see this patient. Sonu Madison MD  Hematologist/Medical Oncologist    Cell: [phone]    This note is created with the assistance of a speech recognition program.  While intending to generate a document that actually reflects the content of the visit, the document can still have some errors including those of syntax and sound a like substitutions which may escape proof reading. It such instances, actual meaning can be extrapolated by contextual diversion.

## 2021-11-20 NOTE — PROGRESS NOTES
RN spoke to patients sister Teo Triplett earlier today. RN spoke to patients  Michael President multiple times today. Both family members up to date with patients care. All questions and concerns answered at this time.

## 2021-11-20 NOTE — PROGRESS NOTES
[facility] Neurology   IN-PATIENT SERVICE      NEUROLOGY PROGRESS  NOTE                Interval History:     Patient is awake and answering questions today. She is following commands. Overall neurologically appears much improved. NG tube has been placed. History of Present Illness: The patient is a 76 y. o. female who presents with Abnormal Lab (low hemoglobin and high CR)  . The patient was seen and examined and the chart was reviewed.  Patient initially admitted on 11/10 found to have severe acute kidney injury, anemia.  Patient evaluated by urology for bilateral hydronephrosis.  In addition she required PRBC transfusion for anemia and was evaluated by GI.  She underwent EGD eventually and found to have gastritis. Neli Nice for cystoscopy with right ureteral stent placement on 11/16 by urology. Shary Schirmer was taken to medical ICU after procedure.  At that time noted to be alert and oriented x3 per nursing report on evening of 11/16.  Patient had worsening ABG with pH of 6.99 at 1 point and critical care pulmonology was consulted.  BUN and creatinine also continue to worsen, nephrology following. Shary Schirmer underwent acute hemodialysis on the evening of 11/17 due to worsening BUN and creatinine.  In addition she underwent thoracentesis due to large right pleural effusion by IR.  Patient then had stroke alert called around 2100 due to decreased level of responsiveness.  She underwent CT head with no acute abnormalities.  Recommendation was for MRI brain.     This morning patient continues to be very lethargic. Veronica Hurt will open her eyes to voice but does not track or follow commands.  She does minimally grimace to pain when deep nailbed pressure is listed in all 4 extremities.  Sisters are at bedside.  Patient has been weaned off of Levophed at this time. Veronica Hurt has not received any sedating medications this morning.     Physical Exam:   /60   Pulse 74   Temp 97.6 °F (36.4 °C) (Oral)   Resp 21   Ht 5' 3\" (1.6 m) Comment: from old chart  Wt 205 lb 7.5 oz (93.2 kg)   SpO2 95%   BMI 36.40 kg/m²   Temp (24hrs), Av.7 °F (36.5 °C), Min:97 °F (36.1 °C), Max:98.1 °F (36.7 °C)      Neurological examination:       Mental status    Patient is lethargic.  Eyes open to voice, she does track. Off sedation. following commands. Answering questions appropriately.      Cranial nerves    Pupil response:            Present     Oculocephalic reflex:   Present     Corneal Reflex:            Present     Facial grimace to pin:  Present         Motor and sensory function   she is able to squeeze hands and wiggle toes bilaterally on command    Tone: Normal      DTR symmetric 1/4 throughout  Plantar response: Downgoing  (-) Jarrell's sign bilaterally    Gait  Not tested due to lethargic state              Diagnostics:      Laboratory Testing:  CBC: Recent Labs     21  0501 21  0502 21  0530   WBC 7.4 5.7 7.5   HGB 8.4* 8.0* 8.0*   * 68* 49*     BMP:    Recent Labs     21  0501 21  0502 21  0530    140 136   K 4.1 3.7 3.7   * 106 101   CO2 23 25 26   BUN 61* 45* 30*   CREATININE 3.24* 3.08* 2.50*   GLUCOSE 99 114* 117*         Lab Results   Component Value Date    ALT 31 2021    AST 21 2021    INR 1.3 2021    LABA1C 7.8 (H) 2013    [id number] 2021         Impression:      Acute metabolic encephalopathy in the setting of hypotension requiring vasopressors, acute kidney injury, metabolic acidosis, recent cystoscopy with right ureteral stent placement, anemia  Atrial fibrillation; currently not on anticoagulation secondary to anemia    Plan:     She continues to show improvement neurologically daily. MRI brain did not reveal any acute abnormalities. Continue current treatment of metabolic derangements  No further neurologic work-up indicated at this time.   We will sign off, please do not hesitate to contact with any further questions or concerns.       Electronically signed by Patric Boxer, DO on 11/20/2021 at 3:20 PM      Patric Boxer, [address] [place] [address]

## 2021-11-20 NOTE — PROGRESS NOTES
Department of Internal Medicine  Nephrology Tomeka Berger MD  Progress Note    Reason for consultation: Management of acute kidney injury. Consulting physician: Richard Mcgraw MD.    Interval history: Patient seen and examined in the ICU today. Patient appears slightly more alert today and is following commands. She still is confused-she has had daily dialysis over the last 3 days-tolerated 2 kg UF yesterday-she remains anuric. Patient is receiving tube feeding at 35 mils per hour. Patient remains edematous. Patient is S/P  right ureteral stent placement with renal ultrasound on 11/18/2021 showing improvement in hydronephrosis. MRI brain showed no acute intracranial abnormality. History of present illness: This is a 66 y.o. female with a significant past medical history of Type 2 diabetes mellitus, coronary artery disease, lumbar radiculopathy, Hyperlipidemia and chronic kidney disease stage III [baseline serum creatinine 1.5 mg/dL], who presented from [facility] for further evaluation of acute kidney injury. She was sent to [facility] after developing nonhealing wound s/p cardiac catheterization via right refill Levophed has been discontinued approach with right groin wound. She had been on Bumex in the ECF and creatinine and BUN have been rising and Bumex was decreased to a lowest dose of 0.5 mg p.o. twice daily. She has had regular blood work performed in the [facility] and ECF contacted nephrologist after finding abnormal laboratory studies. She was noted to have a BUN/creatinine 104/2.28 mg/dL with hemoglobin 8 g/dL. She has mild confusion but is not in obvious respiratory distress.     Scheduled Meds:   carvedilol  3.125 mg Oral BID WC    pantoprazole  40 mg IntraVENous BID    And    sodium chloride flush  10 mL IntraVENous BID    sodium polystyrene  15 g Oral Once    midodrine  7.5 mg Oral TID    cefepime  1,000 mg IntraVENous Q24H    sertraline  25 mg Oral Daily    ferrous sulfate  325 mg Oral Daily with breakfast    [Held by provider] aspirin  81 mg Oral Daily    atorvastatin  80 mg Oral Daily    insulin glargine  22 Units SubCUTAneous Nightly    insulin lispro  0-12 Units SubCUTAneous TID WC    insulin lispro  0-6 Units SubCUTAneous Nightly    sodium chloride flush  5-40 mL IntraVENous 2 times per day     Continuous Infusions:   sodium chloride 30 mL/hr (11/19/21 1255)    dextrose      sodium chloride      sodium chloride       PRN Meds:.potassium chloride **OR** potassium alternative oral replacement **OR** potassium chloride, magnesium sulfate, glucose, dextrose, glucagon (rDNA), dextrose, acetaminophen, anticoagulant sodium citrate, anticoagulant sodium citrate, sodium chloride, sodium chloride flush, sodium chloride, acetaminophen, ondansetron **OR** ondansetron, oxyCODONE-acetaminophen    Physical Exam:    VITALS:  /65   Pulse 76   Temp 97.8 °F (36.6 °C) (Axillary)   Resp 23   Ht 5' 3\" (1.6 m) Comment: from old chart  Wt 205 lb 7.5 oz (93.2 kg)   SpO2 99%   BMI 36.40 kg/m²   24HR INTAKE/OUTPUT:      Intake/Output Summary (Last 24 hours) at 11/20/2021 1134  Last data filed at 11/20/2021 0400  Gross per 24 hour   Intake 1013.5 ml   Output 2860 ml   Net -1846.5 ml     Constitutional: Lethargic but oriented to person and place on nasal cannula oxygen    Skin: Skin color, texture, turgor normal. No rashes or lesions    Head: Normocephalic, without obvious abnormality, atraumatic     Cardiovascular/Edema:S1 and S2 heard regular    Respiratory: Lungs:Diminished air entry basal rales    Abdomen: soft, non-tender; bowel sounds normal; no masses,  no organomegaly    Back: symmetric, no curvature. ROM normal. No CVA tenderness.     Extremities: 3+ generalized edema    Neuro:  Grossly normal    CBC:   Recent Labs     11/18/21  0501 11/19/21  0502 11/20/21  0530   WBC 7.4 5.7 7.5   HGB 8.4* 8.0* 8.0*   * 68* 49*     BMP:    Recent Labs     11/18/21  0501 11/19/21  0502 11/20/21  0530    140 136   K 4.1 3.7 3.7   * 106 101   CO2 23 25 26   BUN 61* 45* 30*   CREATININE 3.24* 3.08* 2.50*   GLUCOSE 99 114* 117*     Lab Results   Component Value Date    NITRU NEGATIVE 11/12/2021    COLORU Yellow 11/12/2021    PHUR 6.0 11/12/2021    WBCUA 50  11/12/2021    RBCUA 20 TO 50 11/12/2021    MUCUS NOT REPORTED 11/12/2021    TRICHOMONAS NOT REPORTED 11/12/2021    YEAST NOT REPORTED 11/12/2021    BACTERIA MODERATE 11/12/2021    CLARITYU Clear 09/07/2016    SPECGRAV 1.012 11/12/2021    LEUKOCYTESUR LARGE 11/12/2021    UROBILINOGEN Normal 11/12/2021    BILIRUBINUR NEGATIVE 11/12/2021    BLOODU Positive 09/07/2016    GLUCOSEU NEGATIVE 11/12/2021    KETUA NEGATIVE 11/12/2021    AMORPHOUS NOT REPORTED 11/12/2021     IMPRESSION/RECOMMENDATIONS:      1. Acute kidney injury - most consistent with Ischemic ATN and obstructive nephropathy secondary to bilateral hydronephrosis. Patient remains anuric- renal infarct and renal cortical necrosis are differentials. Repeat renal ultrasound  11/18/2021 shows improvement in hydronephrosis   Patient was started on acute dialysis on 11/17/2021-completed 3 treatments of dialysis    Plan:  Dialysis today with goal UF of 2 kg  KVO IV fluids  Follow results of renal artery duplex to assess blood flow  Strict input and output   Basic metabolic profile daily. Given anemia and thrombocytopenia, will rule out renal microangiopathy-LDH,absolute retics and haptoglobin within chanelle.l    2. Hypotension-improved    3. Bilateral hydronephrosis status post cystoscopy with right ureteral  stent placement. 4.  Anemia with thrombocytopenia-consider hematology consult    5. Anion gap metabolic acidosis improved with dialysis    6. Anasarca secondary to cardiomyopathy-EF of 30-35 % and renal failure    8. Acute metabolic encephalopathy-secondary to uremia -slowly improving    Prognosis is guarded.           Jakub Barboza MD CATHERINE  Attending Nephrologist  11/20/2021 11:34 AM

## 2021-11-21 ENCOUNTER — APPOINTMENT (OUTPATIENT)
Dept: GENERAL RADIOLOGY | Age: 71
DRG: 004 | End: 2021-11-21
Payer: COMMERCIAL

## 2021-11-21 LAB
ABSOLUTE EOS #: 0.1 K/UL (ref 0–0.4)
ABSOLUTE IMMATURE GRANULOCYTE: ABNORMAL K/UL (ref 0–0.3)
ABSOLUTE LYMPH #: 2.2 K/UL (ref 1–4.8)
ABSOLUTE MONO #: 0.8 K/UL (ref 0.1–1.3)
ALLEN TEST: ABNORMAL
ANION GAP SERPL CALCULATED.3IONS-SCNC: 12 MMOL/L (ref 9–17)
BASOPHILS # BLD: 0 % (ref 0–2)
BASOPHILS ABSOLUTE: 0 K/UL (ref 0–0.2)
BUN BLDV-MCNC: 38 MG/DL (ref 8–23)
BUN/CREAT BLD: ABNORMAL (ref 9–20)
CALCIUM SERPL-MCNC: 7.8 MG/DL (ref 8.6–10.4)
CARBOXYHEMOGLOBIN: 0.2 % (ref 0–5)
CHLORIDE BLD-SCNC: 101 MMOL/L (ref 98–107)
CO2: 23 MMOL/L (ref 20–31)
CREAT SERPL-MCNC: 3.13 MG/DL (ref 0.5–0.9)
DIFFERENTIAL TYPE: ABNORMAL
EOSINOPHILS RELATIVE PERCENT: 1 % (ref 0–4)
FIO2: 60
GFR AFRICAN AMERICAN: 18 ML/MIN
GFR NON-AFRICAN AMERICAN: 15 ML/MIN
GFR SERPL CREATININE-BSD FRML MDRD: ABNORMAL ML/MIN/{1.73_M2}
GFR SERPL CREATININE-BSD FRML MDRD: ABNORMAL ML/MIN/{1.73_M2}
GLUCOSE BLD-MCNC: 126 MG/DL (ref 70–99)
GLUCOSE BLD-MCNC: 136 MG/DL (ref 65–105)
GLUCOSE BLD-MCNC: 211 MG/DL (ref 65–105)
GLUCOSE BLD-MCNC: 236 MG/DL (ref 65–105)
HCO3 ARTERIAL: 24.1 MMOL/L (ref 22–26)
HCT VFR BLD CALC: 24.8 % (ref 36–46)
HEMOGLOBIN: 7.9 G/DL (ref 12–16)
IMMATURE GRANULOCYTES: ABNORMAL %
LYMPHOCYTES # BLD: 19 % (ref 24–44)
MAGNESIUM: 1.7 MG/DL (ref 1.6–2.6)
MCH RBC QN AUTO: 30.2 PG (ref 26–34)
MCHC RBC AUTO-ENTMCNC: 31.8 G/DL (ref 31–37)
MCV RBC AUTO: 95 FL (ref 80–100)
METHEMOGLOBIN: 1.1 % (ref 0–1.9)
MODE: ABNORMAL
MONOCYTES # BLD: 7 % (ref 1–7)
NEGATIVE BASE EXCESS, ART: ABNORMAL MMOL/L (ref 0–2)
NOTIFICATION TIME: ABNORMAL
NOTIFICATION: ABNORMAL
NRBC AUTOMATED: ABNORMAL PER 100 WBC
O2 DEVICE/FLOW/%: ABNORMAL
O2 SAT, ARTERIAL: 98.5 % (ref 95–98)
OXYHEMOGLOBIN: ABNORMAL % (ref 95–98)
PATIENT TEMP: 37
PCO2 ARTERIAL: 34.9 MMHG (ref 35–45)
PCO2, ART, TEMP ADJ: ABNORMAL (ref 35–45)
PDW BLD-RTO: 15.6 % (ref 11.5–14.9)
PEEP/CPAP: 7
PH ARTERIAL: 7.45 (ref 7.35–7.45)
PH, ART, TEMP ADJ: ABNORMAL (ref 7.35–7.45)
PLATELET # BLD: 52 K/UL (ref 150–450)
PLATELET ESTIMATE: ABNORMAL
PMV BLD AUTO: 9.6 FL (ref 6–12)
PO2 ARTERIAL: 232 MMHG (ref 80–100)
PO2, ART, TEMP ADJ: ABNORMAL MMHG (ref 80–100)
POSITIVE BASE EXCESS, ART: 0 MMOL/L (ref 0–2)
POTASSIUM SERPL-SCNC: 3.3 MMOL/L (ref 3.7–5.3)
POTASSIUM SERPL-SCNC: 3.9 MMOL/L (ref 3.7–5.3)
PROCALCITONIN: 0.33 NG/ML
PSV: ABNORMAL
PT. POSITION: ABNORMAL
RBC # BLD: 2.61 M/UL (ref 4–5.2)
RBC # BLD: ABNORMAL 10*6/UL
RESPIRATORY RATE: 20
SAMPLE SITE: ABNORMAL
SEG NEUTROPHILS: 73 % (ref 36–66)
SEGMENTED NEUTROPHILS ABSOLUTE COUNT: 8.4 K/UL (ref 1.3–9.1)
SET RATE: 20
SODIUM BLD-SCNC: 136 MMOL/L (ref 135–144)
TEXT FOR RESPIRATORY: ABNORMAL
TOTAL HB: ABNORMAL G/DL (ref 12–16)
TOTAL RATE: 20
VT: 500
WBC # BLD: 11.5 K/UL (ref 3.5–11)
WBC # BLD: ABNORMAL 10*3/UL

## 2021-11-21 PROCEDURE — 2700000000 HC OXYGEN THERAPY PER DAY

## 2021-11-21 PROCEDURE — 2580000003 HC RX 258: Performed by: UROLOGY

## 2021-11-21 PROCEDURE — 2000000000 HC ICU R&B

## 2021-11-21 PROCEDURE — 83735 ASSAY OF MAGNESIUM: CPT

## 2021-11-21 PROCEDURE — 2580000003 HC RX 258: Performed by: INTERNAL MEDICINE

## 2021-11-21 PROCEDURE — C9113 INJ PANTOPRAZOLE SODIUM, VIA: HCPCS | Performed by: FAMILY MEDICINE

## 2021-11-21 PROCEDURE — 6360000002 HC RX W HCPCS: Performed by: INTERNAL MEDICINE

## 2021-11-21 PROCEDURE — 94761 N-INVAS EAR/PLS OXIMETRY MLT: CPT

## 2021-11-21 PROCEDURE — 99232 SBSQ HOSP IP/OBS MODERATE 35: CPT | Performed by: INTERNAL MEDICINE

## 2021-11-21 PROCEDURE — 84145 PROCALCITONIN (PCT): CPT

## 2021-11-21 PROCEDURE — 71045 X-RAY EXAM CHEST 1 VIEW: CPT

## 2021-11-21 PROCEDURE — 94003 VENT MGMT INPAT SUBQ DAY: CPT

## 2021-11-21 PROCEDURE — 82805 BLOOD GASES W/O2 SATURATION: CPT

## 2021-11-21 PROCEDURE — 84132 ASSAY OF SERUM POTASSIUM: CPT

## 2021-11-21 PROCEDURE — 87641 MR-STAPH DNA AMP PROBE: CPT

## 2021-11-21 PROCEDURE — 6370000000 HC RX 637 (ALT 250 FOR IP): Performed by: UROLOGY

## 2021-11-21 PROCEDURE — 2500000003 HC RX 250 WO HCPCS: Performed by: INTERNAL MEDICINE

## 2021-11-21 PROCEDURE — 36415 COLL VENOUS BLD VENIPUNCTURE: CPT

## 2021-11-21 PROCEDURE — 6370000000 HC RX 637 (ALT 250 FOR IP): Performed by: NURSE PRACTITIONER

## 2021-11-21 PROCEDURE — 82947 ASSAY GLUCOSE BLOOD QUANT: CPT

## 2021-11-21 PROCEDURE — 85025 COMPLETE CBC W/AUTO DIFF WBC: CPT

## 2021-11-21 PROCEDURE — 6370000000 HC RX 637 (ALT 250 FOR IP): Performed by: FAMILY MEDICINE

## 2021-11-21 PROCEDURE — 6370000000 HC RX 637 (ALT 250 FOR IP): Performed by: INTERNAL MEDICINE

## 2021-11-21 PROCEDURE — 2580000003 HC RX 258: Performed by: FAMILY MEDICINE

## 2021-11-21 PROCEDURE — 80048 BASIC METABOLIC PNL TOTAL CA: CPT

## 2021-11-21 PROCEDURE — 6360000002 HC RX W HCPCS: Performed by: FAMILY MEDICINE

## 2021-11-21 PROCEDURE — 36600 WITHDRAWAL OF ARTERIAL BLOOD: CPT

## 2021-11-21 RX ORDER — MIDODRINE HYDROCHLORIDE 5 MG/1
5 TABLET ORAL 3 TIMES DAILY PRN
Status: DISCONTINUED | OUTPATIENT
Start: 2021-11-21 | End: 2021-11-24

## 2021-11-21 RX ORDER — LINEZOLID 2 MG/ML
600 INJECTION, SOLUTION INTRAVENOUS EVERY 12 HOURS
Status: DISCONTINUED | OUTPATIENT
Start: 2021-11-21 | End: 2021-11-26

## 2021-11-21 RX ADMIN — POLYVINYL ALCOHOL 1 DROP: 14 SOLUTION/ DROPS OPHTHALMIC at 17:07

## 2021-11-21 RX ADMIN — CHLORHEXIDINE GLUCONATE 0.12% ORAL RINSE 15 ML: 1.2 LIQUID ORAL at 21:18

## 2021-11-21 RX ADMIN — POLYVINYL ALCOHOL 1 DROP: 14 SOLUTION/ DROPS OPHTHALMIC at 09:03

## 2021-11-21 RX ADMIN — PROPOFOL 20 MCG/KG/MIN: 10 INJECTION, EMULSION INTRAVENOUS at 02:52

## 2021-11-21 RX ADMIN — POTASSIUM BICARBONATE 20 MEQ: 782 TABLET, EFFERVESCENT ORAL at 16:59

## 2021-11-21 RX ADMIN — CHLORHEXIDINE GLUCONATE 0.12% ORAL RINSE 15 ML: 1.2 LIQUID ORAL at 08:49

## 2021-11-21 RX ADMIN — INSULIN LISPRO 4 UNITS: 100 INJECTION, SOLUTION INTRAVENOUS; SUBCUTANEOUS at 17:00

## 2021-11-21 RX ADMIN — SODIUM CHLORIDE, PRESERVATIVE FREE 10 ML: 5 INJECTION INTRAVENOUS at 09:24

## 2021-11-21 RX ADMIN — POLYVINYL ALCOHOL 1 DROP: 14 SOLUTION/ DROPS OPHTHALMIC at 12:07

## 2021-11-21 RX ADMIN — MINERAL OIL, PETROLATUM: 425; 568 OINTMENT OPHTHALMIC at 15:16

## 2021-11-21 RX ADMIN — PANTOPRAZOLE SODIUM 40 MG: 40 INJECTION, POWDER, FOR SOLUTION INTRAVENOUS at 08:49

## 2021-11-21 RX ADMIN — SODIUM CHLORIDE, PRESERVATIVE FREE 10 ML: 5 INJECTION INTRAVENOUS at 08:50

## 2021-11-21 RX ADMIN — ATORVASTATIN CALCIUM 80 MG: 80 TABLET, FILM COATED ORAL at 08:49

## 2021-11-21 RX ADMIN — INSULIN LISPRO 2 UNITS: 100 INJECTION, SOLUTION INTRAVENOUS; SUBCUTANEOUS at 21:18

## 2021-11-21 RX ADMIN — MINERAL OIL, PETROLATUM: 425; 568 OINTMENT OPHTHALMIC at 09:25

## 2021-11-21 RX ADMIN — MAGNESIUM SULFATE HEPTAHYDRATE 1000 MG: 1 INJECTION, SOLUTION INTRAVENOUS at 18:14

## 2021-11-21 RX ADMIN — MINERAL OIL, PETROLATUM: 425; 568 OINTMENT OPHTHALMIC at 17:08

## 2021-11-21 RX ADMIN — CARVEDILOL 3.12 MG: 3.12 TABLET, FILM COATED ORAL at 16:59

## 2021-11-21 RX ADMIN — SERTRALINE HYDROCHLORIDE 25 MG: 50 TABLET ORAL at 08:50

## 2021-11-21 RX ADMIN — SODIUM CHLORIDE, PRESERVATIVE FREE 10 ML: 5 INJECTION INTRAVENOUS at 21:18

## 2021-11-21 RX ADMIN — CHLORHEXIDINE GLUCONATE 0.12% ORAL RINSE 15 ML: 1.2 LIQUID ORAL at 01:22

## 2021-11-21 RX ADMIN — PROPOFOL 10 MCG/KG/MIN: 10 INJECTION, EMULSION INTRAVENOUS at 15:15

## 2021-11-21 RX ADMIN — MAGNESIUM SULFATE HEPTAHYDRATE 1000 MG: 1 INJECTION, SOLUTION INTRAVENOUS at 17:00

## 2021-11-21 RX ADMIN — POTASSIUM BICARBONATE 40 MEQ: 782 TABLET, EFFERVESCENT ORAL at 09:23

## 2021-11-21 RX ADMIN — FERROUS SULFATE TAB 325 MG (65 MG ELEMENTAL FE) 325 MG: 325 (65 FE) TAB at 08:49

## 2021-11-21 RX ADMIN — CEFEPIME HYDROCHLORIDE 1000 MG: 1 INJECTION, POWDER, FOR SOLUTION INTRAMUSCULAR; INTRAVENOUS at 22:30

## 2021-11-21 RX ADMIN — INSULIN GLARGINE 22 UNITS: 100 INJECTION, SOLUTION SUBCUTANEOUS at 21:18

## 2021-11-21 RX ADMIN — FAMOTIDINE 20 MG: 10 INJECTION, SOLUTION INTRAVENOUS at 08:49

## 2021-11-21 RX ADMIN — LINEZOLID 600 MG: 600 INJECTION, SOLUTION INTRAVENOUS at 08:45

## 2021-11-21 RX ADMIN — LINEZOLID 600 MG: 600 INJECTION, SOLUTION INTRAVENOUS at 20:17

## 2021-11-21 RX ADMIN — CARVEDILOL 3.12 MG: 3.12 TABLET, FILM COATED ORAL at 08:49

## 2021-11-21 RX ADMIN — FENTANYL CITRATE 50 MCG: 0.05 INJECTION, SOLUTION INTRAMUSCULAR; INTRAVENOUS at 01:11

## 2021-11-21 ASSESSMENT — PULMONARY FUNCTION TESTS
PIF_VALUE: 25
PIF_VALUE: 24
PIF_VALUE: 27
PIF_VALUE: 25
PIF_VALUE: 25
PIF_VALUE: 29
PIF_VALUE: 25
PIF_VALUE: 24
PIF_VALUE: 25
PIF_VALUE: 24
PIF_VALUE: 23
PIF_VALUE: 25
PIF_VALUE: 25
PIF_VALUE: 22
PIF_VALUE: 26
PIF_VALUE: 26
PIF_VALUE: 25
PIF_VALUE: 24
PIF_VALUE: 26
PIF_VALUE: 24
PIF_VALUE: 25
PIF_VALUE: 25

## 2021-11-21 ASSESSMENT — PAIN SCALES - GENERAL: PAINLEVEL_OUTOF10: 0

## 2021-11-21 NOTE — PLAN OF CARE
Problem: Skin Integrity:  Goal: Absence of new skin breakdown  Description: Absence of new skin breakdown  11/21/2021 0548 by Carmen Banda RN  Outcome: Ongoing  Note: Pt has wounds on legs and feet. Multiple skin tears. Wound care on for wounds     Problem: Gas Exchange - Impaired:  Goal: Levels of oxygenation will improve  Description: Levels of oxygenation will improve  Outcome: Met This Shift  Note: Pts ABG major improvement from night before     Problem: Non-Violent Restraints  Goal: Removal from restraints as soon as assessed to be safe  Outcome: Not Met This Shift  Note: Patient remains in restraints due to pulling at tubes and lines. See restraint charting, continue to monitor closely. Problem: Airway Clearance - Ineffective:  Goal: Ability to maintain a clear airway will improve  Description: Ability to maintain a clear airway will improve  Outcome: Met This Shift  Note: Pt remains intubated. Airway is maintained     Problem: Bowel Function - Altered:  Goal: Bowel elimination is within specified parameters  Description: Bowel elimination is within specified parameters  Outcome: Met This Shift  Note: Pt has colostomy. Pt having output     Problem: Gas Exchange - Impaired:  Goal: Levels of oxygenation will improve  Description: Levels of oxygenation will improve  Outcome: Met This Shift  Note: Pts ABG major improvement from night before     Problem: Nutrition Deficit:  Goal: Ability to achieve adequate nutritional intake will improve  Description: Ability to achieve adequate nutritional intake will improve  Outcome: Met This Shift  Note: Pt NPO due to being intubated.  MONSE to DAMIAN

## 2021-11-21 NOTE — PLAN OF CARE
Problem: DAILY CARE  Goal: Daily care needs are met  Outcome: Met This Shift   Patient and staff currently meeting all patient's daily care needs. Patient encouraged to participate and complete all ADLs per self. Will continue to monitor for opportunities for patient to meet all ADLs per self. Problem: PAIN  Goal: Patient's pain/discomfort is manageable  Outcome: Met This Shift   Pt medicated with pain medication prn. Assessed all pain characteristics including level, type, location, frequency, and onset. Non-pharmacologic interventions offered to pt as well. Pt states pain is tolerable at this time. Will continue to monitor. Problem: SKIN INTEGRITY  Goal: Skin integrity is maintained or improved  Outcome: Met This Shift   Skin assessment complete. Coccyx reddened. Sensicare applied PRN. Turned and repositioned every two hours. Area kept free from moisture. Proper nourishment and fluids encouraged, as appropriate. Will continue to monitor for additional needs and changes in skin breakdown. Problem: Falls - Risk of:  Goal: Will remain free from falls  Description: Will remain free from falls  11/21/2021 1755 by Arthur Cochran RN  Outcome: Met This Shift  Pt assessed as a fall risk this shift. Remains free from falls and accidental injury at this time. Fall precautions in place, including falling star sign and fall risk band on pt. Floor free from obstacles, and bed is locked and in lowest position. Adequate lighting provided. Pt encouraged to call before getting OOB for any need. Bed alarm activated. Will continue to monitor needs during hourly rounding, and reinforce education on use of call light.       Problem: Gas Exchange - Impaired:  Goal: Levels of oxygenation will improve  Description: Levels of oxygenation will improve  11/21/2021 1755 by Arthur Cochran RN  Outcome: Ongoing   Patient still on vent

## 2021-11-21 NOTE — PROGRESS NOTES
RN spoke to patients  pat and updated him on patients care today. Patients sisters at bedside today. Both updated.

## 2021-11-21 NOTE — PROGRESS NOTES
Department of Internal Medicine  Nephrology Grace Gerardo MD  Progress Note    Reason for consultation: Management of acute kidney injury. Consulting physician: Ramses Ruggiero MD.    Interval history: Patient seen and examined in the ICU today. Patient had episode of bradycardia with transient PEA requiring a short session of CPR yesterday approximately 15 minutes into hemodialysis. There was no episode of hypoxia prior to the event-patient was subsequently intubated and currently on mechanical ventilator. Patient remains oliguric with urine output of 155 cc yesterday -patient is S/P  right ureteral stent placement with renal ultrasound on 11/18/2021 showing improvement in hydronephrosis. MRI brain showed no acute intracranial abnormality. History of present illness: This is a 66 y.o. female with a significant past medical history of Type 2 diabetes mellitus, coronary artery disease, lumbar radiculopathy, Hyperlipidemia and chronic kidney disease stage III [baseline serum creatinine 1.5 mg/dL], who presented from [facility] for further evaluation of acute kidney injury. She was sent to [facility] after developing nonhealing wound s/p cardiac catheterization via right refill Levophed has been discontinued approach with right groin wound. She had been on Bumex in the ECF and creatinine and BUN have been rising and Bumex was decreased to a lowest dose of 0.5 mg p.o. twice daily. She has had regular blood work performed in the [facility] and ECF contacted nephrologist after finding abnormal laboratory studies. She was noted to have a BUN/creatinine 104/2.28 mg/dL with hemoglobin 8 g/dL. She has mild confusion but is not in obvious respiratory distress.     Scheduled Meds:   linezolid  600 mg IntraVENous Q12H    chlorhexidine  15 mL Mouth/Throat BID    famotidine (PEPCID) injection  20 mg IntraVENous Daily    polyvinyl alcohol  1 drop Both Eyes Q4H    And    artificial tears   Both Eyes Q4H    carvedilol  3.125 mg Oral BID WC    pantoprazole  40 mg IntraVENous BID    And    sodium chloride flush  10 mL IntraVENous BID    sodium polystyrene  15 g Oral Once    midodrine  7.5 mg Oral TID    cefepime  1,000 mg IntraVENous Q24H    sertraline  25 mg Oral Daily    ferrous sulfate  325 mg Oral Daily with breakfast    [Held by provider] aspirin  81 mg Oral Daily    atorvastatin  80 mg Oral Daily    insulin glargine  22 Units SubCUTAneous Nightly    insulin lispro  0-12 Units SubCUTAneous TID WC    insulin lispro  0-6 Units SubCUTAneous Nightly    sodium chloride flush  5-40 mL IntraVENous 2 times per day     Continuous Infusions:   propofol 10 mcg/kg/min (11/21/21 0901)    dextrose      sodium chloride      sodium chloride       PRN Meds:.sodium citrate, sodium citrate, fentanNYL, potassium chloride **OR** potassium alternative oral replacement **OR** potassium chloride, magnesium sulfate, glucose, dextrose, glucagon (rDNA), dextrose, acetaminophen, sodium chloride, sodium chloride flush, sodium chloride, acetaminophen, ondansetron **OR** ondansetron, oxyCODONE-acetaminophen    Physical Exam:    VITALS:  BP (!) 123/57   Pulse 70   Temp 98 °F (36.7 °C) (Axillary)   Resp 16   Ht 5' 3\" (1.6 m) Comment: from old chart  Wt 213 lb 10 oz (96.9 kg)   SpO2 92%   BMI 37.84 kg/m²   24HR INTAKE/OUTPUT:      Intake/Output Summary (Last 24 hours) at 11/21/2021 1200  Last data filed at 11/21/2021 9739  Gross per 24 hour   Intake 1112.96 ml   Output 580 ml   Net 532.96 ml     Constitutional: Sedated on mechanical ventilator    Skin: Skin color, texture, turgor normal. No rashes or lesions    Head: Normocephalic, without obvious abnormality, atraumatic     Cardiovascular/Edema:S1 and S2 heard regular    Respiratory: Lungs:Diminished air entry basal rales    Abdomen: soft, non-tender; bowel sounds normal; no masses,  no organomegaly    Back: symmetric, no curvature.  ROM normal. No CVA tenderness. Extremities: 3+ generalized edema    Neuro:  Grossly normal    CBC:   Recent Labs     11/19/21  0502 11/20/21  0530 11/21/21  0549   WBC 5.7 7.5 11.5*   HGB 8.0* 8.0* 7.9*   PLT 68* 49* 52*     BMP:    Recent Labs     11/19/21  0502 11/20/21  0530 11/21/21  0549    136 136   K 3.7 3.7 3.3*    101 101   CO2 25 26 23   BUN 45* 30* 38*   CREATININE 3.08* 2.50* 3.13*   GLUCOSE 114* 117* 126*     Lab Results   Component Value Date    NITRU NEGATIVE 11/12/2021    COLORU Yellow 11/12/2021    PHUR 6.0 11/12/2021    WBCUA 50  11/12/2021    RBCUA 20 TO 50 11/12/2021    MUCUS NOT REPORTED 11/12/2021    TRICHOMONAS NOT REPORTED 11/12/2021    YEAST NOT REPORTED 11/12/2021    BACTERIA MODERATE 11/12/2021    CLARITYU Clear 09/07/2016    SPECGRAV 1.012 11/12/2021    LEUKOCYTESUR LARGE 11/12/2021    UROBILINOGEN Normal 11/12/2021    BILIRUBINUR NEGATIVE 11/12/2021    BLOODU Positive 09/07/2016    GLUCOSEU NEGATIVE 11/12/2021    KETUA NEGATIVE 11/12/2021    AMORPHOUS NOT REPORTED 11/12/2021     IMPRESSION/RECOMMENDATIONS:      1. Acute kidney injury - most consistent with Ischemic ATN and obstructive nephropathy secondary to bilateral hydronephrosis. Patient remains anuric- renal infarct and renal cortical necrosis are differentials. Repeat renal ultrasound  11/18/2021 shows improvement in hydronephrosis   Patient was started on acute dialysis on 11/17/2021-completed 3 treatments of dialysis    Plan:  Hold off on dialysis today-reassess hemodynamic status tomorrow  Strict input and output   Basic metabolic profile daily. Given anemia and thrombocytopenia, will rule out renal microangiopathy-LDH,absolute retics and haptoglobin within chanelle.l  Continue Nepro tube feeding  2. Hypotension-stable    3. Bilateral hydronephrosis status post cystoscopy with right ureteral  stent placement.     4.  Anemia with thrombocytopenia-LDH reticulocyte haptoglobin within normal-check iron studies and start Retacrit IF iron stores are normal    5. Anion gap metabolic acidosis improved with dialysis    6. Anasarca secondary to cardiomyopathy-EF of 30-35 % , third spacing renal failure    8. Acute metabolic encephalopathy-secondary to uremia-rule out sepsis. IV linezolid started today , continue IV cefepime    9. Hypokalemia-Improved after correction today. Prognosis is guarded.           MD CATHERINE Mitchell  Attending Nephrologist  11/21/2021 12:00 PM

## 2021-11-21 NOTE — PROGRESS NOTES
Date: 11/20/2021  Time: 1900  Patient identity confirmed:  Yes  Indications: Respiratory Failure  Preoxygenation: Yes    Laryngoscope size and type Glidescope 4  Airway introducer used: Yes  ETT size:a 7.5 cuffed  Number of attempts:1   Cords visualized:  [x] Clearly  [] Poorly  Breath sounds present bilaterally: Yes   ETCO2   [x] Positive   ETT secured at  24cm    ETT secured with Holister  Chest x-ray ordered: Yes     Difficult airway:    No    Was this a Code Situation:    Yes               Procedure performed by: Kelle Albert RCP  8:05 PM

## 2021-11-21 NOTE — PROGRESS NOTES
PRN  acetaminophen (TYLENOL) tablet 650 mg, Q4H PRN  sodium polystyrene (KAYEXALATE) 15 GM/60ML suspension 15 g, Once  cefepime (MAXIPIME) 1000 mg IVPB minibag, Q24H  sertraline (ZOLOFT) tablet 25 mg, Daily  0.9 % sodium chloride infusion, PRN  ferrous sulfate (IRON 325) tablet 325 mg, Daily with breakfast  [Held by provider] aspirin chewable tablet 81 mg, Daily  atorvastatin (LIPITOR) tablet 80 mg, Daily  insulin glargine (LANTUS) injection vial 22 Units, Nightly  insulin lispro (HUMALOG) injection vial 0-12 Units, TID WC  insulin lispro (HUMALOG) injection vial 0-6 Units, Nightly  sodium chloride flush 0.9 % injection 5-40 mL, 2 times per day  sodium chloride flush 0.9 % injection 5-40 mL, PRN  0.9 % sodium chloride infusion, PRN  acetaminophen (TYLENOL) tablet 650 mg, Q4H PRN  ondansetron (ZOFRAN-ODT) disintegrating tablet 4 mg, Q8H PRN   Or  ondansetron (ZOFRAN) injection 4 mg, Q6H PRN  oxyCODONE-acetaminophen (PERCOCET) 5-325 MG per tablet 1 tablet, Q6H PRN        Data:     Code Status:  Full Code    Family History   Problem Relation Age of Onset    High Blood Pressure Other        Social History     Socioeconomic History    Marital status:      Spouse name: Not on file    Number of children: Not on file    Years of education: Not on file    Highest education level: Not on file   Occupational History    Not on file   Tobacco Use    Smoking status: Never Smoker    Smokeless tobacco: Not on file   Substance and Sexual Activity    Alcohol use: No     Alcohol/week: 0.0 standard drinks    Drug use: No    Sexual activity: Not on file   Other Topics Concern    Not on file   Social History Narrative    Not on file     Social Determinants of Health     Financial Resource Strain:     Difficulty of Paying Living Expenses: Not on file   Food Insecurity:     Worried About Running Out of Food in the Last Year: Not on file    Katya of Food in the Last Year: Not on file   Transportation Needs:     No left-sided hydronephrosis. XR CHEST PORTABLE    Result Date: 11/21/2021  Cardiomegaly with bibasilar effusions and adjacent infiltrates. Support tubes as described above. XR CHEST PORTABLE    Result Date: 11/19/2021  Basilar opacities likely represent atelectasis with small pleural effusions. XR CHEST PORTABLE    Result Date: 11/18/2021  1. Tip of the newly inserted NG tube overlies the gastric body. . 2. The right IJ CVC and right arm PICC line remain well positioned. 3.  No acute cardiopulmonary abnormality. XR CHEST PORTABLE    Result Date: 11/17/2021  1. No pneumothorax evident following thoracentesis. 2. Low lung volumes. Significantly improved pneumatization lower right lung with minimal pleural effusion bilateral evident. 3. Calcific atherosclerosis aorta. 4. Cardiomegaly. XR CHEST PORTABLE    Result Date: 11/16/2021  Moderate right pleural effusion and associated airspace disease. Pneumonia cannot be excluded. US RETROPERITONEAL COMPLETE    Result Date: 11/18/2021  No acute findings. No hydronephrosis. Small right pleural effusion. IR NONTUNNELED VASCULAR CATHETER > 5 YEARS    Result Date: 11/17/2021  Successful ultrasound guided non-tunneled right IJ temporary hemodialysis catheter placement. Catheter is ready for dialysis use at this time. MRI BRAIN WO CONTRAST    Result Date: 11/18/2021  There is no acute infarction, intracranial hemorrhage, or intracranial mass lesion. Mild chronic microangiopathic ischemic disease. Mild generalized volume loss. Mild mucosal thickening of bilateral mastoid air cells and right sphenoid sinus. IR GUIDED THORACENTESIS PLEURAL    Result Date: 11/17/2021  Successful ultrasound guided right thoracentesis.        Labs:  Recent Results (from the past 24 hour(s))   POC Glucose Fingerstick    Collection Time: 11/20/21  4:55 PM   Result Value Ref Range    POC Glucose 152 (H) 65 - 105 mg/dL   BLOOD GAS, ARTERIAL    Collection Time: 11/20/21 7:43 PM   Result Value Ref Range    pH, Arterial 7.235 (LL) 7.350 - 7.450    pCO2, Arterial 63.2 (HH) 35.0 - 45.0 mmHg    pO2, Arterial 48.0 (LL) 80.0 - 100.0 mmHg    HCO3, Arterial 26.8 (H) 22.0 - 26.0 mmol/L    Positive Base Excess, Art NOT REPORTED 0.0 - 2.0 mmol/L    Negative Base Excess, Art 0.7 0.0 - 2.0 mmol/L    O2 Sat, Arterial 79.9 (LL) 95 - 98 %    Total Hb NOT REPORTED 12.0 - 16.0 g/dl    Oxyhemoglobin NOT REPORTED 95.0 - 98.0 %    Carboxyhemoglobin 1.1 0 - 5 %    Methemoglobin 0.7 0.0 - 1.9 %    Pt Temp 37     pH, Art, Temp Adj NOT REPORTED 7.350 - 7.450    pCO2, Art, Temp Adj NOT REPORTED 35.0 - 45.0    pO2, Art, Temp Adj NOT REPORTED 80.0 - 100.0 mmHg    O2 Device/Flow/% VENTILATOR     Respiratory Rate 16     Rolf Test PASS     Sample Site Right Radial Artery     Pt. Position SEMI-FOWLERS     Mode PRVC     Set Rate 16     Total Rate 16          FIO2 100     Peep/Cpap 7     PSV NOT REPORTED     Text for Respiratory RESULTS GIVEN TO RN     NOTIFICATION NOT REPORTED     NOTIFICATION TIME NOT REPORTED    POC Glucose Fingerstick    Collection Time: 11/20/21 10:40 PM   Result Value Ref Range    POC Glucose 165 (H) 65 - 105 mg/dL   Culture, Respiratory    Collection Time: 11/20/21 11:30 PM    Specimen: Sputum, Suctioned   Result Value Ref Range    Specimen Description . SUCTIONED SPUTUM     Special Requests NOT REPORTED     Direct Exam >10, <25 WBC'S/LPF     Direct Exam < 10 EPITHELIAL CELLS/LPF     Direct Exam MANY GRAM POSITIVE RODS RESEMBLING DIPHTHEROIDS (A)     Culture PENDING    Blood gas, arterial    Collection Time: 11/21/21  5:12 AM   Result Value Ref Range    pH, Arterial 7.448 7.350 - 7.450    pCO2, Arterial 34.9 (L) 35.0 - 45.0 mmHg    pO2, Arterial 232.0 (H) 80.0 - 100.0 mmHg    HCO3, Arterial 24.1 22.0 - 26.0 mmol/L    Positive Base Excess, Art 0.0 0.0 - 2.0 mmol/L    Negative Base Excess, Art NOT REPORTED 0.0 - 2.0 mmol/L    O2 Sat, Arterial 98.5 (H) 95 - 98 %    Total Hb NOT REPORTED 12.0 - 16.0 g/dl    Oxyhemoglobin NOT REPORTED 95.0 - 98.0 %    Carboxyhemoglobin 0.2 0 - 5 %    Methemoglobin 1.1 0.0 - 1.9 %    Pt Temp 37     pH, Art, Temp Adj NOT REPORTED 7.350 - 7.450    pCO2, Art, Temp Adj NOT REPORTED 35.0 - 45.0    pO2, Art, Temp Adj NOT REPORTED 80.0 - 100.0 mmHg    O2 Device/Flow/% VENTILATOR     Respiratory Rate 20     Rolf Test PASS     Sample Site Right Radial Artery     Pt.  Position SEMI-FOWLERS     Mode PRVC     Set Rate 20     Total Rate 20          FIO2 60     Peep/Cpap 7     PSV NOT REPORTED     Text for Respiratory RESULTS GIVEN TO RN     NOTIFICATION NOT REPORTED     NOTIFICATION TIME NOT REPORTED    CBC with DIFF    Collection Time: 11/21/21  5:49 AM   Result Value Ref Range    WBC 11.5 (H) 3.5 - 11.0 k/uL    RBC 2.61 (L) 4.0 - 5.2 m/uL    Hemoglobin 7.9 (L) 12.0 - 16.0 g/dL    Hematocrit 24.8 (L) 36 - 46 %    MCV 95.0 80 - 100 fL    MCH 30.2 26 - 34 pg    MCHC 31.8 31 - 37 g/dL    RDW 15.6 (H) 11.5 - 14.9 %    Platelets 52 (L) 667 - 450 k/uL    MPV 9.6 6.0 - 12.0 fL    NRBC Automated NOT REPORTED per 100 WBC    Differential Type NOT REPORTED     Immature Granulocytes NOT REPORTED 0 %    Absolute Immature Granulocyte NOT REPORTED 0.00 - 0.30 k/uL    WBC Morphology NOT REPORTED     RBC Morphology NOT REPORTED     Platelet Estimate NOT REPORTED     Seg Neutrophils 73 (H) 36 - 66 %    Lymphocytes 19 (L) 24 - 44 %    Monocytes 7 1 - 7 %    Eosinophils % 1 0 - 4 %    Basophils 0 0 - 2 %    Segs Absolute 8.40 1.3 - 9.1 k/uL    Absolute Lymph # 2.20 1.0 - 4.8 k/uL    Absolute Mono # 0.80 0.1 - 1.3 k/uL    Absolute Eos # 0.10 0.0 - 0.4 k/uL    Basophils Absolute 0.00 0.0 - 0.2 k/uL   Magnesium    Collection Time: 11/21/21  5:49 AM   Result Value Ref Range    Magnesium 1.7 1.6 - 2.6 mg/dL   Basic Metabolic Panel w/ Reflex to MG    Collection Time: 11/21/21  5:49 AM   Result Value Ref Range    Glucose 126 (H) 70 - 99 mg/dL    BUN 38 (H) 8 - 23 mg/dL    CREATININE 3.13 (H) 0.50 - 0.90 mg/dL    Bun/Cre Ratio NOT REPORTED 9 - 20    Calcium 7.8 (L) 8.6 - 10.4 mg/dL    Sodium 136 135 - 144 mmol/L    Potassium 3.3 (L) 3.7 - 5.3 mmol/L    Chloride 101 98 - 107 mmol/L    CO2 23 20 - 31 mmol/L    Anion Gap 12 9 - 17 mmol/L    GFR Non-African American 15 (L) >60 mL/min    GFR  18 (L) >60 mL/min    GFR Comment          GFR Staging NOT REPORTED    Procalcitonin    Collection Time: 21  5:49 AM   Result Value Ref Range    Procalcitonin 0.33 (H) <0.09 ng/mL   POC Glucose Fingerstick    Collection Time: 21 11:36 AM   Result Value Ref Range    POC Glucose 136 (H) 65 - 105 mg/dL   K (Potassium)    Collection Time: 21 11:45 AM   Result Value Ref Range    Potassium 3.9 3.7 - 5.3 mmol/L       Physical Examination:        Vitals:  BP (!) 134/54   Pulse 74   Temp 98.5 °F (36.9 °C) (Axillary)   Resp 16   Ht 5' 3\" (1.6 m) Comment: from old chart  Wt 213 lb 10 oz (96.9 kg)   SpO2 100%   BMI 37.84 kg/m²   Temp (24hrs), Av.4 °F (36.9 °C), Min:97.8 °F (36.6 °C), Max:98.8 °F (37.1 °C)    Recent Labs     21  1119 21  1655 21  2240 21  1136   POCGLU 102 152* 165* 136*         Physical Exam  Vitals reviewed. Constitutional:       Comments: Sedated and intubated   HENT:      Head: Normocephalic. Right Ear: External ear normal.      Left Ear: External ear normal.      Nose: Nose normal.      Mouth/Throat:      Mouth: Mucous membranes are moist.      Pharynx: Oropharynx is clear. Eyes:      Conjunctiva/sclera: Conjunctivae normal.   Cardiovascular:      Rate and Rhythm: Normal rate and regular rhythm. Pulses: Normal pulses. Heart sounds: Normal heart sounds. Pulmonary:      Breath sounds: Normal breath sounds. Comments: Intubated on ventilator  Abdominal:      General: Bowel sounds are normal.      Palpations: Abdomen is soft. Musculoskeletal:         General: No deformity. Cervical back: Neck supple.       Right lower leg: No edema (1+). Left lower leg: No edema (1+). Skin:     General: Skin is warm. Capillary Refill: Capillary refill takes less than 2 seconds. Coloration: Skin is not jaundiced. Neurological:      Comments: Sedated         Assessment:        Primary Problem  JAYLYN (acute kidney injury) ([facility] [address]     Principal Problem:    [facility]  Active Problems:    Type 2 diabetes mellitus with kidney complication, with long-term current use of insulin ([facility])    CKD (chronic kidney disease) stage 3, GFR 30-59 ml/min ([facility])    HTN (hypertension)    ASCVD (arteriosclerotic cardiovascular disease)    Iron deficiency anemia    Hydronephrosis determined by ultrasound    Longstanding persistent atrial fibrillation ([facility])    Acute metabolic encephalopathy  Resolved Problems:    * No resolved hospital problems. *      Past Medical History:   Diagnosis Date    Anemia, unspecified     CAD (coronary artery disease)     Chronic kidney disease     CKD (chronic kidney disease) stage 3, GFR 30-59 ml/min ([facility])     Closed fracture of dorsal (thoracic) vertebra without mention of spinal cord injury     Coronary atherosclerosis of unspecified type of vessel, native or graft     Depression with anxiety     Esophageal reflux     HTN (hypertension)     Hyperkalemia     Hyperlipidemia     Intervertebral cervical disc disorder with myelopathy, cervical region     Lower back pain     Lumbar radiculopathy     Microalbuminuria     Muscle weakness     Pain in limb     Stenosis of cervical spine     Tethered cord syndrome ([facility])     Type II or unspecified type diabetes mellitus without mention of complication, not stated as uncontrolled     Urinary calculus     UTI (lower urinary tract infection)         Plan:        1. IV cefepime, IV Zyvox  1. Blood culture negative  2. Thoracocentesis culture pending  3. Oncology input noted  4.  DVT Prophylaxis no pharmacological DVT prophylaxis due to anemia and thrombocytopenia  5. EPCs  6. PT/OT to evaluate and treat  7. Pain control  8. Replace electrolytes as per sliding scale  9. Home medications reviewed and appropriate medications continued  10.  Reviewed labs and imaging studies from last 24 hours and results explained to patient      Electronically signed by Nola Tang MD

## 2021-11-21 NOTE — FLOWSHEET NOTE
provided listening presence, words of comfort and prayer. Pat () and 2 sisiters at bed side,they seemed hopeful and thankful for staff. 11/20/21 2100   Encounter Summary   Services provided to: Patient and family together   Referral/Consult From: Nurse   Support System Spouse;  Family members   Continue Visiting   (11/20/2021)   Complexity of Encounter Moderate   Length of Encounter 2 hours   Spiritual Assessment Completed Yes   Spiritual/Jain   Type Spiritual support   Assessment Coping   Intervention Active listening; Prayer; Nurtured hope; Sustaining presence/ Ministry of presence   Outcome Comfort

## 2021-11-21 NOTE — CARE COORDINATION
ONGOING DISCHARGE PLANNING NOTE:    Writer reviewed LSW notes, and discharge plan is to discharge to [facility]  Patient remains in ICU     Electronically signed by Erick Deluna RN on 11/21/2021 at 11:50 AM

## 2021-11-21 NOTE — CONSULTS
Comprehensive Nutrition Assessment    Type and Reason for Visit:  Reassess, Consult (Tube Feeding Ordering and Management)    Nutrition Recommendations/Plan:  Nepro tube feeding to continue at 35 mL per hr and provides: 1591 kcal and 94 gm protein. Propofol at 5.8 mL per mL provides: 153 kcal every 24 hr. NPO. Nutrition Assessment:  Pt had bradycardic arrest during HD 11/22 and was intubated. Tube feeding had been at goal for less than 12 hrs before pt coded. Tube feeding has now resumed. Possible wean from vent being considered for 11/22. If pt remains on propofol, RD to re-evaluate and goal tube feeding rate may decrease. Malnutrition Assessment:  Malnutrition Status: At risk for malnutrition (Comment)    Context:  Acute Illness     Findings of the 6 clinical characteristics of malnutrition:  Energy Intake:  Mild decrease in energy intake (Comment)  Weight Loss:  Unable to assess (no wt history)     Body Fat Loss:  Unable to assess     Muscle Mass Loss:  Unable to assess    Fluid Accumulation:  7 - Moderate to Severe Extremities, Generalized (Anasarca)   Strength:  Not Performed    Estimated Daily Nutrient Needs:  Energy (kcal):  15 kcal/kg= 8446-5897 kcal; Weight Used for Energy Requirements:   (92kg)     Protein (g):  1g/kg= 90-95 g; Weight Used for Protein Requirements:   (92kg)          Nutrition Related Findings:  Edema: +3 pitting RUE, RLE, LLE; +2 pitting LUE; +2 generalized. Anasarca. Colostomy. Labs and meds reviewed. K: 3.3, Glucose: 126 (11/21). Wounds:  Skin Tears, Multiple (Wounds- refer to nursing flowsheet)       Current Nutrition Therapies:    ADULT TUBE FEEDING; Nasogastric; Renal Formula; Continuous; 35; No; 30; Q 8 hours; Protein; 1 Protein at 12 noon daily. Flush with 30 mLwater before and after administration.   Current Tube Feeding (TF) Orders:  · Feeding Route: Nasogastric  · Formula: Renal Formula  · Schedule: Continuous  · Additives/Modulars: Protein  · Current TF & Flush Orders Provides: 1591 kcal and 94 gm Protein (based on 1.77 kcal per mL Nepro + Proteinex)    Anthropometric Measures:  · Height: 5' 3\" (160 cm) (from old chart)  · Current Body Weight: 213 lb 10 oz (96.9 kg) (Fluid retention)   · Admission Body Weight: 204 lb (92.5 kg)    · Ideal Body Weight: 115 lbs; % Ideal Body Weight     · BMI: 37.9  · BMI Categories: Obese Class 2 (BMI 35.0 -39.9)       Nutrition Diagnosis:   · Increased nutrient needs related to  (healing) as evidenced by wounds    Nutrition Interventions:   Food and/or Nutrient Delivery:  Continue NPO, Continue Current Tube Feeding  Nutrition Education/Counseling:  No recommendation at this time   Coordination of Nutrition Care:  Continue to monitor while inpatient    Goals:  Nutrition support will meet approximately 100% of estimated needs       Nutrition Monitoring and Evaluation:   Behavioral-Environmental Outcomes:  None Identified   Food/Nutrient Intake Outcomes:  Enteral Nutrition Intake/Tolerance  Physical Signs/Symptoms Outcomes:  Biochemical Data, GI Status, Skin, Weight, Fluid Status or Edema     Discharge Planning: Too soon to determine     Some areas of assessment may be incomplete due to standard COVID-19 Precautions. Kayce Ramirez R.D., L.D.   Phone: [phone]

## 2021-11-21 NOTE — PROGRESS NOTES
RN did sedation vacation on patient. [id number]. Patient did well. RR remained in 20's. Patient started to bite on the tube, BP were little elevated.        propofol back on at 10 mcg/kg/min

## 2021-11-21 NOTE — PROGRESS NOTES
HEMODIALYSIS POST TREATMENT NOTE    Treatment time ordered: 180 mnutes    Actual treatment time: 12 minutes, Treatment terminated due to decreasing O2 sat ,  HR irregular, became  less responsive, All blood returned , Rapid response called after blood returned    UltraFiltration Goal: 2000 ml  UltraFiltration Removed: 0      Pre Treatment weight: 96.9KG actual bed weight  Post Treatment weight: N/A  Estimated Dry Weight: N/A    Access used:     Central Venous Catheter:           Non-tunneled:            Site: Rt. Neck          Access Flow: good,        Sign and symptoms of infection: No       If YES:     Medications or blood products given: No    Chronic outpatient schedule: AJYLYN    Chronic outpatient unit: JAYLYN    Summary of response to treatment: Dialysis terminated , became unstable    Explain if orders NOT met, was physician notified:Dr. Glorya Sicard notified       ACES flowsheet faxed to patient unit/ placed in patient chart: N/A    Post assessment completed: yes    Report given to: Zoya Brooks RN      * Intra-treatment documented Safety Checks include the followin) Access and face visible at all times. 2) All connections and blood lines are secure with no kinks. 3) NVL alarm engaged. 4) Hemosafe device applied (if applicable). 5) No collapse of Arterial or Venous blood chambers. 6) All blood lines / pump segments in the air detectors.

## 2021-11-21 NOTE — PROGRESS NOTES
RN spoke to Dr. Gayathri Winkler to see if cardiology wanted to keep Mag and Potassium at a certain level. Orders received to keep Mag at 2 or above and Potassium at 4 or above. See mis nursing order.

## 2021-11-21 NOTE — PROGRESS NOTES
Dialysis Safety Checks:    Patient ID Verified (Y/N) Y     -Hepatitis Test                   Date      Result  Hepatitis B Surface Antigen   21 Negative     Hepatitis B Surface Antibody 21 Negative     Hepatitis B Core Antibody  21 Negative     -Treatment Initiation  Blood Vol Processed Goal (Liters)  Pump Speed x Treatment Hours x 60 Minutes    Target Fluid Removal 2000 ml     * Intra-treatment documented Safety Checks include the followin) Access and face visible at all times. 2) All connections and blood lines are secure with no kinks. 3) NVL alarm engaged. 4) Hemosafe device applied (if applicable). 5) No collapse of Arterial or Venous blood chambers. 6) All blood lines / pump segments in the air detectors.   --------------------------------------------------------------------------------Report received from [facility]ANY [facility] [employer] [facility]

## 2021-11-21 NOTE — PLAN OF CARE
Nutrition Problem #1: Increased nutrient needs  Intervention: Food and/or Nutrient Delivery: Continue NPO, Continue Current Tube Feeding  Nutritional Goals: Nutrition support will meet approximately 100% of estimated needs

## 2021-11-21 NOTE — PROGRESS NOTES
Progress Note  Date:2021       Room:  Patient Martine Del Cid     YOB: 1950     Age:71 y.o. Subjective    Subjective     Bradycardic arrest during HD. Now intubated. Review of Systems     See HPI      Objective         Vitals Last 24 Hours:  TEMPERATURE:  Temp  Av.1 °F (36.7 °C)  Min: 97.6 °F (36.4 °C)  Max: 98.8 °F (37.1 °C)  RESPIRATIONS RANGE: Resp  Av.1  Min: 14  Max: 29  PULSE OXIMETRY RANGE: SpO2  Av.8 %  Min: 64 %  Max: 100 %  PULSE RANGE: Pulse  Av.6  Min: 45  Max: 128  BLOOD PRESSURE RANGE: Systolic (02UTG), FML:768 , Min:100 , TMN:548   ; Diastolic (10HDO), ZSW:51, Min:41, Max:95    I/O (24Hr): Intake/Output Summary (Last 24 hours) at 2021 0718  Last data filed at 2021 0603  Gross per 24 hour   Intake 1112.96 ml   Output 580 ml   Net 532.96 ml     Objective:  Vital signs: (most recent): Blood pressure (!) 153/67, pulse 57, temperature 98.8 °F (37.1 °C), temperature source Oral, resp. rate 16, height 5' 3\" (1.6 m), weight 213 lb 10 oz (96.9 kg), SpO2 100 %. Labs/Imaging/Diagnostics    Labs:  CBC:  Recent Labs     21  0502 21  0530 21  0549   WBC 5.7 7.5 11.5*   RBC 2.58* 2.58* 2.61*   HGB 8.0* 8.0* 7.9*   HCT 24.8* 24.5* 24.8*   MCV 95.8 94.8 95.0   RDW 15.9* 16.0* 15.6*   PLT 68* 49* 52*     CHEMISTRIES:  Recent Labs     21  0502 21  0530 21  0549    136 136   K 3.7 3.7 PENDING    101 101   CO2 25 26 23   BUN 45* 30* 38*   CREATININE 3.08* 2.50* 3.13*   GLUCOSE 114* 117* 126*   MG 1.9 1.8 1.7     PT/INR:  No results for input(s): PROTIME, INR in the last 72 hours. APTT:  No results for input(s): APTT in the last 72 hours. LIVER PROFILE:  No results for input(s): AST, ALT, BILIDIR, BILITOT, ALKPHOS in the last 72 hours.     Imaging Last 24 Hours:  CT HEAD WO CONTRAST    Result Date: 2021  EXAMINATION: CT OF THE HEAD WITHOUT CONTRAST  2021 9:35 pm TECHNIQUE: arm PICC line overlies the distal SVC. 1. Tip of the newly inserted NG tube overlies the gastric body. . 2. The right IJ CVC and right arm PICC line remain well positioned. 3.  No acute cardiopulmonary abnormality. US RETROPERITONEAL COMPLETE    Result Date: 11/18/2021  EXAMINATION: RETROPERITONEAL ULTRASOUND OF THE KIDNEYS AND URINARY BLADDER 11/18/2021 COMPARISON: 11/12/2021, 11/15/2021 HISTORY: ORDERING SYSTEM PROVIDED HISTORY: low urine out put TECHNOLOGIST PROVIDED HISTORY: low urine out put FINDINGS: Kidneys: Somewhat limited exam due to patient body habitus. The right kidney measures 11 cm in length and the left kidney measures 9.6 cm in length. Kidneys demonstrate normal cortical echogenicity. No evidence of hydronephrosis or intrarenal stones. Resolution of hydronephrosis since prior studies. Small right pleural effusion. Bladder: Kaur catheter in an apparently decompressed urinary bladder. No acute findings. No hydronephrosis. Small right pleural effusion. IR NONTUNNELED VASCULAR CATHETER > 5 YEARS    Result Date: 11/17/2021  PROCEDURE: ULTRASOUND GUIDED VASCULAR ACCESS. FLUOROSCOPY GUIDANCE FOR PLACEMENT OF A NON-TUNNELED HEMODIALYSIS CATHETER. 11/17/2021. HISTORY: ORDERING SYSTEM PROVIDED HISTORY: JAYLYN TECHNOLOGIST PROVIDED HISTORY: JAYLYN In need of hemodialysis. FLUOROSCOPY DOSE AND TYPE OR TIME AND EXPOSURES: Fluoroscopy time-13 seconds D AP-120 cGy cm squared SEDATION: Local anesthesia TECHNIQUE: Informed consent was obtained after a detailed explanation of the procedure including risks, benefits, and alternatives. Universal protocol was observed. The right neck was prepped and draped in sterile fashion using maximum sterile barrier technique. Local anesthesia was achieved with lidocaine. A micropuncture needle was used to access the right internal jugular vein using ultrasound guidance. An ultrasound image demonstrating patency of the vein with needle tip located within it.  An image was obtained and stored in PACs. Under fluoroscopy guidance, a 0.035 guidewire was used to place a 13 [place] x 15 cm duo split temporary hemodialysis catheter after fascial tract dilation. Catheter tip position in the upper right atrium demonstrated. The catheter flushed easily and there was a good blood return. The catheter was sutured to the skin. The catheter was locked with heparinized saline. The patient tolerated the procedure well and there were no immediate complications. FINDINGS: As above, good catheter tip position in the upper right atrium. Right-sided PICC identified, with tip in the mid SVC. Successful ultrasound guided non-tunneled right IJ temporary hemodialysis catheter placement. Catheter is ready for dialysis use at this time. MRI BRAIN WO CONTRAST    Result Date: 11/18/2021  EXAMINATION: MRI OF THE BRAIN WITHOUT CONTRAST  11/18/2021 7:00 pm TECHNIQUE: Multiplanar multisequence MRI of the brain was performed without the administration of intravenous contrast. COMPARISON: Head CT of 11/17/2021 HISTORY: ORDERING SYSTEM PROVIDED HISTORY: altered mental status TECHNOLOGIST PROVIDED HISTORY: altered mental status Reason for Exam: AMS FINDINGS: There is no acute infarction, intracranial hemorrhage, or intracranial mass lesion. No mass effect, midline shift, or extra-axial collection is noted. There are mild nonspecific foci of periventricular and subcortical cerebral white matter T2/FLAIR hyperintensity, most likely representing chronic microangiopathic disease in this age group. The brain parenchyma is otherwise normal. The pituitary gland is normal in appearance. The cerebellar tonsils are in normal position. The ventricles, sulci, and cisterns are prominent suggestive of generalized volume loss. The intracranial flow voids are preserved. The globes and orbits are within normal limits. Mild mucosal thickening of bilateral mastoid air cells and right sphenoid sinus.   The visualized extracranial structures including paranasal sinuses and mastoid air cells are otherwise unremarkable. There is no acute infarction, intracranial hemorrhage, or intracranial mass lesion. Mild chronic microangiopathic ischemic disease. Mild generalized volume loss. Mild mucosal thickening of bilateral mastoid air cells and right sphenoid sinus. IR GUIDED THORACENTESIS PLEURAL    Result Date: 11/17/2021  PROCEDURE: ULTRASOUNDGUIDED RIGHT THORACENTESIS 11/17/2021 HISTORY: ORDERING SYSTEM PROVIDED HISTORY: Large pleural effusion. Patient not able to sit up because of severe pain. Patient confused TECHNOLOGIST PROVIDED HISTORY: Discussed with Dr. Paras Erwin who will attempt CT-guided thoracentesis on the right for diagnostic and therapeutic reasons. Reason for exam:->Large pleural effusion. Patient not able to sit up because of severe pain. Patient confused Which side should the procedure be performed?->Right TECHNIQUE: Informed consent was obtained after a detailed explanation of the procedure including risks, benefits, and alternatives. Universal protocol was performed. With the patient in the left side decubitus position, the right chest was prepped and draped in sterile fashion and local anesthesia was achieved with lidocaine. A 5 [place] needle sheath was advanced under ultrasound guidance into pleural effusion and thoracentesis was performed. The patient tolerated the procedure well. FINDINGS: A total of 1000 mL was removed. Fluid was straw-colored and non turbid. Successful ultrasound guided right thoracentesis. Physical Examination   vented   scattered rhonchi   irregular regular   head normocephalic and atraumatic      Assessment//Plan           Bradycardic arrest, ? PNA, PE?  Vagal?    Longstanding persistent Afib - rate controlled - OAC at home, currently on hold 2/2 severe anemia, GI bleed    NICMP, EF 20-25% on TTE 11/2021  Chronic HFrEF    Right pleural effusion s/p thoracentesis w/ 1 L out on 11/17/2021    Reported ASCVD with prior PCI - details unknown - nuclear stress test 09/2021 with large fixed severe anteroseptal /apical defect c/w  Previous infarct, no ischemia    Chronic mild, flat, troponin elevation     H/O declined ICD 03/2019 per chart review    Chronic RBBB    PAD w/ occlusive BLE dz - managed by [facility] vascular [facility]    JAYLYN/CKD - new dialysis this admission    Type 2 DM    Obesity       Now intubated  Afib  Poor prognosis

## 2021-11-21 NOTE — PROGRESS NOTES
ICU Progress Note (Vent)   [facility]    Patient - Sanju Stern,  Age - 66 y.o.    - 1950      Room Number -    N -  [id number]   Acct # - [de-identified]  Date of Admission -  11/10/2021  4:23 PM    Events of Past 24 Hours   Events of last evening noted. While on dialysis, patient had a bradycardic arrest.  CHEYENNE BRUNSON was called, and patient was intubated and placed on mechanical ventilation. 1 round of CPR brought her back. Prior to this, she was much more alert and interactive. There were no issues with oxygenation prior to her going on dialysis. Vitals    height is 5' 3\" (1.6 m) and weight is 213 lb 10 oz (96.9 kg). Her oral temperature is 98.8 °F (37.1 °C). Her blood pressure is 153/67 (abnormal) and her pulse is 62. Her respiration is 20 and oxygen saturation is 100%. Temperature Range: Temp: 98.8 °F (37.1 °C) Temp  Av.1 °F (36.7 °C)  Min: 97.6 °F (36.4 °C)  Max: 98.8 °F (37.1 °C)  BP Range:  Systolic (14JUJ), TIR:203 , Min:100 , FLU:008     Diastolic (74SBX), MZC:44, Min:41, Max:95    Pulse Range: Pulse  Av.9  Min: 45  Max: 128  Respiration Range: Resp  Av.2  Min: 14  Max: 29  Current Pulse Ox[de-identified]  SpO2: 100 %  24HR Pulse Ox Range:  SpO2  Av.8 %  Min: 64 %  Max: 100 %  Oxygen Amount and Delivery: O2 Flow Rate (L/min): 2 L/min      Wt Readings from Last 3 Encounters:   21 213 lb 10 oz (96.9 kg)   16 178 lb (80.7 kg)   16 184 lb 12.8 oz (83.8 kg)     I/O       Intake/Output Summary (Last 24 hours) at 2021 0634  Last data filed at 2021 0603  Gross per 24 hour   Intake 1112.96 ml   Output 580 ml   Net 532.96 ml     I/O last 3 completed shifts:   In: 1289.5 [I.V.:874.5; NG/GT:415]  Out: 215 [Urine:15; Stool:200]     DRAIN/TUBE OUTPUT:     Invasive Lines   ETT Day -   1  Lines -PICC line and dialysis catheter in place    ICP PRESSURE RANGE:  No data recorded  CVP PRESSURE RANGE:  No data recorded  Mechanical Ventilation Data   SETTINGS (Comprehensive)  Vent Information  $Ventilation: $Initial Day  Skin Assessment: Clean, dry, & intact  Vent Type: Servo i  Vent Mode: PRVC  Vt Ordered: 500 mL  Rate Set: 20 bmp  FiO2 : 60 %  SpO2: 100 %  SpO2/FiO2 ratio: 166.67  Sensitivity: 5  PEEP/CPAP: 7  I Time/ I Time %: 1 s  Humidification Source: HME  Nitric Oxide/Epoprostenol In Use?: No  Mask Type: Full face mask  Mask Size: Medium  Additional Respiratory  Assessments  Pulse: 62  Resp: 20  SpO2: 100 %  End Tidal CO2: 27 (%)  Position: Semi-Lagunas's  Humidification Source: HME  Oral Care: Mouth swabbed, Mouth suctioned       ABGs:   Lab Results   Component Value Date    PHART 7.448 11/21/2021    PO2ART 232.0 11/21/2021    PWP8CXE 34.9 11/21/2021       Lab Results   Component Value Date    MODE PRVC 11/21/2021         Medications   IV   norepinephrine      propofol 20 mcg/kg/min (11/21/21 0252)    dextrose      sodium chloride      sodium chloride        chlorhexidine  15 mL Mouth/Throat BID    famotidine (PEPCID) injection  20 mg IntraVENous Daily    polyvinyl alcohol  1 drop Both Eyes Q4H    And    artificial tears   Both Eyes Q4H    carvedilol  3.125 mg Oral BID WC    pantoprazole  40 mg IntraVENous BID    And    sodium chloride flush  10 mL IntraVENous BID    sodium polystyrene  15 g Oral Once    midodrine  7.5 mg Oral TID    cefepime  1,000 mg IntraVENous Q24H    sertraline  25 mg Oral Daily    ferrous sulfate  325 mg Oral Daily with breakfast    [Held by provider] aspirin  81 mg Oral Daily    atorvastatin  80 mg Oral Daily    insulin glargine  22 Units SubCUTAneous Nightly    insulin lispro  0-12 Units SubCUTAneous TID     insulin lispro  0-6 Units SubCUTAneous Nightly    sodium chloride flush  5-40 mL IntraVENous 2 times per day       Diet/Nutrition   Diet NPO  ADULT TUBE FEEDING; Nasogastric; Renal Formula; Continuous; 35; No; 30; Q 8 hours; Protein; 1 Protein at 12 noon daily. Flush with 30 mLwater before and after administration. Exam   VITALS    height is 5' 3\" (1.6 m) and weight is 213 lb 10 oz (96.9 kg). Her oral temperature is 98.8 °F (37.1 °C). Her blood pressure is 153/67 (abnormal) and her pulse is 62. Her respiration is 20 and oxygen saturation is 100%. Ventilator Settings (Basic)  Vent Mode: PRVC Rate Set: 20 bmp/Vt Ordered: 500 mL/ /FiO2 : 60 %    Constitutional - Sedated  General Appearance  well developed, obese  HEENT - Life support devices in place (ET, OG),normocephalic, atraumatic. PERRLA  Lungs - Chest expands equally, no wheezes, no rhonchi, bilateral crackles, diminished breath sounds on the right side   cardiovascular - Heart sounds are normal.  normal rate and rhythm regular, no murmur, gallop or rub. Abdomen - soft, nontender, nondistended, no masses or organomegaly  Neurologic - CN II-XII are grossly intact.  There are no focal motor deficits  Skin - no bruising or bleeding  Extremities - no cyanosis, clubbing; generalized 2+ upper and lower edema    Lab Results   CBC     Lab Results   Component Value Date    WBC 11.5 11/21/2021    RBC 2.61 11/21/2021    RBC 4.14 08/31/2016    HGB 7.9 11/21/2021    HCT 24.8 11/21/2021    PLT 52 11/21/2021    MCV 95.0 11/21/2021    MCH 30.2 11/21/2021    MCHC 31.8 11/21/2021    RDW 15.6 11/21/2021    NRBC 2 11/20/2021    LYMPHOPCT 19 11/21/2021    LYMPHOPCT 32.8 08/31/2016    MONOPCT 7 11/21/2021    EOSPCT 4.3 08/31/2016    BASOPCT 0 11/21/2021    BASOPCT 0.7 08/31/2016    MONOSABS 0.80 11/21/2021    LYMPHSABS 2.20 11/21/2021    EOSABS 0.10 11/21/2021    BASOSABS 0.00 11/21/2021    DIFFTYPE NOT REPORTED 11/21/2021       BMP   Lab Results   Component Value Date     11/21/2021    K PENDING 11/21/2021     11/21/2021    CO2 23 11/21/2021    BUN 38 11/21/2021    CREATININE 3.13 11/21/2021    GLUCOSE 126 11/21/2021    GLUCOSE 77 08/31/2016    CALCIUM 7.8 11/21/2021       LFTS  Lab Results   Component Value Date    [facility] 175 11/18/2021    ALT 31 11/18/2021    AST 21 11/18/2021    PROT 5.3 11/20/2021    BILITOT 0.27 11/18/2021    BILIDIR <0.08 11/13/2021    IBILI Connot be calculated 11/13/2021    LABALBU 2.6 11/18/2021       INR  No results for input(s): PROTIME, INR in the last 72 hours. APTT  No results for input(s): APTT in the last 72 hours. Lactic Acid  Lab Results   Component Value Date    LACTA 0.6 11/16/2021    LACTA 0.6 11/16/2021        BNP   No results for input(s): BNP in the last 72 hours. Cultures       Radiology     Plain Films             New right lower lobe infiltrate/effusion    SYSTEM ASSESSMENT    Acute on chronic hypoxic and hypercapnic respiratory failure  Bradycardic arrest, intubated 11/20  Right lower lobe infiltrate/pleural effusion  Acute kidney injury-requiring hemodialysis  Morbid obesity  Anemia, chronic disease and possible rectal bleed  Hypoalbuminemia  Thrombocytopenia  Nonischemic cardiomyopathy with EF of 30 to 35% possible right to left interatrial shunt  Atrial fibrillation      Neuro   Patient's neurological status was improving, will do sedation vacation, continue propofol for sedation    Respiratory   Wean oxygen as tolerated.  Keep O2 sat > 88%  Decrease rate on ventilator decrease FiO2 as tolerated  No plans on weaning today, will reassess tomorrow     Hemodynamics   Not on any pressors, continue midodrine if blood pressure is low  Echo done 11/16 showed significant cardiac systolic dysfunction  Still throwing occasional PVCs    Gastrointestinal/Nutrition   Resume tube feeds  GI prophylaxis    Renal   Creatinine up because dialysis was not finished  Total body fluid overloaded  Defer to nephrology when dialysis will be done again    Infectious Disease   Continue cefepime, will add Zyvox pending cultures and MRSA swab  Procalcitonin level    Hematology/Oncology   It is unlikely but the possibility of pulmonary embolism exists due to bradycardic arrest.  However, cannot anticoagulate fully due to possible GI bleed and severe thrombocytopenia    Endocrine   Blood sugars being monitored    Social/Spiritual/DNR/Disposition/Other     Updated her  in detail; he was tearful because \"I'm all alone and I have no one to talk to\" I told him not to give up hope and we will do our best    Critical Care Time   35 min    Electronically signed by Orville Gibson MD on 11/21/2021 at 6:34 AM

## 2021-11-21 NOTE — PROGRESS NOTES
Today's Date: 11/21/2021  Patient Name: Molly Hernandez  Date of admission: 11/10/2021  4:23 PM  Patient's age: 66 y.o., 1950  Admission Dx: JAYLYN (acute kidney injury) ([facility] [address] [N17.9]  Urinary tract infection in elderly patient [N39.0]  Anemia, unspecified type [D64.9]    Reason for Consult: TCP  Requesting Physician: Jo-Ann Moore MD    CHIEF COMPLAINT:    Chief Complaint   Patient presents with    Abnormal Lab     low hemoglobin and high CR       SUBJECTIVE:    Patient seen and examined  Currently she is intubated and sedated  Continuing hemodialysis per nephrology  Her platelets are stable  No active bleeding noted  HISTORY OF PRESENT ILLNESS:    Molly Hernandez is a 66 y.o. female who is admitted to the hospital on 11/10/2021  for acute kidney injury and anemia. Patient has history of chronic kidney disease, chronic anemia and recent history of GI bleed with negative endoscopies at [facility]. She was transferred from nursing home for elevated creatinine and drop in her hemoglobin. Her stool for occult blood on 11/14/2021 was positive. She was evaluated by gastroenterology. Underwent upper endoscopy which showed gastritis. She also was noted to have hydronephrosis on the right side therefore was evaluated by urology and underwent cystoscopy with ureteral stent placement on 11/16/2021. Patient was evaluated by nephrology for her acute kidney injury and was started on acute dialysis on 11/17/2021. Her hemoglobin is stable at 8.0 however her platelets started dropping since admission and today it dropped to 49 therefore hematology was consulted for evaluation of her low platelets. Her iron studies suggest anemia of chronic disease. Her haptoglobin is 125 and LDH is not elevated  Her HIT panel came back negative.     Past Medical History:   has a past medical history of Anemia, unspecified, CAD (coronary artery disease), Chronic kidney disease, CKD (chronic kidney disease) stage 3, GFR 30-59 ml/min ([facility] [address] Closed fracture of dorsal (thoracic) vertebra without mention of spinal cord injury, Coronary atherosclerosis of unspecified type of vessel, native or graft, Depression with anxiety, Esophageal reflux, HTN (hypertension), Hyperkalemia, Hyperlipidemia, Intervertebral cervical disc disorder with myelopathy, cervical region, Lower back pain, Lumbar radiculopathy, Microalbuminuria, Muscle weakness, Pain in limb, Stenosis of cervical spine, Tethered cord syndrome (HCC), Type II or unspecified type diabetes mellitus without mention of complication, not stated as uncontrolled, Urinary calculus, and UTI (lower urinary tract infection). Past Surgical History:   has a past surgical history that includes Colon surgery; angioplasty; joint replacement; Upper gastrointestinal endoscopy (N/A, 11/15/2021); Cystoscopy (Right, 11/16/2021); and IR NONTUNNELED VASCULAR CATHETER > 5 YEARS (11/17/2021). Medications:    Prior to Admission medications    Medication Sig Start Date End Date Taking? Authorizing Provider   acetaminophen (TYLENOL) 325 MG tablet Take 650 mg by mouth every 4 hours as needed for Pain (do not exceed 4000 mg daily)   Yes Historical Provider, MD   albuterol (PROVENTIL) (2.5 MG/3ML) 0.083% nebulizer solution Take 2.5 mg by nebulization every 6 hours as needed for Shortness of Breath   Yes Historical Provider, MD   atorvastatin (LIPITOR) 80 MG tablet Take 80 mg by mouth daily   Yes Historical Provider, MD   famotidine (PEPCID) 20 MG tablet Take 20 mg by mouth daily   Yes Historical Provider, MD   ferrous sulfate (IRON 325) 325 (65 Fe) MG tablet Take 325 mg by mouth daily (with breakfast)   Yes Historical Provider, MD   HYDROcodone-acetaminophen (NORCO) 5-325 MG per tablet Take 1 tablet by mouth every 6 hours as needed for Pain.  Indications: until 11/13/21   Yes Historical Provider, MD   insulin lispro (HUMALOG) 100 UNIT/ML injection vial Inject into the skin 4 times daily (before meals and nightly) Per sliding scale:   150-200 = 2 units  201-250 = 4 units  251-300 = 6 units  301-350 = 8 units  351-400 = 10 units   Yes Historical Provider, MD   insulin glargine (LANTUS SOLOSTAR) 100 UNIT/ML injection pen Inject 22 Units into the skin nightly   Yes Historical Provider, MD   meclizine (ANTIVERT) 12.5 MG tablet Take 12.5 mg by mouth 2 times daily as needed for Dizziness   Yes Historical Provider, MD   warfarin (COUMADIN) 3 MG tablet Take 3 mg by mouth nightly   Yes Historical Provider, MD   carvedilol (COREG) 6.25 MG tablet Take 6.25 mg by mouth 2 times daily (with meals).    Yes Historical Provider, MD   aspirin 81 MG chewable tablet Take 81 mg by mouth daily    Yes Historical Provider, MD   bumetanide (BUMEX) 1 MG tablet Take 1 mg by mouth daily   11/10/21  Historical Provider, MD     Current Facility-Administered Medications   Medication Dose Route Frequency Provider Last Rate Last Admin    linezolid (ZYVOX) IVPB 600 mg  600 mg IntraVENous Q12H Vilma Javier MD   Stopped at 11/21/21 1027    midodrine (PROAMATINE) tablet 5 mg  5 mg Oral TID PRN Carmina Galvan MD        sodium citrate 4 % injection 1.3 mL  1.3 mL IntraCATHeter PRN Darinel Carney MD   1.3 mL at 11/20/21 1958    sodium citrate 4 % injection 1.2 mL  1.2 mL IntraCATHeter PRN Paulino Granados MD   1.2 mL at 11/20/21 1959    chlorhexidine (PERIDEX) 0.12 % solution 15 mL  15 mL Mouth/Throat BID Vilma Javier MD   15 mL at 11/21/21 0849    famotidine (PEPCID) injection 20 mg  20 mg IntraVENous Daily Vilma Javier MD   20 mg at 11/21/21 0849    fentaNYL (SUBLIMAZE) injection 50 mcg  50 mcg IntraVENous Q30 Min PRN Vilma Javier MD   50 mcg at 11/21/21 0111    propofol injection  10 mcg/kg/min IntraVENous Continuous Vilma Javier MD 5.8 mL/hr at 11/21/21 1515 10 mcg/kg/min at 11/21/21 1515    polyvinyl alcohol (LIQUIFILM TEARS) 1.4 % ophthalmic solution 1 drop  1 drop Both Eyes Q4H Vilma Javier MD 1 drop at 11/21/21 1207    And    lubrifresh P.M. (artificial tears) ophthalmic ointment   Both Eyes Q4H Dennis Elizabeth MD   Given at 11/21/21 1516    carvedilol (COREG) tablet 3.125 mg  3.125 mg Oral BID WC JuveJUANITA Valiente - CNP   3.125 mg at 11/21/21 0849    pantoprazole (PROTONIX) injection 40 mg  40 mg IntraVENous BID Missy Nj MD   40 mg at 11/21/21 0849    And    sodium chloride flush 0.9 % injection 10 mL  10 mL IntraVENous BID Missy Nj MD   10 mL at 11/21/21 0924    potassium chloride (KLOR-CON M) extended release tablet 40 mEq  40 mEq Oral PRN Missy Nj MD        Or    potassium bicarb-citric acid (EFFER-K) effervescent tablet 40 mEq  40 mEq Oral PRN Missy Nj MD   40 mEq at 11/21/21 5314    Or    potassium chloride 10 mEq/100 mL IVPB (Peripheral Line)  10 mEq IntraVENous PRN Missy Nj MD        magnesium sulfate 1000 mg in dextrose 5% 100 mL IVPB  1,000 mg IntraVENous PRN Missy Nj MD        glucose (GLUTOSE) 40 % oral gel 15 g  15 g Oral PRN Missy Nj MD        dextrose 50 % IV solution  12.5 g IntraVENous PRN Missy Nj MD        glucagon (rDNA) injection 1 mg  1 mg IntraMUSCular PRN Missy Nj MD        dextrose 5 % solution  100 mL/hr IntraVENous PRN Missy Nj MD        acetaminophen (TYLENOL) tablet 650 mg  650 mg Oral Q4H PRN Kuldip Fatima MD        sodium polystyrene (KAYEXALATE) 15 GM/60ML suspension 15 g  15 g Oral Once Raffaele Anand MD        cefepime (MAXIPIME) 1000 mg IVPB minibag  1,000 mg IntraVENous Q24H Oretha Arrant Cindy Ganser, MD   Stopped at 11/20/21 2130    sertraline (ZOLOFT) tablet 25 mg  25 mg Oral Daily Jeb Gonzales MD   25 mg at 11/21/21 0850    0.9 % sodium chloride infusion   IntraVENous PRN Raffaele Anand MD        ferrous sulfate (IRON 325) tablet 325 mg  325 mg Oral Daily with breakfast Raffaele Anand MD   325 mg at 11/21/21 0849    [Held by provider] aspirin chewable tablet 81 mg  81 mg Oral Daily Jeb Gonzales MD   81 mg at 21 1015    atorvastatin (LIPITOR) tablet 80 mg  80 mg Oral Daily Jeb Gonzales MD   80 mg at 21 0849    insulin glargine (LANTUS) injection vial 22 Units  22 Units SubCUTAneous Nightly Jeb Gonzales MD   22 Units at 11/15/21 2054    insulin lispro (HUMALOG) injection vial 0-12 Units  0-12 Units SubCUTAneous TID WC Jeb Gonzales MD   2 Units at 21 1753    insulin lispro (HUMALOG) injection vial 0-6 Units  0-6 Units SubCUTAneous Nightly Jeb Gonzales MD   2 Units at 11/15/21 2054    sodium chloride flush 0.9 % injection 5-40 mL  5-40 mL IntraVENous 2 times per day Elizabeth Peoples MD   10 mL at 21 0850    sodium chloride flush 0.9 % injection 5-40 mL  5-40 mL IntraVENous PRN Jeb Gonzales MD        0.9 % sodium chloride infusion  25 mL IntraVENous PRN Elizabeth Peoples MD        acetaminophen (TYLENOL) tablet 650 mg  650 mg Oral Q4H PRN Elizabeth Peoples MD   650 mg at 213    ondansetron (ZOFRAN-ODT) disintegrating tablet 4 mg  4 mg Oral Q8H PRN Jeb Gonzales MD        Or    ondansetron (ZOFRAN) injection 4 mg  4 mg IntraVENous Q6H PRN Elizabeth Peoples MD        oxyCODONE-acetaminophen (PERCOCET) 5-325 MG per tablet 1 tablet  1 tablet Oral Q6H PRN Elizabeth Peoples MD           Allergies:  Patient has no known allergies. Social History:   reports that she has never smoked. She does not have any smokeless tobacco history on file. She reports that she does not drink alcohol and does not use drugs. Family History: family history includes High Blood Pressure in an other family member.     REVIEW OF SYSTEMS:    Could not be obtained as patient is intubated    PHYSICAL EXAM:      BP (!) 134/54   Pulse 74   Temp 98.5 °F (36.9 °C) (Axillary)   Resp 16   Ht 5' 3\" (1.6 m) Comment: from old chart  Wt 213 lb 10 oz (96.9 kg)   SpO2 100%   BMI 37.84 kg/m²    Temp (24hrs), Av.5 °F (36.9 °C), Min:98 °F (36.7 °C), Max:98.8 °F (37.1 °C)    General appearance - well appearing, no in pain or distress   Mental status -intubated  Mouth - mucous membranes moist, pharynx normal without lesions   Neck - supple, no significant adenopathy   Lymphatics - no palpable lymphadenopathy, no hepatosplenomegaly   Chest - clear to auscultation, no wheezes, rales or rhonchi, symmetric air entry   Heart - normal rate, regular rhythm, normal S1, S2, no murmurs  Abdomen - soft, nontender, nondistended, no masses or organomegaly   Neurological -intubated nonfocal  Musculoskeletal - no joint tenderness, deformity or swelling   Extremities - peripheral pulses normal,+++ pedal edema, no clubbing or cyanosis   Skin -multiple skin bruises noted    DATA:    Labs:   CBC:   Recent Labs     11/20/21 0530 11/21/21 0549   WBC 7.5 11.5*   HGB 8.0* 7.9*   HCT 24.5* 24.8*   PLT 49* 52*     BMP:   Recent Labs     11/20/21 0530 11/20/21 0530 11/21/21  0549 11/21/21  1145     --  136  --    K 3.7   < > 3.3* 3.9   CO2 26  --  23  --    BUN 30*  --  38*  --    CREATININE 2.50*  --  3.13*  --    LABGLOM 19*  --  15*  --    GLUCOSE 117*  --  126*  --     < > = values in this interval not displayed. PT/INR: No results for input(s): PROTIME, INR in the last 72 hours. IMAGING DATA:  XR CHEST PORTABLE   Final Result   Cardiomegaly with bibasilar effusions and adjacent infiltrates. Support tubes as described above. XR CHEST (SINGLE VIEW FRONTAL)   Final Result   The endotracheal tube ends appropriately above the kristina and below the   clavicular heads. The nasogastric tube ends in the stomach. The right   jugular vein hemodialysis catheter ends in the distal SVC, unchanged. Cardiomegaly. Bibasilar airspace disease and small right pleural effusion,   unchanged. Lungs otherwise grossly clear. No pneumothorax.          VL Renal Arterial Duplex Complete   Final Result      XR CHEST PORTABLE   Final Result   Basilar opacities likely represent atelectasis with small pleural effusions. MRI BRAIN WO CONTRAST   Final Result      There is no acute infarction, intracranial hemorrhage, or intracranial mass   lesion. Mild chronic microangiopathic ischemic disease. Mild generalized volume loss. Mild mucosal thickening of bilateral mastoid air cells and right sphenoid   sinus. XR CHEST PORTABLE   Final Result   1. Tip of the newly inserted NG tube overlies the gastric body. .   2. The right IJ CVC and right arm PICC line remain well positioned. 3.  No acute cardiopulmonary abnormality. US RETROPERITONEAL COMPLETE   Final Result   No acute findings. No hydronephrosis. Small right pleural effusion. CT HEAD WO CONTRAST   Final Result   No acute intracranial abnormality. Chronic microvascular disease and chronic lacunar infarcts. Critical results were called by Dr. Minh Dominguez to Dr Teresa Teran on 11/17/2021 at   22:0. XR CHEST PORTABLE   Final Result   1. No pneumothorax evident following thoracentesis. 2. Low lung volumes. Significantly improved pneumatization lower right lung   with minimal pleural effusion bilateral evident. 3. Calcific atherosclerosis aorta. 4. Cardiomegaly. IR GUIDED THORACENTESIS PLEURAL   Final Result   Successful ultrasound guided right thoracentesis. IR NONTUNNELED VASCULAR CATHETER > 5 YEARS   Final Result   Successful ultrasound guided non-tunneled right IJ temporary hemodialysis   catheter placement. Catheter is ready for dialysis use at this time. FLUORO FOR SURGICAL PROCEDURES   Final Result      XR CHEST PORTABLE   Final Result   Moderate right pleural effusion and associated airspace disease. Pneumonia   cannot be excluded. US RENAL COMPLETE   Final Result   1. Mild to moderate right-sided hydronephrosis. 2. Limited renal ultrasound. No left-sided hydronephrosis.          CT ABDOMEN PELVIS WO CONTRAST Additional Contrast? None   Final Result   Severe right and mild left hydroureteronephrosis. No obstructing ureteral   stone is seen. Wall thickening of the urinary bladder without significant   urinary bladder wall distension. Outlet obstruction is a consideration. Right-sided ostomy. There is bowel wall thickening proximal to the ostomy,   may suggest infectious or inflammatory enteritis. No drainable fluid   collections or free air. No evidence of bowel obstruction. Moderate right pleural effusion. Bibasilar infiltrates or atelectasis. Clinical correlation to exclude   pneumonia. US RETROPERITONEAL COMPLETE   Final Result   1. Moderate to severe right and mild-to-moderate left hydronephrosis. 2. Exam limited due to patient's body habitus. VL Renal Vein Scan Bilat    (Results Pending)   XR CHEST PORTABLE    (Results Pending)       Primary Problem  JAYLYN (acute kidney injury) [facility])    Active Hospital Problems    Diagnosis Date Noted    Acute metabolic encephalopathy [M96.96]     Longstanding persistent atrial fibrillation ([facility] [address] [I48.11] 11/16/2021    Hydronephrosis determined by ultrasound [N13.30] 11/14/2021    Iron deficiency anemia [D50.9] 11/12/2021    JAYLYN (acute kidney injury) ([facility] [address] [N17.9] 11/10/2021    CKD (chronic kidney disease) stage 3, GFR 30-59 ml/min ([facility]) [N18.30]     HTN (hypertension) [I10]     Type 2 diabetes mellitus with kidney complication, with long-term current use of insulin ([facility]) [E11.29, Z79.4]     ASCVD (arteriosclerotic cardiovascular disease) [I25.10]        IMPRESSION:   1. Acute kidney injury and started on dialysis by nephrology  2. Anemia, stool occult positive for blood and had upper endoscopy showing gastritis. Anemia could be related to chronic disease and also renal disease and aggravated by GI bleed  3. Thrombocytopenia  4. Acute encephalopathy  5. Bilateral hydronephrosis  6. Cardiomyopathy with EF of 30-35%  7.  Generalized anasarca    RECOMMENDATIONS:  1. I reviewed the laboratory data, imaging studies, diagnosis, other treatment recommendations  2. Her anemia appears to be multifactorial with acute drop possibly from GI bleed  3. Transfuse PRBC if hemoglobin less than 7  4. No evidence of hemolysis noted as per the labs  5. Her HIT panel is negative  6. I will get peripheral blood smear. Her thrombocytopenia possibly related to acute bone marrow suppression however underlying bone marrow pathology cannot be ruled out  7. Monitor for symptoms of bleeding  8. Hold off anticoagulation  9. Monitor counts closely  10. Today plts are stable  11. We will follow    Discussed with pt and Nurse. Thank you for asking us to see this patient. Sonu Hernandez MD  Hematologist/Medical Oncologist    Cell: [phone]    This note is created with the assistance of a speech recognition program.  While intending to generate a document that actually reflects the content of the visit, the document can still have some errors including those of syntax and sound a like substitutions which may escape proof reading. It such instances, actual meaning can be extrapolated by contextual diversion.

## 2021-11-21 NOTE — PROGRESS NOTES
RN spoke to patients  Shaq Kee. Patients  Shaq Kee and patients sister Jluis Major will be up to see the patient tonight.

## 2021-11-22 ENCOUNTER — APPOINTMENT (OUTPATIENT)
Dept: GENERAL RADIOLOGY | Age: 71
DRG: 004 | End: 2021-11-22
Payer: COMMERCIAL

## 2021-11-22 ENCOUNTER — HOSPITAL ENCOUNTER (OUTPATIENT)
Age: 71
Setting detail: SPECIMEN
Discharge: HOME | End: 2021-11-22

## 2021-11-22 LAB
ABSOLUTE BANDS #: 0.19 K/UL (ref 0–1)
ABSOLUTE EOS #: 0.28 K/UL (ref 0–0.4)
ABSOLUTE IMMATURE GRANULOCYTE: ABNORMAL K/UL (ref 0–0.3)
ABSOLUTE LYMPH #: 2.05 K/UL (ref 1–4.8)
ABSOLUTE MONO #: 0.75 K/UL (ref 0.1–1.3)
ALBUMIN (CALCULATED): 3.4 G/DL (ref 3.2–5.2)
ALBUMIN PERCENT: 64 % (ref 45–65)
ALLEN TEST: ABNORMAL
ALPHA 1 PERCENT: 5 % (ref 3–6)
ALPHA 2 PERCENT: 10 % (ref 6–13)
ALPHA-1-GLOBULIN: 0.3 G/DL (ref 0.1–0.4)
ALPHA-2-GLOBULIN: 0.5 G/DL (ref 0.5–0.9)
ANION GAP SERPL CALCULATED.3IONS-SCNC: 8 MMOL/L (ref 9–17)
BANDS: 2 % (ref 0–10)
BASOPHILS # BLD: 0 % (ref 0–2)
BASOPHILS ABSOLUTE: 0 K/UL (ref 0–0.2)
BETA GLOBULIN: 0.5 G/DL (ref 0.5–1.1)
BETA PERCENT: 9 % (ref 11–19)
BUN BLDV-MCNC: 46 MG/DL (ref 8–23)
BUN/CREAT BLD: ABNORMAL (ref 9–20)
CALCIUM SERPL-MCNC: 7.7 MG/DL (ref 8.6–10.4)
CARBOXYHEMOGLOBIN: 0.4 % (ref 0–5)
CHLORIDE BLD-SCNC: 99 MMOL/L (ref 98–107)
CO2: 25 MMOL/L (ref 20–31)
CREAT SERPL-MCNC: 3.65 MG/DL (ref 0.5–0.9)
CULTURE: ABNORMAL
CULTURE: NORMAL
CULTURE: NORMAL
DIFFERENTIAL TYPE: ABNORMAL
DIRECT EXAM: ABNORMAL
EBV EARLY ANTIGEN IGG: 77 U/ML
EBV INTERPRETATION: NORMAL
EBV NUCLEAR AG AB: 85 U/ML
EOSINOPHILS RELATIVE PERCENT: 3 % (ref 0–4)
EPSTEIN-BARR VCA IGG: 80 U/ML
EPSTEIN-BARR VCA IGM: 0 U/ML
FIO2: 35
GAMMA GLOBULIN %: 13 % (ref 9–20)
GAMMA GLOBULIN: 0.7 G/DL (ref 0.5–1.5)
GFR AFRICAN AMERICAN: 15 ML/MIN
GFR NON-AFRICAN AMERICAN: 12 ML/MIN
GFR SERPL CREATININE-BSD FRML MDRD: ABNORMAL ML/MIN/{1.73_M2}
GFR SERPL CREATININE-BSD FRML MDRD: ABNORMAL ML/MIN/{1.73_M2}
GLUCOSE BLD-MCNC: 235 MG/DL (ref 65–105)
GLUCOSE BLD-MCNC: 236 MG/DL (ref 70–99)
GLUCOSE BLD-MCNC: 242 MG/DL (ref 65–105)
GLUCOSE BLD-MCNC: 244 MG/DL (ref 65–105)
GLUCOSE BLD-MCNC: 245 MG/DL (ref 65–105)
HCO3 ARTERIAL: 26.1 MMOL/L (ref 22–26)
HCT VFR BLD CALC: 24.1 % (ref 36–46)
HEMOGLOBIN: 7.9 G/DL (ref 12–16)
IMMATURE GRANULOCYTES: ABNORMAL %
LYMPHOCYTES # BLD: 22 % (ref 24–44)
Lab: ABNORMAL
Lab: NORMAL
Lab: NORMAL
MAGNESIUM: 2 MG/DL (ref 1.6–2.6)
MCH RBC QN AUTO: 30.8 PG (ref 26–34)
MCHC RBC AUTO-ENTMCNC: 32.7 G/DL (ref 31–37)
MCV RBC AUTO: 94.3 FL (ref 80–100)
METHEMOGLOBIN: 1.1 % (ref 0–1.9)
MODE: ABNORMAL
MONOCYTES # BLD: 8 % (ref 1–7)
MORPHOLOGY: ABNORMAL
MORPHOLOGY: ABNORMAL
MRSA, DNA, NASAL: ABNORMAL
NEGATIVE BASE EXCESS, ART: ABNORMAL MMOL/L (ref 0–2)
NOTIFICATION TIME: ABNORMAL
NOTIFICATION: ABNORMAL
NRBC AUTOMATED: ABNORMAL PER 100 WBC
NUCLEATED RED BLOOD CELLS: 4 PER 100 WBC
O2 DEVICE/FLOW/%: ABNORMAL
O2 SAT, ARTERIAL: 97.8 % (ref 95–98)
OXYHEMOGLOBIN: ABNORMAL % (ref 95–98)
PATHOLOGIST: ABNORMAL
PATHOLOGIST: NORMAL
PATIENT TEMP: 37
PCO2 ARTERIAL: 37.4 MMHG (ref 35–45)
PCO2, ART, TEMP ADJ: ABNORMAL (ref 35–45)
PDW BLD-RTO: 15.9 % (ref 11.5–14.9)
PEEP/CPAP: 7
PH ARTERIAL: 7.45 (ref 7.35–7.45)
PH, ART, TEMP ADJ: ABNORMAL (ref 7.35–7.45)
PLATELET # BLD: 53 K/UL (ref 150–450)
PLATELET ESTIMATE: ABNORMAL
PMV BLD AUTO: 10.1 FL (ref 6–12)
PO2 ARTERIAL: 139 MMHG (ref 80–100)
PO2, ART, TEMP ADJ: ABNORMAL MMHG (ref 80–100)
POSITIVE BASE EXCESS, ART: 2.2 MMOL/L (ref 0–2)
POTASSIUM SERPL-SCNC: 3.7 MMOL/L (ref 3.7–5.3)
PROTEIN ELECTROPHORESIS, SERUM: ABNORMAL
PSV: ABNORMAL
PT. POSITION: ABNORMAL
RBC # BLD: 2.56 M/UL (ref 4–5.2)
RBC # BLD: ABNORMAL 10*6/UL
RESPIRATORY RATE: 16
SAMPLE SITE: ABNORMAL
SEG NEUTROPHILS: 65 % (ref 36–66)
SEGMENTED NEUTROPHILS ABSOLUTE COUNT: 6.06 K/UL (ref 1.3–9.1)
SERUM IFX INTERP: NORMAL
SET RATE: 16
SODIUM BLD-SCNC: 132 MMOL/L (ref 135–144)
SPECIMEN DESCRIPTION: ABNORMAL
SPECIMEN DESCRIPTION: ABNORMAL
SPECIMEN DESCRIPTION: NORMAL
SPECIMEN DESCRIPTION: NORMAL
TEXT FOR RESPIRATORY: ABNORMAL
TOTAL HB: ABNORMAL G/DL (ref 12–16)
TOTAL PROT. SUM,%: 101 % (ref 98–102)
TOTAL PROT. SUM: 5.4 G/DL (ref 6.3–8.2)
TOTAL PROTEIN: 5.3 G/DL (ref 6.4–8.3)
TOTAL RATE: 16
VT: 500
WBC # BLD: 9.3 K/UL (ref 3.5–11)
WBC # BLD: ABNORMAL 10*3/UL

## 2021-11-22 PROCEDURE — 36415 COLL VENOUS BLD VENIPUNCTURE: CPT

## 2021-11-22 PROCEDURE — 6360000002 HC RX W HCPCS: Performed by: INTERNAL MEDICINE

## 2021-11-22 PROCEDURE — 36600 WITHDRAWAL OF ARTERIAL BLOOD: CPT

## 2021-11-22 PROCEDURE — 82947 ASSAY GLUCOSE BLOOD QUANT: CPT

## 2021-11-22 PROCEDURE — 2580000003 HC RX 258: Performed by: INTERNAL MEDICINE

## 2021-11-22 PROCEDURE — 2500000003 HC RX 250 WO HCPCS

## 2021-11-22 PROCEDURE — 6360000002 HC RX W HCPCS: Performed by: FAMILY MEDICINE

## 2021-11-22 PROCEDURE — 80048 BASIC METABOLIC PNL TOTAL CA: CPT

## 2021-11-22 PROCEDURE — 2580000003 HC RX 258: Performed by: FAMILY MEDICINE

## 2021-11-22 PROCEDURE — 94761 N-INVAS EAR/PLS OXIMETRY MLT: CPT

## 2021-11-22 PROCEDURE — 94003 VENT MGMT INPAT SUBQ DAY: CPT

## 2021-11-22 PROCEDURE — 6370000000 HC RX 637 (ALT 250 FOR IP): Performed by: NURSE PRACTITIONER

## 2021-11-22 PROCEDURE — 2580000003 HC RX 258: Performed by: UROLOGY

## 2021-11-22 PROCEDURE — 85025 COMPLETE CBC W/AUTO DIFF WBC: CPT

## 2021-11-22 PROCEDURE — 71045 X-RAY EXAM CHEST 1 VIEW: CPT

## 2021-11-22 PROCEDURE — 6370000000 HC RX 637 (ALT 250 FOR IP): Performed by: UROLOGY

## 2021-11-22 PROCEDURE — 82805 BLOOD GASES W/O2 SATURATION: CPT

## 2021-11-22 PROCEDURE — 2700000000 HC OXYGEN THERAPY PER DAY

## 2021-11-22 PROCEDURE — 6370000000 HC RX 637 (ALT 250 FOR IP): Performed by: FAMILY MEDICINE

## 2021-11-22 PROCEDURE — 6370000000 HC RX 637 (ALT 250 FOR IP): Performed by: INTERNAL MEDICINE

## 2021-11-22 PROCEDURE — 83735 ASSAY OF MAGNESIUM: CPT

## 2021-11-22 PROCEDURE — C9113 INJ PANTOPRAZOLE SODIUM, VIA: HCPCS | Performed by: FAMILY MEDICINE

## 2021-11-22 PROCEDURE — 2500000003 HC RX 250 WO HCPCS: Performed by: INTERNAL MEDICINE

## 2021-11-22 PROCEDURE — 2000000000 HC ICU R&B

## 2021-11-22 PROCEDURE — 99212 OFFICE O/P EST SF 10 MIN: CPT

## 2021-11-22 PROCEDURE — 99232 SBSQ HOSP IP/OBS MODERATE 35: CPT | Performed by: INTERNAL MEDICINE

## 2021-11-22 RX ORDER — NOREPINEPHRINE BIT/0.9 % NACL 16MG/250ML
2-100 INFUSION BOTTLE (ML) INTRAVENOUS CONTINUOUS
Status: DISCONTINUED | OUTPATIENT
Start: 2021-11-22 | End: 2021-12-11

## 2021-11-22 RX ORDER — NOREPINEPHRINE BIT/0.9 % NACL 16MG/250ML
INFUSION BOTTLE (ML) INTRAVENOUS
Status: COMPLETED
Start: 2021-11-22 | End: 2021-11-22

## 2021-11-22 RX ADMIN — LINEZOLID 600 MG: 600 INJECTION, SOLUTION INTRAVENOUS at 19:56

## 2021-11-22 RX ADMIN — CHLORHEXIDINE GLUCONATE 0.12% ORAL RINSE 15 ML: 1.2 LIQUID ORAL at 08:03

## 2021-11-22 RX ADMIN — PROPOFOL 10 MCG/KG/MIN: 10 INJECTION, EMULSION INTRAVENOUS at 18:11

## 2021-11-22 RX ADMIN — FAMOTIDINE 20 MG: 10 INJECTION, SOLUTION INTRAVENOUS at 08:30

## 2021-11-22 RX ADMIN — INSULIN LISPRO 4 UNITS: 100 INJECTION, SOLUTION INTRAVENOUS; SUBCUTANEOUS at 08:30

## 2021-11-22 RX ADMIN — SERTRALINE HYDROCHLORIDE 25 MG: 50 TABLET ORAL at 08:03

## 2021-11-22 RX ADMIN — SODIUM CHLORIDE, PRESERVATIVE FREE 10 ML: 5 INJECTION INTRAVENOUS at 08:03

## 2021-11-22 RX ADMIN — FENTANYL CITRATE 50 MCG: 0.05 INJECTION, SOLUTION INTRAMUSCULAR; INTRAVENOUS at 00:00

## 2021-11-22 RX ADMIN — MINERAL OIL, PETROLATUM: 425; 568 OINTMENT OPHTHALMIC at 18:15

## 2021-11-22 RX ADMIN — ATORVASTATIN CALCIUM 80 MG: 80 TABLET, FILM COATED ORAL at 08:03

## 2021-11-22 RX ADMIN — INSULIN LISPRO 2 UNITS: 100 INJECTION, SOLUTION INTRAVENOUS; SUBCUTANEOUS at 20:44

## 2021-11-22 RX ADMIN — POLYVINYL ALCOHOL 1 DROP: 14 SOLUTION/ DROPS OPHTHALMIC at 08:04

## 2021-11-22 RX ADMIN — PROPOFOL 15 MCG/KG/MIN: 10 INJECTION, EMULSION INTRAVENOUS at 04:42

## 2021-11-22 RX ADMIN — CHLORHEXIDINE GLUCONATE 0.12% ORAL RINSE 15 ML: 1.2 LIQUID ORAL at 20:43

## 2021-11-22 RX ADMIN — POTASSIUM CHLORIDE 10 MEQ: 10 INJECTION, SOLUTION INTRAVENOUS at 17:08

## 2021-11-22 RX ADMIN — FERROUS SULFATE TAB 325 MG (65 MG ELEMENTAL FE) 325 MG: 325 (65 FE) TAB at 08:03

## 2021-11-22 RX ADMIN — INSULIN LISPRO 4 UNITS: 100 INJECTION, SOLUTION INTRAVENOUS; SUBCUTANEOUS at 12:44

## 2021-11-22 RX ADMIN — CARVEDILOL 3.12 MG: 3.12 TABLET, FILM COATED ORAL at 08:02

## 2021-11-22 RX ADMIN — INSULIN GLARGINE 22 UNITS: 100 INJECTION, SOLUTION SUBCUTANEOUS at 20:44

## 2021-11-22 RX ADMIN — SODIUM CHLORIDE, PRESERVATIVE FREE 10 ML: 5 INJECTION INTRAVENOUS at 20:43

## 2021-11-22 RX ADMIN — POTASSIUM CHLORIDE 10 MEQ: 10 INJECTION, SOLUTION INTRAVENOUS at 15:17

## 2021-11-22 RX ADMIN — Medication 5 MCG/MIN: at 11:42

## 2021-11-22 RX ADMIN — INSULIN LISPRO 4 UNITS: 100 INJECTION, SOLUTION INTRAVENOUS; SUBCUTANEOUS at 17:07

## 2021-11-22 RX ADMIN — MIDODRINE HYDROCHLORIDE 5 MG: 5 TABLET ORAL at 10:35

## 2021-11-22 RX ADMIN — PANTOPRAZOLE SODIUM 40 MG: 40 INJECTION, POWDER, FOR SOLUTION INTRAVENOUS at 08:02

## 2021-11-22 RX ADMIN — MINERAL OIL, PETROLATUM: 425; 568 OINTMENT OPHTHALMIC at 14:15

## 2021-11-22 RX ADMIN — CEFEPIME HYDROCHLORIDE 1000 MG: 1 INJECTION, POWDER, FOR SOLUTION INTRAMUSCULAR; INTRAVENOUS at 22:38

## 2021-11-22 RX ADMIN — POTASSIUM CHLORIDE 10 MEQ: 10 INJECTION, SOLUTION INTRAVENOUS at 18:12

## 2021-11-22 RX ADMIN — LINEZOLID 600 MG: 600 INJECTION, SOLUTION INTRAVENOUS at 08:02

## 2021-11-22 RX ADMIN — POLYVINYL ALCOHOL 1 DROP: 14 SOLUTION/ DROPS OPHTHALMIC at 16:30

## 2021-11-22 ASSESSMENT — PULMONARY FUNCTION TESTS
PIF_VALUE: 27
PIF_VALUE: 30
PIF_VALUE: 28
PIF_VALUE: 27
PIF_VALUE: 28
PIF_VALUE: 28
PIF_VALUE: 24
PIF_VALUE: 27
PIF_VALUE: 26
PIF_VALUE: 14
PIF_VALUE: 28
PIF_VALUE: 25
PIF_VALUE: 26
PIF_VALUE: 26
PIF_VALUE: 14
PIF_VALUE: 27
PIF_VALUE: 29
PIF_VALUE: 28
PIF_VALUE: 28
PIF_VALUE: 29
PIF_VALUE: 28
PIF_VALUE: 25
PIF_VALUE: 27

## 2021-11-22 ASSESSMENT — ENCOUNTER SYMPTOMS
COUGH: 0
TROUBLE SWALLOWING: 0
COLOR CHANGE: 0
ABDOMINAL PAIN: 0
SHORTNESS OF BREATH: 0
VOMITING: 0
NAUSEA: 0
EYE REDNESS: 0
BLOOD IN STOOL: 0
CHEST TIGHTNESS: 0
EYE ITCHING: 0

## 2021-11-22 ASSESSMENT — PAIN SCALES - GENERAL
PAINLEVEL_OUTOF10: 0
PAINLEVEL_OUTOF10: 0

## 2021-11-22 NOTE — PROGRESS NOTES
Comprehensive Nutrition Assessment    Type and Reason for Visit:  Reassess    Nutrition Recommendations/Plan:   If tube feed continues to be necessary, recommend continue Nepro tube feed at 35 ml per hour with 1 protein modular to provide 1591 kcal, 104 g protein    Nutrition Assessment:  Attempting to wean pt from vent today. Diprivan is off. Pt started on Levo this morning (no HD today). Malnutrition Assessment:  Malnutrition Status:   At risk for malnutrition (Comment)    Context:  Acute Illness     Findings of the 6 clinical characteristics of malnutrition:  Energy Intake:  Mild decrease in energy intake (Comment)  Weight Loss:  Unable to assess (no wt history)     Body Fat Loss:  Unable to assess     Muscle Mass Loss:  Unable to assess    Fluid Accumulation:  7 - Moderate to Severe Extremities, Generalized (Anasarca)   Strength:  Not Performed    Estimated Daily Nutrient Needs:  Energy (kcal):  15 kcal/kg= 6821-9736 kcal; Weight Used for Energy Requirements:   (92kg)     Protein (g):  1g/kg= 90-95 g; Weight Used for Protein Requirements:   (92kg)          Nutrition Related Findings:  Edema: +3 generalized, +3 pitting all extremities, Labs/Meds: Reviewed, 125 ml stool from ostomy      Wounds:  Skin Tears, Multiple (Wounds- refer to nursing flowsheet)       Current Nutrition Therapies:    Current Tube Feeding (TF) Orders:  · Feeding Route: Nasogastric  · Formula: Renal Formula at 35 ml per hour  · Schedule: Continuous  · Additives/Modulars: Protein  · Current TF & Flush Orders Provides: 1591 kcal and 104 gm Protein (based on 1.77 kcal per mL Nepro + Proteinex)    Anthropometric Measures:  · Height: 5' 3\" (160 cm) (from old chart)  · Current Body Weight: 213 lb 10 oz (96.9 kg) (Fluid retention)   · Admission Body Weight: 204 lb (92.5 kg)    Ideal Body Weight: 115 lbs;     Nutrition Diagnosis:   · Increased nutrient needs related to  (healing) as evidenced by wounds    Nutrition Interventions:   Food and/or Nutrient Delivery:  Continue Current Tube Feeding  Nutrition Education/Counseling:  No recommendation at this time   Coordination of Nutrition Care:  Continue to monitor while inpatient    Goals:  Nutrition support will meet approximately 100% of estimated needs       Nutrition Monitoring and Evaluation:   Food/Nutrient Intake Outcomes:  Enteral Nutrition Intake/Tolerance  Physical Signs/Symptoms Outcomes:  Biochemical Data, GI Status, Fluid Status or Edema, Weight, Skin     Discharge Planning:     Too soon to determine     Electronically signed by Marycarmen Mason RD, LD on 11/22/21 at 1:31 PM EST    Contact: [phone]

## 2021-11-22 NOTE — PROGRESS NOTES
ICU Progress Note (Vent)   [facility]    Patient - Seth Ibrahim,  Age - 66 y.o.    - 1950      Room Number -    N -  [id number]   [facility]  Date of Admission -  11/10/2021  4:23 PM    Events of Past 24 Hours   Patient appears to be able to interact and understand. On a weaning trial currently. Vitals    height is 5' 3\" (1.6 m) and weight is 213 lb 10 oz (96.9 kg). Her axillary temperature is 97.3 °F (36.3 °C). Her blood pressure is 106/36 (abnormal) and her pulse is 72. Her respiration is 23 and oxygen saturation is 99%. Temperature Range: Temp: 97.3 °F (36.3 °C) Temp  Av.1 °F (36.7 °C)  Min: 97.3 °F (36.3 °C)  Max: 99.1 °F (37.3 °C)  BP Range:  Systolic (80ZLE), VFX:998 , Min:101 , YBW:963     Diastolic (86HMY), TXS:09, Min:36, Max:76    Pulse Range: Pulse  Av.4  Min: 56  Max: 93  Respiration Range: Resp  Av.9  Min: 14  Max: 26  Current Pulse Ox[de-identified]  SpO2: 99 %  24HR Pulse Ox Range:  SpO2  Av.7 %  Min: 94 %  Max: 100 %  Oxygen Amount and Delivery: O2 Flow Rate (L/min): 2 L/min      Wt Readings from Last 3 Encounters:   21 213 lb 10 oz (96.9 kg)   16 178 lb (80.7 kg)   16 184 lb 12.8 oz (83.8 kg)     I/O       Intake/Output Summary (Last 24 hours) at 2021 1102  Last data filed at 2021 0829  Gross per 24 hour   Intake 1702.23 ml   Output 810 ml   Net 892.23 ml     I/O last 3 completed shifts: In: 1666.3 [I.V.:520.3; NG/GT:496; IV Piggyback:650]  Out: 750 [Urine:425; Stool:325]     DRAIN/TUBE OUTPUT:     Invasive Lines   ETT Day -   2  PICC line and dialysis catheter in place.      Mechanical Ventilation Data   SETTINGS (Comprehensive)  Vent Information  $Ventilation: $Subsequent Day  Skin Assessment: Clean, dry, & intact  Equipment Changed: HME  Vent Type: Servo i  Vent Mode: (S) CPAP  Vt Ordered: 500 mL  Rate Set: 16 bmp  Pressure Support: 7 cmH20  FiO2 : 30 %  SpO2: 99 %  SpO2/FiO2 ratio: 330  Sensitivity: 5  PEEP/CPAP: 7  I Time/ I Time %: 1.25 s  Humidification Source: HME  Nitric Oxide/Epoprostenol In Use?: No  Mask Type: Full face mask  Mask Size: Medium  Additional Respiratory  Assessments  Pulse: 72  Resp: 23  SpO2: 99 %  End Tidal CO2: 41 (%)  Position: Semi-Lagunas's  Humidification Source: HME  Oral Care: Mouth moisturizer, Mouth suctioned, Mouth swabbed, Mouthwash with chlorhexidine  Cuff Pressure (cm H2O): 24 cm H2O       ABGs:   Lab Results   Component Value Date    PHART 7.453 11/22/2021    PO2ART 139.0 11/22/2021    NTF4LQA 37.4 11/22/2021       Lab Results   Component Value Date    MODE PRV 11/22/2021         Medications   IV   propofol Stopped (11/22/21 0800)    dextrose      sodium chloride      sodium chloride        linezolid  600 mg IntraVENous Q12H    chlorhexidine  15 mL Mouth/Throat BID    famotidine (PEPCID) injection  20 mg IntraVENous Daily    polyvinyl alcohol  1 drop Both Eyes Q4H    And    artificial tears   Both Eyes Q4H    carvedilol  3.125 mg Oral BID WC    pantoprazole  40 mg IntraVENous BID    And    sodium chloride flush  10 mL IntraVENous BID    sodium polystyrene  15 g Oral Once    cefepime  1,000 mg IntraVENous Q24H    sertraline  25 mg Oral Daily    ferrous sulfate  325 mg Oral Daily with breakfast    [Held by provider] aspirin  81 mg Oral Daily    atorvastatin  80 mg Oral Daily    insulin glargine  22 Units SubCUTAneous Nightly    insulin lispro  0-12 Units SubCUTAneous TID WC    insulin lispro  0-6 Units SubCUTAneous Nightly    sodium chloride flush  5-40 mL IntraVENous 2 times per day       Diet/Nutrition   ADULT TUBE FEEDING; Nasogastric; Renal Formula; Continuous; 35; No; 30; Q 8 hours; Protein; 1 Protein at 12 noon daily. Flush with 30 mLwater before and after administration. Exam   VITALS    height is 5' 3\" (1.6 m) and weight is 213 lb 10 oz (96.9 kg). Her axillary temperature is 97.3 °F (36.3 °C).  Her blood pressure is 106/36 (abnormal) and her pulse is 72. Her respiration is 23 and oxygen saturation is 99%. Ventilator Settings (Basic)  Vent Mode: (S) CPAP Rate Set: 16 bmp/Vt Ordered: 500 mL/ /FiO2 : 30 %    Constitutional - Sedated  General Appearance  well developed, well nourished  HEENT - Life support devices in place (ET,),normocephalic, atraumatic. Lungs - Chest expands equally, no wheezes, rales or rhonchi. Cardiovascular - Heart sounds are normal.  normal rate and rhythm regular, no murmur, gallop or rub. Abdomen - soft, nontender,  Extremities - no cyanosis, clubbing or     Lab Results   CBC     Lab Results   Component Value Date    WBC 9.3 11/22/2021    RBC 2.56 11/22/2021    RBC 4.14 08/31/2016    HGB 7.9 11/22/2021    HCT 24.1 11/22/2021    PLT 53 11/22/2021    MCV 94.3 11/22/2021    MCH 30.8 11/22/2021    MCHC 32.7 11/22/2021    RDW 15.9 11/22/2021    NRBC 4 11/22/2021    LYMPHOPCT 22 11/22/2021    LYMPHOPCT 32.8 08/31/2016    MONOPCT 8 11/22/2021    EOSPCT 4.3 08/31/2016    BASOPCT 0 11/22/2021    BASOPCT 0.7 08/31/2016    MONOSABS 0.75 11/22/2021    LYMPHSABS 2.05 11/22/2021    EOSABS 0.28 11/22/2021    BASOSABS 0.00 11/22/2021    DIFFTYPE NOT REPORTED 11/22/2021       BMP   Lab Results   Component Value Date     11/22/2021    K 3.7 11/22/2021    CL 99 11/22/2021    CO2 25 11/22/2021    BUN 46 11/22/2021    CREATININE 3.65 11/22/2021    GLUCOSE 236 11/22/2021    GLUCOSE 77 08/31/2016    CALCIUM 7.7 11/22/2021       LFTS  Lab Results   Component Value Date    ALKPHOS 175 11/18/2021    ALT 31 11/18/2021    AST 21 11/18/2021    PROT 5.3 11/20/2021    BILITOT 0.27 11/18/2021    BILIDIR <0.08 11/13/2021    IBILI Connot be calculated 11/13/2021    LABALBU 2.6 11/18/2021       INR  No results for input(s): PROTIME, INR in the last 72 hours. APTT  No results for input(s): APTT in the last 72 hours.     Lactic Acid  Lab Results   Component Value Date    LACTA 0.6 11/16/2021    LACTA 0.6 11/16/2021        BNP   No results for input(s): BNP in the last 72 hours. Cultures   Blood cultures x2, November 16, negative to date. Respiratory culture, November 20, pending  MRSA nasal swab positive, November 21 x-ray  Radiology     Plain Films    Chest x-ray reveals endotracheal tube in decent position. SYSTEM ASSESSMENT      Acute on chronic hypoxic and hypercapnic respiratory failure  Bradycardic arrest, intubated 11/20  Right lower lobe infiltrate/pleural effusion  Acute kidney injury-requiring hemodialysis  Morbid obesity  Anemia, chronic disease and possible rectal bleed  Hypoalbuminemia  Thrombocytopenia  Nonischemic cardiomyopathy with EF of 30 to 35% possible right to left interatrial shunt  Atrial fibrillation    FULL CODE    Neuro   Is neurological status improving. Respiratory   Currently on CPAP trial.  FiO2 at 30%. Hemodynamics   Currently not on any pressor orders. Gastrointestinal/Nutrition       Renal   I did speak to the nephrology team this morning. They will evaluate for dialysis    Infectious Disease   On Maxipime  On Zyvox  Hematology/Oncology   Patient with anemia and thrombocytopenia. On EPC cuffs. Endocrine       Social/Spiritual/DNR/Disposition/Other       Patient's  Mr. Jules Mark, updated. --- Home # [phone], cell # [phone]. Multiple questions answered most of them dealing with the renal status. I did inform the  that nephrology will be assessing the patient for dialysis soon. He voiced understanding.     I was able to round with the bedside nurse who was updated regarding of the phone conversation with the        Critical Care Time   35 min    Electronically signed by Kassandra Matt MD on 11/22/2021 at 11:02 AM

## 2021-11-22 NOTE — PLAN OF CARE
Problem: SAFETY  Goal: Free from accidental physical injury  Outcome: Ongoing  Goal: Free from intentional harm  Outcome: Ongoing     Problem: DAILY CARE  Goal: Daily care needs are met  Outcome: Ongoing     Problem: PAIN  Goal: Patient's pain/discomfort is manageable  Outcome: Ongoing     Problem: SKIN INTEGRITY  Goal: Skin integrity is maintained or improved  Outcome: Ongoing     Problem: Falls - Risk of:  Goal: Will remain free from falls  Description: Will remain free from falls  11/22/2021 0927 by Martina Morgan  Outcome: Ongoing  11/22/2021 0427 by Greta Villagomez RN  Outcome: Met This Shift  Note: Pt remains in bed on vent   Goal: Absence of physical injury  Description: Absence of physical injury  Outcome: Ongoing    Problem: Nutrition  Goal: Optimal nutrition therapy  11/22/2021 0927 by Martina Morgan  Outcome: Ongoing  11/22/2021 0427 by Greta Villagomez RN  Outcome: Met This Shift  Note: Pt has NG. Receiving TF     Problem: Non-Violent Restraints  Goal: Removal from restraints as soon as assessed to be safe  11/22/2021 0927 by Martina Morgan  Outcome: Ongoing  11/22/2021 0427 by Greta Villagomez RN  Outcome: Not Met This Shift  Note: Patient remains in restraints due to pulling at tubes and lines. See restraint charting, continue to monitor closely.    Goal: No harm/injury to patient while restraints in use  Outcome: Ongoing  Goal: Patient's dignity will be maintained  Outcome: Ongoing

## 2021-11-22 NOTE — PROGRESS NOTES
[facility] CARDIOLOGY Progress Note    11/22/2021 8:13 AM      Subjective:  Ms. Marquis Ambriz intubated. Nods head appropriately and patient denies any chest pain or palpitations or lightheadedness or dizziness. Review of systems:   Unobtainable. LABS:     Recent Results (from the past 24 hour(s))   POC Glucose Fingerstick    Collection Time: 11/21/21 11:36 AM   Result Value Ref Range    POC Glucose 136 (H) 65 - 105 mg/dL   K (Potassium)    Collection Time: 11/21/21 11:45 AM   Result Value Ref Range    Potassium 3.9 3.7 - 5.3 mmol/L   POC Glucose Fingerstick    Collection Time: 11/21/21  3:54 PM   Result Value Ref Range    POC Glucose 211 (H) 65 - 105 mg/dL   POC Glucose Fingerstick    Collection Time: 11/21/21  9:09 PM   Result Value Ref Range    POC Glucose 236 (H) 65 - 105 mg/dL   Blood gas, arterial    Collection Time: 11/22/21  5:12 AM   Result Value Ref Range    pH, Arterial 7.453 (H) 7.350 - 7.450    pCO2, Arterial 37.4 35.0 - 45.0 mmHg    pO2, Arterial 139.0 (H) 80.0 - 100.0 mmHg    HCO3, Arterial 26.1 (H) 22.0 - 26.0 mmol/L    Positive Base Excess, Art 2.2 (H) 0.0 - 2.0 mmol/L    Negative Base Excess, Art NOT REPORTED 0.0 - 2.0 mmol/L    O2 Sat, Arterial 97.8 95 - 98 %    Total Hb NOT REPORTED 12.0 - 16.0 g/dl    Oxyhemoglobin NOT REPORTED 95.0 - 98.0 %    Carboxyhemoglobin 0.4 0 - 5 %    Methemoglobin 1.1 0.0 - 1.9 %    Pt Temp 37     pH, Art, Temp Adj NOT REPORTED 7.350 - 7.450    pCO2, Art, Temp Adj NOT REPORTED 35.0 - 45.0    pO2, Art, Temp Adj NOT REPORTED 80.0 - 100.0 mmHg    O2 Device/Flow/% VENTILATOR     Respiratory Rate 16     Rolf Test PASS     Sample Site Left Radial Artery     Pt.  Position SEMI-FOWLERS     Mode PRVC     Set Rate 16     Total Rate 16          FIO2 35     Peep/Cpap 7     PSV NOT REPORTED     Text for Respiratory RESULTS GIVEN TO RN     NOTIFICATION NOT REPORTED     NOTIFICATION TIME NOT REPORTED    CBC with DIFF    Collection Time: 21  5:39 AM   Result Value Ref Range    WBC 9.3 3.5 - 11.0 k/uL    RBC 2.56 (L) 4.0 - 5.2 m/uL    Hemoglobin 7.9 (L) 12.0 - 16.0 g/dL    Hematocrit 24.1 (L) 36 - 46 %    MCV 94.3 80 - 100 fL    MCH 30.8 26 - 34 pg    MCHC 32.7 31 - 37 g/dL    RDW 15.9 (H) 11.5 - 14.9 %    Platelets 53 (L) 195 - 450 k/uL    MPV 10.1 6.0 - 12.0 fL    NRBC Automated NOT REPORTED per 100 WBC    Differential Type NOT REPORTED     Immature Granulocytes NOT REPORTED 0 %    Absolute Immature Granulocyte NOT REPORTED 0.00 - 0.30 k/uL    WBC Morphology NOT REPORTED     RBC Morphology NOT REPORTED     Platelet Estimate NOT REPORTED     Seg Neutrophils 65 36 - 66 %    Lymphocytes 22 (L) 24 - 44 %    Monocytes 8 (H) 1 - 7 %    Eosinophils % 3 0 - 4 %    Basophils 0 0 - 2 %    Bands 2 0 - 10 %    nRBC 4 (H) 0 per 100 WBC    Segs Absolute 6.06 1.3 - 9.1 k/uL    Absolute Lymph # 2.05 1.0 - 4.8 k/uL    Absolute Mono # 0.75 0.1 - 1.3 k/uL    Absolute Eos # 0.28 0.0 - 0.4 k/uL    Basophils Absolute 0.00 0.0 - 0.2 k/uL    Absolute Bands # 0.19 0.0 - 1.0 k/uL    Morphology ANISOCYTOSIS PRESENT     Morphology SLT POLYCHROMASIA    Magnesium    Collection Time: 21  5:39 AM   Result Value Ref Range    Magnesium 2.0 1.6 - 2.6 mg/dL   Basic Metabolic Panel    Collection Time: 21  5:39 AM   Result Value Ref Range    Glucose 236 (H) 70 - 99 mg/dL    BUN 46 (H) 8 - 23 mg/dL    CREATININE 3.65 (H) 0.50 - 0.90 mg/dL    Bun/Cre Ratio NOT REPORTED 9 - 20    Calcium 7.7 (L) 8.6 - 10.4 mg/dL    Sodium 132 (L) 135 - 144 mmol/L    Potassium 3.7 3.7 - 5.3 mmol/L    Chloride 99 98 - 107 mmol/L    CO2 25 20 - 31 mmol/L    Anion Gap 8 (L) 9 - 17 mmol/L    GFR Non-African American 12 (L) >60 mL/min    GFR African American 15 (L) >60 mL/min    GFR Comment          GFR Staging NOT REPORTED        Pulse Ox:  SpO2  Av.6 %  Min: 92 %  Max: 100 %    Supplemental O2: O2 Flow Rate (L/min): 2 L/min     Current Meds:    linezolid  600 mg IntraVENous Q12H    dry.      2 D Echocardiogram Nov 2021:    Summary  Echo contrast utilized on this technically difficult study. Left ventricle is normal in size. Global hypokinesis, Severe apical hypokinesis  Estimated LV EF 30-35%. Mild left ventricular hypertrophy. Right ventricular dilatation with normal systolic function. Mild to moderate Left and Right atrial dilatation. Aneurysmal interatrial septum. Positive bubble study with and without  Valsalva maneuver suggestive of Rt to Lt inter-atrial shunt. Normal aortic valve structure and function without stenosis or  regurgitation. Normal aortic root dimension. Mitral annular calcification is seen. Mild mitral regurgitation. Grossly normal tricuspid valve structure and function. Mild to moderate tricuspid regurgitation. Estimated right ventricular systolic pressure is 39 mmHg. Mildly elevated  right ventricular systolic pressure. IVC normal diameter & inspiratory collapse indicating normal RA filling  pressure . No significant pericardial effusion is seen. Pleural effusion noted. ASSESSMENT:    Nonsustained ventricular tachycardia, PVCs.       Longstanding, persistent atrial fibrillation with controlled ventricular rate.  Was on chronic oral anticoagulants which is currently on hold due to GI bleeding and severe anemia. History of nonischemic cardiomyopathy with severe left ventricular systolic dysfunction with LVEF 20 to 25% on limited 2D echocardiogram November 2021 and LVEF 25 to 30% on 2D echocardiogram from February 2021 as charted in the EHR. ASCVD, prior stents with unknown details.  No active angina pectoris. Chronic systolic heart failure with reduced LVEF 20 to 25%. Declined AICD in the past due to financial concerns for E HR data and per records from March 2019 reportedly. History of chronic troponinemia.     Large fixed severe anteroseptal/apical defect consistent with old infarction, no ischemia on nuclear stress test September 2021 at [facility].    Abnormal EKG including atrial fibrillation, right bundle branch block.     Acute on chronic respiratory failure with hypoxemia requiring intubation and mechanical ventilation. History of peripheral arterial disease with occlusive bilateral lower extremity disease and followed up at [facility] vascular division. History of cerebrovascular accident February 2021 with right lacunar infarction. Acute on chronic kidney disease. Started on dialysis this admission. Management per nephrologist.    Acute on chronic anemia with GI work-up.  Mild diffuse gastritis on EGD November 15, 2021. Diabetes mellitus.     Obesity with BMI 37 range. Other problems as charted.        REC/PLAN:    Iimproving. Will continue on current medications from cardiac standpoint. Continued supportive care as you are doing and hopefully extubate soon. Discussed with the charge nurse. Will follow. Electronically signed by Yazmin Winters MD, [facility] [place]        PLEASE NOTE:  This progress note was completed using a voice transcription system. Every effort was made to ensure accuracy. However, inadvertent computerized transcription errors may be present.

## 2021-11-22 NOTE — PROGRESS NOTES
Progress Note    11/22/2021   2:34 PM    Name:  Seth Ibrahim  MRN:    [id number]     Acct:     [de-identified]   Room:  2004/2004-01  RODRÍGUEZ Day: 12     Admit Date: 11/10/2021  4:23 PM  PCP: Jarret Holbrook DO    Subjective:     C/C:   Chief Complaint   Patient presents with    Abnormal Lab     low hemoglobin and high CR       Interval History: Status: not changed. Patient is alert able to interact. Currently patient is on a weaning trial. patient had low blood pressure requiring IV Levophed. Heart rate stable, afebrile. Patient is tolerating tube feed well. poor urine output. Recent labs reviewed sodium 132 creatinine elevated at 3.65, WBC 9.3 hemoglobin 7.9, platelets 53. Recent nasal swab positive     ROS:   all 10 systems reviewed and are negative except as noted    Review of Systems   Constitutional: Negative for chills and fatigue. HENT: Negative for drooling, mouth sores, sneezing and trouble swallowing. Eyes: Negative for redness and itching. Respiratory: Negative for cough, chest tightness and shortness of breath. Cardiovascular: Negative for chest pain, palpitations and leg swelling. Gastrointestinal: Negative for abdominal pain, blood in stool, nausea and vomiting. Endocrine: Negative for heat intolerance and polyphagia. Genitourinary: Negative for difficulty urinating, flank pain and pelvic pain. Musculoskeletal: Negative for arthralgias, joint swelling and neck stiffness. Skin: Negative for color change and pallor. Allergic/Immunologic: Negative for food allergies. Neurological: Negative for dizziness, seizures and headaches. Hematological: Does not bruise/bleed easily. Psychiatric/Behavioral: Negative for agitation, behavioral problems and suicidal ideas. The patient is not hyperactive. Medications:      Allergies: No Known Allergies    Current Meds: norepinephrine (LEVOPHED) 16 mg in sodium chloride 0.9 % 250 mL infusion, Continuous  linezolid (ZYVOX) IVPB 600 mg, Q12H  midodrine (PROAMATINE) tablet 5 mg, TID PRN  sodium citrate 4 % injection 1.3 mL, PRN  sodium citrate 4 % injection 1.2 mL, PRN  chlorhexidine (PERIDEX) 0.12 % solution 15 mL, BID  famotidine (PEPCID) injection 20 mg, Daily  fentaNYL (SUBLIMAZE) injection 50 mcg, Q30 Min PRN  propofol injection, Continuous  polyvinyl alcohol (LIQUIFILM TEARS) 1.4 % ophthalmic solution 1 drop, Q4H   And  lubrifresh P.M. (artificial tears) ophthalmic ointment, Q4H  carvedilol (COREG) tablet 3.125 mg, BID WC  pantoprazole (PROTONIX) injection 40 mg, BID   And  sodium chloride flush 0.9 % injection 10 mL, BID  potassium chloride (KLOR-CON M) extended release tablet 40 mEq, PRN   Or  potassium bicarb-citric acid (EFFER-K) effervescent tablet 40 mEq, PRN   Or  potassium chloride 10 mEq/100 mL IVPB (Peripheral Line), PRN  magnesium sulfate 1000 mg in dextrose 5% 100 mL IVPB, PRN  glucose (GLUTOSE) 40 % oral gel 15 g, PRN  dextrose 50 % IV solution, PRN  glucagon (rDNA) injection 1 mg, PRN  dextrose 5 % solution, PRN  acetaminophen (TYLENOL) tablet 650 mg, Q4H PRN  sodium polystyrene (KAYEXALATE) 15 GM/60ML suspension 15 g, Once  cefepime (MAXIPIME) 1000 mg IVPB minibag, Q24H  sertraline (ZOLOFT) tablet 25 mg, Daily  0.9 % sodium chloride infusion, PRN  ferrous sulfate (IRON 325) tablet 325 mg, Daily with breakfast  [Held by provider] aspirin chewable tablet 81 mg, Daily  atorvastatin (LIPITOR) tablet 80 mg, Daily  insulin glargine (LANTUS) injection vial 22 Units, Nightly  insulin lispro (HUMALOG) injection vial 0-12 Units, TID WC  insulin lispro (HUMALOG) injection vial 0-6 Units, Nightly  sodium chloride flush 0.9 % injection 5-40 mL, 2 times per day  sodium chloride flush 0.9 % injection 5-40 mL, PRN  0.9 % sodium chloride infusion, PRN  acetaminophen (TYLENOL) tablet 650 mg, Q4H PRN  ondansetron (ZOFRAN-ODT) disintegrating tablet 4 mg, Q8H PRN   Or  ondansetron (ZOFRAN) injection 4 mg, Q6H PRN  oxyCODONE-acetaminophen (PERCOCET) 5-325 MG per tablet 1 tablet, Q6H PRN        Data:     Code Status:  Full Code    Family History   Problem Relation Age of Onset    High Blood Pressure Other        Social History     Socioeconomic History    Marital status:      Spouse name: Not on file    Number of children: Not on file    Years of education: Not on file    Highest education level: Not on file   Occupational History    Not on file   Tobacco Use    Smoking status: Never Smoker    Smokeless tobacco: Not on file   Substance and Sexual Activity    Alcohol use: No     Alcohol/week: 0.0 standard drinks    Drug use: No    Sexual activity: Not on file   Other Topics Concern    Not on file   Social History Narrative    Not on file     Social Determinants of Health     Financial Resource Strain:     Difficulty of Paying Living Expenses: Not on file   Food Insecurity:     Worried About Running Out of Food in the Last Year: Not on file    Katya of Food in the Last Year: Not on file   Transportation Needs:     Lack of Transportation (Medical): Not on file    Lack of Transportation (Non-Medical):  Not on file   Physical Activity:     Days of Exercise per Week: Not on file    Minutes of Exercise per Session: Not on file   Stress:     Feeling of Stress : Not on file   Social Connections:     Frequency of Communication with Friends and Family: Not on file    Frequency of Social Gatherings with Friends and Family: Not on file    Attends Yazidism Services: Not on file    Active Member of Clubs or Organizations: Not on file    Attends Club or Organization Meetings: Not on file    Marital Status: Not on file   Intimate Partner Violence:     Fear of Current or Ex-Partner: Not on file    Emotionally Abused: Not on file    Physically Abused: Not on file    Sexually Abused: Not on file   Housing Stability:     Unable to Pay for Housing in the Last Year: Not on file    Number of [place] in the Last Year: Not on file  Unstable Housing in the Last Year: Not on file       I/O (24Hr): Intake/Output Summary (Last 24 hours) at 11/22/2021 1434  Last data filed at 11/22/2021 2075  Gross per 24 hour   Intake 1672.23 ml   Output 810 ml   Net 862.23 ml     Radiology:  XR CHEST (SINGLE VIEW FRONTAL)    Result Date: 11/20/2021  The endotracheal tube ends appropriately above the kristina and below the clavicular heads. The nasogastric tube ends in the stomach. The right jugular vein hemodialysis catheter ends in the distal SVC, unchanged. Cardiomegaly. Bibasilar airspace disease and small right pleural effusion, unchanged. Lungs otherwise grossly clear. No pneumothorax. CT HEAD WO CONTRAST    Result Date: 11/17/2021  No acute intracranial abnormality. Chronic microvascular disease and chronic lacunar infarcts. Critical results were called by Dr. Elbert Barnes to Dr Jimmie Anders on 11/17/2021 at [facility] RENAL COMPLETE    Result Date: 11/15/2021  1. Mild to moderate right-sided hydronephrosis. 2. Limited renal ultrasound. No left-sided hydronephrosis. XR CHEST PORTABLE    Result Date: 11/22/2021  Cardiomegaly with bibasilar effusions and adjacent infiltrates. Support tubes as described above. XR CHEST PORTABLE    Result Date: 11/21/2021  Cardiomegaly with bibasilar effusions and adjacent infiltrates. Support tubes as described above. XR CHEST PORTABLE    Result Date: 11/19/2021  Basilar opacities likely represent atelectasis with small pleural effusions. XR CHEST PORTABLE    Result Date: 11/18/2021  1. Tip of the newly inserted NG tube overlies the gastric body. . 2. The right IJ CVC and right arm PICC line remain well positioned. 3.  No acute cardiopulmonary abnormality. XR CHEST PORTABLE    Result Date: 11/17/2021  1. No pneumothorax evident following thoracentesis. 2. Low lung volumes. Significantly improved pneumatization lower right lung with minimal pleural effusion bilateral evident.  3. Calcific atherosclerosis aorta. 4. Cardiomegaly. XR CHEST PORTABLE    Result Date: 11/16/2021  Moderate right pleural effusion and associated airspace disease. Pneumonia cannot be excluded. US RETROPERITONEAL COMPLETE    Result Date: 11/18/2021  No acute findings. No hydronephrosis. Small right pleural effusion. IR NONTUNNELED VASCULAR CATHETER > 5 YEARS    Result Date: 11/17/2021  Successful ultrasound guided non-tunneled right IJ temporary hemodialysis catheter placement. Catheter is ready for dialysis use at this time. MRI BRAIN WO CONTRAST    Result Date: 11/18/2021  There is no acute infarction, intracranial hemorrhage, or intracranial mass lesion. Mild chronic microangiopathic ischemic disease. Mild generalized volume loss. Mild mucosal thickening of bilateral mastoid air cells and right sphenoid sinus. IR GUIDED THORACENTESIS PLEURAL    Result Date: 11/17/2021  Successful ultrasound guided right thoracentesis. Labs:  Recent Results (from the past 24 hour(s))   POC Glucose Fingerstick    Collection Time: 11/21/21  3:54 PM   Result Value Ref Range    POC Glucose 211 (H) 65 - 105 mg/dL   MRSA DNA Probe, Nasal    Collection Time: 11/21/21  5:48 PM    Specimen: Nasal   Result Value Ref Range    Specimen Description . NASAL SWAB     MRSA, DNA, Nasal (A) NEGATIVE:  MRSA DNA not detected by nucleic acid amplificati     POSITIVE:  MRSA DNA detected by nucleic acid amplification.    POC Glucose Fingerstick    Collection Time: 11/21/21  9:09 PM   Result Value Ref Range    POC Glucose 236 (H) 65 - 105 mg/dL   Blood gas, arterial    Collection Time: 11/22/21  5:12 AM   Result Value Ref Range    pH, Arterial 7.453 (H) 7.350 - 7.450    pCO2, Arterial 37.4 35.0 - 45.0 mmHg    pO2, Arterial 139.0 (H) 80.0 - 100.0 mmHg    HCO3, Arterial 26.1 (H) 22.0 - 26.0 mmol/L    Positive Base Excess, Art 2.2 (H) 0.0 - 2.0 mmol/L    Negative Base Excess, Art NOT REPORTED 0.0 - 2.0 mmol/L    O2 Sat, Arterial 97.8 95 - 98 % Total Hb NOT REPORTED 12.0 - 16.0 g/dl    Oxyhemoglobin NOT REPORTED 95.0 - 98.0 %    Carboxyhemoglobin 0.4 0 - 5 %    Methemoglobin 1.1 0.0 - 1.9 %    Pt Temp 37     pH, Art, Temp Adj NOT REPORTED 7.350 - 7.450    pCO2, Art, Temp Adj NOT REPORTED 35.0 - 45.0    pO2, Art, Temp Adj NOT REPORTED 80.0 - 100.0 mmHg    O2 Device/Flow/% VENTILATOR     Respiratory Rate 16     Rolf Test PASS     Sample Site Left Radial Artery     Pt.  Position SEMI-FOWLERS     Mode PRVC     Set Rate 16     Total Rate 16          FIO2 35     Peep/Cpap 7     PSV NOT REPORTED     Text for Respiratory RESULTS GIVEN TO RN     NOTIFICATION NOT REPORTED     NOTIFICATION TIME NOT REPORTED    CBC with DIFF    Collection Time: 11/22/21  5:39 AM   Result Value Ref Range    WBC 9.3 3.5 - 11.0 k/uL    RBC 2.56 (L) 4.0 - 5.2 m/uL    Hemoglobin 7.9 (L) 12.0 - 16.0 g/dL    Hematocrit 24.1 (L) 36 - 46 %    MCV 94.3 80 - 100 fL    MCH 30.8 26 - 34 pg    MCHC 32.7 31 - 37 g/dL    RDW 15.9 (H) 11.5 - 14.9 %    Platelets 53 (L) 264 - 450 k/uL    MPV 10.1 6.0 - 12.0 fL    NRBC Automated NOT REPORTED per 100 WBC    Differential Type NOT REPORTED     Immature Granulocytes NOT REPORTED 0 %    Absolute Immature Granulocyte NOT REPORTED 0.00 - 0.30 k/uL    WBC Morphology NOT REPORTED     RBC Morphology NOT REPORTED     Platelet Estimate NOT REPORTED     Seg Neutrophils 65 36 - 66 %    Lymphocytes 22 (L) 24 - 44 %    Monocytes 8 (H) 1 - 7 %    Eosinophils % 3 0 - 4 %    Basophils 0 0 - 2 %    Bands 2 0 - 10 %    nRBC 4 (H) 0 per 100 WBC    Segs Absolute 6.06 1.3 - 9.1 k/uL    Absolute Lymph # 2.05 1.0 - 4.8 k/uL    Absolute Mono # 0.75 0.1 - 1.3 k/uL    Absolute Eos # 0.28 0.0 - 0.4 k/uL    Basophils Absolute 0.00 0.0 - 0.2 k/uL    Absolute Bands # 0.19 0.0 - 1.0 k/uL    Morphology ANISOCYTOSIS PRESENT     Morphology SLT POLYCHROMASIA    Magnesium    Collection Time: 11/22/21  5:39 AM   Result Value Ref Range    Magnesium 2.0 1.6 - 2.6 mg/dL   Basic Metabolic Panel    Collection Time: 21  5:39 AM   Result Value Ref Range    Glucose 236 (H) 70 - 99 mg/dL    BUN 46 (H) 8 - 23 mg/dL    CREATININE 3.65 (H) 0.50 - 0.90 mg/dL    Bun/Cre Ratio NOT REPORTED 9 - 20    Calcium 7.7 (L) 8.6 - 10.4 mg/dL    Sodium 132 (L) 135 - 144 mmol/L    Potassium 3.7 3.7 - 5.3 mmol/L    Chloride 99 98 - 107 mmol/L    CO2 25 20 - 31 mmol/L    Anion Gap 8 (L) 9 - 17 mmol/L    GFR Non-African American 12 (L) >60 mL/min    GFR African American 15 (L) >60 mL/min    GFR Comment          GFR Staging NOT REPORTED    POC Glucose Fingerstick    Collection Time: 21  8:26 AM   Result Value Ref Range    POC Glucose 242 (H) 65 - 105 mg/dL   POC Glucose Fingerstick    Collection Time: 21 11:02 AM   Result Value Ref Range    POC Glucose 235 (H) 65 - 105 mg/dL       Physical Examination:        Vitals:  BP (!) 106/36   Pulse 66   Temp 97.3 °F (36.3 °C) (Axillary)   Resp 26   Ht 5' 3\" (1.6 m) Comment: from old chart  Wt 213 lb 10 oz (96.9 kg)   SpO2 99%   BMI 37.84 kg/m²   Temp (24hrs), Av °F (36.7 °C), Min:97.3 °F (36.3 °C), Max:99.1 °F (37.3 °C)    Recent Labs     21  1554 21  2109 21  0826 21  1102   POCGLU 211* 236* 242* 235*         Physical Exam  Vitals reviewed. HENT:      Head: Normocephalic. Right Ear: External ear normal.      Left Ear: External ear normal.      Nose: Nose normal.      Mouth/Throat:      Mouth: Mucous membranes are moist.      Pharynx: Oropharynx is clear. Eyes:      Conjunctiva/sclera: Conjunctivae normal.   Cardiovascular:      Rate and Rhythm: Normal rate and regular rhythm. Pulses: Normal pulses. Heart sounds: Normal heart sounds. Pulmonary:      Effort: Pulmonary effort is normal.      Breath sounds: Examination of the right-lower field reveals rales. Examination of the left-lower field reveals rales. Rales present. Abdominal:      General: Bowel sounds are normal.      Palpations: Abdomen is soft. Comments: Colostomy bag with brown stool   Musculoskeletal:         General: No deformity. Cervical back: Normal range of motion and neck supple. Right lower leg: Edema (1+) present. Left lower leg: Edema (1+) present. Skin:     General: Skin is warm. Capillary Refill: Capillary refill takes less than 2 seconds. Coloration: Skin is not jaundiced. Neurological:      General: No focal deficit present. Mental Status: She is alert. Mental status is at baseline. Comments: Alert and oriented to self   Psychiatric:         Mood and Affect: Mood normal.         Behavior: Behavior normal.         Assessment:        Primary Problem  JAYLYN (acute kidney injury) ([facility]     Principal Problem:    [facility]  Active Problems:    Type 2 diabetes mellitus with kidney complication, with long-term current use of insulin ([facility])    CKD (chronic kidney disease) stage 3, GFR 30-59 ml/min ([facility])    HTN (hypertension)    ASCVD (arteriosclerotic cardiovascular disease)    Iron deficiency anemia    Hydronephrosis determined by ultrasound    Longstanding persistent atrial fibrillation ([facility])    Acute metabolic encephalopathy  Resolved Problems:    * No resolved hospital problems.  *      Past Medical History:   Diagnosis Date    Anemia, unspecified     CAD (coronary artery disease)     Chronic kidney disease     CKD (chronic kidney disease) stage 3, GFR 30-59 ml/min ([facility])     Closed fracture of dorsal (thoracic) vertebra without mention of spinal cord injury     Coronary atherosclerosis of unspecified type of vessel, native or graft     Depression with anxiety     Esophageal reflux     HTN (hypertension)     Hyperkalemia     Hyperlipidemia     Intervertebral cervical disc disorder with myelopathy, cervical region     Lower back pain     Lumbar radiculopathy     Microalbuminuria     Muscle weakness     Pain in limb     Stenosis of cervical spine     [facility]     Type II or unspecified type diabetes mellitus without mention of complication, not stated as uncontrolled     Urinary calculus     UTI (lower urinary tract infection)         Plan:        1. IV cefepime, IV Zyvox  2. MRSA nasal swab positive  1. Blood culture 7/16/2021 neg  2. Thoracocentesis culture no growth for 4 days  3. Lantus 22 units subcu daily  4. Insulin sliding scale  5. Chest x-ray report 11/22/2021 shows bibasilar effusion and infiltrates  6. Patient will require hemodialysis once stable  7. Continue tube feed on current rate  8. Oncology input noted  9. DVT Prophylaxis no pharmacological DVT prophylaxis due to anemia and thrombocytopenia  10. EPCs  11. PT/OT to evaluate and treat  12. Pain control  13. Replace electrolytes as per sliding scale  14. Home medications reviewed and appropriate medications continued  15.  Reviewed labs and imaging studies from last 24 hours and results explained to patient          Electronically signed by Aisha James MD

## 2021-11-22 NOTE — PLAN OF CARE
Nutrition Problem #1: Increased nutrient needs  Intervention: Food and/or Nutrient Delivery: Continue Current Tube Feeding  Nutritional Goals: Nutrition support will meet approximately 100% of estimated needs

## 2021-11-22 NOTE — PROGRESS NOTES
Per Dr. Miller Home with nephrology, correct patient's potassium today of 3.7 with 30 meq IV potassium

## 2021-11-22 NOTE — CONSULTS
[facility] Continence Nursing  Follow Up      NAME:  Libby Ibanez RECORD NUMBER:  [id number]  AGE: 66 y.o. GENDER: female  : 1950  TODAY'S DATE:  2021    [facility] nurse follow up visit for right groin wound and left foot wounds. Left foot wounds are stable. Eschar to heel wounds is slightly more moist, but no actual drainage noted. Eschar to lateral and medial foot stable and dry. Groin wound now has some slough present in the base. When cleansing, gauze caught on something within the wound. A metal object, which appeared to be a staple, was noted in the center of the wound and dislodged easily while cleansing. Wound bed explored and no other objects found. Will continue opticell ag for now. Will consider placement of NPWT once pt is more stable.      Measurements:  Wound 21 Femoral Anterior; Right (Active)   Wound Image   21   Wound Etiology Non-Healing Surgical 21   Dressing Status Old drainage noted; New dressing applied 21   Wound Cleansed Cleansed with saline 21   Dressing/Treatment Hydrofiber Ag; Silicone border  09   Offloading for Diabetic Foot Ulcers No 21   Dressing Change Due 21   Wound Length (cm) 1.8 cm 21   Wound Width (cm) 6.7 cm 21   Wound Depth (cm) 2.3 cm 21   Wound Surface Area (cm^2) 12.06 cm^2 21   Change in Wound Size % (l*w) -13.24 21   Wound Volume (cm^3) 27.738 cm^3 21   Wound Healing % -13 21   Wound Assessment [place]/red; [facility] 21   Drainage Amount Small 21   Drainage Description Serosanguinous 21   Odor None 21   Yudith-wound Assessment Dry/flaky 21   Margins Defined edges 21   Wound Thickness Description not for Pressure Injury Partial thickness 21 2830   Number of days: 11       Wound 21 Foot Left; Lateral (Active) Wound Image   11/22/21 0945   Wound Etiology Arterial 11/22/21 0945   Dressing Status Other (Comment) 11/22/21 0800   Wound Cleansed Betadine/povidone iodine 11/22/21 0945   Dressing/Treatment Betadine swabs/povidone iodine 11/22/21 0945   Offloading for Diabetic Foot Ulcers Yes (type) 11/22/21 0800   Dressing Change Due 11/22/21 11/22/21 0800   Wound Length (cm) 1.2 cm 11/22/21 0945   Wound Width (cm) 1.2 cm 11/22/21 0945   Wound Depth (cm) 0 cm 11/22/21 0945   Wound Surface Area (cm^2) 1.44 cm^2 11/22/21 0945   Change in Wound Size % (l*w) 14.29 11/22/21 0945   Wound Volume (cm^3) 0 cm^3 11/22/21 0945   Wound Healing % 100 11/22/21 0945   Wound Assessment Eschar dry 11/22/21 0945   Drainage Amount None 11/22/21 0945   Drainage Description Other (Comment) 11/22/21 0400   Odor None 11/22/21 0945   Yudith-wound Assessment Dry/flaky 11/22/21 0945   Margins Attached edges 11/22/21 0945   Wound Thickness Description not for Pressure Injury Partial thickness 11/22/21 0400   Number of days: 9       Wound 11/12/21 Foot Left; Medial (Active)   Wound Image   11/22/21 0945   Wound Etiology Arterial 11/22/21 0945   Dressing Status Other (Comment) 11/22/21 0800   Wound Cleansed Betadine/povidone iodine 11/22/21 0945   Dressing/Treatment Betadine swabs/povidone iodine 11/22/21 0945   Offloading for Diabetic Foot Ulcers Yes (type) 11/22/21 0800   Dressing Change Due 11/22/21 11/22/21 0800   Wound Length (cm) 2.4 cm 11/22/21 0945   Wound Width (cm) 3.1 cm 11/22/21 0945   Wound Depth (cm) 0 cm 11/22/21 0945   Wound Surface Area (cm^2) 7.44 cm^2 11/22/21 0945   Change in Wound Size % (l*w) 11.43 11/22/21 0945   Wound Volume (cm^3) 0 cm^3 11/22/21 0945   Wound Healing % 100 11/22/21 0945   Wound Assessment Eschar dry 11/22/21 0945   Drainage Amount None 11/22/21 0945   Drainage Description Other (Comment) 11/22/21 0400   Odor None 11/22/21 0945   Yudith-wound Assessment Dry/flaky 11/22/21 0945   Margins Attached edges 11/22/21 0945 Wound Thickness Description not for Pressure Injury Partial thickness 11/22/21 0400   Number of days: 9       Wound 11/12/21 Heel Left; Proximal (Active)   Wound Image   11/22/21 0945   Wound Etiology Arterial 11/22/21 0945   Dressing Status Other (Comment) 11/22/21 0800   Wound Cleansed Betadine/povidone iodine 11/22/21 0945   Dressing/Treatment Betadine swabs/povidone iodine 11/22/21 0945   Offloading for Diabetic Foot Ulcers Yes (type) 11/22/21 0800   Dressing Change Due 11/22/21 11/22/21 0800   Wound Length (cm) 0.6 cm 11/22/21 0945   Wound Width (cm) 0.9 cm 11/22/21 0945   Wound Depth (cm) 0.1 cm 11/22/21 0945   Wound Surface Area (cm^2) 0.54 cm^2 11/22/21 0945   Change in Wound Size % (l*w) -116 11/22/21 0945   Wound Volume (cm^3) 0.054 cm^3 11/22/21 0945   Wound Healing % -116 11/22/21 0945   Wound Assessment Eschar moist; Eschar dry 11/22/21 0945   Drainage Amount None 11/22/21 0945   Drainage Description Other (Comment) 11/22/21 0400   Odor None 11/22/21 0945   Yudith-wound Assessment Blanchable erythema 11/22/21 0945   Margins Defined edges 11/22/21 0945   Wound Thickness Description not for Pressure Injury Partial thickness 11/22/21 0400   Number of days: 9       Wound 11/17/21 Hand Anterior weeping edema  (Active)   Wound Etiology Skin Tear 11/22/21 0800   Dressing Status Clean; Dry; Intact 11/22/21 0800   Wound Cleansed Not Cleansed 11/22/21 0800   Dressing/Treatment Foam 11/22/21 0800   Offloading for Diabetic Foot Ulcers No 11/22/21 0800   Dressing Change Due 11/22/21 11/22/21 0800   Wound Assessment Other (Comment) 11/22/21 0400   Drainage Amount Small 11/22/21 0400   Drainage Description Serous 11/22/21 0400   Odor Mild 11/22/21 0400   Yudith-wound Assessment Intact; Fragile 11/22/21 0400   Margins Attached edges 11/22/21 0400   Wound Thickness Description not for Pressure Injury Partial thickness 11/22/21 0400   Number of days: 5       Wound 11/22/21 Heel Left; Distal (Active)   Wound Etiology Arterial 11/22/21 0945   Wound Cleansed Betadine/povidone iodine 11/22/21 0945   Dressing/Treatment Betadine swabs/povidone iodine 11/22/21 0945   Wound Length (cm) 0.5 cm 11/22/21 0945   Wound Width (cm) 0.5 cm 11/22/21 0945   Wound Depth (cm) 0.1 cm 11/22/21 0945   Wound Surface Area (cm^2) 0.25 cm^2 11/22/21 0945   Wound Volume (cm^3) 0.025 cm^3 11/22/21 0945   Wound Assessment Eschar dry; Eschar moist 11/22/21 0945   Drainage Amount None 11/22/21 0945   Odor None 11/22/21 0945   Yudith-wound Assessment Blanchable erythema 11/22/21 0945   Margins Defined edges 11/22/21 0945   Number of days: 0     Ed SHON Villalta, RN-WCC, CSWS, DAPWCA  [facility] - [place], [facility]  Wound, Ostomy, and Continence Nursing  [phone]

## 2021-11-22 NOTE — CARE COORDINATION
[facility] is following this patient's progress and they will continue to follow along at this time.

## 2021-11-22 NOTE — PROGRESS NOTES
Department of Internal Medicine  Nephrology Nicole Sawyer MD  Progress Note    Reason for consultation: Management of acute kidney injury. Consulting physician: Cynthia Paige MD.    Interval history:   Patient seen and examined in the ICU today. Patient dropped her Pressure SBP 86/35 mm hg, started on levophed. Scr has increased to 3.6 mg/dl from 2.5 mg/dl   ml in last 24 hours. 180 ml since morning in 5 hours. Patient remains edematous. On weaning trial.  CXR cardiomegaly  With basilar effusion and adjacent infiltrates    Patient had episode of bradycardia with transient PEA requiring a short session of CPR on 11/20/21  approximately 15 minutes into hemodialysis. patient is S/P  right ureteral stent placement with renal ultrasound on 11/18/2021 showing improvement in hydronephrosis. History of present illness: This is a 66 y.o. female with a significant past medical history of Type 2 diabetes mellitus, coronary artery disease, lumbar radiculopathy, Hyperlipidemia and chronic kidney disease stage III [baseline serum creatinine 1.5 mg/dL], who presented from [facility] for further evaluation of acute kidney injury. She was sent to [facility] after developing nonhealing wound s/p cardiac catheterization via right refill Levophed has been discontinued approach with right groin wound. She had been on Bumex in the ECF and creatinine and BUN have been rising and Bumex was decreased to a lowest dose of 0.5 mg p.o. twice daily. She has had regular blood work performed in the [facility] and ECF contacted nephrologist after finding abnormal laboratory studies. She was noted to have a BUN/creatinine 104/2.28 mg/dL with hemoglobin 8 g/dL. She has mild confusion but is not in obvious respiratory distress.     Scheduled Meds:   linezolid  600 mg IntraVENous Q12H    chlorhexidine  15 mL Mouth/Throat BID    famotidine (PEPCID) injection  20 mg IntraVENous Daily    polyvinyl alcohol  1 drop Both Eyes Q4H    And    artificial tears   Both Eyes Q4H    carvedilol  3.125 mg Oral BID WC    pantoprazole  40 mg IntraVENous BID    And    sodium chloride flush  10 mL IntraVENous BID    sodium polystyrene  15 g Oral Once    cefepime  1,000 mg IntraVENous Q24H    sertraline  25 mg Oral Daily    ferrous sulfate  325 mg Oral Daily with breakfast    [Held by provider] aspirin  81 mg Oral Daily    atorvastatin  80 mg Oral Daily    insulin glargine  22 Units SubCUTAneous Nightly    insulin lispro  0-12 Units SubCUTAneous TID     insulin lispro  0-6 Units SubCUTAneous Nightly    sodium chloride flush  5-40 mL IntraVENous 2 times per day     Continuous Infusions:   norepinephrine 5 mcg/min (11/22/21 1142)    propofol Stopped (11/22/21 0800)    dextrose      sodium chloride      sodium chloride       PRN Meds:.midodrine, sodium citrate, sodium citrate, fentanNYL, potassium chloride **OR** potassium alternative oral replacement **OR** potassium chloride, magnesium sulfate, glucose, dextrose, glucagon (rDNA), dextrose, acetaminophen, sodium chloride, sodium chloride flush, sodium chloride, acetaminophen, ondansetron **OR** ondansetron, oxyCODONE-acetaminophen    Physical Exam:    VITALS:  BP (!) 106/36   Pulse 66   Temp 97.3 °F (36.3 °C) (Axillary)   Resp 26   Ht 5' 3\" (1.6 m) Comment: from old chart  Wt 213 lb 10 oz (96.9 kg)   SpO2 99%   BMI 37.84 kg/m²   24HR INTAKE/OUTPUT:      Intake/Output Summary (Last 24 hours) at 11/22/2021 1154  Last data filed at 11/22/2021 2171  Gross per 24 hour   Intake 1702.23 ml   Output 810 ml   Net 892.23 ml     Constitutional: lethargic responsive, obeying commands on mechanical ventilator    Skin: Skin color, texture, turgor normal. No rashes or lesions    Head: Normocephalic, without obvious abnormality, atraumatic     Cardiovascular/Edema:S1 and S2 heard regular    Respiratory: Lungs:Diminished air entry basal rales    Abdomen: soft, non-tender; bowel sounds normal; no masses,  no organomegaly    Back: symmetric, no curvature. ROM normal. No CVA tenderness. Extremities: 3+ generalized edema    Neuro:  Grossly normal    CBC:   Recent Labs     11/20/21  0530 11/21/21  0549 11/22/21  0539   WBC 7.5 11.5* 9.3   HGB 8.0* 7.9* 7.9*   PLT 49* 52* 53*     BMP:    Recent Labs     11/20/21  0530 11/20/21  0530 11/21/21  0549 11/21/21  1145 11/22/21  0539     --  136  --  132*   K 3.7   < > 3.3* 3.9 3.7     --  101  --  99   CO2 26  --  23  --  25   BUN 30*  --  38*  --  46*   CREATININE 2.50*  --  3.13*  --  3.65*   GLUCOSE 117*  --  126*  --  236*    < > = values in this interval not displayed. Lab Results   Component Value Date    NITRU NEGATIVE 11/12/2021    COLORU Yellow 11/12/2021    PHUR 6.0 11/12/2021    WBCUA 50  11/12/2021    RBCUA 20 TO 50 11/12/2021    MUCUS NOT REPORTED 11/12/2021    TRICHOMONAS NOT REPORTED 11/12/2021    YEAST NOT REPORTED 11/12/2021    BACTERIA MODERATE 11/12/2021    CLARITYU Clear 09/07/2016    SPECGRAV 1.012 11/12/2021    LEUKOCYTESUR LARGE 11/12/2021    UROBILINOGEN Normal 11/12/2021    BILIRUBINUR NEGATIVE 11/12/2021    BLOODU Positive 09/07/2016    GLUCOSEU NEGATIVE 11/12/2021    KETUA NEGATIVE 11/12/2021    AMORPHOUS NOT REPORTED 11/12/2021     IMPRESSION/RECOMMENDATIONS:      1. Acute kidney injury - most consistent with Ischemic ATN and obstructive nephropathy secondary to bilateral hydronephrosis. Patient remains anuric- renal infarct and renal cortical necrosis are differentials. Repeat renal ultrasound  11/18/2021 shows improvement in hydronephrosis     Patient was started on acute dialysis on 11/17/2021-Patient had HD on 11/18,11/19 and only 15 mins HD on 11/20/21    2. CHF with reduced EF, Systolic dysfunction, diastolic dysfunction. 3. PEA/Cardiac Arrest s/p CPR recovered. 4. Acute respiratory failure s/p intubation on Ventilator.     5. Normocytic anemia/Thrombocytopenia, Heme-oncology following, No hemolysis found, HIT panel negative,    Continue Nepro tube feeding  2. Hypotension- on levophed    3. Bilateral hydronephrosis status post cystoscopy with right ureteral  stent placement. 5. Anion gap metabolic acidosis improved with dialysis    6. Anasarca secondary to cardiomyopathy-EF of 30-35 % , third spacing renal failure    8. Acute metabolic encephalopathy-secondary to uremia-rule out sepsis. IV linezolid started today , continue IV cefepime    9. Hypokalemia-Improved after correction today. Plan:  Attempt dialysis when hemodynamically stable, currently patient had to be started on levophed this morning. Strict input and output   Basic metabolic profile daily. Prognosis is guarded. D/W Dr Bill Black.         Peri Boast, MD   Attending Nephrologist  11/22/2021 11:54 AM

## 2021-11-22 NOTE — PROGRESS NOTES
Today's Date: 11/22/2021  Patient Name: Nicole Abdi  Date of admission: 11/10/2021  4:23 PM  Patient's age: 66 y.o., 1950  Admission Dx: JAYLYN (acute kidney injury) ([facility] [address] [N17.9]  Urinary tract infection in elderly patient [N39.0]  Anemia, unspecified type [D64.9]    Reason for Consult: TCP  Requesting Physician: Crissy Adams MD    CHIEF COMPLAINT:    Chief Complaint   Patient presents with    Abnormal Lab     low hemoglobin and high CR       SUBJECTIVE:    Patient seen and examined  Currently she is intubated and sedated  Had an episode of Hypotension  On pressors  Her platelets are stable  No active bleeding noted    HISTORY OF PRESENT ILLNESS:    Nicole Abdi is a 66 y.o. female who is admitted to the hospital on 11/10/2021  for acute kidney injury and anemia. Patient has history of chronic kidney disease, chronic anemia and recent history of GI bleed with negative endoscopies at [facility]. She was transferred from nursing home for elevated creatinine and drop in her hemoglobin. Her stool for occult blood on 11/14/2021 was positive. She was evaluated by gastroenterology. Underwent upper endoscopy which showed gastritis. She also was noted to have hydronephrosis on the right side therefore was evaluated by urology and underwent cystoscopy with ureteral stent placement on 11/16/2021. Patient was evaluated by nephrology for her acute kidney injury and was started on acute dialysis on 11/17/2021. Her hemoglobin is stable at 8.0 however her platelets started dropping since admission and today it dropped to 49 therefore hematology was consulted for evaluation of her low platelets. Her iron studies suggest anemia of chronic disease. Her haptoglobin is 125 and LDH is not elevated  Her HIT panel came back negative.     Past Medical History:   has a past medical history of Anemia, unspecified, CAD (coronary artery disease), Chronic kidney disease, CKD (chronic kidney disease) stage 3, GFR 30-59 ml/min ([facility]), Closed fracture of dorsal (thoracic) vertebra without mention of spinal cord injury, Coronary atherosclerosis of unspecified type of vessel, native or graft, Depression with anxiety, Esophageal reflux, HTN (hypertension), Hyperkalemia, Hyperlipidemia, Intervertebral cervical disc disorder with myelopathy, cervical region, Lower back pain, Lumbar radiculopathy, Microalbuminuria, Muscle weakness, Pain in limb, Stenosis of cervical spine, Tethered cord syndrome (HCC), Type II or unspecified type diabetes mellitus without mention of complication, not stated as uncontrolled, Urinary calculus, and UTI (lower urinary tract infection). Past Surgical History:   has a past surgical history that includes Colon surgery; angioplasty; joint replacement; Upper gastrointestinal endoscopy (N/A, 11/15/2021); Cystoscopy (Right, 11/16/2021); and IR NONTUNNELED VASCULAR CATHETER > 5 YEARS (11/17/2021). Medications:    Prior to Admission medications    Medication Sig Start Date End Date Taking? Authorizing Provider   acetaminophen (TYLENOL) 325 MG tablet Take 650 mg by mouth every 4 hours as needed for Pain (do not exceed 4000 mg daily)   Yes Historical Provider, MD   albuterol (PROVENTIL) (2.5 MG/3ML) 0.083% nebulizer solution Take 2.5 mg by nebulization every 6 hours as needed for Shortness of Breath   Yes Historical Provider, MD   atorvastatin (LIPITOR) 80 MG tablet Take 80 mg by mouth daily   Yes Historical Provider, MD   famotidine (PEPCID) 20 MG tablet Take 20 mg by mouth daily   Yes Historical Provider, MD   ferrous sulfate (IRON 325) 325 (65 Fe) MG tablet Take 325 mg by mouth daily (with breakfast)   Yes Historical Provider, MD   HYDROcodone-acetaminophen (NORCO) 5-325 MG per tablet Take 1 tablet by mouth every 6 hours as needed for Pain.  Indications: until 11/13/21   Yes Historical Provider, MD   insulin lispro (HUMALOG) 100 UNIT/ML injection vial Inject into the skin 4 times daily (before meals and nightly) Per sliding scale:   150-200 = 2 units  201-250 = 4 units  251-300 = 6 units  301-350 = 8 units  351-400 = 10 units   Yes Historical Provider, MD   insulin glargine (LANTUS SOLOSTAR) 100 UNIT/ML injection pen Inject 22 Units into the skin nightly   Yes Historical Provider, MD   meclizine (ANTIVERT) 12.5 MG tablet Take 12.5 mg by mouth 2 times daily as needed for Dizziness   Yes Historical Provider, MD   warfarin (COUMADIN) 3 MG tablet Take 3 mg by mouth nightly   Yes Historical Provider, MD   carvedilol (COREG) 6.25 MG tablet Take 6.25 mg by mouth 2 times daily (with meals).    Yes Historical Provider, MD   aspirin 81 MG chewable tablet Take 81 mg by mouth daily    Yes Historical Provider, MD   bumetanide (BUMEX) 1 MG tablet Take 1 mg by mouth daily   11/10/21  Historical Provider, MD     Current Facility-Administered Medications   Medication Dose Route Frequency Provider Last Rate Last Admin    norepinephrine (LEVOPHED) 16 mg in sodium chloride 0.9 % 250 mL infusion  2-100 mcg/min IntraVENous Continuous Jean Marie Puente MD 1.9 mL/hr at 11/22/21 1246 2 mcg/min at 11/22/21 1246    linezolid (ZYVOX) IVPB 600 mg  600 mg IntraVENous Q12H Anil San MD   Stopped at 11/22/21 0902    midodrine (PROAMATINE) tablet 5 mg  5 mg Oral TID PRN Alex Aviles MD   5 mg at 11/22/21 1035    sodium citrate 4 % injection 1.3 mL  1.3 mL IntraCATHeter PRN Dale Chappell MD   1.3 mL at 11/20/21 1958    sodium citrate 4 % injection 1.2 mL  1.2 mL IntraCATHeter PRN Dale Chappell MD   1.2 mL at 11/20/21 1959    chlorhexidine (PERIDEX) 0.12 % solution 15 mL  15 mL Mouth/Throat BID Anil San MD   15 mL at 11/22/21 0803    famotidine (PEPCID) injection 20 mg  20 mg IntraVENous Daily Anil San MD   20 mg at 11/21/21 0849    fentaNYL (SUBLIMAZE) injection 50 mcg  50 mcg IntraVENous Q30 Min PRN Anil San MD   50 mcg at 11/22/21 0000    propofol injection  10 mcg/kg/min IntraVENous Continuous Alka Frausto MD   Stopped at 11/22/21 0800    polyvinyl alcohol (LIQUIFILM TEARS) 1.4 % ophthalmic solution 1 drop  1 drop Both Eyes Q4H Alka Frausto MD   1 drop at 11/22/21 0804    And    lubrifresh P.M. (artificial tears) ophthalmic ointment   Both Eyes Q4H Alka Frausto MD   Given at 11/21/21 1708    carvedilol (COREG) tablet 3.125 mg  3.125 mg Oral BID  JUANITA Bermudez - CNP   3.125 mg at 11/22/21 0802    pantoprazole (PROTONIX) injection 40 mg  40 mg IntraVENous BID Margarito Linder MD   40 mg at 11/22/21 0802    And    sodium chloride flush 0.9 % injection 10 mL  10 mL IntraVENous BID Margarito Linder MD   10 mL at 11/22/21 0803    potassium chloride (KLOR-CON M) extended release tablet 40 mEq  40 mEq Oral PRN Margarito Linder MD        Or    potassium bicarb-citric acid (EFFER-K) effervescent tablet 40 mEq  40 mEq Oral PRN Margarito Linder MD   20 mEq at 11/21/21 1659    Or    potassium chloride 10 mEq/100 mL IVPB (Peripheral Line)  10 mEq IntraVENous BRISA Linder MD        magnesium sulfate 1000 mg in dextrose 5% 100 mL IVPB  1,000 mg IntraVENous BRISA Linder MD   Stopped at 11/21/21 1815    glucose (GLUTOSE) 40 % oral gel 15 g  15 g Oral PRWANG Linder MD        dextrose 50 % IV solution  12.5 g IntraVENous PRWANG Linder MD        glucagon (rDNA) injection 1 mg  1 mg IntraMUSCular PRWANG Linder MD        dextrose 5 % solution  100 mL/hr IntraVENous BRISA Linder MD        acetaminophen (TYLENOL) tablet 650 mg  650 mg Oral Q4H PRN Shell Ayala MD        sodium polystyrene (KAYEXALATE) 15 GM/60ML suspension 15 g  15 g Oral Once Boaz Burks MD        cefepime (MAXIPIME) 1000 mg IVPB minibag  1,000 mg IntraVENous Q24H Crissy Wright MD   Stopped at 11/21/21 2300    sertraline (ZOLOFT) tablet 25 mg  25 mg Oral Daily Jeb Santacroce, MD   25 mg at 11/22/21 0803    0.9 % sodium BP (!) 106/36   Pulse 66   Temp 97.3 °F (36.3 °C) (Axillary)   Resp 26   Ht 5' 3\" (1.6 m) Comment: from old chart  Wt 213 lb 10 oz (96.9 kg)   SpO2 99%   BMI 37.84 kg/m²    Temp (24hrs), Av °F (36.7 °C), Min:97.3 °F (36.3 °C), Max:99.1 °F (37.3 °C)    General appearance - well appearing, no in pain or distress   Mental status -intubated  Mouth - mucous membranes moist, pharynx normal without lesions   Neck - supple, no significant adenopathy   Lymphatics - no palpable lymphadenopathy, no hepatosplenomegaly   Chest - clear to auscultation, no wheezes, rales or rhonchi, symmetric air entry   Heart - normal rate, regular rhythm, normal S1, S2, no murmurs  Abdomen - soft, nontender, nondistended, no masses or organomegaly   Neurological -intubated nonfocal  Musculoskeletal - no joint tenderness, deformity or swelling   Extremities - peripheral pulses normal,+++ pedal edema, no clubbing or cyanosis   Skin -multiple skin bruises noted    DATA:    Labs:   CBC:   Recent Labs     21  0549 21  0539   WBC 11.5* 9.3   HGB 7.9* 7.9*   HCT 24.8* 24.1*   PLT 52* 53*     BMP:   Recent Labs     21  0549 21  0549 21  1145 21  0539     --   --  132*   K 3.3*   < > 3.9 3.7   CO2 23  --   --  25   BUN 38*  --   --  46*   CREATININE 3.13*  --   --  3.65*   LABGLOM 15*  --   --  12*   GLUCOSE 126*  --   --  236*    < > = values in this interval not displayed. PT/INR: No results for input(s): PROTIME, INR in the last 72 hours. IMAGING DATA:  XR CHEST PORTABLE   Final Result   Cardiomegaly with bibasilar effusions and adjacent infiltrates. Support tubes as described above. XR CHEST PORTABLE   Final Result   Cardiomegaly with bibasilar effusions and adjacent infiltrates. Support tubes as described above. XR CHEST (SINGLE VIEW FRONTAL)   Final Result   The endotracheal tube ends appropriately above the kristina and below the   clavicular heads.   The nasogastric tube ends in the stomach. The right   jugular vein hemodialysis catheter ends in the distal SVC, unchanged. Cardiomegaly. Bibasilar airspace disease and small right pleural effusion,   unchanged. Lungs otherwise grossly clear. No pneumothorax. VL Renal Arterial Duplex Complete   Final Result      XR CHEST PORTABLE   Final Result   Basilar opacities likely represent atelectasis with small pleural effusions. MRI BRAIN WO CONTRAST   Final Result      There is no acute infarction, intracranial hemorrhage, or intracranial mass   lesion. Mild chronic microangiopathic ischemic disease. Mild generalized volume loss. Mild mucosal thickening of bilateral mastoid air cells and right sphenoid   sinus. XR CHEST PORTABLE   Final Result   1. Tip of the newly inserted NG tube overlies the gastric body. .   2. The right IJ CVC and right arm PICC line remain well positioned. 3.  No acute cardiopulmonary abnormality. US RETROPERITONEAL COMPLETE   Final Result   No acute findings. No hydronephrosis. Small right pleural effusion. CT HEAD WO CONTRAST   Final Result   No acute intracranial abnormality. Chronic microvascular disease and chronic lacunar infarcts. Critical results were called by Dr. Isabella Sevilla to Dr Mark Young on 11/17/2021 at   22:0. XR CHEST PORTABLE   Final Result   1. No pneumothorax evident following thoracentesis. 2. Low lung volumes. Significantly improved pneumatization lower right lung   with minimal pleural effusion bilateral evident. 3. Calcific atherosclerosis aorta. 4. Cardiomegaly. IR GUIDED THORACENTESIS PLEURAL   Final Result   Successful ultrasound guided right thoracentesis. IR NONTUNNELED VASCULAR CATHETER > 5 YEARS   Final Result   Successful ultrasound guided non-tunneled right IJ temporary hemodialysis   catheter placement. Catheter is ready for dialysis use at this time.          FLUORO FOR SURGICAL PROCEDURES   Final Result      XR CHEST PORTABLE   Final Result   Moderate right pleural effusion and associated airspace disease. Pneumonia   cannot be excluded. US RENAL COMPLETE   Final Result   1. Mild to moderate right-sided hydronephrosis. 2. Limited renal ultrasound. No left-sided hydronephrosis. CT ABDOMEN PELVIS WO CONTRAST Additional Contrast? None   Final Result   Severe right and mild left hydroureteronephrosis. No obstructing ureteral   stone is seen. Wall thickening of the urinary bladder without significant   urinary bladder wall distension. Outlet obstruction is a consideration. Right-sided ostomy. There is bowel wall thickening proximal to the ostomy,   may suggest infectious or inflammatory enteritis. No drainable fluid   collections or free air. No evidence of bowel obstruction. Moderate right pleural effusion. Bibasilar infiltrates or atelectasis. Clinical correlation to exclude   pneumonia. US RETROPERITONEAL COMPLETE   Final Result   1. Moderate to severe right and mild-to-moderate left hydronephrosis. 2. Exam limited due to patient's body habitus. VL Renal Vein Scan Bilat    (Results Pending)   XR CHEST PORTABLE    (Results Pending)       Primary Problem  JAYLYN (acute kidney injury) [facility])    Active Hospital Problems    Diagnosis Date Noted    Acute metabolic encephalopathy [E18.62]     Longstanding persistent atrial fibrillation ([address] [I48.11] 11/16/2021    Hydronephrosis determined by ultrasound [N13.30] 11/14/2021    Iron deficiency anemia [D50.9] 11/12/2021    JAYLYN (acute kidney injury) ([facility] [address] [N17.9] 11/10/2021    CKD (chronic kidney disease) stage 3, GFR 30-59 ml/min ([facility]) [N18.30]     HTN (hypertension) [I10]     Type 2 diabetes mellitus with kidney complication, with long-term current use of insulin (HCC) [E11.29, Z79.4]     ASCVD (arteriosclerotic cardiovascular disease) [I25.10]        IMPRESSION:   1.  Acute kidney injury and started on dialysis by nephrology  2. Anemia, stool occult positive for blood and had upper endoscopy showing gastritis. Anemia could be related to chronic disease and also renal disease and aggravated by GI bleed  3. Thrombocytopenia  4. Acute encephalopathy  5. Bilateral hydronephrosis  6. Cardiomyopathy with EF of 30-35%  7. Generalized anasarca  8. S/p PEA, resucitated    RECOMMENDATIONS:  1. I reviewed the laboratory data, imaging studies, diagnosis, other treatment recommendations  2. Her anemia appears to be multifactorial with acute drop possibly from GI bleed  3. Her Hb and plts are low but stable  4. Awaiting blood smear  5. Poor prognosis  6. Transfuse PRBC if hemoglobin less than 7  7. No evidence of hemolysis noted as per the labs. Her HIT panel is negative  8. Awaiting peripheral blood smear. Her thrombocytopenia possibly related to acute bone marrow suppression however underlying bone marrow pathology cannot be ruled out  9. Monitor for symptoms of bleeding  10. Hold off anticoagulation  11. Transfuse platelets if bleeding or any surgical  Procedure planned or plts less than 15K  12. Monitor counts closely  13. ReCheck Hapto and LDH in AM  14. We will follow    Discussed with pt and Nurse. Thank you for asking us to see this patient. Sonu Desai MD  Hematologist/Medical Oncologist    Cell: [phone]    This note is created with the assistance of a speech recognition program.  While intending to generate a document that actually reflects the content of the visit, the document can still have some errors including those of syntax and sound a like substitutions which may escape proof reading. It such instances, actual meaning can be extrapolated by contextual diversion.

## 2021-11-22 NOTE — CARE COORDINATION
ONGOING DISCHARGE PLAN:    CODE BLUE 11/20 Inubated on vent . Pt receiving HD inpt , last treatment 11/20. Nephro following. BU 46 Creat 3.65, . LSW following ofr [facility]. Will continue to follow for additional discharge needs.     Electronically signed by Ari Dickson RN on 11/22/2021 at 11:08 AM

## 2021-11-22 NOTE — PLAN OF CARE
Problem: Falls - Risk of:  Goal: Will remain free from falls  Description: Will remain free from falls  11/22/2021 0427 by Anastasia Hernandez RN  Outcome: Met This Shift  Note: Pt remains in bed on vent      Problem: Skin Integrity:  Goal: Absence of new skin breakdown  Description: Absence of new skin breakdown  Outcome: Met This Shift  Note: Pts skin is dry and intact     Problem: Nutrition  Goal: Optimal nutrition therapy  Outcome: Met This Shift  Note: Pt has NG. Receiving TF     Problem: Gas Exchange - Impaired:  Goal: Levels of oxygenation will improve  Description: Levels of oxygenation will improve  11/22/2021 0427 by Anastasia Hernandez RN  Outcome: Met This Shift  Note: Pts sats good. On vent     Problem: Fluid Volume - Imbalance:  Goal: Absence of imbalanced fluid volume signs and symptoms  Description: Absence of imbalanced fluid volume signs and symptoms  Outcome: Met This Shift  Note: Pt receiving dialysis. Kaur for accurate I&O     Problem: Non-Violent Restraints  Goal: Removal from restraints as soon as assessed to be safe  Outcome: Not Met This Shift  Note: Patient remains in restraints due to pulling at tubes and lines. See restraint charting, continue to monitor closely. Problem: Bowel Function - Altered:  Goal: Bowel elimination is within specified parameters  Description: Bowel elimination is within specified parameters  Outcome: Met This Shift  Note: Pt has colostomy. Good output     Problem: Gas Exchange - Impaired:  Goal: Levels of oxygenation will improve  Description: Levels of oxygenation will improve  11/22/2021 0427 by Anastasia Hernandez RN  Outcome: Met This Shift  Note: Pts sats good. On vent     Problem: Mental Status - Impaired:  Goal: Mental status will be restored to baseline  Description: Mental status will be restored to baseline  Outcome: Ongoing  Note: Pt still intubated on vent.  Following commands

## 2021-11-23 ENCOUNTER — APPOINTMENT (OUTPATIENT)
Dept: GENERAL RADIOLOGY | Age: 71
DRG: 004 | End: 2021-11-23
Payer: COMMERCIAL

## 2021-11-23 LAB
ABSOLUTE EOS #: 0.3 K/UL (ref 0–0.4)
ABSOLUTE IMMATURE GRANULOCYTE: ABNORMAL K/UL (ref 0–0.3)
ABSOLUTE LYMPH #: 1.88 K/UL (ref 1–4.8)
ABSOLUTE MONO #: 0.89 K/UL (ref 0.1–1.3)
ALLEN TEST: ABNORMAL
ANION GAP SERPL CALCULATED.3IONS-SCNC: 10 MMOL/L (ref 9–17)
BASOPHILS # BLD: 0 % (ref 0–2)
BASOPHILS ABSOLUTE: 0 K/UL (ref 0–0.2)
BUN BLDV-MCNC: 54 MG/DL (ref 8–23)
BUN/CREAT BLD: ABNORMAL (ref 9–20)
CALCIUM SERPL-MCNC: 7.8 MG/DL (ref 8.6–10.4)
CARBOXYHEMOGLOBIN: 0.7 % (ref 0–5)
CHLORIDE BLD-SCNC: 96 MMOL/L (ref 98–107)
CO2: 26 MMOL/L (ref 20–31)
CREAT SERPL-MCNC: 3.47 MG/DL (ref 0.5–0.9)
CULTURE: NORMAL
DIFFERENTIAL TYPE: ABNORMAL
DIRECT EXAM: NORMAL
EOSINOPHILS RELATIVE PERCENT: 3 % (ref 0–4)
FIO2: 30
GFR AFRICAN AMERICAN: 16 ML/MIN
GFR NON-AFRICAN AMERICAN: 13 ML/MIN
GFR SERPL CREATININE-BSD FRML MDRD: ABNORMAL ML/MIN/{1.73_M2}
GFR SERPL CREATININE-BSD FRML MDRD: ABNORMAL ML/MIN/{1.73_M2}
GLUCOSE BLD-MCNC: 157 MG/DL (ref 65–105)
GLUCOSE BLD-MCNC: 178 MG/DL (ref 65–105)
GLUCOSE BLD-MCNC: 179 MG/DL (ref 65–105)
GLUCOSE BLD-MCNC: 244 MG/DL (ref 65–105)
GLUCOSE BLD-MCNC: 255 MG/DL (ref 70–99)
HCO3 ARTERIAL: 26 MMOL/L (ref 22–26)
HCT VFR BLD CALC: 23.9 % (ref 36–46)
HEMOGLOBIN: 7.9 G/DL (ref 12–16)
IMMATURE GRANULOCYTES: ABNORMAL %
LYMPHOCYTES # BLD: 19 % (ref 24–44)
Lab: NORMAL
MAGNESIUM: 2.2 MG/DL (ref 1.6–2.6)
MCH RBC QN AUTO: 31.1 PG (ref 26–34)
MCHC RBC AUTO-ENTMCNC: 32.8 G/DL (ref 31–37)
MCV RBC AUTO: 94.7 FL (ref 80–100)
METHEMOGLOBIN: 1 % (ref 0–1.9)
MODE: ABNORMAL
MONOCYTES # BLD: 9 % (ref 1–7)
MORPHOLOGY: ABNORMAL
NEGATIVE BASE EXCESS, ART: ABNORMAL MMOL/L (ref 0–2)
NOTIFICATION TIME: ABNORMAL
NOTIFICATION: ABNORMAL
NRBC AUTOMATED: ABNORMAL PER 100 WBC
O2 DEVICE/FLOW/%: ABNORMAL
O2 SAT, ARTERIAL: 97.2 % (ref 95–98)
OXYHEMOGLOBIN: ABNORMAL % (ref 95–98)
PATIENT TEMP: 37
PCO2 ARTERIAL: 38.2 MMHG (ref 35–45)
PCO2, ART, TEMP ADJ: ABNORMAL (ref 35–45)
PDW BLD-RTO: 16.1 % (ref 11.5–14.9)
PEEP/CPAP: 7
PH ARTERIAL: 7.44 (ref 7.35–7.45)
PH, ART, TEMP ADJ: ABNORMAL (ref 7.35–7.45)
PLATELET # BLD: 56 K/UL (ref 150–450)
PLATELET ESTIMATE: ABNORMAL
PMV BLD AUTO: 10.2 FL (ref 6–12)
PO2 ARTERIAL: 110 MMHG (ref 80–100)
PO2, ART, TEMP ADJ: ABNORMAL MMHG (ref 80–100)
POSITIVE BASE EXCESS, ART: 1.9 MMOL/L (ref 0–2)
POTASSIUM SERPL-SCNC: 3.7 MMOL/L (ref 3.7–5.3)
POTASSIUM SERPL-SCNC: 3.9 MMOL/L (ref 3.7–5.3)
PSV: ABNORMAL
PT. POSITION: ABNORMAL
RBC # BLD: 2.52 M/UL (ref 4–5.2)
RBC # BLD: ABNORMAL 10*6/UL
RESPIRATORY RATE: 16
SAMPLE SITE: ABNORMAL
SEG NEUTROPHILS: 69 % (ref 36–66)
SEGMENTED NEUTROPHILS ABSOLUTE COUNT: 6.83 K/UL (ref 1.3–9.1)
SET RATE: 16
SODIUM BLD-SCNC: 132 MMOL/L (ref 135–144)
SPECIMEN DESCRIPTION: NORMAL
SURGICAL PATHOLOGY REPORT: NORMAL
TEXT FOR RESPIRATORY: ABNORMAL
TOTAL HB: ABNORMAL G/DL (ref 12–16)
TOTAL RATE: 16
VT: 500
WBC # BLD: 9.9 K/UL (ref 3.5–11)
WBC # BLD: ABNORMAL 10*3/UL

## 2021-11-23 PROCEDURE — 2580000003 HC RX 258: Performed by: UROLOGY

## 2021-11-23 PROCEDURE — 6360000002 HC RX W HCPCS: Performed by: FAMILY MEDICINE

## 2021-11-23 PROCEDURE — 84132 ASSAY OF SERUM POTASSIUM: CPT

## 2021-11-23 PROCEDURE — 83735 ASSAY OF MAGNESIUM: CPT

## 2021-11-23 PROCEDURE — 6370000000 HC RX 637 (ALT 250 FOR IP): Performed by: INTERNAL MEDICINE

## 2021-11-23 PROCEDURE — 99232 SBSQ HOSP IP/OBS MODERATE 35: CPT | Performed by: INTERNAL MEDICINE

## 2021-11-23 PROCEDURE — 90935 HEMODIALYSIS ONE EVALUATION: CPT

## 2021-11-23 PROCEDURE — 82805 BLOOD GASES W/O2 SATURATION: CPT

## 2021-11-23 PROCEDURE — 2000000000 HC ICU R&B

## 2021-11-23 PROCEDURE — 80048 BASIC METABOLIC PNL TOTAL CA: CPT

## 2021-11-23 PROCEDURE — 6360000002 HC RX W HCPCS: Performed by: INTERNAL MEDICINE

## 2021-11-23 PROCEDURE — 6370000000 HC RX 637 (ALT 250 FOR IP): Performed by: NURSE PRACTITIONER

## 2021-11-23 PROCEDURE — P9047 ALBUMIN (HUMAN), 25%, 50ML: HCPCS | Performed by: INTERNAL MEDICINE

## 2021-11-23 PROCEDURE — 36415 COLL VENOUS BLD VENIPUNCTURE: CPT

## 2021-11-23 PROCEDURE — 85025 COMPLETE CBC W/AUTO DIFF WBC: CPT

## 2021-11-23 PROCEDURE — 6370000000 HC RX 637 (ALT 250 FOR IP): Performed by: FAMILY MEDICINE

## 2021-11-23 PROCEDURE — 71045 X-RAY EXAM CHEST 1 VIEW: CPT

## 2021-11-23 PROCEDURE — 82947 ASSAY GLUCOSE BLOOD QUANT: CPT

## 2021-11-23 PROCEDURE — 2500000003 HC RX 250 WO HCPCS: Performed by: INTERNAL MEDICINE

## 2021-11-23 PROCEDURE — 94003 VENT MGMT INPAT SUBQ DAY: CPT

## 2021-11-23 PROCEDURE — 36600 WITHDRAWAL OF ARTERIAL BLOOD: CPT

## 2021-11-23 PROCEDURE — 6370000000 HC RX 637 (ALT 250 FOR IP): Performed by: UROLOGY

## 2021-11-23 PROCEDURE — 2580000003 HC RX 258: Performed by: FAMILY MEDICINE

## 2021-11-23 PROCEDURE — C9113 INJ PANTOPRAZOLE SODIUM, VIA: HCPCS | Performed by: FAMILY MEDICINE

## 2021-11-23 PROCEDURE — 2580000003 HC RX 258: Performed by: INTERNAL MEDICINE

## 2021-11-23 RX ORDER — ALBUMIN (HUMAN) 12.5 G/50ML
25 SOLUTION INTRAVENOUS
Status: COMPLETED | OUTPATIENT
Start: 2021-11-23 | End: 2021-11-23

## 2021-11-23 RX ADMIN — CHLORHEXIDINE GLUCONATE 0.12% ORAL RINSE 15 ML: 1.2 LIQUID ORAL at 08:07

## 2021-11-23 RX ADMIN — INSULIN GLARGINE 22 UNITS: 100 INJECTION, SOLUTION SUBCUTANEOUS at 22:16

## 2021-11-23 RX ADMIN — FAMOTIDINE 20 MG: 10 INJECTION, SOLUTION INTRAVENOUS at 08:07

## 2021-11-23 RX ADMIN — SODIUM CHLORIDE, PRESERVATIVE FREE 10 ML: 5 INJECTION INTRAVENOUS at 08:07

## 2021-11-23 RX ADMIN — PANTOPRAZOLE SODIUM 40 MG: 40 INJECTION, POWDER, FOR SOLUTION INTRAVENOUS at 22:36

## 2021-11-23 RX ADMIN — SODIUM CHLORIDE, PRESERVATIVE FREE 10 ML: 5 INJECTION INTRAVENOUS at 22:36

## 2021-11-23 RX ADMIN — INSULIN LISPRO 2 UNITS: 100 INJECTION, SOLUTION INTRAVENOUS; SUBCUTANEOUS at 18:02

## 2021-11-23 RX ADMIN — POLYVINYL ALCOHOL 1 DROP: 14 SOLUTION/ DROPS OPHTHALMIC at 08:13

## 2021-11-23 RX ADMIN — ATORVASTATIN CALCIUM 80 MG: 80 TABLET, FILM COATED ORAL at 08:06

## 2021-11-23 RX ADMIN — INSULIN LISPRO 4 UNITS: 100 INJECTION, SOLUTION INTRAVENOUS; SUBCUTANEOUS at 08:30

## 2021-11-23 RX ADMIN — CEFEPIME HYDROCHLORIDE 1000 MG: 1 INJECTION, POWDER, FOR SOLUTION INTRAMUSCULAR; INTRAVENOUS at 23:28

## 2021-11-23 RX ADMIN — MIDODRINE HYDROCHLORIDE 5 MG: 5 TABLET ORAL at 10:49

## 2021-11-23 RX ADMIN — MINERAL OIL, PETROLATUM: 425; 568 OINTMENT OPHTHALMIC at 18:02

## 2021-11-23 RX ADMIN — CHLORHEXIDINE GLUCONATE 0.12% ORAL RINSE 15 ML: 1.2 LIQUID ORAL at 22:17

## 2021-11-23 RX ADMIN — FERROUS SULFATE TAB 325 MG (65 MG ELEMENTAL FE) 325 MG: 325 (65 FE) TAB at 08:06

## 2021-11-23 RX ADMIN — SERTRALINE HYDROCHLORIDE 25 MG: 50 TABLET ORAL at 08:06

## 2021-11-23 RX ADMIN — SODIUM CHLORIDE, PRESERVATIVE FREE 10 ML: 5 INJECTION INTRAVENOUS at 22:17

## 2021-11-23 RX ADMIN — Medication 1.2 ML: at 13:05

## 2021-11-23 RX ADMIN — Medication 1.3 ML: at 13:04

## 2021-11-23 RX ADMIN — INSULIN LISPRO 1 UNITS: 100 INJECTION, SOLUTION INTRAVENOUS; SUBCUTANEOUS at 22:16

## 2021-11-23 RX ADMIN — LINEZOLID 600 MG: 600 INJECTION, SOLUTION INTRAVENOUS at 14:00

## 2021-11-23 RX ADMIN — ALBUMIN (HUMAN) 25 G: 0.25 INJECTION, SOLUTION INTRAVENOUS at 12:12

## 2021-11-23 RX ADMIN — LINEZOLID 600 MG: 600 INJECTION, SOLUTION INTRAVENOUS at 22:03

## 2021-11-23 RX ADMIN — CARVEDILOL 3.12 MG: 3.12 TABLET, FILM COATED ORAL at 18:01

## 2021-11-23 RX ADMIN — POLYVINYL ALCOHOL 1 DROP: 14 SOLUTION/ DROPS OPHTHALMIC at 16:30

## 2021-11-23 RX ADMIN — INSULIN LISPRO 2 UNITS: 100 INJECTION, SOLUTION INTRAVENOUS; SUBCUTANEOUS at 12:30

## 2021-11-23 RX ADMIN — PANTOPRAZOLE SODIUM 40 MG: 40 INJECTION, POWDER, FOR SOLUTION INTRAVENOUS at 08:07

## 2021-11-23 ASSESSMENT — PULMONARY FUNCTION TESTS
PIF_VALUE: 28
PIF_VALUE: 27
PIF_VALUE: 27
PIF_VALUE: 28
PIF_VALUE: 30
PIF_VALUE: 29
PIF_VALUE: 30
PIF_VALUE: 26
PIF_VALUE: 27
PIF_VALUE: 258
PIF_VALUE: 28
PIF_VALUE: 17
PIF_VALUE: 26
PIF_VALUE: 29
PIF_VALUE: 24
PIF_VALUE: 32
PIF_VALUE: 27
PIF_VALUE: 36
PIF_VALUE: 25
PIF_VALUE: 26
PIF_VALUE: 28
PIF_VALUE: 30
PIF_VALUE: 25
PIF_VALUE: 26
PIF_VALUE: 26
PIF_VALUE: 29
PIF_VALUE: 26
PIF_VALUE: 25
PIF_VALUE: 30
PIF_VALUE: 29
PIF_VALUE: 27

## 2021-11-23 NOTE — PROGRESS NOTES
Progress Note    11/23/2021   1:23 PM    Name:  Sabi Ayala  MRN:    [id number]     Acct:     [de-identified]   Room:  2004/2004-01  IP Day: 15     Admit Date: 11/10/2021  4:23 PM  PCP: Vandana Maloney DO    Subjective:     C/C:   Chief Complaint   Patient presents with    Abnormal Lab     low hemoglobin and high CR       Interval History: Status: not changed. Patient open eyes to name. Currently on vent. Off of vasopressors. having hemodialysis. Blood pressure and heart rate stable. Labs reviewed creatinine 3.47 hemoglobin 7.9 platelets 56    ROS:   all 10 systems reviewed and are negative except as noted    Review of Systems   Unable to perform ROS: Intubated       Medications:      Allergies: No Known Allergies    Current Meds: norepinephrine (LEVOPHED) 16 mg in sodium chloride 0.9 % 250 mL infusion, Continuous  linezolid (ZYVOX) IVPB 600 mg, Q12H  midodrine (PROAMATINE) tablet 5 mg, TID PRN  sodium citrate 4 % injection 1.3 mL, PRN  sodium citrate 4 % injection 1.2 mL, PRN  chlorhexidine (PERIDEX) 0.12 % solution 15 mL, BID  fentaNYL (SUBLIMAZE) injection 50 mcg, Q30 Min PRN  propofol injection, Continuous  polyvinyl alcohol (LIQUIFILM TEARS) 1.4 % ophthalmic solution 1 drop, Q4H   And  lubrifresh P.M. (artificial tears) ophthalmic ointment, Q4H  carvedilol (COREG) tablet 3.125 mg, BID WC  pantoprazole (PROTONIX) injection 40 mg, BID   And  sodium chloride flush 0.9 % injection 10 mL, BID  potassium chloride (KLOR-CON M) extended release tablet 40 mEq, PRN   Or  potassium bicarb-citric acid (EFFER-K) effervescent tablet 40 mEq, PRN   Or  potassium chloride 10 mEq/100 mL IVPB (Peripheral Line), PRN  magnesium sulfate 1000 mg in dextrose 5% 100 mL IVPB, PRN  glucose (GLUTOSE) 40 % oral gel 15 g, PRN  dextrose 50 % IV solution, PRN  glucagon (rDNA) injection 1 mg, PRN  dextrose 5 % solution, PRN  acetaminophen (TYLENOL) tablet 650 mg, Q4H PRN  sodium polystyrene (KAYEXALATE) 15 GM/60ML suspension 15 g, Once  cefepime (MAXIPIME) 1000 mg IVPB minibag, Q24H  sertraline (ZOLOFT) tablet 25 mg, Daily  0.9 % sodium chloride infusion, PRN  ferrous sulfate (IRON 325) tablet 325 mg, Daily with breakfast  [Held by provider] aspirin chewable tablet 81 mg, Daily  atorvastatin (LIPITOR) tablet 80 mg, Daily  insulin glargine (LANTUS) injection vial 22 Units, Nightly  insulin lispro (HUMALOG) injection vial 0-12 Units, TID WC  insulin lispro (HUMALOG) injection vial 0-6 Units, Nightly  sodium chloride flush 0.9 % injection 5-40 mL, 2 times per day  sodium chloride flush 0.9 % injection 5-40 mL, PRN  0.9 % sodium chloride infusion, PRN  acetaminophen (TYLENOL) tablet 650 mg, Q4H PRN  ondansetron (ZOFRAN-ODT) disintegrating tablet 4 mg, Q8H PRN   Or  ondansetron (ZOFRAN) injection 4 mg, Q6H PRN  oxyCODONE-acetaminophen (PERCOCET) 5-325 MG per tablet 1 tablet, Q6H PRN        Data:     Code Status:  Full Code    Family History   Problem Relation Age of Onset    High Blood Pressure Other        Social History     Socioeconomic History    Marital status:      Spouse name: Not on file    Number of children: Not on file    Years of education: Not on file    Highest education level: Not on file   Occupational History    Not on file   Tobacco Use    Smoking status: Never Smoker    Smokeless tobacco: Not on file   Substance and Sexual Activity    Alcohol use: No     Alcohol/week: 0.0 standard drinks    Drug use: No    Sexual activity: Not on file   Other Topics Concern    Not on file   Social History Narrative    Not on file     Social Determinants of Health     Financial Resource Strain:     Difficulty of Paying Living Expenses: Not on file   Food Insecurity:     Worried About Running Out of Food in the Last Year: Not on file    Katya of Food in the Last Year: Not on file   Transportation Needs:     Lack of Transportation (Medical): Not on file    Lack of Transportation (Non-Medical):  Not on file   Physical Activity:     Days of Exercise per Week: Not on file    Minutes of Exercise per Session: Not on file   Stress:     Feeling of Stress : Not on file   Social Connections:     Frequency of Communication with Friends and Family: Not on file    Frequency of Social Gatherings with Friends and Family: Not on file    Attends Voodoo Services: Not on file    Active Member of Clubs or Organizations: Not on file    Attends Club or Organization Meetings: Not on file    Marital Status: Not on file   Intimate Partner Violence:     Fear of Current or Ex-Partner: Not on file    Emotionally Abused: Not on file    Physically Abused: Not on file    Sexually Abused: Not on file   Housing Stability:     Unable to Pay for Housing in the Last Year: Not on file    Number of [place] in the Last Year: Not on file    Unstable Housing in the Last Year: Not on file       I/O (24Hr): Intake/Output Summary (Last 24 hours) at 11/23/2021 1323  Last data filed at 11/23/2021 1130  Gross per 24 hour   Intake 1580 ml   Output 405 ml   Net 1175 ml     Radiology:  XR CHEST (SINGLE VIEW FRONTAL)    Result Date: 11/20/2021  The endotracheal tube ends appropriately above the kristina and below the clavicular heads. The nasogastric tube ends in the stomach. The right jugular vein hemodialysis catheter ends in the distal SVC, unchanged. Cardiomegaly. Bibasilar airspace disease and small right pleural effusion, unchanged. Lungs otherwise grossly clear. No pneumothorax. CT HEAD WO CONTRAST    Result Date: 11/17/2021  No acute intracranial abnormality. Chronic microvascular disease and chronic lacunar infarcts. Critical results were called by Dr. Minh Dominguez to Dr Teresa Teran on 11/17/2021 at 22:0. XR CHEST PORTABLE    Result Date: 11/23/2021  1. Endotracheal tube in place with its tip 2 cm above the kristina. 2.  Unchanged cardiomegaly and small to moderate bilateral pleural effusions with basilar atelectasis.  3.  Support tubes and catheters are otherwise unchanged. XR CHEST PORTABLE    Result Date: 11/22/2021  Cardiomegaly with bibasilar effusions and adjacent infiltrates. Support tubes as described above. XR CHEST PORTABLE    Result Date: 11/21/2021  Cardiomegaly with bibasilar effusions and adjacent infiltrates. Support tubes as described above. XR CHEST PORTABLE    Result Date: 11/19/2021  Basilar opacities likely represent atelectasis with small pleural effusions. XR CHEST PORTABLE    Result Date: 11/18/2021  1. Tip of the newly inserted NG tube overlies the gastric body. . 2. The right IJ CVC and right arm PICC line remain well positioned. 3.  No acute cardiopulmonary abnormality. XR CHEST PORTABLE    Result Date: 11/17/2021  1. No pneumothorax evident following thoracentesis. 2. Low lung volumes. Significantly improved pneumatization lower right lung with minimal pleural effusion bilateral evident. 3. Calcific atherosclerosis aorta. 4. Cardiomegaly. XR CHEST PORTABLE    Result Date: 11/16/2021  Moderate right pleural effusion and associated airspace disease. Pneumonia cannot be excluded. US RETROPERITONEAL COMPLETE    Result Date: 11/18/2021  No acute findings. No hydronephrosis. Small right pleural effusion. IR NONTUNNELED VASCULAR CATHETER > 5 YEARS    Result Date: 11/17/2021  Successful ultrasound guided non-tunneled right IJ temporary hemodialysis catheter placement. Catheter is ready for dialysis use at this time. MRI BRAIN WO CONTRAST    Result Date: 11/18/2021  There is no acute infarction, intracranial hemorrhage, or intracranial mass lesion. Mild chronic microangiopathic ischemic disease. Mild generalized volume loss. Mild mucosal thickening of bilateral mastoid air cells and right sphenoid sinus. IR GUIDED THORACENTESIS PLEURAL    Result Date: 11/17/2021  Successful ultrasound guided right thoracentesis.        Labs:  Recent Results (from the past 24 hour(s))   POC Glucose Fingerstick Collection Time: 11/22/21  4:02 PM   Result Value Ref Range    POC Glucose 245 (H) 65 - 105 mg/dL   POC Glucose Fingerstick    Collection Time: 11/22/21  8:37 PM   Result Value Ref Range    POC Glucose 244 (H) 65 - 105 mg/dL   Blood gas, arterial    Collection Time: 11/23/21  5:53 AM   Result Value Ref Range    pH, Arterial 7.441 7.350 - 7.450    pCO2, Arterial 38.2 35.0 - 45.0 mmHg    pO2, Arterial 110.0 (H) 80.0 - 100.0 mmHg    HCO3, Arterial 26.0 22.0 - 26.0 mmol/L    Positive Base Excess, Art 1.9 0.0 - 2.0 mmol/L    Negative Base Excess, Art NOT REPORTED 0.0 - 2.0 mmol/L    O2 Sat, Arterial 97.2 95 - 98 %    Total Hb NOT REPORTED 12.0 - 16.0 g/dl    Oxyhemoglobin NOT REPORTED 95.0 - 98.0 %    Carboxyhemoglobin 0.7 0 - 5 %    Methemoglobin 1.0 0.0 - 1.9 %    Pt Temp 37     pH, Art, Temp Adj NOT REPORTED 7.350 - 7.450    pCO2, Art, Temp Adj NOT REPORTED 35.0 - 45.0    pO2, Art, Temp Adj NOT REPORTED 80.0 - 100.0 mmHg    O2 Device/Flow/% VENTILATOR     Respiratory Rate 16     Rolf Test PASS     Sample Site Right Radial Artery     Pt.  Position SEMI-FOWLERS     Mode PRVC     Set Rate 16     Total Rate 16          FIO2 30     Peep/Cpap 7     PSV NOT REPORTED     Text for Respiratory RESULTS GIVEN TO RN      NOTIFICATION NOT REPORTED     NOTIFICATION TIME NOT REPORTED    CBC with DIFF    Collection Time: 11/23/21  6:46 AM   Result Value Ref Range    WBC 9.9 3.5 - 11.0 k/uL    RBC 2.52 (L) 4.0 - 5.2 m/uL    Hemoglobin 7.9 (L) 12.0 - 16.0 g/dL    Hematocrit 23.9 (L) 36 - 46 %    MCV 94.7 80 - 100 fL    MCH 31.1 26 - 34 pg    MCHC 32.8 31 - 37 g/dL    RDW 16.1 (H) 11.5 - 14.9 %    Platelets 56 (L) 658 - 450 k/uL    MPV 10.2 6.0 - 12.0 fL    NRBC Automated NOT REPORTED per 100 WBC    Differential Type NOT REPORTED     Immature Granulocytes NOT REPORTED 0 %    Absolute Immature Granulocyte NOT REPORTED 0.00 - 0.30 k/uL    WBC Morphology NOT REPORTED     RBC Morphology NOT REPORTED     Platelet Estimate NOT REPORTED     Seg Neutrophils 69 (H) 36 - 66 %    Lymphocytes 19 (L) 24 - 44 %    Monocytes 9 (H) 1 - 7 %    Eosinophils % 3 0 - 4 %    Basophils 0 0 - 2 %    Segs Absolute 6.83 1.3 - 9.1 k/uL    Absolute Lymph # 1.88 1.0 - 4.8 k/uL    Absolute Mono # 0.89 0.1 - 1.3 k/uL    Absolute Eos # 0.30 0.0 - 0.4 k/uL    Basophils Absolute 0.00 0.0 - 0.2 k/uL    Morphology ANISOCYTOSIS PRESENT     Morphology HYPOCHROMIA PRESENT     Morphology 1+ POLYCHROMASIA     Morphology BASOPHILIC STIPPLING PRESENT    Magnesium    Collection Time: 21  6:46 AM   Result Value Ref Range    Magnesium 2.2 1.6 - 2.6 mg/dL   Basic Metabolic Panel w/ Reflex to MG    Collection Time: 21  6:46 AM   Result Value Ref Range    Glucose 255 (H) 70 - 99 mg/dL    BUN 54 (H) 8 - 23 mg/dL    CREATININE 3.47 (H) 0.50 - 0.90 mg/dL    Bun/Cre Ratio NOT REPORTED 9 - 20    Calcium 7.8 (L) 8.6 - 10.4 mg/dL    Sodium 132 (L) 135 - 144 mmol/L    Potassium 3.7 3.7 - 5.3 mmol/L    Chloride 96 (L) 98 - 107 mmol/L    CO2 26 20 - 31 mmol/L    Anion Gap 10 9 - 17 mmol/L    GFR Non-African American 13 (L) >60 mL/min    GFR  16 (L) >60 mL/min    GFR Comment          GFR Staging NOT REPORTED    POC Glucose Fingerstick    Collection Time: 21  8:29 AM   Result Value Ref Range    POC Glucose 244 (H) 65 - 105 mg/dL   POC Glucose Fingerstick    Collection Time: 21 12:09 PM   Result Value Ref Range    POC Glucose 157 (H) 65 - 105 mg/dL       Physical Examination:        Vitals:  BP (!) 138/40   Pulse 77   Temp 97.6 °F (36.4 °C) (Axillary)   Resp 16   Ht 5' 3\" (1.6 m) Comment: from old chart  Wt 227 lb 1.2 oz (103 kg)   SpO2 100%   BMI 40.22 kg/m²   Temp (24hrs), Av °F (36.7 °C), Min:97.2 °F (36.2 °C), Max:98.4 °F (36.9 °C)    Recent Labs     21  1602 21  2037 21  0829 21  1209   POCGLU 245* 244* 244* 157*         Physical Exam  Vitals reviewed.    Constitutional:       Comments: Sedated   HENT:      Head: Normocephalic. Right Ear: External ear normal.      Left Ear: External ear normal.      Nose: Nose normal.      Mouth/Throat:      Mouth: Mucous membranes are moist.      Pharynx: Oropharynx is clear. Eyes:      Conjunctiva/sclera: Conjunctivae normal.   Cardiovascular:      Rate and Rhythm: Normal rate and regular rhythm. Pulses: Normal pulses. Heart sounds: Normal heart sounds. Pulmonary:      Breath sounds: Examination of the right-lower field reveals rales. Examination of the left-lower field reveals rales. Rales present. No wheezing. Comments: Intubated on ventilator  Abdominal:      General: Bowel sounds are normal.      Palpations: Abdomen is soft. Musculoskeletal:         General: No deformity. Cervical back: Normal range of motion and neck supple. Right lower leg: Edema (2+) present. Left lower leg: Edema (2+) present. Skin:     General: Skin is warm. Capillary Refill: Capillary refill takes less than 2 seconds. Coloration: Skin is not jaundiced. Neurological:      Comments: Sedated         Assessment:        Primary Problem  JAYLYN (acute kidney injury) ([facility] [address]     Principal Problem:    JAYLYN (acute kidney injury) ([facility] [address]  Active Problems:    Type 2 diabetes mellitus with kidney complication, with long-term current use of insulin (HCC)    CKD (chronic kidney disease) stage 3, GFR 30-59 ml/min ([facility])    HTN (hypertension)    ASCVD (arteriosclerotic cardiovascular disease)    Iron deficiency anemia    Hydronephrosis determined by ultrasound    Longstanding persistent atrial fibrillation (HCC)    Acute metabolic encephalopathy  Resolved Problems:    * No resolved hospital problems.  *      Past Medical History:   Diagnosis Date    Anemia, unspecified     CAD (coronary artery disease)     Chronic kidney disease     CKD (chronic kidney disease) stage 3, GFR 30-59 ml/min ([facility])     Closed fracture of dorsal (thoracic) vertebra without mention of spinal cord injury  Coronary atherosclerosis of unspecified type of vessel, native or graft     Depression with anxiety     Esophageal reflux     HTN (hypertension)     Hyperkalemia     Hyperlipidemia     Intervertebral cervical disc disorder with myelopathy, cervical region     Lower back pain     Lumbar radiculopathy     Microalbuminuria     Muscle weakness     Pain in limb     Stenosis of cervical spine     Tethered cord syndrome (HCC)     Type II or unspecified type diabetes mellitus without mention of complication, not stated as uncontrolled     Urinary calculus     UTI (lower urinary tract infection)         Plan:        1. IV cefepime, IV Zyvox  2. MRSA nasal swab positive  1. Blood culture 7/16/2021 neg  2. Thoracocentesis culture neg  3. Lantus 22 units subcu daily  4. Insulin sliding scale  5. Chest x-ray report 11/23/2021 shows bibasilar moderate effusion and infiltrates  6. Continue tube feed on current rate  7. Oncology input noted  8. DVT Prophylaxis no pharmacological DVT prophylaxis due to anemia and thrombocytopenia  9. EPCs  10. PT/OT to evaluate and treat  11. Pain control  12. Replace electrolytes as per sliding scale  13. Home medications reviewed and appropriate medications continued  14.  Reviewed labs and imaging studies from last 24 hours and results explained to patient     Electronically signed by Dot Cochran MD

## 2021-11-23 NOTE — PROGRESS NOTES
HEMODIALYSIS POST TREATMENT NOTE    Treatment time ordered: 3 Hours    Actual treatment time: 3 Hours    UltraFiltration Goal: 2000 ml  UltraFiltration Removed: 2000 ml      Pre Treatment weight: 103 kg  Post Treatment weight: 101 kg  Estimated Dry Weight:     Access used:     Central Venous Catheter:          Tunneled or Non-tunneled: Non Tunneled           Site: Right neck          Access Flow: Good      Internal Access:       AV Fistula or AV Graft:          Site:        Access Flow:        Sign and symptoms of infection:        If YES:     Medications or blood products given: Albumin 25 Gm X 1 and placed back on Levo gtt by primary Rn for BP support. Interventions were successful. Chronic outpatient schedule: JAYLYN, TBD    Chronic outpatient unit:     Summary of response to treatment: Patient tolerated treatment fairly as she had some episodes of hypotension in the beginning of treatment. Levo was restarted by primary RN and Albumin was ordered. Interventions were successful and treatment was able to be completed as prescribed. Vitals stable post blood return. Explain if orders NOT met, was physician notified:      Anupama Sarmiento faxed to patient unit/ placed in patient chart: Yes    Post assessment completed: Yes    Report given to: Larry Sanchez RN      * Intra-treatment documented Safety Checks include the followin) Access and face visible at all times. 2) All connections and blood lines are secure with no kinks. 3) NVL alarm engaged. 4) Hemosafe device applied (if applicable). 5) No collapse of Arterial or Venous blood chambers. 6) All blood lines / pump segments in the air detectors.

## 2021-11-23 NOTE — PROGRESS NOTES
Department of Internal Medicine  Nephrology [facility], MD  Progress Note    Reason for consultation: Management of acute kidney injury. Consulting physician: Kira Torres MD.    Interval history:   Patient seen and examined in the ICU today. Patient currently on hemodialysis, intradialytic hypotension noted systolic blood pressure drop to 80s Levophed was started currently patient is tolerating dialysis okay blood pressure has stabilized since Levophed has started  Scr has remains elevated to 3.4 mg/dL   ml in last 24 hours. 180 ml since morning in 5 hours. Patient remains edematous. On weaning trial.  CXR cardiomegaly  With basilar effusion and adjacent infiltrates    Patient had episode of bradycardia with transient PEA requiring a short session of CPR on 11/20/21  approximately 15 minutes into hemodialysis. patient is S/P  right ureteral stent placement with renal ultrasound on 11/18/2021 showing improvement in hydronephrosis. History of present illness: This is a 66 y.o. female with a significant past medical history of Type 2 diabetes mellitus, coronary artery disease, lumbar radiculopathy, Hyperlipidemia and chronic kidney disease stage III [baseline serum creatinine 1.5 mg/dL], who presented from [facility] for further evaluation of acute kidney injury. She was sent to [facility] after developing nonhealing wound s/p cardiac catheterization via right refill Levophed has been discontinued approach with right groin wound. She had been on Bumex in the ECF and creatinine and BUN have been rising and Bumex was decreased to a lowest dose of 0.5 mg p.o. twice daily. She has had regular blood work performed in the [facility] and ECF contacted nephrologist after finding abnormal laboratory studies. She was noted to have a BUN/creatinine 104/2.28 mg/dL with hemoglobin 8 g/dL. She has mild confusion but is not in obvious respiratory distress.     Scheduled Meds:   albumin human  25 g IntraVENous Once in dialysis    linezolid  600 mg IntraVENous Q12H    chlorhexidine  15 mL Mouth/Throat BID    polyvinyl alcohol  1 drop Both Eyes Q4H    And    artificial tears   Both Eyes Q4H    carvedilol  3.125 mg Oral BID WC    pantoprazole  40 mg IntraVENous BID    And    sodium chloride flush  10 mL IntraVENous BID    sodium polystyrene  15 g Oral Once    cefepime  1,000 mg IntraVENous Q24H    sertraline  25 mg Oral Daily    ferrous sulfate  325 mg Oral Daily with breakfast    [Held by provider] aspirin  81 mg Oral Daily    atorvastatin  80 mg Oral Daily    insulin glargine  22 Units SubCUTAneous Nightly    insulin lispro  0-12 Units SubCUTAneous TID     insulin lispro  0-6 Units SubCUTAneous Nightly    sodium chloride flush  5-40 mL IntraVENous 2 times per day     Continuous Infusions:   norepinephrine 2 mcg/min (11/23/21 1046)    propofol 5 mcg/kg/min (11/23/21 1045)    dextrose      sodium chloride      sodium chloride       PRN Meds:.midodrine, sodium citrate, sodium citrate, fentanNYL, potassium chloride **OR** potassium alternative oral replacement **OR** potassium chloride, magnesium sulfate, glucose, dextrose, glucagon (rDNA), dextrose, acetaminophen, sodium chloride, sodium chloride flush, sodium chloride, acetaminophen, ondansetron **OR** ondansetron, oxyCODONE-acetaminophen    Physical Exam:    VITALS:  BP (!) (P) 136/51   Pulse 75   Temp 98.1 °F (36.7 °C) (Axillary)   Resp 16   Ht 5' 3\" (1.6 m) Comment: from old chart  Wt 227 lb 1.2 oz (103 kg)   SpO2 100%   BMI 40.22 kg/m²   24HR INTAKE/OUTPUT:      Intake/Output Summary (Last 24 hours) at 11/23/2021 1211  Last data filed at 11/23/2021 0817  Gross per 24 hour   Intake 1580 ml   Output 405 ml   Net 1175 ml     Constitutional: lethargic responsive, obeying commands on mechanical ventilator    Skin: Skin color, texture, turgor normal. No rashes or lesions    Head: Normocephalic, without obvious abnormality, atraumatic     Cardiovascular/Edema:S1 and S2 heard regular    Respiratory: Lungs:Diminished air entry basal rales    Abdomen: soft, non-tender; bowel sounds normal; no masses,  no organomegaly    Back: symmetric, no curvature. ROM normal. No CVA tenderness. Extremities: 3+ generalized edema    Neuro:  Grossly normal    CBC:   Recent Labs     11/21/21  0549 11/22/21  0539 11/23/21  0646   WBC 11.5* 9.3 9.9   HGB 7.9* 7.9* 7.9*   PLT 52* 53* 56*     BMP:    Recent Labs     11/21/21  0549 11/21/21  0549 11/21/21  1145 11/22/21  0539 11/23/21  0646     --   --  132* 132*   K 3.3*   < > 3.9 3.7 3.7     --   --  99 96*   CO2 23  --   --  25 26   BUN 38*  --   --  46* 54*   CREATININE 3.13*  --   --  3.65* 3.47*   GLUCOSE 126*  --   --  236* 255*    < > = values in this interval not displayed. Lab Results   Component Value Date    NITRU NEGATIVE 11/12/2021    COLORU Yellow 11/12/2021    PHUR 6.0 11/12/2021    WBCUA 50  11/12/2021    RBCUA 20 TO 50 11/12/2021    MUCUS NOT REPORTED 11/12/2021    TRICHOMONAS NOT REPORTED 11/12/2021    YEAST NOT REPORTED 11/12/2021    BACTERIA MODERATE 11/12/2021    CLARITYU Clear 09/07/2016    SPECGRAV 1.012 11/12/2021    LEUKOCYTESUR LARGE 11/12/2021    UROBILINOGEN Normal 11/12/2021    BILIRUBINUR NEGATIVE 11/12/2021    BLOODU Positive 09/07/2016    GLUCOSEU NEGATIVE 11/12/2021    KETUA NEGATIVE 11/12/2021    AMORPHOUS NOT REPORTED 11/12/2021     IMPRESSION/RECOMMENDATIONS:      1. Acute kidney injury - most consistent with Ischemic ATN and obstructive nephropathy secondary to bilateral hydronephrosis. Patient remains anuric- renal infarct and renal cortical necrosis are differentials. Repeat renal ultrasound  11/18/2021 shows improvement in hydronephrosis     Patient was started on acute dialysis on 11/17/2021-Patient had HD on 11/18,11/19 and only 15 mins HD on 11/20/21    2. CHF with reduced EF, Systolic dysfunction, diastolic dysfunction.     3. PEA/Cardiac Arrest s/p CPR recovered. 4. Acute respiratory failure s/p intubation on Ventilator. 5. Normocytic anemia/Thrombocytopenia, Heme-oncology following, No hemolysis found, HIT panel negative,    Continue Nepro tube feeding  2. Hypotension- on levophed    3. Bilateral hydronephrosis status post cystoscopy with right ureteral  stent placement. 5. Anion gap metabolic acidosis improved with dialysis    6. Anasarca secondary to cardiomyopathy-EF of 30-35 % , third spacing renal failure    8. Acute metabolic encephalopathy-secondary to uremia-rule out sepsis. IV linezolid started today , continue IV cefepime    9. Hypokalemia-Improved after correction today. Plan:  Hemodialysis as per orders 2 L ultrafiltration goal as tolerated  Levophed to keep map above 65 mmHg  With IV albumin as needed for hypotension  Strict input and output   Basic metabolic profile daily. Prognosis is guarded.   Retacrit with dialysis 3 times a week      Carlton Laws MD   Attending Nephrologist  11/23/2021 12:11 PM

## 2021-11-23 NOTE — PROGRESS NOTES
[facility] CARDIOLOGY Progress Note    11/23/2021 7:25 AM      Subjective:  Ms. Andrew Dalal intubated but nods head appropriately. Patient denies any chest pain. nurse at bedside updated overnight events. Off of IV Levophed. Plan is to wean off the ventilator today after dialysis as patient had PA arrest with hemodialysis last Saturday, per nurse's report. LABS:     Recent Results (from the past 24 hour(s))   POC Glucose Fingerstick    Collection Time: 11/22/21  8:26 AM   Result Value Ref Range    POC Glucose 242 (H) 65 - 105 mg/dL   POC Glucose Fingerstick    Collection Time: 11/22/21 11:02 AM   Result Value Ref Range    POC Glucose 235 (H) 65 - 105 mg/dL   POC Glucose Fingerstick    Collection Time: 11/22/21  4:02 PM   Result Value Ref Range    POC Glucose 245 (H) 65 - 105 mg/dL   POC Glucose Fingerstick    Collection Time: 11/22/21  8:37 PM   Result Value Ref Range    POC Glucose 244 (H) 65 - 105 mg/dL   Blood gas, arterial    Collection Time: 11/23/21  5:53 AM   Result Value Ref Range    pH, Arterial 7.441 7.350 - 7.450    pCO2, Arterial 38.2 35.0 - 45.0 mmHg    pO2, Arterial 110.0 (H) 80.0 - 100.0 mmHg    HCO3, Arterial 26.0 22.0 - 26.0 mmol/L    Positive Base Excess, Art 1.9 0.0 - 2.0 mmol/L    Negative Base Excess, Art NOT REPORTED 0.0 - 2.0 mmol/L    O2 Sat, Arterial 97.2 95 - 98 %    Total Hb NOT REPORTED 12.0 - 16.0 g/dl    Oxyhemoglobin NOT REPORTED 95.0 - 98.0 %    Carboxyhemoglobin 0.7 0 - 5 %    Methemoglobin 1.0 0.0 - 1.9 %    Pt Temp 37     pH, Art, Temp Adj NOT REPORTED 7.350 - 7.450    pCO2, Art, Temp Adj NOT REPORTED 35.0 - 45.0    pO2, Art, Temp Adj NOT REPORTED 80.0 - 100.0 mmHg    O2 Device/Flow/% VENTILATOR     Respiratory Rate 16     Rolf Test PASS     Sample Site Right Radial Artery     Pt.  Position SEMI-FOWLERS     Mode PRVC     Set Rate 16     Total Rate 16          FIO2 30     Peep/Cpap 7     PSV NOT REPORTED     Text for Respiratory RESULTS GIVEN TO RN      NOTIFICATION NOT REPORTED     NOTIFICATION TIME NOT REPORTED    CBC with DIFF    Collection Time: 21  6:46 AM   Result Value Ref Range    WBC 9.9 3.5 - 11.0 k/uL    RBC 2.52 (L) 4.0 - 5.2 m/uL    Hemoglobin 7.9 (L) 12.0 - 16.0 g/dL    Hematocrit 23.9 (L) 36 - 46 %    MCV 94.7 80 - 100 fL    MCH 31.1 26 - 34 pg    MCHC 32.8 31 - 37 g/dL    RDW 16.1 (H) 11.5 - 14.9 %    Platelets 56 (L) 632 - 450 k/uL    MPV 10.2 6.0 - 12.0 fL    NRBC Automated NOT REPORTED per 100 WBC    Differential Type NOT REPORTED     Seg Neutrophils PENDING %    Lymphocytes PENDING %    Monocytes PENDING %    Eosinophils % PENDING %    Basophils PENDING %    Immature Granulocytes NOT REPORTED 0 %    Segs Absolute PENDING k/uL    Absolute Lymph # PENDING k/uL    Absolute Mono # PENDING k/uL    Absolute Eos # PENDING k/uL    Basophils Absolute PENDING 0.0 - 0.2 k/uL    Absolute Immature Granulocyte NOT REPORTED 0.00 - 0.30 k/uL    WBC Morphology NOT REPORTED     RBC Morphology NOT REPORTED     Platelet Estimate NOT REPORTED    Magnesium    Collection Time: 21  6:46 AM   Result Value Ref Range    Magnesium 2.2 1.6 - 2.6 mg/dL   Basic Metabolic Panel w/ Reflex to MG    Collection Time: 21  6:46 AM   Result Value Ref Range    Glucose 255 (H) 70 - 99 mg/dL    BUN 54 (H) 8 - 23 mg/dL    CREATININE 3.47 (H) 0.50 - 0.90 mg/dL    Bun/Cre Ratio NOT REPORTED 9 - 20    Calcium 7.8 (L) 8.6 - 10.4 mg/dL    Sodium 132 (L) 135 - 144 mmol/L    Potassium 3.7 3.7 - 5.3 mmol/L    Chloride 96 (L) 98 - 107 mmol/L    CO2 26 20 - 31 mmol/L    Anion Gap 10 9 - 17 mmol/L    GFR Non-African American 13 (L) >60 mL/min    GFR  16 (L) >60 mL/min    GFR Comment          GFR Staging NOT REPORTED        Pulse Ox:  SpO2  Av.6 %  Min: 92 %  Max: 100 %    Supplemental O2: O2 Flow Rate (L/min): 2 L/min     Current Meds:    linezolid  600 mg IntraVENous Q12H    chlorhexidine  15 mL Mouth/Throat BID    famotidine (PEPCID) injection  20 mg IntraVENous Daily    polyvinyl alcohol  1 drop Both Eyes Q4H    And    artificial tears   Both Eyes Q4H    carvedilol  3.125 mg Oral BID WC    pantoprazole  40 mg IntraVENous BID    And    sodium chloride flush  10 mL IntraVENous BID    sodium polystyrene  15 g Oral Once    cefepime  1,000 mg IntraVENous Q24H    sertraline  25 mg Oral Daily    ferrous sulfate  325 mg Oral Daily with breakfast    [Held by provider] aspirin  81 mg Oral Daily    atorvastatin  80 mg Oral Daily    insulin glargine  22 Units SubCUTAneous Nightly    insulin lispro  0-12 Units SubCUTAneous TID WC    insulin lispro  0-6 Units SubCUTAneous Nightly    sodium chloride flush  5-40 mL IntraVENous 2 times per day         Continuous Infusions:    norepinephrine Stopped (11/22/21 1811)    propofol 5 mcg/kg/min (11/22/21 1956)    dextrose      sodium chloride      sodium chloride                VITAL SIGNS:    BP (!) 165/49   Pulse 70   Temp 98.4 °F (36.9 °C) (Oral)   Resp 16   Ht 5' 3\" (1.6 m) Comment: from old chart  Wt 213 lb 10 oz (96.9 kg)   SpO2 100%   BMI 37.84 kg/m²  2 L/min      Admit Weight:  188 lb (85.3 kg)    Last 3 weights: Wt Readings from Last 3 Encounters:   11/20/21 213 lb 10 oz (96.9 kg)   09/07/16 178 lb (80.7 kg)   05/02/16 184 lb 12.8 oz (83.8 kg)       BMI: Body mass index is 37.84 kg/m². INPUT/OUTPUT:          Intake/Output Summary (Last 24 hours) at 11/23/2021 0725  Last data filed at 11/22/2021 2015  Gross per 24 hour   Intake 1085.9 ml   Output 385 ml   Net 700.9 ml         Telemetry shows  Atrial fibrillation with controlled ventricular rate and occasional PVCs. EXAM:     General appearance:  Intubated, awake, alert. Pleasant. Nods head appropriately. Chest: clear bilaterally. No tenderness. No rhonchi or wheezing. Cardiac: irregularly irregular rate and rhythm. No S3 gallop or rubs. Abdomen: soft, non-tender.   Extremities: no cyanosis, no clubbing, no calf tenderness. Improved bilateral lower leg edema. Skin:  warm and dry. Neuro:  Able to move all 4 extremities. 2 D Echocardiogram Nov 2021:     Summary  Echo contrast utilized on this technically difficult study. Left ventricle is normal in size. Global hypokinesis, Severe apical hypokinesis  Estimated LV EF 30-35%. Mild left ventricular hypertrophy. Right ventricular dilatation with normal systolic function. Mild to moderate Left and Right atrial dilatation. Aneurysmal interatrial septum. Positive bubble study with and without  Valsalva maneuver suggestive of Rt to Lt inter-atrial shunt. Normal aortic valve structure and function without stenosis or  regurgitation. Normal aortic root dimension. Mitral annular calcification is seen. Mild mitral regurgitation. Grossly normal tricuspid valve structure and function. Mild to moderate tricuspid regurgitation. Estimated right ventricular systolic pressure is 39 mmHg. Mildly elevated  right ventricular systolic pressure. IVC normal diameter & inspiratory collapse indicating normal RA filling  pressure . No significant pericardial effusion is seen. Pleural effusion noted.       ASSESSMENT:    Nonsustained ventricular tachycardia, PVCs.       Longstanding, persistent atrial fibrillation with controlled ventricular rate.  Was on chronic oral anticoagulants which is currently on hold due to GI bleeding and severe anemia. History of nonischemic cardiomyopathy with severe left ventricular systolic dysfunction with LVEF 20 to 25% on limited 2D echocardiogram November 2021 and LVEF 25 to 30% on 2D echocardiogram from February 2021 as charted in the EHR. ASCVD, prior stents with unknown details.  No active angina pectoris. Chronic systolic heart failure with reduced LVEF 20 to 25%. Declined AICD in the past due to financial concerns for E HR data and per records from March 2019 reportedly. History of chronic troponinemia.     Large fixed severe anteroseptal/apical defect consistent with old infarction, no ischemia on nuclear stress test September 2021 at [facility]. Abnormal EKG including atrial fibrillation, right bundle branch block.     Acute on chronic respiratory failure with hypoxemia requiring intubation and mechanical ventilation. History of peripheral arterial disease with occlusive bilateral lower extremity disease and followed up at [facility] vascular division. History of cerebrovascular accident February 2021 with right lacunar infarction. Acute on chronic kidney disease.  Started on dialysis this admission. Management per nephrologist.    Acute on chronic anemia with GI work-up.  Mild diffuse gastritis on EGD November 15, 2021. Diabetes mellitus.     Obesity with BMI 37 range. Other problems as charted. REC/PLAN:    Improving slowly but steadily. Agree with plans to extubate after hemodialysis later today, as reported to me by the nurse at bedside. Continue on all current cardiac medications. Maintain potassium level around 4.0 and magnesium around 2.0 range as previously mentioned. Will follow. Electronically signed by Ankit Felton MD, [facility] [place]        PLEASE NOTE:  This progress note was completed using a voice transcription system. Every effort was made to ensure accuracy. However, inadvertent computerized transcription errors may be present.

## 2021-11-23 NOTE — PROGRESS NOTES
HEMODIALYSIS PRE-TREATMENT NOTE    Patient Identifiers prior to treatment: Name, , MRN    Isolation Required:  Yes                      Isolation Type: Contact, MRSA       (please document if patient is being managed as a PUI/COVID-19 patient)        Hepatitis status:                           Date Drawn                             Result  Hepatitis B Surface Antigen 21     Negative                     Hepatitis B Surface Antibody 21 Negative        Hepatitis B Core Antibody 21 Negative          How was Hepatitis Status verified: Epic     Was a copy of the labs you documented provided to facility for the patient's chart:     Hemodialysis orders verified: Yes    Access Within normal limits ( I.e. s/s of infection,...): Non tunneled CVC to right neck  Pre-Assessment completed: Yes    Pre-dialysis report received from: Chai Encarnacion RN                       Time: 930

## 2021-11-23 NOTE — PROGRESS NOTES
ICU Progress Note (Vent)   [facility]    Patient - James Quarles,  Age - 66 y.o.    - 1950      Room Number -    MRN -  [id number]   Acct # - [de-identified]  Date of Admission -  11/10/2021  4:23 PM    Events of Past 24 Hours   Patient appears to be opening her eyes to name. Reportedly followed commands earlier. Currently on dialysis. Dialysis nurse informed me that dialysis will be running up until 1:15 PM and the goal is to remove 2 L. Patient did not get dialysis yesterday. Vitals    height is 5' 3\" (1.6 m) and weight is 213 lb 10 oz (96.9 kg). Her axillary temperature is 98.1 °F (36.7 °C). Her blood pressure is 144/38 (abnormal) and her pulse is 62. Her respiration is 16 and oxygen saturation is 100%. Temperature Range: Temp: 98.1 °F (36.7 °C) Temp  Av °F (36.7 °C)  Min: 97.2 °F (36.2 °C)  Max: 98.4 °F (36.9 °C)  BP Range:  Systolic (51VUY), NNM:128 , Min:86 , HDE:400     Diastolic (20RLE), KWW:46, Min:32, Max:77    Pulse Range: Pulse  Av.4  Min: 61  Max: 89  Respiration Range: Resp  Av  Min: 14  Max: 26  Current Pulse Ox[de-identified]  SpO2: 100 %  24HR Pulse Ox Range:  SpO2  Av.8 %  Min: 92 %  Max: 100 %  Oxygen Amount and Delivery: O2 Flow Rate (L/min): 2 L/min      Wt Readings from Last 3 Encounters:   21 213 lb 10 oz (96.9 kg)   16 178 lb (80.7 kg)   16 184 lb 12.8 oz (83.8 kg)     I/O       Intake/Output Summary (Last 24 hours) at 2021 1044  Last data filed at 2021 0817  Gross per 24 hour   Intake 1580 ml   Output 405 ml   Net 1175 ml     I/O last 3 completed shifts:   In: 1085.9 [I.V.:564.9; NG/GT:521]  Out: 385 [Urine:150; Stool:235]     DRAIN/TUBE OUTPUT:     Invasive Lines   ETT Day -   3  PICC line and dialysis catheter in place    Mechanical Ventilation Data   SETTINGS (Comprehensive)  Vent Information  $Ventilation: $Subsequent Day  Skin Assessment: Clean, dry, & intact  Equipment Changed: HME  Vent Type: Servo i  Vent Mode: PRVC  Vt Ordered: 500 mL  Rate Set: 16 bmp  Pressure Support: 7 cmH20  FiO2 : 30 %  SpO2: 100 %  SpO2/FiO2 ratio: 333.33  Sensitivity: 5  PEEP/CPAP: 7  I Time/ I Time %: 1.25 s  Humidification Source: HME  Nitric Oxide/Epoprostenol In Use?: No  Mask Type: Full face mask  Mask Size: Medium  Additional Respiratory  Assessments  Pulse: 62  Resp: 16  SpO2: 100 %  End Tidal CO2: 31 (%)  Position: Semi-Lagunas's  Humidification Source: HME  Oral Care: Mouth swabbed, Mouth suctioned  Cuff Pressure (cm H2O): 30 cm H2O       ABGs:   Lab Results   Component Value Date    PHART 7.441 11/23/2021    PO2ART 110.0 11/23/2021    SBK4FRR 38.2 11/23/2021       Lab Results   Component Value Date    MODE PRV 11/23/2021         Medications   IV   norepinephrine Stopped (11/22/21 1811)    propofol 5 mcg/kg/min (11/22/21 1956)    dextrose      sodium chloride      sodium chloride        linezolid  600 mg IntraVENous Q12H    chlorhexidine  15 mL Mouth/Throat BID    famotidine (PEPCID) injection  20 mg IntraVENous Daily    polyvinyl alcohol  1 drop Both Eyes Q4H    And    artificial tears   Both Eyes Q4H    carvedilol  3.125 mg Oral BID WC    pantoprazole  40 mg IntraVENous BID    And    sodium chloride flush  10 mL IntraVENous BID    sodium polystyrene  15 g Oral Once    cefepime  1,000 mg IntraVENous Q24H    sertraline  25 mg Oral Daily    ferrous sulfate  325 mg Oral Daily with breakfast    [Held by provider] aspirin  81 mg Oral Daily    atorvastatin  80 mg Oral Daily    insulin glargine  22 Units SubCUTAneous Nightly    insulin lispro  0-12 Units SubCUTAneous TID     insulin lispro  0-6 Units SubCUTAneous Nightly    sodium chloride flush  5-40 mL IntraVENous 2 times per day       Diet/Nutrition   ADULT TUBE FEEDING; Nasogastric; Renal Formula; Continuous; 35; No; 30; Q 8 hours; Protein; 1 Protein at 12 noon daily. Flush with 30 mLwater before and after administration.     Exam   VITALS height is 5' 3\" (1.6 m) and weight is 213 lb 10 oz (96.9 kg). Her axillary temperature is 98.1 °F (36.7 °C). Her blood pressure is 144/38 (abnormal) and her pulse is 62. Her respiration is 16 and oxygen saturation is 100%. Ventilator Settings (Basic)  Vent Mode: PRVC Rate Set: 16 bmp/Vt Ordered: 500 mL/ /FiO2 : 30 %    Constitutional - Sedated  General Appearance  well developed, well nourished  HEENT - Life support devices in place (ET),normocephalic, atraumatic. Lungs - Chest expands equally, no wheezes, rales or rhonchi. Cardiovascular - Heart sounds are normal.  normal rate and rhythm regular, no murmur,   Abdomen - soft, nontender,  Extremities - no cyanosis, clubbing     Lab Results   CBC     Lab Results   Component Value Date    WBC 9.9 11/23/2021    RBC 2.52 11/23/2021    RBC 4.14 08/31/2016    HGB 7.9 11/23/2021    HCT 23.9 11/23/2021    PLT 56 11/23/2021    MCV 94.7 11/23/2021    MCH 31.1 11/23/2021    MCHC 32.8 11/23/2021    RDW 16.1 11/23/2021    NRBC 4 11/22/2021    LYMPHOPCT 19 11/23/2021    LYMPHOPCT 32.8 08/31/2016    MONOPCT 9 11/23/2021    EOSPCT 4.3 08/31/2016    BASOPCT 0 11/23/2021    BASOPCT 0.7 08/31/2016    MONOSABS 0.89 11/23/2021    LYMPHSABS 1.88 11/23/2021    EOSABS 0.30 11/23/2021    BASOSABS 0.00 11/23/2021    DIFFTYPE NOT REPORTED 11/23/2021       BMP   Lab Results   Component Value Date     11/23/2021    K 3.7 11/23/2021    CL 96 11/23/2021    CO2 26 11/23/2021    BUN 54 11/23/2021    CREATININE 3.47 11/23/2021    GLUCOSE 255 11/23/2021    GLUCOSE 77 08/31/2016    CALCIUM 7.8 11/23/2021       LFTS  Lab Results   Component Value Date    ALKPHOS 175 11/18/2021    ALT 31 11/18/2021    AST 21 11/18/2021    PROT 5.3 11/20/2021    BILITOT 0.27 11/18/2021    BILIDIR <0.08 11/13/2021    IBILI Connot be calculated 11/13/2021    LABALBU 2.6 11/18/2021       INR  No results for input(s): PROTIME, INR in the last 72 hours. APTT  No results for input(s):  APTT in the last 72 hours. Lactic Acid  Lab Results   Component Value Date    LACTA 0.6 11/16/2021    LACTA 0.6 11/16/2021        BNP   No results for input(s): BNP in the last 72 hours. Cultures   Cultures x2, November 16, negative  Culture, November 20, normal matt  MRSA probe, November 21, positive  Radiology     Plain Films  Endotracheal tube 21 mm. No acute process    SYSTEM ASSESSMENT    Acute on chronic hypoxic and hypercapnic respiratory failure  Bradycardic arrest, intubated 11/20  Right lower lobe infiltrate/pleural effusion  Acute kidney injury-requiring hemodialysis  Morbid obesity  Anemia, chronic disease and possible rectal bleed  Hypoalbuminemia  Thrombocytopenia  Nonischemic cardiomyopathy with EF of 30 to 35% possible right to left interatrial shunt  Atrial fibrillation     FULL CODE      Neuro   Wise, not deteriorating. Respiratory  Our hope is to wean postdialysis    Cardiovascular  Levophed actually started during dialysis. We will try midodrine  Does have a history of his systolic heart failure. Gastrointestinal/Nutrition     Renal  Acute kidney injury on top of chronic renal insufficiency currently on dialysis    Infectious Disease   On cefepime    Hematology/Oncology   Thrombocytopenia count 56  Panel is negative, no evidence of hemolysis. Patient thrombocytopenia perhaps may be from bone marrow suppression. Hematology recommending transfusion if surgical procedure needed or if bleeding or blood count less than 15 K  EPC cuffs  Endocrine       Social/Spiritual/DNR/Disposition/Other     Patient's  Mr. Maria R Franklin, updated. ---[facility] # [phone], cell # [phone].  [id number]  did ask him questions which were answered. He voiced appreciation of the staff care.     Critical Care Time   35 min    Electronically signed by Jacquelin Martin MD on 11/23/2021 at 10:44 AM

## 2021-11-23 NOTE — PLAN OF CARE
Problem: Falls - Risk of:  Goal: Will remain free from falls  Description: Will remain free from falls  Outcome: Met This Shift     Problem: Skin Integrity:  Goal: Absence of new skin breakdown  Description: Absence of new skin breakdown  Outcome: Ongoing     Problem: Gas Exchange - Impaired:  Goal: Levels of oxygenation will improve  Description: Levels of oxygenation will improve  Outcome: Met This Shift     Problem: Non-Violent Restraints  Goal: Removal from restraints as soon as assessed to be safe  Outcome: Not Met This Shift  Note: Patient remains in restraints due to pulling at tubes and lines. See restraint charting, continue to monitor closely.       Problem: Fluid Volume - Imbalance:  Goal: Absence of imbalanced fluid volume signs and symptoms  Description: Absence of imbalanced fluid volume signs and symptoms  Outcome: Ongoing     Problem: OXYGENATION/RESPIRATORY FUNCTION  Goal: Patient will maintain patent airway  11/23/2021 0615 by Radhika Jama RN  Outcome: Met This Shift     Problem: MECHANICAL VENTILATION  Goal: ET tube will be managed safely  11/23/2021 0615 by Radhika Jama RN  Outcome: Met This Shift

## 2021-11-23 NOTE — CARE COORDINATION
ONGOING DISCHARGE PLAN:    Intubated on vent . Pt to have HD today and possible extubation following HD. Currently on IV cefepime and IV Zyvox. LSW following for [facility]. Will continue to follow for additional discharge needs.     Electronically signed by Joana Adair RN on 11/23/2021 at 11:13 AM

## 2021-11-23 NOTE — PROGRESS NOTES
Today's Date: 11/23/2021  Patient Name: Hui David  Date of admission: 11/10/2021  4:23 PM  Patient's age: 66 y.o., 1950  Admission Dx: JAYLYN (acute kidney injury) ([facility] [address] [N17.9]  Urinary tract infection in elderly patient [N39.0]  Anemia, unspecified type [D64.9]    Reason for Consult: TCP  Requesting Physician: Priscilla Maxwell MD    CHIEF COMPLAINT:    Chief Complaint   Patient presents with    Abnormal Lab     low hemoglobin and high CR       SUBJECTIVE:    Patient seen and examined  Currently she is intubated and sedated  Flow cytometry and P blood smear reviewed  Her platelets are stable  No active bleeding noted  Awake and likley extubated today    HISTORY OF PRESENT ILLNESS:    Hui David is a 66 y.o. female who is admitted to the hospital on 11/10/2021  for acute kidney injury and anemia. Patient has history of chronic kidney disease, chronic anemia and recent history of GI bleed with negative endoscopies at [facility]. She was transferred from nursing home for elevated creatinine and drop in her hemoglobin. Her stool for occult blood on 11/14/2021 was positive. She was evaluated by gastroenterology. Underwent upper endoscopy which showed gastritis. She also was noted to have hydronephrosis on the right side therefore was evaluated by urology and underwent cystoscopy with ureteral stent placement on 11/16/2021. Patient was evaluated by nephrology for her acute kidney injury and was started on acute dialysis on 11/17/2021. Her hemoglobin is stable at 8.0 however her platelets started dropping since admission and today it dropped to 49 therefore hematology was consulted for evaluation of her low platelets. Her iron studies suggest anemia of chronic disease. Her haptoglobin is 125 and LDH is not elevated  Her HIT panel came back negative.     Past Medical History:   has a past medical history of Anemia, unspecified, CAD (coronary artery disease), Chronic kidney disease, CKD (chronic kidney disease) stage 3, GFR 30-59 ml/min ([facility]), Closed fracture of dorsal (thoracic) vertebra without mention of spinal cord injury, Coronary atherosclerosis of unspecified type of vessel, native or graft, Depression with anxiety, Esophageal reflux, HTN (hypertension), Hyperkalemia, Hyperlipidemia, Intervertebral cervical disc disorder with myelopathy, cervical region, Lower back pain, Lumbar radiculopathy, Microalbuminuria, Muscle weakness, Pain in limb, Stenosis of cervical spine, Tethered cord syndrome (HCC), Type II or unspecified type diabetes mellitus without mention of complication, not stated as uncontrolled, Urinary calculus, and UTI (lower urinary tract infection). Past Surgical History:   has a past surgical history that includes Colon surgery; angioplasty; joint replacement; Upper gastrointestinal endoscopy (N/A, 11/15/2021); Cystoscopy (Right, 11/16/2021); and IR NONTUNNELED VASCULAR CATHETER > 5 YEARS (11/17/2021). Medications:    Prior to Admission medications    Medication Sig Start Date End Date Taking? Authorizing Provider   acetaminophen (TYLENOL) 325 MG tablet Take 650 mg by mouth every 4 hours as needed for Pain (do not exceed 4000 mg daily)   Yes Historical Provider, MD   albuterol (PROVENTIL) (2.5 MG/3ML) 0.083% nebulizer solution Take 2.5 mg by nebulization every 6 hours as needed for Shortness of Breath   Yes Historical Provider, MD   atorvastatin (LIPITOR) 80 MG tablet Take 80 mg by mouth daily   Yes Historical Provider, MD   famotidine (PEPCID) 20 MG tablet Take 20 mg by mouth daily   Yes Historical Provider, MD   ferrous sulfate (IRON 325) 325 (65 Fe) MG tablet Take 325 mg by mouth daily (with breakfast)   Yes Historical Provider, MD   HYDROcodone-acetaminophen (NORCO) 5-325 MG per tablet Take 1 tablet by mouth every 6 hours as needed for Pain.  Indications: until 11/13/21   Yes Historical Provider, MD   insulin lispro (HUMALOG) 100 UNIT/ML injection vial Inject into the skin 4 times daily (before meals and nightly) Per sliding scale:   150-200 = 2 units  201-250 = 4 units  251-300 = 6 units  301-350 = 8 units  351-400 = 10 units   Yes Historical Provider, MD   insulin glargine (LANTUS SOLOSTAR) 100 UNIT/ML injection pen Inject 22 Units into the skin nightly   Yes Historical Provider, MD   meclizine (ANTIVERT) 12.5 MG tablet Take 12.5 mg by mouth 2 times daily as needed for Dizziness   Yes Historical Provider, MD   warfarin (COUMADIN) 3 MG tablet Take 3 mg by mouth nightly   Yes Historical Provider, MD   carvedilol (COREG) 6.25 MG tablet Take 6.25 mg by mouth 2 times daily (with meals).    Yes Historical Provider, MD   aspirin 81 MG chewable tablet Take 81 mg by mouth daily    Yes Historical Provider, MD   bumetanide (BUMEX) 1 MG tablet Take 1 mg by mouth daily   11/10/21  Historical Provider, MD     Current Facility-Administered Medications   Medication Dose Route Frequency Provider Last Rate Last Admin    norepinephrine (LEVOPHED) 16 mg in sodium chloride 0.9 % 250 mL infusion  2-100 mcg/min IntraVENous Continuous Los Guerra MD 1.9 mL/hr at 11/23/21 1046 2 mcg/min at 11/23/21 1046    linezolid (ZYVOX) IVPB 600 mg  600 mg IntraVENous Q12H Horacio Rios MD   Stopped at 11/23/21 0906    midodrine (PROAMATINE) tablet 5 mg  5 mg Oral TID PRN Dany Duncan MD   5 mg at 11/23/21 1049    sodium citrate 4 % injection 1.3 mL  1.3 mL IntraCATHeter PRN Lavell Jimenez MD   1.3 mL at 11/20/21 1958    sodium citrate 4 % injection 1.2 mL  1.2 mL IntraCATHeter PRN Lavell Jimenez MD   1.2 mL at 11/20/21 1959    chlorhexidine (PERIDEX) 0.12 % solution 15 mL  15 mL Mouth/Throat BID Horacio Rios MD   15 mL at 11/23/21 0807    fentaNYL (SUBLIMAZE) injection 50 mcg  50 mcg IntraVENous Q30 Min PRN Horacio Rios MD   50 mcg at 11/22/21 0000    propofol injection  10 mcg/kg/min IntraVENous Continuous Horacio Rios MD 2.9 mL/hr at 11/23/21 1045 5 mcg/kg/min at 11/23/21 1045    polyvinyl alcohol (LIQUIFILM TEARS) 1.4 % ophthalmic solution 1 drop  1 drop Both Eyes Q4H Sumaya Ayala MD   1 drop at 11/23/21 0813    And    lubrifresh P.M. (artificial tears) ophthalmic ointment   Both Eyes Q4H Sumaya Ayala MD   Given at 11/22/21 1815    carvedilol (COREG) tablet 3.125 mg  3.125 mg Oral BID  JUANITA Holbrook - CNP   3.125 mg at 11/22/21 0802    pantoprazole (PROTONIX) injection 40 mg  40 mg IntraVENous BID Jesus Kaur MD   40 mg at 11/23/21 6672    And    sodium chloride flush 0.9 % injection 10 mL  10 mL IntraVENous BID Jesus Kaur MD   10 mL at 11/23/21 0807    potassium chloride (KLOR-CON M) extended release tablet 40 mEq  40 mEq Oral PRN Jesus Kaur MD        Or    potassium bicarb-citric acid (EFFER-K) effervescent tablet 40 mEq  40 mEq Oral PRN Jesus Kaur MD   20 mEq at 11/21/21 1659    Or    potassium chloride 10 mEq/100 mL IVPB (Peripheral Line)  10 mEq IntraVENous PRN Jesus Kaur  mL/hr at 11/23/21 0807 10 mEq at 11/23/21 0807    magnesium sulfate 1000 mg in dextrose 5% 100 mL IVPB  1,000 mg IntraVENous PRN Jesus Kaur MD   Stopped at 11/21/21 1815    glucose (GLUTOSE) 40 % oral gel 15 g  15 g Oral PRN Jesus Kaur MD        dextrose 50 % IV solution  12.5 g IntraVENous PRN Jesus Kaur MD        glucagon (rDNA) injection 1 mg  1 mg IntraMUSCular PRN Jesus Kaur MD        dextrose 5 % solution  100 mL/hr IntraVENous PRN Jesus Kaur MD        acetaminophen (TYLENOL) tablet 650 mg  650 mg Oral Q4H PRN Becca Hurtado MD        sodium polystyrene (KAYEXALATE) 15 GM/60ML suspension 15 g  15 g Oral Once Marco A Cordoba MD        cefepime (MAXIPIME) 1000 mg IVPB minibag  1,000 mg IntraVENous Q24H Alina Najera MD   Stopped at 11/22/21 2308    sertraline (ZOLOFT) tablet 25 mg  25 mg Oral Daily Jeb Gonzales MD   25 mg at 11/23/21 0806    0.9 % sodium chloride infusion   IntraVENous PRN Roxy García MD        ferrous sulfate (IRON 325) tablet 325 mg  325 mg Oral Daily with breakfast Roxy García MD   325 mg at 11/23/21 0806    [Held by provider] aspirin chewable tablet 81 mg  81 mg Oral Daily Jeb Gonzales MD   81 mg at 11/13/21 1015    atorvastatin (LIPITOR) tablet 80 mg  80 mg Oral Daily Jeb Gonzales MD   80 mg at 11/23/21 0806    insulin glargine (LANTUS) injection vial 22 Units  22 Units SubCUTAneous Nightly Jeb Gonzales MD   22 Units at 11/22/21 2044    insulin lispro (HUMALOG) injection vial 0-12 Units  0-12 Units SubCUTAneous TID WC Jeb Gonzales MD   4 Units at 11/22/21 1707    insulin lispro (HUMALOG) injection vial 0-6 Units  0-6 Units SubCUTAneous Nightly Jeb Gonzales MD   2 Units at 11/22/21 2044    sodium chloride flush 0.9 % injection 5-40 mL  5-40 mL IntraVENous 2 times per day Roxy García MD   10 mL at 11/22/21 2043    sodium chloride flush 0.9 % injection 5-40 mL  5-40 mL IntraVENous PRN Jeb Gonzales MD        0.9 % sodium chloride infusion  25 mL IntraVENous PRN oRxy García MD        acetaminophen (TYLENOL) tablet 650 mg  650 mg Oral Q4H PRN Roxy García MD   650 mg at 11/16/21 2233    ondansetron (ZOFRAN-ODT) disintegrating tablet 4 mg  4 mg Oral Q8H PRN Jeb Gonzales MD        Or    ondansetron (ZOFRAN) injection 4 mg  4 mg IntraVENous Q6H PRN Roxy García MD        oxyCODONE-acetaminophen (PERCOCET) 5-325 MG per tablet 1 tablet  1 tablet Oral Q6H PRN Roxy García MD           Allergies:  Patient has no known allergies. Social History:   reports that she has never smoked. She does not have any smokeless tobacco history on file. She reports that she does not drink alcohol and does not use drugs. Family History: family history includes High Blood Pressure in an other family member.     REVIEW OF SYSTEMS:    Could not be obtained as patient is intubated    PHYSICAL EXAM: BP (!) 150/49   Pulse 74   Temp 97.6 °F (36.4 °C) (Axillary)   Resp 16   Ht 5' 3\" (1.6 m) Comment: from old chart  Wt 227 lb 1.2 oz (103 kg)   SpO2 100%   BMI 40.22 kg/m²    Temp (24hrs), Av °F (36.7 °C), Min:97.2 °F (36.2 °C), Max:98.4 °F (36.9 °C)    General appearance - well appearing, no in pain or distress   Mental status -intubated  Mouth - mucous membranes moist, pharynx normal without lesions   Neck - supple, no significant adenopathy   Lymphatics - no palpable lymphadenopathy, no hepatosplenomegaly   Chest - clear to auscultation, no wheezes, rales or rhonchi, symmetric air entry   Heart - normal rate, regular rhythm, normal S1, S2, no murmurs  Abdomen - soft, nontender, nondistended, no masses or organomegaly   Neurological -intubated nonfocal  Musculoskeletal - no joint tenderness, deformity or swelling   Extremities - peripheral pulses normal,+++ pedal edema, no clubbing or cyanosis   Skin -multiple skin bruises noted    DATA:    Labs:   CBC:   Recent Labs     21  0539 21  0646   WBC 9.3 9.9   HGB 7.9* 7.9*   HCT 24.1* 23.9*   PLT 53* 56*     BMP:   Recent Labs     21  0539 21  0646   * 132*   K 3.7 3.7   CO2 25 26   BUN 46* 54*   CREATININE 3.65* 3.47*   LABGLOM 12* 13*   GLUCOSE 236* 255*     PT/INR: No results for input(s): PROTIME, INR in the last 72 hours.   SURGICAL PATHOLOGY REPORT [[id number]]    Collected: 21 1535    Updated: 21     --Surgical Pathology Report   [id number]   [facility]   [facility]   ANATOMIC PATHOLOGY   [address] [place] [address], [facility] [address] [place]   [phone]   Fax: [phone]   SURGICAL PATHOLOGY CONSULTATION     Patient Name: Gucci Medina   MR#: [id number]   Specimen #[id number]     Procedures/Addenda   FLOW CYTOMETRY REPORT     Date Ordered:     2021     Status:   Signed Out        Date Complete:     2021     By: Leon Freeman M.D.        Date Reported:     11/23/2021       INTERPRETATION   PERIPHERAL BLOOD:      -SEVERE THROMBOCYTOPENIA.   -MODERATE NORMOCYTIC ANEMIA.   -FLOW   CYTOMETRY POSITIVE FOR MONOCLONAL B-LYMPHOCYTES WITH IMMUNOPHENOTYPE       FEATURES MOST SUGGESTIVE OF IMMUNOPHENOTYPICALLY ATYPICAL   B-CHRONIC     LYMPHOCYTIC LEUKEMIA VS MONOCLONAL B-LYMPHOCYTOSIS.  BONE MARROW OR OTHER     TISSUE BIOPSY IS RECOMMENDED FOR FURTHER ELUCIDATION. RESULTS-COMMENTS   PERIPHERAL BLOODWBC 7.5Hgb 8. 0HCT 24.5MCV 94. 8PLT 49Neutrophil   57%Lymphocyte 26%Monocyte 9%nRBC as 2/100 WBCABS Neut 4.31K/ULABS   lymph 1.96K/ULMorphology: Anisocytosis,       ANALYSIS   Flow cytometric immunophenotyping analysis is performed on peripheral   blood following red blood cell lysis procedure.  The cells are labeled   by direct ten color immunostaining procedure and analyzed on [employer]   flow cytometer. Cytocentrifuge differential cell count:   31% Lymphocytes   3% Monocytes   66% Granulocytes     Viability:  99%     Marker panels:   B-cell tube:  CD5, CD10, CD19, CD20, CD22, CD23, CD45, , Kappa,   lambda   T-cell tube:  CD2, CD3, CD4, CD5, CD7, CD8, CD16, CD45, CD56, CD57     Flow cytometric immunophenotyping analysis of peripheral blood   lymphocyte population is positive for B-cell monoclonality;   immunophenotype strong positive for CD20, CD22,  lambda light   chain; and weakly positive for CD5 and CD23.  The immunophenotypic   features suggest immunophenotypically atypical chronic lymphocytic   leukemia, however, other B-chronic lymphoproliferative neoplasm cannot   be excluded.  The absolute monoclonal B-lymphocyte count is   approximately 0.98K/UL (by WHO criteria, absolute monoclonal B   lymphocyte count of >5000 is required to diagnose chronic lymphocytic   leukemia).  Bone marrow or other tissue biopsy is recommended for        IMAGING DATA:  XR CHEST PORTABLE   Final Result   1. Endotracheal tube in place with its tip 2 cm above the kristina.       2. Unchanged cardiomegaly and small to moderate bilateral pleural effusions   with basilar atelectasis. 3.  Support tubes and catheters are otherwise unchanged. XR CHEST PORTABLE   Final Result   Cardiomegaly with bibasilar effusions and adjacent infiltrates. Support tubes as described above. XR CHEST PORTABLE   Final Result   Cardiomegaly with bibasilar effusions and adjacent infiltrates. Support tubes as described above. XR CHEST (SINGLE VIEW FRONTAL)   Final Result   The endotracheal tube ends appropriately above the kristina and below the   clavicular heads. The nasogastric tube ends in the stomach. The right   jugular vein hemodialysis catheter ends in the distal SVC, unchanged. Cardiomegaly. Bibasilar airspace disease and small right pleural effusion,   unchanged. Lungs otherwise grossly clear. No pneumothorax. VL Renal Arterial Duplex Complete   Final Result      XR CHEST PORTABLE   Final Result   Basilar opacities likely represent atelectasis with small pleural effusions. MRI BRAIN WO CONTRAST   Final Result      There is no acute infarction, intracranial hemorrhage, or intracranial mass   lesion. Mild chronic microangiopathic ischemic disease. Mild generalized volume loss. Mild mucosal thickening of bilateral mastoid air cells and right sphenoid   sinus. XR CHEST PORTABLE   Final Result   1. Tip of the newly inserted NG tube overlies the gastric body. .   2. The right IJ CVC and right arm PICC line remain well positioned. 3.  No acute cardiopulmonary abnormality. US RETROPERITONEAL COMPLETE   Final Result   No acute findings. No hydronephrosis. Small right pleural effusion. CT HEAD WO CONTRAST   Final Result   No acute intracranial abnormality. Chronic microvascular disease and chronic lacunar infarcts. Critical results were called by Dr. Hector Zhao to Dr Omkar Villalobos on 11/17/2021 at   22:0. XR CHEST PORTABLE   Final Result   1. No pneumothorax evident following thoracentesis. 2. Low lung volumes. Significantly improved pneumatization lower right lung   with minimal pleural effusion bilateral evident. 3. Calcific atherosclerosis aorta. 4. Cardiomegaly. IR GUIDED THORACENTESIS PLEURAL   Final Result   Successful ultrasound guided right thoracentesis. IR NONTUNNELED VASCULAR CATHETER > 5 YEARS   Final Result   Successful ultrasound guided non-tunneled right IJ temporary hemodialysis   catheter placement. Catheter is ready for dialysis use at this time. FLUORO FOR SURGICAL PROCEDURES   Final Result      XR CHEST PORTABLE   Final Result   Moderate right pleural effusion and associated airspace disease. Pneumonia   cannot be excluded. US RENAL COMPLETE   Final Result   1. Mild to moderate right-sided hydronephrosis. 2. Limited renal ultrasound. No left-sided hydronephrosis. CT ABDOMEN PELVIS WO CONTRAST Additional Contrast? None   Final Result   Severe right and mild left hydroureteronephrosis. No obstructing ureteral   stone is seen. Wall thickening of the urinary bladder without significant   urinary bladder wall distension. Outlet obstruction is a consideration. Right-sided ostomy. There is bowel wall thickening proximal to the ostomy,   may suggest infectious or inflammatory enteritis. No drainable fluid   collections or free air. No evidence of bowel obstruction. Moderate right pleural effusion. Bibasilar infiltrates or atelectasis. Clinical correlation to exclude   pneumonia. US RETROPERITONEAL COMPLETE   Final Result   1. Moderate to severe right and mild-to-moderate left hydronephrosis. 2. Exam limited due to patient's body habitus.          VL Renal Vein Scan Bilat    (Results Pending)   XR CHEST PORTABLE    (Results Pending)       Primary Problem  JAYLYN (acute kidney injury) [facility])    Active Hospital Problems Diagnosis Date Noted    Acute metabolic encephalopathy [P78.97]     Longstanding persistent atrial fibrillation ([facility] [address] [I48.11] 11/16/2021    Hydronephrosis determined by ultrasound [N13.30] 11/14/2021    Iron deficiency anemia [D50.9] 11/12/2021    JAYLYN (acute kidney injury) ([facility] [address] [N17.9] 11/10/2021    CKD (chronic kidney disease) stage 3, GFR 30-59 ml/min ([facility] [N18.30]     HTN (hypertension) [I10]     Type 2 diabetes mellitus with kidney complication, with long-term current use of insulin ([facility]) [E11.29, Z79.4]     ASCVD (arteriosclerotic cardiovascular disease) [I25.10]        IMPRESSION:   1. Acute kidney injury and started on dialysis by nephrology  2. Anemia, stool occult positive for blood and had upper endoscopy showing gastritis. Anemia could be related to chronic disease and also renal disease and aggravated by GI bleed  3. Thrombocytopenia  4. Acute encephalopathy  5. Bilateral hydronephrosis  6. Cardiomyopathy with EF of 30-35%  7. Generalized anasarca  8. S/p PEA, resucitated    RECOMMENDATIONS:  1. I reviewed the laboratory data, imaging studies, diagnosis, other treatment recommendations  2. Her anemia appears to be multifactorial with acute drop possibly from GI bleed  3. Her Hb and plts are low but stable  4. 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 Will need BM biopsy once stable  5. She will need BM biopsy once she is stable  6. Transfuse PRBC if hemoglobin less than 7  7. No evidence of hemolysis noted as per the labs. Her HIT panel is negative  8. Monitor for symptoms of bleeding  9. Hold off anticoagulation  10. Transfuse platelets if bleeding or any surgical  Procedure planned or plts less than 15K  11. Monitor counts closely  12. ReCheck Hapto and LDH today  13. We will follow    Discussed with pt and Nurse.     Thank you for asking us to see this patient. Sonu Nathan MD  Hematologist/Medical Oncologist    Cell: [phone]    This note is created with the assistance of a speech recognition program.  While intending to generate a document that actually reflects the content of the visit, the document can still have some errors including those of syntax and sound a like substitutions which may escape proof reading. It such instances, actual meaning can be extrapolated by contextual diversion.

## 2021-11-23 NOTE — PLAN OF CARE
Problem: SAFETY  Goal: Free from accidental physical injury  Outcome: Ongoing  Goal: Free from intentional harm  Outcome: Ongoing     Problem: DAILY CARE  Goal: Daily care needs are met  Outcome: Ongoing     Problem: PAIN  Goal: Patient's pain/discomfort is manageable  Outcome: Ongoing     Problem: SKIN INTEGRITY  Goal: Skin integrity is maintained or improved  Outcome: Ongoing     Problem: Falls - Risk of:  Goal: Will remain free from falls  Description: Will remain free from falls  11/23/2021 1024 by Kirsten Sparks  Outcome: Ongoing  11/23/2021 0615 by Lali Madison RN  Outcome: Met This Shift  Goal: Absence of physical injury  Description: Absence of physical injury  Outcome: Ongoing    Problem: Gas Exchange - Impaired:  Goal: Levels of oxygenation will improve  Description: Levels of oxygenation will improve  11/23/2021 1024 by Kirsten Sparks  Outcome: Ongoing  11/23/2021 0615 by Lali Madison RN  Outcome: Met This Shift     Problem: Non-Violent Restraints  Goal: Removal from restraints as soon as assessed to be safe  11/23/2021 1024 by Kirsten Sparks  Outcome: Ongoing  11/23/2021 0615 by Lali Madison RN  Outcome: Not Met This Shift  Note: Patient remains in restraints due to pulling at tubes and lines. See restraint charting, continue to monitor closely.    Goal: No harm/injury to patient while restraints in use  Outcome: Ongoing  Goal: Patient's dignity will be maintained  Outcome: Ongoing

## 2021-11-24 ENCOUNTER — APPOINTMENT (OUTPATIENT)
Dept: GENERAL RADIOLOGY | Age: 71
DRG: 004 | End: 2021-11-24
Payer: COMMERCIAL

## 2021-11-24 LAB
ABSOLUTE BANDS #: 0.32 K/UL (ref 0–1)
ABSOLUTE EOS #: 0 K/UL (ref 0–0.4)
ABSOLUTE IMMATURE GRANULOCYTE: ABNORMAL K/UL (ref 0–0.3)
ABSOLUTE LYMPH #: 1.74 K/UL (ref 1–4.8)
ABSOLUTE MONO #: 0.71 K/UL (ref 0.1–1.3)
ALLEN TEST: ABNORMAL
ANCA MYELOPEROXIDASE: <0.3 AU/ML (ref 0–3.5)
ANCA PROTEINASE 3: <0.7 AU/ML (ref 0–2)
ANION GAP SERPL CALCULATED.3IONS-SCNC: 9 MMOL/L (ref 9–17)
ANTI DNA DOUBLE STRANDED: 0.8 IU/ML
ANTI-NUCLEAR ANTIBODY (ANA): NEGATIVE
BANDS: 4 % (ref 0–10)
BASOPHILS # BLD: 0 % (ref 0–2)
BASOPHILS ABSOLUTE: 0 K/UL (ref 0–0.2)
BUN BLDV-MCNC: 34 MG/DL (ref 8–23)
BUN/CREAT BLD: ABNORMAL (ref 9–20)
CALCIUM IONIZED: 1.15 MMOL/L (ref 1.13–1.33)
CALCIUM SERPL-MCNC: 7.5 MG/DL (ref 8.6–10.4)
CARBOXYHEMOGLOBIN: 0.7 % (ref 0–5)
CHLORIDE BLD-SCNC: 101 MMOL/L (ref 98–107)
CO2: 26 MMOL/L (ref 20–31)
CREAT SERPL-MCNC: 2.67 MG/DL (ref 0.5–0.9)
DIFFERENTIAL TYPE: ABNORMAL
ENA ANTIBODIES SCREEN: 0.6 U/ML
EOSINOPHILS RELATIVE PERCENT: 0 % (ref 0–4)
FIO2: 30
FLOW CYTOMETRY BL: NORMAL
GBM ANTIBODY IGG: <2 AU/ML (ref 0–7)
GFR AFRICAN AMERICAN: 21 ML/MIN
GFR NON-AFRICAN AMERICAN: 18 ML/MIN
GFR SERPL CREATININE-BSD FRML MDRD: ABNORMAL ML/MIN/{1.73_M2}
GFR SERPL CREATININE-BSD FRML MDRD: ABNORMAL ML/MIN/{1.73_M2}
GLUCOSE BLD-MCNC: 135 MG/DL (ref 65–105)
GLUCOSE BLD-MCNC: 144 MG/DL (ref 65–105)
GLUCOSE BLD-MCNC: 162 MG/DL (ref 65–105)
GLUCOSE BLD-MCNC: 205 MG/DL (ref 65–105)
GLUCOSE BLD-MCNC: 229 MG/DL (ref 70–99)
HCO3 ARTERIAL: 27.8 MMOL/L (ref 22–26)
HCT VFR BLD CALC: 22.4 % (ref 36–46)
HCT VFR BLD CALC: 23.4 % (ref 36–46)
HEMOGLOBIN: 7.2 G/DL (ref 12–16)
HEMOGLOBIN: 7.6 G/DL (ref 12–16)
IMMATURE GRANULOCYTES: ABNORMAL %
LYMPHOCYTES # BLD: 22 % (ref 24–44)
MAGNESIUM: 2.1 MG/DL (ref 1.6–2.6)
MCH RBC QN AUTO: 30.5 PG (ref 26–34)
MCHC RBC AUTO-ENTMCNC: 32.4 G/DL (ref 31–37)
MCV RBC AUTO: 94.2 FL (ref 80–100)
METHEMOGLOBIN: 1 % (ref 0–1.9)
MODE: ABNORMAL
MONOCYTES # BLD: 9 % (ref 1–7)
MORPHOLOGY: ABNORMAL
MORPHOLOGY: ABNORMAL
NEGATIVE BASE EXCESS, ART: ABNORMAL MMOL/L (ref 0–2)
NOTIFICATION TIME: ABNORMAL
NOTIFICATION: ABNORMAL
NRBC AUTOMATED: ABNORMAL PER 100 WBC
O2 DEVICE/FLOW/%: ABNORMAL
O2 SAT, ARTERIAL: 97.5 % (ref 95–98)
OXYHEMOGLOBIN: ABNORMAL % (ref 95–98)
PATIENT TEMP: 37.1
PCO2 ARTERIAL: 40.2 MMHG (ref 35–45)
PCO2, ART, TEMP ADJ: ABNORMAL (ref 35–45)
PDW BLD-RTO: 16.2 % (ref 11.5–14.9)
PEEP/CPAP: 5
PH ARTERIAL: 7.45 (ref 7.35–7.45)
PH, ART, TEMP ADJ: ABNORMAL (ref 7.35–7.45)
PLATELET # BLD: 39 K/UL (ref 150–450)
PLATELET ESTIMATE: ABNORMAL
PMV BLD AUTO: 10.1 FL (ref 6–12)
PO2 ARTERIAL: 119 MMHG (ref 80–100)
PO2, ART, TEMP ADJ: ABNORMAL MMHG (ref 80–100)
POSITIVE BASE EXCESS, ART: 3.8 MMOL/L (ref 0–2)
POTASSIUM SERPL-SCNC: 3.9 MMOL/L (ref 3.7–5.3)
PSV: ABNORMAL
PT. POSITION: ABNORMAL
RBC # BLD: 2.48 M/UL (ref 4–5.2)
RBC # BLD: ABNORMAL 10*6/UL
RESPIRATORY RATE: 16
SAMPLE SITE: ABNORMAL
SEG NEUTROPHILS: 65 % (ref 36–66)
SEGMENTED NEUTROPHILS ABSOLUTE COUNT: 5.13 K/UL (ref 1.3–9.1)
SET RATE: 16
SODIUM BLD-SCNC: 136 MMOL/L (ref 135–144)
TEXT FOR RESPIRATORY: ABNORMAL
TOTAL HB: ABNORMAL G/DL (ref 12–16)
TOTAL RATE: 16
VT: 500
WBC # BLD: 7.9 K/UL (ref 3.5–11)
WBC # BLD: ABNORMAL 10*3/UL

## 2021-11-24 PROCEDURE — 85014 HEMATOCRIT: CPT

## 2021-11-24 PROCEDURE — 85025 COMPLETE CBC W/AUTO DIFF WBC: CPT

## 2021-11-24 PROCEDURE — 2580000003 HC RX 258

## 2021-11-24 PROCEDURE — 6370000000 HC RX 637 (ALT 250 FOR IP): Performed by: UROLOGY

## 2021-11-24 PROCEDURE — 6370000000 HC RX 637 (ALT 250 FOR IP): Performed by: INTERNAL MEDICINE

## 2021-11-24 PROCEDURE — 82805 BLOOD GASES W/O2 SATURATION: CPT

## 2021-11-24 PROCEDURE — 6370000000 HC RX 637 (ALT 250 FOR IP): Performed by: FAMILY MEDICINE

## 2021-11-24 PROCEDURE — C9113 INJ PANTOPRAZOLE SODIUM, VIA: HCPCS | Performed by: FAMILY MEDICINE

## 2021-11-24 PROCEDURE — 80048 BASIC METABOLIC PNL TOTAL CA: CPT

## 2021-11-24 PROCEDURE — 85018 HEMOGLOBIN: CPT

## 2021-11-24 PROCEDURE — 6360000002 HC RX W HCPCS: Performed by: INTERNAL MEDICINE

## 2021-11-24 PROCEDURE — 82947 ASSAY GLUCOSE BLOOD QUANT: CPT

## 2021-11-24 PROCEDURE — 71045 X-RAY EXAM CHEST 1 VIEW: CPT

## 2021-11-24 PROCEDURE — 2580000003 HC RX 258: Performed by: INTERNAL MEDICINE

## 2021-11-24 PROCEDURE — 36415 COLL VENOUS BLD VENIPUNCTURE: CPT

## 2021-11-24 PROCEDURE — 2580000003 HC RX 258: Performed by: FAMILY MEDICINE

## 2021-11-24 PROCEDURE — 2500000003 HC RX 250 WO HCPCS: Performed by: INTERNAL MEDICINE

## 2021-11-24 PROCEDURE — 6360000002 HC RX W HCPCS: Performed by: FAMILY MEDICINE

## 2021-11-24 PROCEDURE — 83735 ASSAY OF MAGNESIUM: CPT

## 2021-11-24 PROCEDURE — 82330 ASSAY OF CALCIUM: CPT

## 2021-11-24 PROCEDURE — 36600 WITHDRAWAL OF ARTERIAL BLOOD: CPT

## 2021-11-24 PROCEDURE — 90935 HEMODIALYSIS ONE EVALUATION: CPT

## 2021-11-24 PROCEDURE — 2000000000 HC ICU R&B

## 2021-11-24 PROCEDURE — 99212 OFFICE O/P EST SF 10 MIN: CPT

## 2021-11-24 PROCEDURE — 99232 SBSQ HOSP IP/OBS MODERATE 35: CPT | Performed by: INTERNAL MEDICINE

## 2021-11-24 RX ORDER — MIDODRINE HYDROCHLORIDE 10 MG/1
10 TABLET ORAL 3 TIMES DAILY PRN
Status: DISCONTINUED | OUTPATIENT
Start: 2021-11-24 | End: 2021-12-14 | Stop reason: HOSPADM

## 2021-11-24 RX ORDER — ALBUMIN (HUMAN) 12.5 G/50ML
25 SOLUTION INTRAVENOUS PRN
Status: DISCONTINUED | OUTPATIENT
Start: 2021-11-24 | End: 2021-12-14 | Stop reason: HOSPADM

## 2021-11-24 RX ORDER — MIDODRINE HYDROCHLORIDE 5 MG/1
5 TABLET ORAL PRN
Status: DISCONTINUED | OUTPATIENT
Start: 2021-11-24 | End: 2021-12-14 | Stop reason: HOSPADM

## 2021-11-24 RX ADMIN — PANTOPRAZOLE SODIUM 40 MG: 40 INJECTION, POWDER, FOR SOLUTION INTRAVENOUS at 08:02

## 2021-11-24 RX ADMIN — CHLORHEXIDINE GLUCONATE 0.12% ORAL RINSE 15 ML: 1.2 LIQUID ORAL at 19:39

## 2021-11-24 RX ADMIN — Medication 1 EACH: at 17:44

## 2021-11-24 RX ADMIN — INSULIN LISPRO 2 UNITS: 100 INJECTION, SOLUTION INTRAVENOUS; SUBCUTANEOUS at 17:35

## 2021-11-24 RX ADMIN — PROPOFOL 10 MCG/KG/MIN: 10 INJECTION, EMULSION INTRAVENOUS at 05:31

## 2021-11-24 RX ADMIN — SERTRALINE HYDROCHLORIDE 25 MG: 50 TABLET ORAL at 08:01

## 2021-11-24 RX ADMIN — MIDODRINE HYDROCHLORIDE 10 MG: 10 TABLET ORAL at 10:40

## 2021-11-24 RX ADMIN — CHLORHEXIDINE GLUCONATE 0.12% ORAL RINSE 15 ML: 1.2 LIQUID ORAL at 08:02

## 2021-11-24 RX ADMIN — SODIUM CHLORIDE, PRESERVATIVE FREE 10 ML: 5 INJECTION INTRAVENOUS at 19:39

## 2021-11-24 RX ADMIN — CEFEPIME HYDROCHLORIDE 1000 MG: 1 INJECTION, POWDER, FOR SOLUTION INTRAMUSCULAR; INTRAVENOUS at 19:45

## 2021-11-24 RX ADMIN — MINERAL OIL, PETROLATUM: 425; 568 OINTMENT OPHTHALMIC at 14:36

## 2021-11-24 RX ADMIN — Medication 1.2 ML: at 13:14

## 2021-11-24 RX ADMIN — Medication 1.3 ML: at 13:14

## 2021-11-24 RX ADMIN — WATER: 1 INJECTION INTRAMUSCULAR; INTRAVENOUS; SUBCUTANEOUS at 19:39

## 2021-11-24 RX ADMIN — POLYVINYL ALCOHOL 1 DROP: 14 SOLUTION/ DROPS OPHTHALMIC at 08:02

## 2021-11-24 RX ADMIN — INSULIN LISPRO 4 UNITS: 100 INJECTION, SOLUTION INTRAVENOUS; SUBCUTANEOUS at 08:03

## 2021-11-24 RX ADMIN — INSULIN LISPRO 1 UNITS: 100 INJECTION, SOLUTION INTRAVENOUS; SUBCUTANEOUS at 19:51

## 2021-11-24 RX ADMIN — MINERAL OIL, PETROLATUM: 425; 568 OINTMENT OPHTHALMIC at 22:30

## 2021-11-24 RX ADMIN — SODIUM CHLORIDE, PRESERVATIVE FREE 10 ML: 5 INJECTION INTRAVENOUS at 08:02

## 2021-11-24 RX ADMIN — ATORVASTATIN CALCIUM 80 MG: 80 TABLET, FILM COATED ORAL at 08:01

## 2021-11-24 RX ADMIN — INSULIN GLARGINE 22 UNITS: 100 INJECTION, SOLUTION SUBCUTANEOUS at 19:39

## 2021-11-24 RX ADMIN — PANTOPRAZOLE SODIUM 40 MG: 40 INJECTION, POWDER, FOR SOLUTION INTRAVENOUS at 19:39

## 2021-11-24 RX ADMIN — CARVEDILOL 3.12 MG: 3.12 TABLET, FILM COATED ORAL at 17:35

## 2021-11-24 RX ADMIN — FERROUS SULFATE TAB 325 MG (65 MG ELEMENTAL FE) 325 MG: 325 (65 FE) TAB at 08:02

## 2021-11-24 RX ADMIN — LINEZOLID 600 MG: 600 INJECTION, SOLUTION INTRAVENOUS at 14:36

## 2021-11-24 ASSESSMENT — PULMONARY FUNCTION TESTS
PIF_VALUE: 22
PIF_VALUE: 28
PIF_VALUE: 23
PIF_VALUE: 30
PIF_VALUE: 28
PIF_VALUE: 24
PIF_VALUE: 25
PIF_VALUE: 23
PIF_VALUE: 23
PIF_VALUE: 24
PIF_VALUE: 24
PIF_VALUE: 29
PIF_VALUE: 22
PIF_VALUE: 29
PIF_VALUE: 28
PIF_VALUE: 29
PIF_VALUE: 12
PIF_VALUE: 27
PIF_VALUE: 29
PIF_VALUE: 22
PIF_VALUE: 22
PIF_VALUE: 23
PIF_VALUE: 30
PIF_VALUE: 22
PIF_VALUE: 24
PIF_VALUE: 25
PIF_VALUE: 26
PIF_VALUE: 31
PIF_VALUE: 23

## 2021-11-24 ASSESSMENT — ENCOUNTER SYMPTOMS
BLOOD IN STOOL: 0
EYE ITCHING: 0
ABDOMINAL PAIN: 0
COUGH: 0
TROUBLE SWALLOWING: 0
EYE REDNESS: 0
ABDOMINAL DISTENTION: 0
SHORTNESS OF BREATH: 0
NAUSEA: 0
COLOR CHANGE: 0
VOMITING: 0
CHEST TIGHTNESS: 0

## 2021-11-24 ASSESSMENT — PAIN SCALES - GENERAL: PAINLEVEL_OUTOF10: 0

## 2021-11-24 NOTE — PROGRESS NOTES
[id number] [address] Urology Dr. Rosalia Galvan and Nephrology Dr. Andrez Lott to remove kenny catheter.

## 2021-11-24 NOTE — PROGRESS NOTES
Progress Note    11/24/2021   2:28 PM    Name:  Weston Martinez  MRN:    [id number]     Acct:     [de-identified]   Room:  2004/2004-01  IP Day: 15     Admit Date: 11/10/2021  4:23 PM  PCP: Araceli Marinelli,     Subjective:     C/C:   Chief Complaint   Patient presents with    Abnormal Lab     low hemoglobin and high CR       Interval History: Status: not changed. Patient seen during hemodialysis. patient intubated on vent support. Patient opens eyes spontaneously and follows commands. Patient denies chest pain shortness of breath or palpitations. patient is on 1 mics of Levophed and 10 mics of propofol. Is on tube feeding. Patient is noted to have asymptomatic cardiac pauses of 2.6 seconds. Recent labs reviewed hemoglobin 7.6 potassium 3.9 ionized calcium 1.15 magnesium 2.1    ROS:   all 10 systems reviewed and are negative except as noted    Review of Systems   Unable to perform ROS: Intubated   Constitutional: Negative for chills and fatigue. HENT: Negative for drooling, mouth sores, sneezing and trouble swallowing. Eyes: Negative for redness and itching. Respiratory: Negative for cough, chest tightness and shortness of breath. Cardiovascular: Positive for leg swelling. Negative for chest pain and palpitations. Gastrointestinal: Negative for abdominal distention, abdominal pain, blood in stool, nausea and vomiting. Endocrine: Negative for heat intolerance and polyphagia. Genitourinary: Negative for difficulty urinating, flank pain and pelvic pain. Musculoskeletal: Negative for arthralgias, joint swelling and neck stiffness. Skin: Negative for color change and pallor. Allergic/Immunologic: Negative for food allergies. Neurological: Negative for dizziness, seizures and headaches. Hematological: Does not bruise/bleed easily. Psychiatric/Behavioral: Negative for agitation, behavioral problems and suicidal ideas. The patient is not hyperactive. Medications:      Allergies: No Known Allergies    Current Meds: midodrine (PROAMATINE) tablet 10 mg, TID PRN  anticoagulant sodium citrate 4 % injection 1.2 mL, PRN  anticoagulant sodium citrate 4 % injection 1.3 mL, PRN  albumin human 25 % IV solution 25 g, PRN  midodrine (PROAMATINE) tablet 5 mg, PRN  norepinephrine (LEVOPHED) 16 mg in sodium chloride 0.9 % 250 mL infusion, Continuous  linezolid (ZYVOX) IVPB 600 mg, Q12H  chlorhexidine (PERIDEX) 0.12 % solution 15 mL, BID  fentaNYL (SUBLIMAZE) injection 50 mcg, Q30 Min PRN  propofol injection, Continuous  polyvinyl alcohol (LIQUIFILM TEARS) 1.4 % ophthalmic solution 1 drop, Q4H   And  lubrifresh P.M. (artificial tears) ophthalmic ointment, Q4H  carvedilol (COREG) tablet 3.125 mg, BID WC  pantoprazole (PROTONIX) injection 40 mg, BID   And  sodium chloride flush 0.9 % injection 10 mL, BID  potassium chloride (KLOR-CON M) extended release tablet 40 mEq, PRN   Or  potassium bicarb-citric acid (EFFER-K) effervescent tablet 40 mEq, PRN   Or  potassium chloride 10 mEq/100 mL IVPB (Peripheral Line), PRN  magnesium sulfate 1000 mg in dextrose 5% 100 mL IVPB, PRN  glucose (GLUTOSE) 40 % oral gel 15 g, PRN  dextrose 50 % IV solution, PRN  glucagon (rDNA) injection 1 mg, PRN  dextrose 5 % solution, PRN  sodium polystyrene (KAYEXALATE) 15 GM/60ML suspension 15 g, Once  cefepime (MAXIPIME) 1000 mg IVPB minibag, Q24H  sertraline (ZOLOFT) tablet 25 mg, Daily  0.9 % sodium chloride infusion, PRN  ferrous sulfate (IRON 325) tablet 325 mg, Daily with breakfast  [Held by provider] aspirin chewable tablet 81 mg, Daily  atorvastatin (LIPITOR) tablet 80 mg, Daily  insulin glargine (LANTUS) injection vial 22 Units, Nightly  insulin lispro (HUMALOG) injection vial 0-12 Units, TID WC  insulin lispro (HUMALOG) injection vial 0-6 Units, Nightly  sodium chloride flush 0.9 % injection 5-40 mL, 2 times per day  sodium chloride flush 0.9 % injection 5-40 mL, PRN  0.9 % sodium chloride infusion, PRN  acetaminophen (TYLENOL) tablet 650 mg, Q4H PRN  ondansetron (ZOFRAN-ODT) disintegrating tablet 4 mg, Q8H PRN   Or  ondansetron (ZOFRAN) injection 4 mg, Q6H PRN  oxyCODONE-acetaminophen (PERCOCET) 5-325 MG per tablet 1 tablet, Q6H PRN        Data:     Code Status:  Full Code    Family History   Problem Relation Age of Onset    High Blood Pressure Other        Social History     Socioeconomic History    Marital status:      Spouse name: Not on file    Number of children: Not on file    Years of education: Not on file    Highest education level: Not on file   Occupational History    Not on file   Tobacco Use    Smoking status: Never Smoker    Smokeless tobacco: Not on file   Substance and Sexual Activity    Alcohol use: No     Alcohol/week: 0.0 standard drinks    Drug use: No    Sexual activity: Not on file   Other Topics Concern    Not on file   Social History Narrative    Not on file     Social Determinants of Health     Financial Resource Strain:     Difficulty of Paying Living Expenses: Not on file   Food Insecurity:     Worried About Running Out of Food in the Last Year: Not on file    Katya of Food in the Last Year: Not on file   Transportation Needs:     Lack of Transportation (Medical): Not on file    Lack of Transportation (Non-Medical):  Not on file   Physical Activity:     Days of Exercise per Week: Not on file    Minutes of Exercise per Session: Not on file   Stress:     Feeling of Stress : Not on file   Social Connections:     Frequency of Communication with Friends and Family: Not on file    Frequency of Social Gatherings with Friends and Family: Not on file    Attends Christian Services: Not on file    Active Member of Clubs or Organizations: Not on file    Attends Club or Organization Meetings: Not on file    Marital Status: Not on file   Intimate Partner Violence:     Fear of Current or Ex-Partner: Not on file    Emotionally Abused: Not on file    Physically Abused: Not on file   [employer] Sexually Abused: Not on file   Housing Stability:     Unable to Pay for Housing in the Last Year: Not on file    Number of Places Lived in the Last Year: Not on file    Unstable Housing in the Last Year: Not on file       I/O (24Hr): Intake/Output Summary (Last 24 hours) at 11/24/2021 1428  Last data filed at 11/24/2021 0800  Gross per 24 hour   Intake 2668.96 ml   Output 855 ml   Net 1813.96 ml     Radiology:  XR CHEST (SINGLE VIEW FRONTAL)    Result Date: 11/20/2021  The endotracheal tube ends appropriately above the kristina and below the clavicular heads. The nasogastric tube ends in the stomach. The right jugular vein hemodialysis catheter ends in the distal SVC, unchanged. Cardiomegaly. Bibasilar airspace disease and small right pleural effusion, unchanged. Lungs otherwise grossly clear. No pneumothorax. CT HEAD WO CONTRAST    Result Date: 11/17/2021  No acute intracranial abnormality. Chronic microvascular disease and chronic lacunar infarcts. Critical results were called by Dr. Alberto Thomas to Dr Elly Chinchilla on 11/17/2021 at 22:0. XR CHEST PORTABLE    Result Date: 11/24/2021  No significant interval change. Mild vascular congestion and small pleural effusions. XR CHEST PORTABLE    Result Date: 11/23/2021  1. Endotracheal tube in place with its tip 2 cm above the kristina. 2.  Unchanged cardiomegaly and small to moderate bilateral pleural effusions with basilar atelectasis. 3.  Support tubes and catheters are otherwise unchanged. XR CHEST PORTABLE    Result Date: 11/22/2021  Cardiomegaly with bibasilar effusions and adjacent infiltrates. Support tubes as described above. XR CHEST PORTABLE    Result Date: 11/21/2021  Cardiomegaly with bibasilar effusions and adjacent infiltrates. Support tubes as described above. XR CHEST PORTABLE    Result Date: 11/19/2021  Basilar opacities likely represent atelectasis with small pleural effusions. XR CHEST PORTABLE    Result Date: 11/18/2021  1. Tip of the newly inserted NG tube overlies the gastric body. . 2. The right IJ CVC and right arm PICC line remain well positioned. 3.  No acute cardiopulmonary abnormality. XR CHEST PORTABLE    Result Date: 11/17/2021  1. No pneumothorax evident following thoracentesis. 2. Low lung volumes. Significantly improved pneumatization lower right lung with minimal pleural effusion bilateral evident. 3. Calcific atherosclerosis aorta. 4. Cardiomegaly. US RETROPERITONEAL COMPLETE    Result Date: 11/18/2021  No acute findings. No hydronephrosis. Small right pleural effusion. IR NONTUNNELED VASCULAR CATHETER > 5 YEARS    Result Date: 11/17/2021  Successful ultrasound guided non-tunneled right IJ temporary hemodialysis catheter placement. Catheter is ready for dialysis use at this time. MRI BRAIN WO CONTRAST    Result Date: 11/18/2021  There is no acute infarction, intracranial hemorrhage, or intracranial mass lesion. Mild chronic microangiopathic ischemic disease. Mild generalized volume loss. Mild mucosal thickening of bilateral mastoid air cells and right sphenoid sinus. IR GUIDED THORACENTESIS PLEURAL    Result Date: 11/17/2021  Successful ultrasound guided right thoracentesis.        Labs:  Recent Results (from the past 24 hour(s))   K (Potassium)    Collection Time: 11/23/21  4:10 PM   Result Value Ref Range    Potassium 3.9 3.7 - 5.3 mmol/L   POC Glucose Fingerstick    Collection Time: 11/23/21  6:00 PM   Result Value Ref Range    POC Glucose 178 (H) 65 - 105 mg/dL   POC Glucose Fingerstick    Collection Time: 11/23/21  9:59 PM   Result Value Ref Range    POC Glucose 179 (H) 65 - 105 mg/dL   CBC with DIFF    Collection Time: 11/24/21  4:27 AM   Result Value Ref Range    WBC 7.9 3.5 - 11.0 k/uL    RBC 2.48 (L) 4.0 - 5.2 m/uL    Hemoglobin 7.6 (L) 12.0 - 16.0 g/dL    Hematocrit 23.4 (L) 36 - 46 %    MCV 94.2 80 - 100 fL    MCH 30.5 26 - 34 pg    MCHC 32.4 31 - 37 g/dL    RDW 16.2 (H) 11.5 - 14.9 % Platelets 39 (L) 201 - 450 k/uL    MPV 10.1 6.0 - 12.0 fL    NRBC Automated NOT REPORTED per 100 WBC    Differential Type NOT REPORTED     Immature Granulocytes NOT REPORTED 0 %    Absolute Immature Granulocyte NOT REPORTED 0.00 - 0.30 k/uL    WBC Morphology NOT REPORTED     RBC Morphology NOT REPORTED     Platelet Estimate NOT REPORTED     Seg Neutrophils 65 36 - 66 %    Lymphocytes 22 (L) 24 - 44 %    Monocytes 9 (H) 1 - 7 %    Eosinophils % 0 0 - 4 %    Basophils 0 0 - 2 %    Bands 4 0 - 10 %    Segs Absolute 5.13 1.3 - 9.1 k/uL    Absolute Lymph # 1.74 1.0 - 4.8 k/uL    Absolute Mono # 0.71 0.1 - 1.3 k/uL    Absolute Eos # 0.00 0.0 - 0.4 k/uL    Basophils Absolute 0.00 0.0 - 0.2 k/uL    Absolute Bands # 0.32 0.0 - 1.0 k/uL    Morphology ANISOCYTOSIS PRESENT     Morphology HYPOCHROMIA PRESENT    Magnesium    Collection Time: 11/24/21  4:27 AM   Result Value Ref Range    Magnesium 2.1 1.6 - 2.6 mg/dL   Basic Metabolic Panel w/ Reflex to MG    Collection Time: 11/24/21  4:27 AM   Result Value Ref Range    Glucose 229 (H) 70 - 99 mg/dL    BUN 34 (H) 8 - 23 mg/dL    CREATININE 2.67 (H) 0.50 - 0.90 mg/dL    Bun/Cre Ratio NOT REPORTED 9 - 20    Calcium 7.5 (L) 8.6 - 10.4 mg/dL    Sodium 136 135 - 144 mmol/L    Potassium 3.9 3.7 - 5.3 mmol/L    Chloride 101 98 - 107 mmol/L    CO2 26 20 - 31 mmol/L    Anion Gap 9 9 - 17 mmol/L    GFR Non-African American 18 (L) >60 mL/min    GFR  21 (L) >60 mL/min    GFR Comment          GFR Staging NOT REPORTED    Blood gas, arterial    Collection Time: 11/24/21  4:56 AM   Result Value Ref Range    pH, Arterial 7.449 7.350 - 7.450    pCO2, Arterial 40.2 35.0 - 45.0 mmHg    pO2, Arterial 119.0 (H) 80.0 - 100.0 mmHg    HCO3, Arterial 27.8 (H) 22.0 - 26.0 mmol/L    Positive Base Excess, Art 3.8 (H) 0.0 - 2.0 mmol/L    Negative Base Excess, Art NOT REPORTED 0.0 - 2.0 mmol/L    O2 Sat, Arterial 97.5 95 - 98 %    Total Hb NOT REPORTED 12.0 - 16.0 g/dl    Oxyhemoglobin NOT Conjunctivae normal.   Cardiovascular:      Rate and Rhythm: Normal rate and regular rhythm. Pulses: Normal pulses. Heart sounds: Normal heart sounds. Pulmonary:      Effort: Pulmonary effort is normal.      Breath sounds: Examination of the right-lower field reveals rales. Examination of the left-lower field reveals rales. Rales present. Abdominal:      General: Bowel sounds are normal.      Palpations: Abdomen is soft. Comments: Colostomy bag with brown stool   Musculoskeletal:         General: No deformity. Cervical back: Normal range of motion and neck supple. Right lower leg: Edema (1+) present. Left lower leg: Edema (1+) present. Skin:     General: Skin is warm. Capillary Refill: Capillary refill takes less than 2 seconds. Coloration: Skin is not jaundiced. Neurological:      General: No focal deficit present. Mental Status: She is alert. Mental status is at baseline. Comments: Patient follows commands   Psychiatric:         Mood and Affect: Mood normal.         Behavior: Behavior normal.         Assessment:        Primary Problem  JAYLYN (acute kidney injury) ([facility]     Principal Problem:    JAYLYN (acute kidney injury) ([facility]  Active Problems:    Type 2 diabetes mellitus with kidney complication, with long-term current use of insulin (HCC)    CKD (chronic kidney disease) stage 3, GFR 30-59 ml/min ([facility])    HTN (hypertension)    ASCVD (arteriosclerotic cardiovascular disease)    Iron deficiency anemia    Hydronephrosis determined by ultrasound    Longstanding persistent atrial fibrillation ([facility])    Acute metabolic encephalopathy  Resolved Problems:    * No resolved hospital problems.  *      Past Medical History:   Diagnosis Date    Anemia, unspecified     CAD (coronary artery disease)     Chronic kidney disease     CKD (chronic kidney disease) stage 3, GFR 30-59 ml/min ([facility])     Closed fracture of dorsal (thoracic) vertebra without mention of spinal cord injury     Coronary atherosclerosis of unspecified type of vessel, native or graft     Depression with anxiety     Esophageal reflux     HTN (hypertension)     Hyperkalemia     Hyperlipidemia     Intervertebral cervical disc disorder with myelopathy, cervical region     Lower back pain     Lumbar radiculopathy     Microalbuminuria     Muscle weakness     Pain in limb     Stenosis of cervical spine     Tethered cord syndrome (HCC)     Type II or unspecified type diabetes mellitus without mention of complication, not stated as uncontrolled     Urinary calculus     UTI (lower urinary tract infection)         Plan:        1. IV cefepime, IV Zyvox  2. MRSA nasal swab positive  1. Blood culture 7/16/2021 neg  2. Thoracocentesis culture neg  3. Lantus 22 units subcu daily  4. Insulin sliding scale  5. Coreg on hold by cardiology due to asymptomatic cardiac pauses  6. Chest x-ray report 11/23/2021 shows bibasilar moderate effusion and infiltrates  7. Continue tube feed on current rate  8. Oncology input noted  9. DVT Prophylaxis no pharmacological DVT prophylaxis due to anemia and thrombocytopenia  10. EPCs  11. PT/OT to evaluate and treat  12. Guarded prognosis  13. Pain control  14. Replace electrolytes as per sliding scale  15. Home medications reviewed and appropriate medications continued  16.  Reviewed labs and imaging studies from last 24 hours and results explained to patient      Electronically signed by Mame Patrick MD

## 2021-11-24 NOTE — PROGRESS NOTES
[facility]  Follow Up      NAME:  Libby Ibanez RECORD NUMBER:  [id number]  AGE: 66 y.o. GENDER: female  : 1950  TODAY'S DATE:  2021    Received phone call from primary RN due to bleeding noted around stoma upon removal of old ostomy pouch. Pt noted to have small granulomas from 2-4 o'clock and 7-11 o'clock that were actively bleeding. There was also some active bleeding noted from the surface of the stoma itself. Platelet count 39 this morning. Primary RN placed phone call to Dr Junie Castleman and order obtained to treat with silver nitrate. Silver nitrate sticks were successfully used to stop the bleeding to the peristomal granulomas. Still a small amount of bleeding from the surface of the stoma, but this will not interfere with application of a new pouch. Crusting technique was used to prepare the peristomal skin for application of a new pouch. A protective seal was placed around the stoma and new pouch applied. Slightly inflated the pouch with air so that it does not stick to or rub on the stoma and cause more bleeding. Will follow chart through the holiday weekend. If any further problems arise, staff can reach [phone] [facility] nurse via Advise Only (text preferred).               SHON Oglesby, QUINCY Jones, BILLY  [facility]  Wound, Ostomy, and Continence Nursing  [phone]

## 2021-11-24 NOTE — PROGRESS NOTES
Patient placed back on PRVC due to low VTs around 200, even with a PS of 10. Will attempt wean again after dialysis.

## 2021-11-24 NOTE — PROGRESS NOTES
[facility] PHYSICIANS CARDIOLOGY Progress Note    11/24/2021 7:35 AM      Subjective:  Ms. Darien Carlton intubated after failed extubation following hemodialysis on November 23, 2021 with resultant increased heart rate and increased pCO2 per nurse's report. Patient is awake and nods head appropriately and denies denies any chest pain. Nurse showed me telemetric rhythm strips revealing asymptomatic pauses of up to 2.6 seconds.              LABS:     Recent Results (from the past 24 hour(s))   POC Glucose Fingerstick    Collection Time: 11/23/21  8:29 AM   Result Value Ref Range    POC Glucose 244 (H) 65 - 105 mg/dL   POC Glucose Fingerstick    Collection Time: 11/23/21 12:09 PM   Result Value Ref Range    POC Glucose 157 (H) 65 - 105 mg/dL   K (Potassium)    Collection Time: 11/23/21  4:10 PM   Result Value Ref Range    Potassium 3.9 3.7 - 5.3 mmol/L   POC Glucose Fingerstick    Collection Time: 11/23/21  6:00 PM   Result Value Ref Range    POC Glucose 178 (H) 65 - 105 mg/dL   POC Glucose Fingerstick    Collection Time: 11/23/21  9:59 PM   Result Value Ref Range    POC Glucose 179 (H) 65 - 105 mg/dL   CBC with DIFF    Collection Time: 11/24/21  4:27 AM   Result Value Ref Range    WBC 7.9 3.5 - 11.0 k/uL    RBC 2.48 (L) 4.0 - 5.2 m/uL    Hemoglobin 7.6 (L) 12.0 - 16.0 g/dL    Hematocrit 23.4 (L) 36 - 46 %    MCV 94.2 80 - 100 fL    MCH 30.5 26 - 34 pg    MCHC 32.4 31 - 37 g/dL    RDW 16.2 (H) 11.5 - 14.9 %    Platelets 39 (L) 213 - 450 k/uL    MPV 10.1 6.0 - 12.0 fL    NRBC Automated NOT REPORTED per 100 WBC    Differential Type NOT REPORTED     Immature Granulocytes NOT REPORTED 0 %    Absolute Immature Granulocyte NOT REPORTED 0.00 - 0.30 k/uL    WBC Morphology NOT REPORTED     RBC Morphology NOT REPORTED     Platelet Estimate NOT REPORTED     Seg Neutrophils 65 36 - 66 %    Lymphocytes 22 (L) 24 - 44 %    Monocytes 9 (H) 1 - 7 %    Eosinophils % 0 0 - 4 %    Basophils 0 0 - 2 %    Bands 4 0 - 10 %    Segs Absolute 5.13 1.3 - 9.1 k/uL    Absolute Lymph # 1.74 1.0 - 4.8 k/uL    Absolute Mono # 0.71 0.1 - 1.3 k/uL    Absolute Eos # 0.00 0.0 - 0.4 k/uL    Basophils Absolute 0.00 0.0 - 0.2 k/uL    Absolute Bands # 0.32 0.0 - 1.0 k/uL    Morphology ANISOCYTOSIS PRESENT     Morphology HYPOCHROMIA PRESENT    Magnesium    Collection Time: 11/24/21  4:27 AM   Result Value Ref Range    Magnesium 2.1 1.6 - 2.6 mg/dL   Basic Metabolic Panel w/ Reflex to MG    Collection Time: 11/24/21  4:27 AM   Result Value Ref Range    Glucose 229 (H) 70 - 99 mg/dL    BUN 34 (H) 8 - 23 mg/dL    CREATININE 2.67 (H) 0.50 - 0.90 mg/dL    Bun/Cre Ratio NOT REPORTED 9 - 20    Calcium 7.5 (L) 8.6 - 10.4 mg/dL    Sodium 136 135 - 144 mmol/L    Potassium 3.9 3.7 - 5.3 mmol/L    Chloride 101 98 - 107 mmol/L    CO2 26 20 - 31 mmol/L    Anion Gap 9 9 - 17 mmol/L    GFR Non-African American 18 (L) >60 mL/min    GFR  21 (L) >60 mL/min    GFR Comment          GFR Staging NOT REPORTED    Blood gas, arterial    Collection Time: 11/24/21  4:56 AM   Result Value Ref Range    pH, Arterial 7.449 7.350 - 7.450    pCO2, Arterial 40.2 35.0 - 45.0 mmHg    pO2, Arterial 119.0 (H) 80.0 - 100.0 mmHg    HCO3, Arterial 27.8 (H) 22.0 - 26.0 mmol/L    Positive Base Excess, Art 3.8 (H) 0.0 - 2.0 mmol/L    Negative Base Excess, Art NOT REPORTED 0.0 - 2.0 mmol/L    O2 Sat, Arterial 97.5 95 - 98 %    Total Hb NOT REPORTED 12.0 - 16.0 g/dl    Oxyhemoglobin NOT REPORTED 95.0 - 98.0 %    Carboxyhemoglobin 0.7 0 - 5 %    Methemoglobin 1.0 0.0 - 1.9 %    Pt Temp 37.1     pH, Art, Temp Adj NOT REPORTED 7.350 - 7.450    pCO2, Art, Temp Adj NOT REPORTED 35.0 - 45.0    pO2, Art, Temp Adj NOT REPORTED 80.0 - 100.0 mmHg    O2 Device/Flow/% VENTILATOR     Respiratory Rate 16     Rolf Test PASS     Sample Site Right Radial Artery     Pt.  Position SEMI-FOWLERS     Mode PRVC     Set Rate 16     Total Rate 16          FIO2 30     Peep/Cpap 5     PSV NOT REPORTED     Text for Respiratory RESULT TO RN     NOTIFICATION NOT REPORTED     NOTIFICATION TIME NOT REPORTED        Pulse Ox: SpO2  Av.6 %  Min: 94 %  Max: 100 %    Supplemental O2: O2 Flow Rate (L/min): 2 L/min     Current Meds:    linezolid  600 mg IntraVENous Q12H    chlorhexidine  15 mL Mouth/Throat BID    polyvinyl alcohol  1 drop Both Eyes Q4H    And    artificial tears   Both Eyes Q4H    carvedilol  3.125 mg Oral BID WC    pantoprazole  40 mg IntraVENous BID    And    sodium chloride flush  10 mL IntraVENous BID    sodium polystyrene  15 g Oral Once    cefepime  1,000 mg IntraVENous Q24H    sertraline  25 mg Oral Daily    ferrous sulfate  325 mg Oral Daily with breakfast    [Held by provider] aspirin  81 mg Oral Daily    atorvastatin  80 mg Oral Daily    insulin glargine  22 Units SubCUTAneous Nightly    insulin lispro  0-12 Units SubCUTAneous TID WC    insulin lispro  0-6 Units SubCUTAneous Nightly    sodium chloride flush  5-40 mL IntraVENous 2 times per day         Continuous Infusions:    norepinephrine Stopped (21 1339)    propofol 10 mcg/kg/min (21 0531)    dextrose      sodium chloride      sodium chloride                VITAL SIGNS:    BP (!) 150/49   Pulse 73   Temp 97.7 °F (36.5 °C) (Oral)   Resp 16   Ht 5' 3\" (1.6 m) Comment: from old chart  Wt 222 lb 10.6 oz (101 kg)   SpO2 100%   BMI 39.44 kg/m²  2 L/min      Admit Weight:  188 lb (85.3 kg)    Last 3 weights: Wt Readings from Last 3 Encounters:   21 222 lb 10.6 oz (101 kg)   16 178 lb (80.7 kg)   16 184 lb 12.8 oz (83.8 kg)       BMI: Body mass index is 39.44 kg/m². INPUT/OUTPUT:          Intake/Output Summary (Last 24 hours) at 2021 0735  Last data filed at 2021 0528  Gross per 24 hour   Intake 3668.96 ml   Output 3355 ml   Net 313.96 ml         Telemetry shows  Atrial fibrillation with PVCs. 2.6 sec pause on 2021 at 7:50 a.m..       EXAM:     General appearance: awake, alert.   Remains intubated. Neck:   Right IJ triple-lumen catheter in place. Chest:   [address] air entrybilaterally. No tenderness. No rhonchi or wheezing. Cardiac:  Irregularly irregular rate and rhythm. No definite S3 gallop or rubs. Extremities:  Persistent but improving bilateral lower leg edema. Skin:  warm and dry. Neuro:  Able to move all 4 extremities. 2 D Echocardiogram Nov 2021:     Summary  Echo contrast utilized on this technically difficult study. Left ventricle is normal in size. Global hypokinesis, Severe apical hypokinesis  Estimated LV EF 30-35%. Mild left ventricular hypertrophy. Right ventricular dilatation with normal systolic function. Mild to moderate Left and Right atrial dilatation. Aneurysmal interatrial septum. Positive bubble study with and without  Valsalva maneuver suggestive of Rt to Lt inter-atrial shunt. Normal aortic valve structure and function without stenosis or  regurgitation. Normal aortic root dimension. Mitral annular calcification is seen. Mild mitral regurgitation. Grossly normal tricuspid valve structure and function. Mild to moderate tricuspid regurgitation. Estimated right ventricular systolic pressure is 39 mmHg. Mildly elevated  right ventricular systolic pressure. IVC normal diameter & inspiratory collapse indicating normal RA filling  pressure . No significant pericardial effusion is seen. Pleural effusion noted. ASSESSMENT:    Nonsustained ventricular tachycardia, PVCs.      Asymptomatic 2.2nd pause upon review of telemetry rhythm strips November 24, 2021.     Longstanding, persistent atrial fibrillation with controlled ventricular rate.  Was on chronic oral anticoagulants which is currently on hold due to GI bleeding and severe anemia.     History of nonischemic cardiomyopathy with severe left ventricular systolic dysfunction with LVEF 20 to 25% on limited 2D echocardiogram November 2021 and LVEF 25 to 30% on 2D echocardiogram from February 2021 as charted in the EHR. ASCVD, prior stents with unknown details.  No active angina pectoris. Chronic systolic heart failure with reduced LVEF 20 to 25%. Declined AICD in the past due to financial concerns for E HR data and per records from March 2019 reportedly. History of chronic troponinemia. Large fixed severe anteroseptal/apical defect consistent with old infarction, no ischemia on nuclear stress test September 2021 at [facility]. Abnormal EKG including atrial fibrillation, right bundle branch block.     Acute on chronic respiratory failure with hypoxemia requiring intubation and mechanical ventilation. History of peripheral arterial disease with occlusive bilateral lower extremity disease and followed up at [facility] vascular division. History of cerebrovascular accident February 2021 with right lacunar infarction. Acute on chronic kidney disease.  Started on dialysis this admission. Management per nephrologist.    Acute on chronic anemia with GI work-up.  Mild diffuse gastritis on EGD November 15, 2021. Diabetes mellitus.     Obesity with BMI 39 range. Other problems as charted.       REC/PLAN:    Reviewed pauses of up to 2.6 seconds on upon review of telemetric rhythm strips. EKG done this morning revealed atrial fibrillation, occasional PVC, right bundle branch block. Abnormal EKG. Will hold Coreg 3.125 mg b.i.d. and add discussed with the nurse at bedside at the time of my evaluation. Will avoid all AV rolando blocking agents at this time. Continuous telemetric monitoring and continued supportive care as you are doing. When briefly discussed with the patient, she is not interested in any AICD or pacemaker etc.at this time. Nurse at bedside to witness. Overall very very guarded prognosis. Suggest to readdress code status with patient and family members by admitting MD.    No family members available at bedside to discuss. Will follow.     Electronically signed by Funmilayo Lomeli MD, [facility]        PLEASE NOTE:  This progress note was completed using a voice transcription system. Every effort was made to ensure accuracy. However, inadvertent computerized transcription errors may be present.

## 2021-11-24 NOTE — PROGRESS NOTES
Writer at bedside to change colostomy bag due to leakage around bag. When colostomy bag was removed bleeding and clots were noted around stoma. Stool coming out of stoma was Green/Brown and loose, no blood noted in stool. Wound care was called to evaluate stoma. Dr. Breana Patrick was called to notify of situation. Orders for silver nitrate to stop bleeding from around the stoma. Will recheck hemoglobin in 6 hours and continue to monitor.

## 2021-11-24 NOTE — PROGRESS NOTES
ICU Progress Note (Vent)   Pulmonary and Critical Care Specialists    Patient - Courtney Alberto,  Age - 66 y.o.    - 1950      Room Number -    N -  [id number]   Acct # - [de-identified]  Date of Admission -  11/10/2021  4:23 PM    Events of Past 24 Hours   Patient intubated on vent support. Some peers of shallow breathing some ectopy this morning. This is per bedside nurse report. Bedside nurse thinks that patient is more puffy than yesterday. I was able to speak to nephrology who plans for dialysis today. Vitals    height is 5' 3\" (1.6 m) and weight is 222 lb 10.6 oz (101 kg). Her temperature is 97.7 °F (36.5 °C). Her blood pressure is 128/113 (abnormal) and her pulse is 101. Her respiration is 16 and oxygen saturation is 100%. Temperature Range: Temp: 97.7 °F (36.5 °C) Temp  Av.9 °F (36.6 °C)  Min: 97.6 °F (36.4 °C)  Max: 98.2 °F (36.8 °C)  BP Range:  Systolic (75SMA), FGS:189 , Min:85 , BGU:896     Diastolic (80FXP), SHAR:91, Min:30, Max:123    Pulse Range: Pulse  Av.9  Min: 56  Max: 101  Respiration Range: Resp  Av.5  Min: 13  Max: 29  Current Pulse Ox[de-identified]  SpO2: 100 %  24HR Pulse Ox Range:  SpO2  Av.6 %  Min: 94 %  Max: 100 %  Oxygen Amount and Delivery: O2 Flow Rate (L/min): 2 L/min      Wt Readings from Last 3 Encounters:   21 222 lb 10.6 oz (101 kg)   16 178 lb (80.7 kg)   16 184 lb 12.8 oz (83.8 kg)     I/O       Intake/Output Summary (Last 24 hours) at 2021 1149  Last data filed at 2021 0800  Gross per 24 hour   Intake 3168.96 ml   Output 3355 ml   Net -186.04 ml     I/O last 3 completed shifts:   In: 3669 [I.V.:594; NG/GT:1345; IV Piggyback:1230]  Out: 8710 [Urine:505; Stool:350]     DRAIN/TUBE OUTPUT:     Invasive Lines   ETT Day -   4      Mechanical Ventilation Data   SETTINGS (Comprehensive)  Vent Information  $Ventilation: $Subsequent Day  Skin Assessment: Clean, dry, & intact  Equipment Changed: HME  Vent Type: Servo i  Vent Mode: CPAP  Vt Ordered: 500 mL  Rate Set: 16 bmp  Pressure Support: 7 cmH20  FiO2 : 30 %  SpO2: 100 %  SpO2/FiO2 ratio: 333.33  Sensitivity: 5  PEEP/CPAP: 5  I Time/ I Time %: 1.25 s  Humidification Source: HME  Nitric Oxide/Epoprostenol In Use?: No  Mask Type: Full face mask  Mask Size: Medium  Additional Respiratory  Assessments  Pulse: 101  Resp: 16  SpO2: 100 %  End Tidal CO2: 39 (%)  Position: Semi-Lagunas's  Humidification Source: HME  Oral Care: Mouth swabbed, Mouth moisturizer, Mouth suctioned  Cuff Pressure (cm H2O): 30 cm H2O       ABGs:   Lab Results   Component Value Date    PHART 7.449 11/24/2021    PO2ART 119.0 11/24/2021    BNG1OIG 40.2 11/24/2021       Lab Results   Component Value Date    MODE [facility] 11/24/2021         Medications   IV   norepinephrine 1 mcg/min (11/24/21 1120)    propofol 10 mcg/kg/min (11/24/21 0531)    dextrose      sodium chloride      sodium chloride        linezolid  600 mg IntraVENous Q12H    chlorhexidine  15 mL Mouth/Throat BID    polyvinyl alcohol  1 drop Both Eyes Q4H    And    artificial tears   Both Eyes Q4H    carvedilol  3.125 mg Oral BID WC    pantoprazole  40 mg IntraVENous BID    And    sodium chloride flush  10 mL IntraVENous BID    sodium polystyrene  15 g Oral Once    cefepime  1,000 mg IntraVENous Q24H    sertraline  25 mg Oral Daily    ferrous sulfate  325 mg Oral Daily with breakfast    [Held by provider] aspirin  81 mg Oral Daily    atorvastatin  80 mg Oral Daily    insulin glargine  22 Units SubCUTAneous Nightly    insulin lispro  0-12 Units SubCUTAneous TID WC    insulin lispro  0-6 Units SubCUTAneous Nightly    sodium chloride flush  5-40 mL IntraVENous 2 times per day       Diet/Nutrition   ADULT TUBE FEEDING; Nasogastric; Renal Formula; Continuous; 35; No; 30; Q 8 hours; Protein; 1 Protein at 12 noon daily. Flush with 30 mLwater before and after administration.     Exam   VITALS    height is 5' 3\" (1.6 m) and weight is 222 lb 10.6 oz (101 kg). Her temperature is 97.7 °F (36.5 °C). Her blood pressure is 128/113 (abnormal) and her pulse is 101. Her respiration is 16 and oxygen saturation is 100%. Ventilator Settings (Basic)  Vent Mode: CPAP Rate Set: 16 bmp/Vt Ordered: 500 mL/ /FiO2 : 30 %    Constitutional - Sedated  General Appearance  well developed, well nourished  HEENT - Life support devices in place (ET,),normocephalic, atraumatic. Lungs - Chest expands equally, no wheezes, rales or rhonchi. Cardiovascular - Heart sounds are normal.  normal rate and rhythm regular, no murmur  Abdomen - soft, nontender,,   Extremities - no cyanosis, clubbing     Lab Results   CBC     Lab Results   Component Value Date    WBC 7.9 11/24/2021    RBC 2.48 11/24/2021    RBC 4.14 08/31/2016    HGB 7.6 11/24/2021    HCT 23.4 11/24/2021    PLT 39 11/24/2021    MCV 94.2 11/24/2021    MCH 30.5 11/24/2021    MCHC 32.4 11/24/2021    RDW 16.2 11/24/2021    NRBC 4 11/22/2021    LYMPHOPCT 22 11/24/2021    LYMPHOPCT 32.8 08/31/2016    MONOPCT 9 11/24/2021    EOSPCT 4.3 08/31/2016    BASOPCT 0 11/24/2021    BASOPCT 0.7 08/31/2016    MONOSABS 0.71 11/24/2021    LYMPHSABS 1.74 11/24/2021    EOSABS 0.00 11/24/2021    BASOSABS 0.00 11/24/2021    DIFFTYPE NOT REPORTED 11/24/2021       BMP   Lab Results   Component Value Date     11/24/2021    K 3.9 11/24/2021     11/24/2021    CO2 26 11/24/2021    BUN 34 11/24/2021    CREATININE 2.67 11/24/2021    GLUCOSE 229 11/24/2021    GLUCOSE 77 08/31/2016    CALCIUM 7.5 11/24/2021       LFTS  Lab Results   Component Value Date    ALKPHOS 175 11/18/2021    ALT 31 11/18/2021    AST 21 11/18/2021    PROT 5.3 11/20/2021    BILITOT 0.27 11/18/2021    BILIDIR <0.08 11/13/2021    IBILI Connot be calculated 11/13/2021    LABALBU 2.6 11/18/2021       INR  No results for input(s): PROTIME, INR in the last 72 hours. APTT  No results for input(s): APTT in the last 72 hours.     Lactic Acid  Lab Results   Component Value Date LACTA 0.6 11/16/2021    LACTA 0.6 11/16/2021        BNP   No results for input(s): BNP in the last 72 hours. Cultures   Blood cultures x2, on November 16, negative to date  November 20, 2021 respiratory culture normal matt  Radiology     Plain Films  Chest x-ray reveals endotracheal tube in fair position. Bilateral pleural effusions appreciated    SYSTEM ASSESSMENT    Acute on chronic hypoxic and hypercapnic respiratory failure  Bradycardic arrest, intubated 11/20  Right lower lobe infiltrate/pleural effusion  Acute kidney injury-requiring hemodialysis  Morbid obesity  Anemia, chronic disease and possible rectal bleed  Hypoalbuminemia  Thrombocytopenia  Nonischemic cardiomyopathy with EF of 30 to 35% possible right to left interatrial shunt  Atrial fibrillation     FULL CODE    Neuro     Patient is able to follow commands  Fully on vent support r  Respiratory   Full vent support. Plan is to attempt weaning trial after dialysis  She had a bit experience of CODE BLUE on dialysis that prompted the vent support  Hemodynamics   Currently not in shock. Some problems with ectopy earlier  Not on pressors currently had to be on pressors yesterday. Gastrointestinal/Nutrition       Renal   Acute kidney injury on end-stage renal disease. Getting dialysis today    Infectious Disease   On Maxipime and Zyvox  Hematology/Oncology   Platelet count 39 K  On SCDs  Endocrine       Social/Spiritual/DNR/Disposition/Other     Following closely. Patient's  Mr. Maria R Franklin, updated. ---[facility] # [phone], cell # [phone].  CFUHQNS  did ask him questions which were answered. He voiced appreciation of the staff care.       Critical Care Time   35 min    Electronically signed by Nona Sumner MD on 11/24/2021 at 11:49 AM

## 2021-11-24 NOTE — CARE COORDINATION
ONGOING DISCHARGE PLAN:    Patient is on vent. Remains on cefepime, Zyvox and levophed  IV. HD 11/24 , may need outpt slot? LSW following for return to OV. Will continue to follow for additional discharge needs.     Electronically signed by Clarisa Escudero RN on 11/24/2021 at 3:30 PM

## 2021-11-24 NOTE — PROGRESS NOTES
Comprehensive Nutrition Assessment    Type and Reason for Visit:  Reassess    Nutrition Recommendations/Plan: Continue tube feeding regimen as ordered. Nutrition Assessment: This morning the RN indicated TF off for possible extubation after dialysis treatment but otherwise tolerating it. Pt did not get extubated therefore tube feedings restarted. Malnutrition Assessment:  Malnutrition Status: At risk for malnutrition (Comment)    Context:  Acute Illness     Findings of the 6 clinical characteristics of malnutrition:  Energy Intake:  Mild decrease in energy intake (Comment)  Weight Loss:  Unable to assess (no wt history)     Body Fat Loss:  Unable to assess     Muscle Mass Loss:  Unable to assess    Fluid Accumulation:  7 - Moderate to Severe Extremities, Generalized (Anasarca)   Strength:  Not Performed    Estimated Daily Nutrient Needs:  Energy (kcal):  15 kcal/kg= 5534-8060 kcal; Weight Used for Energy Requirements:   (92kg)     Protein (g):  1g/kg= 90-95 g; Weight Used for Protein Requirements:   (92kg)           Nutrition Related Findings:  Edema; +3 pitting all extremities. Labs & meds reviewed. 140 ml colostomy output. Wounds:  Skin Tears, Multiple (Wounds- refer to nursing flowsheet)       Current Nutrition Therapies:    ADULT TUBE FEEDING; Nasogastric; Renal Formula; Continuous; 35; No; 30; Q 8 hours; Protein; 1 Protein at 12 noon daily. Flush with 30 mLwater before and after administration.   Current Tube Feeding (TF) Orders:  · Feeding Route: Nasogastric  · Formula: Renal Formula  · Schedule: Continuous  · Additives/Modulars: Protein  · Current TF & Flush Orders Provides: 1591 kcal and 104 gm Protein (based on 1.77 kcal per mL Nepro + Proteinex)    Anthropometric Measures:  · Height: 5' 3\" (160 cm) (from old chart)  · Current Body Weight: 216 lb (98 kg)   · Admission Body Weight: 204 lb (92.5 kg)    · Ideal Body Weight: 115 lbs; % Ideal Body Weight     · BMI: 38.3  · BMI Categories: Obese Class 2 (BMI 35.0 -39.9)       Nutrition Diagnosis:   · Increased nutrient needs related to  (healing) as evidenced by wounds      Nutrition Interventions:   Food and/or Nutrient Delivery:  Continue Current Tube Feeding  Nutrition Education/Counseling:  No recommendation at this time   Coordination of Nutrition Care:  Continue to monitor while inpatient    Goals:  Nutrition support will meet approximately 100% of estimated needs       Nutrition Monitoring and Evaluation:   Behavioral-Environmental Outcomes:  None Identified   Food/Nutrient Intake Outcomes:  Enteral Nutrition Intake/Tolerance  Physical Signs/Symptoms Outcomes:  Biochemical Data, GI Status, Fluid Status or Edema, Weight, Skin     Discharge Planning: Too soon to determine     Some areas of assessment may be incomplete due to COVID-19 precautions. Mike Sharma R.D., L.D.   Clinical Dietitian  Office: [phone]

## 2021-11-24 NOTE — PROGRESS NOTES
HEMODIALYSIS PRE-TREATMENT NOTE    Patient Identifiers prior to treatment: name, band and birthdate    Isolation Required: MRSA                      Isolation Type: contact       (please document if patient is being managed as a PUI/COVID-19 patient)        Hepatitis status:                           Date Drawn                             Result  Hepatitis B Surface Antigen 11/16/2021     neg                     Hepatitis B Surface Antibody 11/16/2021 neg        Hepatitis B Core Antibody 11/16/2021 neg          How was Hepatitis Status verified: labs     Was a copy of the labs you documented provided to facility for the patient's chart: yes    Hemodialysis orders verified: yes    Access Within normal limits ( I.e. s/s of infection,...): yes     Pre-Assessment completed: yes    Pre-dialysis report received from: Elif Shah                      Time: 1000

## 2021-11-24 NOTE — FLOWSHEET NOTE
11/24/21 1045   Encounter Summary   Services provided to: Patient   Referral/Consult From: Rounding   Complexity of Encounter Low   Length of Encounter 15 minutes   Spiritual/Mu-ism   Type Spiritual support   Assessment Unable to respond   Intervention Prayer 3

## 2021-11-24 NOTE — PROGRESS NOTES
Patient with multiple 2-3 second pauses. Currently in Afib with PVC's. Dr. Chelsy Santa aware and is at bedside. Patient is nodding head appropriately to questions. Still shakes her head no when asked about ICD/ pacemaker. Orders to hold morning dose of carvedilol.

## 2021-11-24 NOTE — PLAN OF CARE
Problem: Falls - Risk of:  Goal: Will remain free from falls  Description: Will remain free from falls  Outcome: Met This Shift  Note: Pt remains in bed on vent     Problem: Skin Integrity:  Goal: Absence of new skin breakdown  Description: Absence of new skin breakdown  Outcome: Ongoing  Note: Pt has wounds;  Problem: Gas Exchange - Impaired:  Goal: Levels of oxygenation will improve  Description: Levels of oxygenation will improve  Outcome: Met This Shift  Note: Pt sating good on vent     Problem: Fluid Volume - Imbalance:  Goal: Absence of imbalanced fluid volume signs and symptoms  Description: Absence of imbalanced fluid volume signs and symptoms  Outcome: Met This Shift  Note: Pt has kenny for retention and also receiving dialysis      Problem: Non-Violent Restraints  Goal: Removal from restraints as soon as assessed to be safe  Outcome: Not Met This Shift  Note: Patient remains in restraints due to pulling at tubes and lines. See restraint charting, continue to monitor closely. Problem: Gas Exchange - Impaired:  Goal: Levels of oxygenation will improve  Description: Levels of oxygenation will improve  Outcome: Met This Shift  Note: Pt sating good on vent     Problem: MECHANICAL VENTILATION  Goal: ET tube will be managed safely  Outcome: Met This Shift  Note: ETT secured with commercial felix. Tristen is dry and clean    being seen by wound care. Problem: Nutrition  Goal: Optimal nutrition therapy  Outcome: Met This Shift  Note: Pt on TF.  Tolerating well

## 2021-11-24 NOTE — PROGRESS NOTES
Department of Internal Medicine  Nephrology Shanice Lombardo MD  Progress Note    Reason for consultation: Management of acute kidney injury. Consulting physician: Onesimo Stover MD.    Interval history:   Patient seen and examined in the ICU today. Patient currently on hemodialysis, intradialytic hypotension noted systolic blood pressure drop to 80s Levophed was started currently patient is tolerating dialysis okay blood pressure has stabilized since Levophed has started  Scr has remains elevated to 3.4 mg/dL   ml in last 24 hours. 180 ml since morning in 5 hours. Patient remains edematous. On weaning trial.  CXR cardiomegaly  With basilar effusion and adjacent infiltrates    Patient had episode of bradycardia with transient PEA requiring a short session of CPR on 11/20/21  approximately 15 minutes into hemodialysis. patient is S/P  right ureteral stent placement with renal ultrasound on 11/18/2021 showing improvement in hydronephrosis. History of present illness: This is a 66 y.o. female with a significant past medical history of Type 2 diabetes mellitus, coronary artery disease, lumbar radiculopathy, Hyperlipidemia and chronic kidney disease stage III [baseline serum creatinine 1.5 mg/dL], who presented from [facility] for further evaluation of acute kidney injury. She was sent to [facility] after developing nonhealing wound s/p cardiac catheterization via right refill Levophed has been discontinued approach with right groin wound. She had been on Bumex in the ECF and creatinine and BUN have been rising and Bumex was decreased to a lowest dose of 0.5 mg p.o. twice daily. She has had regular blood work performed in the [facility] and ECF contacted nephrologist after finding abnormal laboratory studies. She was noted to have a BUN/creatinine 104/2.28 mg/dL with hemoglobin 8 g/dL. She has mild confusion but is not in obvious respiratory distress.     Scheduled Meds:   linezolid 600 mg IntraVENous Q12H    chlorhexidine  15 mL Mouth/Throat BID    polyvinyl alcohol  1 drop Both Eyes Q4H    And    artificial tears   Both Eyes Q4H    carvedilol  3.125 mg Oral BID WC    pantoprazole  40 mg IntraVENous BID    And    sodium chloride flush  10 mL IntraVENous BID    sodium polystyrene  15 g Oral Once    cefepime  1,000 mg IntraVENous Q24H    sertraline  25 mg Oral Daily    ferrous sulfate  325 mg Oral Daily with breakfast    [Held by provider] aspirin  81 mg Oral Daily    atorvastatin  80 mg Oral Daily    insulin glargine  22 Units SubCUTAneous Nightly    insulin lispro  0-12 Units SubCUTAneous TID     insulin lispro  0-6 Units SubCUTAneous Nightly    sodium chloride flush  5-40 mL IntraVENous 2 times per day     Continuous Infusions:   norepinephrine Stopped (11/23/21 1339)    propofol 10 mcg/kg/min (11/24/21 0531)    dextrose      sodium chloride      sodium chloride       PRN Meds:.midodrine, anticoagulant sodium citrate, anticoagulant sodium citrate, albumin human, midodrine, fentanNYL, potassium chloride **OR** potassium alternative oral replacement **OR** potassium chloride, magnesium sulfate, glucose, dextrose, glucagon (rDNA), dextrose, sodium chloride, sodium chloride flush, sodium chloride, acetaminophen, ondansetron **OR** ondansetron, oxyCODONE-acetaminophen    Physical Exam:    VITALS:  BP (!) 132/53   Pulse 66   Temp 97.7 °F (36.5 °C)   Resp 18   Ht 5' 3\" (1.6 m) Comment: from old chart  Wt 222 lb 10.6 oz (101 kg)   SpO2 100%   BMI 39.44 kg/m²   24HR INTAKE/OUTPUT:      Intake/Output Summary (Last 24 hours) at 11/24/2021 1046  Last data filed at 11/24/2021 0800  Gross per 24 hour   Intake 3168.96 ml   Output 3355 ml   Net -186.04 ml     Constitutional: lethargic responsive, obeying commands on mechanical ventilator    Skin: Skin color, texture, turgor normal. No rashes or lesions    Head: Normocephalic, without obvious abnormality, atraumatic Cardiovascular/Edema:S1 and S2 heard regular    Respiratory: Lungs:Diminished air entry basal rales    Abdomen: soft, non-tender; bowel sounds normal; no masses,  no organomegaly    Back: symmetric, no curvature. ROM normal. No CVA tenderness. Extremities: 3+ generalized edema    Neuro:  Grossly normal    CBC:   Recent Labs     11/22/21  0539 11/23/21  0646 11/24/21  0427   WBC 9.3 9.9 7.9   HGB 7.9* 7.9* 7.6*   PLT 53* 56* 39*     BMP:    Recent Labs     11/22/21  0539 11/22/21  0539 11/23/21  0646 11/23/21  1610 11/24/21  0427   *  --  132*  --  136   K 3.7   < > 3.7 3.9 3.9   CL 99  --  96*  --  101   CO2 25  --  26  --  26   BUN 46*  --  54*  --  34*   CREATININE 3.65*  --  3.47*  --  2.67*   GLUCOSE 236*  --  255*  --  229*    < > = values in this interval not displayed. Lab Results   Component Value Date    NITRU NEGATIVE 11/12/2021    COLORU Yellow 11/12/2021    PHUR 6.0 11/12/2021    WBCUA 50  11/12/2021    RBCUA 20 TO 50 11/12/2021    MUCUS NOT REPORTED 11/12/2021    TRICHOMONAS NOT REPORTED 11/12/2021    YEAST NOT REPORTED 11/12/2021    BACTERIA MODERATE 11/12/2021    CLARITYU Clear 09/07/2016    SPECGRAV 1.012 11/12/2021    LEUKOCYTESUR LARGE 11/12/2021    UROBILINOGEN Normal 11/12/2021    BILIRUBINUR NEGATIVE 11/12/2021    BLOODU Positive 09/07/2016    GLUCOSEU NEGATIVE 11/12/2021    KETUA NEGATIVE 11/12/2021    AMORPHOUS NOT REPORTED 11/12/2021     IMPRESSION/RECOMMENDATIONS:      1. Acute kidney injury - most consistent with Ischemic ATN and obstructive nephropathy secondary to bilateral hydronephrosis. Urine output 500 mL yesterday in 24 hours  Repeat renal ultrasound  11/18/2021 shows improvement in hydronephrosis     Patient was started on acute dialysis on 11/17/2021    2. CHF with reduced EF, Systolic dysfunction, diastolic dysfunction. 3. PEA/Cardiac Arrest s/p CPR recovered. 4. Acute respiratory failure s/p intubation on Ventilator.     5. Normocytic anemia/Thrombocytopenia, Heme-oncology following, No hemolysis found, HIT panel negative,    Continue Nepro tube feeding  2. Hypotension- on levophed    3. Bilateral hydronephrosis status post cystoscopy with right ureteral  stent placement. 5. Anion gap metabolic acidosis improved with dialysis    6. Anasarca secondary to cardiomyopathy-EF of 30-35 % , third spacing renal failure    8. Acute metabolic encephalopathy-secondary to uremia-rule out sepsis. IV linezolid continue IV cefepime    9. Hypokalemia-Improved     Plan:  Hemodialysis again today UF goal of 2 to 3 L as tolerated   IV albumin and midodrine for intradialytic hypotension  Strict input and output   Basic metabolic profile daily. Prognosis is guarded.   Retacrit with dialysis 3 times a week      Molly Pinto MD   Attending Nephrologist  11/24/2021 10:46 AM

## 2021-11-24 NOTE — PLAN OF CARE
Problem: SAFETY  Goal: Free from accidental physical injury  Outcome: Ongoing  Goal: Free from intentional harm  Outcome: Ongoing     Problem: DAILY CARE  Goal: Daily care needs are met  Outcome: Ongoing     Problem: PAIN  Goal: Patient's pain/discomfort is manageable  Outcome: Ongoing     Problem: KNOWLEDGE DEFICIT  Goal: Patient/S.O. demonstrates understanding of disease process, treatment plan, medications, and discharge instructions. Outcome: Ongoing     Problem: Falls - Risk of:  Goal: Will remain free from falls  Description: Will remain free from falls  11/24/2021 1159 by Larry Sanchez  Outcome: Ongoing  11/24/2021 0520 by Judie Walton RN  Outcome: Met This Shift  Note: Pt remains in bed on vent  Goal: Absence of physical injury  Description: Absence of physical injury  Outcome: Ongoing     Problem: Skin Integrity:  Goal: Will show no infection signs and symptoms  Description: Will show no infection signs and symptoms  Outcome: Ongoing  Goal: Absence of new skin breakdown  Description: Absence of new skin breakdown  11/24/2021 1159 by Larry Sanchez  Outcome: Ongoing  11/24/2021 0520 by Judie Walton RN  Outcome: Ongoing  Note: Pt has wound being seen by wound care. Problem: Nutrition  Goal: Optimal nutrition therapy  11/24/2021 1159 by Larry Sanchez  Outcome: Ongoing  11/24/2021 0520 by Judie Walton RN  Outcome: Met This Shift  Note: Pt on TF. Tolerating well     Problem: Non-Violent Restraints  Goal: Removal from restraints as soon as assessed to be safe  11/24/2021 1159 by Larry Sanchez  Outcome: Ongoing  11/24/2021 0520 by Judie Walton RN  Outcome: Not Met This Shift  Note: Patient remains in restraints due to pulling at tubes and lines. See restraint charting, continue to monitor closely.    Goal: No harm/injury to patient while restraints in use  Outcome: Ongoing  Goal: Patient's dignity will be maintained  Outcome: Ongoing

## 2021-11-24 NOTE — PROGRESS NOTES
HEMODIALYSIS POST TREATMENT NOTE    Treatment time ordered: 210  Actual treatment time: 210    UltraFiltration Goal: 3500  UltraFiltration Removed: 3000      Pre Treatment weight: 101  Post Treatment weight: 98.0  Estimated Dry Weight: na    Access used:     Central Venous Catheter:          Tunneled or Non-tunneled: non tunneled           Site: right neck          Access Flow: good      Internal Access:       AV Fistula or AV Graft: na         Site: na       Access Flow: na       Sign and symptoms of infection: none       If YES: na    Medications or blood products given: midodrine and albumin    Chronic outpatient schedule: na    Chronic outpatient unit: na    Summary of response to treatment:pt did well    Explain if orders NOT met, was physician notified:charles      ACES flowsheet faxed to patient unit/ placed in patient chart: yes    Post assessment completed: yes    Report given to: Melissa documented Safety Checks include the followin) Access and face visible at all times. 2) All connections and blood lines are secure with no kinks. 3) NVL alarm engaged. 4) Hemosafe device applied (if applicable). 5) No collapse of Arterial or Venous blood chambers. 6) All blood lines / pump segments in the air detectors.

## 2021-11-25 ENCOUNTER — APPOINTMENT (OUTPATIENT)
Dept: GENERAL RADIOLOGY | Age: 71
DRG: 004 | End: 2021-11-25
Payer: COMMERCIAL

## 2021-11-25 LAB
ABSOLUTE EOS #: 0.26 K/UL (ref 0–0.4)
ABSOLUTE IMMATURE GRANULOCYTE: ABNORMAL K/UL (ref 0–0.3)
ABSOLUTE LYMPH #: 1.52 K/UL (ref 1–4.8)
ABSOLUTE MONO #: 0.73 K/UL (ref 0.1–1.3)
ALLEN TEST: ABNORMAL
ANION GAP SERPL CALCULATED.3IONS-SCNC: 7 MMOL/L (ref 9–17)
BASOPHILS # BLD: 0 % (ref 0–2)
BASOPHILS ABSOLUTE: 0 K/UL (ref 0–0.2)
BUN BLDV-MCNC: 27 MG/DL (ref 8–23)
BUN/CREAT BLD: ABNORMAL (ref 9–20)
CALCIUM SERPL-MCNC: 8 MG/DL (ref 8.6–10.4)
CARBOXYHEMOGLOBIN: 0.7 % (ref 0–5)
CHLORIDE BLD-SCNC: 97 MMOL/L (ref 98–107)
CO2: 27 MMOL/L (ref 20–31)
COMPLEMENT TOTAL (CH50): 84.7 U/ML (ref 38.7–89.9)
CREAT SERPL-MCNC: 2.27 MG/DL (ref 0.5–0.9)
DIFFERENTIAL TYPE: ABNORMAL
EOSINOPHILS RELATIVE PERCENT: 4 % (ref 0–4)
FIBRINOGEN: 263 MG/DL (ref 210–530)
FIO2: 30
GFR AFRICAN AMERICAN: 26 ML/MIN
GFR NON-AFRICAN AMERICAN: 21 ML/MIN
GFR SERPL CREATININE-BSD FRML MDRD: ABNORMAL ML/MIN/{1.73_M2}
GFR SERPL CREATININE-BSD FRML MDRD: ABNORMAL ML/MIN/{1.73_M2}
GLUCOSE BLD-MCNC: 188 MG/DL (ref 70–99)
GLUCOSE BLD-MCNC: 194 MG/DL (ref 65–105)
GLUCOSE BLD-MCNC: 196 MG/DL (ref 65–105)
GLUCOSE BLD-MCNC: 198 MG/DL (ref 65–105)
GLUCOSE BLD-MCNC: 224 MG/DL (ref 65–105)
HAPTOGLOBIN: 122 MG/DL (ref 30–200)
HCO3 ARTERIAL: 28.2 MMOL/L (ref 22–26)
HCT VFR BLD CALC: 23.4 % (ref 36–46)
HEMOGLOBIN: 7.5 G/DL (ref 12–16)
IMMATURE GRANULOCYTES: ABNORMAL %
INR BLD: 1.3
LACTATE DEHYDROGENASE: 180 U/L (ref 135–214)
LYMPHOCYTES # BLD: 23 % (ref 24–44)
MAGNESIUM: 1.9 MG/DL (ref 1.6–2.6)
MCH RBC QN AUTO: 30.8 PG (ref 26–34)
MCHC RBC AUTO-ENTMCNC: 32 G/DL (ref 31–37)
MCV RBC AUTO: 96 FL (ref 80–100)
METHEMOGLOBIN: 1.1 % (ref 0–1.9)
MODE: ABNORMAL
MONOCYTES # BLD: 11 % (ref 1–7)
MORPHOLOGY: ABNORMAL
NEGATIVE BASE EXCESS, ART: ABNORMAL MMOL/L (ref 0–2)
NOTIFICATION TIME: ABNORMAL
NOTIFICATION: ABNORMAL
NRBC AUTOMATED: ABNORMAL PER 100 WBC
O2 DEVICE/FLOW/%: ABNORMAL
O2 SAT, ARTERIAL: 96.9 % (ref 95–98)
OXYHEMOGLOBIN: ABNORMAL % (ref 95–98)
PARTIAL THROMBOPLASTIN TIME: 34.9 SEC (ref 24–36)
PATIENT TEMP: 37
PCO2 ARTERIAL: 40.7 MMHG (ref 35–45)
PCO2, ART, TEMP ADJ: ABNORMAL (ref 35–45)
PDW BLD-RTO: 16.6 % (ref 11.5–14.9)
PEEP/CPAP: 5
PH ARTERIAL: 7.45 (ref 7.35–7.45)
PH, ART, TEMP ADJ: ABNORMAL (ref 7.35–7.45)
PHOSPHORUS: 1.9 MG/DL (ref 2.6–4.5)
PLATELET # BLD: 36 K/UL (ref 150–450)
PLATELET ESTIMATE: ABNORMAL
PMV BLD AUTO: 10.6 FL (ref 6–12)
PO2 ARTERIAL: 101 MMHG (ref 80–100)
PO2, ART, TEMP ADJ: ABNORMAL MMHG (ref 80–100)
POSITIVE BASE EXCESS, ART: 4.1 MMOL/L (ref 0–2)
POTASSIUM SERPL-SCNC: 3.9 MMOL/L (ref 3.7–5.3)
PROTHROMBIN TIME: 15.9 SEC (ref 11.8–14.6)
PSV: ABNORMAL
PT. POSITION: ABNORMAL
RBC # BLD: 2.44 M/UL (ref 4–5.2)
RBC # BLD: ABNORMAL 10*6/UL
RESPIRATORY RATE: 16
SAMPLE SITE: ABNORMAL
SEG NEUTROPHILS: 62 % (ref 36–66)
SEGMENTED NEUTROPHILS ABSOLUTE COUNT: 4.09 K/UL (ref 1.3–9.1)
SET RATE: 16
SODIUM BLD-SCNC: 131 MMOL/L (ref 135–144)
TEXT FOR RESPIRATORY: ABNORMAL
TOTAL HB: ABNORMAL G/DL (ref 12–16)
TOTAL RATE: 16
VT: 500
WBC # BLD: 6.6 K/UL (ref 3.5–11)
WBC # BLD: ABNORMAL 10*3/UL

## 2021-11-25 PROCEDURE — 2580000003 HC RX 258: Performed by: FAMILY MEDICINE

## 2021-11-25 PROCEDURE — 85610 PROTHROMBIN TIME: CPT

## 2021-11-25 PROCEDURE — 80048 BASIC METABOLIC PNL TOTAL CA: CPT

## 2021-11-25 PROCEDURE — 83010 ASSAY OF HAPTOGLOBIN QUANT: CPT

## 2021-11-25 PROCEDURE — 71045 X-RAY EXAM CHEST 1 VIEW: CPT

## 2021-11-25 PROCEDURE — 85025 COMPLETE CBC W/AUTO DIFF WBC: CPT

## 2021-11-25 PROCEDURE — 83735 ASSAY OF MAGNESIUM: CPT

## 2021-11-25 PROCEDURE — 99232 SBSQ HOSP IP/OBS MODERATE 35: CPT | Performed by: INTERNAL MEDICINE

## 2021-11-25 PROCEDURE — 6370000000 HC RX 637 (ALT 250 FOR IP): Performed by: INTERNAL MEDICINE

## 2021-11-25 PROCEDURE — 2500000003 HC RX 250 WO HCPCS: Performed by: INTERNAL MEDICINE

## 2021-11-25 PROCEDURE — 94003 VENT MGMT INPAT SUBQ DAY: CPT

## 2021-11-25 PROCEDURE — 85730 THROMBOPLASTIN TIME PARTIAL: CPT

## 2021-11-25 PROCEDURE — 82805 BLOOD GASES W/O2 SATURATION: CPT

## 2021-11-25 PROCEDURE — 2700000000 HC OXYGEN THERAPY PER DAY

## 2021-11-25 PROCEDURE — 2580000003 HC RX 258: Performed by: UROLOGY

## 2021-11-25 PROCEDURE — 84100 ASSAY OF PHOSPHORUS: CPT

## 2021-11-25 PROCEDURE — 2580000003 HC RX 258: Performed by: INTERNAL MEDICINE

## 2021-11-25 PROCEDURE — 6360000002 HC RX W HCPCS: Performed by: INTERNAL MEDICINE

## 2021-11-25 PROCEDURE — 2000000000 HC ICU R&B

## 2021-11-25 PROCEDURE — 36600 WITHDRAWAL OF ARTERIAL BLOOD: CPT

## 2021-11-25 PROCEDURE — 85384 FIBRINOGEN ACTIVITY: CPT

## 2021-11-25 PROCEDURE — 83615 LACTATE (LD) (LDH) ENZYME: CPT

## 2021-11-25 PROCEDURE — 82947 ASSAY GLUCOSE BLOOD QUANT: CPT

## 2021-11-25 PROCEDURE — C9113 INJ PANTOPRAZOLE SODIUM, VIA: HCPCS | Performed by: FAMILY MEDICINE

## 2021-11-25 PROCEDURE — 94761 N-INVAS EAR/PLS OXIMETRY MLT: CPT

## 2021-11-25 PROCEDURE — 6370000000 HC RX 637 (ALT 250 FOR IP): Performed by: UROLOGY

## 2021-11-25 PROCEDURE — 6360000002 HC RX W HCPCS: Performed by: FAMILY MEDICINE

## 2021-11-25 PROCEDURE — 36415 COLL VENOUS BLD VENIPUNCTURE: CPT

## 2021-11-25 RX ADMIN — INSULIN LISPRO 1 UNITS: 100 INJECTION, SOLUTION INTRAVENOUS; SUBCUTANEOUS at 21:57

## 2021-11-25 RX ADMIN — MINERAL OIL, PETROLATUM: 425; 568 OINTMENT OPHTHALMIC at 10:22

## 2021-11-25 RX ADMIN — INSULIN LISPRO 4 UNITS: 100 INJECTION, SOLUTION INTRAVENOUS; SUBCUTANEOUS at 17:23

## 2021-11-25 RX ADMIN — LINEZOLID 600 MG: 600 INJECTION, SOLUTION INTRAVENOUS at 02:46

## 2021-11-25 RX ADMIN — POLYVINYL ALCOHOL 1 DROP: 14 SOLUTION/ DROPS OPHTHALMIC at 01:00

## 2021-11-25 RX ADMIN — INSULIN LISPRO 2 UNITS: 100 INJECTION, SOLUTION INTRAVENOUS; SUBCUTANEOUS at 10:23

## 2021-11-25 RX ADMIN — POLYVINYL ALCOHOL 1 DROP: 14 SOLUTION/ DROPS OPHTHALMIC at 15:08

## 2021-11-25 RX ADMIN — SODIUM CHLORIDE, PRESERVATIVE FREE 10 ML: 5 INJECTION INTRAVENOUS at 10:24

## 2021-11-25 RX ADMIN — CHLORHEXIDINE GLUCONATE 0.12% ORAL RINSE 15 ML: 1.2 LIQUID ORAL at 10:23

## 2021-11-25 RX ADMIN — PANTOPRAZOLE SODIUM 40 MG: 40 INJECTION, POWDER, FOR SOLUTION INTRAVENOUS at 21:57

## 2021-11-25 RX ADMIN — INSULIN LISPRO 2 UNITS: 100 INJECTION, SOLUTION INTRAVENOUS; SUBCUTANEOUS at 15:07

## 2021-11-25 RX ADMIN — PANTOPRAZOLE SODIUM 40 MG: 40 INJECTION, POWDER, FOR SOLUTION INTRAVENOUS at 10:23

## 2021-11-25 RX ADMIN — PROPOFOL 10 MCG/KG/MIN: 10 INJECTION, EMULSION INTRAVENOUS at 18:39

## 2021-11-25 RX ADMIN — MINERAL OIL, PETROLATUM: 425; 568 OINTMENT OPHTHALMIC at 21:48

## 2021-11-25 RX ADMIN — PROPOFOL 10 MCG/KG/MIN: 10 INJECTION, EMULSION INTRAVENOUS at 03:01

## 2021-11-25 RX ADMIN — POLYVINYL ALCOHOL 1 DROP: 14 SOLUTION/ DROPS OPHTHALMIC at 21:47

## 2021-11-25 RX ADMIN — SODIUM CHLORIDE, PRESERVATIVE FREE 10 ML: 5 INJECTION INTRAVENOUS at 21:47

## 2021-11-25 RX ADMIN — MINERAL OIL, PETROLATUM: 425; 568 OINTMENT OPHTHALMIC at 18:00

## 2021-11-25 RX ADMIN — LINEZOLID 600 MG: 600 INJECTION, SOLUTION INTRAVENOUS at 15:07

## 2021-11-25 RX ADMIN — SERTRALINE HYDROCHLORIDE 25 MG: 50 TABLET ORAL at 10:22

## 2021-11-25 RX ADMIN — FERROUS SULFATE TAB 325 MG (65 MG ELEMENTAL FE) 325 MG: 325 (65 FE) TAB at 10:22

## 2021-11-25 RX ADMIN — SODIUM PHOSPHATE, MONOBASIC, MONOHYDRATE 10 MMOL: 276; 142 INJECTION, SOLUTION INTRAVENOUS at 17:26

## 2021-11-25 RX ADMIN — CEFEPIME HYDROCHLORIDE 1000 MG: 1 INJECTION, POWDER, FOR SOLUTION INTRAMUSCULAR; INTRAVENOUS at 21:57

## 2021-11-25 RX ADMIN — CHLORHEXIDINE GLUCONATE 0.12% ORAL RINSE 15 ML: 1.2 LIQUID ORAL at 21:47

## 2021-11-25 RX ADMIN — MINERAL OIL, PETROLATUM: 425; 568 OINTMENT OPHTHALMIC at 15:08

## 2021-11-25 RX ADMIN — POLYVINYL ALCOHOL 1 DROP: 14 SOLUTION/ DROPS OPHTHALMIC at 23:44

## 2021-11-25 RX ADMIN — SODIUM CHLORIDE, PRESERVATIVE FREE 10 ML: 5 INJECTION INTRAVENOUS at 02:57

## 2021-11-25 RX ADMIN — POLYVINYL ALCOHOL 1 DROP: 14 SOLUTION/ DROPS OPHTHALMIC at 10:22

## 2021-11-25 RX ADMIN — ATORVASTATIN CALCIUM 80 MG: 80 TABLET, FILM COATED ORAL at 10:22

## 2021-11-25 RX ADMIN — INSULIN GLARGINE 22 UNITS: 100 INJECTION, SOLUTION SUBCUTANEOUS at 21:57

## 2021-11-25 ASSESSMENT — PULMONARY FUNCTION TESTS
PIF_VALUE: 23
PIF_VALUE: 22
PIF_VALUE: 17
PIF_VALUE: 22
PIF_VALUE: 23
PIF_VALUE: 23
PIF_VALUE: 31
PIF_VALUE: 23
PIF_VALUE: 23
PIF_VALUE: 24
PIF_VALUE: 24
PIF_VALUE: 26

## 2021-11-25 ASSESSMENT — PAIN SCALES - GENERAL
PAINLEVEL_OUTOF10: 0
PAINLEVEL_OUTOF10: 0

## 2021-11-25 NOTE — PROGRESS NOTES
Hospitalist Progress Note  11/25/2021 6:47 PM  Subjective:   Admit Date: 11/10/2021  PCP: Tamera Saldivar DO     Full Code      C/c:  Chief Complaint   Patient presents with    Abnormal Lab     low hemoglobin and high CR         Interval History: still intubation/unable to wean today/pt in a fib/had  dialysis yesterday,blood sugar still high    Diet: ADULT TUBE FEEDING; Nasogastric; Renal Formula; Continuous; 35; No; 30; Q 8 hours; Protein; 1 Protein at 12 noon daily. Flush with 30 mLwater before and after administration.                                 ip days:15  Medications:   Scheduled Meds:   sodium phosphate IVPB  10 mmol IntraVENous Once    linezolid  600 mg IntraVENous Q12H    chlorhexidine  15 mL Mouth/Throat BID    polyvinyl alcohol  1 drop Both Eyes Q4H    And    artificial tears   Both Eyes Q4H    carvedilol  3.125 mg Oral BID WC    pantoprazole  40 mg IntraVENous BID    And    sodium chloride flush  10 mL IntraVENous BID    sodium polystyrene  15 g Oral Once    cefepime  1,000 mg IntraVENous Q24H    sertraline  25 mg Oral Daily    ferrous sulfate  325 mg Oral Daily with breakfast    [Held by provider] aspirin  81 mg Oral Daily    atorvastatin  80 mg Oral Daily    insulin glargine  22 Units SubCUTAneous Nightly    insulin lispro  0-12 Units SubCUTAneous TID WC    insulin lispro  0-6 Units SubCUTAneous Nightly    sodium chloride flush  5-40 mL IntraVENous 2 times per day     Continuous Infusions:   norepinephrine 1 mcg/min (11/24/21 1120)    propofol 10 mcg/kg/min (11/25/21 1839)    dextrose      sodium chloride      sodium chloride       PRN Meds:.midodrine, anticoagulant sodium citrate, anticoagulant sodium citrate, albumin human, midodrine, fentanNYL, potassium chloride **OR** potassium alternative oral replacement **OR** potassium chloride, magnesium sulfate, glucose, dextrose, glucagon (rDNA), dextrose, sodium chloride, sodium chloride flush, sodium chloride, acetaminophen, ondansetron **OR** ondansetron, oxyCODONE-acetaminophen     CBC:   Recent Labs     11/23/21  0646 11/23/21  0646 11/24/21  0427 11/24/21  2314 11/25/21  0438   WBC 9.9  --  7.9  --  6.6   HGB 7.9*   < > 7.6* 7.2* 7.5*   PLT 56*  --  39*  --  36*    < > = values in this interval not displayed. BMP:    Recent Labs     11/23/21  0646 11/23/21  0646 11/23/21  1610 11/24/21  0427 11/25/21  0438   *  --   --  136 131*   K 3.7   < > 3.9 3.9 3.9   CL 96*  --   --  101 97*   CO2 26  --   --  26 27   BUN 54*  --   --  34* 27*   CREATININE 3.47*  --   --  2.67* 2.27*   GLUCOSE 255*  --   --  229* 188*    < > = values in this interval not displayed. Hepatic: No results for input(s): AST, ALT, ALB, BILITOT, ALKPHOS in the last 72 hours. Troponin: No results for input(s): TROPONINI in the last 72 hours. BNP: No results for input(s): BNP in the last 72 hours. Lipids: No results for input(s): CHOL, HDL in the last 72 hours.     Invalid input(s): LDLCALCU  INR:   Recent Labs     11/25/21  1158   INR 1.3       Objective:   Vitals: BP (!) 134/50   Pulse 78   Temp 99.8 °F (37.7 °C) (Oral)   Resp 16   Ht 5' 3\" (1.6 m) Comment: from old chart  Wt 216 lb 0.8 oz (98 kg)   SpO2 100%   BMI 38.27 kg/m²   General appearance: sedated/intubated  Skin: Skin color, texture, turgor normal. No rashes or lesions  Lungs: clear but diminished auscultation bilaterally  Heart: regular rate and rhythm, S1, S2 normal, no murmur, click, rub or gallop  Abdomen: soft, non-tender; bowel sounds normal; no masses,  no organomegaly  Extremities: extremities normal, atraumatic, no cyanosis or edema  Neurologic: Mental status: Alert, oriented, thought content appropriate    Prophylaxis:   DVT with  [] lovenox        [] heparin        [] Scd        [x] none:     Radiology:  XR CHEST PORTABLE    Result Date: 11/23/2021  EXAMINATION: ONE XRAY VIEW OF THE CHEST 11/23/2021 6:26 am COMPARISON: 11/20/2021 HISTORY: ORDERING SYSTEM PROVIDED HISTORY: ETT placement TECHNOLOGIST PROVIDED HISTORY: ETT placement Reason for Exam: ETT placement Acuity: Unknown Type of Exam: Unknown Additional signs and symptoms: ETT placement FINDINGS: There is an endotracheal tube in place with its tip 2 cm above the kristina. Mild cardiomegaly is noted with small to moderate bilateral pleural effusions. There is associated passive basilar atelectasis. Mild pulmonary venous hypertension. There is a gastric catheter passing into the stomach, tip is not included on the film. Stable right IJ central venous catheter. 1.  Endotracheal tube in place with its tip 2 cm above the kristina. 2.  Unchanged cardiomegaly and small to moderate bilateral pleural effusions with basilar atelectasis. 3.  Support tubes and catheters are otherwise unchanged. XR CHEST PORTABLE    Result Date: 11/22/2021  EXAMINATION: ONE XRAY VIEW OF THE CHEST 11/22/2021 6:36 am COMPARISON: Chest radiograph performed 11/21/2021. HISTORY: ORDERING SYSTEM PROVIDED HISTORY: ETT placement TECHNOLOGIST PROVIDED HISTORY: ETT placement Reason for Exam: ETT placement Acuity: Acute Type of Exam: Ongoing FINDINGS: There are bilateral effusions with adjacent infiltrates. There is no pneumothorax. The heart is enlarged. The upper abdomen unremarkable. The extrathoracic soft tissues are unremarkable. There is an endotracheal tube with the tip in the midtrachea. There is a gastric tube and the tip is not visualized. There is a right internal jugular line. Cardiomegaly with bibasilar effusions and adjacent infiltrates. Support tubes as described above. Assessment :   1. Ac kidney injury/dialysis  2. Combined systolic and diastolic dysfunction  3. PEA/cardiac arrest s/p CPR recovered  4. Ac resp failure s/p intubation on ventilator     Plan:   1. Continue present plan  2.   Input from cardio/pul/nephro seen    Patient Active Problem List:     Chronic kidney disease (CKD), stage III (moderate) ([facility] [address] Hyperkalemia     Type 2 diabetes mellitus with kidney complication, with long-term current use of insulin (HCC)     Hyperlipidemia     CKD (chronic kidney disease) stage 3, GFR 30-59 ml/min (HCC)     Microalbuminuria     Urinary calculus     Tethered cord syndrome (HCC)     Stenosis of cervical spine     Lumbar radiculopathy     Lower back pain     HTN (hypertension)     Esophageal reflux     Depression with anxiety     ASCVD (arteriosclerotic cardiovascular disease)     Closed fracture of thoracic vertebra (HCC)     Intervertebral cervical disc disorder with myelopathy, cervical region     Pain in limb     Anemia     JAYLYN (acute kidney injury) ([address]     Iron deficiency anemia     Hydronephrosis determined by ultrasound     Longstanding persistent atrial fibrillation ([address]     Acute metabolic encephalopathy      Anticipated Disposition upon discharge: [] Home                                                                         [] Home with Home Health                                                                         [] [facility]                                                                         [] [facility] [address]      Patient is admitted as inpatient status because of co-morbidities listed above, severity of signs and symptoms as outlined, requirement for current medical therapies and most importantly because of direct risk to patient if care not provided in a hospital setting.           Warren Nunez MD, MD  [facility] Hospitalist

## 2021-11-25 NOTE — FLOWSHEET NOTE
received called from  in the [facility] office.  stated that he has no family support. He is nervious/scarred about losing his wife.  provided a listening presence and offered words of comfort him. 11/25/21 1330   Encounter Summary   Services provided to: Family   Continue Visiting   (11/25/2021)   Complexity of Encounter Moderate   Length of Encounter 30 minutes   Spiritual Assessment Completed Yes   Spiritual/[employer]al   Type Spiritual support   Assessment Anxious; Fearful; Hopeless   Intervention Active listening; Nurtured hope; Sustaining presence/ Ministry of presence   Outcome Expressed gratitude;  Connection/belonging

## 2021-11-25 NOTE — PLAN OF CARE
Problem: OXYGENATION/RESPIRATORY FUNCTION  Goal: Patient will maintain patent airway  11/24/2021 1919 by Bonifacio Graves RCP  Outcome: Ongoing     Problem: OXYGENATION/RESPIRATORY FUNCTION  Goal: Patient will achieve/maintain normal respiratory rate/effort  Description: Respiratory rate and effort will be within normal limits for the patient  11/24/2021 1919 by Bonifacio Graves RCP  Outcome: Ongoing     Problem: MECHANICAL VENTILATION  Goal: Patient will maintain patent airway  11/24/2021 1919 by Bonifacio Graves RCP  Outcome: Ongoing     Problem: MECHANICAL VENTILATION  Goal: Oral health is maintained or improved  11/24/2021 1919 by Bonifacio Graves RCP  Outcome: Ongoing     Problem: MECHANICAL VENTILATION  Goal: ET tube will be managed safely  11/24/2021 1919 by Bonifacio Graves RCP  Outcome: Ongoing

## 2021-11-25 NOTE — PROGRESS NOTES
Department of Internal Medicine  Nephrology Ruby Mcbride MD  Progress Note    Reason for consultation: Management of acute kidney injury. Consulting physician: Onesimo Stover MD.    Interval history:   Patient seen and examined in the ICU today. Patient received dialysis yesterday with 3 L of fluid removed she tolerated the procedure. She remains intubated. She is following commands. Urine  output was 100 mils overnight. Patient did not tolerate weaning trial.  Patient remains edematous. Patient had episode of bradycardia with transient PEA requiring a short session of CPR on 11/20/21  approximately 15 minutes into hemodialysis. Patient is S/P  right ureteral stent placement with renal ultrasound on 11/18/2021 showing improvement in hydronephrosis. History of present illness: This is a 66 y.o. female with a significant past medical history of Type 2 diabetes mellitus, coronary artery disease, lumbar radiculopathy, Hyperlipidemia and chronic kidney disease stage III [baseline serum creatinine 1.5 mg/dL], who presented from [facility] for further evaluation of acute kidney injury. She was sent to [facility] after developing nonhealing wound s/p cardiac catheterization via right refill Levophed has been discontinued approach with right groin wound. She had been on Bumex in the ECF and creatinine and BUN have been rising and Bumex was decreased to a lowest dose of 0.5 mg p.o. twice daily. She has had regular blood work performed in the [facility] and ECF contacted nephrologist after finding abnormal laboratory studies. She was noted to have a BUN/creatinine 104/2.28 mg/dL with hemoglobin 8 g/dL. She has mild confusion but is not in obvious respiratory distress.     Scheduled Meds:   linezolid  600 mg IntraVENous Q12H    chlorhexidine  15 mL Mouth/Throat BID    polyvinyl alcohol  1 drop Both Eyes Q4H    And    artificial tears   Both Eyes Q4H    carvedilol  3.125 mg Oral BID WC  pantoprazole  40 mg IntraVENous BID    And    sodium chloride flush  10 mL IntraVENous BID    sodium polystyrene  15 g Oral Once    cefepime  1,000 mg IntraVENous Q24H    sertraline  25 mg Oral Daily    ferrous sulfate  325 mg Oral Daily with breakfast    [Held by provider] aspirin  81 mg Oral Daily    atorvastatin  80 mg Oral Daily    insulin glargine  22 Units SubCUTAneous Nightly    insulin lispro  0-12 Units SubCUTAneous TID WC    insulin lispro  0-6 Units SubCUTAneous Nightly    sodium chloride flush  5-40 mL IntraVENous 2 times per day     Continuous Infusions:   norepinephrine 1 mcg/min (11/24/21 1120)    propofol 10 mcg/kg/min (11/25/21 0301)    dextrose      sodium chloride      sodium chloride       PRN Meds:.midodrine, anticoagulant sodium citrate, anticoagulant sodium citrate, albumin human, midodrine, fentanNYL, potassium chloride **OR** potassium alternative oral replacement **OR** potassium chloride, magnesium sulfate, glucose, dextrose, glucagon (rDNA), dextrose, sodium chloride, sodium chloride flush, sodium chloride, acetaminophen, ondansetron **OR** ondansetron, oxyCODONE-acetaminophen    Physical Exam:    VITALS:  BP (!) 154/113   Pulse 75   Temp 99.8 °F (37.7 °C) (Oral)   Resp 16   Ht 5' 3\" (1.6 m) Comment: from old chart  Wt 216 lb 0.8 oz (98 kg)   SpO2 99%   BMI 38.27 kg/m²   24HR INTAKE/OUTPUT:      Intake/Output Summary (Last 24 hours) at 11/25/2021 1159  Last data filed at 11/24/2021 1700  Gross per 24 hour   Intake --   Output 100 ml   Net -100 ml     Constitutional: lethargic responsive, obeying commands on mechanical ventilator    Skin: Skin color, texture, turgor normal. No rashes or lesions    Head: Normocephalic, without obvious abnormality, atraumatic     Cardiovascular/Edema:S1 and S2 heard regular    Respiratory: Lungs:Diminished air entry basal rales    Abdomen: soft, non-tender; bowel sounds normal; no masses,  no organomegaly    Back: symmetric, no curvature. ROM normal. No CVA tenderness. Extremities: 3+ generalized edema    Neuro:  Grossly normal    CBC:   Recent Labs     11/23/21  0646 11/23/21  0646 11/24/21  0427 11/24/21  2314 11/25/21  0438   WBC 9.9  --  7.9  --  6.6   HGB 7.9*   < > 7.6* 7.2* 7.5*   PLT 56*  --  39*  --  36*    < > = values in this interval not displayed. BMP:    Recent Labs     11/23/21  0646 11/23/21  0646 11/23/21  1610 11/24/21  0427 11/25/21  0438   *  --   --  136 131*   K 3.7   < > 3.9 3.9 3.9   CL 96*  --   --  101 97*   CO2 26  --   --  26 27   BUN 54*  --   --  34* 27*   CREATININE 3.47*  --   --  2.67* 2.27*   GLUCOSE 255*  --   --  229* 188*    < > = values in this interval not displayed. Lab Results   Component Value Date    NITRU NEGATIVE 11/12/2021    COLORU Yellow 11/12/2021    PHUR 6.0 11/12/2021    WBCUA 50  11/12/2021    RBCUA 20 TO 50 11/12/2021    MUCUS NOT REPORTED 11/12/2021    TRICHOMONAS NOT REPORTED 11/12/2021    YEAST NOT REPORTED 11/12/2021    BACTERIA MODERATE 11/12/2021    CLARITYU Clear 09/07/2016    SPECGRAV 1.012 11/12/2021    LEUKOCYTESUR LARGE 11/12/2021    UROBILINOGEN Normal 11/12/2021    BILIRUBINUR NEGATIVE 11/12/2021    BLOODU Positive 09/07/2016    GLUCOSEU NEGATIVE 11/12/2021    KETUA NEGATIVE 11/12/2021    AMORPHOUS NOT REPORTED 11/12/2021     IMPRESSION/RECOMMENDATIONS:      1. Acute kidney injury - most consistent with Ischemic ATN and obstructive nephropathy secondary to bilateral hydronephrosis. Urine output 100 mL OVER 8 HOURS  Repeat renal ultrasound  11/18/2021 shows improvement in hydronephrosis     Patient was started on acute dialysis on 11/17/2021    2. CHF with reduced EF, Systolic dysfunction, diastolic dysfunction. 3. PEA/Cardiac Arrest s/p CPR recovered. 4. Acute respiratory failure s/p intubation on Ventilator. 5. Normocytic anemia/Thrombocytopenia, Heme-oncology following, No hemolysis found, HIT panel negative,    6.  Hypotension-improved and off Levophed    7. Bilateral hydronephrosis status post cystoscopy with right ureteral  stent placement. 8.  Anasarca secondary to cardiomyopathy-EF of 30-35 % , third spacing renal failure    9. Acute metabolic encephalopathy        Plan:  Hold hemodialysis today. Resume hemodialysis tomorrow  Strict input and output   Basic metabolic profile daily. Check phosphorus. Retacrit with dialysis 3 times a week  Continue Nepro tube feeding. Prognosis is guarded.         Serena Remy MD   Attending Nephrologist  11/25/2021 11:59 AM

## 2021-11-25 NOTE — PROGRESS NOTES
Physical Therapy        Physical Therapy Cancel Note      DATE: 2021    NAME: Jani Lockett  MRN: [id number]   : 1950      Patient not seen this date for Physical Therapy due to: 1420 p.m  Pt unable to participate today d/t wean trial per nursing Jhonathan Mata RN          Electronically signed by Abisai Vieira PTA on 2021 at 8:50 PM

## 2021-11-25 NOTE — PLAN OF CARE
Problem: SAFETY  Goal: Free from accidental physical injury  Outcome: Met This Shift  Goal: Free from intentional harm  Outcome: Met This Shift     Problem: DAILY CARE  Goal: Daily care needs are met  Outcome: Met This Shift     Problem: PAIN  Goal: Patient's pain/discomfort is manageable  Outcome: Met This Shift     Problem: KNOWLEDGE DEFICIT  Goal: Patient/S.O. demonstrates understanding of disease process, treatment plan, medications, and discharge instructions.   Outcome: Met This Shift     Problem: DISCHARGE BARRIERS  Goal: Patient's continuum of care needs are met  Outcome: Met This Shift     Problem: Falls - Risk of:  Goal: Will remain free from falls  Description: Will remain free from falls  Outcome: Met This Shift  Goal: Absence of physical injury  Description: Absence of physical injury  Outcome: Met This Shift     Problem: Skin Integrity:  Goal: Will show no infection signs and symptoms  Description: Will show no infection signs and symptoms  Outcome: Met This Shift  Goal: Absence of new skin breakdown  Description: Absence of new skin breakdown  Outcome: Met This Shift     Problem: Musculor/Skeletal Functional Status  Goal: Highest potential functional level  Outcome: Met This Shift  Goal: Absence of falls  Outcome: Met This Shift     Problem: Musculor/Skeletal Functional Status  Goal: Highest potential functional level  Outcome: Met This Shift  Goal: Absence of falls  Outcome: Met This Shift     Problem: Nutrition  Goal: Optimal nutrition therapy  Outcome: Met This Shift     Problem: Pain:  Goal: Pain level will decrease  Description: Pain level will decrease  Outcome: Met This Shift  Goal: Control of acute pain  Description: Control of acute pain  Outcome: Met This Shift  Goal: Control of chronic pain  Description: Control of chronic pain  Outcome: Met This Shift     Problem: Non-Violent Restraints  Goal: No harm/injury to patient while restraints in use  Outcome: Met This Shift  Goal: Patient's dignity will be maintained  Outcome: Met This Shift     Problem:  Bowel Function - Altered:  Goal: Bowel elimination is within specified parameters  Description: Bowel elimination is within specified parameters  Outcome: Met This Shift     Problem: Nutrition Deficit:  Goal: Ability to achieve adequate nutritional intake will improve  Description: Ability to achieve adequate nutritional intake will improve  Outcome: Met This Shift     Problem: OXYGENATION/RESPIRATORY FUNCTION  Goal: Patient will achieve/maintain normal respiratory rate/effort  Description: Respiratory rate and effort will be within normal limits for the patient  Outcome: Met This Shift     Problem: MECHANICAL VENTILATION  Goal: Oral health is maintained or improved  Outcome: Met This Shift  Goal: ET tube will be managed safely  Outcome: Met This Shift

## 2021-11-25 NOTE — PROGRESS NOTES
Ok to DC kenny and check PVRs. Please contact urology for any post voids (PVRs) greater than 250mL.     Dr. Frankey Letters, MD

## 2021-11-25 NOTE — PROGRESS NOTES
[address], [address] [place]  [phone]          Progress Note    Patient Name:  Weston Martinez    :  2021 12:26 PM      SUBJECTIVE       Ms. Cisneros Plenty awakens and nods to answer. No significant pauses. Continues atrial fibrillation with PVC's. Unable to wean ventilator further. Possible dialysis today. OBJECTIVE     Vital signs:    BP (!) 154/113   Pulse 75   Temp 99.8 °F (37.7 °C) (Oral)   Resp 16   Ht 5' 3\" (1.6 m) Comment: from old chart  Wt 216 lb 0.8 oz (98 kg)   SpO2 99%   BMI 38.27 kg/m²  2 L/min  .tro    Admit Weight:  188 lb (85.3 kg)    Last 3 weights: Wt Readings from Last 3 Encounters:   21 216 lb 0.8 oz (98 kg)   16 178 lb (80.7 kg)   16 184 lb 12.8 oz (83.8 kg)       BMI: Body mass index is 38.27 kg/m². Input/Output:       Intake/Output Summary (Last 24 hours) at 2021 1226  Last data filed at 2021 1700  Gross per 24 hour   Intake --   Output 100 ml   Net -100 ml         Exam:     General appearance: awake and alert moves all ext   Lungs: no rhonchi, no wheezes, no rales  Heart: S1 and S2 no murmur  Abdomen: positive bowel sounds, no bruits, no masses  Extremities: warm and dry, no cyanosis, no clubbing        Laboratory Studies:     CBC:   Recent Labs     21  0646 21  0646 21  0427 21  2314 21  0438   WBC 9.9  --  7.9  --  6.6   HGB 7.9*   < > 7.6* 7.2* 7.5*   HCT 23.9*   < > 23.4* 22.4* 23.4*   MCV 94.7  --  94.2  --  96.0   PLT 56*  --  39*  --  36*    < > = values in this interval not displayed. BMP:   Recent Labs     21  0646 21  0646 21  1610 21  0427 21  0438   *  --   --  136 131*   K 3.7   < > 3.9 3.9 3.9   CL 96*  --   --  101 97*   CO2 26  --   --  26 27   BUN 54*  --   --  34* 27*   CREATININE 3.47*  --   --  2.67* 2.27*    < > = values in this interval not displayed.      PT/INR: No results for input(s): PROTIME, INR in the last 72 hours. APTT: No results for input(s): APTT in the last 72 hours. MAG:   Recent Labs     21  0646 21  0427 21  0438   MG 2.2 2.1 1.9     D Dimer: No results for input(s): DDIMER in the last 72 hours. Troponin  No results for input(s): TROPHS in the last 72 hours. BNP No results for input(s): BNP in the last 72 hours. No results for input(s): PROBNP in the last 72 hours. Pulse Ox: SpO2  Av.6 %  Min: 95 %  Max: 100 %  Supplemental O2: O2 Flow Rate (L/min): 2 L/min     Current Meds:    linezolid  600 mg IntraVENous Q12H    chlorhexidine  15 mL Mouth/Throat BID    polyvinyl alcohol  1 drop Both Eyes Q4H    And    artificial tears   Both Eyes Q4H    carvedilol  3.125 mg Oral BID WC    pantoprazole  40 mg IntraVENous BID    And    sodium chloride flush  10 mL IntraVENous BID    sodium polystyrene  15 g Oral Once    cefepime  1,000 mg IntraVENous Q24H    sertraline  25 mg Oral Daily    ferrous sulfate  325 mg Oral Daily with breakfast    [Held by provider] aspirin  81 mg Oral Daily    atorvastatin  80 mg Oral Daily    insulin glargine  22 Units SubCUTAneous Nightly    insulin lispro  0-12 Units SubCUTAneous TID WC    insulin lispro  0-6 Units SubCUTAneous Nightly    sodium chloride flush  5-40 mL IntraVENous 2 times per day     Continuous Infusions:    norepinephrine 1 mcg/min (21 1120)    propofol 10 mcg/kg/min (21 0301)    dextrose      sodium chloride      sodium chloride           XR CHEST (SINGLE VIEW FRONTAL)    Result Date: 2021  The endotracheal tube ends appropriately above the kristina and below the clavicular heads. The nasogastric tube ends in the stomach. The right jugular vein hemodialysis catheter ends in the distal SVC, unchanged. Cardiomegaly. Bibasilar airspace disease and small right pleural effusion, unchanged. Lungs otherwise grossly clear. No pneumothorax.      XR CHEST PORTABLE    Result Date: 11/25/2021  1. Endotracheal tube remains in appropriate position. 2. No significant change in pleural effusions and associated basilar atelectasis. XR CHEST PORTABLE    Result Date: 11/24/2021  No significant interval change. Mild vascular congestion and small pleural effusions. XR CHEST PORTABLE    Result Date: 11/23/2021  1. Endotracheal tube in place with its tip 2 cm above the kristina. 2.  Unchanged cardiomegaly and small to moderate bilateral pleural effusions with basilar atelectasis. 3.  Support tubes and catheters are otherwise unchanged. XR CHEST PORTABLE    Result Date: 11/22/2021  Cardiomegaly with bibasilar effusions and adjacent infiltrates. Support tubes as described above. XR CHEST PORTABLE    Result Date: 11/21/2021  Cardiomegaly with bibasilar effusions and adjacent infiltrates. Support tubes as described above. XR CHEST PORTABLE    Result Date: 11/19/2021  Basilar opacities likely represent atelectasis with small pleural effusions. XR CHEST PORTABLE    Result Date: 11/18/2021  1. Tip of the newly inserted NG tube overlies the gastric body. . 2. The right IJ CVC and right arm PICC line remain well positioned. 3.  No acute cardiopulmonary abnormality. US RETROPERITONEAL COMPLETE    Result Date: 11/18/2021  No acute findings. No hydronephrosis. Small right pleural effusion. MRI BRAIN WO CONTRAST    Result Date: 11/18/2021  There is no acute infarction, intracranial hemorrhage, or intracranial mass lesion. Mild chronic microangiopathic ischemic disease. Mild generalized volume loss. Mild mucosal thickening of bilateral mastoid air cells and right sphenoid sinus.        Echo:      ASSESSMENT     Principal Problem:    JAYLYN (acute kidney injury) ([facility] [address]  Active Problems:    Type 2 diabetes mellitus with kidney complication, with long-term current use of insulin (HCC)    CKD (chronic kidney disease) stage 3, GFR 30-59 ml/min ([facility])    HTN (hypertension)    ASCVD (arteriosclerotic cardiovascular disease)    Iron deficiency anemia    Hydronephrosis determined by ultrasound    Longstanding persistent atrial fibrillation (HCC)    Acute metabolic encephalopathy  Resolved Problems:    * No resolved hospital problems. *      PLAN     Continue to hold coreg. Rhythm stable. Will continue to follow. Discussed with patients nurse and family.         Electronically signed by Selina Van DO on 11/25/2021 at 12:26 PM

## 2021-11-25 NOTE — PROGRESS NOTES
ICU Progress Note (Vent)   [facility]    Patient - Leilani Golden,  Age - 66 y.o.    - 1950      Room Number -    N -  [id number]   Acct # - [de-identified]  Date of Admission -  11/10/2021  4:23 PM    Events of Past 24 Hours   Received dialysis yesterday, tolerated weaning for close to 2 hours but had low tidal volumes by the end of the weaning trial    Vitals    height is 5' 3\" (1.6 m) and weight is 216 lb 0.8 oz (98 kg). Her oral temperature is 99.8 °F (37.7 °C). Her blood pressure is 154/113 (abnormal) and her pulse is 81. Her respiration is 16 and oxygen saturation is 100%. Temperature Range: Temp: 99.8 °F (37.7 °C) Temp  Av.3 °F (36.8 °C)  Min: 97.6 °F (36.4 °C)  Max: 99.8 °F (37.7 °C)  BP Range:  Systolic (40RJQ), SXY:548 , Min:85 , XIM:981     Diastolic (11EYH), NOK:71, Min:28, Max:123    Pulse Range: Pulse  Av.7  Min: 56  Max: 102  Respiration Range: Resp  Av.1  Min: 13  Max: 29  Current Pulse Ox[de-identified]  SpO2: 100 %  24HR Pulse Ox Range:  SpO2  Av.6 %  Min: 95 %  Max: 100 %  Oxygen Amount and Delivery: O2 Flow Rate (L/min): 2 L/min      Wt Readings from Last 3 Encounters:   21 216 lb 0.8 oz (98 kg)   16 178 lb (80.7 kg)   16 184 lb 12.8 oz (83.8 kg)     I/O       Intake/Output Summary (Last 24 hours) at 2021 0635  Last data filed at 2021 1700  Gross per 24 hour   Intake 130 ml   Output 180 ml   Net -50 ml     I/O last 3 completed shifts:   In:  [I.V.:179; NG/GT:534; IV Piggyback:1230]  Out: 18 [Urine:430; Stool:140]     DRAIN/TUBE OUTPUT:     Invasive Lines   ETT Day -   5  Lines -single-lumen PICC left arm day 10    ICP PRESSURE RANGE:  No data recorded  CVP PRESSURE RANGE:  No data recorded  Mechanical Ventilation Data   SETTINGS (Comprehensive)  Vent Information  $Ventilation: $Subsequent Day  Skin Assessment: Clean, dry, & intact  Equipment Changed: HME  Vent Type: Servo i  Vent Mode: PRVC  Vt Ordered: 500 mL  Rate Set: 16 bmp  Pressure Support: (S) 10 cmH20  FiO2 : 30 %  SpO2: 100 %  SpO2/FiO2 ratio: 333.33  Sensitivity: 5  PEEP/CPAP: 5  I Time/ I Time %: 1.25 s  Humidification Source: HME  Nitric Oxide/Epoprostenol In Use?: No  Mask Type: Full face mask  Mask Size: Medium  Additional Respiratory  Assessments  Pulse: 81  Resp: 16  SpO2: 100 %  End Tidal CO2: 36 (%)  Position: Semi-Lagunas's  Humidification Source: HME  Oral Care: Mouth moisturizer, Mouth swabbed, Mouth suctioned  Cuff Pressure (cm H2O): 30 cm H2O       ABGs:   Lab Results   Component Value Date    PHART 7.449 11/25/2021    PO2ART 101.0 11/25/2021    CIN8UHL 40.7 11/25/2021       Lab Results   Component Value Date    MODE PRVC 11/25/2021         Medications   IV   norepinephrine 1 mcg/min (11/24/21 1120)    propofol 10 mcg/kg/min (11/25/21 0301)    dextrose      sodium chloride      sodium chloride        linezolid  600 mg IntraVENous Q12H    chlorhexidine  15 mL Mouth/Throat BID    polyvinyl alcohol  1 drop Both Eyes Q4H    And    artificial tears   Both Eyes Q4H    carvedilol  3.125 mg Oral BID WC    pantoprazole  40 mg IntraVENous BID    And    sodium chloride flush  10 mL IntraVENous BID    sodium polystyrene  15 g Oral Once    cefepime  1,000 mg IntraVENous Q24H    sertraline  25 mg Oral Daily    ferrous sulfate  325 mg Oral Daily with breakfast    [Held by provider] aspirin  81 mg Oral Daily    atorvastatin  80 mg Oral Daily    insulin glargine  22 Units SubCUTAneous Nightly    insulin lispro  0-12 Units SubCUTAneous TID     insulin lispro  0-6 Units SubCUTAneous Nightly    sodium chloride flush  5-40 mL IntraVENous 2 times per day       Diet/Nutrition   ADULT TUBE FEEDING; Nasogastric; Renal Formula; Continuous; 35; No; 30; Q 8 hours; Protein; 1 Protein at 12 noon daily. Flush with 30 mLwater before and after administration.     Exam   VITALS    height is 5' 3\" (1.6 m) and weight is 216 lb 0.8 11/13/2021    IBILI Connot be calculated 11/13/2021    LABALBU 2.6 11/18/2021       INR  No results for input(s): PROTIME, INR in the last 72 hours. APTT  No results for input(s): APTT in the last 72 hours. Lactic Acid  Lab Results   Component Value Date    LACTA 0.6 11/16/2021    LACTA 0.6 11/16/2021        BNP   No results for input(s): BNP in the last 72 hours. Cultures       Radiology     Plain Films         Improvement in patient's effusion/vascular congestion        SYSTEM ASSESSMENT    Acute on chronic hypoxic and hypercapnic respiratory failure  Bradycardic arrest, intubated 11/20  Right lower lobe infiltrate/pleural effusion  Acute kidney injury-requiring hemodialysis  Morbid obesity  Anemia, chronic disease and possible rectal bleed  Hypoalbuminemia  Thrombocytopenia  Nonischemic cardiomyopathy with EF of 30 to 35% possible right to left interatrial shunt  Atrial fibrillation  FULL CODE    Neuro   Follows commands and is alert minimize sedation if possible    Respiratory   Wean oxygen as tolerated.  Keep O2 sat > 88%  Decrease tidal volume to 450  Aggressively wean today  May need to extubate to BiPAP     Hemodynamics   Still with total body fluid overload despite dialysis    Gastrointestinal/Nutrition   Tolerating tube feed  GI prophylaxis    Renal   Do not believe any dialysis planned for today, but suspect will need to keep on the dry side    Infectious Disease   Sputum growing out normal matt but MRSA positive on swab, will treat empirically for total of 7 days with cefepime and Zyvox, currently day 4 of Zyvox    Hematology/Oncology   Platelet count continues to decline, no chemical DVT prophylaxis    Endocrine   Continue to monitor blood sugar    Social/Spiritual/DNR/Disposition/Other     Updated patient's  in detail, told him that she is improving and we will try her best to get her off the ventilator    Critical Care Time   35 min    Electronically signed by Alka Frausto MD on

## 2021-11-25 NOTE — PROGRESS NOTES
Today's Date: 11/24/2021  Patient Name: Rose Floyd  Date of admission: 11/10/2021  4:23 PM  Patient's age: 66 y.o., 1950  Admission Dx: JAYLYN (acute kidney injury) ([facility] [address] [N17.9]  Urinary tract infection in elderly patient [N39.0]  Anemia, unspecified type [D64.9]    Reason for Consult: TCP  Requesting Physician: Raffi Hicks MD    CHIEF COMPLAINT:    Chief Complaint   Patient presents with    Abnormal Lab     low hemoglobin and high CR     SUBJECTIVE:    Patient seen and examined  Patient remians intubated and sedated  Plts dropped a little  No active bleeding noted      HISTORY OF PRESENT ILLNESS:    Rose Floyd is a 66 y.o. female who is admitted to the hospital on 11/10/2021  for acute kidney injury and anemia. Patient has history of chronic kidney disease, chronic anemia and recent history of GI bleed with negative endoscopies at [facility]. She was transferred from nursing home for elevated creatinine and drop in her hemoglobin. Her stool for occult blood on 11/14/2021 was positive. She was evaluated by gastroenterology. Underwent upper endoscopy which showed gastritis. She also was noted to have hydronephrosis on the right side therefore was evaluated by urology and underwent cystoscopy with ureteral stent placement on 11/16/2021. Patient was evaluated by nephrology for her acute kidney injury and was started on acute dialysis on 11/17/2021. Her hemoglobin is stable at 8.0 however her platelets started dropping since admission and today it dropped to 49 therefore hematology was consulted for evaluation of her low platelets. Her iron studies suggest anemia of chronic disease. Her haptoglobin is 125 and LDH is not elevated  Her HIT panel came back negative.     Past Medical History:   has a past medical history of Anemia, unspecified, CAD (coronary artery disease), Chronic kidney disease, CKD (chronic kidney disease) stage 3, GFR 30-59 ml/min ([facility]), Closed fracture of dorsal (thoracic) vertebra without mention of spinal cord injury, Coronary atherosclerosis of unspecified type of vessel, native or graft, Depression with anxiety, Esophageal reflux, HTN (hypertension), Hyperkalemia, Hyperlipidemia, Intervertebral cervical disc disorder with myelopathy, cervical region, Lower back pain, Lumbar radiculopathy, Microalbuminuria, Muscle weakness, Pain in limb, Stenosis of cervical spine, Tethered cord syndrome (HCC), Type II or unspecified type diabetes mellitus without mention of complication, not stated as uncontrolled, Urinary calculus, and UTI (lower urinary tract infection). Past Surgical History:   has a past surgical history that includes Colon surgery; angioplasty; joint replacement; Upper gastrointestinal endoscopy (N/A, 11/15/2021); Cystoscopy (Right, 11/16/2021); and IR NONTUNNELED VASCULAR CATHETER > 5 YEARS (11/17/2021). Medications:    Prior to Admission medications    Medication Sig Start Date End Date Taking? Authorizing Provider   acetaminophen (TYLENOL) 325 MG tablet Take 650 mg by mouth every 4 hours as needed for Pain (do not exceed 4000 mg daily)   Yes Historical Provider, MD   albuterol (PROVENTIL) (2.5 MG/3ML) 0.083% nebulizer solution Take 2.5 mg by nebulization every 6 hours as needed for Shortness of Breath   Yes Historical Provider, MD   atorvastatin (LIPITOR) 80 MG tablet Take 80 mg by mouth daily   Yes Historical Provider, MD   famotidine (PEPCID) 20 MG tablet Take 20 mg by mouth daily   Yes Historical Provider, MD   ferrous sulfate (IRON 325) 325 (65 Fe) MG tablet Take 325 mg by mouth daily (with breakfast)   Yes Historical Provider, MD   HYDROcodone-acetaminophen (NORCO) 5-325 MG per tablet Take 1 tablet by mouth every 6 hours as needed for Pain.  Indications: until 11/13/21   Yes Historical Provider, MD   insulin lispro (HUMALOG) 100 UNIT/ML injection vial Inject into the skin 4 times daily (before meals and nightly) Per sliding scale:   150-200 = 2 units  201-250 = 4 units  251-300 = 6 units  301-350 = 8 units  351-400 = 10 units   Yes Historical Provider, MD   insulin glargine (LANTUS SOLOSTAR) 100 UNIT/ML injection pen Inject 22 Units into the skin nightly   Yes Historical Provider, MD   meclizine (ANTIVERT) 12.5 MG tablet Take 12.5 mg by mouth 2 times daily as needed for Dizziness   Yes Historical Provider, MD   warfarin (COUMADIN) 3 MG tablet Take 3 mg by mouth nightly   Yes Historical Provider, MD   carvedilol (COREG) 6.25 MG tablet Take 6.25 mg by mouth 2 times daily (with meals).    Yes Historical Provider, MD   aspirin 81 MG chewable tablet Take 81 mg by mouth daily    Yes Historical Provider, MD   bumetanide (BUMEX) 1 MG tablet Take 1 mg by mouth daily   11/10/21  Historical Provider, MD     Current Facility-Administered Medications   Medication Dose Route Frequency Provider Last Rate Last Admin    midodrine (PROAMATINE) tablet 10 mg  10 mg Oral TID PRN Philomena Rivas MD   10 mg at 11/24/21 1040    anticoagulant sodium citrate 4 % injection 1.2 mL  1.2 mL IntraCATHeter PRN Jakub Barboza MD   1.2 mL at 11/24/21 1314    anticoagulant sodium citrate 4 % injection 1.3 mL  1.3 mL IntraCATHeter PRN Jakub Barboza MD   1.3 mL at 11/24/21 1314    albumin human 25 % IV solution 25 g  25 g IntraVENous PRN Philomena Rivas MD        midodrine (PROAMATINE) tablet 5 mg  5 mg Oral PRN Philomena Rivas MD        sterile water injection             norepinephrine (LEVOPHED) 16 mg in sodium chloride 0.9 % 250 mL infusion  2-100 mcg/min IntraVENous Continuous Lawrladarius Moore MD 0.9 mL/hr at 11/24/21 1120 1 mcg/min at 11/24/21 1120    linezolid (ZYVOX) IVPB 600 mg  600 mg IntraVENous Q12H Nhi Brooks MD   Stopped at 11/24/21 1536    chlorhexidine (PERIDEX) 0.12 % solution 15 mL  15 mL Mouth/Throat BID Nhi Brooks MD   15 mL at 11/24/21 1939    fentaNYL (SUBLIMAZE) injection 50 mcg  50 mcg IntraVENous Q30 Min PRN Facundo Luis MD   50 mcg at 11/22/21 0000    propofol injection  10 mcg/kg/min IntraVENous Continuous Facundo Luis MD 2.9 mL/hr at 11/24/21 1625 5 mcg/kg/min at 11/24/21 1625    polyvinyl alcohol (LIQUIFILM TEARS) 1.4 % ophthalmic solution 1 drop  1 drop Both Eyes Q4H Facundo Luis MD   1 drop at 11/24/21 0802    And    lubrifresh P.M. (artificial tears) ophthalmic ointment   Both Eyes Q4H Facundo Luis MD   Given at 11/24/21 1436    carvedilol (COREG) tablet 3.125 mg  3.125 mg Oral BID  Avery Watkins MD   3.125 mg at 11/24/21 1735    pantoprazole (PROTONIX) injection 40 mg  40 mg IntraVENous BID Shoaib Okeefe MD   40 mg at 11/24/21 1939    And    sodium chloride flush 0.9 % injection 10 mL  10 mL IntraVENous BID Shoaib Okeefe MD   10 mL at 11/24/21 0802    potassium chloride (KLOR-CON M) extended release tablet 40 mEq  40 mEq Oral PRN Shoaib Okeefe MD        Or    potassium bicarb-citric acid (EFFER-K) effervescent tablet 40 mEq  40 mEq Oral PRN Shoaib Okeefe MD   20 mEq at 11/21/21 1659    Or    potassium chloride 10 mEq/100 mL IVPB (Peripheral Line)  10 mEq IntraVENous PRN Shoaib Okeefe  mL/hr at 11/22/21 1812 10 mEq at 11/22/21 1812    magnesium sulfate 1000 mg in dextrose 5% 100 mL IVPB  1,000 mg IntraVENous PRN Shoaib Okeefe MD   Stopped at 11/21/21 1815    glucose (GLUTOSE) 40 % oral gel 15 g  15 g Oral PRN Shaoib Okeefe MD        dextrose 50 % IV solution  12.5 g IntraVENous PRN Shoaib Okeefe MD        glucagon (rDNA) injection 1 mg  1 mg IntraMUSCular PRN Shoaib Okeefe MD        dextrose 5 % solution  100 mL/hr IntraVENous PRN Shoaib Okeefe MD        sodium polystyrene (KAYEXALATE) 15 GM/60ML suspension 15 g  15 g Oral Once Jovani Rodriguez MD        cefepime (MAXIPIME) 1000 mg IVPB minibag  1,000 mg IntraVENous Q24H Henok Granados  mL/hr at 11/24/21 1945 1,000 mg at 11/24/21 1945    sertraline (ZOLOFT) tablet 25 mg  25 mg Oral Daily Jeb Gonzales MD   25 mg at 11/24/21 0801    0.9 % sodium chloride infusion   IntraVENous PRN Lance Valderrama MD        ferrous sulfate (IRON 325) tablet 325 mg  325 mg Oral Daily with breakfast Lance Valderrama MD   325 mg at 11/24/21 0802    [Held by provider] aspirin chewable tablet 81 mg  81 mg Oral Daily Jeb Gonzales MD   81 mg at 11/13/21 1015    atorvastatin (LIPITOR) tablet 80 mg  80 mg Oral Daily Jeb Gonzales MD   80 mg at 11/24/21 0801    insulin glargine (LANTUS) injection vial 22 Units  22 Units SubCUTAneous Nightly Jeb Gonzales MD   22 Units at 11/24/21 1939    insulin lispro (HUMALOG) injection vial 0-12 Units  0-12 Units SubCUTAneous TID WC Jeb Gonzales MD   2 Units at 11/24/21 1735    insulin lispro (HUMALOG) injection vial 0-6 Units  0-6 Units SubCUTAneous Nightly Lance Valderrama MD   1 Units at 11/24/21 1951    sodium chloride flush 0.9 % injection 5-40 mL  5-40 mL IntraVENous 2 times per day Lance Valderrama MD   10 mL at 11/23/21 2217    sodium chloride flush 0.9 % injection 5-40 mL  5-40 mL IntraVENous PRN Jeb Gonzales MD        0.9 % sodium chloride infusion  25 mL IntraVENous PRN Lance Valderrama MD        acetaminophen (TYLENOL) tablet 650 mg  650 mg Oral Q4H PRN Lance Valderrama MD   650 mg at 11/16/21 2233    ondansetron (ZOFRAN-ODT) disintegrating tablet 4 mg  4 mg Oral Q8H PRN Jeb Gonzales MD        Or    ondansetron (ZOFRAN) injection 4 mg  4 mg IntraVENous Q6H PRN Lance Valderrama MD        oxyCODONE-acetaminophen (PERCOCET) 5-325 MG per tablet 1 tablet  1 tablet Oral Q6H PRN Lance Valderrama MD           Allergies:  Patient has no known allergies. Social History:   reports that she has never smoked. She does not have any smokeless tobacco history on file. She reports that she does not drink alcohol and does not use drugs.      Family History: family history includes High Blood Pressure in an other family member. REVIEW OF SYSTEMS:    Could not be obtained as patient is intubated    PHYSICAL EXAM:      BP (!) 137/39   Pulse 68   Temp 98.2 °F (36.8 °C) (Axillary)   Resp 14   Ht 5' 3\" (1.6 m) Comment: from old chart  Wt 216 lb 0.8 oz (98 kg)   SpO2 100%   BMI 38.27 kg/m²    Temp (24hrs), Av.8 °F (36.6 °C), Min:97.6 °F (36.4 °C), Max:98.2 °F (36.8 °C)    General appearance - well appearing, no in pain or distress   Mental status -intubated  Mouth - mucous membranes moist, pharynx normal without lesions   Neck - supple, no significant adenopathy   Lymphatics - no palpable lymphadenopathy, no hepatosplenomegaly   Chest - clear to auscultation, no wheezes, rales or rhonchi, symmetric air entry   Heart - normal rate, regular rhythm, normal S1, S2, no murmurs  Abdomen - soft, nontender, nondistended, no masses or organomegaly   Neurological -intubated nonfocal  Musculoskeletal - no joint tenderness, deformity or swelling   Extremities - peripheral pulses normal,+++ pedal edema, no clubbing or cyanosis   Skin -multiple skin bruises noted    DATA:    Labs:   CBC:   Recent Labs     21  0646 21  0427   WBC 9.9 7.9   HGB 7.9* 7.6*   HCT 23.9* 23.4*   PLT 56* 39*     BMP:   Recent Labs     21  0646 21  0646 21  1610 21  0427   *  --   --  136   K 3.7   < > 3.9 3.9   CO2 26  --   --  26   BUN 54*  --   --  34*   CREATININE 3.47*  --   --  2.67*   LABGLOM 13*  --   --  18*   GLUCOSE 255*  --   --  229*    < > = values in this interval not displayed. PT/INR: No results for input(s): PROTIME, INR in the last 72 hours.   SURGICAL PATHOLOGY REPORT [[id number]]    Collected: 21 153    Updated: 21 08     --Surgical Pathology Report   [id number]   [facility]   [facility]   ANATOMIC PATHOLOGY   [address] [place] [address],  [address]. [place], [address] [place], [place] [address] [place]   [phone]   Fax: [phone]   SURGICAL PATHOLOGY CONSULTATION     Patient Name: Christina Shannon, Roger Gupta   MR#: [id number]   Specimen #[id number]     Procedures/Addenda   FLOW CYTOMETRY REPORT     Date Ordered:     11/22/2021     Status:   Signed Out        Date Complete:     11/22/2021     By: Maria Fernanda Ramos M.D.        Date Reported:     11/23/2021       INTERPRETATION   PERIPHERAL BLOOD:      -SEVERE THROMBOCYTOPENIA.   -MODERATE NORMOCYTIC ANEMIA.   -FLOW   CYTOMETRY POSITIVE FOR MONOCLONAL B-LYMPHOCYTES WITH IMMUNOPHENOTYPE       FEATURES MOST SUGGESTIVE OF IMMUNOPHENOTYPICALLY ATYPICAL   B-CHRONIC     LYMPHOCYTIC LEUKEMIA VS MONOCLONAL B-LYMPHOCYTOSIS.  BONE MARROW OR OTHER     TISSUE BIOPSY IS RECOMMENDED FOR FURTHER ELUCIDATION. RESULTS-COMMENTS   PERIPHERAL BLOODWBC 7.5Hgb 8. 0HCT 24.5MCV 94. 8PLT 49Neutrophil   57%Lymphocyte 26%Monocyte 9%nRBC as 2/100 WBCABS Neut 4.31K/ULABS   lymph 1.96K/ULMorphology: Anisocytosis,       ANALYSIS   Flow cytometric immunophenotyping analysis is performed on peripheral   blood following red blood cell lysis procedure.  The cells are labeled   by direct ten color immunostaining procedure and analyzed on TVtripios   flow cytometer.      Cytocentrifuge differential cell count:   31% Lymphocytes   3% Monocytes   66% Granulocytes     Viability:  99%     Marker panels:   B-cell tube:  CD5, CD10, CD19, CD20, CD22, CD23, CD45, , Kappa,   lambda   T-cell tube:  CD2, CD3, CD4, CD5, CD7, CD8, CD16, CD45, CD56, CD57     Flow cytometric immunophenotyping analysis of peripheral blood   lymphocyte population is positive for B-cell monoclonality;   immunophenotype strong positive for CD20, CD22,  lambda light   chain; and weakly positive for CD5 and CD23.  The immunophenotypic   features suggest immunophenotypically atypical chronic lymphocytic   leukemia, however, other B-chronic lymphoproliferative neoplasm cannot   be excluded.  The absolute monoclonal B-lymphocyte count is   approximately 0.98K/UL (by WHO criteria, absolute monoclonal B   lymphocyte count of >5000 is required to diagnose chronic lymphocytic   leukemia).  Bone marrow or other tissue biopsy is recommended for        IMAGING DATA:  XR CHEST PORTABLE   Final Result   No significant interval change. Mild vascular congestion and small pleural   effusions. XR CHEST PORTABLE   Final Result   1. Endotracheal tube in place with its tip 2 cm above the kristina. 2.  Unchanged cardiomegaly and small to moderate bilateral pleural effusions   with basilar atelectasis. 3.  Support tubes and catheters are otherwise unchanged. XR CHEST PORTABLE   Final Result   Cardiomegaly with bibasilar effusions and adjacent infiltrates. Support tubes as described above. XR CHEST PORTABLE   Final Result   Cardiomegaly with bibasilar effusions and adjacent infiltrates. Support tubes as described above. XR CHEST (SINGLE VIEW FRONTAL)   Final Result   The endotracheal tube ends appropriately above the kristina and below the   clavicular heads. The nasogastric tube ends in the stomach. The right   jugular vein hemodialysis catheter ends in the distal SVC, unchanged. Cardiomegaly. Bibasilar airspace disease and small right pleural effusion,   unchanged. Lungs otherwise grossly clear. No pneumothorax. VL Renal Arterial Duplex Complete   Final Result      XR CHEST PORTABLE   Final Result   Basilar opacities likely represent atelectasis with small pleural effusions. MRI BRAIN WO CONTRAST   Final Result      There is no acute infarction, intracranial hemorrhage, or intracranial mass   lesion. Mild chronic microangiopathic ischemic disease. Mild generalized volume loss. Mild mucosal thickening of bilateral mastoid air cells and right sphenoid   sinus. XR CHEST PORTABLE   Final Result   1. Tip of the newly inserted NG tube overlies the gastric body. .   2. The right IJ CVC and right arm PICC line remain well positioned.    3.  No acute cardiopulmonary exclude   pneumonia. US RETROPERITONEAL COMPLETE   Final Result   1. Moderate to severe right and mild-to-moderate left hydronephrosis. 2. Exam limited due to patient's body habitus. VL Renal Vein Scan Bilat    (Results Pending)   XR CHEST PORTABLE    (Results Pending)       Primary Problem  JAYLYN (acute kidney injury) [facility])    Active Hospital Problems    Diagnosis Date Noted    Acute metabolic encephalopathy [I64.39]     Longstanding persistent atrial fibrillation ([address] [I48.11] 11/16/2021    Hydronephrosis determined by ultrasound [N13.30] 11/14/2021    Iron deficiency anemia [D50.9] 11/12/2021    JAYLYN (acute kidney injury) ([facility] [address] [N17.9] 11/10/2021    CKD (chronic kidney disease) stage 3, GFR 30-59 ml/min ([facility]) [N18.30]     HTN (hypertension) [I10]     Type 2 diabetes mellitus with kidney complication, with long-term current use of insulin ([facility]) [E11.29, Z79.4]     ASCVD (arteriosclerotic cardiovascular disease) [I25.10]        IMPRESSION:   1. Acute kidney injury and started on dialysis by nephrology  2. Anemia, stool occult positive for blood and had upper endoscopy showing gastritis. Anemia could be related to chronic disease and also renal disease and aggravated by GI bleed  3. Thrombocytopenia  4. Acute encephalopathy  5. Bilateral hydronephrosis  6. Cardiomyopathy with EF of 30-35%  7. Generalized anasarca  8. S/p PEA, resucitated    RECOMMENDATIONS:  1. I reviewed the laboratory data, imaging studies, diagnosis, other treatment recommendations  2. Her anemia appears to be multifactorial with acute drop possibly from GI bleed  3. Her Hb and plts are low but stable  4. 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 Will need BM biopsy once stable  5. She will need BM biopsy once she is stable  6. Transfuse PRBC if hemoglobin less than 7  7.  No evidence of hemolysis noted as per the labs. Her HIT panel is negative  8. Recheck LDH and Hapto in AM  9. Monitor for symptoms of bleeding  10. Hold off anticoagulation  11. Transfuse platelets if bleeding or any surgical  Procedure planned or plts less than 15K  12. Monitor counts closely  13. We will follow    Discussed with pt and Nurse. Thank you for asking us to see this patient. Sonu Neal MD  Hematologist/Medical Oncologist    Cell: [phone]    This note is created with the assistance of a speech recognition program.  While intending to generate a document that actually reflects the content of the visit, the document can still have some errors including those of syntax and sound a like substitutions which may escape proof reading. It such instances, actual meaning can be extrapolated by contextual diversion.

## 2021-11-25 NOTE — PROGRESS NOTES
Today's Date: 11/25/2021  Patient Name: Vilma Warren  Date of admission: 11/10/2021  4:23 PM  Patient's age: 66 y.o., 1950  Admission Dx: JAYLYN (acute kidney injury) ([facility] [address] [N17.9]  Urinary tract infection in elderly patient [N39.0]  Anemia, unspecified type [D64.9]    Reason for Consult: TCP  Requesting Physician: Sukhjinder Dejesus MD    CHIEF COMPLAINT:    Chief Complaint   Patient presents with    Abnormal Lab     low hemoglobin and high CR     SUBJECTIVE:    Patient seen and examined  Patient remians intubated and sedated  Plts low but stable at 36K  Hb 7.5  No active bleeding noted  Family at bedisde    HISTORY OF PRESENT ILLNESS:    Vilma Warren is a 66 y.o. female who is admitted to the hospital on 11/10/2021  for acute kidney injury and anemia. Patient has history of chronic kidney disease, chronic anemia and recent history of GI bleed with negative endoscopies at [facility]. She was transferred from nursing home for elevated creatinine and drop in her hemoglobin. Her stool for occult blood on 11/14/2021 was positive. She was evaluated by gastroenterology. Underwent upper endoscopy which showed gastritis. She also was noted to have hydronephrosis on the right side therefore was evaluated by urology and underwent cystoscopy with ureteral stent placement on 11/16/2021. Patient was evaluated by nephrology for her acute kidney injury and was started on acute dialysis on 11/17/2021. Her hemoglobin is stable at 8.0 however her platelets started dropping since admission and today it dropped to 49 therefore hematology was consulted for evaluation of her low platelets. Her iron studies suggest anemia of chronic disease. Her haptoglobin is 125 and LDH is not elevated  Her HIT panel came back negative.     Past Medical History:   has a past medical history of Anemia, unspecified, CAD (coronary artery disease), Chronic kidney disease, CKD (chronic kidney disease) stage 3, GFR 30-59 ml/min ([facility] [address] Closed fracture of dorsal (thoracic) vertebra without mention of spinal cord injury, Coronary atherosclerosis of unspecified type of vessel, native or graft, Depression with anxiety, Esophageal reflux, HTN (hypertension), Hyperkalemia, Hyperlipidemia, Intervertebral cervical disc disorder with myelopathy, cervical region, Lower back pain, Lumbar radiculopathy, Microalbuminuria, Muscle weakness, Pain in limb, Stenosis of cervical spine, Tethered cord syndrome (HCC), Type II or unspecified type diabetes mellitus without mention of complication, not stated as uncontrolled, Urinary calculus, and UTI (lower urinary tract infection). Past Surgical History:   has a past surgical history that includes Colon surgery; angioplasty; joint replacement; Upper gastrointestinal endoscopy (N/A, 11/15/2021); Cystoscopy (Right, 11/16/2021); and IR NONTUNNELED VASCULAR CATHETER > 5 YEARS (11/17/2021). Medications:    Prior to Admission medications    Medication Sig Start Date End Date Taking? Authorizing Provider   acetaminophen (TYLENOL) 325 MG tablet Take 650 mg by mouth every 4 hours as needed for Pain (do not exceed 4000 mg daily)   Yes Historical Provider, MD   albuterol (PROVENTIL) (2.5 MG/3ML) 0.083% nebulizer solution Take 2.5 mg by nebulization every 6 hours as needed for Shortness of Breath   Yes Historical Provider, MD   atorvastatin (LIPITOR) 80 MG tablet Take 80 mg by mouth daily   Yes Historical Provider, MD   famotidine (PEPCID) 20 MG tablet Take 20 mg by mouth daily   Yes Historical Provider, MD   ferrous sulfate (IRON 325) 325 (65 Fe) MG tablet Take 325 mg by mouth daily (with breakfast)   Yes Historical Provider, MD   HYDROcodone-acetaminophen (NORCO) 5-325 MG per tablet Take 1 tablet by mouth every 6 hours as needed for Pain.  Indications: until 11/13/21   Yes Historical Provider, MD   insulin lispro (HUMALOG) 100 UNIT/ML injection vial Inject into the skin 4 times daily (before meals and nightly) Per sliding scale:   150-200 = 2 units  201-250 = 4 units  251-300 = 6 units  301-350 = 8 units  351-400 = 10 units   Yes Historical Provider, MD   insulin glargine (LANTUS SOLOSTAR) 100 UNIT/ML injection pen Inject 22 Units into the skin nightly   Yes Historical Provider, MD   meclizine (ANTIVERT) 12.5 MG tablet Take 12.5 mg by mouth 2 times daily as needed for Dizziness   Yes Historical Provider, MD   warfarin (COUMADIN) 3 MG tablet Take 3 mg by mouth nightly   Yes Historical Provider, MD   carvedilol (COREG) 6.25 MG tablet Take 6.25 mg by mouth 2 times daily (with meals).    Yes Historical Provider, MD   aspirin 81 MG chewable tablet Take 81 mg by mouth daily    Yes Historical Provider, MD   bumetanide (BUMEX) 1 MG tablet Take 1 mg by mouth daily   11/10/21  Historical Provider, MD     Current Facility-Administered Medications   Medication Dose Route Frequency Provider Last Rate Last Admin    midodrine (PROAMATINE) tablet 10 mg  10 mg Oral TID PRN Maryse Starkey MD   10 mg at 11/24/21 1040    anticoagulant sodium citrate 4 % injection 1.2 mL  1.2 mL IntraCATHeter PRN Adeel Call MD   1.2 mL at 11/24/21 1314    anticoagulant sodium citrate 4 % injection 1.3 mL  1.3 mL IntraCATHeter PRN Adeel Call MD   1.3 mL at 11/24/21 1314    albumin human 25 % IV solution 25 g  25 g IntraVENous PRN Maryse Starkey MD        midodrine (PROAMATINE) tablet 5 mg  5 mg Oral PRN Maryse Starkey MD        norepinephrine (LEVOPHED) 16 mg in sodium chloride 0.9 % 250 mL infusion  2-100 mcg/min IntraVENous Continuous Brina Hollingsworth MD 0.9 mL/hr at 11/24/21 1120 1 mcg/min at 11/24/21 1120    linezolid (ZYVOX) IVPB 600 mg  600 mg IntraVENous Q12H Elissa Perez MD   Stopped at 11/25/21 0346    chlorhexidine (PERIDEX) 0.12 % solution 15 mL  15 mL Mouth/Throat BID Elissa Perez MD   15 mL at 11/25/21 1023    fentaNYL (SUBLIMAZE) injection 50 mcg  50 mcg IntraVENous Q30 Min PRN Namrata Sommers Roberta Jessica MD   50 mcg at 11/22/21 0000    propofol injection  10 mcg/kg/min IntraVENous Continuous Sumaya Ayala MD 5.8 mL/hr at 11/25/21 0301 10 mcg/kg/min at 11/25/21 0301    polyvinyl alcohol (LIQUIFILM TEARS) 1.4 % ophthalmic solution 1 drop  1 drop Both Eyes Q4H Sumaya Ayala MD   1 drop at 11/25/21 1022    And    lubrifresh P.M. (artificial tears) ophthalmic ointment   Both Eyes Q4H Sumaya Ayala MD   Given at 11/25/21 1022    carvedilol (COREG) tablet 3.125 mg  3.125 mg Oral BID  Gwen Watkins MD   3.125 mg at 11/24/21 1735    pantoprazole (PROTONIX) injection 40 mg  40 mg IntraVENous BID Jesus Kaur MD   40 mg at 11/25/21 1023    And    sodium chloride flush 0.9 % injection 10 mL  10 mL IntraVENous BID Jesus Kaur MD   10 mL at 11/25/21 1024    potassium chloride (KLOR-CON M) extended release tablet 40 mEq  40 mEq Oral PRN Jesus Kaur MD        Or    potassium bicarb-citric acid (EFFER-K) effervescent tablet 40 mEq  40 mEq Oral PRN Jesus Kaur MD   20 mEq at 11/21/21 1659    Or    potassium chloride 10 mEq/100 mL IVPB (Peripheral Line)  10 mEq IntraVENous PRN Jesus Kaur  mL/hr at 11/22/21 1812 10 mEq at 11/22/21 1812    magnesium sulfate 1000 mg in dextrose 5% 100 mL IVPB  1,000 mg IntraVENous PRN Jesus Kaur MD   Stopped at 11/21/21 1815    glucose (GLUTOSE) 40 % oral gel 15 g  15 g Oral PRN Jesus Kaur MD        dextrose 50 % IV solution  12.5 g IntraVENous PRN Jesus Kaur MD        glucagon (rDNA) injection 1 mg  1 mg IntraMUSCular PRN Jesus Kaur MD        dextrose 5 % solution  100 mL/hr IntraVENous PRN Jesus Kaur MD        sodium polystyrene (KAYEXALATE) 15 GM/60ML suspension 15 g  15 g Oral Once Marco A Cordoba MD        cefepime (MAXIPIME) 1000 mg IVPB minibag  1,000 mg IntraVENous Q24H Alina Najera MD   Stopped at 11/24/21 2015    sertraline (ZOLOFT) tablet 25 mg  25 mg Oral Daily Lance Valderrama MD   25 mg at 11/25/21 1022    0.9 % sodium chloride infusion   IntraVENous PRN Lance Valderrama MD        ferrous sulfate (IRON 325) tablet 325 mg  325 mg Oral Daily with breakfast Lance Valderrama MD   325 mg at 11/25/21 1022    [Held by provider] aspirin chewable tablet 81 mg  81 mg Oral Daily Jeb Gonzales MD   81 mg at 11/13/21 1015    atorvastatin (LIPITOR) tablet 80 mg  80 mg Oral Daily Jeb Gonzales MD   80 mg at 11/25/21 1022    insulin glargine (LANTUS) injection vial 22 Units  22 Units SubCUTAneous Nightly Jeb Gonzales MD   22 Units at 11/24/21 1939    insulin lispro (HUMALOG) injection vial 0-12 Units  0-12 Units SubCUTAneous TID WC Jeb Gonzales MD   2 Units at 11/25/21 1023    insulin lispro (HUMALOG) injection vial 0-6 Units  0-6 Units SubCUTAneous Nightly Lance Valderrama MD   1 Units at 11/24/21 1951    sodium chloride flush 0.9 % injection 5-40 mL  5-40 mL IntraVENous 2 times per day Lance Valderrama MD   10 mL at 11/25/21 1024    sodium chloride flush 0.9 % injection 5-40 mL  5-40 mL IntraVENous PRN Jeb Gonzales MD        0.9 % sodium chloride infusion  25 mL IntraVENous PRN Lance Valderrama MD        acetaminophen (TYLENOL) tablet 650 mg  650 mg Oral Q4H PRN Lance Valderrama MD   650 mg at 11/16/21 2233    ondansetron (ZOFRAN-ODT) disintegrating tablet 4 mg  4 mg Oral Q8H PRN Jeb Gonzales MD        Or    ondansetron (ZOFRAN) injection 4 mg  4 mg IntraVENous Q6H PRN Lance Valderrama MD        oxyCODONE-acetaminophen (PERCOCET) 5-325 MG per tablet 1 tablet  1 tablet Oral Q6H PRN Lance Valderrama MD           Allergies:  Patient has no known allergies. Social History:   reports that she has never smoked. She does not have any smokeless tobacco history on file. She reports that she does not drink alcohol and does not use drugs. Family History: family history includes High Blood Pressure in an other family member.     REVIEW OF SYSTEMS: Could not be obtained as patient is intubated    PHYSICAL EXAM:      BP (!) 154/113   Pulse 75   Temp 99.8 °F (37.7 °C) (Oral)   Resp 16   Ht 5' 3\" (1.6 m) Comment: from old chart  Wt 216 lb 0.8 oz (98 kg)   SpO2 99%   BMI 38.27 kg/m²    Temp (24hrs), Av.5 °F (36.9 °C), Min:97.7 °F (36.5 °C), Max:99.8 °F (37.7 °C)    General appearance - well appearing, no in pain or distress   Mental status -intubated  Mouth - mucous membranes moist, pharynx normal without lesions   Neck - supple, no significant adenopathy   Lymphatics - no palpable lymphadenopathy, no hepatosplenomegaly   Chest - clear to auscultation, no wheezes, rales or rhonchi, symmetric air entry   Heart - normal rate, regular rhythm, normal S1, S2, no murmurs  Abdomen - soft, nontender, nondistended, no masses or organomegaly   Neurological -intubated nonfocal  Musculoskeletal - no joint tenderness, deformity or swelling   Extremities - peripheral pulses normal,+++ pedal edema, no clubbing or cyanosis   Skin -multiple skin bruises noted    DATA:    Labs:   CBC:   Recent Labs     21  2314 21  0438   WBC 7.9  --   --  6.6   HGB 7.6*   < > 7.2* 7.5*   HCT 23.4*   < > 22.4* 23.4*   PLT 39*  --   --  36*    < > = values in this interval not displayed. BMP:   Recent Labs     21  0438    131*   K 3.9 3.9   CO2 26 27   BUN 34* 27*   CREATININE 2.67* 2.27*   LABGLOM 18* 21*   GLUCOSE 229* 188*     PT/INR: No results for input(s): PROTIME, INR in the last 72 hours.   SURGICAL PATHOLOGY REPORT [[id number]]    Collected: 21 1535    Updated: 21 0804     --Surgical Pathology Report   [id number]   [facility]   [facility]   ANATOMIC PATHOLOGY   [address] [place] [address],  [address]. [place], [address]   [phone]   Fax: [phone]   [facility]     Patient Name: Nader Manjarrez   MR#: [id number]   Specimen #[id number] Procedures/Addenda   FLOW CYTOMETRY REPORT     Date Ordered:     11/22/2021     Status:   Signed Out        Date Complete:     11/22/2021     By: Leon Freeman M.D.        Date Reported:     11/23/2021       INTERPRETATION   PERIPHERAL BLOOD:      -SEVERE THROMBOCYTOPENIA.   -MODERATE NORMOCYTIC ANEMIA.   -FLOW   CYTOMETRY POSITIVE FOR MONOCLONAL B-LYMPHOCYTES WITH IMMUNOPHENOTYPE       FEATURES MOST SUGGESTIVE OF IMMUNOPHENOTYPICALLY ATYPICAL   B-CHRONIC     LYMPHOCYTIC LEUKEMIA VS MONOCLONAL B-LYMPHOCYTOSIS.  BONE MARROW OR OTHER     TISSUE BIOPSY IS RECOMMENDED FOR FURTHER ELUCIDATION. RESULTS-COMMENTS   PERIPHERAL BLOODWBC 7.5Hgb 8. 0HCT 24.5MCV 94. 8PLT 49Neutrophil   57%Lymphocyte 26%Monocyte 9%nRBC as 2/100 WBCABS Neut 4.31K/ULABS   lymph 1.96K/ULMorphology: Anisocytosis,       ANALYSIS   Flow cytometric immunophenotyping analysis is performed on peripheral   blood following red blood cell lysis procedure.  The cells are labeled   by direct ten color immunostaining procedure and analyzed on "[employer]ios   flow cytometer.      Cytocentrifuge differential cell count:   31% Lymphocytes   3% Monocytes   66% Granulocytes     Viability:  99%     Marker panels:   B-cell tube:  CD5, CD10, CD19, CD20, CD22, CD23, CD45, , Kappa,   lambda   T-cell tube:  CD2, CD3, CD4, CD5, CD7, CD8, CD16, CD45, CD56, CD57     Flow cytometric immunophenotyping analysis of peripheral blood   lymphocyte population is positive for B-cell monoclonality;   immunophenotype strong positive for CD20, CD22,  lambda light   chain; and weakly positive for CD5 and CD23.  The immunophenotypic   features suggest immunophenotypically atypical chronic lymphocytic   leukemia, however, other B-chronic lymphoproliferative neoplasm cannot   be excluded.  The absolute monoclonal B-lymphocyte count is   approximately 0.98K/UL (by WHO criteria, absolute monoclonal B   lymphocyte count of >5000 is required to diagnose chronic lymphocytic leukemia).  Bone marrow or other tissue biopsy is recommended for        IMAGING DATA:  XR CHEST PORTABLE   Final Result   1. Endotracheal tube remains in appropriate position. 2. No significant change in pleural effusions and associated basilar   atelectasis. XR CHEST PORTABLE   Final Result   No significant interval change. Mild vascular congestion and small pleural   effusions. XR CHEST PORTABLE   Final Result   1. Endotracheal tube in place with its tip 2 cm above the kristina. 2.  Unchanged cardiomegaly and small to moderate bilateral pleural effusions   with basilar atelectasis. 3.  Support tubes and catheters are otherwise unchanged. XR CHEST PORTABLE   Final Result   Cardiomegaly with bibasilar effusions and adjacent infiltrates. Support tubes as described above. XR CHEST PORTABLE   Final Result   Cardiomegaly with bibasilar effusions and adjacent infiltrates. Support tubes as described above. XR CHEST (SINGLE VIEW FRONTAL)   Final Result   The endotracheal tube ends appropriately above the kristina and below the   clavicular heads. The nasogastric tube ends in the stomach. The right   jugular vein hemodialysis catheter ends in the distal SVC, unchanged. Cardiomegaly. Bibasilar airspace disease and small right pleural effusion,   unchanged. Lungs otherwise grossly clear. No pneumothorax. VL Renal Arterial Duplex Complete   Final Result      XR CHEST PORTABLE   Final Result   Basilar opacities likely represent atelectasis with small pleural effusions. MRI BRAIN WO CONTRAST   Final Result      There is no acute infarction, intracranial hemorrhage, or intracranial mass   lesion. Mild chronic microangiopathic ischemic disease. Mild generalized volume loss. Mild mucosal thickening of bilateral mastoid air cells and right sphenoid   sinus. XR CHEST PORTABLE   Final Result   1.  Tip of the newly inserted NG tube overlies the gastric body. .   2. The right IJ CVC and right arm PICC line remain well positioned. 3.  No acute cardiopulmonary abnormality. US RETROPERITONEAL COMPLETE   Final Result   No acute findings. No hydronephrosis. Small right pleural effusion. CT HEAD WO CONTRAST   Final Result   No acute intracranial abnormality. Chronic microvascular disease and chronic lacunar infarcts. Critical results were called by Dr. Cris Sevilla to Dr Naeem Bush on 11/17/2021 at   22:0. XR CHEST PORTABLE   Final Result   1. No pneumothorax evident following thoracentesis. 2. Low lung volumes. Significantly improved pneumatization lower right lung   with minimal pleural effusion bilateral evident. 3. Calcific atherosclerosis aorta. 4. Cardiomegaly. IR GUIDED THORACENTESIS PLEURAL   Final Result   Successful ultrasound guided right thoracentesis. IR NONTUNNELED VASCULAR CATHETER > 5 YEARS   Final Result   Successful ultrasound guided non-tunneled right IJ temporary hemodialysis   catheter placement. Catheter is ready for dialysis use at this time. FLUORO FOR SURGICAL PROCEDURES   Final Result      XR CHEST PORTABLE   Final Result   Moderate right pleural effusion and associated airspace disease. Pneumonia   cannot be excluded. US RENAL COMPLETE   Final Result   1. Mild to moderate right-sided hydronephrosis. 2. Limited renal ultrasound. No left-sided hydronephrosis. CT ABDOMEN PELVIS WO CONTRAST Additional Contrast? None   Final Result   Severe right and mild left hydroureteronephrosis. No obstructing ureteral   stone is seen. Wall thickening of the urinary bladder without significant   urinary bladder wall distension. Outlet obstruction is a consideration. Right-sided ostomy. There is bowel wall thickening proximal to the ostomy,   may suggest infectious or inflammatory enteritis. No drainable fluid   collections or free air.   No evidence of bowel obstruction. Moderate right pleural effusion. Bibasilar infiltrates or atelectasis. Clinical correlation to exclude   pneumonia. US RETROPERITONEAL COMPLETE   Final Result   1. Moderate to severe right and mild-to-moderate left hydronephrosis. 2. Exam limited due to patient's body habitus. VL Renal Vein Scan Bilat    (Results Pending)   XR CHEST PORTABLE    (Results Pending)       Primary Problem  JAYLYN (acute kidney injury) [facility])    Active Hospital Problems    Diagnosis Date Noted    Acute metabolic encephalopathy [R24.33]     Longstanding persistent atrial fibrillation ([facility] [address] [I48.11] 11/16/2021    Hydronephrosis determined by ultrasound [N13.30] 11/14/2021    Iron deficiency anemia [D50.9] 11/12/2021    JAYLYN (acute kidney injury) ([facility] [address] [N17.9] 11/10/2021    CKD (chronic kidney disease) stage 3, GFR 30-59 ml/min ([facility]) [N18.30]     HTN (hypertension) [I10]     Type 2 diabetes mellitus with kidney complication, with long-term current use of insulin ([facility]) [E11.29, Z79.4]     ASCVD (arteriosclerotic cardiovascular disease) [I25.10]        IMPRESSION:   1. Acute kidney injury and started on dialysis by nephrology  2. Anemia, stool occult positive for blood and had upper endoscopy showing gastritis. Anemia could be related to chronic disease and also renal disease and aggravated by GI bleed  3. Thrombocytopenia  4. Acute encephalopathy  5. Bilateral hydronephrosis  6. Cardiomyopathy with EF of 30-35%  7. Generalized anasarca  8. S/p PEA, resucitated    RECOMMENDATIONS:  1. I reviewed the laboratory data, imaging studies, diagnosis, other treatment recommendations  2. Her anemia appears to be multifactorial with acute drop possibly from GI bleed  3. Her Hb and plts are low but stable  4. 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  Will need BM biopsy once stable  5. However she is very sick at this time and has poor prognosis  6. Transfuse PRBC if hemoglobin less than 7  7. Transfuse platelets if bleeding or any surgical  Procedure planned or plts less than 15K  8. No evidence of hemolysis noted as per the labs. Her HIT panel is negative  9. Recheck LDH and Hapto , fibrinogen, and coags today  10. Monitor for symptoms of bleeding  11. Hold off anticoagulation  12. Prognosis poor  13. Monitor counts closely  14. We will follow    Discussed with pt and Nurse. Thank you for asking us to see this patient. Sonu Verduzco MD  Hematologist/Medical Oncologist    Cell: [phone]    This note is created with the assistance of a speech recognition program.  While intending to generate a document that actually reflects the content of the visit, the document can still have some errors including those of syntax and sound a like substitutions which may escape proof reading. It such instances, actual meaning can be extrapolated by contextual diversion.

## 2021-11-26 ENCOUNTER — APPOINTMENT (OUTPATIENT)
Dept: GENERAL RADIOLOGY | Age: 71
DRG: 004 | End: 2021-11-26
Payer: COMMERCIAL

## 2021-11-26 LAB
ABSOLUTE EOS #: 0.3 K/UL (ref 0–0.4)
ABSOLUTE IMMATURE GRANULOCYTE: ABNORMAL K/UL (ref 0–0.3)
ABSOLUTE LYMPH #: 1.3 K/UL (ref 1–4.8)
ABSOLUTE MONO #: 0.8 K/UL (ref 0.1–1.3)
ALLEN TEST: ABNORMAL
ANION GAP SERPL CALCULATED.3IONS-SCNC: 9 MMOL/L (ref 9–17)
BASOPHILS # BLD: 0 % (ref 0–2)
BASOPHILS ABSOLUTE: 0 K/UL (ref 0–0.2)
BUN BLDV-MCNC: 40 MG/DL (ref 8–23)
BUN/CREAT BLD: ABNORMAL (ref 9–20)
CALCIUM SERPL-MCNC: 7.8 MG/DL (ref 8.6–10.4)
CARBOXYHEMOGLOBIN: 0.8 % (ref 0–5)
CHLORIDE BLD-SCNC: 97 MMOL/L (ref 98–107)
CO2: 26 MMOL/L (ref 20–31)
CORTISOL COLLECTION INFO: NORMAL
CORTISOL: 11.7 UG/DL (ref 2.7–18.4)
CREAT SERPL-MCNC: 2.8 MG/DL (ref 0.5–0.9)
DIFFERENTIAL TYPE: ABNORMAL
EKG ATRIAL RATE: 46 BPM
EKG Q-T INTERVAL: 488 MS
EKG QRS DURATION: 146 MS
EKG QTC CALCULATION (BAZETT): 457 MS
EKG T AXIS: 105 DEGREES
EKG VENTRICULAR RATE: 53 BPM
EOSINOPHILS RELATIVE PERCENT: 4 % (ref 0–4)
FIO2: 30
GFR AFRICAN AMERICAN: 20 ML/MIN
GFR NON-AFRICAN AMERICAN: 17 ML/MIN
GFR SERPL CREATININE-BSD FRML MDRD: ABNORMAL ML/MIN/{1.73_M2}
GFR SERPL CREATININE-BSD FRML MDRD: ABNORMAL ML/MIN/{1.73_M2}
GLUCOSE BLD-MCNC: 121 MG/DL (ref 65–105)
GLUCOSE BLD-MCNC: 141 MG/DL (ref 65–105)
GLUCOSE BLD-MCNC: 142 MG/DL (ref 65–105)
GLUCOSE BLD-MCNC: 243 MG/DL (ref 70–99)
HCO3 ARTERIAL: 27.6 MMOL/L (ref 22–26)
HCT VFR BLD CALC: 23.6 % (ref 36–46)
HEMOGLOBIN: 7.5 G/DL (ref 12–16)
IMMATURE GRANULOCYTES: ABNORMAL %
LACTIC ACID: 1.3 MMOL/L (ref 0.5–2.2)
LYMPHOCYTES # BLD: 16 % (ref 24–44)
MAGNESIUM: 2 MG/DL (ref 1.6–2.6)
MCH RBC QN AUTO: 30.6 PG (ref 26–34)
MCHC RBC AUTO-ENTMCNC: 31.8 G/DL (ref 31–37)
MCV RBC AUTO: 96.1 FL (ref 80–100)
METHEMOGLOBIN: 1.1 % (ref 0–1.9)
MODE: ABNORMAL
MONOCYTES # BLD: 10 % (ref 1–7)
NEGATIVE BASE EXCESS, ART: ABNORMAL MMOL/L (ref 0–2)
NOTIFICATION TIME: ABNORMAL
NOTIFICATION: ABNORMAL
NRBC AUTOMATED: ABNORMAL PER 100 WBC
O2 DEVICE/FLOW/%: ABNORMAL
O2 SAT, ARTERIAL: 96.3 % (ref 95–98)
OXYHEMOGLOBIN: ABNORMAL % (ref 95–98)
PATIENT TEMP: 37
PCO2 ARTERIAL: 48.4 MMHG (ref 35–45)
PCO2, ART, TEMP ADJ: ABNORMAL (ref 35–45)
PDW BLD-RTO: 17.1 % (ref 11.5–14.9)
PEEP/CPAP: 7
PH ARTERIAL: 7.37 (ref 7.35–7.45)
PH, ART, TEMP ADJ: ABNORMAL (ref 7.35–7.45)
PHOSPHORUS: 2.6 MG/DL (ref 2.6–4.5)
PLATELET # BLD: 45 K/UL (ref 150–450)
PLATELET ESTIMATE: ABNORMAL
PMV BLD AUTO: 10 FL (ref 6–12)
PO2 ARTERIAL: 94.4 MMHG (ref 80–100)
PO2, ART, TEMP ADJ: ABNORMAL MMHG (ref 80–100)
POSITIVE BASE EXCESS, ART: 2.3 MMOL/L (ref 0–2)
POTASSIUM SERPL-SCNC: 4 MMOL/L (ref 3.7–5.3)
PSV: ABNORMAL
PT. POSITION: ABNORMAL
RBC # BLD: 2.46 M/UL (ref 4–5.2)
RBC # BLD: ABNORMAL 10*6/UL
RESPIRATORY RATE: 16
SAMPLE SITE: ABNORMAL
SEG NEUTROPHILS: 70 % (ref 36–66)
SEGMENTED NEUTROPHILS ABSOLUTE COUNT: 6 K/UL (ref 1.3–9.1)
SET RATE: 16
SODIUM BLD-SCNC: 132 MMOL/L (ref 135–144)
TEXT FOR RESPIRATORY: ABNORMAL
TOTAL HB: ABNORMAL G/DL (ref 12–16)
TOTAL RATE: 16
TROPONIN INTERP: ABNORMAL
TROPONIN T: ABNORMAL NG/ML
TROPONIN, HIGH SENSITIVITY: 217 NG/L (ref 0–14)
VT: 450
WBC # BLD: 8.4 K/UL (ref 3.5–11)
WBC # BLD: ABNORMAL 10*3/UL

## 2021-11-26 PROCEDURE — 6360000002 HC RX W HCPCS: Performed by: INTERNAL MEDICINE

## 2021-11-26 PROCEDURE — 2580000003 HC RX 258: Performed by: INTERNAL MEDICINE

## 2021-11-26 PROCEDURE — 2580000003 HC RX 258: Performed by: UROLOGY

## 2021-11-26 PROCEDURE — 94003 VENT MGMT INPAT SUBQ DAY: CPT

## 2021-11-26 PROCEDURE — 2000000000 HC ICU R&B

## 2021-11-26 PROCEDURE — 84484 ASSAY OF TROPONIN QUANT: CPT

## 2021-11-26 PROCEDURE — C9113 INJ PANTOPRAZOLE SODIUM, VIA: HCPCS | Performed by: FAMILY MEDICINE

## 2021-11-26 PROCEDURE — 71045 X-RAY EXAM CHEST 1 VIEW: CPT

## 2021-11-26 PROCEDURE — 84100 ASSAY OF PHOSPHORUS: CPT

## 2021-11-26 PROCEDURE — 6370000000 HC RX 637 (ALT 250 FOR IP): Performed by: UROLOGY

## 2021-11-26 PROCEDURE — 93005 ELECTROCARDIOGRAM TRACING: CPT | Performed by: INTERNAL MEDICINE

## 2021-11-26 PROCEDURE — 80048 BASIC METABOLIC PNL TOTAL CA: CPT

## 2021-11-26 PROCEDURE — 2580000003 HC RX 258: Performed by: FAMILY MEDICINE

## 2021-11-26 PROCEDURE — 85025 COMPLETE CBC W/AUTO DIFF WBC: CPT

## 2021-11-26 PROCEDURE — 36600 WITHDRAWAL OF ARTERIAL BLOOD: CPT

## 2021-11-26 PROCEDURE — 6370000000 HC RX 637 (ALT 250 FOR IP): Performed by: INTERNAL MEDICINE

## 2021-11-26 PROCEDURE — 82947 ASSAY GLUCOSE BLOOD QUANT: CPT

## 2021-11-26 PROCEDURE — 83735 ASSAY OF MAGNESIUM: CPT

## 2021-11-26 PROCEDURE — 82805 BLOOD GASES W/O2 SATURATION: CPT

## 2021-11-26 PROCEDURE — 36415 COLL VENOUS BLD VENIPUNCTURE: CPT

## 2021-11-26 PROCEDURE — 83605 ASSAY OF LACTIC ACID: CPT

## 2021-11-26 PROCEDURE — 2700000000 HC OXYGEN THERAPY PER DAY

## 2021-11-26 PROCEDURE — 90935 HEMODIALYSIS ONE EVALUATION: CPT

## 2021-11-26 PROCEDURE — 6360000002 HC RX W HCPCS: Performed by: FAMILY MEDICINE

## 2021-11-26 PROCEDURE — 82533 TOTAL CORTISOL: CPT

## 2021-11-26 PROCEDURE — 2500000003 HC RX 250 WO HCPCS: Performed by: INTERNAL MEDICINE

## 2021-11-26 PROCEDURE — 99232 SBSQ HOSP IP/OBS MODERATE 35: CPT | Performed by: INTERNAL MEDICINE

## 2021-11-26 PROCEDURE — 94761 N-INVAS EAR/PLS OXIMETRY MLT: CPT

## 2021-11-26 RX ORDER — LINEZOLID 2 MG/ML
600 INJECTION, SOLUTION INTRAVENOUS EVERY 12 HOURS
Status: COMPLETED | OUTPATIENT
Start: 2021-11-26 | End: 2021-11-27

## 2021-11-26 RX ADMIN — POLYVINYL ALCOHOL 1 DROP: 14 SOLUTION/ DROPS OPHTHALMIC at 23:32

## 2021-11-26 RX ADMIN — MINERAL OIL, PETROLATUM: 425; 568 OINTMENT OPHTHALMIC at 04:15

## 2021-11-26 RX ADMIN — SODIUM CHLORIDE, PRESERVATIVE FREE 10 ML: 5 INJECTION INTRAVENOUS at 20:41

## 2021-11-26 RX ADMIN — Medication 5 MCG/MIN: at 11:21

## 2021-11-26 RX ADMIN — POLYVINYL ALCOHOL 1 DROP: 14 SOLUTION/ DROPS OPHTHALMIC at 08:42

## 2021-11-26 RX ADMIN — MINERAL OIL, PETROLATUM: 425; 568 OINTMENT OPHTHALMIC at 02:01

## 2021-11-26 RX ADMIN — PROPOFOL 10 MCG/KG/MIN: 10 INJECTION, EMULSION INTRAVENOUS at 18:10

## 2021-11-26 RX ADMIN — CHLORHEXIDINE GLUCONATE 0.12% ORAL RINSE 15 ML: 1.2 LIQUID ORAL at 08:41

## 2021-11-26 RX ADMIN — PANTOPRAZOLE SODIUM 40 MG: 40 INJECTION, POWDER, FOR SOLUTION INTRAVENOUS at 08:41

## 2021-11-26 RX ADMIN — CEFEPIME HYDROCHLORIDE 1000 MG: 1 INJECTION, POWDER, FOR SOLUTION INTRAMUSCULAR; INTRAVENOUS at 20:40

## 2021-11-26 RX ADMIN — INSULIN LISPRO 2 UNITS: 100 INJECTION, SOLUTION INTRAVENOUS; SUBCUTANEOUS at 12:17

## 2021-11-26 RX ADMIN — SODIUM CHLORIDE, PRESERVATIVE FREE 10 ML: 5 INJECTION INTRAVENOUS at 08:43

## 2021-11-26 RX ADMIN — LINEZOLID 600 MG: 600 INJECTION, SOLUTION INTRAVENOUS at 14:20

## 2021-11-26 RX ADMIN — SODIUM CHLORIDE, PRESERVATIVE FREE 10 ML: 5 INJECTION INTRAVENOUS at 20:40

## 2021-11-26 RX ADMIN — INSULIN LISPRO 4 UNITS: 100 INJECTION, SOLUTION INTRAVENOUS; SUBCUTANEOUS at 08:44

## 2021-11-26 RX ADMIN — FERROUS SULFATE TAB 325 MG (65 MG ELEMENTAL FE) 325 MG: 325 (65 FE) TAB at 08:41

## 2021-11-26 RX ADMIN — PANTOPRAZOLE SODIUM 40 MG: 40 INJECTION, POWDER, FOR SOLUTION INTRAVENOUS at 20:40

## 2021-11-26 RX ADMIN — SERTRALINE HYDROCHLORIDE 25 MG: 50 TABLET ORAL at 08:41

## 2021-11-26 RX ADMIN — MINERAL OIL, PETROLATUM: 425; 568 OINTMENT OPHTHALMIC at 20:40

## 2021-11-26 RX ADMIN — INSULIN LISPRO 1 UNITS: 100 INJECTION, SOLUTION INTRAVENOUS; SUBCUTANEOUS at 20:41

## 2021-11-26 RX ADMIN — LINEZOLID 600 MG: 600 INJECTION, SOLUTION INTRAVENOUS at 02:01

## 2021-11-26 RX ADMIN — POLYVINYL ALCOHOL 1 DROP: 14 SOLUTION/ DROPS OPHTHALMIC at 20:22

## 2021-11-26 RX ADMIN — INSULIN GLARGINE 22 UNITS: 100 INJECTION, SOLUTION SUBCUTANEOUS at 20:41

## 2021-11-26 RX ADMIN — POLYVINYL ALCOHOL 1 DROP: 14 SOLUTION/ DROPS OPHTHALMIC at 04:15

## 2021-11-26 RX ADMIN — ATORVASTATIN CALCIUM 80 MG: 80 TABLET, FILM COATED ORAL at 08:41

## 2021-11-26 RX ADMIN — PROPOFOL 10 MCG/KG/MIN: 10 INJECTION, EMULSION INTRAVENOUS at 06:55

## 2021-11-26 RX ADMIN — CHLORHEXIDINE GLUCONATE 0.12% ORAL RINSE 15 ML: 1.2 LIQUID ORAL at 20:40

## 2021-11-26 ASSESSMENT — PULMONARY FUNCTION TESTS
PIF_VALUE: 23
PIF_VALUE: 23
PIF_VALUE: 24
PIF_VALUE: 33
PIF_VALUE: 25
PIF_VALUE: 24
PIF_VALUE: 23
PIF_VALUE: 24
PIF_VALUE: 20
PIF_VALUE: 21
PIF_VALUE: 26
PIF_VALUE: 26
PIF_VALUE: 20
PIF_VALUE: 23
PIF_VALUE: 22
PIF_VALUE: 23
PIF_VALUE: 20
PIF_VALUE: 23
PIF_VALUE: 24
PIF_VALUE: 23
PIF_VALUE: 22
PIF_VALUE: 23
PIF_VALUE: 26
PIF_VALUE: 20
PIF_VALUE: 23
PIF_VALUE: 33

## 2021-11-26 ASSESSMENT — PAIN SCALES - GENERAL
PAINLEVEL_OUTOF10: 0

## 2021-11-26 NOTE — PROGRESS NOTES
Patient remains on continuous telemetry monitoring. Occasional non sustained v-tach and PVC noted and strips printed and placed in chart. Cardiology aware and following, will update physician with any new change in patient condition.

## 2021-11-26 NOTE — PROGRESS NOTES
HEMODIALYSIS POST TREATMENT NOTE    Treatment time ordered: 210 min    Actual treatment time: 27 min    UltraFiltration Goal: 3L  UltraFiltration Removed: 0      Pre Treatment weight:   Post Treatment weight:   Estimated Dry Weight:     Access used:     Central Venous Catheter:          Tunneled or Non-tunneled: non tunneles       Sign and symptoms of infection:no       If YES:    Medications or blood products given: no    Chronic outpatient schedule:     Chronic outpatient unit:     Summary of response to treatment: patient didn't tolerate tx became hypotensive within minutes of starting tx    Explain if orders NOT met, was physician notified:Dr Santoyo notified      ACES flowsheet faxed to patient unit/ placed in patient chart: yes    Post assessment completed: yes    Report given to: Alejo Piper RN      * Intra-treatment documented Safety Checks include the followin) Access and face visible at all times. 2) All connections and blood lines are secure with no kinks. 3) NVL alarm engaged. 4) Hemosafe device applied (if applicable). 5) No collapse of Arterial or Venous blood chambers. 6) All blood lines / pump segments in the air detectors.

## 2021-11-26 NOTE — PROGRESS NOTES
Department of Internal Medicine  Nephrology Indu Chiu MD  Progress Note    Reason for consultation: Management of acute kidney injury. Consulting physician: Siria Kebede MD.    Interval history:   Patient seen and examined in the ICU today. Patient received dialysis on 11/24/2021, patient scheduled for dialysis today   She remains intubated. She is following commands. Urine  output was 200 mils overnight. Patient remains edematous. Patient had episode of bradycardia with transient PEA requiring a short session of CPR on 11/20/21  approximately 15 minutes into hemodialysis. Patient is S/P  right ureteral stent placement with renal ultrasound on 11/18/2021 showing improvement in hydronephrosis. History of present illness: This is a 66 y.o. female with a significant past medical history of Type 2 diabetes mellitus, coronary artery disease, lumbar radiculopathy, Hyperlipidemia and chronic kidney disease stage III [baseline serum creatinine 1.5 mg/dL], who presented from [facility] for further evaluation of acute kidney injury. She was sent to [facility] after developing nonhealing wound s/p cardiac catheterization via right refill Levophed has been discontinued approach with right groin wound. She had been on Bumex in the ECF and creatinine and BUN have been rising and Bumex was decreased to a lowest dose of 0.5 mg p.o. twice daily. She has had regular blood work performed in the [facility] and ECF contacted nephrologist after finding abnormal laboratory studies. She was noted to have a BUN/creatinine 104/2.28 mg/dL with hemoglobin 8 g/dL. She has mild confusion but is not in obvious respiratory distress.     Scheduled Meds:   linezolid  600 mg IntraVENous Q12H    chlorhexidine  15 mL Mouth/Throat BID    polyvinyl alcohol  1 drop Both Eyes Q4H    And    artificial tears   Both Eyes Q4H    carvedilol  3.125 mg Oral BID WC    pantoprazole  40 mg IntraVENous BID    And    tenderness. Extremities: 3+ generalized edema    Neuro:  Grossly normal    CBC:   Recent Labs     11/24/21 0427 11/24/21 0427 11/24/21  2314 11/25/21 0438 11/26/21 0421   WBC 7.9  --   --  6.6 8.4   HGB 7.6*   < > 7.2* 7.5* 7.5*   PLT 39*  --   --  36* 45*    < > = values in this interval not displayed. BMP:    Recent Labs     11/24/21 0427 11/25/21 0438 11/26/21 0421    131* 132*   K 3.9 3.9 4.0    97* 97*   CO2 26 27 26   BUN 34* 27* 40*   CREATININE 2.67* 2.27* 2.80*   GLUCOSE 229* 188* 243*     Lab Results   Component Value Date    NITRU NEGATIVE 11/12/2021    COLORU Yellow 11/12/2021    PHUR 6.0 11/12/2021    WBCUA 50  11/12/2021    RBCUA 20 TO 50 11/12/2021    MUCUS NOT REPORTED 11/12/2021    TRICHOMONAS NOT REPORTED 11/12/2021    YEAST NOT REPORTED 11/12/2021    BACTERIA MODERATE 11/12/2021    CLARITYU Clear 09/07/2016    SPECGRAV 1.012 11/12/2021    LEUKOCYTESUR LARGE 11/12/2021    UROBILINOGEN Normal 11/12/2021    BILIRUBINUR NEGATIVE 11/12/2021    BLOODU Positive 09/07/2016    GLUCOSEU NEGATIVE 11/12/2021    KETUA NEGATIVE 11/12/2021    AMORPHOUS NOT REPORTED 11/12/2021     IMPRESSION/RECOMMENDATIONS:      1. Acute kidney injury - most consistent with Ischemic ATN and obstructive nephropathy secondary to bilateral hydronephrosis. Urine output 100 mL OVER 8 HOURS  Repeat renal ultrasound  11/18/2021 shows improvement in hydronephrosis     Patient was started on acute dialysis on 11/17/2021    2. CHF with reduced EF, Systolic dysfunction, diastolic dysfunction. 3. PEA/Cardiac Arrest s/p CPR recovered. 4. Acute respiratory failure s/p intubation on Ventilator. 5. Normocytic anemia/Thrombocytopenia, Heme-oncology following, No hemolysis found, HIT panel negative,    6. Hypotension-improved and off Levophed    7. Bilateral hydronephrosis status post cystoscopy with right ureteral  stent placement.     8.  Anasarca secondary to cardiomyopathy-EF of 30-35 % , third spacing renal failure    9. Acute metabolic encephalopathy        Plan:   hemodialysis today as per orders  With IV albumin and midodrine for hypotension  Strict input and output   Basic metabolic profile daily. Check phosphorus. Retacrit with dialysis 3 times a week  Continue Nepro tube feeding. Prognosis is guarded.         MD Santiago   Attending Nephrologist  11/26/2021 11:44 AM

## 2021-11-26 NOTE — PROGRESS NOTES
Comprehensive Nutrition Assessment    Type and Reason for Visit:  Reassess    Nutrition Recommendations/Plan:   Will continue to provide Nepro (Renal) tube feed at 35 ml per hour + 1 protein modular daily (1591 kcal, 104 g protein)    Nutrition Assessment:  Pt tolerating tube feed at goal. Diprivan at 5.8 ml per hour (153 kcal). Malnutrition Assessment:  Malnutrition Status: At risk for malnutrition (Comment)    Context:  Acute Illness     Findings of the 6 clinical characteristics of malnutrition:  Energy Intake:  Mild decrease in energy intake (Comment)  Weight Loss:  Unable to assess (no wt history)     Body Fat Loss:  Unable to assess     Muscle Mass Loss:  Unable to assess    Fluid Accumulation:  7 - Moderate to Severe Extremities, Generalized (Anasarca)   Strength:  Not Performed    Estimated Daily Nutrient Needs:  Energy (kcal):  15 kcal/kg= 1877-7678 kcal; Weight Used for Energy Requirements:   (92kg)     Protein (g):  1g/kg= 90-95 g; Weight Used for Protein Requirements:   (92kg)          Nutrition Related Findings:  Labs/Meds: Reviewed, 200 ml out ostomy      Wounds:  Multiple, Venous Stasis, Skin Tears       Current Nutrition Therapies:    ADULT TUBE FEEDING; Nasogastric; Renal Formula; Continuous; 35; No; 30; Q 8 hours; Protein; 1 Protein at 12 noon daily. Flush with 30 mLwater before and after administration.   Current Tube Feeding (TF) Orders:  · Feeding Route: Nasogastric  · Formula: Renal Formula  · Schedule: Continuous  · Additives/Modulars: Protein  · Current TF & Flush Orders Provides: 1591 kcal and 104 gm Protein (based on 1.77 kcal per mL Nepro + Proteinex)    Anthropometric Measures:  · Height: 5' 3\" (160 cm) (from old chart)  · Current Body Weight: 216 lb (98 kg)   · Admission Body Weight: 204 lb (92.5 kg)      Nutrition Diagnosis:   · Increased nutrient needs related to  (healing) as evidenced by wounds    Nutrition Interventions:   Food and/or Nutrient Delivery:  Continue Current Tube Feeding  Nutrition Education/Counseling:  No recommendation at this time   Coordination of Nutrition Care:  Continue to monitor while inpatient    Goals:  Nutrition support will meet approximately 100% of estimated needs       Nutrition Monitoring and Evaluation:   Food/Nutrient Intake Outcomes:  Enteral Nutrition Intake/Tolerance  Physical Signs/Symptoms Outcomes:  Biochemical Data, Fluid Status or Edema, Weight, Skin, GI Status     Discharge Planning:     Too soon to determine     Electronically signed by Ni Weinstein RD, LD on 11/26/21 at 10:21 AM EST    Contact: [phone]

## 2021-11-26 NOTE — PROGRESS NOTES
ICU Progress Note (Vent)   [facility]    Patient - Citlali Juarez,  Age - 66 y.o.    - 1950      Room Number -    N -  [id number]   [facility]  Date of Admission -  11/10/2021  4:23 PM    Events of Past 24 Hours   Patient seen on dialysis, had an episode of hypotension remains sedated on minimum doses of propofol  Failed weaning trial quickly yesterday, today has not been started just yet    Vitals    height is 5' 3\" (1.6 m) and weight is 216 lb 0.8 oz (98 kg). Her oral temperature is 98.6 °F (37 °C). Her blood pressure is 136/45 (abnormal) and her pulse is 79. Her respiration is 16 and oxygen saturation is 100%. Temperature Range: Temp: 98.6 °F (37 °C) Temp  Av.5 °F (36.9 °C)  Min: 98 °F (36.7 °C)  Max: 98.8 °F (37.1 °C)  BP Range:  Systolic (14UTB), QBF:747 , Min:134 , EOK:346     Diastolic (96ZER), SYZ:64, Min:42, Max:71    Pulse Range: Pulse  Av.8  Min: 66  Max: 80  Respiration Range: Resp  Avg: 15.5  Min: 12  Max: 16  Current Pulse Ox[de-identified]  SpO2: 100 %  24HR Pulse Ox Range:  SpO2  Av.8 %  Min: 99 %  Max: 100 %  Oxygen Amount and Delivery: O2 Flow Rate (L/min): 2 L/min      Wt Readings from Last 3 Encounters:   21 216 lb 0.8 oz (98 kg)   16 178 lb (80.7 kg)   16 184 lb 12.8 oz (83.8 kg)     I/O       Intake/Output Summary (Last 24 hours) at 2021 1113  Last data filed at 2021 0400  Gross per 24 hour   Intake 1668.75 ml   Output 650 ml   Net 1018.75 ml     I/O last 3 completed shifts:   In: 1668.8 [I.V.:86; NG/GT:1264; IV Piggyback:318.8]  Out: 650 [Urine:200; Stool:450]     DRAIN/TUBE OUTPUT:     Invasive Lines   ETT Day -   6  Lines -single-lumen PICC day 11    ICP PRESSURE RANGE:  No data recorded  CVP PRESSURE RANGE:  No data recorded  Mechanical Ventilation Data   SETTINGS (Comprehensive)  Vent Information  $Ventilation: $Subsequent Day  Skin Assessment: Clean, dry, & intact  Suction Catheter Diameter: 14  Equipment Changed: HME  Vent Type: Servo i  Vent Mode: PRVC  Vt Ordered: 450 mL  Rate Set: 16 bmp  Pressure Support: (S) 10 cmH20  FiO2 : 30 %  SpO2: 100 %  SpO2/FiO2 ratio: 330  Sensitivity: 5  PEEP/CPAP: 7  I Time/ I Time %: 1.25 s  Humidification Source: HME  Nitric Oxide/Epoprostenol In Use?: No  Mask Type: Full face mask  Mask Size: Medium  Additional Respiratory  Assessments  Pulse: 79  Resp: 16  SpO2: 100 %  End Tidal CO2: 38 (%)  Position: Semi-Lagunas's  Humidification Source: HME  Oral Care: Mouth swabbed, Mouth moisturizer, Mouth suctioned, Suction toothette  Cuff Pressure (cm H2O): 30 cm H2O       ABGs:   Lab Results   Component Value Date    PHART 7.365 11/26/2021    PO2ART 94.4 11/26/2021    ILW9EPI 48.4 11/26/2021       Lab Results   Component Value Date    MODE PRVC 11/26/2021         Medications   IV   norepinephrine Stopped (11/25/21 2000)    propofol 10 mcg/kg/min (11/26/21 0655)    dextrose      sodium chloride      sodium chloride        linezolid  600 mg IntraVENous Q12H    chlorhexidine  15 mL Mouth/Throat BID    polyvinyl alcohol  1 drop Both Eyes Q4H    And    artificial tears   Both Eyes Q4H    carvedilol  3.125 mg Oral BID WC    pantoprazole  40 mg IntraVENous BID    And    sodium chloride flush  10 mL IntraVENous BID    sodium polystyrene  15 g Oral Once    cefepime  1,000 mg IntraVENous Q24H    sertraline  25 mg Oral Daily    ferrous sulfate  325 mg Oral Daily with breakfast    [Held by provider] aspirin  81 mg Oral Daily    atorvastatin  80 mg Oral Daily    insulin glargine  22 Units SubCUTAneous Nightly    insulin lispro  0-12 Units SubCUTAneous TID WC    insulin lispro  0-6 Units SubCUTAneous Nightly    sodium chloride flush  5-40 mL IntraVENous 2 times per day       Diet/Nutrition   ADULT TUBE FEEDING; Nasogastric; Renal Formula; Continuous; 35; No; 30; Q 8 hours; Protein; 1 Protein at 12 noon daily.  Flush with 30 mLwater before and after administration. Exam   VITALS    height is 5' 3\" (1.6 m) and weight is 216 lb 0.8 oz (98 kg). Her oral temperature is 98.6 °F (37 °C). Her blood pressure is 136/45 (abnormal) and her pulse is 79. Her respiration is 16 and oxygen saturation is 100%. Ventilator Settings (Basic)  Vent Mode: PRVC Rate Set: 16 bmp/Vt Ordered: 450 mL/ /FiO2 : 30 %    Constitutional - Sedated  General Appearance ill-appearing, not as responsive as yesterday  HEENT - Life support devices in place (ET, OG),normocephalic, atraumatic. PERRLA  Lungs - Chest expands equally, no wheezes, diminished breath sounds right side cardiovascular - Heart sounds are normal.  normal rate and rhythm regular, no murmur, gallop or rub. Abdomen - soft, nontender, nondistended, no masses or organomegaly  Neurologic - CN II-XII are grossly intact.  There are no focal motor deficits  Skin - no bruising or bleeding  Extremities - no cyanosis, clubbing or edema    Lab Results   CBC     Lab Results   Component Value Date    WBC 8.4 11/26/2021    RBC 2.46 11/26/2021    RBC 4.14 08/31/2016    HGB 7.5 11/26/2021    HCT 23.6 11/26/2021    PLT 45 11/26/2021    MCV 96.1 11/26/2021    MCH 30.6 11/26/2021    MCHC 31.8 11/26/2021    RDW 17.1 11/26/2021    NRBC 4 11/22/2021    LYMPHOPCT 16 11/26/2021    LYMPHOPCT 32.8 08/31/2016    MONOPCT 10 11/26/2021    EOSPCT 4.3 08/31/2016    BASOPCT 0 11/26/2021    BASOPCT 0.7 08/31/2016    MONOSABS 0.80 11/26/2021    LYMPHSABS 1.30 11/26/2021    EOSABS 0.30 11/26/2021    BASOSABS 0.00 11/26/2021    DIFFTYPE NOT REPORTED 11/26/2021       BMP   Lab Results   Component Value Date     11/26/2021    K 4.0 11/26/2021    CL 97 11/26/2021    CO2 26 11/26/2021    BUN 40 11/26/2021    CREATININE 2.80 11/26/2021    GLUCOSE 243 11/26/2021    GLUCOSE 77 08/31/2016    CALCIUM 7.8 11/26/2021       LFTS  Lab Results   Component Value Date    ALKPHOS 175 11/18/2021    ALT 31 11/18/2021    AST 21 11/18/2021    PROT 5.3 11/20/2021    BILITOT 0.27 11/18/2021    BILIDIR <0.08 11/13/2021    IBILI Connot be calculated 11/13/2021    LABALBU 2.6 11/18/2021       INR  Recent Labs     11/25/21  1158   PROTIME 15.9*   INR 1.3       APTT  Recent Labs     11/25/21  1158   APTT 34.9       Lactic Acid  Lab Results   Component Value Date    LACTA 0.6 11/16/2021    LACTA 0.6 11/16/2021        BNP   No results for input(s): BNP in the last 72 hours. Cultures       Radiology     Plain Films         Chest x-ray shows worsening right pleural effusion        SYSTEM ASSESSMENT    Acute on chronic hypoxic and hypercapnic respiratory failure  Bradycardic arrest, intubated 11/20  Right lower lobe infiltrate/pleural effusion  Acute kidney injury-requiring hemodialysis  Morbid obesity  Anemia, chronic disease and possible rectal bleed  Hypoalbuminemia  Thrombocytopenia  Nonischemic cardiomyopathy with EF of 30 to 35% possible right to left interatrial shunt  Atrial fibrillation  FULL CODE    Neuro   Minimize sedation when on dialysis help with blood pressure    Respiratory   Wean oxygen as tolerated.  Keep O2 sat > 88%  We will start weaning trial after dialysis  Will extubate to BiPAP if she passes     Hemodynamics   Appears to be total body fluid overloaded, but due to intravascular pressure, may not be able to tolerate as much fluid removal as we would like  We may need to use norepinephrine  Check lactic acid and cortisol level  Gastrointestinal/Nutrition   Continue tube feeds  GI prophylaxis    Renal   Dialysis per nephrology    Infectious Disease   Continue cefepime and Zyvox day 5 of Zyvox, stop after 7 days    Hematology/Oncology   Platelet count back up slightly continue to monitor, no chemical DVT prophylaxis    Endocrine   Blood sugars okay    Social/Spiritual/DNR/Disposition/Other     Updated patient's , I told him that she was having issues with blood pressure naturally he is worried    Critical Care Time   35 min    Electronically signed by Joce Brito MD on 11/26/2021 at 11:13 AM

## 2021-11-26 NOTE — PROGRESS NOTES
[facility] CARDIOLOGY Progress Note    11/26/2021 7:11 AM      Subjective:  Ms. Lorenzo Norris remains intubated. Awake and nods head appropriately and  denies any chest pain. Nurse updated overnight events. No more significant pauses.          LABS:     Recent Results (from the past 24 hour(s))   POC Glucose Fingerstick    Collection Time: 11/25/21 10:12 AM   Result Value Ref Range    POC Glucose 194 (H) 65 - 105 mg/dL   POC Glucose Fingerstick    Collection Time: 11/25/21 11:06 AM   Result Value Ref Range    POC Glucose 198 (H) 65 - 105 mg/dL   HAPTOGLOBIN    Collection Time: 11/25/21 11:58 AM   Result Value Ref Range    Haptoglobin 122 30 - 200 mg/dL   Lactate Dehydrogenase    Collection Time: 11/25/21 11:58 AM   Result Value Ref Range     135 - 214 U/L   FIBRINOGEN    Collection Time: 11/25/21 11:58 AM   Result Value Ref Range    Fibrinogen 263 210 - 530 mg/dL   PROTIME-INR    Collection Time: 11/25/21 11:58 AM   Result Value Ref Range    Protime 15.9 (H) 11.8 - 14.6 sec    INR 1.3    APTT    Collection Time: 11/25/21 11:58 AM   Result Value Ref Range    PTT 34.9 24.0 - 36.0 sec   Phosphorus    Collection Time: 11/25/21 11:58 AM   Result Value Ref Range    Phosphorus 1.9 (L) 2.6 - 4.5 mg/dL   POC Glucose Fingerstick    Collection Time: 11/25/21  4:10 PM   Result Value Ref Range    POC Glucose 224 (H) 65 - 105 mg/dL   POC Glucose Fingerstick    Collection Time: 11/25/21  9:50 PM   Result Value Ref Range    POC Glucose 196 (H) 65 - 105 mg/dL   CBC with DIFF    Collection Time: 11/26/21  4:21 AM   Result Value Ref Range    WBC 8.4 3.5 - 11.0 k/uL    RBC 2.46 (L) 4.0 - 5.2 m/uL    Hemoglobin 7.5 (L) 12.0 - 16.0 g/dL    Hematocrit 23.6 (L) 36 - 46 %    MCV 96.1 80 - 100 fL    MCH 30.6 26 - 34 pg    MCHC 31.8 31 - 37 g/dL    RDW 17.1 (H) 11.5 - 14.9 %    Platelets 45 (L) 342 - 450 k/uL    MPV 10.0 6.0 - 12.0 fL    NRBC Automated NOT REPORTED per 100 WBC    Differential Type NOT REPORTED     Immature Granulocytes NOT REPORTED 0 %    Absolute Immature Granulocyte NOT REPORTED 0.00 - 0.30 k/uL    WBC Morphology NOT REPORTED     RBC Morphology NOT REPORTED     Platelet Estimate NOT REPORTED     Seg Neutrophils 70 (H) 36 - 66 %    Lymphocytes 16 (L) 24 - 44 %    Monocytes 10 (H) 1 - 7 %    Eosinophils % 4 0 - 4 %    Basophils 0 0 - 2 %    Segs Absolute 6.00 1.3 - 9.1 k/uL    Absolute Lymph # 1.30 1.0 - 4.8 k/uL    Absolute Mono # 0.80 0.1 - 1.3 k/uL    Absolute Eos # 0.30 0.0 - 0.4 k/uL    Basophils Absolute 0.00 0.0 - 0.2 k/uL   Magnesium    Collection Time: 11/26/21  4:21 AM   Result Value Ref Range    Magnesium 2.0 1.6 - 2.6 mg/dL   Basic Metabolic Panel w/ Reflex to MG    Collection Time: 11/26/21  4:21 AM   Result Value Ref Range    Glucose 243 (H) 70 - 99 mg/dL    BUN 40 (H) 8 - 23 mg/dL    CREATININE 2.80 (H) 0.50 - 0.90 mg/dL    Bun/Cre Ratio NOT REPORTED 9 - 20    Calcium 7.8 (L) 8.6 - 10.4 mg/dL    Sodium 132 (L) 135 - 144 mmol/L    Potassium 4.0 3.7 - 5.3 mmol/L    Chloride 97 (L) 98 - 107 mmol/L    CO2 26 20 - 31 mmol/L    Anion Gap 9 9 - 17 mmol/L    GFR Non-African American 17 (L) >60 mL/min    GFR African American 20 (L) >60 mL/min    GFR Comment          GFR Staging NOT REPORTED    PHOSPHORUS    Collection Time: 11/26/21  4:21 AM   Result Value Ref Range    Phosphorus 2.6 2.6 - 4.5 mg/dL   Blood gas, arterial    Collection Time: 11/26/21  5:22 AM   Result Value Ref Range    pH, Arterial 7.365 7.350 - 7.450    pCO2, Arterial 48.4 (HH) 35.0 - 45.0 mmHg    pO2, Arterial 94.4 80.0 - 100.0 mmHg    HCO3, Arterial 27.6 (H) 22.0 - 26.0 mmol/L    Positive Base Excess, Art 2.3 (H) 0.0 - 2.0 mmol/L    Negative Base Excess, Art NOT REPORTED 0.0 - 2.0 mmol/L    O2 Sat, Arterial 96.3 95 - 98 %    Total Hb NOT REPORTED 12.0 - 16.0 g/dl    Oxyhemoglobin NOT REPORTED 95.0 - 98.0 %    Carboxyhemoglobin 0.8 0 - 5 %    Methemoglobin 1.1 0.0 - 1.9 %    Pt Temp 37.0     pH, Art, Temp Adj NOT REPORTED 7.350 - 7.450 pCO2, Art, Temp Adj NOT REPORTED 35.0 - 45.0    pO2, Art, Temp Adj NOT REPORTED 80.0 - 100.0 mmHg    O2 Device/Flow/% VENTILATOR     Respiratory Rate 16     Rolf Test PASS     Sample Site Left Radial Artery     Pt. Position SEMI-FOWLERS     Mode PRVC     Set Rate 16     Total Rate 16          FIO2 30     Peep/Cpap 7     PSV NOT REPORTED     Text for Respiratory RESULTED TO RN     NOTIFICATION NOT REPORTED     NOTIFICATION TIME NOT REPORTED        Pulse Ox: SpO2  Av.8 %  Min: 99 %  Max: 100 %    Supplemental O2: O2 Flow Rate (L/min): 2 L/min     Current Meds:    linezolid  600 mg IntraVENous Q12H    chlorhexidine  15 mL Mouth/Throat BID    polyvinyl alcohol  1 drop Both Eyes Q4H    And    artificial tears   Both Eyes Q4H    carvedilol  3.125 mg Oral BID WC    pantoprazole  40 mg IntraVENous BID    And    sodium chloride flush  10 mL IntraVENous BID    sodium polystyrene  15 g Oral Once    cefepime  1,000 mg IntraVENous Q24H    sertraline  25 mg Oral Daily    ferrous sulfate  325 mg Oral Daily with breakfast    [Held by provider] aspirin  81 mg Oral Daily    atorvastatin  80 mg Oral Daily    insulin glargine  22 Units SubCUTAneous Nightly    insulin lispro  0-12 Units SubCUTAneous TID WC    insulin lispro  0-6 Units SubCUTAneous Nightly    sodium chloride flush  5-40 mL IntraVENous 2 times per day         Continuous Infusions:    norepinephrine Stopped (21)    propofol 10 mcg/kg/min (21 06)    dextrose      sodium chloride      sodium chloride                VITAL SIGNS:    BP (!) 147/42   Pulse 77   Temp 98.6 °F (37 °C) (Oral)   Resp 12   Ht 5' 3\" (1.6 m) Comment: from old chart  Wt 216 lb 0.8 oz (98 kg)   SpO2 100%   BMI 38.27 kg/m²  2 L/min      Admit Weight:  188 lb (85.3 kg)    Last 3 weights:   Wt Readings from Last 3 Encounters:   21 216 lb 0.8 oz (98 kg)   16 178 lb (80.7 kg)   16 184 lb 12.8 oz (83.8 kg)       BMI: Body mass index is 38.27 kg/m². INPUT/OUTPUT:          Intake/Output Summary (Last 24 hours) at 11/26/2021 0711  Last data filed at 11/26/2021 0400  Gross per 24 hour   Intake 1668.75 ml   Output 650 ml   Net 1018.75 ml         Telemetry shows  Atrial fibrillation with PVCs. EXAM:     General appearance: awake, alert. Remains intubated. Neck:  Right IJ triple-lumen catheter in place. Chest:   [address] air entry anteriorly bilaterally. No tenderness. No rhonchi or wheezing. Cardiac:  Irregularly irregular rate and rhythm. No S3 gallop or rubs. Abdomen: soft, non-tender. Extremities:  Persistent bilateral lower leg edema. Skin:  warm and dry. 2 D Echocardiogram Nov 2021:     Summary  Echo contrast utilized on this technically difficult study. Left ventricle is normal in size. Global hypokinesis, Severe apical hypokinesis  Estimated LV EF 30-35%. Mild left ventricular hypertrophy. Right ventricular dilatation with normal systolic function. Mild to moderate Left and Right atrial dilatation. Aneurysmal interatrial septum. Positive bubble study with and without  Valsalva maneuver suggestive of Rt to Lt inter-atrial shunt. Normal aortic valve structure and function without stenosis or  regurgitation. Normal aortic root dimension. Mitral annular calcification is seen. Mild mitral regurgitation. Grossly normal tricuspid valve structure and function. Mild to moderate tricuspid regurgitation. Estimated right ventricular systolic pressure is 39 mmHg. Mildly elevated  right ventricular systolic pressure. IVC normal diameter & inspiratory collapse indicating normal RA filling  pressure . No significant pericardial effusion is seen. Pleural effusion noted. ASSESSMENT:    Nonsustained ventricular tachycardia, PVCs.       Asymptomatic 2.6 second pause upon review of telemetry rhythm strips November 24, 2021.  No significant pauses since then.     Longstanding, persistent atrial fibrillation with controlled Every effort was made to ensure accuracy. However, inadvertent computerized transcription errors may be present.

## 2021-11-26 NOTE — PLAN OF CARE
Problem: OXYGENATION/RESPIRATORY FUNCTION  Goal: Patient will maintain patent airway  11/26/2021 0811 by Padmini Russell RCP  Outcome: Ongoing     Problem: OXYGENATION/RESPIRATORY FUNCTION  Goal: Patient will achieve/maintain normal respiratory rate/effort  Description: Respiratory rate and effort will be within normal limits for the patient  11/26/2021 0811 by Padmini Russell RCP  Outcome: Ongoing     Problem: MECHANICAL VENTILATION  Goal: Patient will maintain patent airway  11/26/2021 0811 by Padmini Russell RCP  Outcome: Ongoing     Problem: MECHANICAL VENTILATION  Goal: Oral health is maintained or improved  11/26/2021 0811 by Padmini Russell RCP  Outcome: Ongoing     Problem: MECHANICAL VENTILATION  Goal: ET tube will be managed safely  11/26/2021 0811 by Padmini Russell RCP  Outcome: Ongoing

## 2021-11-26 NOTE — PROGRESS NOTES
ml/min ([facility] [address] Closed fracture of dorsal (thoracic) vertebra without mention of spinal cord injury, Coronary atherosclerosis of unspecified type of vessel, native or graft, Depression with anxiety, Esophageal reflux, HTN (hypertension), Hyperkalemia, Hyperlipidemia, Intervertebral cervical disc disorder with myelopathy, cervical region, Lower back pain, Lumbar radiculopathy, Microalbuminuria, Muscle weakness, Pain in limb, Stenosis of cervical spine, Tethered cord syndrome (HCC), Type II or unspecified type diabetes mellitus without mention of complication, not stated as uncontrolled, Urinary calculus, and UTI (lower urinary tract infection). Past Surgical History:   has a past surgical history that includes Colon surgery; angioplasty; joint replacement; Upper gastrointestinal endoscopy (N/A, 11/15/2021); Cystoscopy (Right, 11/16/2021); and IR NONTUNNELED VASCULAR CATHETER > 5 YEARS (11/17/2021). Medications:    Prior to Admission medications    Medication Sig Start Date End Date Taking? Authorizing Provider   acetaminophen (TYLENOL) 325 MG tablet Take 650 mg by mouth every 4 hours as needed for Pain (do not exceed 4000 mg daily)   Yes Historical Provider, MD   albuterol (PROVENTIL) (2.5 MG/3ML) 0.083% nebulizer solution Take 2.5 mg by nebulization every 6 hours as needed for Shortness of Breath   Yes Historical Provider, MD   atorvastatin (LIPITOR) 80 MG tablet Take 80 mg by mouth daily   Yes Historical Provider, MD   famotidine (PEPCID) 20 MG tablet Take 20 mg by mouth daily   Yes Historical Provider, MD   ferrous sulfate (IRON 325) 325 (65 Fe) MG tablet Take 325 mg by mouth daily (with breakfast)   Yes Historical Provider, MD   HYDROcodone-acetaminophen (NORCO) 5-325 MG per tablet Take 1 tablet by mouth every 6 hours as needed for Pain.  Indications: until 11/13/21   Yes Historical Provider, MD   insulin lispro (HUMALOG) 100 UNIT/ML injection vial Inject into the skin 4 times daily (before meals and nightly) Per sliding scale:   150-200 = 2 units  201-250 = 4 units  251-300 = 6 units  301-350 = 8 units  351-400 = 10 units   Yes Historical Provider, MD   insulin glargine (LANTUS SOLOSTAR) 100 UNIT/ML injection pen Inject 22 Units into the skin nightly   Yes Historical Provider, MD   meclizine (ANTIVERT) 12.5 MG tablet Take 12.5 mg by mouth 2 times daily as needed for Dizziness   Yes Historical Provider, MD   warfarin (COUMADIN) 3 MG tablet Take 3 mg by mouth nightly   Yes Historical Provider, MD   carvedilol (COREG) 6.25 MG tablet Take 6.25 mg by mouth 2 times daily (with meals).    Yes Historical Provider, MD   aspirin 81 MG chewable tablet Take 81 mg by mouth daily    Yes Historical Provider, MD   bumetanide (BUMEX) 1 MG tablet Take 1 mg by mouth daily   11/10/21  Historical Provider, MD     Current Facility-Administered Medications   Medication Dose Route Frequency Provider Last Rate Last Admin    midodrine (PROAMATINE) tablet 10 mg  10 mg Oral TID PRN Ander Mccartney MD   10 mg at 11/24/21 1040    anticoagulant sodium citrate 4 % injection 1.2 mL  1.2 mL IntraCATHeter PRN Johnson Ventura MD   1.2 mL at 11/24/21 1314    anticoagulant sodium citrate 4 % injection 1.3 mL  1.3 mL IntraCATHeter PRN Johnson Ventura MD   1.3 mL at 11/24/21 1314    albumin human 25 % IV solution 25 g  25 g IntraVENous PRN Ander Mccartney MD        midodrine (PROAMATINE) tablet 5 mg  5 mg Oral PRN Ander Mccartney MD        norepinephrine (LEVOPHED) 16 mg in sodium chloride 0.9 % 250 mL infusion  2-100 mcg/min IntraVENous Continuous Sana Tariq MD   Stopped at 11/25/21 2000    linezolid (ZYVOX) IVPB 600 mg  600 mg IntraVENous Q12H Clarisa Hernandez MD   Stopped at 11/26/21 0301    chlorhexidine (PERIDEX) 0.12 % solution 15 mL  15 mL Mouth/Throat BID Clarisa Hernandez MD   15 mL at 11/26/21 0841    fentaNYL (SUBLIMAZE) injection 50 mcg  50 mcg IntraVENous Q30 Min PRN Clarisa Hernandez MD   50 mcg at 11/22/21 0000    propofol injection  10 mcg/kg/min IntraVENous Continuous Vilma Javier MD   Stopped at 11/26/21 1115    polyvinyl alcohol (LIQUIFILM TEARS) 1.4 % ophthalmic solution 1 drop  1 drop Both Eyes Q4H Vilma Javier MD   1 drop at 11/26/21 5176    And    lubrifresh P.M. (artificial tears) ophthalmic ointment   Both Eyes Q4H Vilma Javier MD   Given at 11/26/21 0415    carvedilol (COREG) tablet 3.125 mg  3.125 mg Oral BID  Angie Watkins MD   3.125 mg at 11/24/21 1735    pantoprazole (PROTONIX) injection 40 mg  40 mg IntraVENous BID Carmina Galvan MD   40 mg at 11/26/21 0841    And    sodium chloride flush 0.9 % injection 10 mL  10 mL IntraVENous BID Carmina Galvan MD   10 mL at 11/26/21 0843    potassium chloride (KLOR-CON M) extended release tablet 40 mEq  40 mEq Oral PRN Carmina Galvan MD        Or    potassium bicarb-citric acid (EFFER-K) effervescent tablet 40 mEq  40 mEq Oral PRN Carmina Galvan MD   20 mEq at 11/21/21 1659    Or    potassium chloride 10 mEq/100 mL IVPB (Peripheral Line)  10 mEq IntraVENous PRN Carmina Galvan  mL/hr at 11/22/21 1812 10 mEq at 11/22/21 1812    magnesium sulfate 1000 mg in dextrose 5% 100 mL IVPB  1,000 mg IntraVENous PRN Carmina Galvan MD   Stopped at 11/21/21 1815    glucose (GLUTOSE) 40 % oral gel 15 g  15 g Oral PRN Carmina Galvan MD        dextrose 50 % IV solution  12.5 g IntraVENous PRN Carmina Galvan MD        glucagon (rDNA) injection 1 mg  1 mg IntraMUSCular PRN Carmina Galvan MD        dextrose 5 % solution  100 mL/hr IntraVENous PRN Carmina Galvan MD        sodium polystyrene (KAYEXALATE) 15 GM/60ML suspension 15 g  15 g Oral Once Sunitha Patterson MD        cefepime (MAXIPIME) 1000 mg IVPB minibag  1,000 mg IntraVENous Q24H Paulino Oneal MD   Stopped at 11/25/21 2227    sertraline (ZOLOFT) tablet 25 mg  25 mg Oral Daily Jeb Gonzales MD   25 mg at 11/26/21 0841    0.9 % sodium chloride infusion   IntraVENous PRN Elsa Vazquez MD        ferrous sulfate (IRON 325) tablet 325 mg  325 mg Oral Daily with breakfast Elsa Vazquez MD   325 mg at 11/26/21 0841    [Held by provider] aspirin chewable tablet 81 mg  81 mg Oral Daily Jeb Gonzales MD   81 mg at 11/13/21 1015    atorvastatin (LIPITOR) tablet 80 mg  80 mg Oral Daily Jeb Gonzales MD   80 mg at 11/26/21 0841    insulin glargine (LANTUS) injection vial 22 Units  22 Units SubCUTAneous Nightly Jeb Gonzales MD   22 Units at 11/25/21 2157    insulin lispro (HUMALOG) injection vial 0-12 Units  0-12 Units SubCUTAneous TID WC Jeb Gonzales MD   4 Units at 11/26/21 0844    insulin lispro (HUMALOG) injection vial 0-6 Units  0-6 Units SubCUTAneous Nightly Jeb Gonzales MD   1 Units at 11/25/21 2157    sodium chloride flush 0.9 % injection 5-40 mL  5-40 mL IntraVENous 2 times per day Elsa Vazquez MD   10 mL at 11/26/21 0843    sodium chloride flush 0.9 % injection 5-40 mL  5-40 mL IntraVENous PRN Jeb Gonzales MD        0.9 % sodium chloride infusion  25 mL IntraVENous PRN Elsa Vazquez MD        acetaminophen (TYLENOL) tablet 650 mg  650 mg Oral Q4H PRN Elsa Vazquez MD   650 mg at 11/16/21 2233    ondansetron (ZOFRAN-ODT) disintegrating tablet 4 mg  4 mg Oral Q8H PRN Jeb Gonzales MD        Or    ondansetron (ZOFRAN) injection 4 mg  4 mg IntraVENous Q6H PRN Elsa Vazquez MD        oxyCODONE-acetaminophen (PERCOCET) 5-325 MG per tablet 1 tablet  1 tablet Oral Q6H PRN Elsa Vazquez MD           Allergies:  Patient has no known allergies. Social History:   reports that she has never smoked. She does not have any smokeless tobacco history on file. She reports that she does not drink alcohol and does not use drugs. Family History: family history includes High Blood Pressure in an other family member.     REVIEW OF SYSTEMS:    Could not be obtained as patient is intubated    PHYSICAL EXAM:      BP (!) 136/45   Pulse 79   Temp 98.6 °F (37 °C) (Oral)   Resp 16   Ht 5' 3\" (1.6 m) Comment: from old chart  Wt 216 lb 0.8 oz (98 kg)   SpO2 100%   BMI 38.27 kg/m²    Temp (24hrs), Av.5 °F (36.9 °C), Min:98 °F (36.7 °C), Max:98.8 °F (37.1 °C)    General appearance - well appearing, no in pain or distress   Mental status -intubated  Mouth - mucous membranes moist, pharynx normal without lesions   Neck - supple, no significant adenopathy   Lymphatics - no palpable lymphadenopathy, no hepatosplenomegaly   Chest - clear to auscultation, no wheezes, rales or rhonchi, symmetric air entry   Heart - normal rate, regular rhythm, normal S1, S2, no murmurs  Abdomen - soft, nontender, nondistended, no masses or organomegaly   Neurological -intubated nonfocal  Musculoskeletal - no joint tenderness, deformity or swelling   Extremities - peripheral pulses normal,+++ pedal edema, no clubbing or cyanosis   Skin -multiple skin bruises noted    DATA:    Labs:   CBC:   Recent Labs     21  0438 21  0421   WBC 6.6 8.4   HGB 7.5* 7.5*   HCT 23.4* 23.6*   PLT 36* 45*     BMP:   Recent Labs     21  0438 21  0421   * 132*   K 3.9 4.0   CO2 27 26   BUN 27* 40*   CREATININE 2.27* 2.80*   LABGLOM 21* 17*   GLUCOSE 188* 243*     PT/INR:   Recent Labs     21  1158   PROTIME 15.9*   INR 1.3     SURGICAL PATHOLOGY REPORT [[id number]]    Collected: 21 1535    Updated: 21 0804     --Surgical Pathology Report   [id number]   [facility]   [facility]   ANATOMIC PATHOLOGY   [address] [place] [address], [address]   [phone]   Fax: [phone]   SURGICAL PATHOLOGY CONSULTATION     Patient Name: Magali Vang   MR#: [id number]   Specimen #[id number]     Procedures/Addenda   FLOW CYTOMETRY REPORT     Date Ordered:     2021     Status:   Signed Out        Date Complete:     2021     By: Kathi Puga M.D.        Date Reported:     11/23/2021       INTERPRETATION   PERIPHERAL BLOOD:      -SEVERE THROMBOCYTOPENIA.   -MODERATE NORMOCYTIC ANEMIA.   -FLOW   CYTOMETRY POSITIVE FOR MONOCLONAL B-LYMPHOCYTES WITH IMMUNOPHENOTYPE       FEATURES MOST SUGGESTIVE OF IMMUNOPHENOTYPICALLY ATYPICAL   B-CHRONIC     LYMPHOCYTIC LEUKEMIA VS MONOCLONAL B-LYMPHOCYTOSIS.  BONE MARROW OR OTHER     TISSUE BIOPSY IS RECOMMENDED FOR FURTHER ELUCIDATION. RESULTS-COMMENTS   PERIPHERAL BLOODWBC 7.5Hgb 8. 0HCT 24.5MCV 94. 8PLT 49Neutrophil   57%Lymphocyte 26%Monocyte 9%nRBC as 2/100 WBCABS Neut 4.31K/ULABS   lymph 1.96K/ULMorphology: Anisocytosis,       ANALYSIS   Flow cytometric immunophenotyping analysis is performed on peripheral   blood following red blood cell lysis procedure.  The cells are labeled   by direct ten color immunostaining procedure and analyzed on [employer]   flow cytometer. Cytocentrifuge differential cell count:   31% Lymphocytes   3% Monocytes   66% Granulocytes     Viability:  99%     Marker panels:   B-cell tube:  CD5, CD10, CD19, CD20, CD22, CD23, CD45, , Kappa,   lambda   T-cell tube:  CD2, CD3, CD4, CD5, CD7, CD8, CD16, CD45, CD56, CD57     Flow cytometric immunophenotyping analysis of peripheral blood   lymphocyte population is positive for B-cell monoclonality;   immunophenotype strong positive for CD20, CD22,  lambda light   chain; and weakly positive for CD5 and CD23.  The immunophenotypic   features suggest immunophenotypically atypical chronic lymphocytic   leukemia, however, other B-chronic lymphoproliferative neoplasm cannot   be excluded.  The absolute monoclonal B-lymphocyte count is   approximately 0.98K/UL (by WHO criteria, absolute monoclonal B   lymphocyte count of >5000 is required to diagnose chronic lymphocytic   leukemia).  Bone marrow or other tissue biopsy is recommended for        IMAGING DATA:  XR CHEST PORTABLE   Final Result   1. Endotracheal tube remains in appropriate position.       2. Interval improved aeration of the left lung base, otherwise no significant   change. XR CHEST PORTABLE   Final Result   1. Endotracheal tube remains in appropriate position. 2. No significant change in pleural effusions and associated basilar   atelectasis. XR CHEST PORTABLE   Final Result   No significant interval change. Mild vascular congestion and small pleural   effusions. XR CHEST PORTABLE   Final Result   1. Endotracheal tube in place with its tip 2 cm above the kristina. 2.  Unchanged cardiomegaly and small to moderate bilateral pleural effusions   with basilar atelectasis. 3.  Support tubes and catheters are otherwise unchanged. XR CHEST PORTABLE   Final Result   Cardiomegaly with bibasilar effusions and adjacent infiltrates. Support tubes as described above. XR CHEST PORTABLE   Final Result   Cardiomegaly with bibasilar effusions and adjacent infiltrates. Support tubes as described above. XR CHEST (SINGLE VIEW FRONTAL)   Final Result   The endotracheal tube ends appropriately above the kristina and below the   clavicular heads. The nasogastric tube ends in the stomach. The right   jugular vein hemodialysis catheter ends in the distal SVC, unchanged. Cardiomegaly. Bibasilar airspace disease and small right pleural effusion,   unchanged. Lungs otherwise grossly clear. No pneumothorax. VL Renal Arterial Duplex Complete   Final Result      XR CHEST PORTABLE   Final Result   Basilar opacities likely represent atelectasis with small pleural effusions. MRI BRAIN WO CONTRAST   Final Result      There is no acute infarction, intracranial hemorrhage, or intracranial mass   lesion. Mild chronic microangiopathic ischemic disease. Mild generalized volume loss. Mild mucosal thickening of bilateral mastoid air cells and right sphenoid   sinus. XR CHEST PORTABLE   Final Result   1.  Tip of the newly inserted NG tube overlies the gastric body. .   2. The right IJ CVC and right arm PICC line remain well positioned. 3.  No acute cardiopulmonary abnormality. US RETROPERITONEAL COMPLETE   Final Result   No acute findings. No hydronephrosis. Small right pleural effusion. CT HEAD WO CONTRAST   Final Result   No acute intracranial abnormality. Chronic microvascular disease and chronic lacunar infarcts. Critical results were called by Dr. Jennifer Edwards to Dr Teresa Cevallos on 11/17/2021 at   22:0. XR CHEST PORTABLE   Final Result   1. No pneumothorax evident following thoracentesis. 2. Low lung volumes. Significantly improved pneumatization lower right lung   with minimal pleural effusion bilateral evident. 3. Calcific atherosclerosis aorta. 4. Cardiomegaly. IR GUIDED THORACENTESIS PLEURAL   Final Result   Successful ultrasound guided right thoracentesis. IR NONTUNNELED VASCULAR CATHETER > 5 YEARS   Final Result   Successful ultrasound guided non-tunneled right IJ temporary hemodialysis   catheter placement. Catheter is ready for dialysis use at this time. FLUORO FOR SURGICAL PROCEDURES   Final Result      XR CHEST PORTABLE   Final Result   Moderate right pleural effusion and associated airspace disease. Pneumonia   cannot be excluded. US RENAL COMPLETE   Final Result   1. Mild to moderate right-sided hydronephrosis. 2. Limited renal ultrasound. No left-sided hydronephrosis. CT ABDOMEN PELVIS WO CONTRAST Additional Contrast? None   Final Result   Severe right and mild left hydroureteronephrosis. No obstructing ureteral   stone is seen. Wall thickening of the urinary bladder without significant   urinary bladder wall distension. Outlet obstruction is a consideration. Right-sided ostomy. There is bowel wall thickening proximal to the ostomy,   may suggest infectious or inflammatory enteritis. No drainable fluid   collections or free air.   No evidence of bowel obstruction. Moderate right pleural effusion. Bibasilar infiltrates or atelectasis. Clinical correlation to exclude   pneumonia. US RETROPERITONEAL COMPLETE   Final Result   1. Moderate to severe right and mild-to-moderate left hydronephrosis. 2. Exam limited due to patient's body habitus. VL Renal Vein Scan Bilat    (Results Pending)   XR CHEST PORTABLE    (Results Pending)       Primary Problem  JAYLYN (acute kidney injury) [facility])    Active Hospital Problems    Diagnosis Date Noted    Acute metabolic encephalopathy [K38.35]     Longstanding persistent atrial fibrillation ([facility] [address] [I48.11] 11/16/2021    Hydronephrosis determined by ultrasound [N13.30] 11/14/2021    Iron deficiency anemia [D50.9] 11/12/2021    JAYLYN (acute kidney injury) ([facility] [address] [N17.9] 11/10/2021    CKD (chronic kidney disease) stage 3, GFR 30-59 ml/min ([facility]) [N18.30]     HTN (hypertension) [I10]     Type 2 diabetes mellitus with kidney complication, with long-term current use of insulin ([facility]) [E11.29, Z79.4]     ASCVD (arteriosclerotic cardiovascular disease) [I25.10]        IMPRESSION:   1. Acute kidney injury and started on dialysis by nephrology  2. Anemia, stool occult positive for blood and had upper endoscopy showing gastritis. Anemia could be related to chronic disease and also renal disease and aggravated by GI bleed  3. Thrombocytopenia  4. Acute encephalopathy  5. Bilateral hydronephrosis  6. Cardiomyopathy with EF of 30-35%  7. Generalized anasarca  8. S/p PEA, resucitated    RECOMMENDATIONS:  1. I reviewed the laboratory data, imaging studies, diagnosis, other treatment recommendations  2. Her anemia appears to be multifactorial with acute drop possibly from GI bleed  3. Her Hb and plts are low but stable  4. 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  Will need BM biopsy once stable  5. However she is very sick at this time and has poor prognosis  6. Transfuse PRBC if hemoglobin less than 7  7. Transfuse platelets if bleeding or any surgical  Procedure planned or plts less than 15K  8. No evidence of hemolysis noted as per the labs. Her HIT panel is negative  9. Recheck LDH and Hapto , fibrinogen, and coags today  10. Monitor for symptoms of bleeding  11. Hold off anticoagulation  12. Prognosis poor  13. Monitor counts closely  14. We will follow    Discussed with pt and Nurse. Thank you for asking us to see this patient. Sonu Najera MD  Hematologist/Medical Oncologist    Cell: [phone]    This note is created with the assistance of a speech recognition program.  While intending to generate a document that actually reflects the content of the visit, the document can still have some errors including those of syntax and sound a like substitutions which may escape proof reading. It such instances, actual meaning can be extrapolated by contextual diversion.

## 2021-11-26 NOTE — CARE COORDINATION
[facility] is still following and if patient becomes able to return there, They will start pre-cert when she is closer to DC. Otherwise, SW will wait to see if there is another plan for this patient's DC.

## 2021-11-26 NOTE — PROGRESS NOTES
HEMODIALYSIS PRE-TREATMENT NOTE    Patient Identifiers prior to treatment: wristband for name and     Isolation Required: yes                      Isolation Type: contact         (please document if patient is being managed as a PUI/COVID-19 patient)        Hepatitis status:                           Date Drawn                             Result  Hepatitis B Surface Antigen 21     neg                     Hepatitis B Surface Antibody 21 <3.0        Hepatitis B Core Antibody neg neg          How was Hepatitis Status verified: lab results      Was a copy of the labs you documented provided to facility for the patient's chart: yes    Hemodialysis orders verified: yes    Access Within normal limits ( I.e. s/s of infection,...): yes     Pre-Assessment completed: yes    Pre-dialysis report received from: Angela Liang rn                      Time: 3917

## 2021-11-26 NOTE — FLOWSHEET NOTE
Pt's  called [place] office for support.  expresses that he has no support and misses his wife very much  Writer provided listening presence       11/26/21 1233   Encounter Summary   Services provided to: Family   Continue Visiting   (11/26/21)   Complexity of Encounter Moderate   Length of Encounter 30 minutes   Spiritual/Moravian   Type Spiritual support   Assessment Anxious; Loneliness; Hopeless; Helplessness; Despair; Questioning meaning/purpose   Intervention Active listening   Outcome Engaged in conversation; Shared life review;  Shared reminiscences

## 2021-11-26 NOTE — PROGRESS NOTES
Physical Therapy        Physical Therapy Cancel Note      DATE: 2021    NAME: Janette Everett  MRN: [id number]   : 1950      Patient not seen this date for Physical Therapy due to:    Patient is not appropriate for PT evaluation/treatment at this time d/t unstable medical status per RN.        Electronically signed by Josie Arevalo PTA on 2021 at 12:56 PM

## 2021-11-26 NOTE — FLOWSHEET NOTE
Writer spoke with patient's , Tyrese Polanco, on the telephone;  provided extensive details on patient's medical history and journey since February, 2021;  also talked about the relationship dynamics between himself and patient's sisters; when asked,  told writer that patient \"never told him what she would want done\" regarding medical choices for treatment; writer encouraged  to face his fears and discomfort in order to visit his wife in the hospital; writer talked to  about making decisions that he will not regret regarding staying away from the hospital;  also talked about his loneliness; writer challenged  to look at all the limitations that are in place for him to connect with other people;  appreciated being \"challenged\" by writer's observations; listening presence and support;      11/26/21 1832   Encounter Summary   Services provided to: Family   Referral/Consult From: [facility]   (11/26/21)   Complexity of Encounter Moderate   Length of Encounter 45 minutes   Spiritual Assessment Completed Yes   Grief and Life Adjustment   Type Adjustment to illness   Assessment Approachable; Loneliness; Helplessness   Intervention Active listening; Explored feelings, thoughts, concerns; Sustaining presence/ Ministry of presence; Discussed illness/injury and it's impact; Confronted/challenged   Outcome Expressed gratitude; Engaged in conversation; Expressed feelings/needs/concerns; Receptive;  Hopeful; Venting emotion

## 2021-11-27 ENCOUNTER — APPOINTMENT (OUTPATIENT)
Dept: GENERAL RADIOLOGY | Age: 71
DRG: 004 | End: 2021-11-27
Payer: COMMERCIAL

## 2021-11-27 LAB
ABSOLUTE EOS #: 0.2 K/UL (ref 0–0.4)
ABSOLUTE IMMATURE GRANULOCYTE: ABNORMAL K/UL (ref 0–0.3)
ABSOLUTE LYMPH #: 1.27 K/UL (ref 1–4.8)
ABSOLUTE MONO #: 0.67 K/UL (ref 0.1–1.3)
ALLEN TEST: ABNORMAL
ANION GAP SERPL CALCULATED.3IONS-SCNC: 8 MMOL/L (ref 9–17)
BASOPHILS # BLD: 0 % (ref 0–2)
BASOPHILS ABSOLUTE: 0 K/UL (ref 0–0.2)
BUN BLDV-MCNC: 45 MG/DL (ref 8–23)
BUN/CREAT BLD: ABNORMAL (ref 9–20)
CALCIUM SERPL-MCNC: 7.8 MG/DL (ref 8.6–10.4)
CARBOXYHEMOGLOBIN: 1 % (ref 0–5)
CHLORIDE BLD-SCNC: 97 MMOL/L (ref 98–107)
CO2: 25 MMOL/L (ref 20–31)
CREAT SERPL-MCNC: 3.23 MG/DL (ref 0.5–0.9)
DIFFERENTIAL TYPE: ABNORMAL
EOSINOPHILS RELATIVE PERCENT: 3 % (ref 0–4)
FIO2: 30
GFR AFRICAN AMERICAN: 17 ML/MIN
GFR NON-AFRICAN AMERICAN: 14 ML/MIN
GFR SERPL CREATININE-BSD FRML MDRD: ABNORMAL ML/MIN/{1.73_M2}
GFR SERPL CREATININE-BSD FRML MDRD: ABNORMAL ML/MIN/{1.73_M2}
GLUCOSE BLD-MCNC: 128 MG/DL (ref 65–105)
GLUCOSE BLD-MCNC: 163 MG/DL (ref 65–105)
GLUCOSE BLD-MCNC: 173 MG/DL (ref 65–105)
GLUCOSE BLD-MCNC: 178 MG/DL (ref 65–105)
GLUCOSE BLD-MCNC: 187 MG/DL (ref 70–99)
HCO3 ARTERIAL: 27.2 MMOL/L (ref 22–26)
HCT VFR BLD CALC: 22.2 % (ref 36–46)
HEMOGLOBIN: 7.3 G/DL (ref 12–16)
IMMATURE GRANULOCYTES: ABNORMAL %
LYMPHOCYTES # BLD: 19 % (ref 24–44)
MAGNESIUM: 2 MG/DL (ref 1.6–2.6)
MCH RBC QN AUTO: 31.4 PG (ref 26–34)
MCHC RBC AUTO-ENTMCNC: 32.7 G/DL (ref 31–37)
MCV RBC AUTO: 96.1 FL (ref 80–100)
METHEMOGLOBIN: 1.8 % (ref 0–1.9)
MODE: ABNORMAL
MONOCYTES # BLD: 10 % (ref 1–7)
MORPHOLOGY: ABNORMAL
MORPHOLOGY: ABNORMAL
NEGATIVE BASE EXCESS, ART: ABNORMAL MMOL/L (ref 0–2)
NOTIFICATION TIME: ABNORMAL
NOTIFICATION: ABNORMAL
NRBC AUTOMATED: ABNORMAL PER 100 WBC
O2 DEVICE/FLOW/%: ABNORMAL
O2 SAT, ARTERIAL: 96 % (ref 95–98)
OXYHEMOGLOBIN: ABNORMAL % (ref 95–98)
PATIENT TEMP: 37
PCO2 ARTERIAL: 45.4 MMHG (ref 35–45)
PCO2, ART, TEMP ADJ: ABNORMAL (ref 35–45)
PDW BLD-RTO: 16.9 % (ref 11.5–14.9)
PEEP/CPAP: 5
PH ARTERIAL: 7.38 (ref 7.35–7.45)
PH, ART, TEMP ADJ: ABNORMAL (ref 7.35–7.45)
PLATELET # BLD: 39 K/UL (ref 150–450)
PLATELET ESTIMATE: ABNORMAL
PMV BLD AUTO: 9.9 FL (ref 6–12)
PO2 ARTERIAL: 106 MMHG (ref 80–100)
PO2, ART, TEMP ADJ: ABNORMAL MMHG (ref 80–100)
POSITIVE BASE EXCESS, ART: 2.1 MMOL/L (ref 0–2)
POTASSIUM SERPL-SCNC: 4 MMOL/L (ref 3.7–5.3)
PSV: ABNORMAL
PT. POSITION: ABNORMAL
RBC # BLD: 2.31 M/UL (ref 4–5.2)
RBC # BLD: ABNORMAL 10*6/UL
RESPIRATORY RATE: 16
SAMPLE SITE: ABNORMAL
SEG NEUTROPHILS: 68 % (ref 36–66)
SEGMENTED NEUTROPHILS ABSOLUTE COUNT: 4.56 K/UL (ref 1.3–9.1)
SET RATE: 16
SODIUM BLD-SCNC: 130 MMOL/L (ref 135–144)
TEXT FOR RESPIRATORY: ABNORMAL
TOTAL HB: ABNORMAL G/DL (ref 12–16)
TOTAL RATE: 16
TROPONIN INTERP: ABNORMAL
TROPONIN T: ABNORMAL NG/ML
TROPONIN, HIGH SENSITIVITY: 199 NG/L (ref 0–14)
VT: 450
WBC # BLD: 6.7 K/UL (ref 3.5–11)
WBC # BLD: ABNORMAL 10*3/UL

## 2021-11-27 PROCEDURE — 82805 BLOOD GASES W/O2 SATURATION: CPT

## 2021-11-27 PROCEDURE — 6370000000 HC RX 637 (ALT 250 FOR IP): Performed by: UROLOGY

## 2021-11-27 PROCEDURE — 36415 COLL VENOUS BLD VENIPUNCTURE: CPT

## 2021-11-27 PROCEDURE — 6370000000 HC RX 637 (ALT 250 FOR IP): Performed by: INTERNAL MEDICINE

## 2021-11-27 PROCEDURE — 2000000000 HC ICU R&B

## 2021-11-27 PROCEDURE — 82947 ASSAY GLUCOSE BLOOD QUANT: CPT

## 2021-11-27 PROCEDURE — P9047 ALBUMIN (HUMAN), 25%, 50ML: HCPCS | Performed by: INTERNAL MEDICINE

## 2021-11-27 PROCEDURE — 83735 ASSAY OF MAGNESIUM: CPT

## 2021-11-27 PROCEDURE — 2580000003 HC RX 258: Performed by: INTERNAL MEDICINE

## 2021-11-27 PROCEDURE — 2580000003 HC RX 258: Performed by: UROLOGY

## 2021-11-27 PROCEDURE — 94003 VENT MGMT INPAT SUBQ DAY: CPT

## 2021-11-27 PROCEDURE — C9113 INJ PANTOPRAZOLE SODIUM, VIA: HCPCS | Performed by: FAMILY MEDICINE

## 2021-11-27 PROCEDURE — 71045 X-RAY EXAM CHEST 1 VIEW: CPT

## 2021-11-27 PROCEDURE — 6360000002 HC RX W HCPCS: Performed by: INTERNAL MEDICINE

## 2021-11-27 PROCEDURE — 80048 BASIC METABOLIC PNL TOTAL CA: CPT

## 2021-11-27 PROCEDURE — 85025 COMPLETE CBC W/AUTO DIFF WBC: CPT

## 2021-11-27 PROCEDURE — 2500000003 HC RX 250 WO HCPCS: Performed by: INTERNAL MEDICINE

## 2021-11-27 PROCEDURE — 93005 ELECTROCARDIOGRAM TRACING: CPT | Performed by: INTERNAL MEDICINE

## 2021-11-27 PROCEDURE — 90935 HEMODIALYSIS ONE EVALUATION: CPT

## 2021-11-27 PROCEDURE — 2580000003 HC RX 258: Performed by: FAMILY MEDICINE

## 2021-11-27 PROCEDURE — 99232 SBSQ HOSP IP/OBS MODERATE 35: CPT | Performed by: INTERNAL MEDICINE

## 2021-11-27 PROCEDURE — 84484 ASSAY OF TROPONIN QUANT: CPT

## 2021-11-27 PROCEDURE — 6360000002 HC RX W HCPCS: Performed by: FAMILY MEDICINE

## 2021-11-27 PROCEDURE — 36600 WITHDRAWAL OF ARTERIAL BLOOD: CPT

## 2021-11-27 RX ADMIN — SODIUM CHLORIDE, PRESERVATIVE FREE 10 ML: 5 INJECTION INTRAVENOUS at 08:48

## 2021-11-27 RX ADMIN — FERROUS SULFATE TAB 325 MG (65 MG ELEMENTAL FE) 325 MG: 325 (65 FE) TAB at 08:47

## 2021-11-27 RX ADMIN — MINERAL OIL, PETROLATUM: 425; 568 OINTMENT OPHTHALMIC at 02:38

## 2021-11-27 RX ADMIN — ALBUMIN (HUMAN) 25 G: 0.25 INJECTION, SOLUTION INTRAVENOUS at 10:08

## 2021-11-27 RX ADMIN — CEFEPIME HYDROCHLORIDE 1000 MG: 1 INJECTION, POWDER, FOR SOLUTION INTRAMUSCULAR; INTRAVENOUS at 19:33

## 2021-11-27 RX ADMIN — LINEZOLID 600 MG: 600 INJECTION, SOLUTION INTRAVENOUS at 14:45

## 2021-11-27 RX ADMIN — INSULIN LISPRO 2 UNITS: 100 INJECTION, SOLUTION INTRAVENOUS; SUBCUTANEOUS at 18:27

## 2021-11-27 RX ADMIN — MINERAL OIL, PETROLATUM: 425; 568 OINTMENT OPHTHALMIC at 05:40

## 2021-11-27 RX ADMIN — SERTRALINE HYDROCHLORIDE 25 MG: 50 TABLET ORAL at 08:47

## 2021-11-27 RX ADMIN — POLYVINYL ALCOHOL 1 DROP: 14 SOLUTION/ DROPS OPHTHALMIC at 04:28

## 2021-11-27 RX ADMIN — FENTANYL CITRATE 50 MCG: 0.05 INJECTION, SOLUTION INTRAMUSCULAR; INTRAVENOUS at 14:43

## 2021-11-27 RX ADMIN — Medication 1.2 ML: at 13:10

## 2021-11-27 RX ADMIN — SODIUM CHLORIDE, PRESERVATIVE FREE 10 ML: 5 INJECTION INTRAVENOUS at 19:34

## 2021-11-27 RX ADMIN — LINEZOLID 600 MG: 600 INJECTION, SOLUTION INTRAVENOUS at 02:50

## 2021-11-27 RX ADMIN — Medication 1.3 ML: at 13:10

## 2021-11-27 RX ADMIN — CHLORHEXIDINE GLUCONATE 0.12% ORAL RINSE 15 ML: 1.2 LIQUID ORAL at 19:33

## 2021-11-27 RX ADMIN — PROPOFOL 10 MCG/KG/MIN: 10 INJECTION, EMULSION INTRAVENOUS at 08:49

## 2021-11-27 RX ADMIN — POLYVINYL ALCOHOL 1 DROP: 14 SOLUTION/ DROPS OPHTHALMIC at 08:48

## 2021-11-27 RX ADMIN — SODIUM CHLORIDE, PRESERVATIVE FREE 10 ML: 5 INJECTION INTRAVENOUS at 21:38

## 2021-11-27 RX ADMIN — INSULIN GLARGINE 22 UNITS: 100 INJECTION, SOLUTION SUBCUTANEOUS at 19:34

## 2021-11-27 RX ADMIN — CHLORHEXIDINE GLUCONATE 0.12% ORAL RINSE 15 ML: 1.2 LIQUID ORAL at 08:47

## 2021-11-27 RX ADMIN — ATORVASTATIN CALCIUM 80 MG: 80 TABLET, FILM COATED ORAL at 08:47

## 2021-11-27 RX ADMIN — PANTOPRAZOLE SODIUM 40 MG: 40 INJECTION, POWDER, FOR SOLUTION INTRAVENOUS at 21:38

## 2021-11-27 RX ADMIN — PANTOPRAZOLE SODIUM 40 MG: 40 INJECTION, POWDER, FOR SOLUTION INTRAVENOUS at 08:47

## 2021-11-27 RX ADMIN — INSULIN LISPRO 1 UNITS: 100 INJECTION, SOLUTION INTRAVENOUS; SUBCUTANEOUS at 19:34

## 2021-11-27 ASSESSMENT — PULMONARY FUNCTION TESTS
PIF_VALUE: 26
PIF_VALUE: 23
PIF_VALUE: 26
PIF_VALUE: 25
PIF_VALUE: 23
PIF_VALUE: 23
PIF_VALUE: 27
PIF_VALUE: 32
PIF_VALUE: 24
PIF_VALUE: 25
PIF_VALUE: 26
PIF_VALUE: 29
PIF_VALUE: 25
PIF_VALUE: 27
PIF_VALUE: 25
PIF_VALUE: 24
PIF_VALUE: 23
PIF_VALUE: 25
PIF_VALUE: 22
PIF_VALUE: 24
PIF_VALUE: 24
PIF_VALUE: 25
PIF_VALUE: 23
PIF_VALUE: 24
PIF_VALUE: 25
PIF_VALUE: 25
PIF_VALUE: 24
PIF_VALUE: 25
PIF_VALUE: 26
PIF_VALUE: 24
PIF_VALUE: 25

## 2021-11-27 ASSESSMENT — PAIN SCALES - GENERAL
PAINLEVEL_OUTOF10: 0
PAINLEVEL_OUTOF10: 3
PAINLEVEL_OUTOF10: 0

## 2021-11-27 NOTE — PROGRESS NOTES
Hospitalist Progress Note  11/27/2021 5:40 PM  Subjective:   Admit Date: 11/10/2021  PCP: Darinel Jordan DO     Full Code      C/c:  Chief Complaint   Patient presents with    Abnormal Lab     low hemoglobin and high CR         Interval History: still intubated, no signs of bradycardia    Diet: ADULT TUBE FEEDING; Nasogastric; Renal Formula; Continuous; 35; No; 30; Q 8 hours; Protein; 1 Protein at 12 noon daily. Flush with 30 mLwater before and after administration.                                 ip days:17  Medications:   Scheduled Meds:   chlorhexidine  15 mL Mouth/Throat BID    polyvinyl alcohol  1 drop Both Eyes Q4H    And    artificial tears   Both Eyes Q4H    [Held by provider] carvedilol  3.125 mg Oral BID WC    pantoprazole  40 mg IntraVENous BID    And    sodium chloride flush  10 mL IntraVENous BID    sodium polystyrene  15 g Oral Once    cefepime  1,000 mg IntraVENous Q24H    sertraline  25 mg Oral Daily    ferrous sulfate  325 mg Oral Daily with breakfast    [Held by provider] aspirin  81 mg Oral Daily    atorvastatin  80 mg Oral Daily    insulin glargine  22 Units SubCUTAneous Nightly    insulin lispro  0-12 Units SubCUTAneous TID WC    insulin lispro  0-6 Units SubCUTAneous Nightly    sodium chloride flush  5-40 mL IntraVENous 2 times per day     Continuous Infusions:   norepinephrine Stopped (11/26/21 1214)    propofol 10 mcg/kg/min (11/27/21 0849)    dextrose      sodium chloride      sodium chloride       PRN Meds:.midodrine, anticoagulant sodium citrate, anticoagulant sodium citrate, albumin human, midodrine, fentanNYL, potassium chloride **OR** potassium alternative oral replacement **OR** potassium chloride, magnesium sulfate, glucose, dextrose, glucagon (rDNA), dextrose, sodium chloride, sodium chloride flush, sodium chloride, acetaminophen, ondansetron **OR** ondansetron, oxyCODONE-acetaminophen     CBC:   Recent Labs     11/25/21  0438 11/26/21  0421 11/27/21  0636 WBC 6.6 8.4 6.7   HGB 7.5* 7.5* 7.3*   PLT 36* 45* 39*     BMP:    Recent Labs     11/25/21  0438 11/26/21  0421 11/27/21  0636   * 132* 130*   K 3.9 4.0 4.0   CL 97* 97* 97*   CO2 27 26 25   BUN 27* 40* 45*   CREATININE 2.27* 2.80* 3.23*   GLUCOSE 188* 243* 187*     Hepatic: No results for input(s): AST, ALT, ALB, BILITOT, ALKPHOS in the last 72 hours. Troponin: No results for input(s): TROPONINI in the last 72 hours. BNP: No results for input(s): BNP in the last 72 hours. Lipids: No results for input(s): CHOL, HDL in the last 72 hours. Invalid input(s): LDLCALCU  INR:   Recent Labs     11/25/21  1158   INR 1.3       Objective:   Vitals: BP (!) 105/47   Pulse 97   Temp 97.4 °F (36.3 °C) (Oral)   Resp 16   Ht 5' 3\" (1.6 m) Comment: from old chart  Wt 224 lb 3.3 oz (101.7 kg)   SpO2 100%   BMI 39.72 kg/m²   General appearance: alert, appears stated age and cooperative,intubated  Skin: Skin color, texture, turgor normal. No rashes or lesions  Lungs: clear to auscultation bilaterally  Heart: regular rate and rhythm, S1, S2 normal, no murmur, click, rub or gallop  Abdomen: soft, non-tender; bowel sounds normal; no masses,  no organomegaly  Extremities: extremities normal, atraumatic, no cyanosis or edema  Neurologic: Mental status: intubated    Prophylaxis:   DVT with  [] lovenox        [] heparin        [] Scd        [x] none:     Radiology:  XR CHEST PORTABLE    Result Date: 11/23/2021  EXAMINATION: ONE XRAY VIEW OF THE CHEST 11/23/2021 6:26 am COMPARISON: 11/20/2021 HISTORY: ORDERING SYSTEM PROVIDED HISTORY: ETT placement TECHNOLOGIST PROVIDED HISTORY: ETT placement Reason for Exam: ETT placement Acuity: Unknown Type of Exam: Unknown Additional signs and symptoms: ETT placement FINDINGS: There is an endotracheal tube in place with its tip 2 cm above the kristina. Mild cardiomegaly is noted with small to moderate bilateral pleural effusions. There is associated passive basilar atelectasis.   Mild pulmonary venous hypertension. There is a gastric catheter passing into the stomach, tip is not included on the film. Stable right IJ central venous catheter. 1.  Endotracheal tube in place with its tip 2 cm above the kristina. 2.  Unchanged cardiomegaly and small to moderate bilateral pleural effusions with basilar atelectasis. 3.  Support tubes and catheters are otherwise unchanged. XR CHEST PORTABLE    Result Date: 11/22/2021  EXAMINATION: ONE XRAY VIEW OF THE CHEST 11/22/2021 6:36 am COMPARISON: Chest radiograph performed 11/21/2021. HISTORY: ORDERING SYSTEM PROVIDED HISTORY: ETT placement TECHNOLOGIST PROVIDED HISTORY: ETT placement Reason for Exam: ETT placement Acuity: Acute Type of Exam: Ongoing FINDINGS: There are bilateral effusions with adjacent infiltrates. There is no pneumothorax. The heart is enlarged. The upper abdomen unremarkable. The extrathoracic soft tissues are unremarkable. There is an endotracheal tube with the tip in the midtrachea. There is a gastric tube and the tip is not visualized. There is a right internal jugular line. Cardiomegaly with bibasilar effusions and adjacent infiltrates. Support tubes as described above. Assessment :   1. Ac resp failure/intubated/  2. Bradycardia  3. ckd/dialysis     Plan:   1. Off all av node blocking agents  2.   Continue supportive care/spoke with nurse    Patient Active Problem List:     Chronic kidney disease (CKD), stage III (moderate) ([facility])     Hyperkalemia     Type 2 diabetes mellitus with kidney complication, with long-term current use of insulin ([facility])     Hyperlipidemia     CKD (chronic kidney disease) stage 3, GFR 30-59 ml/min ([facility])     Microalbuminuria     Urinary calculus     Tethered cord syndrome ([facility])     Stenosis of cervical spine     Lumbar radiculopathy     Lower back pain     HTN (hypertension)     Esophageal reflux     Depression with anxiety     ASCVD (arteriosclerotic cardiovascular disease)     Closed fracture of thoracic vertebra (HCC)     Intervertebral cervical disc disorder with myelopathy, cervical region     Pain in limb     Anemia     JAYLYN (acute kidney injury) ([facility] [address]     Iron deficiency anemia     Hydronephrosis determined by ultrasound     Longstanding persistent atrial fibrillation (HCC)     Acute metabolic encephalopathy      Anticipated Disposition upon discharge: [] Home                                                                         [] Home with [address]                                                                         [] [facility]                                                                         [] [address] [facility]      Patient is admitted as inpatient status because of co-morbidities listed above, severity of signs and symptoms as outlined, requirement for current medical therapies and most importantly because of direct risk to patient if care not provided in a hospital setting.           Griffin Cee MD, MD  Rounding Hospitalist

## 2021-11-27 NOTE — PROGRESS NOTES
Hospitalist Progress Note  11/26/2021 7:32 PM  Subjective:   Admit Date: 11/10/2021  PCP: Stephani Mancia DO     Full Code      C/c:  Chief Complaint   Patient presents with    Abnormal Lab     low hemoglobin and high CR         Interval History: unable to extubate/alert tries to follow commands    Diet: ADULT TUBE FEEDING; Nasogastric; Renal Formula; Continuous; 35; No; 30; Q 8 hours; Protein; 1 Protein at 12 noon daily. Flush with 30 mLwater before and after administration.                                 ip days:16  Medications:   Scheduled Meds:   linezolid  600 mg IntraVENous Q12H    chlorhexidine  15 mL Mouth/Throat BID    polyvinyl alcohol  1 drop Both Eyes Q4H    And    artificial tears   Both Eyes Q4H    carvedilol  3.125 mg Oral BID WC    pantoprazole  40 mg IntraVENous BID    And    sodium chloride flush  10 mL IntraVENous BID    sodium polystyrene  15 g Oral Once    cefepime  1,000 mg IntraVENous Q24H    sertraline  25 mg Oral Daily    ferrous sulfate  325 mg Oral Daily with breakfast    [Held by provider] aspirin  81 mg Oral Daily    atorvastatin  80 mg Oral Daily    insulin glargine  22 Units SubCUTAneous Nightly    insulin lispro  0-12 Units SubCUTAneous TID     insulin lispro  0-6 Units SubCUTAneous Nightly    sodium chloride flush  5-40 mL IntraVENous 2 times per day     Continuous Infusions:   norepinephrine Stopped (11/26/21 1214)    propofol 10 mcg/kg/min (11/26/21 1810)    dextrose      sodium chloride      sodium chloride       PRN Meds:.midodrine, anticoagulant sodium citrate, anticoagulant sodium citrate, albumin human, midodrine, fentanNYL, potassium chloride **OR** potassium alternative oral replacement **OR** potassium chloride, magnesium sulfate, glucose, dextrose, glucagon (rDNA), dextrose, sodium chloride, sodium chloride flush, sodium chloride, acetaminophen, ondansetron **OR** ondansetron, oxyCODONE-acetaminophen     CBC:   Recent Labs     11/24/21  6563 11/24/21 0427 11/24/21  2314 11/25/21 0438 11/26/21 0421   WBC 7.9  --   --  6.6 8.4   HGB 7.6*   < > 7.2* 7.5* 7.5*   PLT 39*  --   --  36* 45*    < > = values in this interval not displayed. BMP:    Recent Labs     11/24/21 0427 11/25/21 0438 11/26/21 0421    131* 132*   K 3.9 3.9 4.0    97* 97*   CO2 26 27 26   BUN 34* 27* 40*   CREATININE 2.67* 2.27* 2.80*   GLUCOSE 229* 188* 243*     Hepatic: No results for input(s): AST, ALT, ALB, BILITOT, ALKPHOS in the last 72 hours. Troponin: No results for input(s): TROPONINI in the last 72 hours. BNP: No results for input(s): BNP in the last 72 hours. Lipids: No results for input(s): CHOL, HDL in the last 72 hours.     Invalid input(s): LDLCALCU  INR:   Recent Labs     11/25/21  1158   INR 1.3       Objective:   Vitals: BP (!) 115/40   Pulse 88   Temp 97.8 °F (36.6 °C) (Oral)   Resp 16   Ht 5' 3\" (1.6 m) Comment: from old chart  Wt 216 lb 0.8 oz (98 kg)   SpO2 100%   BMI 38.27 kg/m²   General appearance: alert, but intubated  Skin: Skin color, texture, turgor normal. No rashes or lesions  Lungs: clear to auscultation but diminished ausculate bilaterally  Heart: regular rate and rhythm, S1, S2 normal, no murmur, click, rub or gallop  Abdomen: soft, non-tender; bowel sounds normal; no masses,  no organomegaly  Extremities: extremities normal, atraumatic, 3#edema  Neurologic: Mental status: Alert, oriented, thought content appropriate    Prophylaxis:   DVT with  [] lovenox        [] heparin        [] Scd        [x] none:     Radiology:  XR CHEST PORTABLE    Result Date: 11/23/2021  EXAMINATION: ONE XRAY VIEW OF THE CHEST 11/23/2021 6:26 am COMPARISON: 11/20/2021 HISTORY: ORDERING SYSTEM PROVIDED HISTORY: ETT placement TECHNOLOGIST PROVIDED HISTORY: ETT placement Reason for Exam: ETT placement Acuity: Unknown Type of Exam: Unknown Additional signs and symptoms: ETT placement FINDINGS: There is an endotracheal tube in place with its tip 2 cm above the kristina. Mild cardiomegaly is noted with small to moderate bilateral pleural effusions. There is associated passive basilar atelectasis. Mild pulmonary venous hypertension. There is a gastric catheter passing into the stomach, tip is not included on the film. Stable right IJ central venous catheter. 1.  Endotracheal tube in place with its tip 2 cm above the kristina. 2.  Unchanged cardiomegaly and small to moderate bilateral pleural effusions with basilar atelectasis. 3.  Support tubes and catheters are otherwise unchanged. XR CHEST PORTABLE    Result Date: 11/22/2021  EXAMINATION: ONE XRAY VIEW OF THE CHEST 11/22/2021 6:36 am COMPARISON: Chest radiograph performed 11/21/2021. HISTORY: ORDERING SYSTEM PROVIDED HISTORY: ETT placement TECHNOLOGIST PROVIDED HISTORY: ETT placement Reason for Exam: ETT placement Acuity: Acute Type of Exam: Ongoing FINDINGS: There are bilateral effusions with adjacent infiltrates. There is no pneumothorax. The heart is enlarged. The upper abdomen unremarkable. The extrathoracic soft tissues are unremarkable. There is an endotracheal tube with the tip in the midtrachea. There is a gastric tube and the tip is not visualized. There is a right internal jugular line. Cardiomegaly with bibasilar effusions and adjacent infiltrates. Support tubes as described above. Assessment :   1. Intubated/failed extubation trial  2. G[place]/dialysis  3. PEA/cardiac arrests/p cpr recovered  4. Ac resp failure/intubated on vent     Plan:   1. Continue present care  2.   See order    Patient Active Problem List:     Chronic kidney disease (CKD), stage III (moderate) ([facility])     Hyperkalemia     Type 2 diabetes mellitus with kidney complication, with long-term current use of insulin ([facility])     Hyperlipidemia     CKD (chronic kidney disease) stage 3, GFR 30-59 ml/min ([facility])     Microalbuminuria     Urinary calculus     Tethered cord syndrome ([facility])     Stenosis of cervical spine Lumbar radiculopathy     Lower back pain     HTN (hypertension)     Esophageal reflux     Depression with anxiety     ASCVD (arteriosclerotic cardiovascular disease)     Closed fracture of thoracic vertebra (HCC)     Intervertebral cervical disc disorder with myelopathy, cervical region     Pain in limb     Anemia     JAYLYN (acute kidney injury) ([facility] [address]     Iron deficiency anemia     Hydronephrosis determined by ultrasound     Longstanding persistent atrial fibrillation ([facility] [address]     Acute metabolic encephalopathy      Anticipated Disposition upon discharge: [] Home                                                                         [] Home with [address]                                                                         [] [facility]                                                                         [] [address] [place] [address],[address]      Patient is admitted as inpatient status because of co-morbidities listed above, severity of signs and symptoms as outlined, requirement for current medical therapies and most importantly because of direct risk to patient if care not provided in a hospital setting.           Chuy Mcmillan MD, MD  Rounding Hospitalist

## 2021-11-27 NOTE — PROGRESS NOTES
[address], [address]  [phone]          Progress Note    Patient Name:  Molly Hernandez    :  2021 11:59 AM      SUBJECTIVE       Ms. Antoni Lloyd  has no reported chest pain,or shortness of breath. She remains on a ventilator. She currently is on dialysis. She has had no further symptomatic bradycardia. OBJECTIVE      Vital signs:    BP (!) 99/33   Pulse 89   Temp 98.2 °F (36.8 °C) (Axillary)   Resp 9   Ht 5' 3\" (1.6 m) Comment: from old chart  Wt 224 lb 3.3 oz (101.7 kg)   SpO2 100%   BMI 39.72 kg/m²  2 L/min  .tro    Admit Weight:  188 lb (85.3 kg)    Last 3 weights: Wt Readings from Last 3 Encounters:   21 224 lb 3.3 oz (101.7 kg)   16 178 lb (80.7 kg)   16 184 lb 12.8 oz (83.8 kg)       BMI: Body mass index is 39.72 kg/m². Input/Output:       Intake/Output Summary (Last 24 hours) at 2021 1159  Last data filed at 2021 0800  Gross per 24 hour   Intake 1660.78 ml   Output 750 ml   Net 910.78 ml       Date 21 0000 - 21 2359   Shift 6776-6834 7270-2332 7290-9577 24 Hour Total   INTAKE   I.V.(mL/kg) 56.6(0.6)   56.6(0.6)   NG/GT(mL/kg) 424(4.3) 30(0.3)  454(4.5)   IV Piggyback(mL/kg) 600(6.1)   600(5.9)   Shift Total(mL/kg) 1080.6(11) 30(0.3)  1110. 6(10.9)   OUTPUT   Urine(mL/kg/hr) 50(0.1)   50   Stool(mL/kg) 150(1.5)   150(1.5)   Shift Total(mL/kg) 200(2)   200(2)   Weight (kg) 98 101.7 101.7 101.7     Exam:     General appearance: awake and alert moves all ext   Lungs: no rhonchi, no wheezes, no rales  Heart: S1 and S2 no murmur  Abdomen: positive bowel sounds, no bruits, no masses  Extremities: warm and dry, no cyanosis, no clubbing        Laboratory Studies:     CBC:   Recent Labs     21  0421 21  0636   WBC 6.6 8.4 6.7   HGB 7.5* 7.5* 7.3*   HCT 23.4* 23.6* 22.2*   MCV 96.0 96.1 96.1   PLT 36* 45* 39*     BMP:   Recent Labs     21 21  1158 21  0421 21  0636   *  --  132* 130*   K 3.9  --  4.0 4.0   CL 97*  --  97* 97*   CO2   --     PHOS  --  1.9* 2.6  --    BUN 27*  --  40* 45*   CREATININE 2.27*  --  2.80* 3.23*     PT/INR:   Recent Labs     21  115   PROTIME 15.9*   INR 1.3     APTT:   Recent Labs     21  1158   APTT 34.9     MAG:   Recent Labs     21  0438 21  0421 21  0636   MG 1.9 2.0 2.0     D Dimer: No results for input(s): DDIMER in the last 72 hours. Troponin    Recent Labs     21  1441 21  0636   TROPHS 217* 199*                BNP No results for input(s): BNP in the last 72 hours. No results for input(s): PROBNP in the last 72 hours. Pulse Ox: SpO2  Av %  Min: 100 %  Max: 100 %  Supplemental O2: O2 Flow Rate (L/min): 2 L/min     Current Meds:    linezolid  600 mg IntraVENous Q12H    chlorhexidine  15 mL Mouth/Throat BID    polyvinyl alcohol  1 drop Both Eyes Q4H    And    artificial tears   Both Eyes Q4H    carvedilol  3.125 mg Oral BID WC    pantoprazole  40 mg IntraVENous BID    And    sodium chloride flush  10 mL IntraVENous BID    sodium polystyrene  15 g Oral Once    cefepime  1,000 mg IntraVENous Q24H    sertraline  25 mg Oral Daily    ferrous sulfate  325 mg Oral Daily with breakfast    [Held by provider] aspirin  81 mg Oral Daily    atorvastatin  80 mg Oral Daily    insulin glargine  22 Units SubCUTAneous Nightly    insulin lispro  0-12 Units SubCUTAneous TID WC    insulin lispro  0-6 Units SubCUTAneous Nightly    sodium chloride flush  5-40 mL IntraVENous 2 times per day     Continuous Infusions:    norepinephrine Stopped (21 1214)    propofol 10 mcg/kg/min (21 0849)    dextrose      sodium chloride      sodium chloride           XR CHEST (SINGLE VIEW FRONTAL)    Result Date: 2021  The endotracheal tube ends appropriately above the kristina and below the clavicular heads.   The nasogastric tube ends in the stomach. The right jugular vein hemodialysis catheter ends in the distal SVC, unchanged. Cardiomegaly. Bibasilar airspace disease and small right pleural effusion, unchanged. Lungs otherwise grossly clear. No pneumothorax. XR CHEST PORTABLE    Result Date: 11/27/2021  1. Unchanged appearance of support devices. 2. Increasing left pleural effusion and basilar atelectasis. No significant change in right pleural effusion. XR CHEST PORTABLE    Result Date: 11/26/2021  1. Endotracheal tube remains in appropriate position. 2. Interval improved aeration of the left lung base, otherwise no significant change. XR CHEST PORTABLE    Result Date: 11/25/2021  1. Endotracheal tube remains in appropriate position. 2. No significant change in pleural effusions and associated basilar atelectasis. XR CHEST PORTABLE    Result Date: 11/24/2021  No significant interval change. Mild vascular congestion and small pleural effusions. XR CHEST PORTABLE    Result Date: 11/23/2021  1. Endotracheal tube in place with its tip 2 cm above the kristina. 2.  Unchanged cardiomegaly and small to moderate bilateral pleural effusions with basilar atelectasis. 3.  Support tubes and catheters are otherwise unchanged. XR CHEST PORTABLE    Result Date: 11/22/2021  Cardiomegaly with bibasilar effusions and adjacent infiltrates. Support tubes as described above. XR CHEST PORTABLE    Result Date: 11/21/2021  Cardiomegaly with bibasilar effusions and adjacent infiltrates. Support tubes as described above.        Echo:      ASSESSMENT     Principal Problem:    JAYLYN (acute kidney injury) ([facility] [address]  Active Problems:    Type 2 diabetes mellitus with kidney complication, with long-term current use of insulin ([facility])    CKD (chronic kidney disease) stage 3, GFR 30-59 ml/min ([facility])    HTN (hypertension)    ASCVD (arteriosclerotic cardiovascular disease)    Iron deficiency anemia    Hydronephrosis determined by ultrasound Longstanding persistent atrial fibrillation (HCC)    Acute metabolic encephalopathy  Resolved Problems:    * No resolved hospital problems. *      PLAN     Continue to avoid av rolando blocking agents and beta blockers for bradycardia.     Continue to monitor for dysthymias              Electronically signed by Betito Benton DO on 11/27/2021 at 11:59 AM

## 2021-11-27 NOTE — PLAN OF CARE
Problem: SAFETY  Goal: Free from accidental physical injury  Outcome: Ongoing  Note: Fall assessment performed and appropriate measures implemented. Room freed from clutter. Bed in lowest position with wheels locked. Call light in place. ID band in place. Goal: Free from intentional harm  Outcome: Ongoing     Problem: DAILY CARE  Goal: Daily care needs are met  Outcome: Ongoing     Problem: PAIN  Goal: Patient's pain/discomfort is manageable  Outcome: Ongoing     Problem: SKIN INTEGRITY  Goal: Skin integrity is maintained or improved  Outcome: Ongoing     Problem: KNOWLEDGE DEFICIT  Goal: Patient/S.O. demonstrates understanding of disease process, treatment plan, medications, and discharge instructions. Outcome: Ongoing     Problem: DISCHARGE BARRIERS  Goal: Patient's continuum of care needs are met  Outcome: Ongoing     Problem: Falls - Risk of:  Goal: Will remain free from falls  Description: Will remain free from falls  Outcome: Ongoing  Note: Bed in lowest position and side rails up x2. Bilateral mitts remain in place to maintain airway. Patient remains free from falls. Goal: Absence of physical injury  Description: Absence of physical injury  Outcome: Ongoing     Problem: Skin Integrity:  Goal: Will show no infection signs and symptoms  Description: Will show no infection signs and symptoms  Outcome: Ongoing  Note: Patient has low grade fevers. No other signs or symptoms of infection. Goal: Absence of new skin breakdown  Description: Absence of new skin breakdown  Outcome: Ongoing  Note: Patient turned and repositioned every 2 hours and as needed for comfort. Skin remains dry and intact. No new skin breakdown noted. Problem: Musculor/Skeletal Functional Status  Goal: Highest potential functional level  Outcome: Ongoing  Note: Patient has generalized weakness but able to follow commands.    Goal: Absence of falls  Outcome: Ongoing     Problem: Musculor/Skeletal Functional Status  Goal: Highest potential functional level  Outcome: Ongoing  Goal: Absence of falls  Outcome: Ongoing     Problem: Nutrition  Goal: Optimal nutrition therapy  Outcome: Ongoing  Note: Patient has tube feed running at goal with little residuals. Problem: Pain:  Goal: Pain level will decrease  Description: Pain level will decrease  Outcome: Ongoing  Goal: Control of acute pain  Description: Control of acute pain  Outcome: Ongoing  Note: Pain assessed at regular intervals using CPOT. Goal: Control of chronic pain  Description: Control of chronic pain  Outcome: Ongoing     Problem: Gas Exchange - Impaired:  Goal: Levels of oxygenation will improve  Description: Levels of oxygenation will improve  Outcome: Ongoing  Note: Oxygen saturation remains above goal on current vent settings. Problem: Fluid Volume - Imbalance:  Goal: Absence of imbalanced fluid volume signs and symptoms  Description: Absence of imbalanced fluid volume signs and symptoms  Outcome: Ongoing  Note: Monitoring I&O's. Problem: Non-Violent Restraints  Goal: Removal from restraints as soon as assessed to be safe  Outcome: Ongoing  Note: Attempts to pull at tubes when released. ROM assessed every 2 hours and visual safety checks completed hourly. Restraints maintained to preserve the patient's airway. Goal: No harm/injury to patient while restraints in use  Outcome: Ongoing  Note: Skin integrity assessed and ROM performed every two hours and as needed. No signs of injury noted. Goal: Patient's dignity will be maintained  Outcome: Ongoing     Problem: Airway Clearance - Ineffective:  Goal: Ability to maintain a clear airway will improve  Description: Ability to maintain a clear airway will improve  Outcome: Ongoing     Problem: Bowel Function - Altered:  Goal: Bowel elimination is within specified parameters  Description: Bowel elimination is within specified parameters  Outcome: Ongoing  Note: Patient has colostomy in place. Active bowel sounds. Problem: Fluid Volume - Imbalance:  Goal: Absence of imbalanced fluid volume signs and symptoms  Description: Absence of imbalanced fluid volume signs and symptoms  Outcome: Ongoing     Problem: Gas Exchange - Impaired:  Goal: Levels of oxygenation will improve  Description: Levels of oxygenation will improve  Outcome: Ongoing  Note: Oxygen saturation remains above goal on current vent settings. Problem: Mental Status - Impaired:  Goal: Mental status will be restored to baseline  Description: Mental status will be restored to baseline  Outcome: Ongoing  Note: Patient able to follow commands.       Problem: Nutrition Deficit:  Goal: Ability to achieve adequate nutritional intake will improve  Description: Ability to achieve adequate nutritional intake will improve  Outcome: Ongoing     Problem: OXYGENATION/RESPIRATORY FUNCTION  Goal: Patient will maintain patent airway  Outcome: Ongoing  Goal: Patient will achieve/maintain normal respiratory rate/effort  Description: Respiratory rate and effort will be within normal limits for the patient  Outcome: Ongoing     Problem: MECHANICAL VENTILATION  Goal: Patient will maintain patent airway  Outcome: Ongoing  Goal: Oral health is maintained or improved  Outcome: Ongoing  Goal: ET tube will be managed safely  Outcome: Ongoing

## 2021-11-27 NOTE — FLOWSHEET NOTE
11/27/21 0922   Vital Signs   BP (!) 110/50   Pulse 69   Weight 224 lb 3.3 oz (101.7 kg)   Weight Method Bed scale   Percent Weight Change 3.78   Treatment   Treatment Number 2   Time On 0935   Treatment Goal 1500mL   Technical Checks   RO Machine Log Sheet Completed Yes   Machine Alarm Self Test Completed   Machine Autotest Completed   Air Foam Detector Tested   Extracorporeal Circuit Tested for Integrity Yes   Bleach Test (Neg) Yes   Treatment Initiation   Treatment  Initiation Universal Precautions maintained; Lines secured to patient; Connections secured; Prime given; Hemosafe Device; Air foam detector engaged; Arterial Parameters set; Venous Parameters set; Dialysate; Saline line double clamped;  Hemo-Safe Applied; Dialyzer; F180   Dialysis Bath   K+ (Potassium) 3   Ca+ (Calcium) 3   Na+ (Sodium) 138   HCO3 (Bicarb) 35

## 2021-11-27 NOTE — PROGRESS NOTES
Today's Date: 11/27/2021  Patient Name: Amanda Stock  Date of admission: 11/10/2021  4:23 PM  Patient's age: 66 y.o., 1950  Admission Dx: JAYLYN (acute kidney injury) ([facility] [address] [N17.9]  Urinary tract infection in elderly patient [N39.0]  Anemia, unspecified type [D64.9]    Reason for Consult: TCP  Requesting Physician: Gaviota Holloway MD    CHIEF COMPLAINT:    Chief Complaint   Patient presents with    Abnormal Lab     low hemoglobin and high CR     SUBJECTIVE:    Patient seen and examined  H&H is stable. Platelet count at 65,777 today. Patient remains intubated. Patient undergoing hemodialysis per nephrology team    HISTORY OF PRESENT ILLNESS:    Amanda Stock is a 66 y.o. female who is admitted to the hospital on 11/10/2021  for acute kidney injury and anemia. Patient has history of chronic kidney disease, chronic anemia and recent history of GI bleed with negative endoscopies at [facility]. She was transferred from nursing home for elevated creatinine and drop in her hemoglobin. Her stool for occult blood on 11/14/2021 was positive. She was evaluated by gastroenterology. Underwent upper endoscopy which showed gastritis. She also was noted to have hydronephrosis on the right side therefore was evaluated by urology and underwent cystoscopy with ureteral stent placement on 11/16/2021. Patient was evaluated by nephrology for her acute kidney injury and was started on acute dialysis on 11/17/2021. Her hemoglobin is stable at 8.0 however her platelets started dropping since admission and today it dropped to 49 therefore hematology was consulted for evaluation of her low platelets. Her iron studies suggest anemia of chronic disease. Her haptoglobin is 125 and LDH is not elevated  Her HIT panel came back negative.     Past Medical History:   has a past medical history of Anemia, unspecified, CAD (coronary artery disease), Chronic kidney disease, CKD (chronic kidney disease) stage 3, GFR 30-59 ml/min ([facility] [address] Closed fracture of dorsal (thoracic) vertebra without mention of spinal cord injury, Coronary atherosclerosis of unspecified type of vessel, native or graft, Depression with anxiety, Esophageal reflux, HTN (hypertension), Hyperkalemia, Hyperlipidemia, Intervertebral cervical disc disorder with myelopathy, cervical region, Lower back pain, Lumbar radiculopathy, Microalbuminuria, Muscle weakness, Pain in limb, Stenosis of cervical spine, Tethered cord syndrome (HCC), Type II or unspecified type diabetes mellitus without mention of complication, not stated as uncontrolled, Urinary calculus, and UTI (lower urinary tract infection). Past Surgical History:   has a past surgical history that includes Colon surgery; angioplasty; joint replacement; Upper gastrointestinal endoscopy (N/A, 11/15/2021); Cystoscopy (Right, 11/16/2021); and IR NONTUNNELED VASCULAR CATHETER > 5 YEARS (11/17/2021). Medications:    Prior to Admission medications    Medication Sig Start Date End Date Taking? Authorizing Provider   acetaminophen (TYLENOL) 325 MG tablet Take 650 mg by mouth every 4 hours as needed for Pain (do not exceed 4000 mg daily)   Yes Historical Provider, MD   albuterol (PROVENTIL) (2.5 MG/3ML) 0.083% nebulizer solution Take 2.5 mg by nebulization every 6 hours as needed for Shortness of Breath   Yes Historical Provider, MD   atorvastatin (LIPITOR) 80 MG tablet Take 80 mg by mouth daily   Yes Historical Provider, MD   famotidine (PEPCID) 20 MG tablet Take 20 mg by mouth daily   Yes Historical Provider, MD   ferrous sulfate (IRON 325) 325 (65 Fe) MG tablet Take 325 mg by mouth daily (with breakfast)   Yes Historical Provider, MD   HYDROcodone-acetaminophen (NORCO) 5-325 MG per tablet Take 1 tablet by mouth every 6 hours as needed for Pain.  Indications: until 11/13/21   Yes Historical Provider, MD   insulin lispro (HUMALOG) 100 UNIT/ML injection vial Inject into the skin 4 times daily (before meals and nightly) Per sliding scale:   150-200 = 2 units  201-250 = 4 units  251-300 = 6 units  301-350 = 8 units  351-400 = 10 units   Yes Historical Provider, MD   insulin glargine (LANTUS SOLOSTAR) 100 UNIT/ML injection pen Inject 22 Units into the skin nightly   Yes Historical Provider, MD   meclizine (ANTIVERT) 12.5 MG tablet Take 12.5 mg by mouth 2 times daily as needed for Dizziness   Yes Historical Provider, MD   warfarin (COUMADIN) 3 MG tablet Take 3 mg by mouth nightly   Yes Historical Provider, MD   carvedilol (COREG) 6.25 MG tablet Take 6.25 mg by mouth 2 times daily (with meals).    Yes Historical Provider, MD   aspirin 81 MG chewable tablet Take 81 mg by mouth daily    Yes Historical Provider, MD   bumetanide (BUMEX) 1 MG tablet Take 1 mg by mouth daily   11/10/21  Historical Provider, MD     Current Facility-Administered Medications   Medication Dose Route Frequency Provider Last Rate Last Admin    linezolid (ZYVOX) IVPB 600 mg  600 mg IntraVENous Q12H Horacio Rios MD   Stopped at 11/27/21 0350    midodrine (PROAMATINE) tablet 10 mg  10 mg Oral TID PRN Gianfranco Ramos MD   10 mg at 11/24/21 1040    anticoagulant sodium citrate 4 % injection 1.2 mL  1.2 mL IntraCATHeter PRN Artemio Granados MD   1.2 mL at 11/24/21 1314    anticoagulant sodium citrate 4 % injection 1.3 mL  1.3 mL IntraCATHeter PRN Lavell Jimenez MD   1.3 mL at 11/24/21 1314    albumin human 25 % IV solution 25 g  25 g IntraVENous PRN Gianfranco Ramos MD   Stopped at 11/27/21 1012    midodrine (PROAMATINE) tablet 5 mg  5 mg Oral PRN Gianfranco Ramos MD        norepinephrine (LEVOPHED) 16 mg in sodium chloride 0.9 % 250 mL infusion  2-100 mcg/min IntraVENous Continuous Los Guerra MD   Stopped at 11/26/21 1214    chlorhexidine (PERIDEX) 0.12 % solution 15 mL  15 mL Mouth/Throat BID Horacio Rios MD   15 mL at 11/27/21 0847    fentaNYL (SUBLIMAZE) injection 50 mcg  50 mcg IntraVENous Q30 Min PRN Summer Gardner MD   50 mcg at 11/22/21 0000    propofol injection  10 mcg/kg/min IntraVENous Continuous Summer Gardner MD 5.8 mL/hr at 11/27/21 0849 10 mcg/kg/min at 11/27/21 0849    polyvinyl alcohol (LIQUIFILM TEARS) 1.4 % ophthalmic solution 1 drop  1 drop Both Eyes Q4H Summer Gardner MD   1 drop at 11/27/21 0848    And    lubrifresh P.M. (artificial tears) ophthalmic ointment   Both Eyes Q4H Summer Gardner MD   Given at 11/27/21 0540    carvedilol (COREG) tablet 3.125 mg  3.125 mg Oral BID  Michele Watkins MD   3.125 mg at 11/24/21 1735    pantoprazole (PROTONIX) injection 40 mg  40 mg IntraVENous BID Angelia Davis MD   40 mg at 11/27/21 0847    And    sodium chloride flush 0.9 % injection 10 mL  10 mL IntraVENous BID Angelia Davis MD   10 mL at 11/27/21 0848    potassium chloride (KLOR-CON M) extended release tablet 40 mEq  40 mEq Oral PRN Angelia Davis MD        Or    potassium bicarb-citric acid (EFFER-K) effervescent tablet 40 mEq  40 mEq Oral PRN Angelia Davis MD   20 mEq at 11/21/21 1659    Or    potassium chloride 10 mEq/100 mL IVPB (Peripheral Line)  10 mEq IntraVENous PRN Angelia Davis  mL/hr at 11/22/21 1812 10 mEq at 11/22/21 1812    magnesium sulfate 1000 mg in dextrose 5% 100 mL IVPB  1,000 mg IntraVENous PRN Angelia Davis MD   Stopped at 11/21/21 1815    glucose (GLUTOSE) 40 % oral gel 15 g  15 g Oral PRN Angelia Davis MD        dextrose 50 % IV solution  12.5 g IntraVENous PRN Angelia Davis MD        glucagon (rDNA) injection 1 mg  1 mg IntraMUSCular PRN Angelia Davis MD        dextrose 5 % solution  100 mL/hr IntraVENous PRN Angelia Davis MD        sodium polystyrene (KAYEXALATE) 15 GM/60ML suspension 15 g  15 g Oral Once Melia Ingram MD        cefepime (MAXIPIME) 1000 mg IVPB minibag  1,000 mg IntraVENous Q24H Summer Gardner MD   Stopped at 11/26/21 2110    sertraline (ZOLOFT) tablet 25 mg  25 mg Oral Daily Marco A Cordoba MD   25 mg at 11/27/21 0847    0.9 % sodium chloride infusion   IntraVENous PRN Marco A Cordoba MD        ferrous sulfate (IRON 325) tablet 325 mg  325 mg Oral Daily with breakfast Marco A Cordoba MD   325 mg at 11/27/21 0847    [Held by provider] aspirin chewable tablet 81 mg  81 mg Oral Daily Jeb Gonzales MD   81 mg at 11/13/21 1015    atorvastatin (LIPITOR) tablet 80 mg  80 mg Oral Daily Jeb Gonzales MD   80 mg at 11/27/21 0847    insulin glargine (LANTUS) injection vial 22 Units  22 Units SubCUTAneous Nightly Jeb Gonzales MD   22 Units at 11/26/21 2041    insulin lispro (HUMALOG) injection vial 0-12 Units  0-12 Units SubCUTAneous TID WC Jeb Gonzales MD   2 Units at 11/26/21 1217    insulin lispro (HUMALOG) injection vial 0-6 Units  0-6 Units SubCUTAneous Nightly Jeb Gonzales MD   1 Units at 11/26/21 2041    sodium chloride flush 0.9 % injection 5-40 mL  5-40 mL IntraVENous 2 times per day Marco A Cordoba MD   10 mL at 11/27/21 0848    sodium chloride flush 0.9 % injection 5-40 mL  5-40 mL IntraVENous PRN Jeb Gonzales MD        0.9 % sodium chloride infusion  25 mL IntraVENous PRN Marco A Cordoba MD        acetaminophen (TYLENOL) tablet 650 mg  650 mg Oral Q4H PRN Marco A Cordoba MD   650 mg at 11/16/21 2233    ondansetron (ZOFRAN-ODT) disintegrating tablet 4 mg  4 mg Oral Q8H PRN Jeb Gonzales MD        Or    ondansetron (ZOFRAN) injection 4 mg  4 mg IntraVENous Q6H PRN Marco A Cordoba MD        oxyCODONE-acetaminophen (PERCOCET) 5-325 MG per tablet 1 tablet  1 tablet Oral Q6H PRN Marco A Cordoba MD           Allergies:  Patient has no known allergies. Social History:   reports that she has never smoked. She does not have any smokeless tobacco history on file. She reports that she does not drink alcohol and does not use drugs. Family History: family history includes High Blood Pressure in an other family member.     REVIEW OF SYSTEMS: Could not be obtained as patient is intubated    PHYSICAL EXAM:      BP (!) 104/56   Pulse 86   Temp 98.2 °F (36.8 °C) (Axillary)   Resp 19   Ht 5' 3\" (1.6 m) Comment: from old chart  Wt 224 lb 3.3 oz (101.7 kg)   SpO2 100%   BMI 39.72 kg/m²    Temp (24hrs), Av.8 °F (37.1 °C), Min:97.8 °F (36.6 °C), Max:99.8 °F (37.7 °C)    General appearance - well appearing, no in pain or distress   Mental status -intubated  Mouth - mucous membranes moist, pharynx normal without lesions   Neck - supple, no significant adenopathy   Lymphatics - no palpable lymphadenopathy, no hepatosplenomegaly   Chest - clear to auscultation, no wheezes, rales or rhonchi, symmetric air entry   Heart - normal rate, regular rhythm, normal S1, S2, no murmurs  Abdomen - soft, nontender, nondistended, no masses or organomegaly   Neurological -intubated nonfocal  Musculoskeletal - no joint tenderness, deformity or swelling   Extremities - peripheral pulses normal,+++ pedal edema, no clubbing or cyanosis   Skin -multiple skin bruises noted    DATA:    Labs:   CBC:   Recent Labs     21  0636   WBC 8.4 6.7   HGB 7.5* 7.3*   HCT 23.6* 22.2*   PLT 45* 39*     BMP:   Recent Labs     21  0636   * 130*   K 4.0 4.0   CO2 26 25   BUN 40* 45*   CREATININE 2.80* 3.23*   LABGLOM 17* 14*   GLUCOSE 243* 187*     PT/INR:   Recent Labs     21  1158   PROTIME 15.9*   INR 1.3     SURGICAL PATHOLOGY REPORT [[id number]]    Collected: 21 1535    Updated: 21 0804     --Surgical Pathology Report   [id number]   [facility]   [facility]   ANATOMIC PATHOLOGY   [address] [facility] [address], [address]   [phone]   Fax: [phone]   SURGICAL PATHOLOGY CONSULTATION     Patient Name: Jan Velazquez   MR#: [id number]   Specimen #[id number]     Procedures/Addenda   FLOW CYTOMETRY REPORT     Date Ordered:     2021     Status:   Signed Out        Date Complete:     11/22/2021     By: Anam Peña M.D.        Date Reported:     11/23/2021       INTERPRETATION   PERIPHERAL BLOOD:      -SEVERE THROMBOCYTOPENIA.   -MODERATE NORMOCYTIC ANEMIA.   -FLOW   CYTOMETRY POSITIVE FOR MONOCLONAL B-LYMPHOCYTES WITH IMMUNOPHENOTYPE       FEATURES MOST SUGGESTIVE OF IMMUNOPHENOTYPICALLY ATYPICAL   B-CHRONIC     LYMPHOCYTIC LEUKEMIA VS MONOCLONAL B-LYMPHOCYTOSIS.  BONE MARROW OR OTHER     TISSUE BIOPSY IS RECOMMENDED FOR FURTHER ELUCIDATION. RESULTS-COMMENTS   PERIPHERAL BLOODWBC 7.5Hgb 8. 0HCT 24.5MCV 94. 8PLT 49Neutrophil   57%Lymphocyte 26%Monocyte 9%nRBC as 2/100 WBCABS Neut 4.31K/ULABS   lymph 1.96K/ULMorphology: Anisocytosis,       ANALYSIS   Flow cytometric immunophenotyping analysis is performed on peripheral   blood following red blood cell lysis procedure.  The cells are labeled   by direct ten color immunostaining procedure and analyzed on [employer]   flow cytometer. Cytocentrifuge differential cell count:   31% Lymphocytes   3% Monocytes   66% Granulocytes     Viability:  99%     Marker panels:   B-cell tube:  CD5, CD10, CD19, CD20, CD22, CD23, CD45, , Kappa,   lambda   T-cell tube:  CD2, CD3, CD4, CD5, CD7, CD8, CD16, CD45, CD56, CD57     Flow cytometric immunophenotyping analysis of peripheral blood   lymphocyte population is positive for B-cell monoclonality;   immunophenotype strong positive for CD20, CD22,  lambda light   chain; and weakly positive for CD5 and CD23.  The immunophenotypic   features suggest immunophenotypically atypical chronic lymphocytic   leukemia, however, other B-chronic lymphoproliferative neoplasm cannot   be excluded.  The absolute monoclonal B-lymphocyte count is   approximately 0.98K/UL (by WHO criteria, absolute monoclonal B   lymphocyte count of >5000 is required to diagnose chronic lymphocytic   leukemia).  Bone marrow or other tissue biopsy is recommended for        IMAGING DATA:  XR CHEST PORTABLE   Final Result   1. Unchanged appearance of support devices. 2. Increasing left pleural effusion and basilar atelectasis. No significant   change in right pleural effusion. XR CHEST PORTABLE   Final Result   1. Endotracheal tube remains in appropriate position. 2. Interval improved aeration of the left lung base, otherwise no significant   change. XR CHEST PORTABLE   Final Result   1. Endotracheal tube remains in appropriate position. 2. No significant change in pleural effusions and associated basilar   atelectasis. XR CHEST PORTABLE   Final Result   No significant interval change. Mild vascular congestion and small pleural   effusions. XR CHEST PORTABLE   Final Result   1. Endotracheal tube in place with its tip 2 cm above the kristina. 2.  Unchanged cardiomegaly and small to moderate bilateral pleural effusions   with basilar atelectasis. 3.  Support tubes and catheters are otherwise unchanged. XR CHEST PORTABLE   Final Result   Cardiomegaly with bibasilar effusions and adjacent infiltrates. Support tubes as described above. XR CHEST PORTABLE   Final Result   Cardiomegaly with bibasilar effusions and adjacent infiltrates. Support tubes as described above. XR CHEST (SINGLE VIEW FRONTAL)   Final Result   The endotracheal tube ends appropriately above the kristina and below the   clavicular heads. The nasogastric tube ends in the stomach. The right   jugular vein hemodialysis catheter ends in the distal SVC, unchanged. Cardiomegaly. Bibasilar airspace disease and small right pleural effusion,   unchanged. Lungs otherwise grossly clear. No pneumothorax. VL Renal Arterial Duplex Complete   Final Result      XR CHEST PORTABLE   Final Result   Basilar opacities likely represent atelectasis with small pleural effusions.          MRI BRAIN WO CONTRAST   Final Result      There is no acute infarction, intracranial hemorrhage, or intracranial mass   lesion. Mild chronic microangiopathic ischemic disease. Mild generalized volume loss. Mild mucosal thickening of bilateral mastoid air cells and right sphenoid   sinus. XR CHEST PORTABLE   Final Result   1. Tip of the newly inserted NG tube overlies the gastric body. .   2. The right IJ CVC and right arm PICC line remain well positioned. 3.  No acute cardiopulmonary abnormality. US RETROPERITONEAL COMPLETE   Final Result   No acute findings. No hydronephrosis. Small right pleural effusion. CT HEAD WO CONTRAST   Final Result   No acute intracranial abnormality. Chronic microvascular disease and chronic lacunar infarcts. Critical results were called by Dr. Sharonda Santacruz to Dr Jason Ayala on 11/17/2021 at   22:0. XR CHEST PORTABLE   Final Result   1. No pneumothorax evident following thoracentesis. 2. Low lung volumes. Significantly improved pneumatization lower right lung   with minimal pleural effusion bilateral evident. 3. Calcific atherosclerosis aorta. 4. Cardiomegaly. IR GUIDED THORACENTESIS PLEURAL   Final Result   Successful ultrasound guided right thoracentesis. IR NONTUNNELED VASCULAR CATHETER > 5 YEARS   Final Result   Successful ultrasound guided non-tunneled right IJ temporary hemodialysis   catheter placement. Catheter is ready for dialysis use at this time. FLUORO FOR SURGICAL PROCEDURES   Final Result      XR CHEST PORTABLE   Final Result   Moderate right pleural effusion and associated airspace disease. Pneumonia   cannot be excluded. US RENAL COMPLETE   Final Result   1. Mild to moderate right-sided hydronephrosis. 2. Limited renal ultrasound. No left-sided hydronephrosis. CT ABDOMEN PELVIS WO CONTRAST Additional Contrast? None   Final Result   Severe right and mild left hydroureteronephrosis. No obstructing ureteral   stone is seen.   Wall thickening of the urinary bladder without significant   urinary bladder wall distension. Outlet obstruction is a consideration. Right-sided ostomy. There is bowel wall thickening proximal to the ostomy,   may suggest infectious or inflammatory enteritis. No drainable fluid   collections or free air. No evidence of bowel obstruction. Moderate right pleural effusion. Bibasilar infiltrates or atelectasis. Clinical correlation to exclude   pneumonia. US RETROPERITONEAL COMPLETE   Final Result   1. Moderate to severe right and mild-to-moderate left hydronephrosis. 2. Exam limited due to patient's body habitus. VL Renal Vein Scan Bilat    (Results Pending)       Primary Problem  JAYLYN (acute kidney injury) [facility])    Active Hospital Problems    Diagnosis Date Noted    Acute metabolic encephalopathy [S52.89]     Longstanding persistent atrial fibrillation ([address] [I48.11] 11/16/2021    Hydronephrosis determined by ultrasound [N13.30] 11/14/2021    Iron deficiency anemia [D50.9] 11/12/2021    JAYLYN (acute kidney injury) ([address] [N17.9] 11/10/2021    CKD (chronic kidney disease) stage 3, GFR 30-59 ml/min ([facility]) [N18.30]     HTN (hypertension) [I10]     Type 2 diabetes mellitus with kidney complication, with long-term current use of insulin ([facility]) [E11.29, Z79.4]     ASCVD (arteriosclerotic cardiovascular disease) [I25.10]        IMPRESSION:   1. Acute kidney injury and started on dialysis by nephrology  2. Anemia, stool occult positive for blood and had upper endoscopy showing gastritis. Anemia could be related to chronic disease and also renal disease and aggravated by GI bleed  3. Thrombocytopenia  4. Acute encephalopathy  5. Bilateral hydronephrosis  6. Cardiomyopathy with EF of 30-35%  7. Generalized anasarca  8. S/p PEA, resucitated    RECOMMENDATIONS:  1. I reviewed the laboratory data, imaging studies, diagnosis, other treatment recommendations  2.  Her anemia appears to be multifactorial with acute drop possibly from GI bleed  3. 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 Will need BM biopsy once stable  4. However she is very sick at this time and has poor prognosis  5. Transfuse PRBC if hemoglobin less than 7  6. Transfuse platelets if bleeding or any surgical  Procedure planned or plts less than 15K  7. No evidence of hemolysis noted as per the labs. Her HIT panel is negative  8. Monitor for symptoms of bleeding  9. Hold off on anticoagulation as long as platelet counts below 50,000  10. Prognosis poor  11. Monitor counts closely  12. We will follow    Discussed with pt and Nurse. Thank you for asking us to see this patient. Fer Maier MD    This note is created with the assistance of a speech recognition program.  While intending to generate a document that actually reflects the content of the visit, the document can still have some errors including those of syntax and sound a like substitutions which may escape proof reading. It such instances, actual meaning can be extrapolated by contextual diversion.

## 2021-11-27 NOTE — PROGRESS NOTES
ICU Progress Note (Vent)   Pulmonary and Critical Care Specialists    Patient - Smitha Cavazos,  Age - 66 y.o.    - 1950      Room Number -    MRN -  [id number]   Acct # - [de-identified]  Date of Admission -  11/10/2021  4:23 PM    Events of Past 24 Hours    Patient intubated on vent support. Currently on hemodialysis plans to run for 3 hours if possible and articulate any volume --- discussed with hemodialysis nurse        Vitals    height is 5' 3\" (1.6 m) and weight is 224 lb 3.3 oz (101.7 kg). Her axillary temperature is 98.2 °F (36.8 °C). Her blood pressure is 107/60 and her pulse is 84. Her respiration is 19 and oxygen saturation is 100%. Temperature Range: Temp: 98.2 °F (36.8 °C) Temp  Av.8 °F (37.1 °C)  Min: 97.8 °F (36.6 °C)  Max: 99.8 °F (37.7 °C)  BP Range:  Systolic (75TLG), OYW:427 , Min:67 , KED:810     Diastolic (47GVF), EKI:80, Min:18, Max:87    Pulse Range: Pulse  Av.7  Min: 60  Max: 97  Respiration Range: Resp  Avg: 15  Min: 0  Max: 19  Current Pulse Ox[de-identified]  SpO2: 100 %  24HR Pulse Ox Range:  SpO2  Av %  Min: 100 %  Max: 100 %  Oxygen Amount and Delivery: O2 Flow Rate (L/min): 2 L/min      Wt Readings from Last 3 Encounters:   21 224 lb 3.3 oz (101.7 kg)   16 178 lb (80.7 kg)   16 184 lb 12.8 oz (83.8 kg)     I/O       Intake/Output Summary (Last 24 hours) at 2021 1231  Last data filed at 2021 0800  Gross per 24 hour   Intake 1660.78 ml   Output 650 ml   Net 1010.78 ml     I/O last 3 completed shifts:   In: 2180.8 [I.V.:194.8; NG/GT:836; IV Piggyback:600]  Out: 920 [Urine:450; Stool:400]     DRAIN/TUBE OUTPUT:     Invasive Lines   ETT Day -   7  PICC line day #12       Mechanical Ventilation Data   SETTINGS (Comprehensive)  Vent Information  $Ventilation: $Subsequent Day  Skin Assessment: Clean, dry, & intact  Suction Catheter Diameter: 14  Equipment Changed: HME  Vent Type: Servo i  Vent Mode: PRVC  Vt Ordered: 450 mL  Rate Set: 16 bmp  Pressure Support: (S) 10 cmH20  FiO2 : 30 %  SpO2: 100 %  SpO2/FiO2 ratio: 333.33  Sensitivity: 5  PEEP/CPAP: 5  I Time/ I Time %: 1.25 s  Humidification Source: HME  Nitric Oxide/Epoprostenol In Use?: No  Mask Type: Full face mask  Mask Size: Medium  Additional Respiratory  Assessments  Pulse: 84  Resp: 19  SpO2: 100 %  End Tidal CO2: 42 (%)  Position: Semi-Lagunas's  Humidification Source: HME  Oral Care: Mouth swabbed, Mouth moisturizer, Mouth suctioned, Suction toothette  Cuff Pressure (cm H2O): 30 cm H2O       ABGs:   Lab Results   Component Value Date    PHART 7.385 11/27/2021    PO2ART 106.0 11/27/2021    THO5WRH 45.4 11/27/2021       Lab Results   Component Value Date    MODE PRVC 11/27/2021         Medications   IV   norepinephrine Stopped (11/26/21 1214)    propofol 10 mcg/kg/min (11/27/21 0849)    dextrose      sodium chloride      sodium chloride        linezolid  600 mg IntraVENous Q12H    chlorhexidine  15 mL Mouth/Throat BID    polyvinyl alcohol  1 drop Both Eyes Q4H    And    artificial tears   Both Eyes Q4H    carvedilol  3.125 mg Oral BID WC    pantoprazole  40 mg IntraVENous BID    And    sodium chloride flush  10 mL IntraVENous BID    sodium polystyrene  15 g Oral Once    cefepime  1,000 mg IntraVENous Q24H    sertraline  25 mg Oral Daily    ferrous sulfate  325 mg Oral Daily with breakfast    [Held by provider] aspirin  81 mg Oral Daily    atorvastatin  80 mg Oral Daily    insulin glargine  22 Units SubCUTAneous Nightly    insulin lispro  0-12 Units SubCUTAneous TID     insulin lispro  0-6 Units SubCUTAneous Nightly    sodium chloride flush  5-40 mL IntraVENous 2 times per day       Diet/Nutrition   ADULT TUBE FEEDING; Nasogastric; Renal Formula; Continuous; 35; No; 30; Q 8 hours; Protein; 1 Protein at 12 noon daily. Flush with 30 mLwater before and after administration. Exam   VITALS    height is 5' 3\" (1.6 m) and weight is 224 lb 3.3 oz (101.7 kg).  Her axillary temperature is 98.2 °F (36.8 °C). Her blood pressure is 107/60 and her pulse is 84. Her respiration is 19 and oxygen saturation is 100%. Ventilator Settings (Basic)  Vent Mode: PRVC Rate Set: 16 bmp/Vt Ordered: 450 mL/ /FiO2 : 30 %    Constitutional - Sedated  General Appearance  well developed, well nourished  HEENT - Life support devices in place (ET,),normocephalic, atraumatic. Lungs - Chest expands equally, no wheezes, rales or rhonchi. Cardiovascular - Heart sounds are normal.  normal rate and rhythm regular, no murmur, gallop or rub.   Abdomen - soft, nontender, nondistended  Extremities - no cyanosis,    Lab Results   CBC     Lab Results   Component Value Date    WBC 6.7 11/27/2021    RBC 2.31 11/27/2021    RBC 4.14 08/31/2016    HGB 7.3 11/27/2021    HCT 22.2 11/27/2021    PLT 39 11/27/2021    MCV 96.1 11/27/2021    MCH 31.4 11/27/2021    MCHC 32.7 11/27/2021    RDW 16.9 11/27/2021    NRBC 4 11/22/2021    LYMPHOPCT 19 11/27/2021    LYMPHOPCT 32.8 08/31/2016    MONOPCT 10 11/27/2021    EOSPCT 4.3 08/31/2016    BASOPCT 0 11/27/2021    BASOPCT 0.7 08/31/2016    MONOSABS 0.67 11/27/2021    LYMPHSABS 1.27 11/27/2021    EOSABS 0.20 11/27/2021    BASOSABS 0.00 11/27/2021    DIFFTYPE NOT REPORTED 11/27/2021       BMP   Lab Results   Component Value Date     11/27/2021    K 4.0 11/27/2021    CL 97 11/27/2021    CO2 25 11/27/2021    BUN 45 11/27/2021    CREATININE 3.23 11/27/2021    GLUCOSE 187 11/27/2021    GLUCOSE 77 08/31/2016    CALCIUM 7.8 11/27/2021       LFTS  Lab Results   Component Value Date    ALKPHOS 175 11/18/2021    ALT 31 11/18/2021    AST 21 11/18/2021    PROT 5.3 11/20/2021    BILITOT 0.27 11/18/2021    BILIDIR <0.08 11/13/2021    IBILI Connot be calculated 11/13/2021    LABALBU 2.6 11/18/2021       INR  Recent Labs     11/25/21  1158   PROTIME 15.9*   INR 1.3       APTT  Recent Labs     11/25/21  1158   APTT 34.9       Lactic Acid  Lab Results   Component Value Date    LACTA 1.3 11/26/2021 LACTA 0.6 11/16/2021    LACTA 0.6 11/16/2021        BNP   No results for input(s): BNP in the last 72 hours. Cultures       Radiology     X-ray reveals right-sided pleural effusion      SYSTEM ASSESSMENT    Acute on chronic hypoxic and hypercapnic respiratory failure  Bradycardic arrest, intubated 11/20  Right lower lobe infiltrate/pleural effusion  Acute kidney injury-requiring hemodialysis  Morbid obesity  Anemia, chronic disease and possible rectal bleed  Hypoalbuminemia  Thrombocytopenia  Nonischemic cardiomyopathy with EF of 30 to 35% possible right to left interatrial shunt  Atrial fibrillation  FULL CODE    Neuro   Trying to minimize sedation if possible. On propofol ---check triglyceride  Respiratory   Currently on dialysis. We will see if we can wean afterwards. Hemodynamics     Currently on not on Levophed at time of this note. Gastrointestinal/Nutrition       Renal       Infectious Disease   Cefepime and Zyvox. Hematology/Oncology     On EPC cuffs. Thrombocytopenic with platelet count only 01D--- not on any pharmacological prophylaxis    Endocrine       Social/Spiritual/DNR/Disposition/Other     Patient prognosis very guarded at best.  Patient's , [facility]  [phone] has been updated.       Critical Care Time   35  min    Electronically signed by Siva Coe MD on 11/27/2021 at 12:31 PM

## 2021-11-27 NOTE — PLAN OF CARE
Problem: Skin Integrity:  Goal: Will show no infection signs and symptoms  Description: Will show no infection signs and symptoms  11/27/2021 1647 by Daphney Blackwell RN  Outcome: Ongoing  Note: No new skin breakdown noted this shift, patient turned every 2 hours      Problem: Falls - Risk of:  Goal: Will remain free from falls  Description: Will remain free from falls  11/27/2021 1647 by Daphney Blackwell RN  Outcome: Ongoing  Note: Patient remains free from falls this shift, appropriate safety measures in place      Problem: Pain:  Goal: Pain level will decrease  Description: Pain level will decrease  11/27/2021 1647 by Daphney Blackwell RN  Outcome: Ongoing  Note: Patient medicated with Fentanyl this shift      Problem: Non-Violent Restraints  Goal: Removal from restraints as soon as assessed to be safe  11/27/2021 1647 by Daphney Blackwell RN  Outcome: Ongoing  Note: Patient attempts to pull at lines and tubes when unrestrained, restraints continued      Problem: Gas Exchange - Impaired:  Goal: Levels of oxygenation will improve  Description: Levels of oxygenation will improve  11/27/2021 1647 by Daphney Blackwell RN  Outcome: Ongoing  Note: Patient remains orally intubated at this time      Problem: MECHANICAL VENTILATION  Goal: Patient will maintain patent airway  11/27/2021 1647 by Daphney Blackwell RN  Outcome: Ongoing  Note: Patient remains orally intubated

## 2021-11-27 NOTE — PROGRESS NOTES
Q12H    chlorhexidine  15 mL Mouth/Throat BID    polyvinyl alcohol  1 drop Both Eyes Q4H    And    artificial tears   Both Eyes Q4H    carvedilol  3.125 mg Oral BID WC    pantoprazole  40 mg IntraVENous BID    And    sodium chloride flush  10 mL IntraVENous BID    sodium polystyrene  15 g Oral Once    cefepime  1,000 mg IntraVENous Q24H    sertraline  25 mg Oral Daily    ferrous sulfate  325 mg Oral Daily with breakfast    [Held by provider] aspirin  81 mg Oral Daily    atorvastatin  80 mg Oral Daily    insulin glargine  22 Units SubCUTAneous Nightly    insulin lispro  0-12 Units SubCUTAneous TID WC    insulin lispro  0-6 Units SubCUTAneous Nightly    sodium chloride flush  5-40 mL IntraVENous 2 times per day     Continuous Infusions:   norepinephrine Stopped (11/26/21 1214)    propofol 10 mcg/kg/min (11/27/21 0849)    dextrose      sodium chloride      sodium chloride       PRN Meds:.midodrine, anticoagulant sodium citrate, anticoagulant sodium citrate, albumin human, midodrine, fentanNYL, potassium chloride **OR** potassium alternative oral replacement **OR** potassium chloride, magnesium sulfate, glucose, dextrose, glucagon (rDNA), dextrose, sodium chloride, sodium chloride flush, sodium chloride, acetaminophen, ondansetron **OR** ondansetron, oxyCODONE-acetaminophen    Physical Exam:    VITALS:  /60   Pulse 73   Temp 98.2 °F (36.8 °C) (Axillary)   Resp (!) 0   Ht 5' 3\" (1.6 m) Comment: from old chart  Wt 224 lb 3.3 oz (101.7 kg)   SpO2 100%   BMI 39.72 kg/m²   24HR INTAKE/OUTPUT:      Intake/Output Summary (Last 24 hours) at 11/27/2021 1456  Last data filed at 11/27/2021 1330  Gross per 24 hour   Intake 2960.78 ml   Output 1950 ml   Net 1010.78 ml     Constitutional: lethargic responsive, obeying commands on mechanical ventilator    Skin: Skin color, texture, turgor normal. No rashes or lesions    Head: Normocephalic, without obvious abnormality, atraumatic Cardiovascular/Edema:S1 and S2 heard regular    Respiratory: Lungs:Diminished air entry basal rales    Abdomen: soft, non-tender; bowel sounds normal; no masses,  no organomegaly    Back: symmetric, no curvature. ROM normal. No CVA tenderness. Extremities: 3+ generalized edema    Neuro:  Grossly normal    CBC:   Recent Labs     11/25/21 0438 11/26/21 0421 11/27/21  0636   WBC 6.6 8.4 6.7   HGB 7.5* 7.5* 7.3*   PLT 36* 45* 39*     BMP:    Recent Labs     11/25/21 0438 11/26/21 0421 11/27/21  0636   * 132* 130*   K 3.9 4.0 4.0   CL 97* 97* 97*   CO2 27 26 25   BUN 27* 40* 45*   CREATININE 2.27* 2.80* 3.23*   GLUCOSE 188* 243* 187*     Lab Results   Component Value Date    NITRU NEGATIVE 11/12/2021    COLORU Yellow 11/12/2021    PHUR 6.0 11/12/2021    WBCUA 50  11/12/2021    RBCUA 20 TO 50 11/12/2021    MUCUS NOT REPORTED 11/12/2021    TRICHOMONAS NOT REPORTED 11/12/2021    YEAST NOT REPORTED 11/12/2021    BACTERIA MODERATE 11/12/2021    CLARITYU Clear 09/07/2016    SPECGRAV 1.012 11/12/2021    LEUKOCYTESUR LARGE 11/12/2021    UROBILINOGEN Normal 11/12/2021    BILIRUBINUR NEGATIVE 11/12/2021    BLOODU Positive 09/07/2016    GLUCOSEU NEGATIVE 11/12/2021    KETUA NEGATIVE 11/12/2021    AMORPHOUS NOT REPORTED 11/12/2021     IMPRESSION/RECOMMENDATIONS:      1. Acute kidney injury - most consistent with Ischemic ATN and obstructive nephropathy secondary to bilateral hydronephrosis. OLIGURIC  Repeat renal ultrasound  11/18/2021 shows improvement in hydronephrosis     Patient was started on acute dialysis on 11/17/2021    2. CHF with reduced EF, Systolic dysfunction, diastolic dysfunction. 3. PEA/Cardiac Arrest s/p CPR recovered. 4. Acute respiratory failure s/p intubation on Ventilator. 5. Normocytic anemia/Thrombocytopenia, Heme-oncology following, No hemolysis found, HIT panel negative,    6. Hypotension- off preessors    7.  Bilateral hydronephrosis status post cystoscopy with right ureteral  stent placement. 8.  Anasarca secondary to cardiomyopathy-EF of 30-35 % , third spacing renal failure    9. Acute metabolic encephalopathy        Plan:   hemodialysis today completed as per orders  With IV albumin and midodrine for hypotension  Strict input and output   Basic metabolic profile daily. Check phosphorus. Retacrit with dialysis 3 times a week  Continue  tube feeding. Prognosis is guarded.         Sebastian Winchester MD   Attending Nephrologist  11/27/2021 2:56 PM

## 2021-11-27 NOTE — FLOWSHEET NOTE
11/27/21 1330   Vital Signs   /60   Pulse 73   Resp (!) 0   SpO2 100 %   Weight 224 lb 3.3 oz (101.7 kg)   Percent Weight Change 0   Post-Hemodialysis Assessment   Post-Treatment Procedures Blood returned; Access bleeding time < 10 minutes; Catheter capped, clamped with Citrate x 2 ports   Rinseback Volume (ml) 500 ml   Total Liters Processed (l/min) 61.7 l/min   Dialyzer Clearance Clear   Duration of Treatment (minutes) 210 minutes   Hemodialysis Intake (ml) 1300 ml   Hemodialysis Output (ml) 1300 ml   NET Removed (ml) 0 ml   Tolerated Treatment Poor   Patient Response to Treatment Patient completed 3.5 hours. No fluid removed. Albumin given x1. Drsg dry and intact. Physician Notified?  Yes

## 2021-11-28 ENCOUNTER — APPOINTMENT (OUTPATIENT)
Dept: GENERAL RADIOLOGY | Age: 71
DRG: 004 | End: 2021-11-28
Payer: COMMERCIAL

## 2021-11-28 LAB
-: NORMAL
ABSOLUTE EOS #: 0.2 K/UL (ref 0–0.4)
ABSOLUTE IMMATURE GRANULOCYTE: ABNORMAL K/UL (ref 0–0.3)
ABSOLUTE LYMPH #: 0.9 K/UL (ref 1–4.8)
ABSOLUTE MONO #: 0.5 K/UL (ref 0.1–1.3)
ALLEN TEST: ABNORMAL
ANION GAP SERPL CALCULATED.3IONS-SCNC: 9 MMOL/L (ref 9–17)
BASOPHILS # BLD: 0 % (ref 0–2)
BASOPHILS ABSOLUTE: 0 K/UL (ref 0–0.2)
BUN BLDV-MCNC: 31 MG/DL (ref 8–23)
BUN/CREAT BLD: ABNORMAL (ref 9–20)
CALCIUM SERPL-MCNC: 8.1 MG/DL (ref 8.6–10.4)
CARBOXYHEMOGLOBIN: 1 % (ref 0–5)
CHLORIDE BLD-SCNC: 100 MMOL/L (ref 98–107)
CO2: 26 MMOL/L (ref 20–31)
CREAT SERPL-MCNC: 2.39 MG/DL (ref 0.5–0.9)
DIFFERENTIAL TYPE: ABNORMAL
EOSINOPHILS RELATIVE PERCENT: 3 % (ref 0–4)
FIO2: 30
GFR AFRICAN AMERICAN: 24 ML/MIN
GFR NON-AFRICAN AMERICAN: 20 ML/MIN
GFR SERPL CREATININE-BSD FRML MDRD: ABNORMAL ML/MIN/{1.73_M2}
GFR SERPL CREATININE-BSD FRML MDRD: ABNORMAL ML/MIN/{1.73_M2}
GLUCOSE BLD-MCNC: 129 MG/DL (ref 65–105)
GLUCOSE BLD-MCNC: 144 MG/DL (ref 65–105)
GLUCOSE BLD-MCNC: 153 MG/DL (ref 70–99)
HCO3 ARTERIAL: 29 MMOL/L (ref 22–26)
HCT VFR BLD CALC: 21.4 % (ref 36–46)
HEMOGLOBIN: 6.9 G/DL (ref 12–16)
IMMATURE GRANULOCYTES: ABNORMAL %
LYMPHOCYTES # BLD: 17 % (ref 24–44)
MAGNESIUM: 1.9 MG/DL (ref 1.6–2.6)
MCH RBC QN AUTO: 31 PG (ref 26–34)
MCHC RBC AUTO-ENTMCNC: 32.3 G/DL (ref 31–37)
MCV RBC AUTO: 95.9 FL (ref 80–100)
METHEMOGLOBIN: 1 % (ref 0–1.9)
MODE: ABNORMAL
MONOCYTES # BLD: 9 % (ref 1–7)
NEGATIVE BASE EXCESS, ART: ABNORMAL MMOL/L (ref 0–2)
NOTIFICATION TIME: ABNORMAL
NOTIFICATION: ABNORMAL
NRBC AUTOMATED: ABNORMAL PER 100 WBC
O2 DEVICE/FLOW/%: ABNORMAL
O2 SAT, ARTERIAL: 96.8 % (ref 95–98)
OXYHEMOGLOBIN: ABNORMAL % (ref 95–98)
PATIENT TEMP: 37
PCO2 ARTERIAL: 46.2 MMHG (ref 35–45)
PCO2, ART, TEMP ADJ: ABNORMAL (ref 35–45)
PDW BLD-RTO: 16.9 % (ref 11.5–14.9)
PEEP/CPAP: 5
PH ARTERIAL: 7.41 (ref 7.35–7.45)
PH, ART, TEMP ADJ: ABNORMAL (ref 7.35–7.45)
PLATELET # BLD: 20 K/UL (ref 150–450)
PLATELET ESTIMATE: ABNORMAL
PMV BLD AUTO: 9.1 FL (ref 6–12)
PO2 ARTERIAL: 107 MMHG (ref 80–100)
PO2, ART, TEMP ADJ: ABNORMAL MMHG (ref 80–100)
POSITIVE BASE EXCESS, ART: 4.3 MMOL/L (ref 0–2)
POTASSIUM SERPL-SCNC: 3.8 MMOL/L (ref 3.7–5.3)
PSV: ABNORMAL
PT. POSITION: ABNORMAL
RBC # BLD: 2.23 M/UL (ref 4–5.2)
RBC # BLD: ABNORMAL 10*6/UL
REASON FOR REJECTION: NORMAL
RESPIRATORY RATE: 16
SAMPLE SITE: ABNORMAL
SEG NEUTROPHILS: 71 % (ref 36–66)
SEGMENTED NEUTROPHILS ABSOLUTE COUNT: 3.9 K/UL (ref 1.3–9.1)
SET RATE: 16
SODIUM BLD-SCNC: 135 MMOL/L (ref 135–144)
TEXT FOR RESPIRATORY: ABNORMAL
TOTAL HB: ABNORMAL G/DL (ref 12–16)
TOTAL RATE: 16
TRIGL SERPL-MCNC: 95 MG/DL
VT: 450
WBC # BLD: 5.5 K/UL (ref 3.5–11)
WBC # BLD: ABNORMAL 10*3/UL
ZZ NTE CLEAN UP: ORDERED TEST: NORMAL
ZZ NTE WITH NAME CLEAN UP: SPECIMEN SOURCE: NORMAL

## 2021-11-28 PROCEDURE — 83735 ASSAY OF MAGNESIUM: CPT

## 2021-11-28 PROCEDURE — 2580000003 HC RX 258: Performed by: FAMILY MEDICINE

## 2021-11-28 PROCEDURE — 71045 X-RAY EXAM CHEST 1 VIEW: CPT

## 2021-11-28 PROCEDURE — 6370000000 HC RX 637 (ALT 250 FOR IP): Performed by: INTERNAL MEDICINE

## 2021-11-28 PROCEDURE — 86900 BLOOD TYPING SEROLOGIC ABO: CPT

## 2021-11-28 PROCEDURE — 84478 ASSAY OF TRIGLYCERIDES: CPT

## 2021-11-28 PROCEDURE — 82805 BLOOD GASES W/O2 SATURATION: CPT

## 2021-11-28 PROCEDURE — C9113 INJ PANTOPRAZOLE SODIUM, VIA: HCPCS | Performed by: FAMILY MEDICINE

## 2021-11-28 PROCEDURE — 86901 BLOOD TYPING SEROLOGIC RH(D): CPT

## 2021-11-28 PROCEDURE — 2000000000 HC ICU R&B

## 2021-11-28 PROCEDURE — P9016 RBC LEUKOCYTES REDUCED: HCPCS

## 2021-11-28 PROCEDURE — 6370000000 HC RX 637 (ALT 250 FOR IP): Performed by: UROLOGY

## 2021-11-28 PROCEDURE — 85025 COMPLETE CBC W/AUTO DIFF WBC: CPT

## 2021-11-28 PROCEDURE — 2580000003 HC RX 258: Performed by: UROLOGY

## 2021-11-28 PROCEDURE — 80048 BASIC METABOLIC PNL TOTAL CA: CPT

## 2021-11-28 PROCEDURE — 36600 WITHDRAWAL OF ARTERIAL BLOOD: CPT

## 2021-11-28 PROCEDURE — 94003 VENT MGMT INPAT SUBQ DAY: CPT

## 2021-11-28 PROCEDURE — 86850 RBC ANTIBODY SCREEN: CPT

## 2021-11-28 PROCEDURE — 82947 ASSAY GLUCOSE BLOOD QUANT: CPT

## 2021-11-28 PROCEDURE — 86920 COMPATIBILITY TEST SPIN: CPT

## 2021-11-28 PROCEDURE — 99232 SBSQ HOSP IP/OBS MODERATE 35: CPT | Performed by: INTERNAL MEDICINE

## 2021-11-28 PROCEDURE — 2500000003 HC RX 250 WO HCPCS: Performed by: INTERNAL MEDICINE

## 2021-11-28 PROCEDURE — 36415 COLL VENOUS BLD VENIPUNCTURE: CPT

## 2021-11-28 PROCEDURE — 6360000002 HC RX W HCPCS: Performed by: FAMILY MEDICINE

## 2021-11-28 PROCEDURE — 6360000002 HC RX W HCPCS: Performed by: INTERNAL MEDICINE

## 2021-11-28 RX ORDER — SODIUM CHLORIDE 9 MG/ML
INJECTION, SOLUTION INTRAVENOUS PRN
Status: DISCONTINUED | OUTPATIENT
Start: 2021-11-28 | End: 2021-12-02 | Stop reason: SDUPTHER

## 2021-11-28 RX ADMIN — PROPOFOL 10 MCG/KG/MIN: 10 INJECTION, EMULSION INTRAVENOUS at 00:26

## 2021-11-28 RX ADMIN — MINERAL OIL, PETROLATUM: 425; 568 OINTMENT OPHTHALMIC at 14:51

## 2021-11-28 RX ADMIN — POLYVINYL ALCOHOL 1 DROP: 14 SOLUTION/ DROPS OPHTHALMIC at 04:13

## 2021-11-28 RX ADMIN — POLYVINYL ALCOHOL 1 DROP: 14 SOLUTION/ DROPS OPHTHALMIC at 12:02

## 2021-11-28 RX ADMIN — SERTRALINE HYDROCHLORIDE 25 MG: 50 TABLET ORAL at 09:11

## 2021-11-28 RX ADMIN — POLYVINYL ALCOHOL 1 DROP: 14 SOLUTION/ DROPS OPHTHALMIC at 00:17

## 2021-11-28 RX ADMIN — ATORVASTATIN CALCIUM 80 MG: 80 TABLET, FILM COATED ORAL at 09:11

## 2021-11-28 RX ADMIN — POLYVINYL ALCOHOL 1 DROP: 14 SOLUTION/ DROPS OPHTHALMIC at 23:42

## 2021-11-28 RX ADMIN — INSULIN GLARGINE 22 UNITS: 100 INJECTION, SOLUTION SUBCUTANEOUS at 21:42

## 2021-11-28 RX ADMIN — POLYVINYL ALCOHOL 1 DROP: 14 SOLUTION/ DROPS OPHTHALMIC at 09:11

## 2021-11-28 RX ADMIN — PROPOFOL 15 MCG/KG/MIN: 10 INJECTION, EMULSION INTRAVENOUS at 13:29

## 2021-11-28 RX ADMIN — FERROUS SULFATE TAB 325 MG (65 MG ELEMENTAL FE) 325 MG: 325 (65 FE) TAB at 09:11

## 2021-11-28 RX ADMIN — PANTOPRAZOLE SODIUM 40 MG: 40 INJECTION, POWDER, FOR SOLUTION INTRAVENOUS at 09:12

## 2021-11-28 RX ADMIN — PROPOFOL 15 MCG/KG/MIN: 10 INJECTION, EMULSION INTRAVENOUS at 21:36

## 2021-11-28 RX ADMIN — MINERAL OIL, PETROLATUM: 425; 568 OINTMENT OPHTHALMIC at 10:43

## 2021-11-28 RX ADMIN — MINERAL OIL, PETROLATUM: 425; 568 OINTMENT OPHTHALMIC at 21:40

## 2021-11-28 RX ADMIN — POLYVINYL ALCOHOL 1 DROP: 14 SOLUTION/ DROPS OPHTHALMIC at 16:33

## 2021-11-28 RX ADMIN — MINERAL OIL, PETROLATUM: 425; 568 OINTMENT OPHTHALMIC at 06:14

## 2021-11-28 RX ADMIN — CHLORHEXIDINE GLUCONATE 0.12% ORAL RINSE 15 ML: 1.2 LIQUID ORAL at 09:11

## 2021-11-28 RX ADMIN — Medication 3 MCG/MIN: at 09:57

## 2021-11-28 RX ADMIN — CHLORHEXIDINE GLUCONATE 0.12% ORAL RINSE 15 ML: 1.2 LIQUID ORAL at 21:39

## 2021-11-28 RX ADMIN — PANTOPRAZOLE SODIUM 40 MG: 40 INJECTION, POWDER, FOR SOLUTION INTRAVENOUS at 21:41

## 2021-11-28 RX ADMIN — INSULIN LISPRO 2 UNITS: 100 INJECTION, SOLUTION INTRAVENOUS; SUBCUTANEOUS at 09:11

## 2021-11-28 RX ADMIN — SODIUM CHLORIDE, PRESERVATIVE FREE 10 ML: 5 INJECTION INTRAVENOUS at 21:41

## 2021-11-28 RX ADMIN — SODIUM CHLORIDE, PRESERVATIVE FREE 10 ML: 5 INJECTION INTRAVENOUS at 09:12

## 2021-11-28 RX ADMIN — POLYVINYL ALCOHOL 1 DROP: 14 SOLUTION/ DROPS OPHTHALMIC at 21:40

## 2021-11-28 ASSESSMENT — PULMONARY FUNCTION TESTS
PIF_VALUE: 22
PIF_VALUE: 26
PIF_VALUE: 25
PIF_VALUE: 23
PIF_VALUE: 25
PIF_VALUE: 22
PIF_VALUE: 26
PIF_VALUE: 22
PIF_VALUE: 22
PIF_VALUE: 27
PIF_VALUE: 23
PIF_VALUE: 22
PIF_VALUE: 30
PIF_VALUE: 22
PIF_VALUE: 23
PIF_VALUE: 25
PIF_VALUE: 22
PIF_VALUE: 23
PIF_VALUE: 24
PIF_VALUE: 15
PIF_VALUE: 22
PIF_VALUE: 24
PIF_VALUE: 24
PIF_VALUE: 23
PIF_VALUE: 26
PIF_VALUE: 23
PIF_VALUE: 22
PIF_VALUE: 24
PIF_VALUE: 22
PIF_VALUE: 28
PIF_VALUE: 23
PIF_VALUE: 22
PIF_VALUE: 24
PIF_VALUE: 24
PIF_VALUE: 23
PIF_VALUE: 27

## 2021-11-28 ASSESSMENT — PAIN SCALES - GENERAL
PAINLEVEL_OUTOF10: 0

## 2021-11-28 NOTE — PROGRESS NOTES
ICU Progress Note (Vent)   Pulmonary and Critical Care Specialists    Patient - Weston Martinez,  Age - 66 y.o.    - 1950      Room Number -    MRN -  [id number]   [facility]  Date of Admission -  11/10/2021  4:23 PM    Events of Past 24 Hours   Patient currently intubated on vent support. There was an episode of severe bradycardia when they tried to turn her earlier today. For a moment they thought they lost pulse but there was a pulse. Currently on Levophed. Vitals    height is 5' 3\" (1.6 m) and weight is 224 lb 3.3 oz (101.7 kg). Her axillary temperature is 98.1 °F (36.7 °C). Her blood pressure is 125/51 (abnormal) and her pulse is 79. Her respiration is 0 (abnormal) and oxygen saturation is 99%. Temperature Range: Temp: 98.1 °F (36.7 °C) Temp  Av.4 °F (36.9 °C)  Min: 98.1 °F (36.7 °C)  Max: 98.7 °F (37.1 °C)  BP Range:  Systolic (32IMV), OTZ:635 , Min:50 , ORZ:719     Diastolic (31RNB), KHN:33, Min:21, Max:112    Pulse Range: Pulse  Av  Min: 48  Max: 160  Respiration Range: Resp  Av.7  Min: 0  Max: 28  Current Pulse Ox[de-identified]  SpO2: 99 %  24HR Pulse Ox Range:  SpO2  Av.7 %  Min: 98 %  Max: 100 %  Oxygen Amount and Delivery: O2 Flow Rate (L/min): 2 L/min      Wt Readings from Last 3 Encounters:   21 224 lb 3.3 oz (101.7 kg)   16 178 lb (80.7 kg)   16 184 lb 12.8 oz (83.8 kg)     I/O       Intake/Output Summary (Last 24 hours) at 2021 1727  Last data filed at 2021 1200  Gross per 24 hour   Intake 1725.2 ml   Output 1375 ml   Net 350.2 ml     I/O last 3 completed shifts:   In: 1725.2 [I.V.:454.2; NG/GT:1271]  Out: 8303 [Urine:125; ACIZW:3479]     DRAIN/TUBE OUTPUT:     Invasive Lines   ETT Day -   8  PICC line day #13      Mechanical Ventilation Data   SETTINGS (Comprehensive)  Vent Information  $Ventilation: $Subsequent Day  Skin Assessment: Clean, dry, & intact  Suction Catheter Diameter: 14  Equipment Changed: HME  Vent Type: Servo i  Vent Mode: PRVC  Vt Ordered: 450 mL  Rate Set: 16 bmp  Pressure Support: 10 cmH20  FiO2 : 30 %  SpO2: 99 %  SpO2/FiO2 ratio: 330  Sensitivity: 5  PEEP/CPAP: 5  I Time/ I Time %: 1.25 s  Humidification Source: HME  Nitric Oxide/Epoprostenol In Use?: No  Mask Type: Full face mask  Mask Size: Medium  Additional Respiratory  Assessments  Pulse: 79  Resp: (!) 0  SpO2: 99 %  End Tidal CO2: 42 (%)  Position: Semi-Lagunas's  Humidification Source: HME  Oral Care: Mouth swabbed, Mouth moisturizer, Mouth suctioned, Suction toothette  Cuff Pressure (cm H2O): 30 cm H2O       ABGs:   Lab Results   Component Value Date    PHART 7.406 11/28/2021    PO2ART 107.0 11/28/2021    QLB7BFG 46.2 11/28/2021       Lab Results   Component Value Date    MODE PRVC 11/28/2021         Medications   IV   sodium chloride      norepinephrine Stopped (11/28/21 1305)    propofol 15 mcg/kg/min (11/28/21 1329)    dextrose      sodium chloride      sodium chloride        chlorhexidine  15 mL Mouth/Throat BID    polyvinyl alcohol  1 drop Both Eyes Q4H    And    artificial tears   Both Eyes Q4H    [Held by provider] carvedilol  3.125 mg Oral BID WC    pantoprazole  40 mg IntraVENous BID    And    sodium chloride flush  10 mL IntraVENous BID    sodium polystyrene  15 g Oral Once    sertraline  25 mg Oral Daily    ferrous sulfate  325 mg Oral Daily with breakfast    [Held by provider] aspirin  81 mg Oral Daily    atorvastatin  80 mg Oral Daily    insulin glargine  22 Units SubCUTAneous Nightly    insulin lispro  0-12 Units SubCUTAneous TID WC    insulin lispro  0-6 Units SubCUTAneous Nightly    sodium chloride flush  5-40 mL IntraVENous 2 times per day       Diet/Nutrition   ADULT TUBE FEEDING; Nasogastric; Renal Formula; Continuous; 35; No; 30; Q 8 hours; Protein; 1 Protein at 12 noon daily. Flush with 30 mLwater before and after administration. Exam   VITALS    height is 5' 3\" (1.6 m) and weight is 224 lb 3.3 oz (101.7 kg). Her axillary temperature is 98.1 °F (36.7 °C). Her blood pressure is 125/51 (abnormal) and her pulse is 79. Her respiration is 0 (abnormal) and oxygen saturation is 99%. Ventilator Settings (Basic)  Vent Mode: PRVC Rate Set: 16 bmp/Vt Ordered: 450 mL/ /FiO2 : 30 %    Constitutional - Sedated  General Appearance  well developed, well nourished  HEENT - Life support devices in place (ET,),normocephalic, atraumatic. Lungs - Chest expands equally, no wheezes, rales or rhonchi. Cardiovascular - Heart sounds are normal.  normal rate and rhythm regular, no murmur, gallop or rub. Abdomen - soft, nontender,  Extremities - no cyanosis, clubbing     Lab Results   CBC     Lab Results   Component Value Date    WBC 5.5 11/28/2021    RBC 2.23 11/28/2021    RBC 4.14 08/31/2016    HGB 6.9 11/28/2021    HCT 21.4 11/28/2021    PLT 20 11/28/2021    MCV 95.9 11/28/2021    MCH 31.0 11/28/2021    MCHC 32.3 11/28/2021    RDW 16.9 11/28/2021    NRBC 4 11/22/2021    LYMPHOPCT 17 11/28/2021    LYMPHOPCT 32.8 08/31/2016    MONOPCT 9 11/28/2021    EOSPCT 4.3 08/31/2016    BASOPCT 0 11/28/2021    BASOPCT 0.7 08/31/2016    MONOSABS 0.50 11/28/2021    LYMPHSABS 0.90 11/28/2021    EOSABS 0.20 11/28/2021    BASOSABS 0.00 11/28/2021    DIFFTYPE NOT REPORTED 11/28/2021       BMP   Lab Results   Component Value Date     11/28/2021    K 3.8 11/28/2021     11/28/2021    CO2 26 11/28/2021    BUN 31 11/28/2021    CREATININE 2.39 11/28/2021    GLUCOSE 153 11/28/2021    GLUCOSE 77 08/31/2016    CALCIUM 8.1 11/28/2021       LFTS  Lab Results   Component Value Date    ALKPHOS 175 11/18/2021    ALT 31 11/18/2021    AST 21 11/18/2021    PROT 5.3 11/20/2021    BILITOT 0.27 11/18/2021    BILIDIR <0.08 11/13/2021    IBILI Connot be calculated 11/13/2021    LABALBU 2.6 11/18/2021       INR  No results for input(s): PROTIME, INR in the last 72 hours. APTT  No results for input(s): APTT in the last 72 hours.     Lactic Acid  Lab Results   Component Value Date    LACTA 1.3 11/26/2021    LACTA 0.6 11/16/2021    LACTA 0.6 11/16/2021        BNP   No results for input(s): BNP in the last 72 hours. Cultures   November 21 MRSA DNA probe positive    Radiology     Plain Films         Chest x-ray ---Endo tracheal tube in adequate position. Bilateral effusions appreciated right side greater than left. SYSTEM ASSESSMENT    Acute on chronic hypoxic and hypercapnic respiratory failure  Bradycardic arrest, intubated 11/20  Right lower lobe infiltrate/pleural effusion  Acute kidney injury-requiring hemodialysis  Morbid obesity  Anemia, chronic disease and possible rectal bleed  Hypoalbuminemia  Thrombocytopenia  Nonischemic cardiomyopathy with EF of 30 to 35% possible right to left interatrial shunt  Atrial fibrillation  FULL CODE      Neuro   On propofol. I do not see a  triglyceride level this morning, will add on      Respiratory   Vent support. No dialysis today    Hemodynamics   Off Levophed. Gastrointestinal/Nutrition       Renal     G following for dialysis needs. Infectious Disease   On cefepime. Hematology/Oncology       Endocrine       Social/Spiritual/DNR/Disposition/Other     This time, a lengthy discussion with took place at bedside with patient's sisters, Libia Sheffield and Sarah. Patient has multiple comorbid conditions. We are concerned that the patient's prognosis may be very poor. I did recommend a palliative care consult which the sisters did agree on. Currently patient is full code at this time. I was told that Mary Porter spoke to the patient's  regarding patient's overall status.         Critical Care Time   35 min    Electronically signed by Lupe Walters MD on 11/28/2021 at 5:27 PM

## 2021-11-28 NOTE — PROGRESS NOTES
Pt turned for back and adrianne assessment and care, cardiac monitor indicated bradycardia and SPO2 70%. RN turned pt back to supine and found her to be cyanotic. As 2 RN's assessed for pulse, pt's color improved and a femoral pulse was found to be weak but present. B/P 51/32. Levophed started, pt's heart increased, 160-170 with frequent multifocal PVC.

## 2021-11-28 NOTE — CARE COORDINATION
ONGOING DISCHARGE PLANNING NOTE:    Writer reviewed LSW notes, and discharge plan is Guilherme Mcgee. Pt is on the vent at 30% FiO2. Has hazel and was started on acute HD 11/17.     Electronically signed by Karlos Rogers RN on 11/28/2021 at 10:57 AM

## 2021-11-28 NOTE — PLAN OF CARE
Problem: SAFETY  Goal: Free from accidental physical injury  11/28/2021 6804 by Sujit Estrada RN  Outcome: Ongoing  Note: Fall assessment performed and appropriate measures implemented. Room freed from clutter. Bed in lowest position with wheels locked. Call light in place. ID band in place. Goal: Free from intentional harm  11/28/2021 0312 by Sujit Estrada RN  Outcome: Ongoing     Problem: DAILY CARE  Goal: Daily care needs are met  11/28/2021 5694 by Sujit Estrada RN  Outcome: Ongoing  Note: Daily care needs have been met this shift. Hygiene care needs has been assessed this shift. Will continue to monitor. Problem: PAIN  Goal: Patient's pain/discomfort is manageable  11/28/2021 0312 by Sujit Estrada RN  Outcome: Ongoing  Note: Patient denies having any pain. Problem: SKIN INTEGRITY  Goal: Skin integrity is maintained or improved  11/28/2021 0312 by Sujit Estrada RN  Outcome: Ongoing  Note: Patient has breakdown/redness on bottom. Cream applied. Dressings changed. Problem: KNOWLEDGE DEFICIT  Goal: Patient/S.O. demonstrates understanding of disease process, treatment plan, medications, and discharge instructions. 11/28/2021 0312 by Sujit Estrada RN  Outcome: Ongoing     Problem: DISCHARGE BARRIERS  Goal: Patient's continuum of care needs are met  11/28/2021 0312 by Sujit Estrada RN  Outcome: Ongoing     Problem: Falls - Risk of:  Goal: Will remain free from falls  Description: Will remain free from falls  11/28/2021 0312 by Sujit Estrada RN  Outcome: Ongoing  Note: Patient orally intubated and sedated on vent. Bilateral mitts in place to protect airway. Patient remains free from falls.    Goal: Absence of physical injury  Description: Absence of physical injury  11/28/2021 4871 by Sujit Estrada RN  Outcome: Ongoing     Problem: Skin Integrity:  Goal: Will show no infection signs and symptoms  Description: Will show no infection signs and symptoms  11/28/2021 0312 by Sujit Estrada RN  Outcome: Ongoing  Note: Patient safe  11/28/2021 0312 by Kirby Sanchez RN  Outcome: Ongoing  Note: Attempts to pull at tubes when released. ROM assessed every 2 hours and visual safety checks completed hourly. Restraints maintained to preserve the patient's airway. 11/27/2021 1647 by Demarcus Limon RN  Outcome: Ongoing  Note: Patient attempts to pull at lines and tubes when unrestrained, restraints continued   Goal: No harm/injury to patient while restraints in use  11/28/2021 0312 by Kirby Sanchez RN  Outcome: Ongoing  Note: Skin integrity assessed and ROM performed every two hours. No signs of injury noted. Goal: Patient's dignity will be maintained  11/28/2021 0312 by Kirby Sanchez RN  Outcome: Ongoing     Problem: Airway Clearance - Ineffective:  Goal: Ability to maintain a clear airway will improve  Description: Ability to maintain a clear airway will improve  11/28/2021 0312 by Kirby Sanchez RN  Outcome: Ongoing  Note: Patient orally intubated on vent. Problem: Bowel Function - Altered:  Goal: Bowel elimination is within specified parameters  Description: Bowel elimination is within specified parameters  11/28/2021 0312 by Kirby Sanchez RN  Outcome: Ongoing  Note: Patient having output out of colostomy with active bowel sounds. Problem: Fluid Volume - Imbalance:  Goal: Absence of imbalanced fluid volume signs and symptoms  Description: Absence of imbalanced fluid volume signs and symptoms  11/28/2021 0312 by Kirby Sanchez RN  Outcome: Ongoing     Problem: Gas Exchange - Impaired:  Goal: Levels of oxygenation will improve  Description: Levels of oxygenation will improve  11/28/2021 0312 by Kirby Sanchez RN  Outcome: Ongoing  Note: Patient's oxygen saturation remains above goal on vent settings. Problem: Mental Status - Impaired:  Goal: Mental status will be restored to baseline  Description: Mental status will be restored to baseline  11/28/2021 0312 by Kirby Sanchez RN  Outcome: Ongoing  Note: Patient able to follow commands on vent. Problem: Nutrition Deficit:  Goal: Ability to achieve adequate nutritional intake will improve  Description: Ability to achieve adequate nutritional intake will improve  11/28/2021 0312 by Binu Raphael RN  Outcome: Ongoing     Problem: OXYGENATION/RESPIRATORY FUNCTION  Goal: Patient will maintain patent airway  11/28/2021 0312 by Binu Raphael RN  Outcome: Ongoing  Goal: Patient will achieve/maintain normal respiratory rate/effort  Description: Respiratory rate and effort will be within normal limits for the patient  11/28/2021 2370 by Binu Raphael RN  Outcome: Ongoing     Problem: MECHANICAL VENTILATION  Goal: Patient will maintain patent airway  11/28/2021 0312 by Binu Raphael RN  Outcome: Ongoing  Goal: Oral health is maintained or improved  11/28/2021 0312 by Binu Raphael RN  Outcome: Ongoing  Goal: ET tube will be managed safely  11/28/2021 0312 by Binu Raphael RN  Outcome: Ongoing

## 2021-11-28 NOTE — PROGRESS NOTES
Department of Internal Medicine  Nephrology [facility], [place]  Progress Note    Reason for consultation: Management of acute kidney injury. Consulting physician: Gadiel Kebede MD.    Interval history:   Patient seen and examined in the ICU today. Patient received dialysis yesterday, intradialytic hypotension was noted minimal ultrafiltration was achieved  Hemoglobin 6.9, patient receiving packed red blood cell  She remains intubated on ventilator  Urine  output was 200 mils overnight. Patient remains edematous. On tube feedings    Patient had episode of bradycardia with transient PEA requiring a short session of CPR on 11/20/21  approximately 15 minutes into hemodialysis. Patient is S/P  right ureteral stent placement with renal ultrasound on 11/18/2021 showing improvement in hydronephrosis. History of present illness: This is a 66 y.o. female with a significant past medical history of Type 2 diabetes mellitus, coronary artery disease, lumbar radiculopathy, Hyperlipidemia and chronic kidney disease stage III [baseline serum creatinine 1.5 mg/dL], who presented from [facility] for further evaluation of acute kidney injury. She was sent to [facility] after developing nonhealing wound s/p cardiac catheterization via right refill Levophed has been discontinued approach with right groin wound. She had been on Bumex in the ECF and creatinine and BUN have been rising and Bumex was decreased to a lowest dose of 0.5 mg p.o. twice daily. She has had regular blood work performed in the [facility] and ECF contacted nephrologist after finding abnormal laboratory studies. She was noted to have a BUN/creatinine 104/2.28 mg/dL with hemoglobin 8 g/dL. She has mild confusion but is not in obvious respiratory distress.     Scheduled Meds:   chlorhexidine  15 mL Mouth/Throat BID    polyvinyl alcohol  1 drop Both Eyes Q4H    And    artificial tears   Both Eyes Q4H    [Held by provider] carvedilol  3.125 mg Oral BID WC    pantoprazole  40 mg IntraVENous BID    And    sodium chloride flush  10 mL IntraVENous BID    sodium polystyrene  15 g Oral Once    sertraline  25 mg Oral Daily    ferrous sulfate  325 mg Oral Daily with breakfast    [Held by provider] aspirin  81 mg Oral Daily    atorvastatin  80 mg Oral Daily    insulin glargine  22 Units SubCUTAneous Nightly    insulin lispro  0-12 Units SubCUTAneous TID WC    insulin lispro  0-6 Units SubCUTAneous Nightly    sodium chloride flush  5-40 mL IntraVENous 2 times per day     Continuous Infusions:   sodium chloride      norepinephrine Stopped (11/28/21 1305)    propofol 15 mcg/kg/min (11/28/21 1329)    dextrose      sodium chloride      sodium chloride       PRN Meds:.sodium chloride, midodrine, anticoagulant sodium citrate, anticoagulant sodium citrate, albumin human, midodrine, fentanNYL, potassium chloride **OR** potassium alternative oral replacement **OR** potassium chloride, magnesium sulfate, glucose, dextrose, glucagon (rDNA), dextrose, sodium chloride, sodium chloride flush, sodium chloride, acetaminophen, ondansetron **OR** ondansetron, oxyCODONE-acetaminophen    Physical Exam:    VITALS:  BP (!) 125/51   Pulse 72   Temp 98.1 °F (36.7 °C) (Axillary)   Resp (!) 0   Ht 5' 3\" (1.6 m) Comment: from old chart  Wt 224 lb 3.3 oz (101.7 kg)   SpO2 100%   BMI 39.72 kg/m²   24HR INTAKE/OUTPUT:      Intake/Output Summary (Last 24 hours) at 11/28/2021 1526  Last data filed at 11/28/2021 1200  Gross per 24 hour   Intake 1725.2 ml   Output 1375 ml   Net 350.2 ml     Constitutional:  on mechanical ventilator    Skin: Skin color, texture, turgor normal. No rashes or lesions    Head: Normocephalic, without obvious abnormality, atraumatic     Cardiovascular/Edema:S1 and S2 heard regular    Respiratory: Lungs:Diminished air entry basal rales intubated on ventilator    Abdomen: soft, non-tender; bowel sounds normal; no masses,  no organomegaly    Back: symmetric, no curvature. ROM normal. No CVA tenderness. Extremities: 3+ generalized edema    Neuro:  Grossly normal    CBC:   Recent Labs     11/26/21  0421 11/27/21  0636 11/28/21  0758   WBC 8.4 6.7 5.5   HGB 7.5* 7.3* 6.9*   PLT 45* 39* 20*     BMP:    Recent Labs     11/26/21  0421 11/27/21  0636 11/28/21  0426   * 130* 135   K 4.0 4.0 3.8   CL 97* 97* 100   CO2 26 25 26   BUN 40* 45* 31*   CREATININE 2.80* 3.23* 2.39*   GLUCOSE 243* 187* 153*     Lab Results   Component Value Date    NITRU NEGATIVE 11/12/2021    COLORU Yellow 11/12/2021    PHUR 6.0 11/12/2021    WBCUA 50  11/12/2021    RBCUA 20 TO 50 11/12/2021    MUCUS NOT REPORTED 11/12/2021    TRICHOMONAS NOT REPORTED 11/12/2021    YEAST NOT REPORTED 11/12/2021    BACTERIA MODERATE 11/12/2021    CLARITYU Clear 09/07/2016    SPECGRAV 1.012 11/12/2021    LEUKOCYTESUR LARGE 11/12/2021    UROBILINOGEN Normal 11/12/2021    BILIRUBINUR NEGATIVE 11/12/2021    BLOODU Positive 09/07/2016    GLUCOSEU NEGATIVE 11/12/2021    KETUA NEGATIVE 11/12/2021    AMORPHOUS NOT REPORTED 11/12/2021     IMPRESSION/RECOMMENDATIONS:      1. Acute kidney injury - most consistent with Ischemic ATN and obstructive nephropathy secondary to bilateral hydronephrosis. OLIGURIC  Repeat renal ultrasound  11/18/2021 shows improvement in hydronephrosis     Patient was started on acute dialysis on 11/17/2021    2. CHF with reduced EF, Systolic dysfunction, diastolic dysfunction. 3. PEA/Cardiac Arrest s/p CPR recovered. 4. Acute respiratory failure s/p intubation on Ventilator. 5. Normocytic anemia/Thrombocytopenia, Heme-oncology following, No hemolysis found, HIT panel negative,    6. Hypotension- off preessors    7. Bilateral hydronephrosis status post cystoscopy with right ureteral  stent placement. 8.  Anasarca secondary to cardiomyopathy-EF of 30-35 % , third spacing renal failure    9. Acute metabolic encephalopathy      Plan:  No hemodialysis today we will plan

## 2021-11-28 NOTE — PROGRESS NOTES
Today's Date: 11/28/2021  Patient Name: Smitha Cavazos  Date of admission: 11/10/2021  4:23 PM  Patient's age: 66 y.o., 1950  Admission Dx: JAYLYN (acute kidney injury) ([facility] [address] [N17.9]  Urinary tract infection in elderly patient [N39.0]  Anemia, unspecified type [D64.9]    Reason for Consult: TCP  Requesting Physician: Enrique Mehta MD    CHIEF COMPLAINT:    Chief Complaint   Patient presents with    Abnormal Lab     low hemoglobin and high CR     SUBJECTIVE:    Patient seen and examined  Hemoglobin dropped to 6.9. Platelet count 91,354 today  No bleeding noted per nurse  Discussed with patient sister at bedside  Patient remains full code. HISTORY OF PRESENT ILLNESS:    Smitha Cavazos is a 66 y.o. female who is admitted to the hospital on 11/10/2021  for acute kidney injury and anemia. Patient has history of chronic kidney disease, chronic anemia and recent history of GI bleed with negative endoscopies at [facility]. She was transferred from nursing home for elevated creatinine and drop in her hemoglobin. Her stool for occult blood on 11/14/2021 was positive. She was evaluated by gastroenterology. Underwent upper endoscopy which showed gastritis. She also was noted to have hydronephrosis on the right side therefore was evaluated by urology and underwent cystoscopy with ureteral stent placement on 11/16/2021. Patient was evaluated by nephrology for her acute kidney injury and was started on acute dialysis on 11/17/2021. Her hemoglobin is stable at 8.0 however her platelets started dropping since admission and today it dropped to 49 therefore hematology was consulted for evaluation of her low platelets. Her iron studies suggest anemia of chronic disease. Her haptoglobin is 125 and LDH is not elevated  Her HIT panel came back negative.     Past Medical History:   has a past medical history of Anemia, unspecified, CAD (coronary artery disease), Chronic kidney disease, CKD (chronic kidney disease) stage 3, GFR 30-59 ml/min (HCC), Closed fracture of dorsal (thoracic) vertebra without mention of spinal cord injury, Coronary atherosclerosis of unspecified type of vessel, native or graft, Depression with anxiety, Esophageal reflux, HTN (hypertension), Hyperkalemia, Hyperlipidemia, Intervertebral cervical disc disorder with myelopathy, cervical region, Lower back pain, Lumbar radiculopathy, Microalbuminuria, Muscle weakness, Pain in limb, Stenosis of cervical spine, Tethered cord syndrome (HCC), Type II or unspecified type diabetes mellitus without mention of complication, not stated as uncontrolled, Urinary calculus, and UTI (lower urinary tract infection). Past Surgical History:   has a past surgical history that includes Colon surgery; angioplasty; joint replacement; Upper gastrointestinal endoscopy (N/A, 11/15/2021); Cystoscopy (Right, 11/16/2021); and IR NONTUNNELED VASCULAR CATHETER > 5 YEARS (11/17/2021). Medications:    Prior to Admission medications    Medication Sig Start Date End Date Taking? Authorizing Provider   acetaminophen (TYLENOL) 325 MG tablet Take 650 mg by mouth every 4 hours as needed for Pain (do not exceed 4000 mg daily)   Yes Historical Provider, MD   albuterol (PROVENTIL) (2.5 MG/3ML) 0.083% nebulizer solution Take 2.5 mg by nebulization every 6 hours as needed for Shortness of Breath   Yes Historical Provider, MD   atorvastatin (LIPITOR) 80 MG tablet Take 80 mg by mouth daily   Yes Historical Provider, MD   famotidine (PEPCID) 20 MG tablet Take 20 mg by mouth daily   Yes Historical Provider, MD   ferrous sulfate (IRON 325) 325 (65 Fe) MG tablet Take 325 mg by mouth daily (with breakfast)   Yes Historical Provider, MD   HYDROcodone-acetaminophen (NORCO) 5-325 MG per tablet Take 1 tablet by mouth every 6 hours as needed for Pain.  Indications: until 11/13/21   Yes Historical Provider, MD   insulin lispro (HUMALOG) 100 UNIT/ML injection vial Inject into the skin 4 times daily (before meals and nightly) Per sliding scale:   150-200 = 2 units  201-250 = 4 units  251-300 = 6 units  301-350 = 8 units  351-400 = 10 units   Yes Historical Provider, MD   insulin glargine (LANTUS SOLOSTAR) 100 UNIT/ML injection pen Inject 22 Units into the skin nightly   Yes Historical Provider, MD   meclizine (ANTIVERT) 12.5 MG tablet Take 12.5 mg by mouth 2 times daily as needed for Dizziness   Yes Historical Provider, MD   warfarin (COUMADIN) 3 MG tablet Take 3 mg by mouth nightly   Yes Historical Provider, MD   carvedilol (COREG) 6.25 MG tablet Take 6.25 mg by mouth 2 times daily (with meals).    Yes Historical Provider, MD   aspirin 81 MG chewable tablet Take 81 mg by mouth daily    Yes Historical Provider, MD   bumetanide (BUMEX) 1 MG tablet Take 1 mg by mouth daily   11/10/21  Historical Provider, MD     Current Facility-Administered Medications   Medication Dose Route Frequency Provider Last Rate Last Admin    0.9 % sodium chloride infusion   IntraVENous PRN Haylie Ramos MD        midodrine (PROAMATINE) tablet 10 mg  10 mg Oral TID PRN Shine White MD   10 mg at 11/24/21 1040    anticoagulant sodium citrate 4 % injection 1.2 mL  1.2 mL IntraCATHeter PRN Ron Wolf MD   1.2 mL at 11/27/21 1310    anticoagulant sodium citrate 4 % injection 1.3 mL  1.3 mL IntraCATHeter PRN Ron Wolf MD   1.3 mL at 11/27/21 1310    albumin human 25 % IV solution 25 g  25 g IntraVENous PRN Shine White MD   Stopped at 11/27/21 1012    midodrine (PROAMATINE) tablet 5 mg  5 mg Oral PRN Shine White MD        norepinephrine (LEVOPHED) 16 mg in sodium chloride 0.9 % 250 mL infusion  2-100 mcg/min IntraVENous Continuous Dianna Mak MD 1.9 mL/hr at 11/28/21 1044 2 mcg/min at 11/28/21 1044    chlorhexidine (PERIDEX) 0.12 % solution 15 mL  15 mL Mouth/Throat BID Joce Brito MD   15 mL at 11/28/21 0911    fentaNYL (SUBLIMAZE) injection 50 mcg  50 mcg IntraVENous Q30 Min PRN Josee Richard MD   50 mcg at 11/27/21 1443    propofol injection  10 mcg/kg/min IntraVENous Continuous Josee Richard MD 5.8 mL/hr at 11/28/21 0026 10 mcg/kg/min at 11/28/21 0026    polyvinyl alcohol (LIQUIFILM TEARS) 1.4 % ophthalmic solution 1 drop  1 drop Both Eyes Q4H Josee Richard MD   1 drop at 11/28/21 0911    And    lubrifresh P.M. (artificial tears) ophthalmic ointment   Both Eyes Q4H Josee Richard MD   Given at 11/28/21 1043    [Held by provider] carvedilol (COREG) tablet 3.125 mg  3.125 mg Oral BID  Mika Watkins MD   3.125 mg at 11/24/21 1735    pantoprazole (PROTONIX) injection 40 mg  40 mg IntraVENous BID Ryan Sheikh MD   40 mg at 11/28/21 0912    And    sodium chloride flush 0.9 % injection 10 mL  10 mL IntraVENous BID Ryan Sheikh MD   10 mL at 11/28/21 0912    potassium chloride (KLOR-CON M) extended release tablet 40 mEq  40 mEq Oral PRN Ryan Sheikh MD        Or    potassium bicarb-citric acid (EFFER-K) effervescent tablet 40 mEq  40 mEq Oral PRN Ryan Sheikh MD   20 mEq at 11/21/21 1659    Or    potassium chloride 10 mEq/100 mL IVPB (Peripheral Line)  10 mEq IntraVENous PRN Ryan Sheikh  mL/hr at 11/22/21 1812 10 mEq at 11/22/21 1812    magnesium sulfate 1000 mg in dextrose 5% 100 mL IVPB  1,000 mg IntraVENous PRN Ryan Sheikh MD   Stopped at 11/21/21 1815    glucose (GLUTOSE) 40 % oral gel 15 g  15 g Oral PRN Ryan Sheikh MD        dextrose 50 % IV solution  12.5 g IntraVENous PRN Ryan Sheikh MD        glucagon (rDNA) injection 1 mg  1 mg IntraMUSCular PRWANG Sheikh MD        dextrose 5 % solution  100 mL/hr IntraVENous PRN Ryan Sheikh MD        sodium polystyrene (KAYEXALATE) 15 GM/60ML suspension 15 g  15 g Oral Once Elsa Vazquez MD        sertraline (ZOLOFT) tablet 25 mg  25 mg Oral Daily Jeb Santacroce, MD   25 mg at 11/28/21 0911    0.9 % sodium chloride infusion   IntraVENous PRN Kourtney Nunn MD        ferrous sulfate (IRON 325) tablet 325 mg  325 mg Oral Daily with breakfast Kourtney Nunn MD   325 mg at 11/28/21 0911    [Held by provider] aspirin chewable tablet 81 mg  81 mg Oral Daily Jeb Gonzales MD   81 mg at 11/13/21 1015    atorvastatin (LIPITOR) tablet 80 mg  80 mg Oral Daily Jeb Gonzales MD   80 mg at 11/28/21 0911    insulin glargine (LANTUS) injection vial 22 Units  22 Units SubCUTAneous Nightly Jeb Gonzales MD   22 Units at 11/27/21 1934    insulin lispro (HUMALOG) injection vial 0-12 Units  0-12 Units SubCUTAneous TID WC Jeb Gonzales MD   2 Units at 11/28/21 0911    insulin lispro (HUMALOG) injection vial 0-6 Units  0-6 Units SubCUTAneous Nightly Jeb Gonzales MD   1 Units at 11/27/21 1934    sodium chloride flush 0.9 % injection 5-40 mL  5-40 mL IntraVENous 2 times per day Kourtney Nunn MD   10 mL at 11/27/21 1934    sodium chloride flush 0.9 % injection 5-40 mL  5-40 mL IntraVENous PRN Jeb Gonzales MD        0.9 % sodium chloride infusion  25 mL IntraVENous PRN Kourtney Nunn MD        acetaminophen (TYLENOL) tablet 650 mg  650 mg Oral Q4H PRN Kourtney Nunn MD   650 mg at 11/16/21 2233    ondansetron (ZOFRAN-ODT) disintegrating tablet 4 mg  4 mg Oral Q8H PRN Jeb Gonzales MD        Or    ondansetron (ZOFRAN) injection 4 mg  4 mg IntraVENous Q6H PRN Kourtney Nunn MD        oxyCODONE-acetaminophen (PERCOCET) 5-325 MG per tablet 1 tablet  1 tablet Oral Q6H PRN Kourtney Nunn MD           Allergies:  Patient has no known allergies. Social History:   reports that she has never smoked. She does not have any smokeless tobacco history on file. She reports that she does not drink alcohol and does not use drugs. Family History: family history includes High Blood Pressure in an other family member.     REVIEW OF SYSTEMS:    Could not be obtained as patient is intubated    PHYSICAL EXAM:      BP (!) 106/35   Pulse 148   Temp 98.4 °F (36.9 °C) (Oral)   Resp 23   Ht 5' 3\" (1.6 m) Comment: from old chart  Wt 224 lb 3.3 oz (101.7 kg)   SpO2 98%   BMI 39.72 kg/m²    Temp (24hrs), Av.2 °F (36.8 °C), Min:97.4 °F (36.3 °C), Max:98.7 °F (37.1 °C)    General appearance - well appearing, no in pain or distress   Mental status -intubated  Mouth - mucous membranes moist, pharynx normal without lesions   Neck - supple, no significant adenopathy   Lymphatics - no palpable lymphadenopathy, no hepatosplenomegaly   Chest - clear to auscultation, no wheezes, rales or rhonchi, symmetric air entry   Heart - normal rate, regular rhythm, normal S1, S2, no murmurs  Abdomen - soft, nontender, nondistended, no masses or organomegaly   Neurological -intubated nonfocal  Musculoskeletal - no joint tenderness, deformity or swelling   Extremities - peripheral pulses normal,+++ pedal edema, no clubbing or cyanosis   Skin -multiple skin bruises noted    DATA:    Labs:   CBC:   Recent Labs     21  0636 21  0758   WBC 6.7 5.5   HGB 7.3* 6.9*   HCT 22.2* 21.4*   PLT 39* 20*     BMP:   Recent Labs     21  0636 21  0426   * 135   K 4.0 3.8   CO2 25 26   BUN 45* 31*   CREATININE 3.23* 2.39*   LABGLOM 14* 20*   GLUCOSE 187* 153*     PT/INR:   Recent Labs     21  1158   PROTIME 15.9*   INR 1.3     SURGICAL PATHOLOGY REPORT [[id number]]    Collected: 21 1535    Updated: 21 0804     --Surgical Pathology Report   [id number]   [facility]   [facility]   ANATOMIC PATHOLOGY   [address] [place] [address], [address]   [phone]   Fax: [phone]   SURGICAL PATHOLOGY CONSULTATION     Patient Name: Mavis De Dios   MR#: [id number]   Specimen #[id number]     Procedures/Addenda   FLOW CYTOMETRY REPORT     Date Ordered:     2021     Status:   Signed Out        Date Complete:     2021     By: Milan Norris M.D.        Date Reported:     2021 change in small pleural effusions and basilar   atelectasis. XR CHEST PORTABLE   Final Result   1. Unchanged appearance of support devices. 2. Increasing left pleural effusion and basilar atelectasis. No significant   change in right pleural effusion. XR CHEST PORTABLE   Final Result   1. Endotracheal tube remains in appropriate position. 2. Interval improved aeration of the left lung base, otherwise no significant   change. XR CHEST PORTABLE   Final Result   1. Endotracheal tube remains in appropriate position. 2. No significant change in pleural effusions and associated basilar   atelectasis. XR CHEST PORTABLE   Final Result   No significant interval change. Mild vascular congestion and small pleural   effusions. XR CHEST PORTABLE   Final Result   1. Endotracheal tube in place with its tip 2 cm above the kristina. 2.  Unchanged cardiomegaly and small to moderate bilateral pleural effusions   with basilar atelectasis. 3.  Support tubes and catheters are otherwise unchanged. XR CHEST PORTABLE   Final Result   Cardiomegaly with bibasilar effusions and adjacent infiltrates. Support tubes as described above. XR CHEST PORTABLE   Final Result   Cardiomegaly with bibasilar effusions and adjacent infiltrates. Support tubes as described above. XR CHEST (SINGLE VIEW FRONTAL)   Final Result   The endotracheal tube ends appropriately above the kristina and below the   clavicular heads. The nasogastric tube ends in the stomach. The right   jugular vein hemodialysis catheter ends in the distal SVC, unchanged. Cardiomegaly. Bibasilar airspace disease and small right pleural effusion,   unchanged. Lungs otherwise grossly clear. No pneumothorax. VL Renal Arterial Duplex Complete   Final Result      XR CHEST PORTABLE   Final Result   Basilar opacities likely represent atelectasis with small pleural effusions. MRI BRAIN WO CONTRAST   Final Result      There is no acute infarction, intracranial hemorrhage, or intracranial mass   lesion. Mild chronic microangiopathic ischemic disease. Mild generalized volume loss. Mild mucosal thickening of bilateral mastoid air cells and right sphenoid   sinus. XR CHEST PORTABLE   Final Result   1. Tip of the newly inserted NG tube overlies the gastric body. .   2. The right IJ CVC and right arm PICC line remain well positioned. 3.  No acute cardiopulmonary abnormality. US RETROPERITONEAL COMPLETE   Final Result   No acute findings. No hydronephrosis. Small right pleural effusion. CT HEAD WO CONTRAST   Final Result   No acute intracranial abnormality. Chronic microvascular disease and chronic lacunar infarcts. Critical results were called by Dr. Aquilino Stone to Dr Elisa Braun on 11/17/2021 at   22:0. XR CHEST PORTABLE   Final Result   1. No pneumothorax evident following thoracentesis. 2. Low lung volumes. Significantly improved pneumatization lower right lung   with minimal pleural effusion bilateral evident. 3. Calcific atherosclerosis aorta. 4. Cardiomegaly. IR GUIDED THORACENTESIS PLEURAL   Final Result   Successful ultrasound guided right thoracentesis. IR NONTUNNELED VASCULAR CATHETER > 5 YEARS   Final Result   Successful ultrasound guided non-tunneled right IJ temporary hemodialysis   catheter placement. Catheter is ready for dialysis use at this time. FLUORO FOR SURGICAL PROCEDURES   Final Result      XR CHEST PORTABLE   Final Result   Moderate right pleural effusion and associated airspace disease. Pneumonia   cannot be excluded. US RENAL COMPLETE   Final Result   1. Mild to moderate right-sided hydronephrosis. 2. Limited renal ultrasound. No left-sided hydronephrosis.          CT ABDOMEN PELVIS WO CONTRAST Additional Contrast? None   Final Result   Severe right and mild left hydroureteronephrosis. No obstructing ureteral   stone is seen. Wall thickening of the urinary bladder without significant   urinary bladder wall distension. Outlet obstruction is a consideration. Right-sided ostomy. There is bowel wall thickening proximal to the ostomy,   may suggest infectious or inflammatory enteritis. No drainable fluid   collections or free air. No evidence of bowel obstruction. Moderate right pleural effusion. Bibasilar infiltrates or atelectasis. Clinical correlation to exclude   pneumonia. US RETROPERITONEAL COMPLETE   Final Result   1. Moderate to severe right and mild-to-moderate left hydronephrosis. 2. Exam limited due to patient's body habitus. VL Renal Vein Scan Bilat    (Results Pending)       Primary Problem  JAYLYN (acute kidney injury) [facility])    Active Hospital Problems    Diagnosis Date Noted    Acute metabolic encephalopathy [U10.48]     Longstanding persistent atrial fibrillation ([facility] [address] [I48.11] 11/16/2021    Hydronephrosis determined by ultrasound [N13.30] 11/14/2021    Iron deficiency anemia [D50.9] 11/12/2021    JAYLYN (acute kidney injury) ([facility] [address] [N17.9] 11/10/2021    CKD (chronic kidney disease) stage 3, GFR 30-59 ml/min ([facility]) [N18.30]     HTN (hypertension) [I10]     Type 2 diabetes mellitus with kidney complication, with long-term current use of insulin ([facility]) [E11.29, Z79.4]     ASCVD (arteriosclerotic cardiovascular disease) [I25.10]        IMPRESSION:   1. Acute kidney injury and started on dialysis by nephrology  2. Anemia, stool occult positive for blood and had upper endoscopy showing gastritis. Anemia could be related to chronic disease and also renal disease and aggravated by GI bleed  3. Thrombocytopenia  4. Acute encephalopathy  5. Bilateral hydronephrosis  6. Cardiomyopathy with EF of 30-35%  7. Generalized anasarca  8.  S/p PEA, resucitated    RECOMMENDATIONS:  1. I reviewed the laboratory data, imaging studies, diagnosis, other treatment recommendations  2. Her anemia appears to be multifactorial with acute drop possibly from GI bleed  3. We will transfuse 1 unit of packed RBC  4. 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 Will need BM biopsy once stable  5. Continue to monitor platelet count  6. Transfuse platelets if bleeding or any surgical  Procedure planned or plts less than 15K  7. Discussed with patient's sister at bedside prognosis poor however patient's POA is her  who wants patient to be full code  8. Hold off on anticoagulation as long as platelet counts below 50,000  9. Prognosis poor  10. Monitor counts closely  11. We will follow    Discussed with pt and Nurse. Thank you for asking us to see this patient. Connie Greene MD    This note is created with the assistance of a speech recognition program.  While intending to generate a document that actually reflects the content of the visit, the document can still have some errors including those of syntax and sound a like substitutions which may escape proof reading. It such instances, actual meaning can be extrapolated by contextual diversion.

## 2021-11-28 NOTE — PROGRESS NOTES
Physical Therapy        Physical Therapy Cancel Note      DATE: 2021    NAME: James Quarles  MRN: [id number]   : 1950      Patient not seen this date for Physical Therapy due to:    Sedation: Pt is on vent this date. Will check back next date as able for PT services.        Electronically signed by Skip Reid PTA on 2021 at 10:48 AM

## 2021-11-28 NOTE — PLAN OF CARE
Ongoing     Problem: Non-Violent Restraints  Goal: Patient's dignity will be maintained  11/28/2021 1126 by Taisha Cabezas RN  Outcome: Ongoing   Pt in wrist restraints to protect artificial airway. Pt does attempt to pull ETT and lines when restraints released for ROM. Problem: Airway Clearance - Ineffective:  Goal: Ability to maintain a clear airway will improve  Description: Ability to maintain a clear airway will improve  11/28/2021 1126 by Taisha Cabezas RN  Outcome: Ongoing     Problem:  Bowel Function - Altered:  Goal: Bowel elimination is within specified parameters  Description: Bowel elimination is within specified parameters  11/28/2021 1126 by Taisha Cabezas RN  Outcome: Ongoing     Problem: Fluid Volume - Imbalance:  Goal: Absence of imbalanced fluid volume signs and symptoms  Description: Absence of imbalanced fluid volume signs and symptoms  11/28/2021 1126 by Taisha Cabezas RN  Outcome: Ongoing        Problem: Falls - Risk of:  Goal: Will remain free from falls  Description: Will remain free from falls  11/28/2021 1126 by Taisha Cabezas RN  Outcome: Ongoing

## 2021-11-28 NOTE — PROGRESS NOTES
Hospitalist Progress Note  11/28/2021 12:23 PM  Subjective:   Admit Date: 11/10/2021  PCP: Pedro Mooney DO     Full Code      C/c:  Chief Complaint   Patient presents with    Abnormal Lab     low hemoglobin and high CR         Interval History: pt had a drop in bp and bradycardia, pt repoitioned, levophed started, pt still intubated, pt unable to tolerate dialysis    Diet: ADULT TUBE FEEDING; Nasogastric; Renal Formula; Continuous; 35; No; 30; Q 8 hours; Protein; 1 Protein at 12 noon daily. Flush with 30 mLwater before and after administration.                                 ip days:18  Medications:   Scheduled Meds:   chlorhexidine  15 mL Mouth/Throat BID    polyvinyl alcohol  1 drop Both Eyes Q4H    And    artificial tears   Both Eyes Q4H    [Held by provider] carvedilol  3.125 mg Oral BID WC    pantoprazole  40 mg IntraVENous BID    And    sodium chloride flush  10 mL IntraVENous BID    sodium polystyrene  15 g Oral Once    sertraline  25 mg Oral Daily    ferrous sulfate  325 mg Oral Daily with breakfast    [Held by provider] aspirin  81 mg Oral Daily    atorvastatin  80 mg Oral Daily    insulin glargine  22 Units SubCUTAneous Nightly    insulin lispro  0-12 Units SubCUTAneous TID WC    insulin lispro  0-6 Units SubCUTAneous Nightly    sodium chloride flush  5-40 mL IntraVENous 2 times per day     Continuous Infusions:   sodium chloride      norepinephrine 1 mcg/min (11/28/21 1130)    propofol 10 mcg/kg/min (11/28/21 0026)    dextrose      sodium chloride      sodium chloride       PRN Meds:.sodium chloride, midodrine, anticoagulant sodium citrate, anticoagulant sodium citrate, albumin human, midodrine, fentanNYL, potassium chloride **OR** potassium alternative oral replacement **OR** potassium chloride, magnesium sulfate, glucose, dextrose, glucagon (rDNA), dextrose, sodium chloride, sodium chloride flush, sodium chloride, acetaminophen, ondansetron **OR** ondansetron, with small to moderate bilateral pleural effusions. There is associated passive basilar atelectasis. Mild pulmonary venous hypertension. There is a gastric catheter passing into the stomach, tip is not included on the film. Stable right IJ central venous catheter. 1.  Endotracheal tube in place with its tip 2 cm above the kristina. 2.  Unchanged cardiomegaly and small to moderate bilateral pleural effusions with basilar atelectasis. 3.  Support tubes and catheters are otherwise unchanged. XR CHEST PORTABLE    Result Date: 11/22/2021  EXAMINATION: ONE XRAY VIEW OF THE CHEST 11/22/2021 6:36 am COMPARISON: Chest radiograph performed 11/21/2021. HISTORY: ORDERING SYSTEM PROVIDED HISTORY: ETT placement TECHNOLOGIST PROVIDED HISTORY: ETT placement Reason for Exam: ETT placement Acuity: Acute Type of Exam: Ongoing FINDINGS: There are bilateral effusions with adjacent infiltrates. There is no pneumothorax. The heart is enlarged. The upper abdomen unremarkable. The extrathoracic soft tissues are unremarkable. There is an endotracheal tube with the tip in the midtrachea. There is a gastric tube and the tip is not visualized. There is a right internal jugular line. Cardiomegaly with bibasilar effusions and adjacent infiltrates. Support tubes as described above. Assessment :   1. Sepsis/?code this am, on levo drip/intubated  2. ckd     Plan:   1. Spoke with family  2.   Continue present care    Patient Active Problem List:     Chronic kidney disease (CKD), stage III (moderate) ([facility])     Hyperkalemia     Type 2 diabetes mellitus with kidney complication, with long-term current use of insulin ([facility])     Hyperlipidemia     CKD (chronic kidney disease) stage 3, GFR 30-59 ml/min ([facility])     Microalbuminuria     Urinary calculus     Tethered cord syndrome ([facility])     Stenosis of cervical spine     Lumbar radiculopathy     Lower back pain     HTN (hypertension)     Esophageal reflux     Depression with anxiety ASCVD (arteriosclerotic cardiovascular disease)     Closed fracture of thoracic vertebra (HCC)     Intervertebral cervical disc disorder with myelopathy, cervical region     Pain in limb     Anemia     JAYLYN (acute kidney injury) ([facility] [address]     Iron deficiency anemia     Hydronephrosis determined by ultrasound     Longstanding persistent atrial fibrillation ([facility]     Acute metabolic encephalopathy      Anticipated Disposition upon discharge: [] Home                                                                         [] Home with Home Health                                                                         [] [facility]                                                                         [] [address]      Patient is admitted as inpatient status because of co-morbidities listed above, severity of signs and symptoms as outlined, requirement for current medical therapies and most importantly because of direct risk to patient if care not provided in a hospital setting.           Eddie Beth MD, MD  [facility] Hospitalist

## 2021-11-28 NOTE — PROGRESS NOTES
[address], [address]  [phone]          Progress Note    Patient Name:  Carmel Castro    :  2021 11:14 AM      SUBJECTIVE       Ms. Franklin  Developed hypotension and bradycardia this am when repositioned. Levophed was started and she developed tachycardia. She has not tolerated dialysis well. OBJECTIVE     Vital signs:    BP (!) 140/51   Pulse 100   Temp 98.2 °F (36.8 °C) (Axillary)   Resp 14   Ht 5' 3\" (1.6 m) Comment: from old chart  Wt 224 lb 3.3 oz (101.7 kg)   SpO2 98%   BMI 39.72 kg/m²  2 L/min  .tro    Admit Weight:  188 lb (85.3 kg)    Last 3 weights: Wt Readings from Last 3 Encounters:   21 224 lb 3.3 oz (101.7 kg)   16 178 lb (80.7 kg)   16 184 lb 12.8 oz (83.8 kg)       BMI: Body mass index is 39.72 kg/m².     Input/Output:       Intake/Output Summary (Last 24 hours) at 2021 1114  Last data filed at 2021 0400  Gross per 24 hour   Intake 2643.2 ml   Output 2475 ml   Net 168.2 ml       Date 21 0000 - 21 2359   Shift 5240-6040 6231-7184 9017-0127 24 Hour Total   INTAKE   I.V.(mL/kg) 55(0.5)   55(0.5)   NG/GT(mL/kg) 352(3.5)   352(3.5)   Shift Total(mL/kg) 407(4)   407(4)   OUTPUT   Urine(mL/kg/hr) 25(0)   25   Stool(mL/kg) 550(5.4)   550(5.4)   Shift Total(mL/kg) 575(5.7)   575(5.7)   Weight (kg) 101.7 101.7 101.7 101.7     Exam:     General appearance: awake and alert moves all ext   Lungs: no rhonchi, no wheezes, no rales  Heart: S1 and S2 no murmur  Abdomen: positive bowel sounds, no bruits, no masses  Extremities: warm and dry, no cyanosis, no clubbing        Laboratory Studies:     CBC:   Recent Labs     21  0421 21  0636 21  0758   WBC 8.4 6.7 5.5   HGB 7.5* 7.3* 6.9*   HCT 23.6* 22.2* 21.4*   MCV 96.1 96.1 95.9   PLT 45* 39* 20*     BMP:   Recent Labs     21  1158 211 21  0636 21  0426   NA  --  132* 130* 135   K --  4.0 4.0 3.8   CL  --  97* 97* 100   CO2  --     PHOS 1.9* 2.6  --   --    BUN  --  40* 45* 31*   CREATININE  --  2.80* 3.23* 2.39*     PT/INR:   Recent Labs     21  1158   PROTIME 15.9*   INR 1.3     APTT:   Recent Labs     21  1158   APTT 34.9     MAG:   Recent Labs     21  0421 21  0636 21  0426   MG 2.0 2.0 1.9     D Dimer: No results for input(s): DDIMER in the last 72 hours. Troponin    Recent Labs     21  1441 21  06   TROPHS 217* 199*                BNP No results for input(s): BNP in the last 72 hours. No results for input(s): PROBNP in the last 72 hours. Pulse Ox: SpO2  Av.6 %  Min: 98 %  Max: 100 %  Supplemental O2: O2 Flow Rate (L/min): 2 L/min     Current Meds:    chlorhexidine  15 mL Mouth/Throat BID    polyvinyl alcohol  1 drop Both Eyes Q4H    And    artificial tears   Both Eyes Q4H    [Held by provider] carvedilol  3.125 mg Oral BID WC    pantoprazole  40 mg IntraVENous BID    And    sodium chloride flush  10 mL IntraVENous BID    sodium polystyrene  15 g Oral Once    sertraline  25 mg Oral Daily    ferrous sulfate  325 mg Oral Daily with breakfast    [Held by provider] aspirin  81 mg Oral Daily    atorvastatin  80 mg Oral Daily    insulin glargine  22 Units SubCUTAneous Nightly    insulin lispro  0-12 Units SubCUTAneous TID     insulin lispro  0-6 Units SubCUTAneous Nightly    sodium chloride flush  5-40 mL IntraVENous 2 times per day     Continuous Infusions:    sodium chloride      norepinephrine 2 mcg/min (21 1044)    propofol 10 mcg/kg/min (21 0026)    dextrose      sodium chloride      sodium chloride           XR CHEST (SINGLE VIEW FRONTAL)    Result Date: 2021  1. Unchanged appearance of support devices. 2. No significant interval change in small pleural effusions and basilar atelectasis. XR CHEST PORTABLE    Result Date: 2021  1.  Unchanged appearance of support devices. 2. Increasing left pleural effusion and basilar atelectasis. No significant change in right pleural effusion. XR CHEST PORTABLE    Result Date: 11/26/2021  1. Endotracheal tube remains in appropriate position. 2. Interval improved aeration of the left lung base, otherwise no significant change. XR CHEST PORTABLE    Result Date: 11/25/2021  1. Endotracheal tube remains in appropriate position. 2. No significant change in pleural effusions and associated basilar atelectasis. XR CHEST PORTABLE    Result Date: 11/24/2021  No significant interval change. Mild vascular congestion and small pleural effusions. XR CHEST PORTABLE    Result Date: 11/23/2021  1. Endotracheal tube in place with its tip 2 cm above the kristina. 2.  Unchanged cardiomegaly and small to moderate bilateral pleural effusions with basilar atelectasis. 3.  Support tubes and catheters are otherwise unchanged. XR CHEST PORTABLE    Result Date: 11/22/2021  Cardiomegaly with bibasilar effusions and adjacent infiltrates. Support tubes as described above. Echo:      ASSESSMENT     Principal Problem:    JAYLYN (acute kidney injury) ([facility] [address]  Active Problems:    Type 2 diabetes mellitus with kidney complication, with long-term current use of insulin ([facility])    CKD (chronic kidney disease) stage 3, GFR 30-59 ml/min ([facility])    HTN (hypertension)    ASCVD (arteriosclerotic cardiovascular disease)    Iron deficiency anemia    Hydronephrosis determined by ultrasound    Longstanding persistent atrial fibrillation ([facility])    Acute metabolic encephalopathy  Resolved Problems:    * No resolved hospital problems. *  Severe LV dysfunction    PLAN     Continue supportive care. Patient is doing poorly requiring pressor support, dialysis, ventilator support. Prognosis is poor. Will continue to follow.         Electronically signed by Maira Ferrer DO on 11/28/2021 at 11:14 AM

## 2021-11-29 ENCOUNTER — APPOINTMENT (OUTPATIENT)
Dept: GENERAL RADIOLOGY | Age: 71
DRG: 004 | End: 2021-11-29
Payer: COMMERCIAL

## 2021-11-29 LAB
ABSOLUTE EOS #: 0.2 K/UL (ref 0–0.4)
ABSOLUTE IMMATURE GRANULOCYTE: ABNORMAL K/UL (ref 0–0.3)
ABSOLUTE LYMPH #: 1.2 K/UL (ref 1–4.8)
ABSOLUTE MONO #: 0.5 K/UL (ref 0.1–1.3)
ALLEN TEST: ABNORMAL
ANION GAP SERPL CALCULATED.3IONS-SCNC: 8 MMOL/L (ref 9–17)
BASOPHILS # BLD: 0 % (ref 0–2)
BASOPHILS ABSOLUTE: 0 K/UL (ref 0–0.2)
BUN BLDV-MCNC: 43 MG/DL (ref 8–23)
BUN/CREAT BLD: ABNORMAL (ref 9–20)
CALCIUM SERPL-MCNC: 8.2 MG/DL (ref 8.6–10.4)
CARBOXYHEMOGLOBIN: 1.2 % (ref 0–5)
CHLORIDE BLD-SCNC: 98 MMOL/L (ref 98–107)
CO2: 26 MMOL/L (ref 20–31)
CREAT SERPL-MCNC: 2.99 MG/DL (ref 0.5–0.9)
DIFFERENTIAL TYPE: ABNORMAL
EKG ATRIAL RATE: 141 BPM
EKG ATRIAL RATE: 340 BPM
EKG Q-T INTERVAL: 416 MS
EKG Q-T INTERVAL: 460 MS
EKG QRS DURATION: 120 MS
EKG QRS DURATION: 144 MS
EKG QTC CALCULATION (BAZETT): 491 MS
EKG QTC CALCULATION (BAZETT): 524 MS
EKG R AXIS: -9 DEGREES
EKG R AXIS: 12 DEGREES
EKG T AXIS: -111 DEGREES
EKG T AXIS: -4 DEGREES
EKG VENTRICULAR RATE: 78 BPM
EKG VENTRICULAR RATE: 84 BPM
EOSINOPHILS RELATIVE PERCENT: 3 % (ref 0–4)
FIO2: 30
GFR AFRICAN AMERICAN: 19 ML/MIN
GFR NON-AFRICAN AMERICAN: 15 ML/MIN
GFR SERPL CREATININE-BSD FRML MDRD: ABNORMAL ML/MIN/{1.73_M2}
GFR SERPL CREATININE-BSD FRML MDRD: ABNORMAL ML/MIN/{1.73_M2}
GLUCOSE BLD-MCNC: 136 MG/DL (ref 65–105)
GLUCOSE BLD-MCNC: 147 MG/DL (ref 65–105)
GLUCOSE BLD-MCNC: 156 MG/DL (ref 70–99)
GLUCOSE BLD-MCNC: 159 MG/DL (ref 65–105)
HCO3 ARTERIAL: 27.5 MMOL/L (ref 22–26)
HCT VFR BLD CALC: 25.7 % (ref 36–46)
HEMOGLOBIN: 8.2 G/DL (ref 12–16)
IMMATURE GRANULOCYTES: ABNORMAL %
LYMPHOCYTES # BLD: 19 % (ref 24–44)
MAGNESIUM: 2 MG/DL (ref 1.6–2.6)
MCH RBC QN AUTO: 30.7 PG (ref 26–34)
MCHC RBC AUTO-ENTMCNC: 32.1 G/DL (ref 31–37)
MCV RBC AUTO: 95.9 FL (ref 80–100)
METHEMOGLOBIN: 1.2 % (ref 0–1.9)
MODE: ABNORMAL
MONOCYTES # BLD: 8 % (ref 1–7)
NEGATIVE BASE EXCESS, ART: ABNORMAL MMOL/L (ref 0–2)
NOTIFICATION TIME: ABNORMAL
NOTIFICATION: ABNORMAL
NRBC AUTOMATED: ABNORMAL PER 100 WBC
O2 DEVICE/FLOW/%: ABNORMAL
O2 SAT, ARTERIAL: 95.8 % (ref 95–98)
OXYHEMOGLOBIN: ABNORMAL % (ref 95–98)
PATIENT TEMP: 37
PCO2 ARTERIAL: 48.4 MMHG (ref 35–45)
PCO2, ART, TEMP ADJ: ABNORMAL (ref 35–45)
PDW BLD-RTO: 16.5 % (ref 11.5–14.9)
PEEP/CPAP: 5
PH ARTERIAL: 7.36 (ref 7.35–7.45)
PH, ART, TEMP ADJ: ABNORMAL (ref 7.35–7.45)
PLATELET # BLD: 23 K/UL (ref 150–450)
PLATELET ESTIMATE: ABNORMAL
PMV BLD AUTO: 10 FL (ref 6–12)
PO2 ARTERIAL: 104 MMHG (ref 80–100)
PO2, ART, TEMP ADJ: ABNORMAL MMHG (ref 80–100)
POSITIVE BASE EXCESS, ART: 2.1 MMOL/L (ref 0–2)
POTASSIUM SERPL-SCNC: 3.9 MMOL/L (ref 3.7–5.3)
PSV: ABNORMAL
PT. POSITION: ABNORMAL
RBC # BLD: 2.68 M/UL (ref 4–5.2)
RBC # BLD: ABNORMAL 10*6/UL
RESPIRATORY RATE: 16
SAMPLE SITE: ABNORMAL
SEG NEUTROPHILS: 70 % (ref 36–66)
SEGMENTED NEUTROPHILS ABSOLUTE COUNT: 4.3 K/UL (ref 1.3–9.1)
SET RATE: 16
SODIUM BLD-SCNC: 132 MMOL/L (ref 135–144)
TEXT FOR RESPIRATORY: ABNORMAL
TOTAL HB: ABNORMAL G/DL (ref 12–16)
TOTAL RATE: 18
VT: 450
WBC # BLD: 6.2 K/UL (ref 3.5–11)
WBC # BLD: ABNORMAL 10*3/UL

## 2021-11-29 PROCEDURE — 82805 BLOOD GASES W/O2 SATURATION: CPT

## 2021-11-29 PROCEDURE — 6360000002 HC RX W HCPCS: Performed by: FAMILY MEDICINE

## 2021-11-29 PROCEDURE — 6370000000 HC RX 637 (ALT 250 FOR IP): Performed by: INTERNAL MEDICINE

## 2021-11-29 PROCEDURE — 80048 BASIC METABOLIC PNL TOTAL CA: CPT

## 2021-11-29 PROCEDURE — 6360000002 HC RX W HCPCS: Performed by: INTERNAL MEDICINE

## 2021-11-29 PROCEDURE — P9047 ALBUMIN (HUMAN), 25%, 50ML: HCPCS | Performed by: INTERNAL MEDICINE

## 2021-11-29 PROCEDURE — 2580000003 HC RX 258: Performed by: FAMILY MEDICINE

## 2021-11-29 PROCEDURE — 2500000003 HC RX 250 WO HCPCS: Performed by: INTERNAL MEDICINE

## 2021-11-29 PROCEDURE — 71045 X-RAY EXAM CHEST 1 VIEW: CPT

## 2021-11-29 PROCEDURE — 36415 COLL VENOUS BLD VENIPUNCTURE: CPT

## 2021-11-29 PROCEDURE — 2000000000 HC ICU R&B

## 2021-11-29 PROCEDURE — 83735 ASSAY OF MAGNESIUM: CPT

## 2021-11-29 PROCEDURE — 36600 WITHDRAWAL OF ARTERIAL BLOOD: CPT

## 2021-11-29 PROCEDURE — 2580000003 HC RX 258: Performed by: UROLOGY

## 2021-11-29 PROCEDURE — 99213 OFFICE O/P EST LOW 20 MIN: CPT

## 2021-11-29 PROCEDURE — 6370000000 HC RX 637 (ALT 250 FOR IP): Performed by: UROLOGY

## 2021-11-29 PROCEDURE — 99232 SBSQ HOSP IP/OBS MODERATE 35: CPT | Performed by: INTERNAL MEDICINE

## 2021-11-29 PROCEDURE — C9113 INJ PANTOPRAZOLE SODIUM, VIA: HCPCS | Performed by: FAMILY MEDICINE

## 2021-11-29 PROCEDURE — 82947 ASSAY GLUCOSE BLOOD QUANT: CPT

## 2021-11-29 PROCEDURE — 85025 COMPLETE CBC W/AUTO DIFF WBC: CPT

## 2021-11-29 PROCEDURE — 99223 1ST HOSP IP/OBS HIGH 75: CPT | Performed by: NURSE PRACTITIONER

## 2021-11-29 RX ADMIN — ALBUMIN (HUMAN) 25 G: 0.25 INJECTION, SOLUTION INTRAVENOUS at 12:59

## 2021-11-29 RX ADMIN — Medication 1.2 ML: at 16:33

## 2021-11-29 RX ADMIN — SODIUM CHLORIDE, PRESERVATIVE FREE 10 ML: 5 INJECTION INTRAVENOUS at 21:43

## 2021-11-29 RX ADMIN — INSULIN LISPRO 2 UNITS: 100 INJECTION, SOLUTION INTRAVENOUS; SUBCUTANEOUS at 07:47

## 2021-11-29 RX ADMIN — SODIUM CHLORIDE, PRESERVATIVE FREE 10 ML: 5 INJECTION INTRAVENOUS at 21:46

## 2021-11-29 RX ADMIN — INSULIN GLARGINE 22 UNITS: 100 INJECTION, SOLUTION SUBCUTANEOUS at 21:44

## 2021-11-29 RX ADMIN — Medication 5 MCG/MIN: at 22:49

## 2021-11-29 RX ADMIN — INSULIN LISPRO 2 UNITS: 100 INJECTION, SOLUTION INTRAVENOUS; SUBCUTANEOUS at 17:36

## 2021-11-29 RX ADMIN — Medication 1.3 ML: at 16:33

## 2021-11-29 RX ADMIN — MINERAL OIL, PETROLATUM: 425; 568 OINTMENT OPHTHALMIC at 05:16

## 2021-11-29 RX ADMIN — PANTOPRAZOLE SODIUM 40 MG: 40 INJECTION, POWDER, FOR SOLUTION INTRAVENOUS at 21:43

## 2021-11-29 RX ADMIN — MINERAL OIL, PETROLATUM: 425; 568 OINTMENT OPHTHALMIC at 03:15

## 2021-11-29 RX ADMIN — POLYVINYL ALCOHOL 1 DROP: 14 SOLUTION/ DROPS OPHTHALMIC at 07:46

## 2021-11-29 RX ADMIN — POLYVINYL ALCOHOL 1 DROP: 14 SOLUTION/ DROPS OPHTHALMIC at 03:23

## 2021-11-29 RX ADMIN — CHLORHEXIDINE GLUCONATE 0.12% ORAL RINSE 15 ML: 1.2 LIQUID ORAL at 21:43

## 2021-11-29 RX ADMIN — INSULIN LISPRO 2 UNITS: 100 INJECTION, SOLUTION INTRAVENOUS; SUBCUTANEOUS at 12:50

## 2021-11-29 RX ADMIN — CHLORHEXIDINE GLUCONATE 0.12% ORAL RINSE 15 ML: 1.2 LIQUID ORAL at 07:46

## 2021-11-29 RX ADMIN — INSULIN LISPRO 1 UNITS: 100 INJECTION, SOLUTION INTRAVENOUS; SUBCUTANEOUS at 21:45

## 2021-11-29 RX ADMIN — SODIUM CHLORIDE, PRESERVATIVE FREE 10 ML: 5 INJECTION INTRAVENOUS at 07:47

## 2021-11-29 RX ADMIN — PANTOPRAZOLE SODIUM 40 MG: 40 INJECTION, POWDER, FOR SOLUTION INTRAVENOUS at 07:46

## 2021-11-29 RX ADMIN — MINERAL OIL, PETROLATUM: 425; 568 OINTMENT OPHTHALMIC at 21:47

## 2021-11-29 RX ADMIN — SERTRALINE HYDROCHLORIDE 25 MG: 50 TABLET ORAL at 07:45

## 2021-11-29 RX ADMIN — PROPOFOL 15 MCG/KG/MIN: 10 INJECTION, EMULSION INTRAVENOUS at 16:05

## 2021-11-29 RX ADMIN — ALBUMIN (HUMAN) 25 G: 0.25 INJECTION, SOLUTION INTRAVENOUS at 13:49

## 2021-11-29 RX ADMIN — PROPOFOL 15 MCG/KG/MIN: 10 INJECTION, EMULSION INTRAVENOUS at 07:45

## 2021-11-29 RX ADMIN — FERROUS SULFATE TAB 325 MG (65 MG ELEMENTAL FE) 325 MG: 325 (65 FE) TAB at 07:45

## 2021-11-29 RX ADMIN — ATORVASTATIN CALCIUM 80 MG: 80 TABLET, FILM COATED ORAL at 07:45

## 2021-11-29 RX ADMIN — POLYVINYL ALCOHOL 1 DROP: 14 SOLUTION/ DROPS OPHTHALMIC at 21:46

## 2021-11-29 RX ADMIN — MIDODRINE HYDROCHLORIDE 10 MG: 10 TABLET ORAL at 13:53

## 2021-11-29 ASSESSMENT — PULMONARY FUNCTION TESTS
PIF_VALUE: 28
PIF_VALUE: 26
PIF_VALUE: 17
PIF_VALUE: 34
PIF_VALUE: 26
PIF_VALUE: 26
PIF_VALUE: 29
PIF_VALUE: 36
PIF_VALUE: 25
PIF_VALUE: 12
PIF_VALUE: 23
PIF_VALUE: 19
PIF_VALUE: 24
PIF_VALUE: 32
PIF_VALUE: 25
PIF_VALUE: 27
PIF_VALUE: 29
PIF_VALUE: 24
PIF_VALUE: 25
PIF_VALUE: 29
PIF_VALUE: 32
PIF_VALUE: 24
PIF_VALUE: 44
PIF_VALUE: 29
PIF_VALUE: 37
PIF_VALUE: 26
PIF_VALUE: 33
PIF_VALUE: 24
PIF_VALUE: 25
PIF_VALUE: 29
PIF_VALUE: 27
PIF_VALUE: 28
PIF_VALUE: 23
PIF_VALUE: 29
PIF_VALUE: 25
PIF_VALUE: 23
PIF_VALUE: 32
PIF_VALUE: 23
PIF_VALUE: 23
PIF_VALUE: 27
PIF_VALUE: 19
PIF_VALUE: 31
PIF_VALUE: 24
PIF_VALUE: 29
PIF_VALUE: 25
PIF_VALUE: 44
PIF_VALUE: 38
PIF_VALUE: 34
PIF_VALUE: 23
PIF_VALUE: 37
PIF_VALUE: 32
PIF_VALUE: 24
PIF_VALUE: 25
PIF_VALUE: 24
PIF_VALUE: 37
PIF_VALUE: 23
PIF_VALUE: 28
PIF_VALUE: 23
PIF_VALUE: 32
PIF_VALUE: 30
PIF_VALUE: 23
PIF_VALUE: 23
PIF_VALUE: 31
PIF_VALUE: 25
PIF_VALUE: 34
PIF_VALUE: 25
PIF_VALUE: 25

## 2021-11-29 ASSESSMENT — PAIN SCALES - GENERAL
PAINLEVEL_OUTOF10: 0

## 2021-11-29 NOTE — PROGRESS NOTES
[address] [place] [address]    Progress Note    11/29/2021    9:02 AM    Name:   Jazmine Wong  MRN:     [id number]     Acct:      [de-identified]   Room:   2004/2004-01  IP Day:  23  Admit Date:  11/10/2021  4:23 PM    PCP:   Ever Lubin DO  Code Status:  Full Code    Subjective:     C/C:   Chief Complaint   Patient presents with    Abnormal Lab     low hemoglobin and high CR     Interval History Status: not changed. Patient initially admitted with anemia elevated creatinine, developed acute respiratory failure, currently intubated. She is on sedation with for but appears awake. Not agitated. Currently undergoing dialysis, systolic blood pressure in the 80s per records, she has failed previous weaning trials. Afebrile  No leucocytosis  Hemoglobin stable  Platelets low at 23      Review of Systems:     Unable to obtain    Medications:      Allergies:  No Known Allergies    Current Meds:   Scheduled Meds:    chlorhexidine  15 mL Mouth/Throat BID    polyvinyl alcohol  1 drop Both Eyes Q4H    And    artificial tears   Both Eyes Q4H    [Held by provider] carvedilol  3.125 mg Oral BID WC    pantoprazole  40 mg IntraVENous BID    And    sodium chloride flush  10 mL IntraVENous BID    sodium polystyrene  15 g Oral Once    sertraline  25 mg Oral Daily    ferrous sulfate  325 mg Oral Daily with breakfast    [Held by provider] aspirin  81 mg Oral Daily    atorvastatin  80 mg Oral Daily    insulin glargine  22 Units SubCUTAneous Nightly    insulin lispro  0-12 Units SubCUTAneous TID WC    insulin lispro  0-6 Units SubCUTAneous Nightly    sodium chloride flush  5-40 mL IntraVENous 2 times per day     Continuous Infusions:    sodium chloride      norepinephrine Stopped (11/28/21 1305)    propofol 15 mcg/kg/min (11/29/21 0748)    dextrose      sodium chloride      sodium chloride       PRN Meds: sodium chloride, midodrine, anticoagulant sodium citrate, anticoagulant sodium citrate, albumin human, midodrine, fentanNYL, potassium chloride **OR** potassium alternative oral replacement **OR** potassium chloride, magnesium sulfate, glucose, dextrose, glucagon (rDNA), dextrose, sodium chloride, sodium chloride flush, sodium chloride, acetaminophen, ondansetron **OR** ondansetron, oxyCODONE-acetaminophen    Data:     Past Medical History:   has a past medical history of Anemia, unspecified, CAD (coronary artery disease), Chronic kidney disease, CKD (chronic kidney disease) stage 3, GFR 30-59 ml/min ([facility]), Closed fracture of dorsal (thoracic) vertebra without mention of spinal cord injury, Coronary atherosclerosis of unspecified type of vessel, native or graft, Depression with anxiety, Esophageal reflux, HTN (hypertension), Hyperkalemia, Hyperlipidemia, Intervertebral cervical disc disorder with myelopathy, cervical region, Lower back pain, Lumbar radiculopathy, Microalbuminuria, Muscle weakness, Pain in limb, Stenosis of cervical spine, Tethered cord syndrome ([facility] [address] Type II or unspecified type diabetes mellitus without mention of complication, not stated as uncontrolled, Urinary calculus, and UTI (lower urinary tract infection). Social History:   reports that she has never smoked. She does not have any smokeless tobacco history on file. She reports that she does not drink alcohol and does not use drugs. Family History:   Family History   Problem Relation Age of Onset    High Blood Pressure Other        Vitals:  BP (!) 107/40   Pulse 82   Temp 97.8 °F (36.6 °C) (Axillary)   Resp 18   Ht 5' 3\" (1.6 m) Comment: from old chart  Wt 224 lb 3.3 oz (101.7 kg)   SpO2 100%   BMI 39.72 kg/m²   Temp (24hrs), Av.2 °F (36.8 °C), Min:97.8 °F (36.6 °C), Max:98.7 °F (37.1 °C)    Recent Labs     21  1932 21  1200 214   POCGLU 163* 129* 144* 136*       I/O (24Hr):     Intake/Output Summary (Last 24 hours) at 2021 0902  Last data filed at 11/29/2021 0500  Gross per 24 hour   Intake 1316 ml   Output 600 ml   Net 716 ml       Labs:  Hematology:  Recent Labs     11/27/21  0636 11/28/21  0758 11/29/21 0438   WBC 6.7 5.5 6.2   RBC 2.31* 2.23* 2.68*   HGB 7.3* 6.9* 8.2*   HCT 22.2* 21.4* 25.7*   MCV 96.1 95.9 95.9   MCH 31.4 31.0 30.7   MCHC 32.7 32.3 32.1   RDW 16.9* 16.9* 16.5*   PLT 39* 20* 23*   MPV 9.9 9.1 10.0     Chemistry:  Recent Labs     11/26/21  1441 11/27/21  0636 11/28/21  0426 11/29/21  0438   NA  --  130* 135 132*   K  --  4.0 3.8 3.9   CL  --  97* 100 98   CO2  --  25 26 26   GLUCOSE  --  187* 153* 156*   BUN  --  45* 31* 43*   CREATININE  --  3.23* 2.39* 2.99*   MG  --  2.0 1.9 2.0   ANIONGAP  --  8* 9 8*   LABGLOM  --  14* 20* 15*   GFRAA  --  17* 24* 19*   CALCIUM  --  7.8* 8.1* 8.2*   TROPHS 217* 199*  --   --      Recent Labs     11/27/21  1111 11/27/21  1621 11/27/21  1932 11/28/21  0426 11/28/21  1200 11/28/21  1817 11/28/21 2134   TRIG  --   --   --  95  --   --   --    POCGLU 128* 173* 163*  --  129* 144* 136*     ABG:  Lab Results   Component Value Date    PHART 7.363 11/29/2021    WTA3AUQ 48.4 11/29/2021    PO2ART 104.0 11/29/2021    OLB7IPL 27.5 11/29/2021    NBEA NOT REPORTED 11/29/2021    PBEA 2.1 11/29/2021    S7HNEAXG 95.8 11/29/2021    FIO2 30 11/29/2021     Lab Results   Component Value Date/Time    SPECIAL NOT REPORTED 11/20/2021 11:30 PM     Lab Results   Component Value Date/Time    CULTURE NORMAL RESPIRATORY TANNER MODERATE GROWTH 11/20/2021 11:30 PM       Radiology:  XR CHEST (SINGLE VIEW FRONTAL)    Result Date: 11/28/2021  1. Unchanged appearance of support devices. 2. No significant interval change in small pleural effusions and basilar atelectasis. XR CHEST PORTABLE    Result Date: 11/29/2021  1. Endotracheal tube terminates in appropriate position. 2. No significant change in pleural effusions and basilar atelectasis. XR CHEST PORTABLE    Result Date: 11/27/2021  1.  Unchanged appearance of support devices. 2. Increasing left pleural effusion and basilar atelectasis. No significant change in right pleural effusion. XR CHEST PORTABLE    Result Date: 11/26/2021  1. Endotracheal tube remains in appropriate position. 2. Interval improved aeration of the left lung base, otherwise no significant change. XR CHEST PORTABLE    Result Date: 11/25/2021  1. Endotracheal tube remains in appropriate position. 2. No significant change in pleural effusions and associated basilar atelectasis. XR CHEST PORTABLE    Result Date: 11/24/2021  No significant interval change. Mild vascular congestion and small pleural effusions. XR CHEST PORTABLE    Result Date: 11/23/2021  1. Endotracheal tube in place with its tip 2 cm above the kristina. 2.  Unchanged cardiomegaly and small to moderate bilateral pleural effusions with basilar atelectasis. 3.  Support tubes and catheters are otherwise unchanged. Physical Examination:        General appearance:  Intubated. Sedated but opens eyes  Mental Status:  Unable to assess  Lungs:  Coarse breath sounds bilaterally,   Heart:  regular rate and rhythm, no murmur  Abdomen:  Mildly distended,  bowel sounds present,   Extremities:  Edema present    Assessment:        Hospital Problems           Last Modified POA    * (Principal) JAYLYN (acute kidney injury) ([address] 11/12/2021 Yes    Type 2 diabetes mellitus with kidney complication, with long-term current use of insulin ([address] 11/12/2021 Yes    CKD (chronic kidney disease) stage 3, GFR 30-59 ml/min ([address] 11/12/2021 Yes    HTN (hypertension) 11/12/2021 Yes    ASCVD (arteriosclerotic cardiovascular disease) 11/16/2021 Yes    Iron deficiency anemia 11/12/2021 Yes    Hydronephrosis determined by ultrasound 11/14/2021 Yes    Longstanding persistent atrial fibrillation ([address] 11/16/2021 Yes    Acute metabolic encephalopathy 60/13/6796 Yes          Plan:        Acute respiratory failure-  Patient intubated.    Apparently not doing well with weaning trials. Probably will need trach and PEG. Tube feeds at 35  Acute kidney injury, she is on dialysis-  Appreciate recommendations from Nephrology  Anemia, Thrombocytopenia-  Received 1 unit PRBC. On ferrous sulfate. IV protonix. Hem-onc consulted  4. Bradycardia, AFib-  Coreg on hold  5. DM- Lantus 22 units +ISS. Glucose control fair  6. Chronic CHF- Coreg on hold  7. Right hydronephrosis, s/p stent placement, improving  8. Non healing left groin wound- wound care  9. Dyslipidemia- lipitor  10. Palliative care consulted.   [address] [place] [address] MD Brenda  11/29/2021  9:02 AM

## 2021-11-29 NOTE — PLAN OF CARE
Problem: Falls - Risk of:  Goal: Will remain free from falls  Description: Will remain free from falls  11/29/2021 1643 by Mc Jennings RN  Outcome: Ongoing  Note: Patient remains free from falls this shift, appropriate safety measures in place      Problem: Skin Integrity:  Goal: Will show no infection signs and symptoms  Description: Will show no infection signs and symptoms  11/29/2021 1643 by Mc Jennings RN  Outcome: Ongoing  Note: No new skin breakdown noted this shift, patient turned every 2hours      Problem: Non-Violent Restraints  Goal: Removal from restraints as soon as assessed to be safe  11/29/2021 1643 by Mc Jennings RN  Outcome: Ongoing  Note: Patient attempts to pull at lines and tubes when unrestrained.  Restraints continued      Problem: Gas Exchange - Impaired:  Goal: Levels of oxygenation will improve  Description: Levels of oxygenation will improve  11/29/2021 1643 by Mc Jennings RN  Outcome: Ongoing  Note: Patient remains orally intubated

## 2021-11-29 NOTE — PROGRESS NOTES
Department of Internal Medicine  Nephrology Ya Romo MD  Progress Note    Reason for consultation: Management of acute kidney injury. Consulting physician: Sammie Garcia MD.    Interval history: Patient was seen and examined today in the intensive care unit where she remains intubated and on ventilator support. She required maximum dose of Levophed yesterday but pressors have since been titrated off. She has required PRBC transfusions for anemia. Patient had episode of bradycardia with transient PEA requiring a short session of CPR on 11/20/21  approximately 15 minutes into hemodialysis. Patient is S/P  right ureteral stent placement with renal ultrasound on 11/18/2021 showing improvement in hydronephrosis. History of present illness: This is a 66 y.o. female with a significant past medical history of Type 2 diabetes mellitus, coronary artery disease, lumbar radiculopathy, Hyperlipidemia and chronic kidney disease stage III [baseline serum creatinine 1.5 mg/dL], who presented from [facility] for further evaluation of acute kidney injury. She was sent to [facility] after developing nonhealing wound s/p cardiac catheterization via right refill Levophed has been discontinued approach with right groin wound. She had been on Bumex in the ECF and creatinine and BUN have been rising and Bumex was decreased to a lowest dose of 0.5 mg p.o. twice daily. She has had regular blood work performed in the [facility] and ECF contacted nephrologist after finding abnormal laboratory studies. She was noted to have a BUN/creatinine 104/2.28 mg/dL with hemoglobin 8 g/dL. She has mild confusion but is not in obvious respiratory distress.     Scheduled Meds:   chlorhexidine  15 mL Mouth/Throat BID    polyvinyl alcohol  1 drop Both Eyes Q4H    And    artificial tears   Both Eyes Q4H    [Held by provider] carvedilol  3.125 mg Oral BID WC    pantoprazole  40 mg IntraVENous BID    And    sodium chloride flush  10 mL IntraVENous BID    sodium polystyrene  15 g Oral Once    sertraline  25 mg Oral Daily    ferrous sulfate  325 mg Oral Daily with breakfast    [Held by provider] aspirin  81 mg Oral Daily    atorvastatin  80 mg Oral Daily    insulin glargine  22 Units SubCUTAneous Nightly    insulin lispro  0-12 Units SubCUTAneous TID WC    insulin lispro  0-6 Units SubCUTAneous Nightly    sodium chloride flush  5-40 mL IntraVENous 2 times per day     Continuous Infusions:   sodium chloride      norepinephrine Stopped (11/28/21 1305)    propofol 15 mcg/kg/min (11/29/21 0745)    dextrose      sodium chloride      sodium chloride       PRN Meds:.sodium chloride, midodrine, anticoagulant sodium citrate, anticoagulant sodium citrate, albumin human, midodrine, fentanNYL, potassium chloride **OR** potassium alternative oral replacement **OR** potassium chloride, magnesium sulfate, glucose, dextrose, glucagon (rDNA), dextrose, sodium chloride, sodium chloride flush, sodium chloride, acetaminophen, ondansetron **OR** ondansetron, oxyCODONE-acetaminophen    Physical Exam:    VITALS:  BP (!) 107/40   Pulse 82   Temp 97.8 °F (36.6 °C) (Axillary)   Resp 18   Ht 5' 3\" (1.6 m) Comment: from old chart  Wt 224 lb 3.3 oz (101.7 kg)   SpO2 100%   BMI 39.72 kg/m²   24HR INTAKE/OUTPUT:      Intake/Output Summary (Last 24 hours) at 11/29/2021 5811  Last data filed at 11/29/2021 0500  Gross per 24 hour   Intake 1316 ml   Output 600 ml   Net 716 ml     Constitutional:  on mechanical ventilator    Skin: Skin color, texture, turgor normal. No rashes or lesions    Head: Normocephalic, without obvious abnormality, atraumatic     Cardiovascular/Edema: S1, S2 with no pericardial rub or gallop. Respiratory: Diminished air entry basal rales intubated on ventilator    Abdomen: soft, non-tender; bowel sounds normal; no masses,  no organomegaly    Back: symmetric, no curvature. ROM normal. No CVA tenderness.     Extremities: 1-2+ generalized edema    Neuro:  Grossly normal    CBC:   Recent Labs     11/27/21  0636 11/28/21  0758 11/29/21  0438   WBC 6.7 5.5 6.2   HGB 7.3* 6.9* 8.2*   PLT 39* 20* 23*     BMP:    Recent Labs     11/27/21  0636 11/28/21  0426 11/29/21  0438   * 135 132*   K 4.0 3.8 3.9   CL 97* 100 98   CO2 25 26 26   BUN 45* 31* 43*   CREATININE 3.23* 2.39* 2.99*   GLUCOSE 187* 153* 156*     Lab Results   Component Value Date    NITRU NEGATIVE 11/12/2021    COLORU Yellow 11/12/2021    PHUR 6.0 11/12/2021    WBCUA 50  11/12/2021    RBCUA 20 TO 50 11/12/2021    MUCUS NOT REPORTED 11/12/2021    TRICHOMONAS NOT REPORTED 11/12/2021    YEAST NOT REPORTED 11/12/2021    BACTERIA MODERATE 11/12/2021    CLARITYU Clear 09/07/2016    SPECGRAV 1.012 11/12/2021    LEUKOCYTESUR LARGE 11/12/2021    UROBILINOGEN Normal 11/12/2021    BILIRUBINUR NEGATIVE 11/12/2021    BLOODU Positive 09/07/2016    GLUCOSEU NEGATIVE 11/12/2021    KETUA NEGATIVE 11/12/2021    AMORPHOUS NOT REPORTED 11/12/2021     IMPRESSION/RECOMMENDATIONS:      1. Acute kidney injury - most consistent with ischemic acute tubular necrosis and obstructive nephropathy secondary to bilateral hydronephrosis. She remains borderline oliguric despite repeat renal ultrasound [11/18/2021] showing improvement in hydronephrosis. Hemodynamic instability has limited attempts at ultrafiltration. Patient was started on acute dialysis on 11/17/2021  We will schedule for ultrafiltration today for 3 hours to remove 3 kg. Continue to monitor urine output closely. Avoid nephrotoxic agents. Basic metabolic profile daily. 2.  Heart failure with reduced ejection fraction [HFrEF with LVEF 30 to 35% 11/10/2021] - we will continue ultrafiltration attempts. 3.  S/p pulseless electrical activity/cardiac arrest requiring CPR. Cardiology input is noted. 4.  Acute respiratory failure - patient remains intubated and on ventilator support.   Chest x-ray today showed stable bilateral pleural effusions and basilar opacities. 5.  Normocytic anemia and thrombocytopenia - Heme-oncology following. No hemolysis found, HIT panel negative. 6.  Bilateral hydronephrosis status post cystoscopy with right ureteral  stent placement. Prognosis is guarded.     Gideon Hyde MD FACP  Attending Nephrologist  11/29/2021 9:24 AM

## 2021-11-29 NOTE — PROGRESS NOTES
ICU Progress Note (Vent)   [facility]    Patient - Chidi May,  Age - 66 y.o.    - 1950      Room Number -    MRN -  [id number]   [facility]  Date of Admission -  11/10/2021  4:23 PM    Events of Past 24 Hours   Continues to fail weaning trials due to low tidal volumes. Patient is alert and on the ventilator. Not have any further episodes of bradycardia. Vitals    height is 5' 3\" (1.6 m) and weight is 224 lb 3.3 oz (101.7 kg). Her axillary temperature is 98.8 °F (37.1 °C). Her blood pressure is 112/51 (abnormal) and her pulse is 81. Her respiration is 25 and oxygen saturation is 99%. Temperature Range: Temp: 98.8 °F (37.1 °C) Temp  Av.3 °F (36.8 °C)  Min: 97.8 °F (36.6 °C)  Max: 98.8 °F (37.1 °C)  BP Range:  Systolic (99VIS), LYL:146 , Min:67 , ZPQ:559     Diastolic (09GZH), ZLE:60, Min:27, Max:112    Pulse Range: Pulse  Av.2  Min: 64  Max: 110  Respiration Range: Resp  Av.5  Min: 0  Max: 25  Current Pulse Ox[de-identified]  SpO2: 99 %  24HR Pulse Ox Range:  SpO2  Av.8 %  Min: 95 %  Max: 100 %  Oxygen Amount and Delivery: O2 Flow Rate (L/min): 2 L/min      Wt Readings from Last 3 Encounters:   21 224 lb 3.3 oz (101.7 kg)   16 178 lb (80.7 kg)   16 184 lb 12.8 oz (83.8 kg)     I/O       Intake/Output Summary (Last 24 hours) at 2021 1032  Last data filed at 2021 0500  Gross per 24 hour   Intake 1316 ml   Output 600 ml   Net 716 ml     I/O last 3 completed shifts:   In: 2447 [I.V.:200; NG/GT:1146]  Out: 805 [Urine:75; Stool:700]     DRAIN/TUBE OUTPUT:     Invasive Lines   ETT Day -   9  Lines -PICC line day 14    ICP PRESSURE RANGE:  No data recorded  CVP PRESSURE RANGE:  No data recorded  Mechanical Ventilation Data   SETTINGS (Comprehensive)  Vent Information  $Ventilation: $Subsequent Day  Skin Assessment: Clean, dry, & intact  Suction Catheter Diameter: 14  Equipment Changed: JENNIFER (suction cath)  Vent Type: Servo i  Vent Mode: PRVC  Vt Ordered: 450 mL  Rate Set: 16 bmp  Pressure Support: 14 cmH20  FiO2 : 30 %  SpO2: 99 %  SpO2/FiO2 ratio: 330  Sensitivity: 5  PEEP/CPAP: 5  I Time/ I Time %: 1.25 s  Humidification Source: HME  Nitric Oxide/Epoprostenol In Use?: No  Mask Type: Full face mask  Mask Size: Medium  Additional Respiratory  Assessments  Pulse: 81  Resp: 25  SpO2: 99 %  End Tidal CO2: 47 (%)  Position: Semi-Lagunas's  Humidification Source: HME  Oral Care: Mouth suctioned, Suction toothette, Mouthwash  Cuff Pressure (cm H2O): 30 cm H2O       ABGs:   Lab Results   Component Value Date    PHART 7.363 11/29/2021    PO2ART 104.0 11/29/2021    CPI1QYE 48.4 11/29/2021       Lab Results   Component Value Date    MODE PRVC 11/29/2021         Medications   IV   sodium chloride      norepinephrine Stopped (11/28/21 1305)    propofol 15 mcg/kg/min (11/29/21 3851)    dextrose      sodium chloride      sodium chloride        chlorhexidine  15 mL Mouth/Throat BID    polyvinyl alcohol  1 drop Both Eyes Q4H    And    artificial tears   Both Eyes Q4H    [Held by provider] carvedilol  3.125 mg Oral BID WC    pantoprazole  40 mg IntraVENous BID    And    sodium chloride flush  10 mL IntraVENous BID    sodium polystyrene  15 g Oral Once    sertraline  25 mg Oral Daily    ferrous sulfate  325 mg Oral Daily with breakfast    [Held by provider] aspirin  81 mg Oral Daily    atorvastatin  80 mg Oral Daily    insulin glargine  22 Units SubCUTAneous Nightly    insulin lispro  0-12 Units SubCUTAneous TID WC    insulin lispro  0-6 Units SubCUTAneous Nightly    sodium chloride flush  5-40 mL IntraVENous 2 times per day       Diet/Nutrition   ADULT TUBE FEEDING; Nasogastric; Renal Formula; Continuous; 35; No; 30; Q 8 hours; Protein; 1 Protein at 12 noon daily. Flush with 30 mLwater before and after administration.     Exam   VITALS    height is 5' 3\" (1.6 m) and weight is 224 lb 3.3 oz (101.7 kg). Her axillary temperature is 98.8 °F (37.1 °C). Her blood pressure is 112/51 (abnormal) and her pulse is 81. Her respiration is 25 and oxygen saturation is 99%. Ventilator Settings (Basic)  Vent Mode: PRVC Rate Set: 16 bmp/Vt Ordered: 450 mL/ /FiO2 : 30 %    Constitutional - Sedated but alert  General Appearance  well developed, well nourished  HEENT - Life support devices in place (ET, OG),normocephalic, atraumatic. PERRLA  Lungs - Chest expands equally, no wheezes, rales or rhonchi. Diminished breath sounds on right side  Cardiovascular - Heart sounds are normal.  normal rate and rhythm regular, no murmur, gallop or rub. Abdomen - soft, nontender, nondistended, no masses or organomegaly  Neurologic - CN II-XII are grossly intact.  There are no focal motor deficits  Skin - no bruising or bleeding  Extremities - no cyanosis, clubbing; significant weeping edema specially in upper extremities    Lab Results   CBC     Lab Results   Component Value Date    WBC 6.2 11/29/2021    RBC 2.68 11/29/2021    RBC 4.14 08/31/2016    HGB 8.2 11/29/2021    HCT 25.7 11/29/2021    PLT 23 11/29/2021    MCV 95.9 11/29/2021    MCH 30.7 11/29/2021    MCHC 32.1 11/29/2021    RDW 16.5 11/29/2021    NRBC 4 11/22/2021    LYMPHOPCT 19 11/29/2021    LYMPHOPCT 32.8 08/31/2016    MONOPCT 8 11/29/2021    EOSPCT 4.3 08/31/2016    BASOPCT 0 11/29/2021    BASOPCT 0.7 08/31/2016    MONOSABS 0.50 11/29/2021    LYMPHSABS 1.20 11/29/2021    EOSABS 0.20 11/29/2021    BASOSABS 0.00 11/29/2021    DIFFTYPE NOT REPORTED 11/29/2021       BMP   Lab Results   Component Value Date     11/29/2021    K 3.9 11/29/2021    CL 98 11/29/2021    CO2 26 11/29/2021    BUN 43 11/29/2021    CREATININE 2.99 11/29/2021    GLUCOSE 156 11/29/2021    GLUCOSE 77 08/31/2016    CALCIUM 8.2 11/29/2021       LFTS  Lab Results   Component Value Date    ALKPHOS 175 11/18/2021    ALT 31 11/18/2021    AST 21 11/18/2021    PROT 5.3 11/20/2021    BILITOT 0.27 11/18/2021 Darryle Triny <0.08 11/13/2021    IBBRENDA Brasher be calculated 11/13/2021    LABALBU 2.6 11/18/2021       INR  No results for input(s): PROTIME, INR in the last 72 hours. APTT  No results for input(s): APTT in the last 72 hours. Lactic Acid  Lab Results   Component Value Date    LACTA 1.3 11/26/2021    LACTA 0.6 11/16/2021    LACTA 0.6 11/16/2021        BNP   No results for input(s): BNP in the last 72 hours. Cultures       Radiology     Plain Films         Persistent right lower lobe haziness/effusion        SYSTEM ASSESSMENT    Acute on chronic hypoxic and hypercapnic respiratory failure  Bradycardic arrest, intubated 11/20  Right lower lobe infiltrate/pleural effusion  Acute kidney injury-requiring hemodialysis  Morbid obesity  Anemia, chronic disease and possible rectal bleed  Hypoalbuminemia  Thrombocytopenia  Nonischemic cardiomyopathy with EF of 30 to 35% possible right to left interatrial shunt  Atrial fibrillation  FULL CODE      Neuro   Continue minimal sedation with propofol as well as intermittent fentanyl    Respiratory   Wean oxygen as tolerated.  Keep O2 sat > 88%  I feel that respiratory muscle weakness combined with poor heart function and renal failure are all barriers to her being liberated from the ventilator  If no improvement by Wednesday, then will aim for tracheostomy by the end of the week     Hemodynamics   Continue to monitor for severe episodes of bradycardia  Blood pressure reasonable    Gastrointestinal/Nutrition   Tolerating tube feeds  GI prophylaxis    Renal   Dialysis per nephrology    Infectious Disease   Continue cefepime, empiric treatment    Hematology/Oncology   Has not been on chemical DVT prophylaxis due to thrombocytopenia  Received 1 unit of packed red blood cells yesterday    Endocrine   Blood sugars okay    Social/Spiritual/DNR/Disposition/Other   Updated  at the bedside, told him that by the end of the week she may need a trach and PEG and then would need to go to an LTAC.  Told him that our group goes to [facility] Time   35 min    Electronically signed by Joce Brito MD on 11/29/2021 at 10:32 AM

## 2021-11-29 NOTE — PROGRESS NOTES
Today's Date: 11/29/2021  Patient Name: Trinidad Stephens  Date of admission: 11/10/2021  4:23 PM  Patient's age: 66 y.o., 1950  Admission Dx: JAYLYN (acute kidney injury) ([facility] [address] [N17.9]  Urinary tract infection in elderly patient [N39.0]  Anemia, unspecified type [D64.9]    Reason for Consult: TCP  Requesting Physician: Justina Mejia MD    CHIEF COMPLAINT:    Chief Complaint   Patient presents with    Abnormal Lab     low hemoglobin and high CR     SUBJECTIVE:    Patient seen and examined  Patient remains intubated  Patient failed weaning trial  CODE STATUS changed to DNR CCA  Patient tapered off vasopressors  Platelet count is 45,502 today    HISTORY OF PRESENT ILLNESS:    Trinidad Stephens is a 66 y.o. female who is admitted to the hospital on 11/10/2021  for acute kidney injury and anemia. Patient has history of chronic kidney disease, chronic anemia and recent history of GI bleed with negative endoscopies at [facility]. She was transferred from nursing home for elevated creatinine and drop in her hemoglobin. Her stool for occult blood on 11/14/2021 was positive. She was evaluated by gastroenterology. Underwent upper endoscopy which showed gastritis. She also was noted to have hydronephrosis on the right side therefore was evaluated by urology and underwent cystoscopy with ureteral stent placement on 11/16/2021. Patient was evaluated by nephrology for her acute kidney injury and was started on acute dialysis on 11/17/2021. Her hemoglobin is stable at 8.0 however her platelets started dropping since admission and today it dropped to 49 therefore hematology was consulted for evaluation of her low platelets. Her iron studies suggest anemia of chronic disease. Her haptoglobin is 125 and LDH is not elevated  Her HIT panel came back negative.     Past Medical History:   has a past medical history of Anemia, unspecified, CAD (coronary artery disease), Chronic kidney disease, CKD (chronic kidney disease) stage 3, GFR 30-59 ml/min (HCC), Closed fracture of dorsal (thoracic) vertebra without mention of spinal cord injury, Coronary atherosclerosis of unspecified type of vessel, native or graft, Depression with anxiety, Esophageal reflux, HTN (hypertension), Hyperkalemia, Hyperlipidemia, Intervertebral cervical disc disorder with myelopathy, cervical region, Lower back pain, Lumbar radiculopathy, Microalbuminuria, Muscle weakness, Pain in limb, Stenosis of cervical spine, Tethered cord syndrome (HCC), Type II or unspecified type diabetes mellitus without mention of complication, not stated as uncontrolled, Urinary calculus, and UTI (lower urinary tract infection). Past Surgical History:   has a past surgical history that includes Colon surgery; angioplasty; joint replacement; Upper gastrointestinal endoscopy (N/A, 11/15/2021); Cystoscopy (Right, 11/16/2021); and IR NONTUNNELED VASCULAR CATHETER > 5 YEARS (11/17/2021). Medications:    Prior to Admission medications    Medication Sig Start Date End Date Taking? Authorizing Provider   acetaminophen (TYLENOL) 325 MG tablet Take 650 mg by mouth every 4 hours as needed for Pain (do not exceed 4000 mg daily)   Yes Historical Provider, MD   albuterol (PROVENTIL) (2.5 MG/3ML) 0.083% nebulizer solution Take 2.5 mg by nebulization every 6 hours as needed for Shortness of Breath   Yes Historical Provider, MD   atorvastatin (LIPITOR) 80 MG tablet Take 80 mg by mouth daily   Yes Historical Provider, MD   famotidine (PEPCID) 20 MG tablet Take 20 mg by mouth daily   Yes Historical Provider, MD   ferrous sulfate (IRON 325) 325 (65 Fe) MG tablet Take 325 mg by mouth daily (with breakfast)   Yes Historical Provider, MD   HYDROcodone-acetaminophen (NORCO) 5-325 MG per tablet Take 1 tablet by mouth every 6 hours as needed for Pain.  Indications: until 11/13/21   Yes Historical Provider, MD   insulin lispro (HUMALOG) 100 UNIT/ML injection vial Inject into the skin 4 times daily (before meals and nightly) Per sliding scale:   150-200 = 2 units  201-250 = 4 units  251-300 = 6 units  301-350 = 8 units  351-400 = 10 units   Yes Historical Provider, MD   insulin glargine (LANTUS SOLOSTAR) 100 UNIT/ML injection pen Inject 22 Units into the skin nightly   Yes Historical Provider, MD   meclizine (ANTIVERT) 12.5 MG tablet Take 12.5 mg by mouth 2 times daily as needed for Dizziness   Yes Historical Provider, MD   warfarin (COUMADIN) 3 MG tablet Take 3 mg by mouth nightly   Yes Historical Provider, MD   carvedilol (COREG) 6.25 MG tablet Take 6.25 mg by mouth 2 times daily (with meals).    Yes Historical Provider, MD   aspirin 81 MG chewable tablet Take 81 mg by mouth daily    Yes Historical Provider, MD   bumetanide (BUMEX) 1 MG tablet Take 1 mg by mouth daily   11/10/21  Historical Provider, MD     Current Facility-Administered Medications   Medication Dose Route Frequency Provider Last Rate Last Admin    0.9 % sodium chloride infusion   IntraVENous PRN Sacha Layne MD        midodrine (PROAMATINE) tablet 10 mg  10 mg Oral TID PRN Yane Huggins MD   10 mg at 11/29/21 1353    anticoagulant sodium citrate 4 % injection 1.2 mL  1.2 mL IntraCATHeter PRN Isabella Carballo MD   1.2 mL at 11/29/21 1633    anticoagulant sodium citrate 4 % injection 1.3 mL  1.3 mL IntraCATHeter PRN Isabella Carballo MD   1.3 mL at 11/29/21 1633    albumin human 25 % IV solution 25 g  25 g IntraVENous PRN Yane Huggins MD   Stopped at 11/29/21 1404    midodrine (PROAMATINE) tablet 5 mg  5 mg Oral PRN Yane Huggins MD        norepinephrine (LEVOPHED) 16 mg in sodium chloride 0.9 % 250 mL infusion  2-100 mcg/min IntraVENous Continuous Lupe Walters MD   Stopped at 11/28/21 1305    chlorhexidine (PERIDEX) 0.12 % solution 15 mL  15 mL Mouth/Throat BID Warren Moreno MD   15 mL at 11/29/21 0746    fentaNYL (SUBLIMAZE) injection 50 mcg  50 mcg IntraVENous Q30 Min PRN Facundo Luis MD   50 mcg at 11/27/21 1443    propofol injection  10 mcg/kg/min IntraVENous Continuous Facundo Luis MD 8.7 mL/hr at 11/29/21 1605 15 mcg/kg/min at 11/29/21 1605    polyvinyl alcohol (LIQUIFILM TEARS) 1.4 % ophthalmic solution 1 drop  1 drop Both Eyes Q4H Facundo Luis MD   1 drop at 11/29/21 0746    And    lubrifresh P.M. (artificial tears) ophthalmic ointment   Both Eyes Q4H Facundo Luis MD   Given at 11/29/21 0516    [Held by provider] carvedilol (COREG) tablet 3.125 mg  3.125 mg Oral BID  Avery Watkins MD   3.125 mg at 11/24/21 1735    pantoprazole (PROTONIX) injection 40 mg  40 mg IntraVENous BID Shoaib Okeefe MD   40 mg at 11/29/21 0746    And    sodium chloride flush 0.9 % injection 10 mL  10 mL IntraVENous BID Shoaib Okeefe MD   10 mL at 11/29/21 0747    potassium chloride (KLOR-CON M) extended release tablet 40 mEq  40 mEq Oral PRN Shoaib Okeefe MD        Or    potassium bicarb-citric acid (EFFER-K) effervescent tablet 40 mEq  40 mEq Oral PRN Shoaib Okeefe MD   20 mEq at 11/21/21 1659    Or    potassium chloride 10 mEq/100 mL IVPB (Peripheral Line)  10 mEq IntraVENous PRN Shoaib Okeefe  mL/hr at 11/22/21 1812 10 mEq at 11/22/21 1812    magnesium sulfate 1000 mg in dextrose 5% 100 mL IVPB  1,000 mg IntraVENous PRN Shoaib Okeefe MD   Stopped at 11/21/21 1815    glucose (GLUTOSE) 40 % oral gel 15 g  15 g Oral PRN Shoaib Okeefe MD        dextrose 50 % IV solution  12.5 g IntraVENous PRN Shoaib Okeefe MD        glucagon (rDNA) injection 1 mg  1 mg IntraMUSCular PRN Shoaib Okeefe MD        dextrose 5 % solution  100 mL/hr IntraVENous PRN Shoaib Okeefe MD        sodium polystyrene (KAYEXALATE) 15 GM/60ML suspension 15 g  15 g Oral Once Jovani Rodriguez MD        sertraline (ZOLOFT) tablet 25 mg  25 mg Oral Daily Ejb Santacroce, MD   25 mg at 11/29/21 0745    0.9 % sodium chloride infusion   IntraVENous PRN Jeb MD Christian        ferrous sulfate (IRON 325) tablet 325 mg  325 mg Oral Daily with breakfast Selvin Garcia MD   325 mg at 11/29/21 0745    [Held by provider] aspirin chewable tablet 81 mg  81 mg Oral Daily Jeb Gonzales MD   81 mg at 11/13/21 1015    atorvastatin (LIPITOR) tablet 80 mg  80 mg Oral Daily Jeb Gonzales MD   80 mg at 11/29/21 0745    insulin glargine (LANTUS) injection vial 22 Units  22 Units SubCUTAneous Nightly Jeb Gonzales MD   22 Units at 11/28/21 2142    insulin lispro (HUMALOG) injection vial 0-12 Units  0-12 Units SubCUTAneous TID WC Jeb Gonzales MD   2 Units at 11/29/21 1736    insulin lispro (HUMALOG) injection vial 0-6 Units  0-6 Units SubCUTAneous Nightly Jeb Gonzales MD   1 Units at 11/27/21 1934    sodium chloride flush 0.9 % injection 5-40 mL  5-40 mL IntraVENous 2 times per day Selvin Garcia MD   10 mL at 11/29/21 0747    sodium chloride flush 0.9 % injection 5-40 mL  5-40 mL IntraVENous PRN Jeb Gonzales MD        0.9 % sodium chloride infusion  25 mL IntraVENous PRN Selvin Garcia MD        acetaminophen (TYLENOL) tablet 650 mg  650 mg Oral Q4H PRN Selvin Garcia MD   650 mg at 11/16/21 2233    ondansetron (ZOFRAN-ODT) disintegrating tablet 4 mg  4 mg Oral Q8H PRN Jeb Gonzales MD        Or    ondansetron (ZOFRAN) injection 4 mg  4 mg IntraVENous Q6H PRN Selvin Garcia MD        oxyCODONE-acetaminophen (PERCOCET) 5-325 MG per tablet 1 tablet  1 tablet Oral Q6H PRN Selvin Garcia MD           Allergies:  Patient has no known allergies. Social History:   reports that she has never smoked. She does not have any smokeless tobacco history on file. She reports that she does not drink alcohol and does not use drugs. Family History: family history includes High Blood Pressure in an other family member.     REVIEW OF SYSTEMS:    Could not be obtained as patient is intubated    PHYSICAL EXAM:      BP (!) 121/43   Pulse 82   Temp 97.5 basilar atelectasis. XR CHEST (SINGLE VIEW FRONTAL)   Final Result   1. Unchanged appearance of support devices. 2. No significant interval change in small pleural effusions and basilar   atelectasis. XR CHEST PORTABLE   Final Result   1. Unchanged appearance of support devices. 2. Increasing left pleural effusion and basilar atelectasis. No significant   change in right pleural effusion. XR CHEST PORTABLE   Final Result   1. Endotracheal tube remains in appropriate position. 2. Interval improved aeration of the left lung base, otherwise no significant   change. XR CHEST PORTABLE   Final Result   1. Endotracheal tube remains in appropriate position. 2. No significant change in pleural effusions and associated basilar   atelectasis. XR CHEST PORTABLE   Final Result   No significant interval change. Mild vascular congestion and small pleural   effusions. XR CHEST PORTABLE   Final Result   1. Endotracheal tube in place with its tip 2 cm above the kristina. 2.  Unchanged cardiomegaly and small to moderate bilateral pleural effusions   with basilar atelectasis. 3.  Support tubes and catheters are otherwise unchanged. XR CHEST PORTABLE   Final Result   Cardiomegaly with bibasilar effusions and adjacent infiltrates. Support tubes as described above. XR CHEST PORTABLE   Final Result   Cardiomegaly with bibasilar effusions and adjacent infiltrates. Support tubes as described above. XR CHEST (SINGLE VIEW FRONTAL)   Final Result   The endotracheal tube ends appropriately above the kristina and below the   clavicular heads. The nasogastric tube ends in the stomach. The right   jugular vein hemodialysis catheter ends in the distal SVC, unchanged. Cardiomegaly. Bibasilar airspace disease and small right pleural effusion,   unchanged. Lungs otherwise grossly clear. No pneumothorax.          VL Renal Arterial Duplex Complete   Final Result      XR CHEST PORTABLE   Final Result   Basilar opacities likely represent atelectasis with small pleural effusions. MRI BRAIN WO CONTRAST   Final Result      There is no acute infarction, intracranial hemorrhage, or intracranial mass   lesion. Mild chronic microangiopathic ischemic disease. Mild generalized volume loss. Mild mucosal thickening of bilateral mastoid air cells and right sphenoid   sinus. XR CHEST PORTABLE   Final Result   1. Tip of the newly inserted NG tube overlies the gastric body. .   2. The right IJ CVC and right arm PICC line remain well positioned. 3.  No acute cardiopulmonary abnormality. US RETROPERITONEAL COMPLETE   Final Result   No acute findings. No hydronephrosis. Small right pleural effusion. CT HEAD WO CONTRAST   Final Result   No acute intracranial abnormality. Chronic microvascular disease and chronic lacunar infarcts. Critical results were called by Dr. Jennifer Edwards to Dr Teresa Cevallos on 11/17/2021 at   22:0. XR CHEST PORTABLE   Final Result   1. No pneumothorax evident following thoracentesis. 2. Low lung volumes. Significantly improved pneumatization lower right lung   with minimal pleural effusion bilateral evident. 3. Calcific atherosclerosis aorta. 4. Cardiomegaly. IR GUIDED THORACENTESIS PLEURAL   Final Result   Successful ultrasound guided right thoracentesis. IR NONTUNNELED VASCULAR CATHETER > 5 YEARS   Final Result   Successful ultrasound guided non-tunneled right IJ temporary hemodialysis   catheter placement. Catheter is ready for dialysis use at this time. FLUORO FOR SURGICAL PROCEDURES   Final Result      XR CHEST PORTABLE   Final Result   Moderate right pleural effusion and associated airspace disease. Pneumonia   cannot be excluded. US RENAL COMPLETE   Final Result   1. Mild to moderate right-sided hydronephrosis.    2. Limited renal ultrasound. No left-sided hydronephrosis. CT ABDOMEN PELVIS WO CONTRAST Additional Contrast? None   Final Result   Severe right and mild left hydroureteronephrosis. No obstructing ureteral   stone is seen. Wall thickening of the urinary bladder without significant   urinary bladder wall distension. Outlet obstruction is a consideration. Right-sided ostomy. There is bowel wall thickening proximal to the ostomy,   may suggest infectious or inflammatory enteritis. No drainable fluid   collections or free air. No evidence of bowel obstruction. Moderate right pleural effusion. Bibasilar infiltrates or atelectasis. Clinical correlation to exclude   pneumonia. US RETROPERITONEAL COMPLETE   Final Result   1. Moderate to severe right and mild-to-moderate left hydronephrosis. 2. Exam limited due to patient's body habitus. VL Renal Vein Scan Bilat    (Results Pending)       Primary Problem  JAYLYN (acute kidney injury) [facility])    Active Hospital Problems    Diagnosis Date Noted    Acute metabolic encephalopathy [B11.50]     Longstanding persistent atrial fibrillation ([address] [I48.11] 11/16/2021    Hydronephrosis determined by ultrasound [N13.30] 11/14/2021    Iron deficiency anemia [D50.9] 11/12/2021    JAYLYN (acute kidney injury) ([address] [N17.9] 11/10/2021    CKD (chronic kidney disease) stage 3, GFR 30-59 ml/min ([facility]) [N18.30]     HTN (hypertension) [I10]     Type 2 diabetes mellitus with kidney complication, with long-term current use of insulin ([facility]) [E11.29, Z79.4]     ASCVD (arteriosclerotic cardiovascular disease) [I25.10]        IMPRESSION:   1. Acute kidney injury and started on dialysis by nephrology  2. Anemia, stool occult positive for blood and had upper endoscopy showing gastritis. Anemia could be related to chronic disease and also renal disease and aggravated by GI bleed  3. Thrombocytopenia  4. Acute encephalopathy  5. Bilateral hydronephrosis  6.  Cardiomyopathy with EF of 30-35%  7. Generalized anasarca  8. S/p PEA, resucitated    RECOMMENDATIONS:  1. I reviewed the laboratory data, imaging studies, diagnosis, other treatment recommendations  2. Anemia is multifactorial secondary to renal dysfunction myelosuppression from acute illness as well as possible primary bone marrow disorder  3. Thrombocytopenia also likely due to myelosuppression from acute illness polypharmacy and peripheral consumption  4. Continue to hold anticoagulation as long as platelet count is below 50,000  5. 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 Will need BM biopsy once stable  6. Prognosis poor  7. Monitor counts closely  8. We will follow    Discussed with pt and Nurse. Thank you for asking us to see this patient. Sherine Hobbs MD    This note is created with the assistance of a speech recognition program.  While intending to generate a document that actually reflects the content of the visit, the document can still have some errors including those of syntax and sound a like substitutions which may escape proof reading. It such instances, actual meaning can be extrapolated by contextual diversion.

## 2021-11-29 NOTE — CARE COORDINATION
LETTY has been following this patient for a return to [facility]. If she is unable to wean off the vent this week, patient may end up having to get a trach and PEG. Per Dr. Luis Miguel Hicks at that point she would likely need LTACH. If patient does end up getting a trach and PEG, LETTY will then fax the referral to [facility]. LETTY following for final plan.

## 2021-11-29 NOTE — FLOWSHEET NOTE
Writer was updated by Figueroa Mims RN, on pt's condition; prayer said. Writer will contact  to offer ongoing support.      11/29/21 8629   Encounter Summary   Services provided to: Patient   Referral/Consult From: Palliative Care   Complexity of Encounter Low   Length of Encounter 15 minutes   Spiritual/Hindu   Type Spiritual support   Assessment Unable to respond   Intervention Prayer

## 2021-11-29 NOTE — PROGRESS NOTES
[facility]  Follow Up      NAME:  Libby Ibanez RECORD NUMBER:  [id number]  AGE: 66 y.o. GENDER: female  : 1950  TODAY'S DATE:  2021    [facility] nurse follow up visit for multiple wounds. Ostomy pouch intact, no signs of leakage. There have not been any more issues with bleeding around the stoma. Pt has weeping edema to bilateral arms. Both arms ecchymotic and right arm has several small cracks in the skin due to edema. Clean dry dressings applied. Right groin wound improving. Wound bed is red and granulating, except for most medial edge, which has a small amount of slough. There is epithelialization to the edges of the wound.  present in room and states that he has treated pt's foot wounds at home with a \"dremel tool to shave off the calluses\" and colloidal silver that he ordered online. Left medial and lateral foot wounds are stable. Both remain covered with firmly adherent black eschar. Left heel wounds have opened up and have a small amount of drainage. Proximal wound is slough-covered and probes to bone. The distal wound had some purulent drainage and bone is palpable under a thin layer of tissue. Recommend applying opticell ag to these wounds. Pt will need to follow up with Dr Alondra Redd at [address] [place] [address] after discharge.       Measurements:  Wound 21 Femoral Anterior; Right (Active)   Wound Image   21 1210   Wound Etiology Non-Healing Surgical 21 1210   Dressing Status Old drainage noted; New dressing applied 21 1210   Wound Cleansed Cleansed with saline 21 1210   Dressing/Treatment Hydrofiber Ag; Silicone border  1210   Offloading for Diabetic Foot Ulcers No 21 0800   Dressing Change Due 21 0800   Wound Length (cm) 1.5 cm 21 1210   Wound Width (cm) 5.6 cm 21 1210   Wound Depth (cm) 2 cm 21 1210   Wound Surface Area (cm^2) 8.4 cm^2 21 1210   Change in Wound Size % (l*w) 21.13 11/29/21 1210   Wound Volume (cm^3) 16.8 cm^3 11/29/21 1210   Wound Healing % 31 11/29/21 1210   Wound Assessment Pink/red; Slough;  Epithelialization 11/29/21 1210   Drainage Amount Moderate 11/29/21 1210   Drainage Description Serosanguinous 11/29/21 1210   Odor None 11/29/21 1210   Yudith-wound Assessment Dry/flaky 11/29/21 1210   Margins Defined edges 11/29/21 1210   Wound Thickness Description not for Pressure Injury Partial thickness 11/22/21 0400   Number of days: 18       Wound 11/12/21 Foot Left; Lateral (Active)   Wound Image   11/29/21 1210   Wound Etiology Arterial 11/29/21 1210   Dressing Status New dressing applied 11/29/21 1210   Wound Cleansed Betadine/povidone iodine 11/29/21 1210   Dressing/Treatment Betadine swabs/povidone iodine 11/29/21 1210   Offloading for Diabetic Foot Ulcers Yes (type) 11/29/21 0800   Dressing Change Due 11/23/21 11/27/21 1600   Wound Length (cm) 0.8 cm 11/29/21 1210   Wound Width (cm) 1.1 cm 11/29/21 1210   Wound Depth (cm) 0 cm 11/29/21 1210   Wound Surface Area (cm^2) 0.88 cm^2 11/29/21 1210   Change in Wound Size % (l*w) 47.62 11/29/21 1210   Wound Volume (cm^3) 0 cm^3 11/29/21 1210   Wound Healing % 100 11/29/21 1210   Wound Assessment Eschar dry 11/29/21 1210   Drainage Amount None 11/29/21 1210   Drainage Description Other (Comment) 11/22/21 0400   Odor None 11/29/21 1210   Yudith-wound Assessment Dry/flaky 11/29/21 1210   Margins Attached edges 11/29/21 1210   Wound Thickness Description not for Pressure Injury Partial thickness 11/29/21 0800   Number of days: 16       Wound 11/12/21 Foot Left; Medial (Active)   Wound Image   11/29/21 1210   Wound Etiology Arterial 11/29/21 1210   Dressing Status New dressing applied 11/29/21 1210   Wound Cleansed Betadine/povidone iodine 11/29/21 1210   Dressing/Treatment Betadine swabs/povidone iodine 11/29/21 1210   Offloading for Diabetic Foot Ulcers Yes (type) 11/29/21 0800   Dressing Change Due 11/23/21 11/27/21 1600 Wound Length (cm) 2.4 cm 11/29/21 1210   Wound Width (cm) 3.2 cm 11/29/21 1210   Wound Depth (cm) 0 cm 11/29/21 1210   Wound Surface Area (cm^2) 7.68 cm^2 11/29/21 1210   Change in Wound Size % (l*w) 8.57 11/29/21 1210   Wound Volume (cm^3) 0 cm^3 11/29/21 1210   Wound Healing % 100 11/29/21 1210   Wound Assessment Eschar dry 11/29/21 1210   Drainage Amount None 11/29/21 1210   Drainage Description Other (Comment) 11/22/21 0400   Odor None 11/29/21 1210   Yudith-wound Assessment Dry/flaky 11/29/21 1210   Margins Attached edges 11/29/21 1210   Wound Thickness Description not for Pressure Injury Partial thickness 11/29/21 0800   Number of days: 16       Wound 11/12/21 Heel Left; Proximal (Active)   Wound Image   11/29/21 1210   Wound Etiology Arterial 11/29/21 1210   Dressing Status Old drainage noted; New dressing applied 11/29/21 1210   Wound Cleansed Cleansed with saline 11/29/21 1210   Dressing/Treatment Hydrofiber Ag; ABD; Roll gauze 11/29/21 1210   Offloading for Diabetic Foot Ulcers Yes (type) 11/29/21 0800   Dressing Change Due 11/22/21 11/27/21 1600   Wound Length (cm) 0.8 cm 11/29/21 1210   Wound Width (cm) 0.9 cm 11/29/21 1210   Wound Depth (cm) 0.3 cm 11/29/21 1210   Wound Surface Area (cm^2) 0.72 cm^2 11/29/21 1210   Change in Wound Size % (l*w) -188 11/29/21 1210   Wound Volume (cm^3) 0.216 cm^3 11/29/21 1210   Wound Healing % -764 11/29/21 1210   Wound Assessment Slough; Exposed structure bone 11/29/21 1210   Drainage Amount Small 11/29/21 1210   Drainage Description Serosanguinous;  Yellow 11/29/21 1210   Odor None 11/29/21 1210   Yudith-wound Assessment Blanchable erythema 11/29/21 1210   Margins Defined edges 11/29/21 1210   Wound Thickness Description not for Pressure Injury Partial thickness 11/22/21 0400   Number of days: 16       Wound 11/22/21 Heel Left; Distal (Active)   Wound Etiology Arterial 11/29/21 1210   Dressing Status Old drainage noted 11/29/21 1210   Wound Cleansed Cleansed with saline 11/29/21 1210   Dressing/Treatment Hydrofiber Ag; ABD; Roll gauze 11/29/21 1210   Offloading for Diabetic Foot Ulcers Yes (type) 11/29/21 0800   Dressing Change Due 11/24/21 11/23/21 2330   Wound Length (cm) 0.4 cm 11/29/21 1210   Wound Width (cm) 0.4 cm 11/29/21 1210   Wound Depth (cm) 0.2 cm 11/29/21 1210   Wound Surface Area (cm^2) 0.16 cm^2 11/29/21 1210   Change in Wound Size % (l*w) 36 11/29/21 1210   Wound Volume (cm^3) 0.032 cm^3 11/29/21 1210   Wound Healing % -28 11/29/21 1210   Wound Assessment Eschar moist; [facility] 11/29/21 1210   Drainage Amount Small 11/29/21 1210   Drainage Description Purulent 11/29/21 1210   Odor None 11/29/21 1210   Yudith-wound Assessment Blanchable erythema 11/29/21 1210   Margins Defined edges 11/29/21 1210   Number of days: [address], BSN, RN-WCC, CSWS, DAPWCA  [facility] [address]  Wound, Ostomy, and Continence Nursing  [phone]

## 2021-11-29 NOTE — PROGRESS NOTES
Writer in room assessing wounds and palliative care nurse came to room to speak with family. Palliative nurse and pt's  left the room to discuss care. Pt's sister also in room and did not go to speak with palliative nurse as she stated that pt's  did not want her to go. Once they had left, pt's sister asked pt if she had shared her wishes with her  and pt shook her head no. Pt was asked several times and each time she shook her head no, that she had not discussed her wishes with her . Primary nurse, Margo Morales notified and came in to talk to pt. Pt shook her head yes, that she would be ok with a tracheostomy, peg tube, and pacemaker if needed. Margo Morales will discuss pt wishes with the palliative nurse.
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
## 2021-11-29 NOTE — PROGRESS NOTES
[address]   OCCUPATIONAL THERAPY MISSED TREATMENT NOTE   INPATIENT   Date: 21  Patient Name: Adonay Cherry       Room:   MRN: [id number]   Account #: [de-identified]    : 1950  (66 y.o.)  Gender: female   Referring Practitioner: Ibis Castrejon MD  Diagnosis: JAYLYN, UTI, anemia             REASON FOR MISSED TREATMENT:  Patient unable to participate   -   Pt failed wean trial. Continues to be vented and sedated. Occupational therapy will continue to follow.          Yanique Dalal OT

## 2021-11-29 NOTE — PLAN OF CARE
Problem: SAFETY  Goal: Free from accidental physical injury  11/29/2021 0317 by Porfirio Howe RN  Outcome: Ongoing  Note: Fall assessment performed and appropriate measures implemented. Room freed from clutter. Bed in lowest position with wheels locked. ID band in place. Goal: Free from intentional harm  11/29/2021 0317 by Porfirio Howe RN  Outcome: Ongoing     Problem: DAILY CARE  Goal: Daily care needs are met  11/29/2021 0317 by Porfirio Howe RN  Outcome: Ongoing  Note: Daily care needs have been met this shift. Bathroom needs have been assessed every 2 hours. Hygiene care needs has been assessed this shift. Will continue to monitor. Problem: PAIN  Goal: Patient's pain/discomfort is manageable  11/29/2021 0317 by Porfirio Howe RN  Outcome: Ongoing  Note: Pain assessed at regular intervals using CPOT. Problem: SKIN INTEGRITY  Goal: Skin integrity is maintained or improved  11/29/2021 0317 by Porfirio Howe RN  Outcome: Ongoing     Problem: KNOWLEDGE DEFICIT  Goal: Patient/S.O. demonstrates understanding of disease process, treatment plan, medications, and discharge instructions. 11/29/2021 0317 by Porfirio Howe RN  Outcome: Ongoing     Problem: DISCHARGE BARRIERS  Goal: Patient's continuum of care needs are met  11/29/2021 0317 by Porfirio Howe RN  Outcome: Ongoing     Problem: Falls - Risk of:  Goal: Will remain free from falls  Description: Will remain free from falls  11/29/2021 0317 by Porfirio Howe RN  Outcome: Ongoing  Note: Bed remains in lowest position and side rails up x2. Restraints in place to maintain airway. Patient remains free of falls at this time. RN will continue to monitor.    Goal: Absence of physical injury  Description: Absence of physical injury  11/29/2021 0317 by Porfirio Howe RN  Outcome: Ongoing     Problem: Skin Integrity:  Goal: Will show no infection signs and symptoms  Description: Will show no infection signs and symptoms  11/29/2021 0317 by Porfirio Howe RN  Outcome: Ongoing  Note: Patient afebrile. No signs or symptoms of infection noted. Goal: Absence of new skin breakdown  Description: Absence of new skin breakdown  11/29/2021 0317 by Raciel Jackson RN  Outcome: Ongoing  Note: Redness and excoriation noted on bottom. Cream applied. Dressing changes done per order. Problem: Musculor/Skeletal Functional Status  Goal: Highest potential functional level  11/29/2021 0317 by Raciel Jackson RN  Outcome: Ongoing  Goal: Absence of falls  11/29/2021 0317 by Raciel Jackson RN  Outcome: Ongoing     Problem: Musculor/Skeletal Functional Status  Goal: Highest potential functional level  11/29/2021 0317 by Raciel Jackson RN  Outcome: Ongoing  Note: Patient has generalized weakness. Goal: Absence of falls  11/29/2021 0317 by Raciel Jackson RN  Outcome: Ongoing     Problem: Nutrition  Goal: Optimal nutrition therapy  11/29/2021 0317 by Raciel Jackson RN  Outcome: Ongoing     Problem: Pain:  Goal: Pain level will decrease  Description: Pain level will decrease  11/29/2021 0317 by Raciel Jackson RN  Goal: Control of acute pain  Description: Control of acute pain  11/29/2021 0317 by Raciel Jackson RN  Outcome: Ongoing  Note: Pain assessed at regular intervals using CPOT. Goal: Control of chronic pain  Description: Control of chronic pain  11/29/2021 0317 by Raciel Jackson RN  Outcome: Ongoing     Problem: Gas Exchange - Impaired:  Goal: Levels of oxygenation will improve  Description: Levels of oxygenation will improve  11/29/2021 0317 by Raciel Jackson RN  Outcome: Ongoing  Note: Patient on vent. Problem: Fluid Volume - Imbalance:  Goal: Absence of imbalanced fluid volume signs and symptoms  Description: Absence of imbalanced fluid volume signs and symptoms  11/29/2021 0317 by Raciel Jackson RN  Outcome: Ongoing  Note: Monitoring I&O's.       Problem: Non-Violent Restraints  Goal: Removal from restraints as soon as assessed to be safe  11/29/2021 0317 by Raciel Jackson RN  Outcome: Ongoing  Note: Attempts to pull at tubes when released. ROM assessed every 2 hours and visual safety checks completed hourly. Restraints maintained to preserve the patient's airway. Goal: No harm/injury to patient while restraints in use  11/29/2021 0317 by Rodney Esparza RN  Outcome: Ongoing  Note: Skin integrity assessed and ROM performed every two hours. Goal: Patient's dignity will be maintained  11/29/2021 0317 by Rodney Esparza RN  Outcome: Ongoing     Problem: Airway Clearance - Ineffective:  Goal: Ability to maintain a clear airway will improve  Description: Ability to maintain a clear airway will improve  11/29/2021 0317 by Rodney Esparza RN  Outcome: Ongoing     Problem: Bowel Function - Altered:  Goal: Bowel elimination is within specified parameters  Description: Bowel elimination is within specified parameters  11/29/2021 0317 by Rodney Esparza RN  Outcome: Ongoing     Problem: Fluid Volume - Imbalance:  Goal: Absence of imbalanced fluid volume signs and symptoms  Description: Absence of imbalanced fluid volume signs and symptoms  11/29/2021 0317 by Rodney Esparza RN  Outcome: Ongoing  Problem: Gas Exchange - Impaired:  Goal: Levels of oxygenation will improve  Description: Levels of oxygenation will improve  11/29/2021 0317 by Rodney Esparza RN  Outcome: Ongoing  Note: Patient on vent. Problem: Mental Status - Impaired:  Goal: Mental status will be restored to baseline  Description: Mental status will be restored to baseline  11/29/2021 0317 by Rodney Esparza RN  Outcome: Ongoing  Note: Patient able to follow commands.       Problem: Nutrition Deficit:  Goal: Ability to achieve adequate nutritional intake will improve  Description: Ability to achieve adequate nutritional intake will improve  11/29/2021 0317 by Rodney Esparza RN  Outcome: Ongoing     Problem: OXYGENATION/RESPIRATORY FUNCTION  Goal: Patient will maintain patent airway  11/29/2021 0317 by Rodney Esparza RN  Outcome: Ongoing  Goal: Patient will achieve/maintain normal respiratory rate/effort  Description: Respiratory rate and effort will be within normal limits for the patient  11/29/2021 0317 by Yazmin Villagomez RN  Outcome: Ongoing     Problem: MECHANICAL VENTILATION  Goal: Patient will maintain patent airway  11/29/2021 0317 by Yazmin Villagomez RN  Outcome: Ongoing  Goal: Oral health is maintained or improved  11/29/2021 0317 by Yazmin Villagomez RN  Outcome: Ongoing  Goal: ET tube will be managed safely  11/29/2021 0317 by Yazmin Villagomez RN  Outcome: Ongoing

## 2021-11-29 NOTE — PROGRESS NOTES
Off pressors  Intubated and sedated  Vitals:    11/29/21 0645   BP: (!) 107/40   Pulse: 82   Resp: 18   Temp:    SpO2: 100%     nad  Anasarca  irreg irreg no m  Skin intact    Impression    1. afib with bradycardia  2. Acute on chronic systolic CHF, not interested in ICD/PM per prior cardiology notes  3. JAYLYN/CKD, nephrology following, on HD  4. GI bleed/anemia   5. PEA arrest  6. Possible BC monoclonal dx    Prognosis poor  Cannot a/c to protect from CVA given ongoing transfusion dependent anemia. Possible GI bleed.   There is currently no acute cardiac plans  Avoiding AVN blockers, unfortunately unless gets a chronic indwelling device later, would be unable to adequately treat chronic systolic dysfunction  No further cardiovascular plans will s/o

## 2021-11-30 ENCOUNTER — APPOINTMENT (OUTPATIENT)
Dept: GENERAL RADIOLOGY | Age: 71
DRG: 004 | End: 2021-11-30
Payer: COMMERCIAL

## 2021-11-30 LAB
ABO/RH: NORMAL
ABSOLUTE EOS #: 0.13 K/UL (ref 0–0.4)
ABSOLUTE IMMATURE GRANULOCYTE: ABNORMAL K/UL (ref 0–0.3)
ABSOLUTE LYMPH #: 0.91 K/UL (ref 1–4.8)
ABSOLUTE MONO #: 0.39 K/UL (ref 0.1–1.3)
ALLEN TEST: ABNORMAL
ANION GAP SERPL CALCULATED.3IONS-SCNC: 11 MMOL/L (ref 9–17)
ANTIBODY SCREEN: NEGATIVE
ARM BAND NUMBER: NORMAL
BASOPHILS # BLD: 0 % (ref 0–2)
BASOPHILS ABSOLUTE: 0 K/UL (ref 0–0.2)
BLD PROD TYP BPU: NORMAL
BUN BLDV-MCNC: 51 MG/DL (ref 8–23)
BUN/CREAT BLD: ABNORMAL (ref 9–20)
CALCIUM SERPL-MCNC: 8.5 MG/DL (ref 8.6–10.4)
CARBOXYHEMOGLOBIN: 0.8 % (ref 0–5)
CHLORIDE BLD-SCNC: 99 MMOL/L (ref 98–107)
CO2: 24 MMOL/L (ref 20–31)
CREAT SERPL-MCNC: 3.27 MG/DL (ref 0.5–0.9)
CROSSMATCH RESULT: NORMAL
DIFFERENTIAL TYPE: ABNORMAL
DISPENSE STATUS BLOOD BANK: NORMAL
EOSINOPHILS RELATIVE PERCENT: 2 % (ref 0–4)
EXPIRATION DATE: NORMAL
FIO2: 60
GFR AFRICAN AMERICAN: 17 ML/MIN
GFR NON-AFRICAN AMERICAN: 14 ML/MIN
GFR SERPL CREATININE-BSD FRML MDRD: ABNORMAL ML/MIN/{1.73_M2}
GFR SERPL CREATININE-BSD FRML MDRD: ABNORMAL ML/MIN/{1.73_M2}
GLUCOSE BLD-MCNC: 142 MG/DL (ref 65–105)
GLUCOSE BLD-MCNC: 146 MG/DL (ref 65–105)
GLUCOSE BLD-MCNC: 148 MG/DL (ref 65–105)
GLUCOSE BLD-MCNC: 155 MG/DL (ref 65–105)
GLUCOSE BLD-MCNC: 164 MG/DL (ref 65–105)
GLUCOSE BLD-MCNC: 180 MG/DL (ref 70–99)
HCO3 ARTERIAL: 26.5 MMOL/L (ref 22–26)
HCT VFR BLD CALC: 25.3 % (ref 36–46)
HEMOGLOBIN: 8.3 G/DL (ref 12–16)
IMMATURE GRANULOCYTES: ABNORMAL %
LYMPHOCYTES # BLD: 14 % (ref 24–44)
MAGNESIUM: 2 MG/DL (ref 1.6–2.6)
MCH RBC QN AUTO: 31.8 PG (ref 26–34)
MCHC RBC AUTO-ENTMCNC: 32.9 G/DL (ref 31–37)
MCV RBC AUTO: 96.5 FL (ref 80–100)
METHEMOGLOBIN: 1.2 % (ref 0–1.9)
MODE: ABNORMAL
MONOCYTES # BLD: 6 % (ref 1–7)
MORPHOLOGY: ABNORMAL
MORPHOLOGY: ABNORMAL
NEGATIVE BASE EXCESS, ART: 0.2 MMOL/L (ref 0–2)
NOTIFICATION TIME: ABNORMAL
NOTIFICATION: ABNORMAL
NRBC AUTOMATED: ABNORMAL PER 100 WBC
O2 DEVICE/FLOW/%: ABNORMAL
O2 SAT, ARTERIAL: 88.5 % (ref 95–98)
OXYHEMOGLOBIN: ABNORMAL % (ref 95–98)
PATIENT TEMP: 37.1
PCO2 ARTERIAL: 56.9 MMHG (ref 35–45)
PCO2, ART, TEMP ADJ: ABNORMAL (ref 35–45)
PDW BLD-RTO: 16 % (ref 11.5–14.9)
PEEP/CPAP: 5
PH ARTERIAL: 7.28 (ref 7.35–7.45)
PH, ART, TEMP ADJ: ABNORMAL (ref 7.35–7.45)
PLATELET # BLD: 13 K/UL (ref 150–450)
PLATELET ESTIMATE: ABNORMAL
PMV BLD AUTO: 9.6 FL (ref 6–12)
PO2 ARTERIAL: 57.5 MMHG (ref 80–100)
PO2, ART, TEMP ADJ: ABNORMAL MMHG (ref 80–100)
POSITIVE BASE EXCESS, ART: ABNORMAL MMOL/L (ref 0–2)
POTASSIUM SERPL-SCNC: 4.2 MMOL/L (ref 3.7–5.3)
PSV: ABNORMAL
PT. POSITION: ABNORMAL
RBC # BLD: 2.62 M/UL (ref 4–5.2)
RBC # BLD: ABNORMAL 10*6/UL
RESPIRATORY RATE: 16
SAMPLE SITE: ABNORMAL
SEG NEUTROPHILS: 78 % (ref 36–66)
SEGMENTED NEUTROPHILS ABSOLUTE COUNT: 5.07 K/UL (ref 1.3–9.1)
SET RATE: 16
SODIUM BLD-SCNC: 134 MMOL/L (ref 135–144)
TEXT FOR RESPIRATORY: ABNORMAL
TOTAL HB: ABNORMAL G/DL (ref 12–16)
TOTAL RATE: 16
TRANSFUSION STATUS: NORMAL
UNIT DIVISION: 0
UNIT NUMBER: NORMAL
VT: 450
WBC # BLD: 6.5 K/UL (ref 3.5–11)
WBC # BLD: ABNORMAL 10*3/UL

## 2021-11-30 PROCEDURE — 83735 ASSAY OF MAGNESIUM: CPT

## 2021-11-30 PROCEDURE — 85025 COMPLETE CBC W/AUTO DIFF WBC: CPT

## 2021-11-30 PROCEDURE — 80048 BASIC METABOLIC PNL TOTAL CA: CPT

## 2021-11-30 PROCEDURE — 2580000003 HC RX 258: Performed by: FAMILY MEDICINE

## 2021-11-30 PROCEDURE — 2700000000 HC OXYGEN THERAPY PER DAY

## 2021-11-30 PROCEDURE — 71045 X-RAY EXAM CHEST 1 VIEW: CPT

## 2021-11-30 PROCEDURE — 94003 VENT MGMT INPAT SUBQ DAY: CPT

## 2021-11-30 PROCEDURE — 90935 HEMODIALYSIS ONE EVALUATION: CPT

## 2021-11-30 PROCEDURE — 99232 SBSQ HOSP IP/OBS MODERATE 35: CPT | Performed by: NURSE PRACTITIONER

## 2021-11-30 PROCEDURE — P9047 ALBUMIN (HUMAN), 25%, 50ML: HCPCS | Performed by: INTERNAL MEDICINE

## 2021-11-30 PROCEDURE — 36600 WITHDRAWAL OF ARTERIAL BLOOD: CPT

## 2021-11-30 PROCEDURE — 6360000002 HC RX W HCPCS: Performed by: INTERNAL MEDICINE

## 2021-11-30 PROCEDURE — C9113 INJ PANTOPRAZOLE SODIUM, VIA: HCPCS | Performed by: FAMILY MEDICINE

## 2021-11-30 PROCEDURE — 6370000000 HC RX 637 (ALT 250 FOR IP): Performed by: UROLOGY

## 2021-11-30 PROCEDURE — P9073 PLATELETS PHERESIS PATH REDU: HCPCS

## 2021-11-30 PROCEDURE — 36415 COLL VENOUS BLD VENIPUNCTURE: CPT

## 2021-11-30 PROCEDURE — 99232 SBSQ HOSP IP/OBS MODERATE 35: CPT | Performed by: INTERNAL MEDICINE

## 2021-11-30 PROCEDURE — 36430 TRANSFUSION BLD/BLD COMPNT: CPT

## 2021-11-30 PROCEDURE — 2000000000 HC ICU R&B

## 2021-11-30 PROCEDURE — 82805 BLOOD GASES W/O2 SATURATION: CPT

## 2021-11-30 PROCEDURE — 6370000000 HC RX 637 (ALT 250 FOR IP): Performed by: INTERNAL MEDICINE

## 2021-11-30 PROCEDURE — 6360000002 HC RX W HCPCS: Performed by: FAMILY MEDICINE

## 2021-11-30 PROCEDURE — 2500000003 HC RX 250 WO HCPCS: Performed by: INTERNAL MEDICINE

## 2021-11-30 PROCEDURE — 2580000003 HC RX 258: Performed by: UROLOGY

## 2021-11-30 RX ORDER — SODIUM CITRATE 4 % (5 ML)
1.2 SYRINGE (ML) MISCELLANEOUS PRN
Status: DISCONTINUED | OUTPATIENT
Start: 2021-11-30 | End: 2021-12-08

## 2021-11-30 RX ORDER — SODIUM CITRATE 4 % (5 ML)
1.3 SYRINGE (ML) MISCELLANEOUS PRN
Status: DISCONTINUED | OUTPATIENT
Start: 2021-11-30 | End: 2021-12-08

## 2021-11-30 RX ORDER — SODIUM CHLORIDE 9 MG/ML
INJECTION, SOLUTION INTRAVENOUS PRN
Status: DISCONTINUED | OUTPATIENT
Start: 2021-11-30 | End: 2021-12-02 | Stop reason: SDUPTHER

## 2021-11-30 RX ADMIN — MINERAL OIL, PETROLATUM: 425; 568 OINTMENT OPHTHALMIC at 01:51

## 2021-11-30 RX ADMIN — INSULIN LISPRO 2 UNITS: 100 INJECTION, SOLUTION INTRAVENOUS; SUBCUTANEOUS at 07:40

## 2021-11-30 RX ADMIN — PANTOPRAZOLE SODIUM 40 MG: 40 INJECTION, POWDER, FOR SOLUTION INTRAVENOUS at 21:23

## 2021-11-30 RX ADMIN — SODIUM CHLORIDE, PRESERVATIVE FREE 10 ML: 5 INJECTION INTRAVENOUS at 07:38

## 2021-11-30 RX ADMIN — PANTOPRAZOLE SODIUM 40 MG: 40 INJECTION, POWDER, FOR SOLUTION INTRAVENOUS at 07:38

## 2021-11-30 RX ADMIN — POLYVINYL ALCOHOL 1 DROP: 14 SOLUTION/ DROPS OPHTHALMIC at 00:28

## 2021-11-30 RX ADMIN — POLYVINYL ALCOHOL 1 DROP: 14 SOLUTION/ DROPS OPHTHALMIC at 21:23

## 2021-11-30 RX ADMIN — SODIUM CHLORIDE, PRESERVATIVE FREE 10 ML: 5 INJECTION INTRAVENOUS at 21:24

## 2021-11-30 RX ADMIN — INSULIN LISPRO 2 UNITS: 100 INJECTION, SOLUTION INTRAVENOUS; SUBCUTANEOUS at 11:52

## 2021-11-30 RX ADMIN — Medication 1.3 ML: at 20:43

## 2021-11-30 RX ADMIN — MIDODRINE HYDROCHLORIDE 10 MG: 10 TABLET ORAL at 17:23

## 2021-11-30 RX ADMIN — INSULIN LISPRO 1 UNITS: 100 INJECTION, SOLUTION INTRAVENOUS; SUBCUTANEOUS at 21:22

## 2021-11-30 RX ADMIN — CHLORHEXIDINE GLUCONATE 0.12% ORAL RINSE 15 ML: 1.2 LIQUID ORAL at 07:38

## 2021-11-30 RX ADMIN — PROPOFOL 15 MCG/KG/MIN: 10 INJECTION, EMULSION INTRAVENOUS at 03:33

## 2021-11-30 RX ADMIN — SODIUM CHLORIDE, PRESERVATIVE FREE 10 ML: 5 INJECTION INTRAVENOUS at 21:23

## 2021-11-30 RX ADMIN — Medication 1.2 ML: at 20:43

## 2021-11-30 RX ADMIN — MINERAL OIL, PETROLATUM: 425; 568 OINTMENT OPHTHALMIC at 21:24

## 2021-11-30 RX ADMIN — POLYVINYL ALCOHOL 1 DROP: 14 SOLUTION/ DROPS OPHTHALMIC at 04:33

## 2021-11-30 RX ADMIN — ATORVASTATIN CALCIUM 80 MG: 80 TABLET, FILM COATED ORAL at 07:38

## 2021-11-30 RX ADMIN — POLYVINYL ALCOHOL 1 DROP: 14 SOLUTION/ DROPS OPHTHALMIC at 07:40

## 2021-11-30 RX ADMIN — CHLORHEXIDINE GLUCONATE 0.12% ORAL RINSE 15 ML: 1.2 LIQUID ORAL at 21:24

## 2021-11-30 RX ADMIN — ALBUMIN (HUMAN) 25 G: 0.25 INJECTION, SOLUTION INTRAVENOUS at 19:43

## 2021-11-30 RX ADMIN — INSULIN LISPRO 2 UNITS: 100 INJECTION, SOLUTION INTRAVENOUS; SUBCUTANEOUS at 18:29

## 2021-11-30 RX ADMIN — MINERAL OIL, PETROLATUM: 425; 568 OINTMENT OPHTHALMIC at 05:25

## 2021-11-30 RX ADMIN — FERROUS SULFATE TAB 325 MG (65 MG ELEMENTAL FE) 325 MG: 325 (65 FE) TAB at 07:38

## 2021-11-30 RX ADMIN — MIDODRINE HYDROCHLORIDE 5 MG: 5 TABLET ORAL at 18:29

## 2021-11-30 RX ADMIN — SERTRALINE HYDROCHLORIDE 25 MG: 50 TABLET ORAL at 07:38

## 2021-11-30 RX ADMIN — ALBUMIN (HUMAN) 25 G: 0.25 INJECTION, SOLUTION INTRAVENOUS at 17:49

## 2021-11-30 RX ADMIN — INSULIN GLARGINE 22 UNITS: 100 INJECTION, SOLUTION SUBCUTANEOUS at 21:22

## 2021-11-30 RX ADMIN — SODIUM CHLORIDE, PRESERVATIVE FREE 10 ML: 5 INJECTION INTRAVENOUS at 07:39

## 2021-11-30 ASSESSMENT — PULMONARY FUNCTION TESTS
PIF_VALUE: 30
PIF_VALUE: 30
PIF_VALUE: 29
PIF_VALUE: 32
PIF_VALUE: 28
PIF_VALUE: 33
PIF_VALUE: 28
PIF_VALUE: 30
PIF_VALUE: 28
PIF_VALUE: 31
PIF_VALUE: 30
PIF_VALUE: 28
PIF_VALUE: 30
PIF_VALUE: 32
PIF_VALUE: 29
PIF_VALUE: 30
PIF_VALUE: 28
PIF_VALUE: 28
PIF_VALUE: 29
PIF_VALUE: 31
PIF_VALUE: 31
PIF_VALUE: 30
PIF_VALUE: 29
PIF_VALUE: 41
PIF_VALUE: 34
PIF_VALUE: 31
PIF_VALUE: 29
PIF_VALUE: 29
PIF_VALUE: 28
PIF_VALUE: 31
PIF_VALUE: 32
PIF_VALUE: 37
PIF_VALUE: 27
PIF_VALUE: 29
PIF_VALUE: 28
PIF_VALUE: 29
PIF_VALUE: 32
PIF_VALUE: 49
PIF_VALUE: 27
PIF_VALUE: 28
PIF_VALUE: 35
PIF_VALUE: 47
PIF_VALUE: 29

## 2021-11-30 ASSESSMENT — PAIN SCALES - GENERAL
PAINLEVEL_OUTOF10: 0

## 2021-11-30 NOTE — CARE COORDINATION
ONGOING DISCHARGE PLAN:    Patient was a code blue on 11/20/21 and required intubation    Patient remains intubated and may need a Trach /Peg     LTACH following  Will need a pre cert    Will continue to follow for additional discharge needs.     Electronically signed by Lobito Kraus RN on 11/30/2021 at 9:03 AM

## 2021-11-30 NOTE — PLAN OF CARE
Problem: SKIN INTEGRITY  Goal: Skin integrity is maintained or improved  11/30/2021 0302 by Tawana Myrick RN  Outcome: Ongoing     Problem: OXYGENATION/RESPIRATORY FUNCTION  Goal: Patient will maintain patent airway  11/30/2021 1059 by Jelly Snell RCP  Outcome: Ongoing  11/30/2021 0302 by Tawana Myrick RN  Outcome: Ongoing  Goal: Patient will achieve/maintain normal respiratory rate/effort  Description: Respiratory rate and effort will be within normal limits for the patient  11/30/2021 1059 by CK BergerP  Outcome: Ongoing  11/30/2021 0302 by Tawana Myrick RN  Outcome: Ongoing     Problem: MECHANICAL VENTILATION  Goal: Patient will maintain patent airway  11/30/2021 1059 by Jelly Snell RCP  Outcome: Ongoing  11/30/2021 0302 by Tawana Myrick RN  Outcome: Ongoing  Goal: Oral health is maintained or improved  11/30/2021 1059 by CK BergerP  Outcome: Ongoing  11/30/2021 0302 by Tawana Myrick RN  Outcome: Ongoing  Goal: ET tube will be managed safely  11/30/2021 1059 by Jelly Snell RCP  Outcome: Ongoing  11/30/2021 0302 by Tawana Myrick RN  Outcome: Ongoing

## 2021-11-30 NOTE — CARE COORDINATION
Patient is not yet ready for a DC of any kind. [facility] updated [facility] with admissions at [facility]. [facility] is following and [facility] will also continue to follow.

## 2021-11-30 NOTE — FLOWSHEET NOTE
11/30/21 0559   Provider Notification   Reason for Communication Evaluate; New orders   Provider Name Dr. Kristin Cho. Provider Notification Physician   Method of Communication Page   Response See orders   Dr. Kristin Cho updated on morning ABG results and events that happened over night. MD stated to change rate on vent to 20.

## 2021-11-30 NOTE — PROGRESS NOTES
HEMODIALYSIS PRE-TREATMENT NOTE    Patient Identifiers prior to treatment: Name, , MRN    Isolation Required:  Yes                      Isolation Type: MRSA Contact       (please document if patient is being managed as a PUI/COVID-19 patient)        Hepatitis status:                           Date Drawn                             Result  Hepatitis B Surface Antigen 21     Negative                     Hepatitis B Surface Antibody 21 Negative        Hepatitis B Core Antibody 21 Negative          How was Hepatitis Status verified: Epic     Was a copy of the labs you documented provided to facility for the patient's chart:     Hemodialysis orders verified: Yes    Access Within normal limits ( I.e. s/s of infection,...): Non Tunneled CVC right neck WNL     Pre-Assessment completed: Yes    Pre-dialysis report received from: Karrie Cortés RN                      Time: 1700

## 2021-11-30 NOTE — FLOWSHEET NOTE
11/30/21 0602   Provider Notification   Reason for Communication Evaluate   Provider Name Dr. Fabien Schulte. Provider Notification Physician   Method of Communication Secure Message   Response No new orders   Dr. Fabien Schulte, oncology, notified of patient's platelets being 13 this morning. No new orders received.

## 2021-11-30 NOTE — PLAN OF CARE
Problem: SAFETY  Goal: Free from accidental physical injury  11/30/2021 0302 by Cheryle Kyle, RN  Outcome: Ongoing  Note: Fall assessment performed and appropriate measures implemented. Room freed from clutter. Bed in lowest position with wheels locked. ID band in place. Goal: Free from intentional harm  11/30/2021 0302 by Cheryle Kyle, RN  Outcome: Ongoing     Problem: DAILY CARE  Goal: Daily care needs are met  11/30/2021 0302 by Cheryle Kyle, RN  Outcome: Ongoing     Problem: PAIN  Goal: Patient's pain/discomfort is manageable  11/30/2021 0302 by Cheryle Kyle, RN  Outcome: Ongoing     Problem: SKIN INTEGRITY  Goal: Skin integrity is maintained or improved  11/30/2021 0302 by Cheryle Kyle, RN  Outcome: Ongoing     Problem: KNOWLEDGE DEFICIT  Goal: Patient/S.O. demonstrates understanding of disease process, treatment plan, medications, and discharge instructions. 11/30/2021 0302 by Cheryle Kyle, RN  Outcome: Ongoing     Problem: DISCHARGE BARRIERS  Goal: Patient's continuum of care needs are met  11/30/2021 0302 by Cheryle Kyle, RN  Outcome: Ongoing     Problem: Falls - Risk of:  Goal: Will remain free from falls  Description: Will remain free from falls  11/30/2021 0302 by Cheryle Kyle, RN  Outcome: Ongoing  Note: Bed remains in lowest position and side rails up x2. Restraints in place to maintain airway. Patient remains free of falls at this time. RN will continue to monitor. Goal: Absence of physical injury  Description: Absence of physical injury  11/30/2021 0302 by Cheryle Kyle, RN  Outcome: Ongoing     Problem: Skin Integrity:  Goal: Will show no infection signs and symptoms  Description: Will show no infection signs and symptoms  11/30/2021 0302 by Cheryle Kyle, RN  Outcome: Ongoing  Note: Patient afebrile. No signs or symptoms of infection noted. Goal: Absence of new skin breakdown  Description: Absence of new skin breakdown  11/30/2021 0302 by Cheryle Kyle, RN  Outcome: Ongoing  Note: Bottom is red with excoriation. Cream applied. See charting for wounds. Problem: Musculor/Skeletal Functional Status  Goal: Highest potential functional level  11/30/2021 0302 by Cheryle Kyle, RN  Outcome: Ongoing  Goal: Absence of falls  11/30/2021 0302 by Cheryle Kyle, RN  Outcome: Ongoing     Problem: Musculor/Skeletal Functional Status  Goal: Highest potential functional level  11/30/2021 0302 by Cheryle Kyle, RN  Outcome: Ongoing  Goal: Absence of falls  11/30/2021 0302 by Cheryle Kyle, RN  Outcome: Ongoing     Problem: Nutrition  Goal: Optimal nutrition therapy  11/30/2021 0302 by Cheryle Kyle, RN  Outcome: Ongoing     Problem: Pain:  Goal: Pain level will decrease  Description: Pain level will decrease  11/30/2021 0302 by Cheryle Kyle, RN  Outcome: Ongoing  Goal: Control of acute pain  Description: Control of acute pain  11/30/2021 0302 by Cheryle Kyle, RN  Outcome: Ongoing  Note: Pain assessed at regular intervals. Patient denies pain. Goal: Control of chronic pain  Description: Control of chronic pain  11/30/2021 0302 by Cheryle Kyle, RN  Outcome: Ongoing     Problem: Gas Exchange - Impaired:  Goal: Levels of oxygenation will improve  Description: Levels of oxygenation will improve  11/30/2021 0302 by Cheryle Kyle, RN  Outcome: Ongoing     Problem: Fluid Volume - Imbalance:  Goal: Absence of imbalanced fluid volume signs and symptoms  Description: Absence of imbalanced fluid volume signs and symptoms  11/30/2021 0302 by Cheryle Kyle, RN  Outcome: Ongoing  Note: Monitoring I&O's. Problem: Non-Violent Restraints  Goal: Removal from restraints as soon as assessed to be safe  11/30/2021 0302 by Cheryle Kyle, RN  Outcome: Ongoing  Note: Attempts to pull at tubes when released. ROM assessed every 2 hours and visual safety checks completed hourly. Restraints maintained to preserve the patient's airway.    Goal: No harm/injury to patient while restraints in use  11/30/2021 0302 by Cheryle Kyle, RN  Outcome: Ongoing  Note: Skin integrity assessed and ROM performed every two hours and as needed. No signs of injury noted. Goal: Patient's dignity will be maintained  11/30/2021 0302 by Sweta Conroy RN  Outcome: Ongoing     Problem: Airway Clearance - Ineffective:  Goal: Ability to maintain a clear airway will improve  Description: Ability to maintain a clear airway will improve  11/30/2021 0302 by Sweta Conroy RN  Outcome: Ongoing     Problem: Bowel Function - Altered:  Goal: Bowel elimination is within specified parameters  Description: Bowel elimination is within specified parameters  11/30/2021 0302 by Sweta Conroy RN  Outcome: Ongoing  Note: Patient has active bowel sounds. Problem: Fluid Volume - Imbalance:  Goal: Absence of imbalanced fluid volume signs and symptoms  Description: Absence of imbalanced fluid volume signs and symptoms  11/30/2021 0302 by Sweta Conroy RN  Outcome: Ongoing  Note: Monitoring I&O's. Problem: Gas Exchange - Impaired:  Goal: Levels of oxygenation will improve  Description: Levels of oxygenation will improve  11/30/2021 0302 by Sweta Conroy RN  Outcome: Ongoing     Problem: Mental Status - Impaired:  Goal: Mental status will be restored to baseline  Description: Mental status will be restored to baseline  11/30/2021 0302 by Sweta Conroy RN  Outcome: Ongoing  Note: Patient able to follow commands.       Problem: Nutrition Deficit:  Goal: Ability to achieve adequate nutritional intake will improve  Description: Ability to achieve adequate nutritional intake will improve  11/30/2021 0302 by Sweta Conroy RN  Outcome: Ongoing  Problem: OXYGENATION/RESPIRATORY FUNCTION  Goal: Patient will maintain patent airway  11/30/2021 0302 by Sweta Conroy RN  Outcome: Ongoing  Goal: Patient will achieve/maintain normal respiratory rate/effort  Description: Respiratory rate and effort will be within normal limits for the patient  11/30/2021 0302 by Sweta Conroy RN  Outcome: Ongoing     Problem: MECHANICAL VENTILATION  Goal: Patient will maintain patent airway  11/30/2021 0302 by Shyla Kennedy RN  Outcome: Ongoing  Goal: Oral health is maintained or improved  11/30/2021 0302 by Shyla Kennedy RN  Outcome: Ongoing  Goal: ET tube will be managed safely  11/30/2021 0302 by Shyla Kennedy RN  Outcome: Ongoing

## 2021-11-30 NOTE — PROGRESS NOTES
Department of Internal Medicine  Nephrology Dione Balderas MD  Progress Note    Reason for consultation: Management of acute kidney injury. Consulting physician: Richard Mcgraw MD.    Interval history: Patient developed significant hypotension shortly after initiation of hemodialysis yesterday evening with minimal ultrafiltration and required fluid bolus. She remains on ventilator support and last night she developed hypoxia requiring increasing FiO2 from 30 to 60%. FiO2 is now down to 50%. She is borderline oliguric. Patient also had episode of bradycardia with transient PEA requiring a short session of CPR on 11/20/21  approximately 15 minutes into hemodialysis. Patient is s/p right ureteral stent placement and renal ultrasound on 11/18/2021 showing improvement in hydronephrosis. History of present illness: This is a 66 y.o. female with a significant past medical history of Type 2 diabetes mellitus, coronary artery disease, lumbar radiculopathy, Hyperlipidemia and chronic kidney disease stage III [baseline serum creatinine 1.5 mg/dL], who presented from [facility] for further evaluation of acute kidney injury. She was sent to [facility] after developing nonhealing wound s/p cardiac catheterization via right refill Levophed has been discontinued approach with right groin wound. She had been on Bumex in the ECF and creatinine and BUN have been rising and Bumex was decreased to a lowest dose of 0.5 mg p.o. twice daily. She has had regular blood work performed in the [facility] and ECF contacted nephrologist after finding abnormal laboratory studies. She was noted to have a BUN/creatinine 104/2.28 mg/dL with hemoglobin 8 g/dL. She has mild confusion but is not in obvious respiratory distress.     Scheduled Meds:   chlorhexidine  15 mL Mouth/Throat BID    polyvinyl alcohol  1 drop Both Eyes Q4H    And    artificial tears   Both Eyes Q4H    [Held by provider] carvedilol  3.125 mg Oral BID WC  pantoprazole  40 mg IntraVENous BID    And    sodium chloride flush  10 mL IntraVENous BID    sodium polystyrene  15 g Oral Once    sertraline  25 mg Oral Daily    ferrous sulfate  325 mg Oral Daily with breakfast    [Held by provider] aspirin  81 mg Oral Daily    atorvastatin  80 mg Oral Daily    insulin glargine  22 Units SubCUTAneous Nightly    insulin lispro  0-12 Units SubCUTAneous TID WC    insulin lispro  0-6 Units SubCUTAneous Nightly    sodium chloride flush  5-40 mL IntraVENous 2 times per day     Continuous Infusions:   sodium chloride      norepinephrine Stopped (11/29/21 2306)    propofol 15 mcg/kg/min (11/30/21 0333)    dextrose      sodium chloride      sodium chloride       PRN Meds:.sodium chloride, midodrine, anticoagulant sodium citrate, anticoagulant sodium citrate, albumin human, midodrine, fentanNYL, potassium chloride **OR** potassium alternative oral replacement **OR** potassium chloride, magnesium sulfate, glucose, dextrose, glucagon (rDNA), dextrose, sodium chloride, sodium chloride flush, sodium chloride, acetaminophen, ondansetron **OR** ondansetron, oxyCODONE-acetaminophen    Physical Exam:    VITALS:  /82   Pulse 94   Temp 98.3 °F (36.8 °C) (Axillary)   Resp 24   Ht 5' 3\" (1.6 m) Comment: from old chart  Wt 224 lb 3.3 oz (101.7 kg)   SpO2 98%   BMI 39.72 kg/m²   24HR INTAKE/OUTPUT:      Intake/Output Summary (Last 24 hours) at 11/30/2021 0857  Last data filed at 11/30/2021 0800  Gross per 24 hour   Intake 1617.61 ml   Output 850 ml   Net 767.61 ml     Constitutional: Intubated and on ventilator support. Skin: Skin color, texture, turgor normal. No rashes or lesions    Head: Normocephalic, without obvious abnormality, atraumatic     Cardiovascular/Edema: S1, S2 with no pericardial rub or gallop.     Respiratory: Diminished air entry basal rales intubated on ventilator    Abdomen: soft, non-tender; bowel sounds normal; no masses,  no ventilator support. 5.  Normocytic anemia and thrombocytopenia - No evidence of microangiopathy and HIT panel was negative. 6.  Bilateral hydronephrosis - status post cystoscopy with right ureteral  stent placement. Prognosis is guarded to poor.     Shun Rodas MD FACP  Attending Nephrologist  11/30/2021 8:57 AM

## 2021-11-30 NOTE — PROGRESS NOTES
Comprehensive Nutrition Assessment    Type and Reason for Visit:  Reassess    Nutrition Recommendations/Plan:  Continue current tube feeding. Nutrition Assessment:  Pt appears to be tolerating tube feeding at goal. Propofol at 8.7 mL per hr provides: 230 kcal every 24 hr. Will continue current tube feeding which meets estimated protein needs. Malnutrition Assessment:  Malnutrition Status: At risk for malnutrition (Comment)    Context:  Acute Illness     Findings of the 6 clinical characteristics of malnutrition:  Energy Intake:   (Previous mild energy deficit)  Weight Loss:  Unable to assess (Edema)     Body Fat Loss:  Unable to assess     Muscle Mass Loss:  Unable to assess    Fluid Accumulation:  7 - Moderate to Severe Extremities, Generalized (Anasarca)   Strength:  Not Performed    Estimated Daily Nutrient Needs:  Energy (kcal):  15 kcal/kg= 9728-8835 kcal; Weight Used for Energy Requirements:   (92kg)     Protein (g):  92 based on 1 gm per kg (may vary with healing needs and renal status); Weight Used for Protein Requirements:   (92kg)          Nutrition Related Findings:  K: 4.2, Glucose: 180 (11/30); Phosphorus: 2.6, BUN: 51, Cr: 3.27 (11/26). Other labs and meds reviewed. Colostomy. Await decison regarding further dialysis. Wounds:  Multiple, Skin Tears (Non-healing surgical; Arterial wounds)       Current Nutrition Therapies:    ADULT TUBE FEEDING; Nasogastric; Renal Formula; Continuous; 35; No; 30; Q 8 hours; Protein; 1 Protein at 12 noon daily. Flush with 30 mLwater before and after administration.     Anthropometric Measures:  · Height: 5' 2.99\" (160 cm)  · Current Body Weight: 224 lb 3.3 oz (101.7 kg)   · Admission Body Weight: 204 lb (92.5 kg)    · Ideal Body Weight: 115 lbs  · BMI Categories: Obese Class 2 (BMI 35.0 -39.9)       Nutrition Diagnosis:   · Increased nutrient needs related to  (healing) as evidenced by wounds    Nutrition Interventions:   Food and/or Nutrient Delivery: Continue Current Tube Feeding  Nutrition Education/Counseling:  No recommendation at this time   Coordination of Nutrition Care:  Continue to monitor while inpatient    Goals:  Nutrition support will meet approximately 100% of estimated needs       Nutrition Monitoring and Evaluation:   Behavioral-Environmental Outcomes:  None Identified   Food/Nutrient Intake Outcomes:  Enteral Nutrition Intake/Tolerance  Physical Signs/Symptoms Outcomes:  Biochemical Data, Fluid Status or Edema, Weight, Skin, GI Status     Discharge Planning: Too soon to determine     Some areas of assessment may be incomplete due to standard COVID-19 Precautions. Russell Sepulveda R.D., L.D.   Phone: [phone]

## 2021-11-30 NOTE — PROGRESS NOTES
RN entered room due to patient's vent alarming. Patient's endotracheal secretions bloody. Patient's vent continued to alarm. Patient difficult to suction. RT called. Pulse ox reading 78% and patient's lips and under eyes started to turn blue. RT at bedside, began bagging patient but was difficult to bag. Patient began to amanda down in the 30's with SBP in the 60's. Propofol turned off. Patient not responsive. Levophed started as shown in [facility]. Oxygen saturation began increasing to 100%, blood pressure and heart rate began to stabilize and patient now awake and following commands.

## 2021-11-30 NOTE — PLAN OF CARE
Problem: Falls - Risk of:  Goal: Will remain free from falls  Description: Will remain free from falls  11/30/2021 1532 by Kira Hadley RN  Outcome: Ongoing  Note: Patient remains free from falls this shift, appropriate safety measures in place      Problem: Skin Integrity:  Goal: Will show no infection signs and symptoms  Description: Will show no infection signs and symptoms  11/30/2021 1532 by Kira Hadley RN  Outcome: Ongoing  Note: No new skin breakdown noted this shift, patient turned every 2 hours.       Problem: Pain:  Goal: Pain level will decrease  Description: Pain level will decrease  11/30/2021 1532 by Kira Hadley RN  Outcome: Ongoing  Note: Patient denies pain this shift      Problem: Non-Violent Restraints  Goal: Removal from restraints as soon as assessed to be safe  11/30/2021 1532 by Kira Hadley RN  Outcome: Ongoing  Note: Restraints removed this shift      Problem: Gas Exchange - Impaired:  Goal: Levels of oxygenation will improve  Description: Levels of oxygenation will improve  11/30/2021 1532 by Kira Hadley RN  Outcome: Ongoing  Note: Patient remains orally intubated this shift

## 2021-11-30 NOTE — PROGRESS NOTES
Today's Date: 11/30/2021  Patient Name: Jani Lockett  Date of admission: 11/10/2021  4:23 PM  Patient's age: 66 y.o., 1950  Admission Dx: JAYLYN (acute kidney injury) ([facility] [address] [N17.9]  Urinary tract infection in elderly patient [N39.0]  Anemia, unspecified type [D64.9]    Reason for Consult: TCP  Requesting Physician: Verlean Hodgkin, MD    CHIEF COMPLAINT:    Chief Complaint   Patient presents with    Abnormal Lab     low hemoglobin and high CR     SUBJECTIVE:    Patient seen and examined  Patient remains intubated but follows commands. Platelet count dropped to 13,000. H&H is stable. Per nurse some clots noted in the vaginal area  CT scan from 11/12 showed unremarkable spleen. HISTORY OF PRESENT ILLNESS:    Jani Lockett is a 66 y.o. female who is admitted to the hospital on 11/10/2021  for acute kidney injury and anemia. Patient has history of chronic kidney disease, chronic anemia and recent history of GI bleed with negative endoscopies at [facility]. She was transferred from nursing home for elevated creatinine and drop in her hemoglobin. Her stool for occult blood on 11/14/2021 was positive. She was evaluated by gastroenterology. Underwent upper endoscopy which showed gastritis. She also was noted to have hydronephrosis on the right side therefore was evaluated by urology and underwent cystoscopy with ureteral stent placement on 11/16/2021. Patient was evaluated by nephrology for her acute kidney injury and was started on acute dialysis on 11/17/2021. Her hemoglobin is stable at 8.0 however her platelets started dropping since admission and today it dropped to 49 therefore hematology was consulted for evaluation of her low platelets. Her iron studies suggest anemia of chronic disease. Her haptoglobin is 125 and LDH is not elevated  Her HIT panel came back negative.     Past Medical History:   has a past medical history of Anemia, unspecified, CAD (coronary artery disease), Chronic kidney disease, CKD (chronic kidney disease) stage 3, GFR 30-59 ml/min (Formerly [facility]), Closed fracture of dorsal (thoracic) vertebra without mention of spinal cord injury, Coronary atherosclerosis of unspecified type of vessel, native or graft, Depression with anxiety, Esophageal reflux, HTN (hypertension), Hyperkalemia, Hyperlipidemia, Intervertebral cervical disc disorder with myelopathy, cervical region, Lower back pain, Lumbar radiculopathy, Microalbuminuria, Muscle weakness, Pain in limb, Stenosis of cervical spine, Tethered cord syndrome ([facility]), Type II or unspecified type diabetes mellitus without mention of complication, not stated as uncontrolled, Urinary calculus, and UTI (lower urinary tract infection). Past Surgical History:   has a past surgical history that includes Colon surgery; angioplasty; joint replacement; Upper gastrointestinal endoscopy (N/A, 11/15/2021); Cystoscopy (Right, 11/16/2021); and IR NONTUNNELED VASCULAR CATHETER > 5 YEARS (11/17/2021). Medications:    Prior to Admission medications    Medication Sig Start Date End Date Taking? Authorizing Provider   acetaminophen (TYLENOL) 325 MG tablet Take 650 mg by mouth every 4 hours as needed for Pain (do not exceed 4000 mg daily)   Yes Historical Provider, MD   albuterol (PROVENTIL) (2.5 MG/3ML) 0.083% nebulizer solution Take 2.5 mg by nebulization every 6 hours as needed for Shortness of Breath   Yes Historical Provider, MD   atorvastatin (LIPITOR) 80 MG tablet Take 80 mg by mouth daily   Yes Historical Provider, MD   famotidine (PEPCID) 20 MG tablet Take 20 mg by mouth daily   Yes Historical Provider, MD   ferrous sulfate (IRON 325) 325 (65 Fe) MG tablet Take 325 mg by mouth daily (with breakfast)   Yes Historical Provider, MD   HYDROcodone-acetaminophen (NORCO) 5-325 MG per tablet Take 1 tablet by mouth every 6 hours as needed for Pain.  Indications: until 11/13/21   Yes Historical Provider, MD   insulin lispro (HUMALOG) 100 UNIT/ML injection vial Inject into the skin 4 times daily (before meals and nightly) Per sliding scale:   150-200 = 2 units  201-250 = 4 units  251-300 = 6 units  301-350 = 8 units  351-400 = 10 units   Yes Historical Provider, MD   insulin glargine (LANTUS SOLOSTAR) 100 UNIT/ML injection pen Inject 22 Units into the skin nightly   Yes Historical Provider, MD   meclizine (ANTIVERT) 12.5 MG tablet Take 12.5 mg by mouth 2 times daily as needed for Dizziness   Yes Historical Provider, MD   warfarin (COUMADIN) 3 MG tablet Take 3 mg by mouth nightly   Yes Historical Provider, MD   carvedilol (COREG) 6.25 MG tablet Take 6.25 mg by mouth 2 times daily (with meals).    Yes Historical Provider, MD   aspirin 81 MG chewable tablet Take 81 mg by mouth daily    Yes Historical Provider, MD   bumetanide (BUMEX) 1 MG tablet Take 1 mg by mouth daily   11/10/21  Historical Provider, MD     Current Facility-Administered Medications   Medication Dose Route Frequency Provider Last Rate Last Admin    0.9 % sodium chloride infusion   IntraVENous PRN Daryn Figueroa MD        midodrine (PROAMATINE) tablet 10 mg  10 mg Oral TID PRN Ander Mccartney MD   10 mg at 11/29/21 1353    anticoagulant sodium citrate 4 % injection 1.2 mL  1.2 mL IntraCATHeter PRN Johnson Ventura MD   1.2 mL at 11/29/21 1633    anticoagulant sodium citrate 4 % injection 1.3 mL  1.3 mL IntraCATHeter PRN Johnson Ventura MD   1.3 mL at 11/29/21 1633    albumin human 25 % IV solution 25 g  25 g IntraVENous PRN Ander Mccartney MD   Stopped at 11/29/21 1404    midodrine (PROAMATINE) tablet 5 mg  5 mg Oral PRN Ander Mccartney MD        norepinephrine (LEVOPHED) 16 mg in sodium chloride 0.9 % 250 mL infusion  2-100 mcg/min IntraVENous Continuous Sana Tariq MD   Stopped at 11/29/21 2306    chlorhexidine (PERIDEX) 0.12 % solution 15 mL  15 mL Mouth/Throat BID Clarisa Hernandez MD   15 mL at 11/30/21 0738    fentaNYL (SUBLIMAZE) injection 50 mcg  50 mcg IntraVENous Q30 Min PRN Vilma Javier MD   50 mcg at 11/27/21 1443    propofol injection  10 mcg/kg/min IntraVENous Continuous Vilma Javier MD 8.7 mL/hr at 11/30/21 0333 15 mcg/kg/min at 11/30/21 0333    polyvinyl alcohol (LIQUIFILM TEARS) 1.4 % ophthalmic solution 1 drop  1 drop Both Eyes Q4H Vilma Javier MD   1 drop at 11/30/21 0740    And    lubrifresh P.M. (artificial tears) ophthalmic ointment   Both Eyes Q4H Vilma Javier MD   Given at 11/30/21 0525    [Held by provider] carvedilol (COREG) tablet 3.125 mg  3.125 mg Oral BID  Angie Watkins MD   3.125 mg at 11/24/21 1735    pantoprazole (PROTONIX) injection 40 mg  40 mg IntraVENous BID Carmina Galvan MD   40 mg at 11/30/21 4750    And    sodium chloride flush 0.9 % injection 10 mL  10 mL IntraVENous BID Carmina Galvan MD   10 mL at 11/30/21 0738    potassium chloride (KLOR-CON M) extended release tablet 40 mEq  40 mEq Oral PRN Carmina Galvan MD        Or    potassium bicarb-citric acid (EFFER-K) effervescent tablet 40 mEq  40 mEq Oral PRN Carmina Galvan MD   20 mEq at 11/21/21 1659    Or    potassium chloride 10 mEq/100 mL IVPB (Peripheral Line)  10 mEq IntraVENous PRN Carmina Galvan  mL/hr at 11/22/21 1812 10 mEq at 11/22/21 1812    magnesium sulfate 1000 mg in dextrose 5% 100 mL IVPB  1,000 mg IntraVENous PRN Carmina Galvan MD   Stopped at 11/21/21 1815    glucose (GLUTOSE) 40 % oral gel 15 g  15 g Oral PRN Carmina Galvan MD        dextrose 50 % IV solution  12.5 g IntraVENous PRN Carmina Galvan MD        glucagon (rDNA) injection 1 mg  1 mg IntraMUSCular PRN Carmina Galvan MD        dextrose 5 % solution  100 mL/hr IntraVENous PRN Carmina Galvan MD        sodium polystyrene (KAYEXALATE) 15 GM/60ML suspension 15 g  15 g Oral Once Sunitha Aquas, MD        sertraline (ZOLOFT) tablet 25 mg  25 mg Oral Daily Jeb Gonzales MD   25 mg at 11/30/21 0738    0.9 % sodium chloride infusion   IntraVENous PRN Amie Forrest MD        ferrous sulfate (IRON 325) tablet 325 mg  325 mg Oral Daily with breakfast Amie Forrest MD   325 mg at 11/30/21 0738    [Held by provider] aspirin chewable tablet 81 mg  81 mg Oral Daily Jeb Gonzales MD   81 mg at 11/13/21 1015    atorvastatin (LIPITOR) tablet 80 mg  80 mg Oral Daily Jeb Gonzales MD   80 mg at 11/30/21 0738    insulin glargine (LANTUS) injection vial 22 Units  22 Units SubCUTAneous Nightly Jeb Gonzales MD   22 Units at 11/29/21 2144    insulin lispro (HUMALOG) injection vial 0-12 Units  0-12 Units SubCUTAneous TID WC Jeb Gonzales MD   2 Units at 11/30/21 0740    insulin lispro (HUMALOG) injection vial 0-6 Units  0-6 Units SubCUTAneous Nightly Jeb Gonzales MD   1 Units at 11/29/21 2145    sodium chloride flush 0.9 % injection 5-40 mL  5-40 mL IntraVENous 2 times per day Amie Forrest MD   10 mL at 11/30/21 0739    sodium chloride flush 0.9 % injection 5-40 mL  5-40 mL IntraVENous PRN Jeb Gonzales MD        0.9 % sodium chloride infusion  25 mL IntraVENous PRN Amie Forrest MD        acetaminophen (TYLENOL) tablet 650 mg  650 mg Oral Q4H PRN Amie Forrest MD   650 mg at 11/16/21 2233    ondansetron (ZOFRAN-ODT) disintegrating tablet 4 mg  4 mg Oral Q8H PRN Jeb Gonzales MD        Or    ondansetron (ZOFRAN) injection 4 mg  4 mg IntraVENous Q6H PRN Amie Forrest MD        oxyCODONE-acetaminophen (PERCOCET) 5-325 MG per tablet 1 tablet  1 tablet Oral Q6H PRN Amie Forrest MD           Allergies:  Patient has no known allergies. Social History:   reports that she has never smoked. She does not have any smokeless tobacco history on file. She reports that she does not drink alcohol and does not use drugs. Family History: family history includes High Blood Pressure in an other family member.     REVIEW OF SYSTEMS:    Could not be obtained as patient is intubated    PHYSICAL EXAM: /83   Pulse 79   Temp 98.3 °F (36.8 °C) (Axillary)   Resp 8   Ht 5' 2.99\" (1.6 m)   Wt 224 lb 3.3 oz (101.7 kg)   SpO2 99%   BMI 39.73 kg/m²    Temp (24hrs), Av.8 °F (36.6 °C), Min:97.5 °F (36.4 °C), Max:98.3 °F (36.8 °C)    General appearance - well appearing, no in pain or distress   Mental status -intubated  Mouth - mucous membranes moist, pharynx normal without lesions   Neck - supple, no significant adenopathy   Lymphatics - no palpable lymphadenopathy, no hepatosplenomegaly   Chest - clear to auscultation, no wheezes, rales or rhonchi, symmetric air entry   Heart - normal rate, regular rhythm, normal S1, S2, no murmurs  Abdomen - soft, nontender, nondistended, no masses or organomegaly   Neurological -intubated nonfocal  Musculoskeletal - no joint tenderness, deformity or swelling   Extremities - peripheral pulses normal,+++ pedal edema, no clubbing or cyanosis   Skin -multiple skin bruises noted    DATA:    Labs:   CBC:   Recent Labs     21  0438 21  0440   WBC 6.2 6.5   HGB 8.2* 8.3*   HCT 25.7* 25.3*   PLT 23* 13*     BMP:   Recent Labs     21  0438 21  0440   * 134*   K 3.9 4.2   CO2 26 24   BUN 43* 51*   CREATININE 2.99* 3.27*   LABGLOM 15* 14*   GLUCOSE 156* 180*     PT/INR:   No results for input(s): PROTIME, INR in the last 72 hours.   SURGICAL PATHOLOGY REPORT [[id number]]    Collected: 21 1535    Updated: 21     --Surgical Pathology Report   [id number]   [facility]   [facility]   ANATOMIC PATHOLOGY   [address] [place] [address], [facility] [address], [address]   [phone]   Fax: [phone]   SURGICAL PATHOLOGY CONSULTATION     Patient Name: Raven Mars   MR#: [id number]   Specimen #[id number]     Procedures/Addenda   FLOW CYTOMETRY REPORT     Date Ordered:     2021     Status:   Signed Out        Date Complete:     2021     By: Agustina Wadsworth M.D.        Date Reported:     2021     tubes as described above. XR CHEST PORTABLE   Final Result   1. Endotracheal tube terminates in appropriate position. 2. No significant change in pleural effusions and basilar atelectasis. XR CHEST (SINGLE VIEW FRONTAL)   Final Result   1. Unchanged appearance of support devices. 2. No significant interval change in small pleural effusions and basilar   atelectasis. XR CHEST PORTABLE   Final Result   1. Unchanged appearance of support devices. 2. Increasing left pleural effusion and basilar atelectasis. No significant   change in right pleural effusion. XR CHEST PORTABLE   Final Result   1. Endotracheal tube remains in appropriate position. 2. Interval improved aeration of the left lung base, otherwise no significant   change. XR CHEST PORTABLE   Final Result   1. Endotracheal tube remains in appropriate position. 2. No significant change in pleural effusions and associated basilar   atelectasis. XR CHEST PORTABLE   Final Result   No significant interval change. Mild vascular congestion and small pleural   effusions. XR CHEST PORTABLE   Final Result   1. Endotracheal tube in place with its tip 2 cm above the kristina. 2.  Unchanged cardiomegaly and small to moderate bilateral pleural effusions   with basilar atelectasis. 3.  Support tubes and catheters are otherwise unchanged. XR CHEST PORTABLE   Final Result   Cardiomegaly with bibasilar effusions and adjacent infiltrates. Support tubes as described above. XR CHEST PORTABLE   Final Result   Cardiomegaly with bibasilar effusions and adjacent infiltrates. Support tubes as described above. XR CHEST (SINGLE VIEW FRONTAL)   Final Result   The endotracheal tube ends appropriately above the kristina and below the   clavicular heads. The nasogastric tube ends in the stomach.   The right   jugular vein hemodialysis catheter ends in the distal SVC, unchanged. Cardiomegaly. Bibasilar airspace disease and small right pleural effusion,   unchanged. Lungs otherwise grossly clear. No pneumothorax. VL Renal Arterial Duplex Complete   Final Result      XR CHEST PORTABLE   Final Result   Basilar opacities likely represent atelectasis with small pleural effusions. MRI BRAIN WO CONTRAST   Final Result      There is no acute infarction, intracranial hemorrhage, or intracranial mass   lesion. Mild chronic microangiopathic ischemic disease. Mild generalized volume loss. Mild mucosal thickening of bilateral mastoid air cells and right sphenoid   sinus. XR CHEST PORTABLE   Final Result   1. Tip of the newly inserted NG tube overlies the gastric body. .   2. The right IJ CVC and right arm PICC line remain well positioned. 3.  No acute cardiopulmonary abnormality. US RETROPERITONEAL COMPLETE   Final Result   No acute findings. No hydronephrosis. Small right pleural effusion. CT HEAD WO CONTRAST   Final Result   No acute intracranial abnormality. Chronic microvascular disease and chronic lacunar infarcts. Critical results were called by Dr. Jeane Cerrato to Dr Eufemia Childress on 11/17/2021 at   22:0. XR CHEST PORTABLE   Final Result   1. No pneumothorax evident following thoracentesis. 2. Low lung volumes. Significantly improved pneumatization lower right lung   with minimal pleural effusion bilateral evident. 3. Calcific atherosclerosis aorta. 4. Cardiomegaly. IR GUIDED THORACENTESIS PLEURAL   Final Result   Successful ultrasound guided right thoracentesis. IR NONTUNNELED VASCULAR CATHETER > 5 YEARS   Final Result   Successful ultrasound guided non-tunneled right IJ temporary hemodialysis   catheter placement. Catheter is ready for dialysis use at this time.          FLUORO FOR SURGICAL PROCEDURES   Final Result      XR CHEST PORTABLE   Final Result   Moderate right pleural effusion and associated airspace disease. Pneumonia   cannot be excluded. US RENAL COMPLETE   Final Result   1. Mild to moderate right-sided hydronephrosis. 2. Limited renal ultrasound. No left-sided hydronephrosis. CT ABDOMEN PELVIS WO CONTRAST Additional Contrast? None   Final Result   Severe right and mild left hydroureteronephrosis. No obstructing ureteral   stone is seen. Wall thickening of the urinary bladder without significant   urinary bladder wall distension. Outlet obstruction is a consideration. Right-sided ostomy. There is bowel wall thickening proximal to the ostomy,   may suggest infectious or inflammatory enteritis. No drainable fluid   collections or free air. No evidence of bowel obstruction. Moderate right pleural effusion. Bibasilar infiltrates or atelectasis. Clinical correlation to exclude   pneumonia. US RETROPERITONEAL COMPLETE   Final Result   1. Moderate to severe right and mild-to-moderate left hydronephrosis. 2. Exam limited due to patient's body habitus. VL Renal Vein Scan Bilat    (Results Pending)       Primary Problem  JAYLYN (acute kidney injury) [facility])    Active Hospital Problems    Diagnosis Date Noted    Acute metabolic encephalopathy [I52.12]     Longstanding persistent atrial fibrillation ([address] [I48.11] 11/16/2021    Hydronephrosis determined by ultrasound [N13.30] 11/14/2021    Iron deficiency anemia [D50.9] 11/12/2021    JAYLYN (acute kidney injury) ([facility] [address] [N17.9] 11/10/2021    CKD (chronic kidney disease) stage 3, GFR 30-59 ml/min ([facility]) [N18.30]     HTN (hypertension) [I10]     Type 2 diabetes mellitus with kidney complication, with long-term current use of insulin ([facility]) [E11.29, Z79.4]     ASCVD (arteriosclerotic cardiovascular disease) [I25.10]        IMPRESSION:   1. Acute kidney injury and started on dialysis by nephrology  2. Anemia, stool occult positive for blood and had upper endoscopy showing gastritis. Anemia could be related to chronic disease and also renal disease and aggravated by GI bleed  3. Thrombocytopenia  4. Acute encephalopathy  5. Bilateral hydronephrosis  6. Cardiomyopathy with EF of 30-35%  7. Generalized anasarca  8. S/p PEA, resucitated    RECOMMENDATIONS:  1. I reviewed the laboratory data, imaging studies, diagnosis, other treatment recommendations  2. Anemia is multifactorial secondary to renal dysfunction myelosuppression from acute illness as well as possible primary bone marrow disorder  3. Thrombocytopenia also likely due to myelosuppression from acute illness polypharmacy and peripheral consumption. 4. Primary bone marrow disorder is also in the differential.  Flow cytometry raises concern regarding atypical CLL or lymphoproliferative disorder however patient too sick for a bone marrow biopsy  5. Prognosis poor  6. We will transfuse 1 unit of platelet  7. Monitor counts closely  8. We will follow    Discussed with pt and Nurse. Thank you for asking us to see this patient. Ruben Rogers MD    This note is created with the assistance of a speech recognition program.  While intending to generate a document that actually reflects the content of the visit, the document can still have some errors including those of syntax and sound a like substitutions which may escape proof reading. It such instances, actual meaning can be extrapolated by contextual diversion.

## 2021-11-30 NOTE — FLOWSHEET NOTE
Writer called patient's  Shaq Kee per request from [facility]. He is struggling with many issues and is clearly not mentally healthy. He discussed his lack of relationship and mental anguish caused by his parents and also from his first marriage. His conversations are about him and not the patient. Writer discontinued the call when he began discussing inappropriate subject matter. He has been given many resources for assistance, but has a reason why none of them are good ones for him. 11/29/21 1923   Encounter Summary   Services provided to: Family   Referral/Consult From: Palliative Care   Continue Visiting   (11-29-21)   Complexity of Encounter Moderate   Length of Encounter 30 minutes   Grief and Life Adjustment   Type Adjustment to illness   Assessment Anxious; Loneliness; Concerns with suffering; Questioning meaning/purpose   Intervention Active listening; Explored feelings, thoughts, concerns; Sustaining presence/ Ministry of presence   Outcome Expressed gratitude; Engaged in conversation; Expressed feelings/needs/concerns; Venting emotion;  Shared life review

## 2021-11-30 NOTE — PROGRESS NOTES
Palliative Care Progress Note    NAME:  Libby Ibanez RECORD NUMBER:  [id number]  AGE: 66 y.o. GENDER: female  : 1950  TODAY'S DATE:  2021    Reason for Consult:  goals of care  History of Present Illness     The patient is a 66 y.o. Non- / non  female who presents with Abnormal Lab (low hemoglobin and high CR)      Referred to Palliative Care by  [x] Physician   [] Nursing  [] Family Request   [] Other:       She was admitted to the ICU service for JAYLYN (acute kidney injury) ([facility] [address] [N17.9]  Urinary tract infection in elderly patient [N39.0]  Anemia, unspecified type [D64.9]. Her hospital course has been associated with JAYLYN, Respiratory distress, cardiac arrest . The patient has a complicated medical history and has been hospitalized since 11/10/2021  4:23 PM. [facility] to see patient and she was awake on on a ventilator with FIO2 30% and Peep 5. Patient continues with thrombocytopenia with platelets 13 and no s/s of bleeding. Patient HGB 8.3 and hct 25.3. Patient underwent dialysis yesterday that she did not tolerate due to hypotension IVF bolus given and hypotension and ventilator settings increased. When I spoke about tracheostomy and peg tube with patient she shakes her head yes that she would want that. Patient is on sedation currently. Will await until no sedation and  is present to discuss. Patient labs from today include glucose 180, BUN 51, creatinine 3.27, calcium 8.5, sodium 134, potassium 4.2, chloride 99, magnesium 2.0, WBC 6.5, RBC 2.62, hemoglobin 8.3, hematocrit 25.3, platelets 13. Patient had a chest x-ray done today that showed cardiomegaly with bilateral effusions and perihilar infiltrates.   Palliative care will continue to follow by patient and reach out to patient's  to provide emotional support and updates as needed    OVERNIGHT EVENTS:  Patient is a DNRCC-A   Patient remains on ventilator with sedation   Emotional support provided to patient and family     /87   Pulse 87   Temp 98.3 °F (36.8 °C) (Oral)   Resp 20   Ht 5' 2.99\" (1.6 m)   Wt 224 lb 3.3 oz (101.7 kg)   SpO2 99%   BMI 39.73 kg/m²     Assessment        REVIEW OF SYSTEMS   Patient is on ventilator with sedation unable to assess ROS     PHYSICAL ASSESSMENT:     General: []  Oriented x3      [] well appearing      [x] Intubated      [x] ill appearing      [x] Other:Awake nods head yes or no   Mental Status: [] normal mental status exam      [] drowsy      [] Confused      [x] Other: nods head yes or no     Cardiovascular: [x]  Regular rate/rhythm      [] Arrhythmia      [] Other: generalized edema   Chest: [] Effort normal      [] lungs clear      [] respiratory distress      [] Tachypnea      [x]  Other:Patient on ventilator lungs diminished respirations relaxed   Abdomen: [x] Soft/non-tender      []  Normal appearance      [] Distended      [] Ascites      [x] Other:NG with TF   Neurological: [] Normal Speech      [] Normal Sensation      []  Deficits present:  Awake nods head yes and no     Extremity:  [] normal skin color/temp      [] clubbing/cyanosis      []  No edema      [x] Other: generalized edema     Palliative Performance Scale:  ___60%  Ambulation reduced; Significant disease; Can't do hobbies/housework; intake normal or reduced; occasional assist; LOC full/confusion  ___50%  Mainly sit/lie; Extensive disease; Can't do any work; Considerable assist; intake normal or reduced; LOC full/confusion  __x_40%  Mainly in bed; Extensive disease; Mainly assist; intake normal or reduced; LOC full/confusion   ___30%  Bed Bound; Extensive disease; Total care; intake reduced; LOCfull/confusion  ___20%  Bed Bound; Extensive disease; Total care; intake minimal; Drowsy/coma  ___10%  Bed Bound; Extensive disease;  Total care; Mouth care only; Drowsy/coma  ___0       Death      Plan      Palliative Interaction:  Spoke with patient  Karyn Harris and introduced myself and my palliative care role.    Attempted to give  updates on patient condition and he kept changing subject and talking about himself, politics, and that he was lonely and needed to talk with someone. I provided emotional support and then tried redirecting him to discuss his wife. I did update him on deciding on whether patient would want a tracheostomy and gtube and he said he would be here to see her in am.     Palliative care will continue to follow     Education/support to family  Education/support to patient  Discharge planning/helping to coordinate care  Communications with primary service  Pharmacologic pain management  Providing support for coping/adaptation/distress of family  Providing support for coping/adaptation/distress of patient  Discussing meaning/purpose   Caregiver support/education  Continue with current plan of care  Code status clarified: DNRCCA  Principle Problem/Diagnosis:  JAYLYN (acute kidney injury) ([facility] [address]    Additional Assessments:  Principal Problem:    JAYLYN (acute kidney injury) ([facility] [address]  Active Problems:    Type 2 diabetes mellitus with kidney complication, with long-term current use of insulin ([facility])    CKD (chronic kidney disease) stage 3, GFR 30-59 ml/min ([facility])    HTN (hypertension)    ASCVD (arteriosclerotic cardiovascular disease)    Iron deficiency anemia    Hydronephrosis determined by ultrasound    Longstanding persistent atrial fibrillation ([facility] [address]    Acute metabolic encephalopathy  Resolved Problems:    * No resolved hospital problems.  *    1- Symptom management/ pain control     Pain Assessment:  Ventilator with sedation                Anxiety:  ventilator with sedation                           Dyspnea:  patient with ventilator with sedation                           Fatigue:  generalized weakness     Other:      2- Goals of care evaluation   The patient goals of care are improve or maintain function/quality of life   Goals of care discussed with:    [] Patient independently    [x] Patient and

## 2021-11-30 NOTE — PROGRESS NOTES
ICU Progress Note (Vent)   [facility]    Patient - James Quarles,  Age - 66 y.o.    - 1950      Room Number -    [facility] -  [id number]   [facility]  Date of Admission -  11/10/2021  4:23 PM    Events of Past 24 Hours   Remains on minimal dose of propofol. Still able to respond and answer questions by shaking her head yes or no. Vitals    height is 5' 3\" (1.6 m) and weight is 224 lb 3.3 oz (101.7 kg). Her axillary temperature is 98.3 °F (36.8 °C). Her blood pressure is 106/82 and her pulse is 94. Her respiration is 24 and oxygen saturation is 98%. Temperature Range: Temp: 98.3 °F (36.8 °C) Temp  Av.8 °F (36.6 °C)  Min: 97.5 °F (36.4 °C)  Max: 98.3 °F (36.8 °C)  BP Range:  Systolic (23XZS), HGS:704 , Min:65 , JGW:018     Diastolic (07VOY), DVY:64, Min:26, Max:101    Pulse Range: Pulse  Av.5  Min: 55  Max: 94  Respiration Range: Resp  Av.2  Min: 13  Max: 29  Current Pulse Ox[de-identified]  SpO2: 98 %  24HR Pulse Ox Range:  SpO2  Av.2 %  Min: 82 %  Max: 100 %  Oxygen Amount and Delivery: O2 Flow Rate (L/min): 2 L/min      Wt Readings from Last 3 Encounters:   21 224 lb 3.3 oz (101.7 kg)   16 178 lb (80.7 kg)   16 184 lb 12.8 oz (83.8 kg)     I/O       Intake/Output Summary (Last 24 hours) at 2021 0859  Last data filed at 2021 0800  Gross per 24 hour   Intake 1617.61 ml   Output 850 ml   Net 767.61 ml     I/O last 3 completed shifts:   In: 1587.6 [I.V.:267.6; NG/GT:1320]  Out: 850 [Stool:850]     DRAIN/TUBE OUTPUT:     Invasive Lines   ETT Day -   10  Lines -PICC line day 15    ICP PRESSURE RANGE:  No data recorded  CVP PRESSURE RANGE:  No data recorded  Mechanical Ventilation Data   SETTINGS (Comprehensive)  Vent Information  $Ventilation: $Subsequent Day  Skin Assessment: Clean, dry, & intact  Suction Catheter Diameter: 14  Equipment Changed: HME  Vent Type: Servo i  Vent Mode: PRVC  Vt Ordered: 450 mL  Rate Set: 20 bmp  Pressure Support: 14 cmH20  FiO2 : 30 %  SpO2: 98 %  SpO2/FiO2 ratio: 326.67  Sensitivity: 5  PEEP/CPAP: 5  I Time/ I Time %: 1 s  Humidification Source: Heated wire  Nitric Oxide/Epoprostenol In Use?: No  Mask Type: Full face mask  Mask Size: Medium  Additional Respiratory  Assessments  Pulse: 94  Resp: 24  SpO2: 98 %  End Tidal CO2: 39 (%)  Position: Semi-Lagunas's  Humidification Source: Heated wire  Oral Care: Mouth suctioned, Suction toothette, Mouthwash  Cuff Pressure (cm H2O): 30 cm H2O       ABGs:   Lab Results   Component Value Date    PHART 7.278 11/30/2021    PO2ART 57.5 11/30/2021    NUY6TNI 56.9 11/30/2021       Lab Results   Component Value Date    MODE PRVC 11/30/2021         Medications   IV   sodium chloride      norepinephrine Stopped (11/29/21 2306)    propofol 15 mcg/kg/min (11/30/21 0333)    dextrose      sodium chloride      sodium chloride        chlorhexidine  15 mL Mouth/Throat BID    polyvinyl alcohol  1 drop Both Eyes Q4H    And    artificial tears   Both Eyes Q4H    [Held by provider] carvedilol  3.125 mg Oral BID WC    pantoprazole  40 mg IntraVENous BID    And    sodium chloride flush  10 mL IntraVENous BID    sodium polystyrene  15 g Oral Once    sertraline  25 mg Oral Daily    ferrous sulfate  325 mg Oral Daily with breakfast    [Held by provider] aspirin  81 mg Oral Daily    atorvastatin  80 mg Oral Daily    insulin glargine  22 Units SubCUTAneous Nightly    insulin lispro  0-12 Units SubCUTAneous TID WC    insulin lispro  0-6 Units SubCUTAneous Nightly    sodium chloride flush  5-40 mL IntraVENous 2 times per day       Diet/Nutrition   ADULT TUBE FEEDING; Nasogastric; Renal Formula; Continuous; 35; No; 30; Q 8 hours; Protein; 1 Protein at 12 noon daily. Flush with 30 mLwater before and after administration. Exam   VITALS    height is 5' 3\" (1.6 m) and weight is 224 lb 3.3 oz (101.7 kg). Her axillary temperature is 98.3 °F (36.8 °C). Her blood pressure is 106/82 and her pulse is 94. Her respiration is 24 and oxygen saturation is 98%. Ventilator Settings (Basic)  Vent Mode: PRVC Rate Set: 20 bmp/Vt Ordered: 450 mL/ /FiO2 : 30 %    Constitutional - Sedated but arousable  General Appearance  well developed, well nourished ill-appearing, obese  HEENT - Life support devices in place (ET, OG),normocephalic, atraumatic. PERRLA  Lungs - Chest expands equally, no wheezes, diminished breath sounds   Cardiovascular - Heart sounds are normal.  normal rate and rhythm regular, no murmur, gallop or rub. Abdomen - soft, nontender, nondistended, no masses or organomegaly  Neurologic - CN II-XII are grossly intact.  There are no focal motor deficits  Skin - no bruising or bleeding  Extremities - no cyanosis, clubbing, generalized edema    Lab Results   CBC     Lab Results   Component Value Date    WBC 6.5 11/30/2021    RBC 2.62 11/30/2021    RBC 4.14 08/31/2016    HGB 8.3 11/30/2021    HCT 25.3 11/30/2021    PLT 13 11/30/2021    MCV 96.5 11/30/2021    MCH 31.8 11/30/2021    MCHC 32.9 11/30/2021    RDW 16.0 11/30/2021    NRBC 4 11/22/2021    LYMPHOPCT 14 11/30/2021    LYMPHOPCT 32.8 08/31/2016    MONOPCT 6 11/30/2021    EOSPCT 4.3 08/31/2016    BASOPCT 0 11/30/2021    BASOPCT 0.7 08/31/2016    MONOSABS 0.39 11/30/2021    LYMPHSABS 0.91 11/30/2021    EOSABS 0.13 11/30/2021    BASOSABS 0.00 11/30/2021    DIFFTYPE NOT REPORTED 11/30/2021       BMP   Lab Results   Component Value Date     11/30/2021    K 4.2 11/30/2021    CL 99 11/30/2021    CO2 24 11/30/2021    BUN 51 11/30/2021    CREATININE 3.27 11/30/2021    GLUCOSE 180 11/30/2021    GLUCOSE 77 08/31/2016    CALCIUM 8.5 11/30/2021       LFTS  Lab Results   Component Value Date    ALKPHOS 175 11/18/2021    ALT 31 11/18/2021    AST 21 11/18/2021    PROT 5.3 11/20/2021    BILITOT 0.27 11/18/2021    BILIDIR <0.08 11/13/2021    IBILI Connot be calculated 11/13/2021    LABALBU 2.6 11/18/2021       INR  No results for input(s): PROTIME, INR in the last 72 hours. APTT  No results for input(s): APTT in the last 72 hours. Lactic Acid  Lab Results   Component Value Date    LACTA 1.3 11/26/2021    LACTA 0.6 11/16/2021    LACTA 0.6 11/16/2021        BNP   No results for input(s): BNP in the last 72 hours. 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 She seems to be comfortable    Respiratory   Wean oxygen as tolerated.  Keep O2 sat > 88%  Continues to fail weaning trials due to low tidal volume, suspect she will need a tracheostomy and feeding tube  Rate increased on ventilator due to increased CO2 retention  Hemodynamics   Not on any pressors currently  Did not have episodes of bradycardia    Gastrointestinal/Nutrition   Tube feeds at goal, GI prophylaxis    Renal   Dialysis definitely seems to help the effusion and overall volume status; will discuss limited dialysis with nephrology    Infectious Disease   Remains on cefepime    Hematology/Oncology   Continues to have significant thrombocytopenia platelet count continues to drop, but hemoglobin is stable, no active bleeding  Hematology feels that thrombocytopenia is due to myelosuppression peripheral consumption; additionally, they are also suggesting possible atypical CLL or lymphoproliferative disorder    Endocrine Blood sugars reasonably controlled    Social/Spiritual/DNR/Disposition/Other   I spoke to her and told her that she will probably need a tracheostomy and feeding tube due to to her failing ventilator weans. When I asked her if that something she would want she nodded her head yes. I will wait until she is completely off sedation and hopefully her  will be there again.       Critical Care Time   35 min    Electronically signed by Cristi Beverly MD on 11/30/2021 at 8:59 AM

## 2021-12-01 LAB
ABSOLUTE EOS #: 0.14 K/UL (ref 0–0.4)
ABSOLUTE IMMATURE GRANULOCYTE: ABNORMAL K/UL (ref 0–0.3)
ABSOLUTE LYMPH #: 1.24 K/UL (ref 1–4.8)
ABSOLUTE MONO #: 0.69 K/UL (ref 0.1–1.3)
ALLEN TEST: NORMAL
ANION GAP SERPL CALCULATED.3IONS-SCNC: 11 MMOL/L (ref 9–17)
BASOPHILS # BLD: 0 % (ref 0–2)
BASOPHILS ABSOLUTE: 0 K/UL (ref 0–0.2)
BUN BLDV-MCNC: 58 MG/DL (ref 8–23)
BUN/CREAT BLD: ABNORMAL (ref 9–20)
CALCIUM SERPL-MCNC: 9 MG/DL (ref 8.6–10.4)
CARBOXYHEMOGLOBIN: 0.9 % (ref 0–5)
CHLORIDE BLD-SCNC: 97 MMOL/L (ref 98–107)
CO2: 24 MMOL/L (ref 20–31)
CREAT SERPL-MCNC: 3.66 MG/DL (ref 0.5–0.9)
DIFFERENTIAL TYPE: ABNORMAL
EOSINOPHILS RELATIVE PERCENT: 2 % (ref 0–4)
FIO2: 30
GFR AFRICAN AMERICAN: 15 ML/MIN
GFR NON-AFRICAN AMERICAN: 12 ML/MIN
GFR SERPL CREATININE-BSD FRML MDRD: ABNORMAL ML/MIN/{1.73_M2}
GFR SERPL CREATININE-BSD FRML MDRD: ABNORMAL ML/MIN/{1.73_M2}
GLUCOSE BLD-MCNC: 122 MG/DL (ref 65–105)
GLUCOSE BLD-MCNC: 147 MG/DL (ref 65–105)
GLUCOSE BLD-MCNC: 149 MG/DL (ref 70–99)
GLUCOSE BLD-MCNC: 150 MG/DL (ref 65–105)
GLUCOSE BLD-MCNC: 95 MG/DL (ref 65–105)
HCO3 ARTERIAL: 25.4 MMOL/L (ref 22–26)
HCT VFR BLD CALC: 24 % (ref 36–46)
HEMOGLOBIN: 7.8 G/DL (ref 12–16)
IMMATURE GRANULOCYTES: ABNORMAL %
LYMPHOCYTES # BLD: 18 % (ref 24–44)
MAGNESIUM: 2 MG/DL (ref 1.6–2.6)
MCH RBC QN AUTO: 31.3 PG (ref 26–34)
MCHC RBC AUTO-ENTMCNC: 32.6 G/DL (ref 31–37)
MCV RBC AUTO: 96.2 FL (ref 80–100)
METHEMOGLOBIN: 1.6 % (ref 0–1.9)
MODE: NORMAL
MONOCYTES # BLD: 10 % (ref 1–7)
MORPHOLOGY: ABNORMAL
MORPHOLOGY: ABNORMAL
NEGATIVE BASE EXCESS, ART: NORMAL MMOL/L (ref 0–2)
NOTIFICATION TIME: NORMAL
NOTIFICATION: NORMAL
NRBC AUTOMATED: ABNORMAL PER 100 WBC
O2 DEVICE/FLOW/%: NORMAL
O2 SAT, ARTERIAL: 95.3 % (ref 95–98)
OXYHEMOGLOBIN: NORMAL % (ref 95–98)
PATIENT TEMP: 37
PCO2 ARTERIAL: 43.9 MMHG (ref 35–45)
PCO2, ART, TEMP ADJ: NORMAL (ref 35–45)
PDW BLD-RTO: 16.4 % (ref 11.5–14.9)
PEEP/CPAP: 5
PH ARTERIAL: 7.37 (ref 7.35–7.45)
PH, ART, TEMP ADJ: NORMAL (ref 7.35–7.45)
PLATELET # BLD: 22 K/UL (ref 150–450)
PLATELET ESTIMATE: ABNORMAL
PMV BLD AUTO: 8 FL (ref 6–12)
PO2 ARTERIAL: 85 MMHG (ref 80–100)
PO2, ART, TEMP ADJ: NORMAL MMHG (ref 80–100)
POSITIVE BASE EXCESS, ART: 0.2 MMOL/L (ref 0–2)
POTASSIUM SERPL-SCNC: 3.8 MMOL/L (ref 3.7–5.3)
PSV: NORMAL
PT. POSITION: NORMAL
RBC # BLD: 2.5 M/UL (ref 4–5.2)
RBC # BLD: ABNORMAL 10*6/UL
RESPIRATORY RATE: 20
SAMPLE SITE: NORMAL
SARS-COV-2, RAPID: NOT DETECTED
SEG NEUTROPHILS: 70 % (ref 36–66)
SEGMENTED NEUTROPHILS ABSOLUTE COUNT: 4.83 K/UL (ref 1.3–9.1)
SET RATE: 20
SODIUM BLD-SCNC: 132 MMOL/L (ref 135–144)
SPECIMEN DESCRIPTION: NORMAL
TEXT FOR RESPIRATORY: NORMAL
TOTAL HB: NORMAL G/DL (ref 12–16)
TOTAL RATE: 20
VT: 450
WBC # BLD: 6.9 K/UL (ref 3.5–11)
WBC # BLD: ABNORMAL 10*3/UL

## 2021-12-01 PROCEDURE — 6360000002 HC RX W HCPCS: Performed by: INTERNAL MEDICINE

## 2021-12-01 PROCEDURE — 85025 COMPLETE CBC W/AUTO DIFF WBC: CPT

## 2021-12-01 PROCEDURE — C9113 INJ PANTOPRAZOLE SODIUM, VIA: HCPCS | Performed by: FAMILY MEDICINE

## 2021-12-01 PROCEDURE — 82947 ASSAY GLUCOSE BLOOD QUANT: CPT

## 2021-12-01 PROCEDURE — 6370000000 HC RX 637 (ALT 250 FOR IP): Performed by: INTERNAL MEDICINE

## 2021-12-01 PROCEDURE — 6370000000 HC RX 637 (ALT 250 FOR IP): Performed by: UROLOGY

## 2021-12-01 PROCEDURE — 82805 BLOOD GASES W/O2 SATURATION: CPT

## 2021-12-01 PROCEDURE — 2500000003 HC RX 250 WO HCPCS: Performed by: INTERNAL MEDICINE

## 2021-12-01 PROCEDURE — 87635 SARS-COV-2 COVID-19 AMP PRB: CPT

## 2021-12-01 PROCEDURE — 2580000003 HC RX 258: Performed by: UROLOGY

## 2021-12-01 PROCEDURE — 90935 HEMODIALYSIS ONE EVALUATION: CPT

## 2021-12-01 PROCEDURE — 36600 WITHDRAWAL OF ARTERIAL BLOOD: CPT

## 2021-12-01 PROCEDURE — 2580000003 HC RX 258: Performed by: FAMILY MEDICINE

## 2021-12-01 PROCEDURE — 99232 SBSQ HOSP IP/OBS MODERATE 35: CPT | Performed by: INTERNAL MEDICINE

## 2021-12-01 PROCEDURE — 2000000000 HC ICU R&B

## 2021-12-01 PROCEDURE — P9047 ALBUMIN (HUMAN), 25%, 50ML: HCPCS | Performed by: INTERNAL MEDICINE

## 2021-12-01 PROCEDURE — 94003 VENT MGMT INPAT SUBQ DAY: CPT

## 2021-12-01 PROCEDURE — 80048 BASIC METABOLIC PNL TOTAL CA: CPT

## 2021-12-01 PROCEDURE — 6360000002 HC RX W HCPCS: Performed by: FAMILY MEDICINE

## 2021-12-01 PROCEDURE — 83735 ASSAY OF MAGNESIUM: CPT

## 2021-12-01 PROCEDURE — 99497 ADVNCD CARE PLAN 30 MIN: CPT | Performed by: NURSE PRACTITIONER

## 2021-12-01 RX ORDER — SODIUM CHLORIDE 9 MG/ML
INJECTION, SOLUTION INTRAVENOUS PRN
Status: DISCONTINUED | OUTPATIENT
Start: 2021-12-03 | End: 2021-12-14 | Stop reason: HOSPADM

## 2021-12-01 RX ADMIN — SODIUM CHLORIDE, PRESERVATIVE FREE 10 ML: 5 INJECTION INTRAVENOUS at 21:29

## 2021-12-01 RX ADMIN — CHLORHEXIDINE GLUCONATE 0.12% ORAL RINSE 15 ML: 1.2 LIQUID ORAL at 08:05

## 2021-12-01 RX ADMIN — Medication 1.3 ML: at 15:48

## 2021-12-01 RX ADMIN — SODIUM CHLORIDE, PRESERVATIVE FREE 10 ML: 5 INJECTION INTRAVENOUS at 09:00

## 2021-12-01 RX ADMIN — Medication 1.2 ML: at 15:48

## 2021-12-01 RX ADMIN — POLYVINYL ALCOHOL 1 DROP: 14 SOLUTION/ DROPS OPHTHALMIC at 04:19

## 2021-12-01 RX ADMIN — PROPOFOL 15 MCG/KG/MIN: 10 INJECTION, EMULSION INTRAVENOUS at 02:28

## 2021-12-01 RX ADMIN — FERROUS SULFATE TAB 325 MG (65 MG ELEMENTAL FE) 325 MG: 325 (65 FE) TAB at 08:05

## 2021-12-01 RX ADMIN — MIDODRINE HYDROCHLORIDE 5 MG: 5 TABLET ORAL at 13:45

## 2021-12-01 RX ADMIN — SERTRALINE HYDROCHLORIDE 25 MG: 50 TABLET ORAL at 08:06

## 2021-12-01 RX ADMIN — PANTOPRAZOLE SODIUM 40 MG: 40 INJECTION, POWDER, FOR SOLUTION INTRAVENOUS at 08:05

## 2021-12-01 RX ADMIN — MIDODRINE HYDROCHLORIDE 10 MG: 10 TABLET ORAL at 13:30

## 2021-12-01 RX ADMIN — ALBUMIN (HUMAN) 25 G: 0.25 INJECTION, SOLUTION INTRAVENOUS at 13:29

## 2021-12-01 RX ADMIN — ATORVASTATIN CALCIUM 80 MG: 80 TABLET, FILM COATED ORAL at 08:05

## 2021-12-01 RX ADMIN — INSULIN LISPRO 2 UNITS: 100 INJECTION, SOLUTION INTRAVENOUS; SUBCUTANEOUS at 08:05

## 2021-12-01 RX ADMIN — INSULIN LISPRO 2 UNITS: 100 INJECTION, SOLUTION INTRAVENOUS; SUBCUTANEOUS at 13:34

## 2021-12-01 RX ADMIN — PANTOPRAZOLE SODIUM 40 MG: 40 INJECTION, POWDER, FOR SOLUTION INTRAVENOUS at 21:28

## 2021-12-01 RX ADMIN — INSULIN GLARGINE 22 UNITS: 100 INJECTION, SOLUTION SUBCUTANEOUS at 21:25

## 2021-12-01 RX ADMIN — CHLORHEXIDINE GLUCONATE 0.12% ORAL RINSE 15 ML: 1.2 LIQUID ORAL at 21:30

## 2021-12-01 RX ADMIN — MINERAL OIL, PETROLATUM: 425; 568 OINTMENT OPHTHALMIC at 21:30

## 2021-12-01 ASSESSMENT — PULMONARY FUNCTION TESTS
PIF_VALUE: 40
PIF_VALUE: 29
PIF_VALUE: 30
PIF_VALUE: 29
PIF_VALUE: 27
PIF_VALUE: 29
PIF_VALUE: 28
PIF_VALUE: 29
PIF_VALUE: 28
PIF_VALUE: 34
PIF_VALUE: 29
PIF_VALUE: 28
PIF_VALUE: 29
PIF_VALUE: 29
PIF_VALUE: 26
PIF_VALUE: 27
PIF_VALUE: 29
PIF_VALUE: 27
PIF_VALUE: 28
PIF_VALUE: 31
PIF_VALUE: 28
PIF_VALUE: 28
PIF_VALUE: 38
PIF_VALUE: 29
PIF_VALUE: 26
PIF_VALUE: 15
PIF_VALUE: 28
PIF_VALUE: 29
PIF_VALUE: 31
PIF_VALUE: 26
PIF_VALUE: 29
PIF_VALUE: 30
PIF_VALUE: 28
PIF_VALUE: 29
PIF_VALUE: 35
PIF_VALUE: 30
PIF_VALUE: 34
PIF_VALUE: 26
PIF_VALUE: 36
PIF_VALUE: 23
PIF_VALUE: 32

## 2021-12-01 ASSESSMENT — PAIN SCALES - GENERAL
PAINLEVEL_OUTOF10: 0

## 2021-12-01 ASSESSMENT — PAIN SCALES - WONG BAKER
WONGBAKER_NUMERICALRESPONSE: 0

## 2021-12-01 NOTE — FLOWSHEET NOTE
Escorted patient's , Karyn Harris, into ICU and to patient's [address] visit with  and sister, Cinthia Whitman; facilitated communication between family and Favian Giron RN;      12/01/21 1149   Encounter Summary   Services provided to: Patient and family together   Referral/Consult From: Palliative Care   Support System Spouse; Family members   Continue Visiting   (12/1/21)   Complexity of Encounter Moderate   Length of Encounter 15 minutes   Spiritual Assessment Completed Yes   Grief and Life Adjustment   Type Adjustment to illness   Assessment Approachable; Anxious; Helplessness; Coping   Intervention Active listening; Explored feelings, thoughts, concerns; Sustaining presence/ Ministry of presence; Discussed illness/injury and it's impact   Outcome Engaged in conversation; Expressed feelings/needs/concerns; Receptive;  Expressed gratitude

## 2021-12-01 NOTE — PROGRESS NOTES
HEMODIALYSIS POST TREATMENT NOTE    Treatment time ordered: 3 Hours    Actual treatment time: 3 Hours    UltraFiltration Goal: 1486-2875 ml as tolerated  UltraFiltration Removed: 2500 ml      Pre Treatment weight: 103 kg  Post Treatment weight: 100.5 kg  Estimated Dry Weight: N/A    Access used:     Central Venous Catheter:          Tunneled or Non-tunneled: Non Tunneled           Site: Right Neck          Access Flow: Good, tolerated BFR at 350 all through treatment      Internal Access:       AV Fistula or AV Graft:          Site:        Access Flow:        Sign and symptoms of infection:        If YES:     Medications or blood products given: Albumin 25g and Midodrine 15 mg  Chronic outpatient schedule: TBD, JAYLYN    Chronic outpatient unit: N/A    Summary of response to treatment: Patient tolerated treatment well. Target fluid goal met with assist of 25 gm of Albumin and 15 mg of Midodrine. Vitals remained stable. No concerns to report. Explain if orders NOT met, was physician notified:      Lizet Stephens faxed to patient unit/ placed in patient chart: Yes    Post assessment completed: Yes    Report given to: [address] documented Safety Checks include the followin) Access and face visible at all times. 2) All connections and blood lines are secure with no kinks. 3) NVL alarm engaged. 4) Hemosafe device applied (if applicable). 5) No collapse of Arterial or Venous blood chambers. 6) All blood lines / pump segments in the air detectors.

## 2021-12-01 NOTE — PROGRESS NOTES
[address] [place] [address]    Progress Note    12/1/2021    7:08 AM    Name:   James Quarles  MRN:     [id number]     [place]:      [de-identified]   Room:   2004/2004-01   Day:  21  Admit Date:  11/10/2021  4:23 PM    PCP:   Hood Lorenzo DO  Code Status:  DNR-CCA    Subjective:     C/C:   Chief Complaint   Patient presents with    Abnormal Lab     low hemoglobin and high CR     Interval History Status: not changed. Patient initially admitted with anemia elevated creatinine, developed acute respiratory failure, currently intubated. She is on sedation . Not agitated. she has failed previous weaning trials. Afebrile  No leucocytosis  Hemoglobin stable  Platelets improved to 22, received 1 unit of platelets      Review of Systems:     Unable to obtain    Medications:      Allergies:  No Known Allergies    Current Meds:   Scheduled Meds:    chlorhexidine  15 mL Mouth/Throat BID    polyvinyl alcohol  1 drop Both Eyes Q4H    And    artificial tears   Both Eyes Q4H    [Held by provider] carvedilol  3.125 mg Oral BID WC    pantoprazole  40 mg IntraVENous BID    And    sodium chloride flush  10 mL IntraVENous BID    sodium polystyrene  15 g Oral Once    sertraline  25 mg Oral Daily    ferrous sulfate  325 mg Oral Daily with breakfast    [Held by provider] aspirin  81 mg Oral Daily    atorvastatin  80 mg Oral Daily    insulin glargine  22 Units SubCUTAneous Nightly    insulin lispro  0-12 Units SubCUTAneous TID WC    insulin lispro  0-6 Units SubCUTAneous Nightly    sodium chloride flush  5-40 mL IntraVENous 2 times per day     Continuous Infusions:    sodium chloride      sodium chloride      norepinephrine Stopped (11/29/21 2306)    propofol 15 mcg/kg/min (12/01/21 0228)    dextrose      sodium chloride      sodium chloride       PRN Meds: sodium chloride, sodium citrate **AND** sodium citrate, sodium chloride, midodrine, albumin human, midodrine, fentanNYL, potassium chloride **OR** potassium alternative oral replacement **OR** potassium chloride, magnesium sulfate, glucose, dextrose, glucagon (rDNA), dextrose, sodium chloride, sodium chloride flush, sodium chloride, acetaminophen, ondansetron **OR** ondansetron, oxyCODONE-acetaminophen    Data:     Past Medical History:   has a past medical history of Anemia, unspecified, CAD (coronary artery disease), Chronic kidney disease, CKD (chronic kidney disease) stage 3, GFR 30-59 ml/min ([facility]), Closed fracture of dorsal (thoracic) vertebra without mention of spinal cord injury, Coronary atherosclerosis of unspecified type of vessel, native or graft, Depression with anxiety, Esophageal reflux, HTN (hypertension), Hyperkalemia, Hyperlipidemia, Intervertebral cervical disc disorder with myelopathy, cervical region, Lower back pain, Lumbar radiculopathy, Microalbuminuria, Muscle weakness, Pain in limb, Stenosis of cervical spine, Tethered cord syndrome ([facility] [address] Type II or unspecified type diabetes mellitus without mention of complication, not stated as uncontrolled, Urinary calculus, and UTI (lower urinary tract infection). Social History:   reports that she has never smoked. She does not have any smokeless tobacco history on file. She reports that she does not drink alcohol and does not use drugs. Family History:   Family History   Problem Relation Age of Onset    High Blood Pressure Other        Vitals:  BP (!) 140/56   Pulse 88   Temp 98.1 °F (36.7 °C) (Oral)   Resp 16   Ht 5' 2.99\" (1.6 m)   Wt 227 lb 8.2 oz (103.2 kg)   SpO2 100%   BMI 40.31 kg/m²   Temp (24hrs), Av.2 °F (36.8 °C), Min:97.9 °F (36.6 °C), Max:98.5 °F (36.9 °C)    Recent Labs     21  0817 21  1126 21  1609 21   POCGLU 155* 164* 146* 148*       I/O (24Hr):     Intake/Output Summary (Last 24 hours) at 2021 0708  Last data filed at 2021 0634  Gross per 24 hour   Intake 1166.43 ml   Output 2500 ml Net -1333.57 ml       Labs:  Hematology:  Recent Labs     11/29/21  0438 11/30/21  0440 12/01/21  0454   WBC 6.2 6.5 6.9   RBC 2.68* 2.62* 2.50*   HGB 8.2* 8.3* 7.8*   HCT 25.7* 25.3* 24.0*   MCV 95.9 96.5 96.2   MCH 30.7 31.8 31.3   MCHC 32.1 32.9 32.6   RDW 16.5* 16.0* 16.4*   PLT 23* 13* 22*   MPV 10.0 9.6 8.0     Chemistry:  Recent Labs     11/29/21 0438 11/30/21  0440 12/01/21  0454   * 134* 132*   K 3.9 4.2 3.8   CL 98 99 97*   CO2 26 24 24   GLUCOSE 156* 180* 149*   BUN 43* 51* 58*   CREATININE 2.99* 3.27* 3.66*   MG 2.0 2.0 2.0   ANIONGAP 8* 11 11   LABGLOM 15* 14* 12*   GFRAA 19* 17* 15*   CALCIUM 8.2* 8.5* 9.0     Recent Labs     11/29/21  1726 11/29/21  2123 11/30/21  0817 11/30/21  1126 11/30/21  1609 11/30/21 2020   POCGLU 159* 142* 155* 164* 146* 148*     ABG:  Lab Results   Component Value Date    PHART 7.371 12/01/2021    APA7ZSG 43.9 12/01/2021    PO2ART 85.0 12/01/2021    HJA7UCP 25.4 12/01/2021    NBEA NOT REPORTED 12/01/2021    PBEA 0.2 12/01/2021    X6MAQCLM 95.3 12/01/2021    FIO2 30 12/01/2021     Lab Results   Component Value Date/Time    SPECIAL NOT REPORTED 11/20/2021 11:30 PM     Lab Results   Component Value Date/Time    CULTURE NORMAL RESPIRATORY TANNER MODERATE GROWTH 11/20/2021 11:30 PM       Radiology:  XR CHEST (SINGLE VIEW FRONTAL)    Result Date: 11/28/2021  1. Unchanged appearance of support devices. 2. No significant interval change in small pleural effusions and basilar atelectasis. XR CHEST PORTABLE    Result Date: 11/29/2021  1. Endotracheal tube terminates in appropriate position. 2. No significant change in pleural effusions and basilar atelectasis. XR CHEST PORTABLE    Result Date: 11/27/2021  1. Unchanged appearance of support devices. 2. Increasing left pleural effusion and basilar atelectasis. No significant change in right pleural effusion. XR CHEST PORTABLE    Result Date: 11/26/2021  1. Endotracheal tube remains in appropriate position.  2. acute illness. Received 1 unit Platelets. On ferrous sulfate. IV protonix. Hem-onc consulted  4. Bradycardia, AFib-  Coreg on hold  5. DM- Lantus 22 units +ISS. Glucose control fair  6. Chronic CHF- Coreg on hold  7. Right hydronephrosis, s/p stent placement, improving  8. Non healing left groin wound- wound care  9. Dyslipidemia- lipitor  10. Palliative care consulted.   [address] [place] [address] MD Brenda  12/1/2021  7:08 AM

## 2021-12-01 NOTE — CONSULTS
General Surgery Consult      Pt Name: Rose Floyd  MRN: [id number]  YOB: 1950  Date of evaluation: 12/1/2021  Primary Care Physician: Megan Johnson DO   Patient evaluated at the request of  Dr. Alma Farmer   Reason for evaluation: Nelly Kei and PEG    SUBJECTIVE:   History of Chief Complaint:    Rose Floyd is a 66 y.o. female who presents with acute respiratory failure. Patient is intubated on ventilator; history obtained from chart nursing staff and family at bedside. Patient initially admitted 11/10 for anemia, Hgb 7.2, and elevated creatinine 2.66 baseline was reported at 1.5. Patient had code blue upon admission secondary to bradycardia. CPR performed and patient was intubated; ROSC achieved. Intubated 11/20. Patient has continued to fail wean trials. Unable to get patient off of the ventilator. At this time, pulmonology is requesting Trach and PEG tube placement. He has spoken to family who wish to proceed. Symptom onset has been acute for a time period of few week(s). Severity is described as severe. Course of her symptoms over time is worsening. Past Medical History   has a past medical history of Anemia, unspecified, CAD (coronary artery disease), Chronic kidney disease, CKD (chronic kidney disease) stage 3, GFR 30-59 ml/min ([facility]), Closed fracture of dorsal (thoracic) vertebra without mention of spinal cord injury, Coronary atherosclerosis of unspecified type of vessel, native or graft, Depression with anxiety, Esophageal reflux, HTN (hypertension), Hyperkalemia, Hyperlipidemia, Intervertebral cervical disc disorder with myelopathy, cervical region, Lower back pain, Lumbar radiculopathy, Microalbuminuria, Muscle weakness, Pain in limb, Stenosis of cervical spine, Tethered cord syndrome (HCC), Type II or unspecified type diabetes mellitus without mention of complication, not stated as uncontrolled, Urinary calculus, and UTI (lower urinary tract infection).     Past Surgical History   has a past surgical history that includes Colon surgery; angioplasty; joint replacement; Upper gastrointestinal endoscopy (N/A, 11/15/2021); Cystoscopy (Right, 11/16/2021); and IR NONTUNNELED VASCULAR CATHETER > 5 YEARS (11/17/2021). Medications  Prior to Admission medications    Medication Sig Start Date End Date Taking? Authorizing Provider   acetaminophen (TYLENOL) 325 MG tablet Take 650 mg by mouth every 4 hours as needed for Pain (do not exceed 4000 mg daily)   Yes Historical Provider, MD   albuterol (PROVENTIL) (2.5 MG/3ML) 0.083% nebulizer solution Take 2.5 mg by nebulization every 6 hours as needed for Shortness of Breath   Yes Historical Provider, MD   atorvastatin (LIPITOR) 80 MG tablet Take 80 mg by mouth daily   Yes Historical Provider, MD   famotidine (PEPCID) 20 MG tablet Take 20 mg by mouth daily   Yes Historical Provider, MD   ferrous sulfate (IRON 325) 325 (65 Fe) MG tablet Take 325 mg by mouth daily (with breakfast)   Yes Historical Provider, MD   HYDROcodone-acetaminophen (NORCO) 5-325 MG per tablet Take 1 tablet by mouth every 6 hours as needed for Pain. Indications: until 11/13/21   Yes Historical Provider, MD   insulin lispro (HUMALOG) 100 UNIT/ML injection vial Inject into the skin 4 times daily (before meals and nightly) Per sliding scale:   150-200 = 2 units  201-250 = 4 units  251-300 = 6 units  301-350 = 8 units  351-400 = 10 units   Yes Historical Provider, MD   insulin glargine (LANTUS SOLOSTAR) 100 UNIT/ML injection pen Inject 22 Units into the skin nightly   Yes Historical Provider, MD   meclizine (ANTIVERT) 12.5 MG tablet Take 12.5 mg by mouth 2 times daily as needed for Dizziness   Yes Historical Provider, MD   warfarin (COUMADIN) 3 MG tablet Take 3 mg by mouth nightly   Yes Historical Provider, MD   carvedilol (COREG) 6.25 MG tablet Take 6.25 mg by mouth 2 times daily (with meals).    Yes Historical Provider, MD   aspirin 81 MG chewable tablet Take 81 mg by mouth daily    Yes Historical Provider, MD   bumetanide (BUMEX) 1 MG tablet Take 1 mg by mouth daily   11/10/21  Historical Provider, MD     Allergies  has No Known Allergies. Family History  family history includes High Blood Pressure in an other family member. Social History   reports that she has never smoked. She does not have any smokeless tobacco history on file. She reports that she does not drink alcohol and does not use drugs. Review of Systems: Unable to perform ROS at this time. Patient is sedated intubated on ventilator and unable to participate in examination. Please refer to chart. OBJECTIVE:   VITALS:  height is 5' 2.99\" (1.6 m) and weight is 227 lb 8.2 oz (103.2 kg). Her oral temperature is 98.4 °F (36.9 °C). Her blood pressure is 137/76 and her pulse is 79. Her respiration is 20 and oxygen saturation is 99%. CONSTITUTIONAL: Sedated and intubated on ventilator. No acute distress. SKIN: Chronic right groin wound from previous angio  LYMPH: no cervical nodes, no inguinal nodes  HEENT: Head is normocephalic, atraumatic. EOMI, PERRLA  NECK: Supple, symmetrical, trachea midline, no adenopathy, thyroid symmetric, not enlarged and no tenderness, skin normal  CHEST/LUNGS: chest symmetric with normal A/P diameter, normal respiratory rate and rhythm on ventilator, lungs clear to auscultation without wheezes, rales or rhonchi. No accessory muscle use. CARDIOVASCULAR: Heart regular rate and rhythm Normal S1 and S2. . Carotid and femoral pulses 2+/4 and equal bilaterally  ABDOMEN: Normal shape. . large midline scar scar(s) present. Normal bowel sounds. No bruits. Soft, nondistended, no masses or organomegaly. no evidence of hernia. Percussion: Normal without hepatosplenomegally. Tenderness: absent. Colostomy in place and functioning. RECTAL: deferred, not clinically indicated  NEUROLOGIC: There are no focalizing motor or sensory deficits. CN II-XII are grossly intact.   EXTREMITIES: no cyanosis, no clubbing and no edema    LABS:   CBC with Differential:    Lab Results   Component Value Date    WBC 6.9 12/01/2021    RBC 2.50 12/01/2021    RBC 4.14 08/31/2016    HGB 7.8 12/01/2021    HCT 24.0 12/01/2021    PLT 22 12/01/2021    MCV 96.2 12/01/2021    MCH 31.3 12/01/2021    MCHC 32.6 12/01/2021    RDW 16.4 12/01/2021    NRBC 4 11/22/2021    LYMPHOPCT 18 12/01/2021    LYMPHOPCT 32.8 08/31/2016    MONOPCT 10 12/01/2021    EOSPCT 4.3 08/31/2016    BASOPCT 0 12/01/2021    BASOPCT 0.7 08/31/2016    MONOSABS 0.69 12/01/2021    LYMPHSABS 1.24 12/01/2021    EOSABS 0.14 12/01/2021    BASOSABS 0.00 12/01/2021    DIFFTYPE NOT REPORTED 12/01/2021     BMP:    Lab Results   Component Value Date     12/01/2021    K 3.8 12/01/2021    CL 97 12/01/2021    CO2 24 12/01/2021    BUN 58 12/01/2021    LABALBU 2.6 11/18/2021    CREATININE 3.66 12/01/2021    CALCIUM 9.0 12/01/2021    GFRAA 15 12/01/2021    LABGLOM 12 12/01/2021    GLUCOSE 149 12/01/2021    GLUCOSE 77 08/31/2016     Hepatic Function Panel:    Lab Results   Component Value Date    ALKPHOS 175 11/18/2021    ALT 31 11/18/2021    AST 21 11/18/2021    PROT 5.3 11/20/2021    BILITOT 0.27 11/18/2021    BILIDIR <0.08 11/13/2021    IBILI Connot be calculated 11/13/2021    LABALBU 2.6 11/18/2021     Calcium:    Lab Results   Component Value Date    CALCIUM 9.0 12/01/2021     Magnesium:    Lab Results   Component Value Date    MG 2.0 12/01/2021     Phosphorus:    Lab Results   Component Value Date    PHOS 2.6 11/26/2021     PT/INR:    Lab Results   Component Value Date    PROTIME 15.9 11/25/2021    INR 1.3 11/25/2021     ABG:    Lab Results   Component Value Date    PHART 7.371 12/01/2021    OQY7FQM 43.9 12/01/2021    PO2ART 85.0 12/01/2021    QTX2LEL 25.4 12/01/2021    N5KAHMSZ 95.3 12/01/2021     Urine Culture:  No components found for: CURINE  Blood Culture:  No components found for: CBLOOD, CFUNGUSBL  Stool Culture:  No components found for: CSTOOL    RADIOLOGY:   I have personally reviewed the following films:  XR CHEST (SINGLE VIEW FRONTAL)    Result Date: 11/30/2021  EXAMINATION: ONE XRAY VIEW OF THE CHEST 11/30/2021 6:57 am COMPARISON: Chest radiograph performed 11/29/2021. HISTORY: ORDERING SYSTEM PROVIDED HISTORY: ett placement TECHNOLOGIST PROVIDED HISTORY: ett placement Reason for Exam: ETT Acuity: Acute Type of Exam: Initial FINDINGS: There are bilateral effusions. There are subtle perihilar infiltrates. There is no pneumothorax. The heart is enlarged. The upper abdomen unremarkable. The extrathoracic soft tissues are unremarkable. There is endotracheal tube with tip in the midtrachea. There is a gastric tube and the tip is not visualized. There is a right internal jugular line that is stable. Cardiomegaly with bilateral effusions and perihilar infiltrates. Support tubes as described above. XR CHEST PORTABLE    Result Date: 11/29/2021  EXAMINATION: ONE XRAY VIEW OF THE CHEST 11/29/2021 6:01 am COMPARISON: 11/28/2021, 11/27/2021 HISTORY: ORDERING SYSTEM PROVIDED HISTORY: ett placement TECHNOLOGIST PROVIDED HISTORY: ett placement Reason for Exam: ett placement Acuity: Unknown Type of Exam: Unknown Additional signs and symptoms: ett placement Relevant Medical/Surgical History: ett placement FINDINGS: The endotracheal tube terminates 2.5 cm above the kristina. The enteric tube courses off the field of view in the upper abdomen. Right internal jugular line and right PICC line remain in place. The cardiac and mediastinal contours appear unchanged. Basilar opacities and pleural effusions are again demonstrated and without significant change. No evidence for pneumothorax. 1. Endotracheal tube terminates in appropriate position. 2. No significant change in pleural effusions and basilar atelectasis. IMPRESSION:   Acute respiratory failure, intubated on ventilator   JAYLYN  Anemia     does not have any pertinent problems on file.     PLAN:   At this time patient is DNR-CCA however family would like to proceed with [facility] and PEG procedure  Will schedule for Trach and PEG accordingly/Friday  Continue medical management   Transfuse platelets the day of surgery      Thank you for this interesting consult and for allowing us to participate in the care of this patient. If you have any questions please don't hesitate to call.           Electronically signed by JESSICA Rai  on 12/1/2021 at 12:05 PM

## 2021-12-01 NOTE — PROGRESS NOTES
ICU Progress Note (Vent)   [facility]    Patient - Adonay Cherry,  Age - 66 y.o.    - 1950      Room Number -    MRN -  [id number]   Acct # - [de-identified]  Date of Admission -  11/10/2021  4:23 PM    Events of Past 24 Hours   Tolerated dialysis yesterday without any significant issues, approximately 1.5 L removed; only on minimal sedation, does not open eyes for me but nods head yes or no. Vitals    height is 5' 2.99\" (1.6 m) and weight is 227 lb 8.2 oz (103.2 kg). Her oral temperature is 98.1 °F (36.7 °C). Her blood pressure is 137/76 and her pulse is 91. Her respiration is 18 and oxygen saturation is 98%. Temperature Range: Temp: 98.1 °F (36.7 °C) Temp  Av.2 °F (36.8 °C)  Min: 97.9 °F (36.6 °C)  Max: 98.5 °F (36.9 °C)  BP Range:  Systolic (66GBD), ZZ , Min:86 , CKL:755     Diastolic (92ZQR), PZZ:19, Min:38, Max:96    Pulse Range: Pulse  Av.4  Min: 67  Max: 98  Respiration Range: Resp  Av.1  Min: 0  Max: 30  Current Pulse Ox[de-identified]  SpO2: 98 %  24HR Pulse Ox Range:  SpO2  Av.9 %  Min: 91 %  Max: 100 %  Oxygen Amount and Delivery: O2 Flow Rate (L/min): 2 L/min      Wt Readings from Last 3 Encounters:   21 227 lb 8.2 oz (103.2 kg)   16 178 lb (80.7 kg)   16 184 lb 12.8 oz (83.8 kg)     I/O       Intake/Output Summary (Last 24 hours) at 2021 0804  Last data filed at 2021 0634  Gross per 24 hour   Intake 1136.43 ml   Output 2500 ml   Net -1363.57 ml     I/O last 3 completed shifts:   In: 1166.4 [I.V.:222.4; NG/GT:444]  Out: 2500 [Stool:500]     DRAIN/TUBE OUTPUT:     Invasive Lines   ETT Day -   11  Lines -PICC line day 16    ICP PRESSURE RANGE:  No data recorded  CVP PRESSURE RANGE:  No data recorded  Mechanical Ventilation Data   SETTINGS (Comprehensive)  Vent Information  $Ventilation: $Subsequent Day  Skin Assessment: Clean, dry, & intact  Suction Catheter Diameter: 14  Equipment Changed: HME  Vent Type: Servo i  Vent Mode: PRVC  Vt Ordered: 450 mL  Rate Set: 20 bmp  Pressure Support: 10 cmH20  FiO2 : 30 %  SpO2: 98 %  SpO2/FiO2 ratio: 326.67  Sensitivity: 5  PEEP/CPAP: 5  I Time/ I Time %: 0.86 s  Humidification Source: HME  Nitric Oxide/Epoprostenol In Use?: No  Mask Type: Full face mask  Mask Size: Medium  Additional Respiratory  Assessments  Pulse: 91  Resp: 18  SpO2: 98 %  End Tidal CO2: 43 (%)  Position: Semi-Lagunas's  Humidification Source: HME  Oral Care: Mouth suctioned, Suction toothette, Mouthwash  Cuff Pressure (cm H2O): 30 cm H2O       ABGs:   Lab Results   Component Value Date    PHART 7.371 12/01/2021    PO2ART 85.0 12/01/2021    LNO8KMP 43.9 12/01/2021       Lab Results   Component Value Date    MODE PRVC 12/01/2021         Medications   IV   sodium chloride      sodium chloride      norepinephrine Stopped (11/29/21 2306)    propofol 10 mcg/kg/min (12/01/21 0822)    dextrose      sodium chloride      sodium chloride        chlorhexidine  15 mL Mouth/Throat BID    polyvinyl alcohol  1 drop Both Eyes Q4H    And    artificial tears   Both Eyes Q4H    [Held by provider] carvedilol  3.125 mg Oral BID WC    pantoprazole  40 mg IntraVENous BID    And    sodium chloride flush  10 mL IntraVENous BID    sodium polystyrene  15 g Oral Once    sertraline  25 mg Oral Daily    ferrous sulfate  325 mg Oral Daily with breakfast    [Held by provider] aspirin  81 mg Oral Daily    atorvastatin  80 mg Oral Daily    insulin glargine  22 Units SubCUTAneous Nightly    insulin lispro  0-12 Units SubCUTAneous TID WC    insulin lispro  0-6 Units SubCUTAneous Nightly    sodium chloride flush  5-40 mL IntraVENous 2 times per day       Diet/Nutrition   ADULT TUBE FEEDING; Nasogastric; Renal Formula; Continuous; 35; No; 30; Q 8 hours; Protein; 1 Protein at 12 noon daily. Flush with 30 mLwater before and after administration.     Exam   VITALS    height is 5' 2.99\" (1.6 m) and weight is 227 lb 8.2 oz (103.2 kg). Her oral temperature is 98.1 °F (36.7 °C). Her blood pressure is 137/76 and her pulse is 91. Her respiration is 18 and oxygen saturation is 98%. Ventilator Settings (Basic)  Vent Mode: PRVC Rate Set: 20 bmp/Vt Ordered: 450 mL/ /FiO2 : 30 %    Constitutional - Sedated, arousable  General Appearance  well developed, well nourished  HEENT - Life support devices in place (ET, OG),normocephalic, atraumatic. Lungs - Chest expands equally, coarse breath sounds bilaterally   Cardiovascular - Heart sounds are normal.  normal rate and rhythm regular, no murmur, gallop or rub. Abdomen - soft, nontender, nondistended, no masses or organomegaly  Neurologic - CN II-XII are grossly intact.  There are no focal motor deficits  Skin - no bruising or bleeding  Extremities - no cyanosis, clubbing or edema    Lab Results   CBC     Lab Results   Component Value Date    WBC 6.9 12/01/2021    RBC 2.50 12/01/2021    RBC 4.14 08/31/2016    HGB 7.8 12/01/2021    HCT 24.0 12/01/2021    PLT 22 12/01/2021    MCV 96.2 12/01/2021    MCH 31.3 12/01/2021    MCHC 32.6 12/01/2021    RDW 16.4 12/01/2021    NRBC 4 11/22/2021    LYMPHOPCT 18 12/01/2021    LYMPHOPCT 32.8 08/31/2016    MONOPCT 10 12/01/2021    EOSPCT 4.3 08/31/2016    BASOPCT 0 12/01/2021    BASOPCT 0.7 08/31/2016    MONOSABS 0.69 12/01/2021    LYMPHSABS 1.24 12/01/2021    EOSABS 0.14 12/01/2021    BASOSABS 0.00 12/01/2021    DIFFTYPE NOT REPORTED 12/01/2021       BMP   Lab Results   Component Value Date     12/01/2021    K 3.8 12/01/2021    CL 97 12/01/2021    CO2 24 12/01/2021    BUN 58 12/01/2021    CREATININE 3.66 12/01/2021    GLUCOSE 149 12/01/2021    GLUCOSE 77 08/31/2016    CALCIUM 9.0 12/01/2021       LFTS  Lab Results   Component Value Date    ALKPHOS 175 11/18/2021    ALT 31 11/18/2021    AST 21 11/18/2021    PROT 5.3 11/20/2021    BILITOT 0.27 11/18/2021    BILIDIR <0.08 11/13/2021    IBILI Connot be calculated 11/13/2021    LABALBU 2.6 11/18/2021 INR  No results for input(s): PROTIME, INR in the last 72 hours. APTT  No results for input(s): APTT in the last 72 hours. Lactic Acid  Lab Results   Component Value Date    LACTA 1.3 11/26/2021    LACTA 0.6 11/16/2021    LACTA 0.6 11/16/2021        BNP   No results for input(s): BNP in the last 72 hours. Cultures       Radiology     Plain Films         Chest x-ray shows stable/improved right effusion lines and tubes in good position    SYSTEM ASSESSMENT    Acute on chronic hypoxic and hypercapnic respiratory failure  Bradycardic arrest, intubated 11/20  Right lower lobe infiltrate/pleural effusion  Acute kidney injury-requiring hemodialysis  Morbid obesity  Anemia, chronic disease and possible rectal bleed  Hypoalbuminemia  Thrombocytopenia  Nonischemic cardiomyopathy with EF of 30 to 35% possible right to left interatrial shunt  Atrial fibrillation  Possible CLL/lymphoproliferative disorder  DNR CCA      Neuro   Will try to have her off all sedation so she can better answer questions, however my feeling is she may not want to answer them    Respiratory   Wean oxygen as tolerated.  Keep O2 sat > 88%  Continues to fail weaning trials, if the patient and family agree, would recommend tracheostomy and then eventual LTAC  I am concerned that she does not have enough respiratory muscle strength to liberate herself from the ventilator anytime soon  Hemodynamics   Did not have any episodes of bradycardia or significant hypotension during dialysis    Gastrointestinal/Nutrition   Continue tube feeds    Renal   Despite dialysis, creatinine continues to worsen  Chest x-ray did improve with dialysis  Infectious Disease   Remains on cefepime    Hematology/Oncology   See 1 unit of platelets yesterday and platelet count did go up, however will need more in order to do tracheostomy and PEG if that occurs    Endocrine   Blood sugars are okay    Social/Spiritual/DNR/Disposition/Other     Family meeting today with , will need to proceed with decisions about tracheostomy versus comfort care withdrawal from ventilator      Critical Care Time   35 min    Electronically signed by Vilma Javier MD on 12/1/2021 at 8:47 AM

## 2021-12-01 NOTE — PLAN OF CARE
Problem: SAFETY  Goal: Free from accidental physical injury  Outcome: Ongoing  Goal: Free from intentional harm  Outcome: Ongoing     Problem: DAILY CARE  Goal: Daily care needs are met  Outcome: Ongoing     Problem: PAIN  Goal: Patient's pain/discomfort is manageable  Outcome: Ongoing     Problem: SKIN INTEGRITY  Goal: Skin integrity is maintained or improved  Outcome: Ongoing     Problem: KNOWLEDGE DEFICIT  Goal: Patient/S.O. demonstrates understanding of disease process, treatment plan, medications, and discharge instructions.   Outcome: Ongoing     Problem: DISCHARGE BARRIERS  Goal: Patient's continuum of care needs are met  Outcome: Ongoing     Problem: Falls - Risk of:  Goal: Will remain free from falls  Outcome: Ongoing  Goal: Absence of physical injury  Outcome: Ongoing     Problem: Skin Integrity:  Goal: Will show no infection signs and symptoms  Outcome: Ongoing  Goal: Absence of new skin breakdown  Outcome: Ongoing     Problem: Musculor/Skeletal Functional Status  Goal: Highest potential functional level  Outcome: Ongoing  Goal: Absence of falls  Outcome: Ongoing     Problem: Musculor/Skeletal Functional Status  Goal: Highest potential functional level  Outcome: Ongoing  Goal: Absence of falls  Outcome: Ongoing     Problem: Nutrition  Goal: Optimal nutrition therapy  Outcome: Ongoing     Problem: Pain:  Goal: Pain level will decrease  Outcome: Ongoing  Goal: Control of acute pain  Outcome: Ongoing  Goal: Control of chronic pain  Outcome: Ongoing     Problem: Gas Exchange - Impaired:  Goal: Levels of oxygenation will improve  Outcome: Ongoing     Problem: Fluid Volume - Imbalance:  Goal: Absence of imbalanced fluid volume signs and symptoms  Outcome: Ongoing     Problem: Non-Violent Restraints  Goal: Removal from restraints as soon as assessed to be safe  Outcome: Ongoing  Goal: No harm/injury to patient while restraints in use  Outcome: Ongoing  Goal: Patient's dignity will be maintained  Outcome: Ongoing     Problem: Airway Clearance - Ineffective:  Goal: Ability to maintain a clear airway will improve  Outcome: Ongoing     Problem:  Bowel Function - Altered:  Goal: Bowel elimination is within specified parameters  Outcome: Ongoing     Problem: Fluid Volume - Imbalance:  Goal: Absence of imbalanced fluid volume signs and symptoms  Outcome: Ongoing     Problem: Gas Exchange - Impaired:  Goal: Levels of oxygenation will improve  Outcome: Ongoing     Problem: Mental Status - Impaired:  Goal: Mental status will be restored to baseline  Outcome: Ongoing     Problem: Nutrition Deficit:  Goal: Ability to achieve adequate nutritional intake will improve  Outcome: Ongoing     Problem: OXYGENATION/RESPIRATORY FUNCTION  Goal: Patient will maintain patent airway  Outcome: Ongoing  Goal: Patient will achieve/maintain normal respiratory rate/effort  Outcome: Ongoing     Problem: MECHANICAL VENTILATION  Goal: Patient will maintain patent airway  Outcome: Ongoing  Goal: Oral health is maintained or improved  Outcome: Ongoing  Goal: ET tube will be managed safely  Outcome: Ongoing

## 2021-12-01 NOTE — PATIENT CARE CONFERENCE
Palliative Care Family Conference    Patient: Dev Espitia  Room: 2004/2004-01    Attended By: Dr Presley Lung NP palliative care, Pat patient , and Clarisa Huynh patient sister    Reason for meeting:Discuss goals of care  Treatment options/plans  Provide clinical updates and answer questions  Listen to patient/family concerns  Interdiscplinary collaboration  Build trust  Provide emotional support to the family  Discuss if patient would want tracheostomy and Peg      Conference Summary:  Fior Grounds in with Dr Kenzie Culver and spoke with patient  Lui Angela and sister Clarisa Huynh. Dr Kenzie Culver discussed whether patient would want a tracheostomy and Gtube. Patient  states he makes decisions for patient and wants her to receive tracheostomy and peg tube. Emotional support provided to patient  and sister.     Dr Kenzie Culver states he is going to  [address] [facility] for tracheostomy and peg eval.          Conclusion/Plan:  Patient is a DNRCC-A   Patient remains on ventilator with sedation   General surgery consulted        Length of meeting 30 minutes with 50% of time spent on education and counseling     Electronically signed by   JUANITA Chow - NP  Palliative Care Team  on 12/1/2021 at 4:48 PM

## 2021-12-01 NOTE — PROGRESS NOTES
[facility]     Date:   11/30/2021  Patient name:  Eddie Novak  Date of admission:  11/10/2021  4:23 PM  MRN:   [id number]  YOB: 1950    CC-  resp failure     HPI-    Pt with complicated hospital course   Intubated  fio2 30 %       REVIEW OF SYSTEMS:    · Sedated        EXAM-  BP 96/61   Pulse 98   Temp 98.1 °F (36.7 °C) (Oral)   Resp 20   Ht 5' 2.99\" (1.6 m)   Wt 227 lb 8.2 oz (103.2 kg)   SpO2 98%   BMI 40.31 kg/m²      · General appearance: sedated   · Lungs:  clear to auscultation   · Heart: regular rate and rhythm, S1, S2 normal, no murmur  · Abdomen: soft, non-tender; no masses  · Extremities: no cyanosis, +  edema no clubbing no synovitis      Laboratory Testing:  CBC: Recent Labs     11/30/21  0440   WBC 6.5   HGB 8.3*   PLT 13*     BMP:    Recent Labs     11/28/21  0426 11/28/21  0426 11/29/21  0438 11/29/21  0438 11/30/21  0440      < > 132*   < > 134*   K 3.8   < > 3.9   < > 4.2      < > 98   < > 99   CO2 26   < > 26   < > 24   BUN 31*   < > 43*   < > 51*   CREATININE 2.39*  --  2.99*  --  3.27*   GLUCOSE 153*   < > 156*   < > 180*    < > = values in this interval not displayed.          ASSESSMENT:    Patient Active Problem List   Diagnosis    Chronic kidney disease (CKD), stage III (moderate) ([facility])    Hyperkalemia    Type 2 diabetes mellitus with kidney complication, with long-term current use of insulin ([facility])    Hyperlipidemia    CKD (chronic kidney disease) stage 3, GFR 30-59 ml/min ([facility])    Microalbuminuria    Urinary calculus    Tethered cord syndrome ([facility])    Stenosis of cervical spine    Lumbar radiculopathy    Lower back pain    HTN (hypertension)    Esophageal reflux    Depression with anxiety    ASCVD (arteriosclerotic cardiovascular disease)    Closed fracture of thoracic vertebra ([facility])    Intervertebral cervical disc disorder with myelopathy, cervical region    Pain in limb    Anemia    JAYLYN (acute kidney injury)

## 2021-12-01 NOTE — PROGRESS NOTES
HEMODIALYSIS POST TREATMENT NOTE    Treatment time ordered: 3 Hours    Actual treatment time: 3 Hours    UltraFiltration Goal: 1500 - 2000 ml as tolerated  UltraFiltration Removed: 1500 ml      Pre Treatment weight: 104.7 kg  Post Treatment weight: 103.2 kg  Estimated Dry Weight:     Access used:     Central Venous Catheter:          Tunneled or Non-tunneled: Non Tunneled           Site: right neck WNL          Access Flow: Good at 350 BFR all throughout treatment      Internal Access:       AV Fistula or AV Graft:          Site:        Access Flow:        Sign and symptoms of infection:        If YES:     Medications or blood products given: Albumin 50 gm and Midodrine 15 mg  Chronic outpatient schedule: TBD, JAYLYN    Chronic outpatient unit:     Summary of response to treatment: Patient tolerated treatment well with 50 g of Albumin administered in two separate doses; at start of and mid treatment. 10 mg of Midodrine was given at start of treatment and 5 mg was given mid treatment for BP support. Interventions were effective. Target goal was able to be reached at low end of range ordered. No concerns to report. Patient remained stable throughout ultrafiltration treatment. Explain if orders NOT met, was physician notified:      Jeb Silva faxed to patient unit/ placed in patient chart: Yes    Post assessment completed: Yes    Report given to: Yolie Key RN      * Intra-treatment documented Safety Checks include the followin) Access and face visible at all times. 2) All connections and blood lines are secure with no kinks. 3) NVL alarm engaged. 4) Hemosafe device applied (if applicable). 5) No collapse of Arterial or Venous blood chambers. 6) All blood lines / pump segments in the air detectors.

## 2021-12-01 NOTE — CARE COORDINATION
LETTY was asked to have [facility] follow this patient in the event that she may need to DC to their facility.   LETTY did fax this referral, LETTY spoke to Peyton in admissions with [facility] concerning this referral.

## 2021-12-01 NOTE — PROGRESS NOTES
Today's Date: 12/1/2021  Patient Name: Tobi Finch  Date of admission: 11/10/2021  4:23 PM  Patient's age: 66 y.o., 1950  Admission Dx: JAYLYN (acute kidney injury) ([facility] [address] [N17.9]  Urinary tract infection in elderly patient [N39.0]  Anemia, unspecified type [D64.9]    Reason for Consult: TCP  Requesting Physician: Neeraj Burger MD    CHIEF COMPLAINT:    Chief Complaint   Patient presents with    Abnormal Lab     low hemoglobin and high CR     SUBJECTIVE:    Patient seen and examined  No new complaint or interval event  Patient remains intubated  Plans for trach and PEG on Friday  Platelet count 47,780 today after blood transfusion hemoglobin 7.8        HISTORY OF PRESENT ILLNESS:    Tobi Finch is a 66 y.o. female who is admitted to the hospital on 11/10/2021  for acute kidney injury and anemia. Patient has history of chronic kidney disease, chronic anemia and recent history of GI bleed with negative endoscopies at [facility]. She was transferred from nursing home for elevated creatinine and drop in her hemoglobin. Her stool for occult blood on 11/14/2021 was positive. She was evaluated by gastroenterology. Underwent upper endoscopy which showed gastritis. She also was noted to have hydronephrosis on the right side therefore was evaluated by urology and underwent cystoscopy with ureteral stent placement on 11/16/2021. Patient was evaluated by nephrology for her acute kidney injury and was started on acute dialysis on 11/17/2021. Her hemoglobin is stable at 8.0 however her platelets started dropping since admission and today it dropped to 49 therefore hematology was consulted for evaluation of her low platelets. Her iron studies suggest anemia of chronic disease. Her haptoglobin is 125 and LDH is not elevated  Her HIT panel came back negative.     Past Medical History:   has a past medical history of Anemia, unspecified, CAD (coronary artery disease), Chronic kidney disease, CKD (chronic kidney disease) stage 3, GFR 30-59 ml/min ([facility]), Closed fracture of dorsal (thoracic) vertebra without mention of spinal cord injury, Coronary atherosclerosis of unspecified type of vessel, native or graft, Depression with anxiety, Esophageal reflux, HTN (hypertension), Hyperkalemia, Hyperlipidemia, Intervertebral cervical disc disorder with myelopathy, cervical region, Lower back pain, Lumbar radiculopathy, Microalbuminuria, Muscle weakness, Pain in limb, Stenosis of cervical spine, Tethered cord syndrome (HCC), Type II or unspecified type diabetes mellitus without mention of complication, not stated as uncontrolled, Urinary calculus, and UTI (lower urinary tract infection). Past Surgical History:   has a past surgical history that includes Colon surgery; angioplasty; joint replacement; Upper gastrointestinal endoscopy (N/A, 11/15/2021); Cystoscopy (Right, 11/16/2021); and IR NONTUNNELED VASCULAR CATHETER > 5 YEARS (11/17/2021). Medications:    Prior to Admission medications    Medication Sig Start Date End Date Taking? Authorizing Provider   acetaminophen (TYLENOL) 325 MG tablet Take 650 mg by mouth every 4 hours as needed for Pain (do not exceed 4000 mg daily)   Yes Historical Provider, MD   albuterol (PROVENTIL) (2.5 MG/3ML) 0.083% nebulizer solution Take 2.5 mg by nebulization every 6 hours as needed for Shortness of Breath   Yes Historical Provider, MD   atorvastatin (LIPITOR) 80 MG tablet Take 80 mg by mouth daily   Yes Historical Provider, MD   famotidine (PEPCID) 20 MG tablet Take 20 mg by mouth daily   Yes Historical Provider, MD   ferrous sulfate (IRON 325) 325 (65 Fe) MG tablet Take 325 mg by mouth daily (with breakfast)   Yes Historical Provider, MD   HYDROcodone-acetaminophen (NORCO) 5-325 MG per tablet Take 1 tablet by mouth every 6 hours as needed for Pain.  Indications: until 11/13/21   Yes Historical Provider, MD   insulin lispro (HUMALOG) 100 UNIT/ML injection vial Inject into the skin 4 times daily (before meals and nightly) Per sliding scale:   150-200 = 2 units  201-250 = 4 units  251-300 = 6 units  301-350 = 8 units  351-400 = 10 units   Yes Historical Provider, MD   insulin glargine (LANTUS SOLOSTAR) 100 UNIT/ML injection pen Inject 22 Units into the skin nightly   Yes Historical Provider, MD   meclizine (ANTIVERT) 12.5 MG tablet Take 12.5 mg by mouth 2 times daily as needed for Dizziness   Yes Historical Provider, MD   warfarin (COUMADIN) 3 MG tablet Take 3 mg by mouth nightly   Yes Historical Provider, MD   carvedilol (COREG) 6.25 MG tablet Take 6.25 mg by mouth 2 times daily (with meals).    Yes Historical Provider, MD   aspirin 81 MG chewable tablet Take 81 mg by mouth daily    Yes Historical Provider, MD   bumetanide (BUMEX) 1 MG tablet Take 1 mg by mouth daily   11/10/21  Historical Provider, MD     Current Facility-Administered Medications   Medication Dose Route Frequency Provider Last Rate Last Admin    0.9 % sodium chloride infusion   IntraVENous PRN Keyla Rivers MD        sodium citrate 4 % injection 1.3 mL  1.3 mL IntraCATHeter PRN Dixon Blevins MD   1.3 mL at 12/01/21 1548    And    sodium citrate 4 % injection 1.2 mL  1.2 mL IntraCATHeter PRN Dixon Blevins MD   1.2 mL at 12/01/21 1548    0.9 % sodium chloride infusion   IntraVENous PRN Keyla Rivers MD        midodrine (PROAMATINE) tablet 10 mg  10 mg Oral TID PRN MD Santiago   10 mg at 12/01/21 1330    albumin human 25 % IV solution 25 g  25 g IntraVENous PRN MD Santiago   Stopped at 12/01/21 1344    midodrine (PROAMATINE) tablet 5 mg  5 mg Oral PRN MD Santiago   5 mg at 12/01/21 1345    norepinephrine (LEVOPHED) 16 mg in sodium chloride 0.9 % 250 mL infusion  2-100 mcg/min IntraVENous Ele Gonsalves MD   Stopped at 11/29/21 2306    chlorhexidine (PERIDEX) 0.12 % solution 15 mL  15 mL Mouth/Throat BID Ana M Sorto MD   15 mL at 12/01/21 0805    fentaNYL (SUBLIMAZE) injection 50 mcg  50 mcg IntraVENous Q30 Min PRN Warren Moreno MD   50 mcg at 11/27/21 1443    propofol injection  10 mcg/kg/min IntraVENous Continuous Warren Moreno MD 5.8 mL/hr at 12/01/21 0822 10 mcg/kg/min at 12/01/21 6192    polyvinyl alcohol (LIQUIFILM TEARS) 1.4 % ophthalmic solution 1 drop  1 drop Both Eyes Q4H Warren Moreno MD   1 drop at 12/01/21 0419    And    lubrifresh P.M. (artificial tears) ophthalmic ointment   Both Eyes Q4H Warren Moreno MD   Given at 11/30/21 2124    [Held by provider] carvedilol (COREG) tablet 3.125 mg  3.125 mg Oral BID  Sofia Watkins MD   3.125 mg at 11/24/21 1735    pantoprazole (PROTONIX) injection 40 mg  40 mg IntraVENous BID Kenzie Conway MD   40 mg at 12/01/21 0805    And    sodium chloride flush 0.9 % injection 10 mL  10 mL IntraVENous BID Kenzie Conway MD   10 mL at 12/01/21 0900    magnesium sulfate 1000 mg in dextrose 5% 100 mL IVPB  1,000 mg IntraVENous PRN Kenzie Conway MD   Stopped at 11/21/21 1815    glucose (GLUTOSE) 40 % oral gel 15 g  15 g Oral PRN Kenzie Conway MD        dextrose 50 % IV solution  12.5 g IntraVENous PRN Kenzie Conway MD        glucagon (rDNA) injection 1 mg  1 mg IntraMUSCular PRN Kenzie Conway MD        dextrose 5 % solution  100 mL/hr IntraVENous PRN Kenzie Conway MD        sodium polystyrene (KAYEXALATE) 15 GM/60ML suspension 15 g  15 g Oral Once Susy Thomson MD        sertraline (ZOLOFT) tablet 25 mg  25 mg Oral Daily Jeb Gonzales MD   25 mg at 12/01/21 0806    0.9 % sodium chloride infusion   IntraVENous PRN Susy Thomson MD        ferrous sulfate (IRON 325) tablet 325 mg  325 mg Oral Daily with breakfast Susy Thomson MD   325 mg at 12/01/21 0805    [Held by provider] aspirin chewable tablet 81 mg  81 mg Oral Daily Jeb Gonzales MD   81 mg at 11/13/21 1015    atorvastatin (LIPITOR) tablet 80 mg  80 mg Oral Daily Jeb MD Christian   80 mg at 21 0805    insulin glargine (LANTUS) injection vial 22 Units  22 Units SubCUTAneous Nightly Zay Hillman MD   22 Units at 21    insulin lispro (HUMALOG) injection vial 0-12 Units  0-12 Units SubCUTAneous TID WC Zay Hillman MD   2 Units at 21 1334    insulin lispro (HUMALOG) injection vial 0-6 Units  0-6 Units SubCUTAneous Nightly Jeb Gonzales MD   1 Units at 21    sodium chloride flush 0.9 % injection 5-40 mL  5-40 mL IntraVENous 2 times per day Zay Hillman MD   10 mL at 21 0900    sodium chloride flush 0.9 % injection 5-40 mL  5-40 mL IntraVENous PRN Jeb Gonzales MD        0.9 % sodium chloride infusion  25 mL IntraVENous PRN Zay Hillman MD        acetaminophen (TYLENOL) tablet 650 mg  650 mg Oral Q4H PRN Zay Hillman MD   650 mg at 21 2233    ondansetron (ZOFRAN-ODT) disintegrating tablet 4 mg  4 mg Oral Q8H PRN Jeb Gonzales MD        Or    ondansetron (ZOFRAN) injection 4 mg  4 mg IntraVENous Q6H PRN Zay Hillman MD        oxyCODONE-acetaminophen (PERCOCET) 5-325 MG per tablet 1 tablet  1 tablet Oral Q6H PRN Zay Hillman MD           Allergies:  Patient has no known allergies. Social History:   reports that she has never smoked. She does not have any smokeless tobacco history on file. She reports that she does not drink alcohol and does not use drugs. Family History: family history includes High Blood Pressure in an other family member.     REVIEW OF SYSTEMS:    Could not be obtained as patient is intubated    PHYSICAL EXAM:      BP (!) 123/54   Pulse 74   Temp 97.9 °F (36.6 °C)   Resp 23   Ht 5' 2.99\" (1.6 m)   Wt 221 lb 9 oz (100.5 kg)   SpO2 100%   BMI 39.26 kg/m²    Temp (24hrs), Av.3 °F (36.8 °C), Min:97.9 °F (36.6 °C), Max:99 °F (37.2 °C)    General appearance - well appearing, no in pain or distress   Mental status -intubated  Mouth - mucous membranes moist, pharynx normal without lesions   Neck - supple, no significant adenopathy   Lymphatics - no palpable lymphadenopathy, no hepatosplenomegaly   Chest - clear to auscultation, no wheezes, rales or rhonchi, symmetric air entry   Heart - normal rate, regular rhythm, normal S1, S2, no murmurs  Abdomen - soft, nontender, nondistended, no masses or organomegaly   Neurological -intubated nonfocal  Musculoskeletal - no joint tenderness, deformity or swelling   Extremities - peripheral pulses normal,+++ pedal edema, no clubbing or cyanosis   Skin -multiple skin bruises noted    DATA:    Labs:   CBC:   Recent Labs     11/30/21 0440 12/01/21  0454   WBC 6.5 6.9   HGB 8.3* 7.8*   HCT 25.3* 24.0*   PLT 13* 22*     BMP:   Recent Labs     11/30/21 0440 12/01/21  0454   * 132*   K 4.2 3.8   CO2 24 24   BUN 51* 58*   CREATININE 3.27* 3.66*   LABGLOM 14* 12*   GLUCOSE 180* 149*     PT/INR:   No results for input(s): PROTIME, INR in the last 72 hours.   SURGICAL PATHOLOGY REPORT [[id number]]    Collected: 11/20/21 1535    Updated: 11/23/21 0804     --Surgical Pathology Report   [id number]   [facility]   [facility]   ANATOMIC PATHOLOGY   [address] [place] [address]. [place], [address]   [phone]   Fax: [phone]   SURGICAL PATHOLOGY CONSULTATION     Patient Name: Mavis De Dios   MR#: [id number]   Specimen #[id number]     Procedures/Addenda   FLOW CYTOMETRY REPORT     Date Ordered:     11/22/2021     Status:   Signed Out        Date Complete:     11/22/2021     By: Milan Norris M.D.        Date Reported:     11/23/2021       INTERPRETATION   PERIPHERAL BLOOD:      -SEVERE THROMBOCYTOPENIA.   -MODERATE NORMOCYTIC ANEMIA.   -FLOW   CYTOMETRY POSITIVE FOR MONOCLONAL B-LYMPHOCYTES WITH IMMUNOPHENOTYPE       FEATURES MOST SUGGESTIVE OF IMMUNOPHENOTYPICALLY ATYPICAL   B-CHRONIC     LYMPHOCYTIC LEUKEMIA VS MONOCLONAL B-LYMPHOCYTOSIS.  BONE MARROW OR OTHER     TISSUE BIOPSY IS RECOMMENDED FOR FURTHER ELUCIDATION. RESULTS-COMMENTS   PERIPHERAL BLOODWBC 7.5Hgb 8. 0HCT 24.5MCV 94. 8PLT 49Neutrophil   57%Lymphocyte 26%Monocyte 9%nRBC as 2/100 WBCABS Neut 4.31K/ULABS   lymph 1.96K/ULMorphology: Anisocytosis,       ANALYSIS   Flow cytometric immunophenotyping analysis is performed on peripheral   blood following red blood cell lysis procedure.  The cells are labeled   by direct ten color immunostaining procedure and analyzed on [employer]   flow cytometer. Cytocentrifuge differential cell count:   31% Lymphocytes   3% Monocytes   66% Granulocytes     Viability:  99%     Marker panels:   B-cell tube:  CD5, CD10, CD19, CD20, CD22, CD23, CD45, , Kappa,   lambda   T-cell tube:  CD2, CD3, CD4, CD5, CD7, CD8, CD16, CD45, CD56, CD57     Flow cytometric immunophenotyping analysis of peripheral blood   lymphocyte population is positive for B-cell monoclonality;   immunophenotype strong positive for CD20, CD22,  lambda light   chain; and weakly positive for CD5 and CD23.  The immunophenotypic   features suggest immunophenotypically atypical chronic lymphocytic   leukemia, however, other B-chronic lymphoproliferative neoplasm cannot   be excluded.  The absolute monoclonal B-lymphocyte count is   approximately 0.98K/UL (by WHO criteria, absolute monoclonal B   lymphocyte count of >5000 is required to diagnose chronic lymphocytic   leukemia).  Bone marrow or other tissue biopsy is recommended for        IMAGING DATA:  XR CHEST (SINGLE VIEW FRONTAL)   Final Result   Cardiomegaly with bilateral effusions and perihilar infiltrates. Support tubes as described above. XR CHEST PORTABLE   Final Result   1. Endotracheal tube terminates in appropriate position. 2. No significant change in pleural effusions and basilar atelectasis. XR CHEST (SINGLE VIEW FRONTAL)   Final Result   1. Unchanged appearance of support devices.       2. No significant interval change in small pleural effusions and basilar atelectasis. XR CHEST PORTABLE   Final Result   1. Unchanged appearance of support devices. 2. Increasing left pleural effusion and basilar atelectasis. No significant   change in right pleural effusion. XR CHEST PORTABLE   Final Result   1. Endotracheal tube remains in appropriate position. 2. Interval improved aeration of the left lung base, otherwise no significant   change. XR CHEST PORTABLE   Final Result   1. Endotracheal tube remains in appropriate position. 2. No significant change in pleural effusions and associated basilar   atelectasis. XR CHEST PORTABLE   Final Result   No significant interval change. Mild vascular congestion and small pleural   effusions. XR CHEST PORTABLE   Final Result   1. Endotracheal tube in place with its tip 2 cm above the kristina. 2.  Unchanged cardiomegaly and small to moderate bilateral pleural effusions   with basilar atelectasis. 3.  Support tubes and catheters are otherwise unchanged. XR CHEST PORTABLE   Final Result   Cardiomegaly with bibasilar effusions and adjacent infiltrates. Support tubes as described above. XR CHEST PORTABLE   Final Result   Cardiomegaly with bibasilar effusions and adjacent infiltrates. Support tubes as described above. XR CHEST (SINGLE VIEW FRONTAL)   Final Result   The endotracheal tube ends appropriately above the kristina and below the   clavicular heads. The nasogastric tube ends in the stomach. The right   jugular vein hemodialysis catheter ends in the distal SVC, unchanged. Cardiomegaly. Bibasilar airspace disease and small right pleural effusion,   unchanged. Lungs otherwise grossly clear. No pneumothorax. VL Renal Arterial Duplex Complete   Final Result      XR CHEST PORTABLE   Final Result   Basilar opacities likely represent atelectasis with small pleural effusions.          MRI BRAIN WO CONTRAST   Final Result      There is no acute infarction, intracranial hemorrhage, or intracranial mass   lesion. Mild chronic microangiopathic ischemic disease. Mild generalized volume loss. Mild mucosal thickening of bilateral mastoid air cells and right sphenoid   sinus. XR CHEST PORTABLE   Final Result   1. Tip of the newly inserted NG tube overlies the gastric body. .   2. The right IJ CVC and right arm PICC line remain well positioned. 3.  No acute cardiopulmonary abnormality. US RETROPERITONEAL COMPLETE   Final Result   No acute findings. No hydronephrosis. Small right pleural effusion. CT HEAD WO CONTRAST   Final Result   No acute intracranial abnormality. Chronic microvascular disease and chronic lacunar infarcts. Critical results were called by Dr. Rona Atwood to Dr Gabriel Dee on 11/17/2021 at   22:0. XR CHEST PORTABLE   Final Result   1. No pneumothorax evident following thoracentesis. 2. Low lung volumes. Significantly improved pneumatization lower right lung   with minimal pleural effusion bilateral evident. 3. Calcific atherosclerosis aorta. 4. Cardiomegaly. IR GUIDED THORACENTESIS PLEURAL   Final Result   Successful ultrasound guided right thoracentesis. IR NONTUNNELED VASCULAR CATHETER > 5 YEARS   Final Result   Successful ultrasound guided non-tunneled right IJ temporary hemodialysis   catheter placement. Catheter is ready for dialysis use at this time. FLUORO FOR SURGICAL PROCEDURES   Final Result      XR CHEST PORTABLE   Final Result   Moderate right pleural effusion and associated airspace disease. Pneumonia   cannot be excluded. US RENAL COMPLETE   Final Result   1. Mild to moderate right-sided hydronephrosis. 2. Limited renal ultrasound. No left-sided hydronephrosis. CT ABDOMEN PELVIS WO CONTRAST Additional Contrast? None   Final Result   Severe right and mild left hydroureteronephrosis.   No obstructing ureteral   stone is seen. Wall thickening of the urinary bladder without significant   urinary bladder wall distension. Outlet obstruction is a consideration. Right-sided ostomy. There is bowel wall thickening proximal to the ostomy,   may suggest infectious or inflammatory enteritis. No drainable fluid   collections or free air. No evidence of bowel obstruction. Moderate right pleural effusion. Bibasilar infiltrates or atelectasis. Clinical correlation to exclude   pneumonia. US RETROPERITONEAL COMPLETE   Final Result   1. Moderate to severe right and mild-to-moderate left hydronephrosis. 2. Exam limited due to patient's body habitus. Primary Problem  JAYLYN (acute kidney injury) [facility])    Active Hospital Problems    Diagnosis Date Noted    Acute metabolic encephalopathy [P16.22]     Longstanding persistent atrial fibrillation ([facility] [address] [I48.11] 11/16/2021    Hydronephrosis determined by ultrasound [N13.30] 11/14/2021    Iron deficiency anemia [D50.9] 11/12/2021    JAYLYN (acute kidney injury) ([facility] [address] [N17.9] 11/10/2021    CKD (chronic kidney disease) stage 3, GFR 30-59 ml/min ([facility]) [N18.30]     HTN (hypertension) [I10]     Type 2 diabetes mellitus with kidney complication, with long-term current use of insulin ([facility]) [E11.29, Z79.4]     ASCVD (arteriosclerotic cardiovascular disease) [I25.10]        IMPRESSION:   1. Acute kidney injury and started on dialysis by nephrology  2. Anemia, stool occult positive for blood and had upper endoscopy showing gastritis. Anemia could be related to chronic disease and also renal disease and aggravated by GI bleed  3. Thrombocytopenia  4. Acute encephalopathy  5. Bilateral hydronephrosis  6. Cardiomyopathy with EF of 30-35%  7. Generalized anasarca  8. S/p PEA, resucitated    RECOMMENDATIONS:  1. I reviewed the laboratory data, imaging studies, diagnosis, other treatment recommendations  2.  Anemia is multifactorial secondary to renal dysfunction myelosuppression from acute illness as well as possible primary bone marrow disorder  3. Thrombocytopenia also likely due to myelosuppression from acute illness polypharmacy and peripheral consumption. 4. Primary bone marrow disorder is also in the differential.  Flow cytometry raises concern regarding atypical CLL or lymphoproliferative disorder however patient too sick for a bone marrow biopsy  5. Patient's power of , her  wishes to pursue further care. Trach and PEG planned for  Friday  6. We can transfuse platelet to the desired level for the procedure  7. Prognosis remains guarded to poor  8. Monitor counts closely  9. We will follow    Discussed with pt and Nurse. Thank you for asking us to see this patient. Caroline Koehler MD    This note is created with the assistance of a speech recognition program.  While intending to generate a document that actually reflects the content of the visit, the document can still have some errors including those of syntax and sound a like substitutions which may escape proof reading. It such instances, actual meaning can be extrapolated by contextual diversion.

## 2021-12-01 NOTE — PLAN OF CARE
Problem: SAFETY  Goal: Free from accidental physical injury  12/1/2021 1617 by Latoya Cancino RN  Outcome: Met This Shift  Note: Patient has remained free from any accidental injury at this time   12/1/2021 0646 by Anjali Hicks RN  Outcome: Ongoing  Goal: Free from intentional harm  12/1/2021 1617 by Latoya Cancino RN  Outcome: Met This Shift  Note: Patient has remained free from any intentional harm at this time   12/1/2021 0646 by Anjali Hicks RN  Outcome: Ongoing     Problem: Falls - Risk of:  Goal: Will remain free from falls  Description: Will remain free from falls  12/1/2021 1617 by Latoya Cancino RN  Outcome: Met This Shift  Note: Patient has remained free from falls at this time   12/1/2021 0646 by Anjali Hicks RN  Outcome: Ongoing  Goal: Absence of physical injury  Description: Absence of physical injury  12/1/2021 1617 by Latoya Cancino RN  Outcome: Met This Shift  Note: Patient has remained free from any physical injury at this time  12/1/2021 0646 by Anjali Hicks RN  Outcome: Ongoing     Problem: Skin Integrity:  Goal: Absence of new skin breakdown  Description: Absence of new skin breakdown  12/1/2021 1617 by Latoya Cancino RN  Outcome: Met This Shift  Note: No signs of new skin breakdown at this time   12/1/2021 0646 by Anjali Hicks RN  Outcome: Ongoing     Problem: DAILY CARE  Goal: Daily care needs are met  12/1/2021 1617 by Latoya Cancino RN  Outcome: Ongoing  12/1/2021 0646 by Anjali Hicks RN  Outcome: Ongoing     Problem: PAIN  Goal: Patient's pain/discomfort is manageable  12/1/2021 1617 by Latoya Cancino RN  Outcome: Ongoing  12/1/2021 0646 by Anjali Hicks RN  Outcome: Ongoing     Problem: SKIN INTEGRITY  Goal: Skin integrity is maintained or improved  12/1/2021 161[place] by Latoya Cancino RN  Outcome: Ongoing  12/1/2021 0646 by Anjali Hicks RN  Outcome: Ongoing     Problem: KNOWLEDGE DEFICIT  Goal: Patient/S.O. demonstrates understanding of disease process, treatment plan, medications, and discharge instructions.   12/1/2021 1617 by Isaiah Kamara RN  Outcome: Ongoing  12/1/2021 0646 by Stephan Reyes RN  Outcome: Ongoing     Problem: DISCHARGE BARRIERS  Goal: Patient's continuum of care needs are met  12/1/2021 1617 by Isaiah Kamara RN  Outcome: Ongoing  12/1/2021 0646 by Stephan Reyes RN  Outcome: Ongoing     Problem: Skin Integrity:  Goal: Will show no infection signs and symptoms  Description: Will show no infection signs and symptoms  12/1/2021 1617 by Isaiah Kamara RN  Outcome: Ongoing  12/1/2021 0646 by Stephan Reyes RN  Outcome: Ongoing     Problem: Musculor/Skeletal Functional Status  Goal: Highest potential functional level  12/1/2021 1617 by Isaiah Kamara RN  Outcome: Ongoing  12/1/2021 0646 by Stephan Reyes RN  Outcome: Ongoing  Goal: Absence of falls  12/1/2021 1617 by Isaiah Kamara RN  Outcome: Ongoing  12/1/2021 0646 by Stephan Reyes RN  Outcome: Ongoing     Problem: Musculor/Skeletal Functional Status  Goal: Highest potential functional level  12/1/2021 1617 by Isaiah Kamara RN  Outcome: Ongoing  12/1/2021 0646 by Stephan Reyes RN  Outcome: Ongoing  Goal: Absence of falls  12/1/2021 1617 by Isaiah Kamara RN  Outcome: Ongoing  12/1/2021 0646 by Stephan Reyes RN  Outcome: Ongoing     Problem: Nutrition  Goal: Optimal nutrition therapy  12/1/2021 1617 by Isaiah Kamara RN  Outcome: Ongoing  12/1/2021 0646 by Stephan Reyes RN  Outcome: Ongoing     Problem: Pain:  Goal: Pain level will decrease  Description: Pain level will decrease  12/1/2021 1617 by Isaiah Kamara RN  Outcome: Ongoing  12/1/2021 0646 by Stephan Reyes RN  Outcome: Ongoing  Goal: Control of acute pain  Description: Control of acute pain  12/1/2021 1617 by Isaiah Kamara RN  Outcome: Ongoing  12/1/2021 0646 by Stephan Reyes RN  Outcome: Ongoing  Goal: Control of chronic pain  Description: Control of chronic pain  12/1/2021 1617 by Jerrod Varela RN  Outcome: Ongoing  12/1/2021 0646 by Paola Starkey RN  Outcome: Ongoing     Problem: Gas Exchange - Impaired:  Goal: Levels of oxygenation will improve  Description: Levels of oxygenation will improve  12/1/2021 1617 by Jerrod Varela RN  Outcome: Ongoing  12/1/2021 0646 by Paola Starkey RN  Outcome: Ongoing     Problem: Fluid Volume - Imbalance:  Goal: Absence of imbalanced fluid volume signs and symptoms  Description: Absence of imbalanced fluid volume signs and symptoms  12/1/2021 1617 by Jerrod Varela RN  Outcome: Ongoing  12/1/2021 0646 by Paola Starkey RN  Outcome: Ongoing     Problem: Non-Violent Restraints  Goal: Removal from restraints as soon as assessed to be safe  12/1/2021 1617 by Jerrod Varela RN  Outcome: Ongoing  12/1/2021 0646 by Paola Starkey RN  Outcome: Ongoing  Goal: No harm/injury to patient while restraints in use  12/1/2021 1617 by Jerrod Varela RN  Outcome: Ongoing  12/1/2021 0646 by Paola Starkey RN  Outcome: Ongoing  Goal: Patient's dignity will be maintained  12/1/2021 1617 by Jerrod Varela RN  Outcome: Ongoing  12/1/2021 0646 by Paola Starkey RN  Outcome: Ongoing     Problem: Airway Clearance - Ineffective:  Goal: Ability to maintain a clear airway will improve  Description: Ability to maintain a clear airway will improve  12/1/2021 1617 by Jerrod Varela RN  Outcome: Ongoing  12/1/2021 0646 by Paola Starkey RN  Outcome: Ongoing     Problem:  Bowel Function - Altered:  Goal: Bowel elimination is within specified parameters  Description: Bowel elimination is within specified parameters  12/1/2021 1617 by Jerrod Varela RN  Outcome: Ongoing  12/1/2021 0646 by Paola Starkey RN  Outcome: Ongoing     Problem: Fluid Volume - Imbalance:  Goal: Absence of imbalanced fluid volume signs and symptoms  Description: Absence of imbalanced fluid volume signs and symptoms  12/1/2021 1617 by Aamir Vail RN  Outcome: Ongoing  12/1/2021 0646 by Opal Lucero RN  Outcome: Ongoing     Problem: Gas Exchange - Impaired:  Goal: Levels of oxygenation will improve  Description: Levels of oxygenation will improve  12/1/2021 1617 by Aamir Vail RN  Outcome: Ongoing  12/1/2021 0646 by Opal Lucero RN  Outcome: Ongoing     Problem: Mental Status - Impaired:  Goal: Mental status will be restored to baseline  Description: Mental status will be restored to baseline  12/1/2021 1617 by Aamir Vail RN  Outcome: Ongoing  12/1/2021 0646 by Opal Lucero RN  Outcome: Ongoing     Problem: Nutrition Deficit:  Goal: Ability to achieve adequate nutritional intake will improve  Description: Ability to achieve adequate nutritional intake will improve  12/1/2021 1617 by Aamir Vail RN  Outcome: Ongoing  12/1/2021 0646 by Opal Lucero RN  Outcome: Ongoing     Problem: OXYGENATION/RESPIRATORY FUNCTION  Goal: Patient will maintain patent airway  12/1/2021 1617 by Aamir Vail RN  Outcome: Ongoing  12/1/2021 0646 by Opal Lucero RN  Outcome: Ongoing  Goal: Patient will achieve/maintain normal respiratory rate/effort  Description: Respiratory rate and effort will be within normal limits for the patient  12/1/2021 1617 by Aamir Vail RN  Outcome: Ongoing  12/1/2021 0646 by Opal Lucero RN  Outcome: Ongoing     Problem: MECHANICAL VENTILATION  Goal: Patient will maintain patent airway  12/1/2021 1617 by Aamir Vail RN  Outcome: Ongoing  12/1/2021 0646 by Opal Lucero RN  Outcome: Ongoing  Goal: Oral health is maintained or improved  12/1/2021 1617 by Aamir Vail RN  Outcome: Ongoing  12/1/2021 0646 by Opal Lucero RN  Outcome: Ongoing  Goal: ET tube will be managed safely  12/1/2021 1617 by Aamir Vail RN  Outcome: Ongoing  12/1/2021 0646 by Opal Lucero RN  Outcome: Ongoing

## 2021-12-01 NOTE — PLAN OF CARE
MECHANICAL VENTILATION     [x]  PROVIDE OPTIMAL VENTILATION  [x]   ASSESS FOR EXTUBATION READINESS  [x]   ASSESS FOR WEANING READINESS  [x]  EXTUBATE AS TOLERATED  []  IMPLEMENT ADULT MECHANICAL VENTILATION PROTOCOL  [x]  MAINTAIN ADEQUATE OXYGENATION  [x]  PERFORM SPONTANEOUS WEANING TRIAL AS TOLERATED

## 2021-12-01 NOTE — PROGRESS NOTES
Department of Internal Medicine  Nephrology Abby Aleman MD  Progress Note    Reason for consultation: Management of acute kidney injury. Consulting physician: Godwin Adhikari MD.    Interval history: Patient was seen and examined today and she remains intubated and on ventilator support. She did tolerate ultrafiltration yesterday with removal of 1.5 L. She remains oliguric. Repeat 2D echocardiogram showed left ventricular ejection fraction 30 to 35%. History of present illness: This is a 66 y.o. female with a significant past medical history of Type 2 diabetes mellitus, coronary artery disease, lumbar radiculopathy, Hyperlipidemia and chronic kidney disease stage III [baseline serum creatinine 1.5 mg/dL], who presented from [facility] for further evaluation of acute kidney injury. [facility] was sent to [facility] after developing nonhealing wound s/p cardiac catheterization via right refill Levophed has been discontinued approach with right groin wound. She had been on Bumex in the ECF and creatinine and BUN have been rising and Bumex was decreased to a lowest dose of 0.5 mg p.o. twice daily. She has had regular blood work performed in the [facility] and ECF contacted nephrologist after finding abnormal laboratory studies. She was noted to have a BUN/creatinine 104/2.28 mg/dL with hemoglobin 8 g/dL. She has mild confusion but is not in obvious respiratory distress.     Scheduled Meds:   chlorhexidine  15 mL Mouth/Throat BID    polyvinyl alcohol  1 drop Both Eyes Q4H    And    artificial tears   Both Eyes Q4H    [Held by provider] carvedilol  3.125 mg Oral BID WC    pantoprazole  40 mg IntraVENous BID    And    sodium chloride flush  10 mL IntraVENous BID    sodium polystyrene  15 g Oral Once    sertraline  25 mg Oral Daily    ferrous sulfate  325 mg Oral Daily with breakfast    [Held by provider] aspirin  81 mg Oral Daily    atorvastatin  80 mg Oral Daily    insulin glargine  22 Units SubCUTAneous Nightly    insulin lispro  0-12 Units SubCUTAneous TID WC    insulin lispro  0-6 Units SubCUTAneous Nightly    sodium chloride flush  5-40 mL IntraVENous 2 times per day     Continuous Infusions:   sodium chloride      sodium chloride      norepinephrine Stopped (11/29/21 2306)    propofol 10 mcg/kg/min (12/01/21 8789)    dextrose      sodium chloride      sodium chloride       PRN Meds:.sodium chloride, sodium citrate **AND** sodium citrate, sodium chloride, midodrine, albumin human, midodrine, fentanNYL, potassium chloride **OR** potassium alternative oral replacement **OR** potassium chloride, magnesium sulfate, glucose, dextrose, glucagon (rDNA), dextrose, sodium chloride, sodium chloride flush, sodium chloride, acetaminophen, ondansetron **OR** ondansetron, oxyCODONE-acetaminophen    Physical Exam:    VITALS:  /76   Pulse 91   Temp 98.1 °F (36.7 °C) (Oral)   Resp 18   Ht 5' 2.99\" (1.6 m)   Wt 227 lb 8.2 oz (103.2 kg)   SpO2 98%   BMI 40.31 kg/m²   24HR INTAKE/OUTPUT:      Intake/Output Summary (Last 24 hours) at 12/1/2021 1012  Last data filed at 12/1/2021 0634  Gross per 24 hour   Intake 1136.43 ml   Output 2500 ml   Net -1363.57 ml     Constitutional: Intubated and on ventilator support. Skin: Skin color, texture, turgor normal. No rashes or lesions    Head: Normocephalic, without obvious abnormality, atraumatic     Cardiovascular/Edema: S1, S2 with no pericardial rub or gallop. Respiratory: Diminished air entry basal rales intubated on ventilator    Abdomen: soft, non-tender; bowel sounds normal; no masses,  no organomegaly    Back: symmetric, no curvature. ROM normal. No CVA tenderness.     Extremities: 1-2+ generalized edema    Neuro:  Grossly normal    CBC:   Recent Labs     11/29/21 0438 11/30/21 0440 12/01/21  0454   WBC 6.2 6.5 6.9   HGB 8.2* 8.3* 7.8*   PLT 23* 13* 22*     BMP:    Recent Labs     11/29/21 0438 11/30/21 0440 12/01/21  0454   * 134* 132*   K 3.9 4.2 3.8   CL 98 99 97*   CO2 26 24 24   BUN 43* 51* 58*   CREATININE 2.99* 3.27* 3.66*   GLUCOSE 156* 180* 149*     Lab Results   Component Value Date    NITRU NEGATIVE 11/12/2021    COLORU Yellow 11/12/2021    PHUR 6.0 11/12/2021    WBCUA 50  11/12/2021    RBCUA 20 TO 50 11/12/2021    MUCUS NOT REPORTED 11/12/2021    TRICHOMONAS NOT REPORTED 11/12/2021    YEAST NOT REPORTED 11/12/2021    BACTERIA MODERATE 11/12/2021    CLARITYU Clear 09/07/2016    SPECGRAV 1.012 11/12/2021    LEUKOCYTESUR LARGE 11/12/2021    UROBILINOGEN Normal 11/12/2021    BILIRUBINUR NEGATIVE 11/12/2021    BLOODU Positive 09/07/2016    GLUCOSEU NEGATIVE 11/12/2021    KETUA NEGATIVE 11/12/2021    AMORPHOUS NOT REPORTED 11/12/2021     IMPRESSION/RECOMMENDATIONS:      1. Acute kidney injury - most consistent with ischemic acute tubular necrosis and obstructive nephropathy secondary to bilateral hydronephrosis. She remains borderline oliguric despite repeat renal ultrasound [11/18/2021] showing improvement in hydronephrosis. Hemodynamic instability has limited attempts at ultrafiltration. She has required acute intermittent hemodialysis+/- ultrafiltration since 11/17/2021 with initial poor tolerance. Will attempt hemodialysis and ultrafiltration today. Continue to monitor urine output closely. Avoid nephrotoxic agents. Basic metabolic profile daily. 2.  Heart failure with reduced ejection fraction [HFrEF with LVEF 30 to 35% 11/10/2021] - We will treat with Ultrafiltration as tolerated and as needed diuretics. 3.  Acute hypoxic and hypercapnic respiratory failure - patient remains intubated and on ventilator support. 4.  Normocytic anemia and thrombocytopenia - No evidence of microangiopathy and HIT panel was negative. He has required platelet transfusion as needed. 5.  Hyponatremia - secondary to systolic heart failure with hypervolemia. Would treat with diuretics and as needed ultrafiltration.     6.  Bilateral hydronephrosis - status post cystoscopy with right ureteral  stent placement. Prognosis is guarded to poor.     Bianca Granados MD FACP  Attending Nephrologist  12/1/2021 10:12 AM

## 2021-12-01 NOTE — PROGRESS NOTES
Physical Therapy        Physical Therapy Cancel Note      DATE: 2021    NAME: Jani Lockett  MRN: [id number]   : 1950      Patient not seen this date for Physical Therapy due to:    Hemodialysis: Pt remains intubated and on ventilator support. Pt is currently getting dialysis at this time.        Electronically signed by Samia Lara PTA on 2021 at 3:06 PM

## 2021-12-02 ENCOUNTER — ANESTHESIA EVENT (OUTPATIENT)
Dept: OPERATING ROOM | Age: 71
DRG: 004 | End: 2021-12-02
Payer: COMMERCIAL

## 2021-12-02 LAB
-: ABNORMAL
ABO/RH: NORMAL
ABSOLUTE BANDS #: 0.06 K/UL (ref 0–1)
ABSOLUTE EOS #: 0.33 K/UL (ref 0–0.4)
ABSOLUTE IMMATURE GRANULOCYTE: ABNORMAL K/UL (ref 0–0.3)
ABSOLUTE LYMPH #: 0.77 K/UL (ref 1–4.8)
ABSOLUTE MONO #: 0.55 K/UL (ref 0.1–1.3)
ALLEN TEST: ABNORMAL
AMORPHOUS: ABNORMAL
ANION GAP SERPL CALCULATED.3IONS-SCNC: 9 MMOL/L (ref 9–17)
ANTIBODY SCREEN: NEGATIVE
ARM BAND NUMBER: NORMAL
BACTERIA: ABNORMAL
BANDS: 1 % (ref 0–10)
BASOPHILS # BLD: 0 % (ref 0–2)
BASOPHILS ABSOLUTE: 0 K/UL (ref 0–0.2)
BILIRUBIN URINE: NEGATIVE
BLD PROD TYP BPU: NORMAL
BUN BLDV-MCNC: 39 MG/DL (ref 8–23)
BUN/CREAT BLD: ABNORMAL (ref 9–20)
CALCIUM SERPL-MCNC: 8.8 MG/DL (ref 8.6–10.4)
CARBOXYHEMOGLOBIN: 1 % (ref 0–5)
CASTS UA: ABNORMAL /LPF
CHLORIDE BLD-SCNC: 97 MMOL/L (ref 98–107)
CO2: 26 MMOL/L (ref 20–31)
COLOR: YELLOW
COMMENT UA: ABNORMAL
CREAT SERPL-MCNC: 2.96 MG/DL (ref 0.5–0.9)
CRYSTALS, UA: ABNORMAL /HPF
DIFFERENTIAL TYPE: ABNORMAL
DISPENSE STATUS BLOOD BANK: NORMAL
EOSINOPHILS RELATIVE PERCENT: 6 % (ref 0–4)
EPITHELIAL CELLS UA: ABNORMAL /HPF
EXPIRATION DATE: NORMAL
FIO2: 30
GFR AFRICAN AMERICAN: 19 ML/MIN
GFR NON-AFRICAN AMERICAN: 16 ML/MIN
GFR SERPL CREATININE-BSD FRML MDRD: ABNORMAL ML/MIN/{1.73_M2}
GFR SERPL CREATININE-BSD FRML MDRD: ABNORMAL ML/MIN/{1.73_M2}
GLUCOSE BLD-MCNC: 137 MG/DL (ref 65–105)
GLUCOSE BLD-MCNC: 148 MG/DL (ref 70–99)
GLUCOSE BLD-MCNC: 160 MG/DL (ref 65–105)
GLUCOSE BLD-MCNC: 161 MG/DL (ref 65–105)
GLUCOSE BLD-MCNC: 163 MG/DL (ref 65–105)
GLUCOSE URINE: NEGATIVE
HCO3 ARTERIAL: 28.1 MMOL/L (ref 22–26)
HCT VFR BLD CALC: 23.8 % (ref 36–46)
HEMOGLOBIN: 7.7 G/DL (ref 12–16)
IMMATURE GRANULOCYTES: ABNORMAL %
KETONES, URINE: NEGATIVE
LEUKOCYTE ESTERASE, URINE: ABNORMAL
LYMPHOCYTES # BLD: 14 % (ref 24–44)
MAGNESIUM: 2 MG/DL (ref 1.6–2.6)
MCH RBC QN AUTO: 31.2 PG (ref 26–34)
MCHC RBC AUTO-ENTMCNC: 32.2 G/DL (ref 31–37)
MCV RBC AUTO: 96.9 FL (ref 80–100)
METAMYELOCYTES ABSOLUTE COUNT: 0.17 K/UL
METAMYELOCYTES: 3 %
METHEMOGLOBIN: 1.4 % (ref 0–1.9)
MODE: ABNORMAL
MONOCYTES # BLD: 10 % (ref 1–7)
MORPHOLOGY: ABNORMAL
MORPHOLOGY: ABNORMAL
MUCUS: ABNORMAL
NEGATIVE BASE EXCESS, ART: ABNORMAL MMOL/L (ref 0–2)
NITRITE, URINE: NEGATIVE
NOTIFICATION TIME: ABNORMAL
NOTIFICATION: ABNORMAL
NRBC AUTOMATED: ABNORMAL PER 100 WBC
O2 DEVICE/FLOW/%: ABNORMAL
O2 SAT, ARTERIAL: 95.6 % (ref 95–98)
OTHER OBSERVATIONS UA: ABNORMAL
OXYHEMOGLOBIN: ABNORMAL % (ref 95–98)
PATIENT TEMP: 37
PCO2 ARTERIAL: 43.5 MMHG (ref 35–45)
PCO2, ART, TEMP ADJ: ABNORMAL (ref 35–45)
PDW BLD-RTO: 16.4 % (ref 11.5–14.9)
PEEP/CPAP: 5
PH ARTERIAL: 7.42 (ref 7.35–7.45)
PH UA: 7.5 (ref 5–8)
PH, ART, TEMP ADJ: ABNORMAL (ref 7.35–7.45)
PLATELET # BLD: 16 K/UL (ref 150–450)
PLATELET ESTIMATE: ABNORMAL
PMV BLD AUTO: 8.8 FL (ref 6–12)
PO2 ARTERIAL: 91.2 MMHG (ref 80–100)
PO2, ART, TEMP ADJ: ABNORMAL MMHG (ref 80–100)
POSITIVE BASE EXCESS, ART: 3.6 MMOL/L (ref 0–2)
POTASSIUM SERPL-SCNC: 3.8 MMOL/L (ref 3.7–5.3)
PROTEIN UA: ABNORMAL
PSV: ABNORMAL
PT. POSITION: ABNORMAL
RBC # BLD: 2.46 M/UL (ref 4–5.2)
RBC # BLD: ABNORMAL 10*6/UL
RBC UA: ABNORMAL /HPF
RENAL EPITHELIAL, UA: ABNORMAL /HPF
RESPIRATORY RATE: 20
SAMPLE SITE: ABNORMAL
SEG NEUTROPHILS: 66 % (ref 36–66)
SEGMENTED NEUTROPHILS ABSOLUTE COUNT: 3.62 K/UL (ref 1.3–9.1)
SET RATE: 20
SODIUM BLD-SCNC: 132 MMOL/L (ref 135–144)
SPECIFIC GRAVITY UA: 1.02 (ref 1–1.03)
TEXT FOR RESPIRATORY: ABNORMAL
TOTAL HB: ABNORMAL G/DL (ref 12–16)
TOTAL RATE: 20
TRANSFUSION STATUS: NORMAL
TRICHOMONAS: ABNORMAL
TURBIDITY: ABNORMAL
UNIT DIVISION: 0
UNIT NUMBER: NORMAL
URINE HGB: ABNORMAL
UROBILINOGEN, URINE: NORMAL
VT: 450
WBC # BLD: 5.5 K/UL (ref 3.5–11)
WBC # BLD: ABNORMAL 10*3/UL
WBC UA: ABNORMAL /HPF
YEAST: ABNORMAL

## 2021-12-02 PROCEDURE — 6370000000 HC RX 637 (ALT 250 FOR IP): Performed by: INTERNAL MEDICINE

## 2021-12-02 PROCEDURE — 2580000003 HC RX 258: Performed by: UROLOGY

## 2021-12-02 PROCEDURE — 86900 BLOOD TYPING SEROLOGIC ABO: CPT

## 2021-12-02 PROCEDURE — 2580000003 HC RX 258: Performed by: FAMILY MEDICINE

## 2021-12-02 PROCEDURE — 86901 BLOOD TYPING SEROLOGIC RH(D): CPT

## 2021-12-02 PROCEDURE — 6360000002 HC RX W HCPCS: Performed by: FAMILY MEDICINE

## 2021-12-02 PROCEDURE — 6370000000 HC RX 637 (ALT 250 FOR IP): Performed by: UROLOGY

## 2021-12-02 PROCEDURE — 6360000002 HC RX W HCPCS: Performed by: INTERNAL MEDICINE

## 2021-12-02 PROCEDURE — 99232 SBSQ HOSP IP/OBS MODERATE 35: CPT | Performed by: NURSE PRACTITIONER

## 2021-12-02 PROCEDURE — 82805 BLOOD GASES W/O2 SATURATION: CPT

## 2021-12-02 PROCEDURE — 86850 RBC ANTIBODY SCREEN: CPT

## 2021-12-02 PROCEDURE — 94761 N-INVAS EAR/PLS OXIMETRY MLT: CPT

## 2021-12-02 PROCEDURE — 2580000003 HC RX 258: Performed by: INTERNAL MEDICINE

## 2021-12-02 PROCEDURE — 90935 HEMODIALYSIS ONE EVALUATION: CPT

## 2021-12-02 PROCEDURE — 81001 URINALYSIS AUTO W/SCOPE: CPT

## 2021-12-02 PROCEDURE — 2700000000 HC OXYGEN THERAPY PER DAY

## 2021-12-02 PROCEDURE — 99232 SBSQ HOSP IP/OBS MODERATE 35: CPT | Performed by: INTERNAL MEDICINE

## 2021-12-02 PROCEDURE — 80048 BASIC METABOLIC PNL TOTAL CA: CPT

## 2021-12-02 PROCEDURE — 87086 URINE CULTURE/COLONY COUNT: CPT

## 2021-12-02 PROCEDURE — 36600 WITHDRAWAL OF ARTERIAL BLOOD: CPT

## 2021-12-02 PROCEDURE — C9113 INJ PANTOPRAZOLE SODIUM, VIA: HCPCS | Performed by: FAMILY MEDICINE

## 2021-12-02 PROCEDURE — 2500000003 HC RX 250 WO HCPCS: Performed by: INTERNAL MEDICINE

## 2021-12-02 PROCEDURE — 36415 COLL VENOUS BLD VENIPUNCTURE: CPT

## 2021-12-02 PROCEDURE — 82947 ASSAY GLUCOSE BLOOD QUANT: CPT

## 2021-12-02 PROCEDURE — P9047 ALBUMIN (HUMAN), 25%, 50ML: HCPCS | Performed by: INTERNAL MEDICINE

## 2021-12-02 PROCEDURE — 85025 COMPLETE CBC W/AUTO DIFF WBC: CPT

## 2021-12-02 PROCEDURE — 94003 VENT MGMT INPAT SUBQ DAY: CPT

## 2021-12-02 PROCEDURE — 2000000000 HC ICU R&B

## 2021-12-02 PROCEDURE — 83735 ASSAY OF MAGNESIUM: CPT

## 2021-12-02 RX ORDER — SODIUM CITRATE 4 % (5 ML)
1.2 SYRINGE (ML) MISCELLANEOUS ONCE
Status: COMPLETED | OUTPATIENT
Start: 2021-12-02 | End: 2021-12-02

## 2021-12-02 RX ORDER — SODIUM CITRATE 4 % (5 ML)
1.3 SYRINGE (ML) MISCELLANEOUS ONCE
Status: COMPLETED | OUTPATIENT
Start: 2021-12-02 | End: 2021-12-02

## 2021-12-02 RX ADMIN — INSULIN GLARGINE 22 UNITS: 100 INJECTION, SOLUTION SUBCUTANEOUS at 21:26

## 2021-12-02 RX ADMIN — SODIUM CHLORIDE, PRESERVATIVE FREE 10 ML: 5 INJECTION INTRAVENOUS at 21:26

## 2021-12-02 RX ADMIN — ATORVASTATIN CALCIUM 80 MG: 80 TABLET, FILM COATED ORAL at 08:33

## 2021-12-02 RX ADMIN — SODIUM CHLORIDE, PRESERVATIVE FREE 10 ML: 5 INJECTION INTRAVENOUS at 21:27

## 2021-12-02 RX ADMIN — MIDODRINE HYDROCHLORIDE 5 MG: 5 TABLET ORAL at 12:06

## 2021-12-02 RX ADMIN — INSULIN LISPRO 2 UNITS: 100 INJECTION, SOLUTION INTRAVENOUS; SUBCUTANEOUS at 12:05

## 2021-12-02 RX ADMIN — PANTOPRAZOLE SODIUM 40 MG: 40 INJECTION, POWDER, FOR SOLUTION INTRAVENOUS at 21:26

## 2021-12-02 RX ADMIN — SODIUM CHLORIDE, PRESERVATIVE FREE 10 ML: 5 INJECTION INTRAVENOUS at 08:00

## 2021-12-02 RX ADMIN — SERTRALINE HYDROCHLORIDE 25 MG: 50 TABLET ORAL at 08:33

## 2021-12-02 RX ADMIN — INSULIN LISPRO 2 UNITS: 100 INJECTION, SOLUTION INTRAVENOUS; SUBCUTANEOUS at 17:05

## 2021-12-02 RX ADMIN — CEFTRIAXONE SODIUM 1000 MG: 1 INJECTION, POWDER, FOR SOLUTION INTRAMUSCULAR; INTRAVENOUS at 17:05

## 2021-12-02 RX ADMIN — SODIUM CHLORIDE, PRESERVATIVE FREE 10 ML: 5 INJECTION INTRAVENOUS at 09:00

## 2021-12-02 RX ADMIN — Medication 1.3 ML: at 13:23

## 2021-12-02 RX ADMIN — POLYVINYL ALCOHOL 1 DROP: 14 SOLUTION/ DROPS OPHTHALMIC at 04:12

## 2021-12-02 RX ADMIN — PANTOPRAZOLE SODIUM 40 MG: 40 INJECTION, POWDER, FOR SOLUTION INTRAVENOUS at 08:33

## 2021-12-02 RX ADMIN — CHLORHEXIDINE GLUCONATE 0.12% ORAL RINSE 15 ML: 1.2 LIQUID ORAL at 21:27

## 2021-12-02 RX ADMIN — FERROUS SULFATE TAB 325 MG (65 MG ELEMENTAL FE) 325 MG: 325 (65 FE) TAB at 08:33

## 2021-12-02 RX ADMIN — Medication 1.2 ML: at 13:23

## 2021-12-02 RX ADMIN — MIDODRINE HYDROCHLORIDE 10 MG: 10 TABLET ORAL at 12:06

## 2021-12-02 RX ADMIN — ALBUMIN (HUMAN) 25 G: 0.25 INJECTION, SOLUTION INTRAVENOUS at 11:55

## 2021-12-02 RX ADMIN — PROPOFOL 10 MCG/KG/MIN: 10 INJECTION, EMULSION INTRAVENOUS at 04:10

## 2021-12-02 RX ADMIN — CHLORHEXIDINE GLUCONATE 0.12% ORAL RINSE 15 ML: 1.2 LIQUID ORAL at 08:33

## 2021-12-02 RX ADMIN — INSULIN LISPRO 2 UNITS: 100 INJECTION, SOLUTION INTRAVENOUS; SUBCUTANEOUS at 21:26

## 2021-12-02 ASSESSMENT — PAIN SCALES - WONG BAKER

## 2021-12-02 ASSESSMENT — PULMONARY FUNCTION TESTS
PIF_VALUE: 28
PIF_VALUE: 27
PIF_VALUE: 29
PIF_VALUE: 25
PIF_VALUE: 27
PIF_VALUE: 26
PIF_VALUE: 29
PIF_VALUE: 28
PIF_VALUE: 32
PIF_VALUE: 28
PIF_VALUE: 33
PIF_VALUE: 27
PIF_VALUE: 29
PIF_VALUE: 35
PIF_VALUE: 30
PIF_VALUE: 33
PIF_VALUE: 30
PIF_VALUE: 27
PIF_VALUE: 29
PIF_VALUE: 28
PIF_VALUE: 26
PIF_VALUE: 27
PIF_VALUE: 34
PIF_VALUE: 29
PIF_VALUE: 30
PIF_VALUE: 27
PIF_VALUE: 28

## 2021-12-02 ASSESSMENT — PAIN SCALES - GENERAL
PAINLEVEL_OUTOF10: 0

## 2021-12-02 ASSESSMENT — ENCOUNTER SYMPTOMS
SHORTNESS OF BREATH: 0
STRIDOR: 0

## 2021-12-02 ASSESSMENT — LIFESTYLE VARIABLES: SMOKING_STATUS: 0

## 2021-12-02 NOTE — CARE COORDINATION
Katia Thornton is reviewing this chart and following along.   SW waiting for [facility] to inform this SW of their acceptance or denial.

## 2021-12-02 NOTE — PROGRESS NOTES
Today's Date: 12/2/2021  Patient Name: Danilo Rosenthal  Date of admission: 11/10/2021  4:23 PM  Patient's age: 66 y.o., 1950  Admission Dx: JAYLYN (acute kidney injury) ([facility] [address] [N17.9]  Urinary tract infection in elderly patient [N39.0]  Anemia, unspecified type [D64.9]    Reason for Consult: TCP  Requesting Physician: Park Gamboa MD    CHIEF COMPLAINT:    Chief Complaint   Patient presents with    Abnormal Lab     low hemoglobin and high CR     SUBJECTIVE:    Patient seen and examined  Patient remains intubated  Platelet count down to 16,000  No bleeding noted per  Plan schedule for PEG and trach tomorrow    HISTORY OF PRESENT ILLNESS:    Danilo Rosenthal is a 66 y.o. female who is admitted to the hospital on 11/10/2021  for acute kidney injury and anemia. Patient has history of chronic kidney disease, chronic anemia and recent history of GI bleed with negative endoscopies at [facility]. She was transferred from nursing home for elevated creatinine and drop in her hemoglobin. Her stool for occult blood on 11/14/2021 was positive. She was evaluated by gastroenterology. Underwent upper endoscopy which showed gastritis. She also was noted to have hydronephrosis on the right side therefore was evaluated by urology and underwent cystoscopy with ureteral stent placement on 11/16/2021. Patient was evaluated by nephrology for her acute kidney injury and was started on acute dialysis on 11/17/2021. Her hemoglobin is stable at 8.0 however her platelets started dropping since admission and today it dropped to 49 therefore hematology was consulted for evaluation of her low platelets. Her iron studies suggest anemia of chronic disease. Her haptoglobin is 125 and LDH is not elevated  Her HIT panel came back negative.     Past Medical History:   has a past medical history of Anemia, unspecified, CAD (coronary artery disease), Chronic kidney disease, CKD (chronic kidney disease) stage 3, GFR 30-59 ml/min ([facility] [address] Closed fracture of dorsal (thoracic) vertebra without mention of spinal cord injury, Coronary atherosclerosis of unspecified type of vessel, native or graft, Depression with anxiety, Esophageal reflux, HTN (hypertension), Hyperkalemia, Hyperlipidemia, Intervertebral cervical disc disorder with myelopathy, cervical region, Lower back pain, Lumbar radiculopathy, Microalbuminuria, Muscle weakness, Pain in limb, Stenosis of cervical spine, Tethered cord syndrome (HCC), Type II or unspecified type diabetes mellitus without mention of complication, not stated as uncontrolled, Urinary calculus, and UTI (lower urinary tract infection). Past Surgical History:   has a past surgical history that includes Colon surgery; angioplasty; joint replacement; Upper gastrointestinal endoscopy (N/A, 11/15/2021); Cystoscopy (Right, 11/16/2021); and IR NONTUNNELED VASCULAR CATHETER > 5 YEARS (11/17/2021). Medications:    Prior to Admission medications    Medication Sig Start Date End Date Taking? Authorizing Provider   acetaminophen (TYLENOL) 325 MG tablet Take 650 mg by mouth every 4 hours as needed for Pain (do not exceed 4000 mg daily)   Yes Historical Provider, MD   albuterol (PROVENTIL) (2.5 MG/3ML) 0.083% nebulizer solution Take 2.5 mg by nebulization every 6 hours as needed for Shortness of Breath   Yes Historical Provider, MD   atorvastatin (LIPITOR) 80 MG tablet Take 80 mg by mouth daily   Yes Historical Provider, MD   famotidine (PEPCID) 20 MG tablet Take 20 mg by mouth daily   Yes Historical Provider, MD   ferrous sulfate (IRON 325) 325 (65 Fe) MG tablet Take 325 mg by mouth daily (with breakfast)   Yes Historical Provider, MD   HYDROcodone-acetaminophen (NORCO) 5-325 MG per tablet Take 1 tablet by mouth every 6 hours as needed for Pain.  Indications: until 11/13/21   Yes Historical Provider, MD   insulin lispro (HUMALOG) 100 UNIT/ML injection vial Inject into the skin 4 times daily (before meals and nightly) Per sliding scale:   150-200 = 2 units  201-250 = 4 units  251-300 = 6 units  301-350 = 8 units  351-400 = 10 units   Yes Historical Provider, MD   insulin glargine (LANTUS SOLOSTAR) 100 UNIT/ML injection pen Inject 22 Units into the skin nightly   Yes Historical Provider, MD   meclizine (ANTIVERT) 12.5 MG tablet Take 12.5 mg by mouth 2 times daily as needed for Dizziness   Yes Historical Provider, MD   warfarin (COUMADIN) 3 MG tablet Take 3 mg by mouth nightly   Yes Historical Provider, MD   carvedilol (COREG) 6.25 MG tablet Take 6.25 mg by mouth 2 times daily (with meals).    Yes Historical Provider, MD   aspirin 81 MG chewable tablet Take 81 mg by mouth daily    Yes Historical Provider, MD   bumetanide (BUMEX) 1 MG tablet Take 1 mg by mouth daily   11/10/21  Historical Provider, MD     Current Facility-Administered Medications   Medication Dose Route Frequency Provider Last Rate Last Admin    cefTRIAXone (ROCEPHIN) 1000 mg IVPB in 50 mL D5W minibag  1,000 mg IntraVENous Q24H Anil San  mL/hr at 12/02/21 1705 1,000 mg at 12/02/21 1705    [START ON 12/3/2021] 0.9 % sodium chloride infusion   IntraVENous PRN Jayjay Vidal MD        sodium citrate 4 % injection 1.3 mL  1.3 mL IntraCATHeter PRN Dale Chappell MD   1.3 mL at 12/01/21 1548    And    sodium citrate 4 % injection 1.2 mL  1.2 mL IntraCATHeter PRN Dale Chappell MD   1.2 mL at 12/01/21 1548    midodrine (PROAMATINE) tablet 10 mg  10 mg Oral TID PRN Tanner Anders MD   10 mg at 12/02/21 1206    albumin human 25 % IV solution 25 g  25 g IntraVENous PRN Tanner Anders MD   Stopped at 12/02/21 1205    midodrine (PROAMATINE) tablet 5 mg  5 mg Oral PRN Tanner Anders MD   5 mg at 12/02/21 1206    norepinephrine (LEVOPHED) 16 mg in sodium chloride 0.9 % 250 mL infusion  2-100 mcg/min IntraVENous Continuous Jean Marie Puente MD   Stopped at 11/29/21 2306    chlorhexidine (PERIDEX) 0.12 % solution 15 mL 15 mL Mouth/Throat BID Summer Gardner MD   15 mL at 12/02/21 0833    fentaNYL (SUBLIMAZE) injection 50 mcg  50 mcg IntraVENous Q30 Min PRN Summer Gardner MD   50 mcg at 11/27/21 1443    propofol injection  10 mcg/kg/min IntraVENous Continuous Summer Gardner MD 5.8 mL/hr at 12/02/21 0410 10 mcg/kg/min at 12/02/21 0410    polyvinyl alcohol (LIQUIFILM TEARS) 1.4 % ophthalmic solution 1 drop  1 drop Both Eyes Q4H Summer Gardner MD   1 drop at 12/02/21 5464    And    lubrifresh P.M. (artificial tears) ophthalmic ointment   Both Eyes Q4H Summer Gardner MD   Given at 12/01/21 2130    [Held by provider] carvedilol (COREG) tablet 3.125 mg  3.125 mg Oral BID [facility] MD June   3.125 mg at 11/24/21 1735    pantoprazole (PROTONIX) injection 40 mg  40 mg IntraVENous BID Angelia Davis MD   40 mg at 12/02/21 0796    And    sodium chloride flush 0.9 % injection 10 mL  10 mL IntraVENous BID Angelia Davis MD   10 mL at 12/02/21 0800    magnesium sulfate 1000 mg in dextrose 5% 100 mL IVPB  1,000 mg IntraVENous PRN Angelia Davis MD   Stopped at 11/21/21 1815    glucose (GLUTOSE) 40 % oral gel 15 g  15 g Oral PRN Angelia Davis MD        dextrose 50 % IV solution  12.5 g IntraVENous PRN Angelia Davis MD        glucagon (rDNA) injection 1 mg  1 mg IntraMUSCular PRN Angelia Davis MD        dextrose 5 % solution  100 mL/hr IntraVENous PRN Angelia Davis MD        sodium polystyrene (KAYEXALATE) 15 GM/60ML suspension 15 g  15 g Oral Once Melia Ingram MD        sertraline (ZOLOFT) tablet 25 mg  25 mg Oral Daily Jeb MD Christian   25 mg at 12/02/21 3099    ferrous sulfate (IRON 325) tablet 325 mg  325 mg Oral Daily with breakfast Melia Ingram MD   325 mg at 12/02/21 0833    [Held by provider] aspirin chewable tablet 81 mg  81 mg Oral Daily Jeb Santacroce, MD   81 mg at 11/13/21 1015    atorvastatin (LIPITOR) tablet 80 mg  80 mg Oral Daily Jeb Gonzales MD   80 mg at 21 0833    insulin glargine (LANTUS) injection vial 22 Units  22 Units SubCUTAneous Nightly Toby Wolf MD   22 Units at 21 2125    insulin lispro (HUMALOG) injection vial 0-12 Units  0-12 Units SubCUTAneous TID WC Toby Wolf MD   2 Units at 21 1705    insulin lispro (HUMALOG) injection vial 0-6 Units  0-6 Units SubCUTAneous Nightly Jeb Gonzales MD   1 Units at 21 212    sodium chloride flush 0.9 % injection 5-40 mL  5-40 mL IntraVENous 2 times per day Toby Wolf MD   10 mL at 21 0900    sodium chloride flush 0.9 % injection 5-40 mL  5-40 mL IntraVENous PRN Jeb Gonzales MD        0.9 % sodium chloride infusion  25 mL IntraVENous PRN Toby Wolf MD        acetaminophen (TYLENOL) tablet 650 mg  650 mg Oral Q4H PRN Toby Wolf MD   650 mg at 21 2233    ondansetron (ZOFRAN-ODT) disintegrating tablet 4 mg  4 mg Oral Q8H PRN Jeb Gonzales MD        Or    ondansetron (ZOFRAN) injection 4 mg  4 mg IntraVENous Q6H PRN Toby Wolf MD        oxyCODONE-acetaminophen (PERCOCET) 5-325 MG per tablet 1 tablet  1 tablet Oral Q6H PRN Toby Wolf MD           Allergies:  Patient has no known allergies. Social History:   reports that she has never smoked. She does not have any smokeless tobacco history on file. She reports that she does not drink alcohol and does not use drugs. Family History: family history includes High Blood Pressure in an other family member.     REVIEW OF SYSTEMS:    Could not be obtained as patient is intubated    PHYSICAL EXAM:      /80   Pulse 87   Temp 98.8 °F (37.1 °C) (Oral)   Resp 20   Ht 5' 2.99\" (1.6 m)   Wt 217 lb 9.5 oz (98.7 kg)   SpO2 100%   BMI 38.55 kg/m²    Temp (24hrs), Av.9 °F (37.2 °C), Min:98 °F (36.7 °C), Max:99.8 °F (37.7 °C)    General appearance -ill appearing, no in pain or distress   Mental status -intubated  Mouth - mucous membranes moist, pharynx normal without RESULTS-COMMENTS   PERIPHERAL BLOODWBC 7.5Hgb 8. 0HCT 24.5MCV 94. 8PLT 49Neutrophil   57%Lymphocyte 26%Monocyte 9%nRBC as 2/100 WBCABS Neut 4.31K/ULABS   lymph 1.96K/ULMorphology: Anisocytosis,       ANALYSIS   Flow cytometric immunophenotyping analysis is performed on peripheral   blood following red blood cell lysis procedure.  The cells are labeled   by direct ten color immunostaining procedure and analyzed on [employer]   flow cytometer. Cytocentrifuge differential cell count:   31% Lymphocytes   3% Monocytes   66% Granulocytes     Viability:  99%     Marker panels:   B-cell tube:  CD5, CD10, CD19, CD20, CD22, CD23, CD45, , Kappa,   lambda   T-cell tube:  CD2, CD3, CD4, CD5, CD7, CD8, CD16, CD45, CD56, CD57     Flow cytometric immunophenotyping analysis of peripheral blood   lymphocyte population is positive for B-cell monoclonality;   immunophenotype strong positive for CD20, CD22,  lambda light   chain; and weakly positive for CD5 and CD23.  The immunophenotypic   features suggest immunophenotypically atypical chronic lymphocytic   leukemia, however, other B-chronic lymphoproliferative neoplasm cannot   be excluded.  The absolute monoclonal B-lymphocyte count is   approximately 0.98K/UL (by WHO criteria, absolute monoclonal B   lymphocyte count of >5000 is required to diagnose chronic lymphocytic   leukemia).  Bone marrow or other tissue biopsy is recommended for        IMAGING DATA:  XR CHEST (SINGLE VIEW FRONTAL)   Final Result   Cardiomegaly with bilateral effusions and perihilar infiltrates. Support tubes as described above. XR CHEST PORTABLE   Final Result   1. Endotracheal tube terminates in appropriate position. 2. No significant change in pleural effusions and basilar atelectasis. XR CHEST (SINGLE VIEW FRONTAL)   Final Result   1. Unchanged appearance of support devices. 2. No significant interval change in small pleural effusions and basilar   atelectasis. XR CHEST PORTABLE   Final Result   1. Unchanged appearance of support devices. 2. Increasing left pleural effusion and basilar atelectasis. No significant   change in right pleural effusion. XR CHEST PORTABLE   Final Result   1. Endotracheal tube remains in appropriate position. 2. Interval improved aeration of the left lung base, otherwise no significant   change. XR CHEST PORTABLE   Final Result   1. Endotracheal tube remains in appropriate position. 2. No significant change in pleural effusions and associated basilar   atelectasis. XR CHEST PORTABLE   Final Result   No significant interval change. Mild vascular congestion and small pleural   effusions. XR CHEST PORTABLE   Final Result   1. Endotracheal tube in place with its tip 2 cm above the kristina. 2.  Unchanged cardiomegaly and small to moderate bilateral pleural effusions   with basilar atelectasis. 3.  Support tubes and catheters are otherwise unchanged. XR CHEST PORTABLE   Final Result   Cardiomegaly with bibasilar effusions and adjacent infiltrates. Support tubes as described above. XR CHEST PORTABLE   Final Result   Cardiomegaly with bibasilar effusions and adjacent infiltrates. Support tubes as described above. XR CHEST (SINGLE VIEW FRONTAL)   Final Result   The endotracheal tube ends appropriately above the kristina and below the   clavicular heads. The nasogastric tube ends in the stomach. The right   jugular vein hemodialysis catheter ends in the distal SVC, unchanged. Cardiomegaly. Bibasilar airspace disease and small right pleural effusion,   unchanged. Lungs otherwise grossly clear. No pneumothorax. VL Renal Arterial Duplex Complete   Final Result      XR CHEST PORTABLE   Final Result   Basilar opacities likely represent atelectasis with small pleural effusions.          MRI BRAIN WO CONTRAST   Final Result      There is no acute infarction, intracranial hemorrhage, or intracranial mass   lesion. Mild chronic microangiopathic ischemic disease. Mild generalized volume loss. Mild mucosal thickening of bilateral mastoid air cells and right sphenoid   sinus. XR CHEST PORTABLE   Final Result   1. Tip of the newly inserted NG tube overlies the gastric body. .   2. The right IJ CVC and right arm PICC line remain well positioned. 3.  No acute cardiopulmonary abnormality. US RETROPERITONEAL COMPLETE   Final Result   No acute findings. No hydronephrosis. Small right pleural effusion. CT HEAD WO CONTRAST   Final Result   No acute intracranial abnormality. Chronic microvascular disease and chronic lacunar infarcts. Critical results were called by Dr. Hector Zhao to Dr Omkar Villalobos on 11/17/2021 at   22:0. XR CHEST PORTABLE   Final Result   1. No pneumothorax evident following thoracentesis. 2. Low lung volumes. Significantly improved pneumatization lower right lung   with minimal pleural effusion bilateral evident. 3. Calcific atherosclerosis aorta. 4. Cardiomegaly. IR GUIDED THORACENTESIS PLEURAL   Final Result   Successful ultrasound guided right thoracentesis. IR NONTUNNELED VASCULAR CATHETER > 5 YEARS   Final Result   Successful ultrasound guided non-tunneled right IJ temporary hemodialysis   catheter placement. Catheter is ready for dialysis use at this time. FLUORO FOR SURGICAL PROCEDURES   Final Result      XR CHEST PORTABLE   Final Result   Moderate right pleural effusion and associated airspace disease. Pneumonia   cannot be excluded. US RENAL COMPLETE   Final Result   1. Mild to moderate right-sided hydronephrosis. 2. Limited renal ultrasound. No left-sided hydronephrosis. CT ABDOMEN PELVIS WO CONTRAST Additional Contrast? None   Final Result   Severe right and mild left hydroureteronephrosis. No obstructing ureteral   stone is seen. Wall thickening of the urinary bladder without significant   urinary bladder wall distension. Outlet obstruction is a consideration. Right-sided ostomy. There is bowel wall thickening proximal to the ostomy,   may suggest infectious or inflammatory enteritis. No drainable fluid   collections or free air. No evidence of bowel obstruction. Moderate right pleural effusion. Bibasilar infiltrates or atelectasis. Clinical correlation to exclude   pneumonia. US RETROPERITONEAL COMPLETE   Final Result   1. Moderate to severe right and mild-to-moderate left hydronephrosis. 2. Exam limited due to patient's body habitus. XR CHEST PORTABLE    (Results Pending)       Primary Problem  JAYLYN (acute kidney injury) [facility])    Active Hospital Problems    Diagnosis Date Noted    Acute metabolic encephalopathy [L33.05]     Longstanding persistent atrial fibrillation ([address] [I48.11] 11/16/2021    Hydronephrosis determined by ultrasound [N13.30] 11/14/2021    Iron deficiency anemia [D50.9] 11/12/2021    JAYLYN (acute kidney injury) ([address] [N17.9] 11/10/2021    CKD (chronic kidney disease) stage 3, GFR 30-59 ml/min ([facility] [N18.30]     HTN (hypertension) [I10]     Type 2 diabetes mellitus with kidney complication, with long-term current use of insulin ([facility]) [E11.29, Z79.4]     ASCVD (arteriosclerotic cardiovascular disease) [I25.10]        IMPRESSION:   1. Acute kidney injury and started on dialysis by nephrology  2. Anemia, stool occult positive for blood and had upper endoscopy showing gastritis. Anemia could be related to chronic disease and also renal disease and aggravated by GI bleed  3. Thrombocytopenia  4. Acute encephalopathy  5. Bilateral hydronephrosis  6. Cardiomyopathy with EF of 30-35%  7. Generalized anasarca  8. S/p PEA, resucitated    RECOMMENDATIONS:  1. I reviewed the laboratory data, imaging studies, diagnosis, other treatment recommendations  2.  Anemia is multifactorial secondary to renal dysfunction myelosuppression from acute illness as well as possible primary bone marrow disorder  3. Thrombocytopenia also likely due to myelosuppression from acute illness polypharmacy and peripheral consumption. 4. Primary bone marrow disorder is also in the differential.  Flow cytometry raises concern regarding atypical CLL or lymphoproliferative disorder however patient too sick for a bone marrow biopsy  5. Schedule for trach and PEG tomorrow  6. We can transfuse platelet to the desired level for the procedure  7. Prognosis remains guarded to poor  8. Monitor counts closely  9. We will follow    Discussed with pt and Nurse. Thank you for asking us to see this patient. Connor Hutchins MD    This note is created with the assistance of a speech recognition program.  While intending to generate a document that actually reflects the content of the visit, the document can still have some errors including those of syntax and sound a like substitutions which may escape proof reading. It such instances, actual meaning can be extrapolated by contextual diversion.

## 2021-12-02 NOTE — ANESTHESIA PRE PROCEDURE
by mouth 2 times daily (with meals).    Yes Historical Provider, MD   aspirin 81 MG chewable tablet Take 81 mg by mouth daily    Yes Historical Provider, MD   bumetanide (BUMEX) 1 MG tablet Take 1 mg by mouth daily   11/10/21  Historical Provider, MD       Current medications:    Current Facility-Administered Medications   Medication Dose Route Frequency Provider Last Rate Last Admin    [START ON 12/3/2021] 0.9 % sodium chloride infusion   IntraVENous PRN Alonso Jeffery MD        sodium citrate 4 % injection 1.3 mL  1.3 mL IntraCATHeter PRN Jorge Granados MD   1.3 mL at 12/01/21 1548    And    sodium citrate 4 % injection 1.2 mL  1.2 mL IntraCATHeter PRN Grace Gerardo MD   1.2 mL at 12/01/21 1548    midodrine (PROAMATINE) tablet 10 mg  10 mg Oral TID PRN Skyla Arreaga MD   10 mg at 12/02/21 1206    albumin human 25 % IV solution 25 g  25 g IntraVENous PRN Skyla Arreaga MD   Stopped at 12/02/21 1205    midodrine (PROAMATINE) tablet 5 mg  5 mg Oral PRN Skyla Arreaga MD   5 mg at 12/02/21 1206    norepinephrine (LEVOPHED) 16 mg in sodium chloride 0.9 % 250 mL infusion  2-100 mcg/min IntraVENous Continuous Sukhjinder Bal MD   Stopped at 11/29/21 2306    chlorhexidine (PERIDEX) 0.12 % solution 15 mL  15 mL Mouth/Throat BID Rozanna Shone, MD   15 mL at 12/02/21 0833    fentaNYL (SUBLIMAZE) injection 50 mcg  50 mcg IntraVENous Q30 Min PRN Rozanna Shone, MD   50 mcg at 11/27/21 1443    propofol injection  10 mcg/kg/min IntraVENous Continuous Rozanna Shone, MD 5.8 mL/hr at 12/02/21 0410 10 mcg/kg/min at 12/02/21 0410    polyvinyl alcohol (LIQUIFILM TEARS) 1.4 % ophthalmic solution 1 drop  1 drop Both Eyes Q4H Rozanna Shone, MD   1 drop at 12/02/21 9248    And    lubrifresh P.M. (artificial tears) ophthalmic ointment   Both Eyes Q4H Rozanna Shone, MD   Given at 12/01/21 3482    [Held by provider] carvedilol (COREG) tablet 3.125 mg  3.125 mg Oral BID WC Funmilayo Lomeli MD   3.125 mg at 11/24/21 1735    pantoprazole (PROTONIX) injection 40 mg  40 mg IntraVENous BID Cynthia Ramos MD   40 mg at 12/02/21 9760    And    sodium chloride flush 0.9 % injection 10 mL  10 mL IntraVENous BID Cynthia Ramos MD   10 mL at 12/02/21 0800    magnesium sulfate 1000 mg in dextrose 5% 100 mL IVPB  1,000 mg IntraVENous PRN Cynthia Ramos MD   Stopped at 11/21/21 1815    glucose (GLUTOSE) 40 % oral gel 15 g  15 g Oral PRN Cynthia Ramos MD        dextrose 50 % IV solution  12.5 g IntraVENous PRN Cynthia Ramos MD        glucagon (rDNA) injection 1 mg  1 mg IntraMUSCular PRN Cynthia Ramos MD        dextrose 5 % solution  100 mL/hr IntraVENous PRN Cynthia Ramos MD        sodium polystyrene (KAYEXALATE) 15 GM/60ML suspension 15 g  15 g Oral Once Dustin Garcia MD        sertraline (ZOLOFT) tablet 25 mg  25 mg Oral Daily Jeb Gonzales MD   25 mg at 12/02/21 5340    ferrous sulfate (IRON 325) tablet 325 mg  325 mg Oral Daily with breakfast Dustin Garcia MD   325 mg at 12/02/21 4248    [Held by provider] aspirin chewable tablet 81 mg  81 mg Oral Daily Jeb Gonzales MD   81 mg at 11/13/21 1015    atorvastatin (LIPITOR) tablet 80 mg  80 mg Oral Daily Jeb Gonzales MD   80 mg at 12/02/21 0833    insulin glargine (LANTUS) injection vial 22 Units  22 Units SubCUTAneous Nightly Jeb Gonzales MD   22 Units at 12/01/21 2125    insulin lispro (HUMALOG) injection vial 0-12 Units  0-12 Units SubCUTAneous TID WC Jeb Gonzales MD   2 Units at 12/02/21 1205    insulin lispro (HUMALOG) injection vial 0-6 Units  0-6 Units SubCUTAneous Nightly Dustin Garcia MD   1 Units at 11/30/21 2122    sodium chloride flush 0.9 % injection 5-40 mL  5-40 mL IntraVENous 2 times per day Dustin Garcia MD   10 mL at 12/02/21 0900    sodium chloride flush 0.9 % injection 5-40 mL  5-40 mL IntraVENous PRN Dustin Garcia MD        0.9 % sodium chloride infusion  25 mL IntraVENous PRN Devon Todd MD        acetaminophen (TYLENOL) tablet 650 mg  650 mg Oral Q4H PRN Devon Todd MD   650 mg at 11/16/21 2233    ondansetron (ZOFRAN-ODT) disintegrating tablet 4 mg  4 mg Oral Q8H PRN Devon Todd MD        Or    ondansetron (ZOFRAN) injection 4 mg  4 mg IntraVENous Q6H PRN Devon Todd MD        oxyCODONE-acetaminophen (PERCOCET) 5-325 MG per tablet 1 tablet  1 tablet Oral Q6H PRN Devon Todd MD           Allergies:  No Known Allergies    Problem List:    Patient Active Problem List   Diagnosis Code    Chronic kidney disease (CKD), stage III (moderate) ([facility]) N18.30    Hyperkalemia E87.5    Type 2 diabetes mellitus with kidney complication, with long-term current use of insulin ([facility]) E11.29, Z79.4    Hyperlipidemia E78.5    CKD (chronic kidney disease) stage 3, GFR 30-59 ml/min ([facility]) N18.30    Microalbuminuria R80.9    Urinary calculus N20.9    Tethered cord syndrome ([facility]) Q06.8    Stenosis of cervical spine M48.02    Lumbar radiculopathy M54.16    Lower back pain M54.50    HTN (hypertension) I10    Esophageal reflux K21.9    Depression with anxiety F41.8    ASCVD (arteriosclerotic cardiovascular disease) I25.10    Closed fracture of thoracic vertebra ([facility] S22.009A    Intervertebral cervical disc disorder with myelopathy, cervical region M50.00    Pain in limb M79.609    Anemia D64.9    JAYLYN (acute kidney injury) ([facility] [address] N17.9    Iron deficiency anemia D50.9    Hydronephrosis determined by ultrasound N13.30    Longstanding persistent atrial fibrillation ([facility]) I48.11    Acute metabolic encephalopathy T33.15       Past Medical History:        Diagnosis Date    Anemia, unspecified     CAD (coronary artery disease)     Chronic kidney disease     CKD (chronic kidney disease) stage 3, GFR 30-59 ml/min ([facility])     Closed fracture of dorsal (thoracic) vertebra without mention of spinal cord injury     Coronary atherosclerosis of unspecified type of vessel, native or graft     Depression with anxiety     Esophageal reflux     HTN (hypertension)     Hyperkalemia     Hyperlipidemia     Intervertebral cervical disc disorder with myelopathy, cervical region     Lower back pain     Lumbar radiculopathy     Microalbuminuria     Muscle weakness     Pain in limb     Stenosis of cervical spine     Tethered cord syndrome (HCC)     Type II or unspecified type diabetes mellitus without mention of complication, not stated as uncontrolled     Urinary calculus     UTI (lower urinary tract infection)        Past Surgical History:        Procedure Laterality Date    ANGIOPLASTY      COLON SURGERY      CYSTOSCOPY Right 11/16/2021    CYSTOSCOPY URETERAL STENT INSERTION performed by Sherri Kline MD at [address] IR NONTUNNELED VASCULAR CATHETER  11/17/2021    IR NONTUNNELED VASCULAR CATHETER 11/17/2021 [facility]ED[facility]    JOINT REPLACEMENT      UPPER GASTROINTESTINAL ENDOSCOPY N/A 11/15/2021    EGD BIOPSY performed by Jeremie Nickerson MD at [address] [facility]       Social History:    Social History     Tobacco Use    Smoking status: Never Smoker    Smokeless tobacco: Not on file   Substance Use Topics    Alcohol use: No     Alcohol/week: 0.0 standard drinks                                Counseling given: Not Answered      Vital Signs (Current):   Vitals:    12/02/21 1215 12/02/21 1300 12/02/21 1315 12/02/21 1322   BP:  (!) 100/47  112/80   Pulse: 101 85 92    Resp:       Temp:       TempSrc:       SpO2:       Weight:       Height:                                                  BP Readings from Last 3 Encounters:   12/02/21 112/80   11/16/21 (!) 115/59   11/15/21 (!) 81/57       NPO Status: Time of last liquid consumption: 0400                        Time of last solid consumption: 1700                        Date of last liquid consumption: 11/16/21                        Date of last solid food consumption: 11/15/21    BMI:   Wt Readings from Last 3 Encounters:   12/02/21 221 lb 9 oz (100.5 kg)   09/07/16 178 lb (80.7 kg)   05/02/16 184 lb 12.8 oz (83.8 kg)     Body mass index is 39.26 kg/m².     CBC:   Lab Results   Component Value Date    WBC 5.5 12/02/2021    RBC 2.46 12/02/2021    RBC 4.14 08/31/2016    HGB 7.7 12/02/2021    HCT 23.8 12/02/2021    MCV 96.9 12/02/2021    RDW 16.4 12/02/2021    PLT 16 12/02/2021     HB 7.7    CMP:   Lab Results   Component Value Date     12/02/2021    K 3.8 12/02/2021    CL 97 12/02/2021    CO2 26 12/02/2021    BUN 39 12/02/2021    CREATININE 2.96 12/02/2021    GFRAA 19 12/02/2021    LABGLOM 16 12/02/2021    GLUCOSE 148 12/02/2021    GLUCOSE 77 08/31/2016    PROT 5.3 11/20/2021    CALCIUM 8.8 12/02/2021    BILITOT 0.27 11/18/2021    ALKPHOS 175 11/18/2021    AST 21 11/18/2021    ALT 31 11/18/2021       POC Tests:   Recent Labs     12/02/21  1143   POCGLU 160*       Coags:   Lab Results   Component Value Date    PROTIME 15.9 11/25/2021    INR 1.3 11/25/2021    APTT 34.9 11/25/2021       HCG (If Applicable): No results found for: PREGTESTUR, PREGSERUM, HCG, HCGQUANT     ABGs:   Lab Results   Component Value Date    PHART 7.418 12/02/2021    PO2ART 91.2 12/02/2021    FCX4LBQ 43.5 12/02/2021    GGT2WOV 28.1 12/02/2021    M6ZBPUMN 95.6 12/02/2021        Type & Screen (If Applicable):  No results found for: LABABO, LABRH    Drug/Infectious Status (If Applicable):  No results found for: HIV, HEPCAB    COVID-19 Screening (If Applicable):   Lab Results   Component Value Date    COVID19 Not Detected 12/01/2021           Anesthesia Evaluation  Patient summary reviewed and Nursing notes reviewed no history of anesthetic complications:   Airway: Mallampati: III  TM distance: >3 FB   Neck ROM: full  Mouth opening: > = 3 FB Dental:          Pulmonary:Negative Pulmonary ROS and normal exam  breath sounds clear to auscultation      (-) pneumonia, COPD, asthma, shortness of breath, recent URI, sleep apnea, rhonchi, wheezes, rales, stridor, not a current smoker and no decreased breath sounds          Patient did not smoke on day of surgery. Cardiovascular:  Exercise tolerance: poor (<4 METS),   (+) hypertension: no interval change, CAD:,     (-) pacemaker, valvular problems/murmurs, past MI, CABG/stent, dysrhythmias,  angina,  CHF, orthopnea, PND,  QUINN, murmur, weak pulses,  friction rub, systolic click, carotid bruit,  JVD, peripheral edema, no pulmonary hypertension and no hyperlipidemia    ECG reviewed  Rhythm: regular  Rate: normal  Echocardiogram reviewed         Beta Blocker:  Dose within 24 Hrs         Neuro/Psych:   (+) neuromuscular disease:,    (-) seizures, TIA, CVA, headaches, psychiatric history and depression/anxiety            GI/Hepatic/Renal:   (+) GERD:,      (-) hiatal hernia, PUD, hepatitis, liver disease, no renal disease, bowel prep and no morbid obesity       Endo/Other:    (+) DiabetesType II DM, using insulin, blood dyscrasia::., no malignancy/cancer. (-) hypothyroidism, hyperthyroidism, arthritis, no electrolyte abnormalities, no malignancy/cancer               Abdominal:             Vascular: negative vascular ROS. - PVD, DVT and PE. Other Findings: Intubated          Anesthesia Plan      general     ASA 4     (Severe thrombocytopenia, platelets Tx prior to surgery  )  Induction: intravenous. MIPS: Postoperative opioids intended and Prophylactic antiemetics administered. Anesthetic plan and risks discussed with patient. Plan discussed with CRNA.                 Vinita Serrano MD   12/2/2021

## 2021-12-02 NOTE — PROGRESS NOTES
Secure message sent to  regarding pts lab values this morning. Results for Catalina Echevarria (MRN [id number]) as of 12/2/2021 09:38   Ref. Range 12/2/2021 05:07   Platelet Count Latest Ref Range: 150 - 450 k/uL 16 (LL)     Awaiting response at this time.

## 2021-12-02 NOTE — FLOWSHEET NOTE
12/02/21 1330   Vital Signs   Pulse 89   Weight 217 lb 9.5 oz (98.7 kg)   Weight Method Bedside scale   Percent Weight Change -1.79   Post-Hemodialysis Assessment   Post-Treatment Procedures Blood returned; Catheter capped, clamped with Citrate x 2 ports   Machine Disinfection Process Exterior Machine Disinfection   Rinseback Volume (ml) 250 ml   Dialyzer Clearance Lightly streaked   Duration of Treatment (minutes) 120 minutes   NET Removed (ml) 2000 ml   Tolerated Treatment Fair   Bilateral Breath Sounds Diminished   Edema Right upper extremity; Left upper extremity; Right lower extremity; Left lower extremity   Edema Generalized +2; Pitting   RUE Edema +2; Pitting   LUE Edema +2; Pitting   RLE Edema +2; Non-pitting   LLE Edema +2; Non-pitting   completed 2 hr  HD UF only TX and removed 2 Liters of fluid. pt hypotensive during [address] and given 50g Albumin for BP support along with Midodrine. cvc dressing is clean, dry and intact. report given to primary nurse, Loren Sands RN.

## 2021-12-02 NOTE — PROGRESS NOTES
Palliative Care Progress Note    NAME:  Libby Ibanez RECORD NUMBER:  [id number]  AGE: 66 y.o. GENDER: female  : 1950  TODAY'S DATE:  2021    Reason for Consult:  goals of care  History of Present Illness     The patient is a 66 y.o. Non- / non  female who presents with Abnormal Lab (low hemoglobin and high CR)      Referred to Palliative Care by  [x] Physician   [] Nursing  [] Family Request   [] Other:       She was admitted to the ICU service for JAYLYN (acute kidney injury) ([facility] [N17.9]  Urinary tract infection in elderly patient [N39.0]  Anemia, unspecified type [D64.9]. Her hospital course has been associated withAKI (acute kidney injury) ([address] UTI, Anemia, acute hypoxic respiratory failure, bilateral hydronephrosis, moderate right pleural effusion, metabolic and respiratory acidosis, shock, encephalopathy, PES arrest, thrombocytopenia, and NSVT. Liseth Wells The patient has a complicated medical history and has been hospitalized since 11/10/2021  4:23 PM. Patient remains on ventilator with FIO2 30% and Peep 5. Patient remains on sedation. Patient scheduled for tracheostomy and peg tube tomorrow at 730 am. Patient labs from today include GLUC  148, BUN 39, CR 2.96, CA 8.8, , K 3.8, CHL 97, MG 2.0, WBC 5.5, RBC 2.46, HGB 7.7, HCT 23.8, PLT 16. Palliative care will continue to follow patient and reach out to patient family to provide  with emotional support and updates as needed.      OVERNIGHT EVENTS:  Patient is a DNRCC-A status  Patient is on ventilator and sedation   Patient going to surgery for tracheostomy and gtube     BP (!) 137/51   Pulse 97   Temp 99.2 °F (37.3 °C) (Oral)   Resp 20   Ht 5' 2.99\" (1.6 m)   Wt 221 lb 9 oz (100.5 kg)   SpO2 100%   BMI 39.26 kg/m²     Assessment        REVIEW OF SYSTEMS    [x]   UNABLE TO OBTAIN: Patient is on ventilator and sedation unable to assess patient ROS presently   PHYSICAL ASSESSMENT:     General: []  Oriented x3 to follow. Education/support to family  Discharge planning/helping to coordinate care  Communications with primary service  Pharmacologic pain management  Providing support for coping/adaptation/distress of family  Discussing meaning/purpose   Caregiver support/education  Continue with current plan of care  Code status clarified: DNRCCA  Principle Problem/Diagnosis:  JAYLYN (acute kidney injury) ([facility] [address]    Additional Assessments:  Principal Problem:    JAYLYN (acute kidney injury) ([facility] [address]  Active Problems:    Type 2 diabetes mellitus with kidney complication, with long-term current use of insulin ([facility])    CKD (chronic kidney disease) stage 3, GFR 30-59 ml/min ([facility])    HTN (hypertension)    ASCVD (arteriosclerotic cardiovascular disease)    Iron deficiency anemia    Hydronephrosis determined by ultrasound    Longstanding persistent atrial fibrillation ([facility] [address]    Acute metabolic encephalopathy  Resolved Problems:    * No resolved hospital problems. *    - Symptom management/ pain control     Pain Assessment:  Patient on ventilator with sedation                Anxiety:  patient on ventilator with sedation                           Dyspnea:  patient on ventilator and sedation                           Fatigue:  generalized weakness     Other:      2- Goals of care evaluation   The patient goals of care are improve or maintain function/quality of life   Goals of care discussed with:    [] Patient independently    [] Patient and Family    [x] Family or Healthcare DPOA independently    [] Unable to discuss with patient, family/DPOA not present    3- Code Status  DNR-CCA    4- Other recommendations  - We will continue to provide comfort and support to the patient and the family        Palliative Care will continue to follow Ms. Franklin's care as needed. The note has been dictated by dragon, typing errors may be a possibility     Thank you for allowing Palliative Care to participate in the care of Ms. Franklin .        Electronically signed by   JUANITA Black NP  Palliative Care Team  on 12/2/2021 at 12:17 PM     Please call with any palliative questions or concerns. Palliative Care Team is available via perfect serve or via phone.      Palliative care number [phone]

## 2021-12-02 NOTE — PROGRESS NOTES
Department of Internal Medicine  Nephrology John Gross MD  Progress Note    Reason for consultation: Management of acute kidney injury. Consulting physician: Brad Blake MD.    Interval history: Patient remains intubated and on ventilator support. She does not have a Kaur catheter and urine output documentation is suboptimal. She is hemodynamically stable and currently not on pressors. She again tolerated hemodialysis/ultrafiltration well yesterday with removal of 2.5 L of fluid. History of present illness: This is a 66 y.o. female with a significant past medical history of Type 2 diabetes mellitus, coronary artery disease, lumbar radiculopathy, Hyperlipidemia and chronic kidney disease stage III [baseline serum creatinine 1.5 mg/dL], who presented from [facility] for further evaluation of acute kidney injury. She was sent to [facility] after developing nonhealing wound s/p cardiac catheterization via right refill Levophed has been discontinued approach with right groin wound. She had been on Bumex in the ECF and creatinine and BUN have been rising and Bumex was decreased to a lowest dose of 0.5 mg p.o. twice daily. She has had regular blood work performed in the [facility] and ECF contacted nephrologist after finding abnormal laboratory studies. She was noted to have a BUN/creatinine 104/2.28 mg/dL with hemoglobin 8 g/dL. She has mild confusion but is not in obvious respiratory distress.     Scheduled Meds:   chlorhexidine  15 mL Mouth/Throat BID    polyvinyl alcohol  1 drop Both Eyes Q4H    And    artificial tears   Both Eyes Q4H    [Held by provider] carvedilol  3.125 mg Oral BID WC    pantoprazole  40 mg IntraVENous BID    And    sodium chloride flush  10 mL IntraVENous BID    sodium polystyrene  15 g Oral Once    sertraline  25 mg Oral Daily    ferrous sulfate  325 mg Oral Daily with breakfast    [Held by provider] aspirin  81 mg Oral Daily    atorvastatin  80 mg Oral 12/02/21  0507   * 132* 132*   K 4.2 3.8 3.8   CL 99 97* 97*   CO2 24 24 26   BUN 51* 58* 39*   CREATININE 3.27* 3.66* 2.96*   GLUCOSE 180* 149* 148*     Lab Results   Component Value Date    NITRU NEGATIVE 12/02/2021    COLORU Yellow 12/02/2021    PHUR 7.5 12/02/2021    WBCUA 50  11/12/2021    RBCUA 20 TO 50 11/12/2021    MUCUS NOT REPORTED 11/12/2021    TRICHOMONAS NOT REPORTED 11/12/2021    YEAST NOT REPORTED 11/12/2021    BACTERIA MODERATE 11/12/2021    CLARITYU Clear 09/07/2016    SPECGRAV 1.025 12/02/2021    LEUKOCYTESUR LARGE 12/02/2021    UROBILINOGEN Normal 12/02/2021    BILIRUBINUR NEGATIVE 12/02/2021    BLOODU Positive 09/07/2016    GLUCOSEU NEGATIVE 12/02/2021    KETUA NEGATIVE 12/02/2021    AMORPHOUS NOT REPORTED 11/12/2021     IMPRESSION/RECOMMENDATIONS:      1. Acute kidney injury - most consistent with ischemic acute tubular necrosis and obstructive nephropathy secondary to bilateral hydronephrosis. She remains borderline oliguric despite repeat renal ultrasound [11/18/2021] showing improvement in hydronephrosis. We will perform ultrafiltration today for 2 hours to remove 2 L of fluid. Place indwelling Kaur catheter. Urinalysis and urine culture. Continue to monitor urine output closely. Avoid nephrotoxic agents. Basic metabolic profile daily. 2.  Heart failure with reduced ejection fraction [HFrEF with LVEF 30 to 35% 11/10/2021] - We will treat with Ultrafiltration as tolerated and as needed diuretics. 3.  Acute hypoxic and hypercapnic respiratory failure - patient remains intubated and on ventilator support. 4.  Normocytic anemia and thrombocytopenia - No evidence of microangiopathy and HIT panel was negative. He has required platelet transfusion as needed. Will defer treatment to hematology/oncology service. 5.  Hyponatremia - secondary to systolic heart failure with hypervolemia. Would treat with diuretics and as needed ultrafiltration.     6.  Bilateral

## 2021-12-02 NOTE — PROGRESS NOTES
Physical Therapy        Physical Therapy Cancel Note      DATE: 2021    NAME: Michael Pacheco  MRN: [id number]   : 1950      Patient not seen this date for Physical Therapy due to: Other: Cx, pt continues vented, sedated and dialysis, tomorrow is scheduled for tracheostomy and peg tube.       Electronically signed by Randa Oh PTA on 2021 at 12:41 PM

## 2021-12-02 NOTE — PROGRESS NOTES
[address] [place] [address]    Progress Note    12/2/2021    8:47 AM    Name:   Carmel Castro  MRN:     [id number]     Acct:      [de-identified]   Room:   2004/2004-01  IP Day:  25  Admit Date:  11/10/2021  4:23 PM    PCP:   Cameron Diallo DO  Code Status:  DNR-CCA    Subjective:     C/C:   Chief Complaint   Patient presents with    Abnormal Lab     low hemoglobin and high CR     Interval History Status: not changed. Patient initially admitted with anemia elevated creatinine, developed acute respiratory failure, currently intubated. She is on sedation . Not agitated. she has failed previous weaning trials. Afebrile  BP low intermittently  No leucocytosis  Hemoglobin stable  Platelets dropped to 16, small amount of vaginal bleeding  UA abnormal      Review of Systems:     Unable to obtain    Medications:      Allergies:  No Known Allergies    Current Meds:   Scheduled Meds:    chlorhexidine  15 mL Mouth/Throat BID    polyvinyl alcohol  1 drop Both Eyes Q4H    And    artificial tears   Both Eyes Q4H    [Held by provider] carvedilol  3.125 mg Oral BID WC    pantoprazole  40 mg IntraVENous BID    And    sodium chloride flush  10 mL IntraVENous BID    sodium polystyrene  15 g Oral Once    sertraline  25 mg Oral Daily    ferrous sulfate  325 mg Oral Daily with breakfast    [Held by provider] aspirin  81 mg Oral Daily    atorvastatin  80 mg Oral Daily    insulin glargine  22 Units SubCUTAneous Nightly    insulin lispro  0-12 Units SubCUTAneous TID WC    insulin lispro  0-6 Units SubCUTAneous Nightly    sodium chloride flush  5-40 mL IntraVENous 2 times per day     Continuous Infusions:    [START ON 12/3/2021] sodium chloride      sodium chloride      sodium chloride      norepinephrine Stopped (11/29/21 1813)    propofol 10 mcg/kg/min (12/02/21 0410)    dextrose      sodium chloride      sodium chloride       PRN Meds: [START ON 12/3/2021] sodium chloride, sodium chloride, sodium citrate **AND** sodium citrate, sodium chloride, midodrine, albumin human, midodrine, fentanNYL, magnesium sulfate, glucose, dextrose, glucagon (rDNA), dextrose, sodium chloride, sodium chloride flush, sodium chloride, acetaminophen, ondansetron **OR** ondansetron, oxyCODONE-acetaminophen    Data:     Past Medical History:   has a past medical history of Anemia, unspecified, CAD (coronary artery disease), Chronic kidney disease, CKD (chronic kidney disease) stage 3, GFR 30-59 ml/min ([facility]), Closed fracture of dorsal (thoracic) vertebra without mention of spinal cord injury, Coronary atherosclerosis of unspecified type of vessel, native or graft, Depression with anxiety, Esophageal reflux, HTN (hypertension), Hyperkalemia, Hyperlipidemia, Intervertebral cervical disc disorder with myelopathy, cervical region, Lower back pain, Lumbar radiculopathy, Microalbuminuria, Muscle weakness, Pain in limb, Stenosis of cervical spine, Tethered cord syndrome ([facility] [address] Type II or unspecified type diabetes mellitus without mention of complication, not stated as uncontrolled, Urinary calculus, and UTI (lower urinary tract infection). Social History:   reports that she has never smoked. She does not have any smokeless tobacco history on file. She reports that she does not drink alcohol and does not use drugs. Family History:   Family History   Problem Relation Age of Onset    High Blood Pressure Other        Vitals:  BP (!) 137/51   Pulse 87   Temp 99.2 °F (37.3 °C) (Oral)   Resp 20   Ht 5' 2.99\" (1.6 m)   Wt 221 lb 9 oz (100.5 kg)   SpO2 100%   BMI 39.26 kg/m²   Temp (24hrs), Av.6 °F (37 °C), Min:97.9 °F (36.6 °C), Max:99.2 °F (37.3 °C)    Recent Labs     21  1249 21  1559 21  0826   POCGLU 150* 95 122* 137*       I/O (24Hr):     Intake/Output Summary (Last 24 hours) at 2021 0847  Last data filed at 2021 0519  Gross per 24 hour   Intake 1484.17 ml Output 3125 ml   Net -1640.83 ml       Labs:  Hematology:  Recent Labs     11/30/21  0440 12/01/21  0454 12/02/21  0507   WBC 6.5 6.9 5.5   RBC 2.62* 2.50* 2.46*   HGB 8.3* 7.8* 7.7*   HCT 25.3* 24.0* 23.8*   MCV 96.5 96.2 96.9   MCH 31.8 31.3 31.2   MCHC 32.9 32.6 32.2   RDW 16.0* 16.4* 16.4*   PLT 13* 22* 16*   MPV 9.6 8.0 8.8     Chemistry:  Recent Labs     11/30/21  0440 12/01/21  0454 12/02/21  0507   * 132* 132*   K 4.2 3.8 3.8   CL 99 97* 97*   CO2 24 24 26   GLUCOSE 180* 149* 148*   BUN 51* 58* 39*   CREATININE 3.27* 3.66* 2.96*   MG 2.0 2.0 2.0   ANIONGAP 11 11 9   LABGLOM 14* 12* 16*   GFRAA 17* 15* 19*   CALCIUM 8.5* 9.0 8.8     Recent Labs     11/30/21  2020 12/01/21  0755 12/01/21  1249 12/01/21  1559 12/01/21 2050 12/02/21  0826   POCGLU 148* 147* 150* 95 122* 137*     ABG:  Lab Results   Component Value Date    PHART 7.418 12/02/2021    HED9UYQ 43.5 12/02/2021    PO2ART 91.2 12/02/2021    ZZS5EYD 28.1 12/02/2021    NBEA NOT REPORTED 12/02/2021    PBEA 3.6 12/02/2021    K6TMPDQQ 95.6 12/02/2021    FIO2 30 12/02/2021     Lab Results   Component Value Date/Time    SPECIAL NOT REPORTED 11/20/2021 11:30 PM     Lab Results   Component Value Date/Time    CULTURE NORMAL RESPIRATORY TANNER MODERATE GROWTH 11/20/2021 11:30 PM       Radiology:  XR CHEST (SINGLE VIEW FRONTAL)    Result Date: 11/28/2021  1. Unchanged appearance of support devices. 2. No significant interval change in small pleural effusions and basilar atelectasis. XR CHEST PORTABLE    Result Date: 11/29/2021  1. Endotracheal tube terminates in appropriate position. 2. No significant change in pleural effusions and basilar atelectasis. XR CHEST PORTABLE    Result Date: 11/27/2021  1. Unchanged appearance of support devices. 2. Increasing left pleural effusion and basilar atelectasis. No significant change in right pleural effusion. XR CHEST PORTABLE    Result Date: 11/26/2021  1.  Endotracheal tube remains in appropriate position. 2. Interval improved aeration of the left lung base, otherwise no significant change. XR CHEST PORTABLE    Result Date: 11/25/2021  1. Endotracheal tube remains in appropriate position. 2. No significant change in pleural effusions and associated basilar atelectasis. XR CHEST PORTABLE    Result Date: 11/24/2021  No significant interval change. Mild vascular congestion and small pleural effusions. XR CHEST PORTABLE    Result Date: 11/23/2021  1. Endotracheal tube in place with its tip 2 cm above the kristina. 2.  Unchanged cardiomegaly and small to moderate bilateral pleural effusions with basilar atelectasis. 3.  Support tubes and catheters are otherwise unchanged. Physical Examination:        General appearance:  Intubated. Sedated but opens eyes  Mental Status:  Unable to assess  Lungs:  Coarse breath sounds bilaterally,   Heart:  regular rate and rhythm, no murmur  Abdomen:  Mildly distended,  bowel sounds present,   Extremities:  Edema present    Assessment:        Hospital Problems           Last Modified POA    * (Principal) JAYLYN (acute kidney injury) ([address] 11/12/2021 Yes    Type 2 diabetes mellitus with kidney complication, with long-term current use of insulin ([address] 11/12/2021 Yes    CKD (chronic kidney disease) stage 3, GFR 30-59 ml/min ([address] 11/12/2021 Yes    HTN (hypertension) 11/12/2021 Yes    ASCVD (arteriosclerotic cardiovascular disease) 11/16/2021 Yes    Iron deficiency anemia 11/12/2021 Yes    Hydronephrosis determined by ultrasound 11/14/2021 Yes    Longstanding persistent atrial fibrillation ([address] 11/16/2021 Yes    Acute metabolic encephalopathy 75/48/7725 Yes          Plan:        Acute respiratory failure-  Patient intubated. Apparently not doing well with weaning trials. Plan for  trach and PEG tomorrow.  Tube feeds at 35  Acute kidney injury, she is on dialysis-  Appreciate recommendations from Nephrology  Anemia, Thrombocytopenia- multifactorial, bone marrow

## 2021-12-02 NOTE — PROGRESS NOTES
Pulm/CCM    Appears that her UA is consistent with a urinary infection, will start on Rocephin 1 g every 24, awaiting culture results

## 2021-12-02 NOTE — PROGRESS NOTES
Comprehensive Nutrition Assessment    Type and Reason for Visit:  Reassess    Nutrition Recommendations/Plan: Continue tube feeding regimen as ordered. NPO at [facility] for trach/PEG tomorrow. Nutrition Assessment:  Pt appears to be tolerating tube feeding at goal rate. Propofol at 5.8 ml providing 153 kcals. TF will be off at midnight for trach & PEG tomorrow. Malnutrition Assessment:  Malnutrition Status: At risk for malnutrition (Comment)    Context:  Acute Illness     Findings of the 6 clinical characteristics of malnutrition:  Energy Intake:   (Previous mild energy deficit)  Weight Loss:  Unable to assess (Edema)     Body Fat Loss:  Unable to assess     Muscle Mass Loss:  Unable to assess    Fluid Accumulation:  7 - Moderate to Severe Extremities, Generalized (Anasarca)   Strength:  Not Performed    Estimated Daily Nutrient Needs:  Energy (kcal):  15 kcal/kg= 0724-1205 kcal; Weight Used for Energy Requirements:   (92kg)     Protein (g):  92 based on 1 gm per kg (may vary with healing needs and renal status); Weight Used for Protein Requirements:   (92kg)            Nutrition Related Findings:  Edema: +2 pitting BUE's, +2 non-pitting BLE's. Had UF today with 2 liters removed. Labs & meds reviewed. Colostomy present      Wounds:  Wound Consult Pending, Multiple       Current Nutrition Therapies:    ADULT TUBE FEEDING; Nasogastric; Renal Formula; Continuous; 35; No; 30; Q 8 hours; Protein; 1 Protein at 12 noon daily. Flush with 30 mLwater before and after administration.   Current Tube Feeding (TF) Orders:  · Feeding Route: Nasogastric  · Formula: Renal Formula  · Schedule: Continuous  · Additives/Modulars: Protein  · Current TF & Flush Orders Provides: 1591 kcal and 94 gm Protein (based on 1.77 kcal per mL Nepro + Proteinex)    Anthropometric Measures:  · Height: 5' 2.99\" (160 cm)  · Current Body Weight: 217 lb (98.4 kg)   · Admission Body Weight: 204 lb (92.5 kg)    · Ideal Body Weight: 115 lbs; % Ideal Body Weight 195 %   · BMI: 38.4   · BMI Categories: Obese Class 2 (BMI 35.0 -39.9)       Nutrition Diagnosis:   · Increased nutrient needs related to  (healing) as evidenced by wounds      Nutrition Interventions:   Food and/or Nutrient Delivery:  Continue Current Tube Feeding  Nutrition Education/Counseling:  No recommendation at this time   Coordination of Nutrition Care:  Continue to monitor while inpatient    Goals:  Nutrition support will meet approximately 100% of estimated needs       Nutrition Monitoring and Evaluation:   Behavioral-Environmental Outcomes:  None Identified   Food/Nutrient Intake Outcomes:  Enteral Nutrition Intake/Tolerance  Physical Signs/Symptoms Outcomes:  Biochemical Data, Fluid Status or Edema, Weight, Skin, GI Status     Discharge Planning: Too soon to determine     Some areas of assessment may be incomplete due to COVID-19 precautions. Skyler Bailey R.D., L.D.   Clinical Dietitian  Office: [phone]

## 2021-12-02 NOTE — PROGRESS NOTES
Call out to primary to discuss the possibility of wound care consult, no answer at this time, Jordan Montoya will call back shortly.

## 2021-12-02 NOTE — PROGRESS NOTES
Patient seen and examined. Afebrile, VSS. No leukocytosis, hemoglobin low but stable at 7.7. Platelets low at 66,752. Patient remains intubated on ventilator. Discussed with nursing staff. Patient scheduled for Trach and PEG in OR tomorrow. Attempted to call  for consent however no answer. Patient will need one pack of platelets in preop tomorrow prior to procedure. Hold tube feeds after midnight. Continue medical management.      Saira Manriquez PA-C  [address] [facility]

## 2021-12-02 NOTE — PROGRESS NOTES
ICU Progress Note (Vent)   [facility]    Patient - Rashel Armas,  Age - 66 y.o.    - 1950      Room Number -    MRN -  [id number]   Acct # - [de-identified]  Date of Admission -  11/10/2021  4:23 PM    Events of Past 24 Hours   Remains minimally responsive on propofol    Vitals    height is 5' 2.99\" (1.6 m) and weight is 221 lb 9 oz (100.5 kg). Her oral temperature is 99.2 °F (37.3 °C). Her blood pressure is 137/51 (abnormal) and her pulse is 87. Her respiration is 20 and oxygen saturation is 100%. Temperature Range: Temp: 99.2 °F (37.3 °C) Temp  Av.6 °F (37 °C)  Min: 97.9 °F (36.6 °C)  Max: 99.2 °F (37.3 °C)  BP Range:  Systolic (58DRQ), NKO:247 , Min:86 , SK     Diastolic (76TOF), BSF:70, Min:35, Max:91    Pulse Range: Pulse  Av.3  Min: 70  Max: 106  Respiration Range: Resp  Av.7  Min: 9  Max: 23  Current Pulse Ox[de-identified]  SpO2: 100 %  24HR Pulse Ox Range:  SpO2  Av.6 %  Min: 98 %  Max: 100 %  Oxygen Amount and Delivery: O2 Flow Rate (L/min): 2 L/min      Wt Readings from Last 3 Encounters:   21 221 lb 9 oz (100.5 kg)   16 178 lb (80.7 kg)   16 184 lb 12.8 oz (83.8 kg)     I/O       Intake/Output Summary (Last 24 hours) at 2021 0859  Last data filed at 2021 0519  Gross per 24 hour   Intake 1484.17 ml   Output 3125 ml   Net -1640.83 ml     I/O last 3 completed shifts:   In: 1484.2 [I.V.:207.2; NG/GT:777]  Out: 3125 [Stool:125]     DRAIN/TUBE OUTPUT:     Invasive Lines   ETT Day -   12  Lines -PICC line day 17    ICP PRESSURE RANGE:  No data recorded  CVP PRESSURE RANGE:  No data recorded  Mechanical Ventilation Data   SETTINGS (Comprehensive)  Vent Information  $Ventilation: $Subsequent Day  Skin Assessment: Clean, dry, & intact  Suction Catheter Diameter: 14  Equipment Changed: HME  Vent Type: Servo i  Vent Mode: PRVC  Vt Ordered: 450 mL  Rate Set: 20 bmp  Pressure Support: 10 cmH20  FiO2 : 30 pressure is 137/51 (abnormal) and her pulse is 87. Her respiration is 20 and oxygen saturation is 100%. Ventilator Settings (Basic)  Vent Mode: PRVC Rate Set: 20 bmp/Vt Ordered: 450 mL/ /FiO2 : 30 %    Constitutional - Sedated but arousable  General Appearance ill-appearing, edematous  HEENT - Life support devices in place (ET, OG),normocephalic, atraumatic. PERRLA  Lungs - Chest expands equally, no wheezes, rales or rhonchi. Diminished breath sounds  Cardiovascular - Heart sounds are normal.  normal rate and rhythm regular, no murmur, gallop or rub. Abdomen - soft, nontender, nondistended, no masses or organomegaly  Neurologic - CN II-XII are grossly intact.  There are no focal motor deficits  Skin - no bruising or bleeding  Extremities - no cyanosis, clubbing; significant edema    Lab Results   CBC     Lab Results   Component Value Date    WBC 5.5 12/02/2021    RBC 2.46 12/02/2021    RBC 4.14 08/31/2016    HGB 7.7 12/02/2021    HCT 23.8 12/02/2021    PLT 16 12/02/2021    MCV 96.9 12/02/2021    MCH 31.2 12/02/2021    MCHC 32.2 12/02/2021    RDW 16.4 12/02/2021    NRBC 4 11/22/2021    METASPCT 3 12/02/2021    LYMPHOPCT 14 12/02/2021    LYMPHOPCT 32.8 08/31/2016    MONOPCT 10 12/02/2021    EOSPCT 4.3 08/31/2016    BASOPCT 0 12/02/2021    BASOPCT 0.7 08/31/2016    MONOSABS 0.55 12/02/2021    LYMPHSABS 0.77 12/02/2021    EOSABS 0.33 12/02/2021    BASOSABS 0.00 12/02/2021    DIFFTYPE NOT REPORTED 12/02/2021       BMP   Lab Results   Component Value Date     12/02/2021    K 3.8 12/02/2021    CL 97 12/02/2021    CO2 26 12/02/2021    BUN 39 12/02/2021    CREATININE 2.96 12/02/2021    GLUCOSE 148 12/02/2021    GLUCOSE 77 08/31/2016    CALCIUM 8.8 12/02/2021       LFTS  Lab Results   Component Value Date    ALKPHOS 175 11/18/2021    ALT 31 11/18/2021    AST 21 11/18/2021    PROT 5.3 11/20/2021    BILITOT 0.27 11/18/2021    BILIDIR <0.08 11/13/2021    IBILI Connot be calculated 11/13/2021    LABALBU 2.6 11/18/2021 INR  No results for input(s): PROTIME, INR in the last 72 hours. APTT  No results for input(s): APTT in the last 72 hours. Lactic Acid  Lab Results   Component Value Date    LACTA 1.3 11/26/2021    LACTA 0.6 11/16/2021    LACTA 0.6 11/16/2021        BNP   No results for input(s): BNP in the last 72 hours. Cultures       Radiology     Plain Films         No chest x-ray done today trach was anticipated    SYSTEM ASSESSMENT    Acute on chronic hypoxic and hypercapnic respiratory failure  Bradycardic arrest, intubated 11/20  Right lower lobe infiltrate/pleural effusion  Acute kidney injury-requiring hemodialysis  Morbid obesity  Anemia, chronic disease and possible rectal bleed  Hypoalbuminemia  Thrombocytopenia  Nonischemic cardiomyopathy with EF of 30 to 35% possible right to left interatrial shunt  Atrial fibrillation  Possible CLL/lymphoproliferative disorder  DNR CCA    Neuro   Responsive when she is off sedation, otherwise looks comfortable    Respiratory   Wean oxygen as tolerated. Keep O2 sat > 88%  Tracheostomy planned for tomorrow her wishes and family's  Not unreasonable to try weaning at [facility], she may be able to get off the ventilator during the day     Hemodynamics   No further episodes of significant bradycardia or hypotension when not on dialysis; continue to monitor    Gastrointestinal/Nutrition   Tolerating tube feeds  GI prophylaxis    Renal   Dialysis planned again by nephrology    Infectious Disease   Opinion stopped, had received a reasonable course    Hematology/Oncology   Will need platelets prior to surgery  I think there is more than going on than just myelosuppression/illness in terms of thrombocytopenia    Endocrine   Blood sugars being monitored and are reasonably controlled    Social/Spiritual/DNR/Disposition/Other     Family updated about current situation.     Critical Care Time   35 min    Electronically signed by Alexsandra Tidwell MD on 12/2/2021 at 8:59 AM

## 2021-12-02 NOTE — FLOWSHEET NOTE
12/02/21 1203   Encounter Summary   Services provided to: Patient not available  (dialysis nurse with patient)   Referral/Consult From: Palliative Care   Complexity of Encounter Low   Length of Encounter 15 minutes   Spiritual/Taoist   Type Spiritual support   Assessment Unable to respond   Intervention Prayer

## 2021-12-02 NOTE — PROGRESS NOTES
Second page out to primary, answering service automated voicemail said to call back at another time.

## 2021-12-02 NOTE — PLAN OF CARE
Problem: SAFETY  Goal: Free from accidental physical injury  12/2/2021 1613 by Netta Winkler RN  Outcome: Met This Shift  Note: Patient has remained free from any physical injury at this time   12/2/2021 0716 by Ophelia Barnes RN  Outcome: Ongoing  Goal: Free from intentional harm  12/2/2021 1613 by Netta Winkler RN  Outcome: Met This Shift  Note: Patient has remained free from any intentional harm at this time   12/2/2021 0716 by Ophelia Barnes RN  Outcome: Ongoing     Problem: Falls - Risk of:  Goal: Will remain free from falls  Description: Will remain free from falls  12/2/2021 1613 by Netta Winkler RN  Outcome: Met This Shift  Note: Patient has remained free from falls at this time   12/2/2021 0716 by Ophelia Barnes RN  Outcome: Ongoing  Goal: Absence of physical injury  Description: Absence of physical injury  12/2/2021 1613 by Netta Winkler RN  Outcome: Met This Shift  Note: Patient has remained free from any physical injury   12/2/2021 0716 by Ophelia Barnes RN  Outcome: Ongoing     Problem: Skin Integrity:  Goal: Absence of new skin breakdown  Description: Absence of new skin breakdown  12/2/2021 1613 by Netta Winkler RN  Outcome: Met This Shift  Note: No signs of new skin breakdown at this time   12/2/2021 0716 by Ophelia Barnes RN  Outcome: Ongoing     Problem: DAILY CARE  Goal: Daily care needs are met  12/2/2021 1613 by Netta Winkler RN  Outcome: Ongoing  12/2/2021 0716 by Ophelia Barnes RN  Outcome: Ongoing     Problem: PAIN  Goal: Patient's pain/discomfort is manageable  12/2/2021 1613 by Netta Winkler RN  Outcome: Ongoing  12/2/2021 0716 by Ophelia Barnes RN  Outcome: Ongoing     Problem: SKIN INTEGRITY  Goal: Skin integrity is maintained or improved  12/2/2021 1613 by Netta Winkler RN  Outcome: Ongoing  12/2/2021 0716 by Ophelia Barnes RN  Outcome: Ongoing     Problem: KNOWLEDGE DEFICIT  Goal: Patient/S.O. demonstrates understanding of disease process, treatment plan, medications, and discharge instructions.   12/2/2021 1613 by Abby Do RN  Outcome: Ongoing  12/2/2021 0716 by Judy Goldstein RN  Outcome: Ongoing     Problem: DISCHARGE BARRIERS  Goal: Patient's continuum of care needs are met  12/2/2021 1613 by Abby Do RN  Outcome: Ongoing  12/2/2021 0716 by Judy Goldstein RN  Outcome: Ongoing     Problem: Skin Integrity:  Goal: Will show no infection signs and symptoms  Description: Will show no infection signs and symptoms  12/2/2021 1613 by Abby Do RN  Outcome: Ongoing  12/2/2021 0716 by Judy Goldstein RN  Outcome: Ongoing     Problem: Musculor/Skeletal Functional Status  Goal: Highest potential functional level  12/2/2021 1613 by Abby Do RN  Outcome: Ongoing  12/2/2021 0716 by Judy Goldstein RN  Outcome: Ongoing  Goal: Absence of falls  12/2/2021 1613 by Abby Do RN  Outcome: Ongoing  12/2/2021 0716 by Judy Goldstein RN  Outcome: Ongoing     Problem: Musculor/Skeletal Functional Status  Goal: Highest potential functional level  12/2/2021 1613 by Abby Do RN  Outcome: Ongoing  12/2/2021 0716 by Judy Goldstein RN  Outcome: Ongoing  Goal: Absence of falls  12/2/2021 1613 by Abby Do RN  Outcome: Ongoing  12/2/2021 0716 by Judy Goldstein RN  Outcome: Ongoing     Problem: Nutrition  Goal: Optimal nutrition therapy  12/2/2021 1613 by Abby Do RN  Outcome: Ongoing  12/2/2021 0716 by Judy Goldstein RN  Outcome: Ongoing     Problem: Pain:  Goal: Pain level will decrease  Description: Pain level will decrease  12/2/2021 1613 by Abby Do RN  Outcome: Ongoing  12/2/2021 0716 by Judy Goldstein RN  Outcome: Ongoing  Goal: Control of acute pain  Description: Control of acute pain  12/2/2021 1613 by Abby Do RN  Outcome: Ongoing  12/2/2021 0716 by Judy Goldstein RN  Outcome: Ongoing  Goal: Control of chronic pain  Description: Control of chronic pain  12/2/2021 1613 by Stephanie Pinto RN  Outcome: Ongoing  12/2/2021 0716 by Lisa Calix RN  Outcome: Ongoing     Problem: Gas Exchange - Impaired:  Goal: Levels of oxygenation will improve  Description: Levels of oxygenation will improve  12/2/2021 1613 by Stephanie Pinto RN  Outcome: Ongoing  12/2/2021 0716 by Lisa Calix RN  Outcome: Ongoing     Problem: Fluid Volume - Imbalance:  Goal: Absence of imbalanced fluid volume signs and symptoms  Description: Absence of imbalanced fluid volume signs and symptoms  12/2/2021 1613 by Stephanie Pinto RN  Outcome: Ongoing  12/2/2021 0716 by Lisa Calix RN  Outcome: Ongoing     Problem: Non-Violent Restraints  Goal: Removal from restraints as soon as assessed to be safe  12/2/2021 1613 by Stephanie Pinto RN  Outcome: Ongoing  12/2/2021 0716 by Lisa Calix RN  Outcome: Ongoing  Goal: No harm/injury to patient while restraints in use  12/2/2021 1613 by Stephanie Pinto RN  Outcome: Ongoing  12/2/2021 0716 by Lisa Calix RN  Outcome: Ongoing  Goal: Patient's dignity will be maintained  12/2/2021 1613 by Stephanie Pinto RN  Outcome: Ongoing  12/2/2021 0716 by Lisa Calix RN  Outcome: Ongoing     Problem: Airway Clearance - Ineffective:  Goal: Ability to maintain a clear airway will improve  Description: Ability to maintain a clear airway will improve  12/2/2021 1613 by Stephanie Pinto RN  Outcome: Ongoing  12/2/2021 0716 by Lisa Calix RN  Outcome: Ongoing     Problem:  Bowel Function - Altered:  Goal: Bowel elimination is within specified parameters  Description: Bowel elimination is within specified parameters  12/2/2021 1613 by Stephanie Pinto RN  Outcome: Ongoing  12/2/2021 0716 by Lisa Calix RN  Outcome: Ongoing     Problem: Fluid Volume - Imbalance:  Goal: Absence of imbalanced fluid volume signs and symptoms  Description: Absence of imbalanced fluid volume signs and symptoms  12/2/2021 1613 by Parviz Eid RN  Outcome: Ongoing  12/2/2021 0716 by Nasra Narvaez RN  Outcome: Ongoing     Problem: Gas Exchange - Impaired:  Goal: Levels of oxygenation will improve  Description: Levels of oxygenation will improve  12/2/2021 1613 by Parviz Eid RN  Outcome: Ongoing  12/2/2021 0716 by Nasra Narvaez RN  Outcome: Ongoing     Problem: Mental Status - Impaired:  Goal: Mental status will be restored to baseline  Description: Mental status will be restored to baseline  12/2/2021 1613 by Parviz Eid RN  Outcome: Ongoing  12/2/2021 0716 by Nasra Narvaez RN  Outcome: Ongoing     Problem: Nutrition Deficit:  Goal: Ability to achieve adequate nutritional intake will improve  Description: Ability to achieve adequate nutritional intake will improve  12/2/2021 1613 by Parviz Eid RN  Outcome: Ongoing  12/2/2021 0716 by Nasra Narvaez RN  Outcome: Ongoing     Problem: OXYGENATION/RESPIRATORY FUNCTION  Goal: Patient will maintain patent airway  12/2/2021 1613 by Parviz Eid RN  Outcome: Ongoing  12/2/2021 0716 by Nasra Narvaez RN  Outcome: Ongoing  Goal: Patient will achieve/maintain normal respiratory rate/effort  Description: Respiratory rate and effort will be within normal limits for the patient  12/2/2021 1613 by Parviz Eid RN  Outcome: Ongoing  12/2/2021 0716 by Nasra Narvaez RN  Outcome: Ongoing     Problem: MECHANICAL VENTILATION  Goal: Patient will maintain patent airway  12/2/2021 1613 by Praviz Eid RN  Outcome: Ongoing  12/2/2021 0716 by Nasra Narvaez RN  Outcome: Ongoing  Goal: Oral health is maintained or improved  12/2/2021 1613 by Parviz Eid RN  Outcome: Ongoing  12/2/2021 0716 by Nasra Narvaez RN  Outcome: Ongoing  Goal: ET tube will be managed safely  12/2/2021 1613 by Parviz Eid RN  Outcome: Ongoing  12/2/2021 0716 by Nasra Narvaez RN  Outcome: Ongoing

## 2021-12-02 NOTE — PROGRESS NOTES
Dr. Tyler Savage at bedside, [facility] and PEG 0730 Friday Morning(12/4/2021). Have platlet ready to transfuse morning of surgery. Writer contacted Blood bank. Will have platelets ready. Type and Screen will be completed with morning labs.

## 2021-12-03 ENCOUNTER — APPOINTMENT (OUTPATIENT)
Dept: GENERAL RADIOLOGY | Age: 71
DRG: 004 | End: 2021-12-03
Payer: COMMERCIAL

## 2021-12-03 ENCOUNTER — ANESTHESIA (OUTPATIENT)
Dept: OPERATING ROOM | Age: 71
DRG: 004 | End: 2021-12-03
Payer: COMMERCIAL

## 2021-12-03 VITALS
SYSTOLIC BLOOD PRESSURE: 122 MMHG | RESPIRATION RATE: 15 BRPM | TEMPERATURE: 100.4 F | OXYGEN SATURATION: 100 % | DIASTOLIC BLOOD PRESSURE: 79 MMHG

## 2021-12-03 PROBLEM — J96.02 ACUTE RESPIRATORY FAILURE WITH HYPOXIA AND HYPERCAPNIA (HCC): Status: ACTIVE | Noted: 2021-12-03

## 2021-12-03 PROBLEM — N30.00 ACUTE CYSTITIS WITHOUT HEMATURIA: Status: ACTIVE | Noted: 2021-12-03

## 2021-12-03 PROBLEM — I42.0 DILATED CARDIOMYOPATHY (HCC): Status: ACTIVE | Noted: 2021-12-03

## 2021-12-03 PROBLEM — J96.01 ACUTE RESPIRATORY FAILURE WITH HYPOXIA AND HYPERCAPNIA (HCC): Status: ACTIVE | Noted: 2021-12-03

## 2021-12-03 LAB
ABSOLUTE BANDS #: 0.06 K/UL (ref 0–1)
ABSOLUTE EOS #: 0.11 K/UL (ref 0–0.4)
ABSOLUTE IMMATURE GRANULOCYTE: ABNORMAL K/UL (ref 0–0.3)
ABSOLUTE LYMPH #: 1.01 K/UL (ref 1–4.8)
ABSOLUTE MONO #: 0.45 K/UL (ref 0.1–1.3)
ALLEN TEST: ABNORMAL
ANION GAP SERPL CALCULATED.3IONS-SCNC: 13 MMOL/L (ref 9–17)
BANDS: 1 % (ref 0–10)
BASOPHILS # BLD: 0 % (ref 0–2)
BASOPHILS ABSOLUTE: 0 K/UL (ref 0–0.2)
BUN BLDV-MCNC: 47 MG/DL (ref 8–23)
BUN/CREAT BLD: ABNORMAL (ref 9–20)
CALCIUM SERPL-MCNC: 8.9 MG/DL (ref 8.6–10.4)
CARBOXYHEMOGLOBIN: 0.8 % (ref 0–5)
CHLORIDE BLD-SCNC: 98 MMOL/L (ref 98–107)
CO2: 25 MMOL/L (ref 20–31)
CREAT SERPL-MCNC: 3.46 MG/DL (ref 0.5–0.9)
CULTURE: ABNORMAL
DIFFERENTIAL TYPE: ABNORMAL
EOSINOPHILS RELATIVE PERCENT: 2 % (ref 0–4)
FIO2: 30
GFR AFRICAN AMERICAN: 16 ML/MIN
GFR NON-AFRICAN AMERICAN: 13 ML/MIN
GFR SERPL CREATININE-BSD FRML MDRD: ABNORMAL ML/MIN/{1.73_M2}
GFR SERPL CREATININE-BSD FRML MDRD: ABNORMAL ML/MIN/{1.73_M2}
GLUCOSE BLD-MCNC: 121 MG/DL (ref 65–105)
GLUCOSE BLD-MCNC: 139 MG/DL (ref 70–99)
GLUCOSE BLD-MCNC: 142 MG/DL (ref 65–105)
HCO3 ARTERIAL: 26.9 MMOL/L (ref 22–26)
HCT VFR BLD CALC: 22.2 % (ref 36–46)
HEMOGLOBIN: 7.3 G/DL (ref 12–16)
IMMATURE GRANULOCYTES: ABNORMAL %
LYMPHOCYTES # BLD: 18 % (ref 24–44)
Lab: ABNORMAL
MAGNESIUM: 2 MG/DL (ref 1.6–2.6)
MCH RBC QN AUTO: 31.2 PG (ref 26–34)
MCHC RBC AUTO-ENTMCNC: 32.7 G/DL (ref 31–37)
MCV RBC AUTO: 95.5 FL (ref 80–100)
METHEMOGLOBIN: 1.4 % (ref 0–1.9)
MODE: ABNORMAL
MONOCYTES # BLD: 8 % (ref 1–7)
MORPHOLOGY: ABNORMAL
NEGATIVE BASE EXCESS, ART: ABNORMAL MMOL/L (ref 0–2)
NOTIFICATION TIME: ABNORMAL
NOTIFICATION: ABNORMAL
NRBC AUTOMATED: ABNORMAL PER 100 WBC
O2 DEVICE/FLOW/%: ABNORMAL
O2 SAT, ARTERIAL: 96.3 % (ref 95–98)
OXYHEMOGLOBIN: ABNORMAL % (ref 95–98)
PATIENT TEMP: 37
PCO2 ARTERIAL: 42.3 MMHG (ref 35–45)
PCO2, ART, TEMP ADJ: ABNORMAL (ref 35–45)
PDW BLD-RTO: 16.2 % (ref 11.5–14.9)
PEEP/CPAP: 5
PH ARTERIAL: 7.41 (ref 7.35–7.45)
PH, ART, TEMP ADJ: ABNORMAL (ref 7.35–7.45)
PLATELET # BLD: 15 K/UL (ref 150–450)
PLATELET # BLD: 30 K/UL (ref 150–450)
PLATELET ESTIMATE: ABNORMAL
PMV BLD AUTO: 9.7 FL (ref 6–12)
PO2 ARTERIAL: 101 MMHG (ref 80–100)
PO2, ART, TEMP ADJ: ABNORMAL MMHG (ref 80–100)
POSITIVE BASE EXCESS, ART: 2.3 MMOL/L (ref 0–2)
POTASSIUM SERPL-SCNC: 3.7 MMOL/L (ref 3.7–5.3)
PSV: ABNORMAL
PT. POSITION: ABNORMAL
RBC # BLD: 2.33 M/UL (ref 4–5.2)
RBC # BLD: ABNORMAL 10*6/UL
RESPIRATORY RATE: 20
SAMPLE SITE: ABNORMAL
SEG NEUTROPHILS: 71 % (ref 36–66)
SEGMENTED NEUTROPHILS ABSOLUTE COUNT: 3.97 K/UL (ref 1.3–9.1)
SET RATE: 20
SODIUM BLD-SCNC: 136 MMOL/L (ref 135–144)
SPECIMEN DESCRIPTION: ABNORMAL
TEXT FOR RESPIRATORY: ABNORMAL
TOTAL HB: ABNORMAL G/DL (ref 12–16)
TOTAL RATE: 20
VT: 450
WBC # BLD: 5.6 K/UL (ref 3.5–11)
WBC # BLD: ABNORMAL 10*3/UL

## 2021-12-03 PROCEDURE — 0B113F4 BYPASS TRACHEA TO CUTANEOUS WITH TRACHEOSTOMY DEVICE, PERCUTANEOUS APPROACH: ICD-10-PCS | Performed by: FAMILY MEDICINE

## 2021-12-03 PROCEDURE — 2000000000 HC ICU R&B

## 2021-12-03 PROCEDURE — 2580000003 HC RX 258: Performed by: NURSE ANESTHETIST, CERTIFIED REGISTERED

## 2021-12-03 PROCEDURE — 99232 SBSQ HOSP IP/OBS MODERATE 35: CPT | Performed by: INTERNAL MEDICINE

## 2021-12-03 PROCEDURE — C9113 INJ PANTOPRAZOLE SODIUM, VIA: HCPCS | Performed by: SURGERY

## 2021-12-03 PROCEDURE — 71045 X-RAY EXAM CHEST 1 VIEW: CPT

## 2021-12-03 PROCEDURE — 2580000003 HC RX 258: Performed by: SURGERY

## 2021-12-03 PROCEDURE — P9073 PLATELETS PHERESIS PATH REDU: HCPCS

## 2021-12-03 PROCEDURE — 82805 BLOOD GASES W/O2 SATURATION: CPT

## 2021-12-03 PROCEDURE — 99999 PR OFFICE/OUTPT VISIT,PROCEDURE ONLY: CPT | Performed by: PHYSICIAN ASSISTANT

## 2021-12-03 PROCEDURE — 6360000002 HC RX W HCPCS: Performed by: NURSE ANESTHETIST, CERTIFIED REGISTERED

## 2021-12-03 PROCEDURE — 0DH63UZ INSERTION OF FEEDING DEVICE INTO STOMACH, PERCUTANEOUS APPROACH: ICD-10-PCS | Performed by: FAMILY MEDICINE

## 2021-12-03 PROCEDURE — 6370000000 HC RX 637 (ALT 250 FOR IP): Performed by: SURGERY

## 2021-12-03 PROCEDURE — 6360000002 HC RX W HCPCS: Performed by: SURGERY

## 2021-12-03 PROCEDURE — 6360000002 HC RX W HCPCS: Performed by: INTERNAL MEDICINE

## 2021-12-03 PROCEDURE — 2500000003 HC RX 250 WO HCPCS: Performed by: NURSE ANESTHETIST, CERTIFIED REGISTERED

## 2021-12-03 PROCEDURE — 2709999900 HC NON-CHARGEABLE SUPPLY: Performed by: SURGERY

## 2021-12-03 PROCEDURE — 83735 ASSAY OF MAGNESIUM: CPT

## 2021-12-03 PROCEDURE — 2700000000 HC OXYGEN THERAPY PER DAY

## 2021-12-03 PROCEDURE — 85049 AUTOMATED PLATELET COUNT: CPT

## 2021-12-03 PROCEDURE — 94003 VENT MGMT INPAT SUBQ DAY: CPT

## 2021-12-03 PROCEDURE — 85025 COMPLETE CBC W/AUTO DIFF WBC: CPT

## 2021-12-03 PROCEDURE — 82947 ASSAY GLUCOSE BLOOD QUANT: CPT

## 2021-12-03 PROCEDURE — 0B21XFZ CHANGE TRACHEOSTOMY DEVICE IN TRACHEA, EXTERNAL APPROACH: ICD-10-PCS | Performed by: FAMILY MEDICINE

## 2021-12-03 PROCEDURE — 3700000001 HC ADD 15 MINUTES (ANESTHESIA): Performed by: SURGERY

## 2021-12-03 PROCEDURE — 80048 BASIC METABOLIC PNL TOTAL CA: CPT

## 2021-12-03 PROCEDURE — 36415 COLL VENOUS BLD VENIPUNCTURE: CPT

## 2021-12-03 PROCEDURE — 2720000010 HC SURG SUPPLY STERILE: Performed by: SURGERY

## 2021-12-03 PROCEDURE — 3600000013 HC SURGERY LEVEL 3 ADDTL 15MIN: Performed by: SURGERY

## 2021-12-03 PROCEDURE — 3600000003 HC SURGERY LEVEL 3 BASE: Performed by: SURGERY

## 2021-12-03 PROCEDURE — 36600 WITHDRAWAL OF ARTERIAL BLOOD: CPT

## 2021-12-03 PROCEDURE — 94761 N-INVAS EAR/PLS OXIMETRY MLT: CPT

## 2021-12-03 PROCEDURE — 3700000000 HC ANESTHESIA ATTENDED CARE: Performed by: SURGERY

## 2021-12-03 RX ORDER — POLYETHYLENE GLYCOL 3350 17 G/17G
17 POWDER, FOR SOLUTION ORAL DAILY PRN
Status: DISCONTINUED | OUTPATIENT
Start: 2021-12-03 | End: 2021-12-14 | Stop reason: HOSPADM

## 2021-12-03 RX ORDER — SODIUM CHLORIDE 9 MG/ML
INJECTION, SOLUTION INTRAVENOUS CONTINUOUS PRN
Status: DISCONTINUED | OUTPATIENT
Start: 2021-12-03 | End: 2021-12-03 | Stop reason: SDUPTHER

## 2021-12-03 RX ORDER — EPHEDRINE SULFATE/0.9% NACL/PF 50 MG/5 ML
SYRINGE (ML) INTRAVENOUS PRN
Status: DISCONTINUED | OUTPATIENT
Start: 2021-12-03 | End: 2021-12-03 | Stop reason: SDUPTHER

## 2021-12-03 RX ORDER — ROCURONIUM BROMIDE 10 MG/ML
INJECTION, SOLUTION INTRAVENOUS PRN
Status: DISCONTINUED | OUTPATIENT
Start: 2021-12-03 | End: 2021-12-03 | Stop reason: SDUPTHER

## 2021-12-03 RX ORDER — ATORVASTATIN CALCIUM 40 MG/1
40 TABLET, FILM COATED ORAL DAILY
Status: DISCONTINUED | OUTPATIENT
Start: 2021-12-04 | End: 2021-12-14 | Stop reason: HOSPADM

## 2021-12-03 RX ORDER — ESMOLOL HYDROCHLORIDE 10 MG/ML
INJECTION INTRAVENOUS PRN
Status: DISCONTINUED | OUTPATIENT
Start: 2021-12-03 | End: 2021-12-03 | Stop reason: SDUPTHER

## 2021-12-03 RX ORDER — FLUCONAZOLE 100 MG/1
100 TABLET ORAL 2 TIMES DAILY
Status: DISCONTINUED | OUTPATIENT
Start: 2021-12-03 | End: 2021-12-06

## 2021-12-03 RX ORDER — CEFAZOLIN SODIUM 1 G/3ML
INJECTION, POWDER, FOR SOLUTION INTRAMUSCULAR; INTRAVENOUS PRN
Status: DISCONTINUED | OUTPATIENT
Start: 2021-12-03 | End: 2021-12-03 | Stop reason: SDUPTHER

## 2021-12-03 RX ADMIN — Medication 30 MG: at 07:51

## 2021-12-03 RX ADMIN — PROPOFOL 10 MCG/KG/MIN: 10 INJECTION, EMULSION INTRAVENOUS at 05:25

## 2021-12-03 RX ADMIN — PANTOPRAZOLE SODIUM 40 MG: 40 INJECTION, POWDER, FOR SOLUTION INTRAVENOUS at 22:00

## 2021-12-03 RX ADMIN — ESMOLOL HYDROCHLORIDE 50 MG: 10 INJECTION, SOLUTION INTRAVENOUS at 08:33

## 2021-12-03 RX ADMIN — ROCURONIUM BROMIDE 20 MG: 10 INJECTION, SOLUTION INTRAVENOUS at 08:24

## 2021-12-03 RX ADMIN — PHENYLEPHRINE HYDROCHLORIDE 200 MCG: 10 INJECTION INTRAVENOUS at 07:47

## 2021-12-03 RX ADMIN — PHENYLEPHRINE HYDROCHLORIDE 200 MCG: 10 INJECTION INTRAVENOUS at 07:45

## 2021-12-03 RX ADMIN — OXYCODONE HYDROCHLORIDE AND ACETAMINOPHEN 1 TABLET: 5; 325 TABLET ORAL at 21:58

## 2021-12-03 RX ADMIN — SODIUM CHLORIDE, PRESERVATIVE FREE 10 ML: 5 INJECTION INTRAVENOUS at 22:00

## 2021-12-03 RX ADMIN — SODIUM CHLORIDE: 9 INJECTION, SOLUTION INTRAVENOUS at 07:38

## 2021-12-03 RX ADMIN — CEFAZOLIN 2000 MG: 1 INJECTION, POWDER, FOR SOLUTION INTRAMUSCULAR; INTRAVENOUS at 07:53

## 2021-12-03 RX ADMIN — POLYVINYL ALCOHOL 1 DROP: 14 SOLUTION/ DROPS OPHTHALMIC at 00:13

## 2021-12-03 RX ADMIN — PHENYLEPHRINE HYDROCHLORIDE 200 MCG: 10 INJECTION INTRAVENOUS at 07:43

## 2021-12-03 RX ADMIN — CHLORHEXIDINE GLUCONATE 0.12% ORAL RINSE 15 ML: 1.2 LIQUID ORAL at 22:00

## 2021-12-03 RX ADMIN — ESMOLOL HYDROCHLORIDE 50 MG: 10 INJECTION, SOLUTION INTRAVENOUS at 07:54

## 2021-12-03 RX ADMIN — ROCURONIUM BROMIDE 30 MG: 10 INJECTION, SOLUTION INTRAVENOUS at 07:54

## 2021-12-03 ASSESSMENT — PAIN SCALES - WONG BAKER

## 2021-12-03 ASSESSMENT — PULMONARY FUNCTION TESTS
PIF_VALUE: 3
PIF_VALUE: 36
PIF_VALUE: 35
PIF_VALUE: 27
PIF_VALUE: 33
PIF_VALUE: 24
PIF_VALUE: 26
PIF_VALUE: 35
PIF_VALUE: 29
PIF_VALUE: 26
PIF_VALUE: 30
PIF_VALUE: 35
PIF_VALUE: 39
PIF_VALUE: 37
PIF_VALUE: 25
PIF_VALUE: 35
PIF_VALUE: 28
PIF_VALUE: 38
PIF_VALUE: 28
PIF_VALUE: 36
PIF_VALUE: 37
PIF_VALUE: 10
PIF_VALUE: 24
PIF_VALUE: 36
PIF_VALUE: 34
PIF_VALUE: 35
PIF_VALUE: 34
PIF_VALUE: 36
PIF_VALUE: 11
PIF_VALUE: 35
PIF_VALUE: 41
PIF_VALUE: 12
PIF_VALUE: 32
PIF_VALUE: 10
PIF_VALUE: 38
PIF_VALUE: 26
PIF_VALUE: 35
PIF_VALUE: 33
PIF_VALUE: 32
PIF_VALUE: 11
PIF_VALUE: 34
PIF_VALUE: 28
PIF_VALUE: 10
PIF_VALUE: 33
PIF_VALUE: 35
PIF_VALUE: 40
PIF_VALUE: 30
PIF_VALUE: 37
PIF_VALUE: 32
PIF_VALUE: 30
PIF_VALUE: 39
PIF_VALUE: 10
PIF_VALUE: 34
PIF_VALUE: 28
PIF_VALUE: 28
PIF_VALUE: 34
PIF_VALUE: 34
PIF_VALUE: 36
PIF_VALUE: 38
PIF_VALUE: 34
PIF_VALUE: 24
PIF_VALUE: 36
PIF_VALUE: 26
PIF_VALUE: 36
PIF_VALUE: 38
PIF_VALUE: 39
PIF_VALUE: 35
PIF_VALUE: 10
PIF_VALUE: 35
PIF_VALUE: 30
PIF_VALUE: 35
PIF_VALUE: 28
PIF_VALUE: 28
PIF_VALUE: 25
PIF_VALUE: 28
PIF_VALUE: 34
PIF_VALUE: 28
PIF_VALUE: 34
PIF_VALUE: 34
PIF_VALUE: 26
PIF_VALUE: 35
PIF_VALUE: 29
PIF_VALUE: 27
PIF_VALUE: 32
PIF_VALUE: 28
PIF_VALUE: 34
PIF_VALUE: 26
PIF_VALUE: 39
PIF_VALUE: 36
PIF_VALUE: 26
PIF_VALUE: 26
PIF_VALUE: 28
PIF_VALUE: 27
PIF_VALUE: 9
PIF_VALUE: 35
PIF_VALUE: 37
PIF_VALUE: 40
PIF_VALUE: 35
PIF_VALUE: 25
PIF_VALUE: 25

## 2021-12-03 ASSESSMENT — PAIN DESCRIPTION - LOCATION: LOCATION: ABDOMEN

## 2021-12-03 ASSESSMENT — PAIN SCALES - GENERAL
PAINLEVEL_OUTOF10: 0
PAINLEVEL_OUTOF10: 6
PAINLEVEL_OUTOF10: 3
PAINLEVEL_OUTOF10: 2

## 2021-12-03 ASSESSMENT — PAIN DESCRIPTION - ONSET: ONSET: GRADUAL

## 2021-12-03 ASSESSMENT — PAIN DESCRIPTION - PAIN TYPE: TYPE: ACUTE PAIN;SURGICAL PAIN

## 2021-12-03 NOTE — CARE COORDINATION
[facility]TTY learned that [facility] can accept this patient. Patient does require a pre-cert. SW following to determine when the medical staff would like this pre-cert started. [address] [place] [address] will continue to follow.

## 2021-12-03 NOTE — OP NOTE
Operative Note      Patient: Alyson Montiel  YOB: 1950  MRN: [id number]    Date of Procedure: 12/3/2021                Preoperative diagnosis: Respiratory failure. Malnutrition. Postoperative diagnosis: Same. Duodenal polyp and gastritis not biopsied today given her medical condition and low platelets. Procedure: 1) Tracheostomy Shiley #8 tube placement. 2) EGD with 20 Polish PEG tube placement. Surgeon: Dr. Krista Ortega    Asst.: JESSICA Contreras    Anesthesia: General    Preparation: Chloraprep    EBL: Less than 25 mL    Specimen: None     Procedure: Informed consent was obtained. Preoperative antibiotic was given. Patient was taken to the  operating room. General anesthesia was given. Shoulder roll was placed. Operative site was prepped and draped in usual sterile fashion. Timeout was done. Collar incision was made. Platysma was incised. Self-retaining retractor was placed. Dissection was continued in the midline all the way down to the trachea. Another wheat Carroll retractor was placed. Thyroid isthmus was divided using the LigaSure. Hemostasis was confirmed. Trachea was exposed. Nurse anesthetist was informed. At this point tracheotomy was performed by excising the second tracheal ring. Endotracheal tube was withdrawn above the tracheotomy. Vicryl suture was placed on the inferior tracheal ring for traction. Tracheal  was used. At this point #8 Shiley tracheostomy tube was inserted without any difficulty. Obturator was removed and inner cannula was placed. Tracheostomy tube was connected to the ventilator. Balloon was inflated. End-tidal CO2 was obtained. Satisfactory oxygen saturation was obtained. Satisfactory tracheostomy tube placement was performed. Again hemostasis was confirmed. Sponge needle instrument count was found to be correct. Wound was approximated after removing all the retractors using nylon suture.   Tracheostomy was secured using Prolene suture as well as trach ties. Clean dressing was applied. Olympus gastroscope was advanced under direct visualization to the second portion of the duodenum without any difficulty. Esophagus revealed no active esophagitis mass or obstruction. Scope was advanced into the stomach. Gastritis noted. Retroflexion revealed some gastritis and mucosal edema. In the body of the stomach also some gastritis noted. Gastric outlet was patent. Antrum was unremarkable. There was a polyp noted in the duodenum at the junction of the bulb and the second portion this was not biopsied given low platelets and her overall medical condition and prognosis. No gastric outlet obstruction noted. No obvious duodenitis noted. Scope was withdrawn back into the stomach. Stomach was distended with air. Light reflex was seen over the intra-abdominal wall. This area was prepped and draped in usual sterile fashion. Incision was made using a #11 blade at the site of light reflex. Through the incision a needle was introduced through the abdominal wall into the stomach. Guidewire was introduced through the needle. Guidewire was grasped with the help of the snare. Guidewire was retrieved from the oral aspect. 21 Peruvian PEG tube was inserted over the guidewire and retrieved from the abdominal aspect. Bolster was applied which was between 2 to 3 cm. Scope was reintroduced. Mushroom of the PEG was found to be in satisfactory position. No bleeding noted. Air was decompressed and scope was removed. Clean dressing was applied. Patient tolerated procedure well and was transferred to the intensive care unit in a stable condition.    -  Recommendations: PEG tube to gravity. PEG tube can be used for medications. Resume tube feeds tomorrow. Local trach site care.   Continue medical management per critical care team.

## 2021-12-03 NOTE — ANESTHESIA POSTPROCEDURE EVALUATION
POST- ANESTHESIA EVALUATION       Pt Name: Danilo Rosenthal  MRN: [id number]  YOB: 1950  Date of evaluation: 12/3/2021  Time:  9:37 AM      /63   Pulse 80   Temp 98.6 °F (37 °C) (Axillary)   Resp 20   Ht 5' 2.99\" (1.6 m)   Wt 217 lb 9.5 oz (98.7 kg)   SpO2 100%   BMI 38.55 kg/m²      Consciousness Level  Awake  Cardiopulmonary Status  Stable  Pain Adequately Treated YES  Nausea / Vomiting  NO  Adequate Hydration  YES  Anesthesia Related Complications NONE      Electronically signed by Shahid Stevens MD on 12/3/2021 at 9:37 AM       Department of Anesthesiology  Postprocedure Note    Patient: Danilo Rosenthal  MRN: [id number]  YOB: 1950  Date of evaluation: 12/3/2021  Time:  9:37 AM     Procedure Summary     Date: 12/03/21 Room / Location: [address] [place] [address]    Anesthesia Start: 8481 Anesthesia Stop: 7142    Procedures:       TRACHEOTOMY (N/A Throat)      EGD ESOPHAGOGASTRODUODENOSCOPY PEG TUBE INSERTION (N/A Esophagus) Diagnosis: (MALNUTRITION RESPIRATORY FAILURE)    Surgeons: Vincenzo Bardales MD Responsible Provider: Shahid Stevens MD    Anesthesia Type: general ASA Status: 4          Anesthesia Type: general    Mane Phase I: Mane Score: 8    Mane Phase II:      Last vitals: Reviewed and per EMR flowsheets.        Anesthesia Post Evaluation

## 2021-12-03 NOTE — FLOWSHEET NOTE
Patient receptive to prayer but did not engage in conversation;     12/03/21 1728   Encounter Summary   Services provided to: Patient   Referral/Consult From: Palliative Care   Continue Visiting   (12/3/21)   Complexity of Encounter Moderate   Length of Encounter 15 minutes   Spiritual Assessment Completed Yes   Spiritual/Latter day   Type Spiritual support   Assessment Approachable   Intervention Prayer; Sustaining presence/ Ministry of presence   Outcome Coping; Receptive

## 2021-12-03 NOTE — PROGRESS NOTES
[address] [place] [address]    Progress Note    12/3/2021    9:19 AM    Name:   Hui David  MRN:     [id number]     Acct:      [de-identified]   Room:   2004/2004-01  IP Day:  23  Admit Date:  11/10/2021  4:23 PM    PCP:   Ravin Velasco DO  Code Status:  DNR-CCA    Subjective:     C/C:   Chief Complaint   Patient presents with    Abnormal Lab     low hemoglobin and high CR     Interval History Status: not changed. Patient initially admitted with anemia elevated creatinine, developed acute respiratory failure, currently intubated. She is on sedation . Not agitated. she has failed previous weaning trials. Scheduled for Trach and PEG today  Afebrile  BP stable  No leucocytosis  Hemoglobin dropped to 7.3  Platelets dropped to 15, had small amount of vaginal bleeding  UA abnormal      Review of Systems:     Unable to obtain    Medications:      Allergies:  No Known Allergies    Current Meds:   Scheduled Meds:    cefTRIAXone (ROCEPHIN) IV  1,000 mg IntraVENous Q24H    chlorhexidine  15 mL Mouth/Throat BID    polyvinyl alcohol  1 drop Both Eyes Q4H    And    artificial tears   Both Eyes Q4H    [Held by provider] carvedilol  3.125 mg Oral BID WC    pantoprazole  40 mg IntraVENous BID    And    sodium chloride flush  10 mL IntraVENous BID    sodium polystyrene  15 g Oral Once    sertraline  25 mg Oral Daily    ferrous sulfate  325 mg Oral Daily with breakfast    [Held by provider] aspirin  81 mg Oral Daily    atorvastatin  80 mg Oral Daily    insulin glargine  22 Units SubCUTAneous Nightly    insulin lispro  0-12 Units SubCUTAneous TID WC    insulin lispro  0-6 Units SubCUTAneous Nightly    sodium chloride flush  5-40 mL IntraVENous 2 times per day     Continuous Infusions:    sodium chloride      norepinephrine Stopped (11/29/21 6796)    propofol 10 mcg/kg/min (12/03/21 8344)    dextrose      sodium chloride       PRN Meds: sodium chloride, sodium citrate 12/01/21  0454 12/02/21  0507 12/03/21  0425   WBC 6.9 5.5 5.6   RBC 2.50* 2.46* 2.33*   HGB 7.8* 7.7* 7.3*   HCT 24.0* 23.8* 22.2*   MCV 96.2 96.9 95.5   MCH 31.3 31.2 31.2   MCHC 32.6 32.2 32.7   RDW 16.4* 16.4* 16.2*   PLT 22* 16* 15*   MPV 8.0 8.8 9.7     Chemistry:  Recent Labs     12/01/21  0454 12/02/21  0507 12/03/21  0425   * 132* 136   K 3.8 3.8 3.7   CL 97* 97* 98   CO2 24 26 25   GLUCOSE 149* 148* 139*   BUN 58* 39* 47*   CREATININE 3.66* 2.96* 3.46*   MG 2.0 2.0 2.0   ANIONGAP 11 9 13   LABGLOM 12* 16* 13*   GFRAA 15* 19* 16*   CALCIUM 9.0 8.8 8.9     Recent Labs     12/01/21  1559 12/01/21  2050 12/02/21  0826 12/02/21  1143 12/02/21  1539 12/02/21  2019   POCGLU 95 122* 137* 160* 161* 163*     ABG:  Lab Results   Component Value Date    PHART 7.412 12/03/2021    LUK2FCT 42.3 12/03/2021    PO2ART 101.0 12/03/2021    MTF4NXV 26.9 12/03/2021    NBEA NOT REPORTED 12/03/2021    PBEA 2.3 12/03/2021    B5INAKYT 96.3 12/03/2021    FIO2 30 12/03/2021     Lab Results   Component Value Date/Time    SPECIAL NOT REPORTED 11/20/2021 11:30 PM     Lab Results   Component Value Date/Time    CULTURE NORMAL RESPIRATORY TANNER MODERATE GROWTH 11/20/2021 11:30 PM       Radiology:  XR CHEST (SINGLE VIEW FRONTAL)    Result Date: 11/28/2021  1. Unchanged appearance of support devices. 2. No significant interval change in small pleural effusions and basilar atelectasis. XR CHEST PORTABLE    Result Date: 11/29/2021  1. Endotracheal tube terminates in appropriate position. 2. No significant change in pleural effusions and basilar atelectasis. XR CHEST PORTABLE    Result Date: 11/27/2021  1. Unchanged appearance of support devices. 2. Increasing left pleural effusion and basilar atelectasis. No significant change in right pleural effusion. XR CHEST PORTABLE    Result Date: 11/26/2021  1. Endotracheal tube remains in appropriate position.  2. Interval improved aeration of the left lung base, otherwise no Trach and PEG placement. On ferrous sulfate. IV protonix. Hem-onc consulted  4. Bradycardia, AFib-  Coreg on hold  5. DM- Lantus 22 units +ISS. Glucose control fair  6. Chronic CHF- Coreg on hold  7. Right hydronephrosis, s/p stent placement, improving  8. Non healing left groin wound- wound care  9. Dyslipidemia- lipitor  10. Palliative care consulted. 11. UTI- started on Rocephin. Urine culture pending.   [address] [place] [address] Yesika Gutierrez MD  12/3/2021  9:18 AM

## 2021-12-03 NOTE — PROGRESS NOTES
ICU Progress Note (Vent)   [facility]    Patient - Jani Lockett,  Age - 66 y.o.    - 1950      Room Number -    N -  [id number]   [facility]  Date of Admission -  11/10/2021  4:23 PM    Events of Past 24 Hours   Seen after tracheostomy, patient still paralyzed under general anesthesia    Vitals    height is 5' 2.99\" (1.6 m) and weight is 217 lb 9.5 oz (98.7 kg). Her axillary temperature is 98.6 °F (37 °C). Her blood pressure is 127/63 and her pulse is 80. Her respiration is 20 and oxygen saturation is 100%. Temperature Range: Temp: 98.6 °F (37 °C) Temp  Av.4 °F (37.4 °C)  Min: 98.2 °F (36.8 °C)  Max: 100.4 °F (38 °C)  BP Range:  Systolic (49HBC), WGB:641 , Min:47 , YZF:420     Diastolic (89SMZ), ZJJ:03, Min:31, Max:92    Pulse Range: Pulse  Av.4  Min: 63  Max: 101  Respiration Range: Resp  Av  Min: 0  Max: 27  Current Pulse Ox[de-identified]  SpO2: 100 %  24HR Pulse Ox Range:  SpO2  Av %  Min: 66 %  Max: 100 %  Oxygen Amount and Delivery: O2 Flow Rate (L/min): 2 L/min      Wt Readings from Last 3 Encounters:   21 217 lb 9.5 oz (98.7 kg)   16 178 lb (80.7 kg)   16 184 lb 12.8 oz (83.8 kg)     I/O       Intake/Output Summary (Last 24 hours) at 12/3/2021 0956  Last data filed at 12/3/2021 0538  Gross per 24 hour   Intake 1224.04 ml   Output 405 ml   Net 819.04 ml     I/O last 3 completed shifts:   In: 7222 [I.V.:221; NG/GT:1003]  Out: 405 [Urine:155; Stool:250]     DRAIN/TUBE OUTPUT:     Invasive Lines   Ventilator day 13, tracheostomy day 1  Lines -PICC line day 18    ICP PRESSURE RANGE:  No data recorded  CVP PRESSURE RANGE:  No data recorded  Mechanical Ventilation Data   SETTINGS (Comprehensive)  Vent Information  $Ventilation: $Subsequent Day  Skin Assessment: Clean, dry, & intact  Suction Catheter Diameter: 14  Equipment Changed: HME  Vent Type: Servo i  Vent Mode: PRVC  Vt Ordered: 450 mL  Rate Set: 20 bmp  Pressure Support: 10 cmH20  FiO2 : 30 %  SpO2: 100 %  SpO2/FiO2 ratio: 333.33  Sensitivity: 5  PEEP/CPAP: 5  I Time/ I Time %: 0.86 s  Humidification Source: HME  Nitric Oxide/Epoprostenol In Use?: No  Mask Type: Full face mask  Mask Size: Medium  Additional Respiratory  Assessments  Pulse: 80  Resp: 20  SpO2: 100 %  End Tidal CO2: 33 (%)  Position: Semi-Lagunas's  Humidification Source: HME  Oral Care: Mouth suctioned, Suction toothette, Mouthwash  Cuff Pressure (cm H2O): 30 cm H2O       ABGs:   Lab Results   Component Value Date    PHART 7.412 12/03/2021    PO2ART 101.0 12/03/2021    OYX9JDQ 42.3 12/03/2021       Lab Results   Component Value Date    MODE PRV 12/03/2021         Medications   IV   sodium chloride      norepinephrine Stopped (11/29/21 2306)    propofol 10 mcg/kg/min (12/03/21 0525)    dextrose      sodium chloride        cefTRIAXone (ROCEPHIN) IV  1,000 mg IntraVENous Q24H    chlorhexidine  15 mL Mouth/Throat BID    polyvinyl alcohol  1 drop Both Eyes Q4H    And    artificial tears   Both Eyes Q4H    [Held by provider] carvedilol  3.125 mg Oral BID WC    pantoprazole  40 mg IntraVENous BID    And    sodium chloride flush  10 mL IntraVENous BID    sodium polystyrene  15 g Oral Once    sertraline  25 mg Oral Daily    ferrous sulfate  325 mg Oral Daily with breakfast    [Held by provider] aspirin  81 mg Oral Daily    atorvastatin  80 mg Oral Daily    insulin glargine  22 Units SubCUTAneous Nightly    insulin lispro  0-12 Units SubCUTAneous TID WC    insulin lispro  0-6 Units SubCUTAneous Nightly    sodium chloride flush  5-40 mL IntraVENous 2 times per day       Diet/Nutrition   ADULT TUBE FEEDING; Nasogastric; Renal Formula; Continuous; 35; No; 30; Q 8 hours; Protein; 1 Protein at 12 noon daily. Flush with 30 mLwater before and after administration. Exam   VITALS    height is 5' 2.99\" (1.6 m) and weight is 217 lb 9.5 oz (98.7 kg).  Her axillary temperature is 98.6 °F (37 °C). Her blood pressure is 127/63 and her pulse is 80. Her respiration is 20 and oxygen saturation is 100%. Ventilator Settings (Basic)  Vent Mode: PRVC Rate Set: 20 bmp/Vt Ordered: 450 mL/ /FiO2 : 30 %    Constitutional - Sedated  General Appearance  well developed, well nourished  HEENT -tracheostomy in place, there is oozing around the site, bright red blood  Lungs - Chest expands equally, no wheezes, rales or rhonchi. Cardiovascular - Heart sounds are normal.  normal rate and rhythm regular, no murmur, gallop or rub. Abdomen - soft, nontender, nondistended, no masses or organomegaly  Neurologic - CN II-XII are grossly intact.  There are no focal motor deficits  Skin - no bruising or bleeding  Extremities - no cyanosis, clubbing or edema    Lab Results   CBC     Lab Results   Component Value Date    WBC 5.6 12/03/2021    RBC 2.33 12/03/2021    RBC 4.14 08/31/2016    HGB 7.3 12/03/2021    HCT 22.2 12/03/2021    PLT 15 12/03/2021    MCV 95.5 12/03/2021    MCH 31.2 12/03/2021    MCHC 32.7 12/03/2021    RDW 16.2 12/03/2021    NRBC 4 11/22/2021    METASPCT 3 12/02/2021    LYMPHOPCT 18 12/03/2021    LYMPHOPCT 32.8 08/31/2016    MONOPCT 8 12/03/2021    EOSPCT 4.3 08/31/2016    BASOPCT 0 12/03/2021    BASOPCT 0.7 08/31/2016    MONOSABS 0.45 12/03/2021    LYMPHSABS 1.01 12/03/2021    EOSABS 0.11 12/03/2021    BASOSABS 0.00 12/03/2021    DIFFTYPE NOT REPORTED 12/03/2021       BMP   Lab Results   Component Value Date     12/03/2021    K 3.7 12/03/2021    CL 98 12/03/2021    CO2 25 12/03/2021    BUN 47 12/03/2021    CREATININE 3.46 12/03/2021    GLUCOSE 139 12/03/2021    GLUCOSE 77 08/31/2016    CALCIUM 8.9 12/03/2021       LFTS  Lab Results   Component Value Date    ALKPHOS 175 11/18/2021    ALT 31 11/18/2021    AST 21 11/18/2021    PROT 5.3 11/20/2021    BILITOT 0.27 11/18/2021    BILIDIR <0.08 11/13/2021    IBILI Connot be calculated 11/13/2021    LABALBU 2.6 11/18/2021       INR  No results for input(s): PROTIME, INR in the last 72 hours. APTT  No results for input(s): APTT in the last 72 hours. Lactic Acid  Lab Results   Component Value Date    LACTA 1.3 11/26/2021    LACTA 0.6 11/16/2021    LACTA 0.6 11/16/2021        BNP   No results for input(s): BNP in the last 72 hours. Cultures       Radiology     Plain Films         Chest x-ray shows right pleural effusion prior to tracheostomy      SYSTEM ASSESSMENT    Acute on chronic hypoxic and hypercapnic respiratory failure  Bradycardic arrest, intubated 11/20  Right lower lobe infiltrate/pleural effusion  Acute kidney injury-requiring hemodialysis  Morbid obesity  Anemia, chronic disease and possible rectal bleed  Hypoalbuminemia  Thrombocytopenia  Nonischemic cardiomyopathy with EF of 30 to 35% possible right to left interatrial shunt  Atrial fibrillation  Possible CLL/lymphoproliferative disorder  DNR CCA      Neuro   Keep fairly sedated today, do not want her to move much of her current tracheostomy    Respiratory   Wean oxygen as tolerated.  Keep O2 sat > 88%  We will order stat chest x-ray after tracheostomy  Can make ABGs and chest x-ray every other day   We will resume weaning tomorrow  Hemodynamics   Continue to have episodes of bradycardia and tachycardia during surgery    Gastrointestinal/Nutrition   Tube feeds per general surgery, probably will start tomorrow  GI prophylaxis    Renal   Apparently no dialysis today due to anesthesia/surgery    Infectious Disease   Urine culture still in progress, continue Rocephin    Hematology/Oncology   Will check stat platelet count, 1 unit of platelets was given at the time of OR, there is oozing around tracheostomy site    Endocrine   Monitor blood sugars    Social/Spiritual/DNR/Disposition/Other     Have been updating family members daily    Critical Care Time   35 min    Electronically signed by Chato Blancas MD on 12/3/2021 at 9:28 AM

## 2021-12-03 NOTE — PLAN OF CARE
Problem: OXYGENATION/RESPIRATORY FUNCTION  Goal: Patient will maintain patent airway  12/2/2021 1932 by Asher Guan RCP  Outcome: Ongoing     Problem: OXYGENATION/RESPIRATORY FUNCTION  Goal: Patient will achieve/maintain normal respiratory rate/effort  Description: Respiratory rate and effort will be within normal limits for the patient  12/2/2021 1932 by Asher Guan RCP  Outcome: Ongoing     Problem: MECHANICAL VENTILATION  Goal: Patient will maintain patent airway  12/2/2021 1932 by Asher Guan RCP  Outcome: Ongoing     Problem: MECHANICAL VENTILATION  Goal: Oral health is maintained or improved  12/2/2021 1932 by Asher Guan RCP  Outcome: Ongoing     Problem: MECHANICAL VENTILATION  Goal: ET tube will be managed safely  12/2/2021 1932 by Asher Guan RCP  Outcome: Ongoing

## 2021-12-03 NOTE — PROGRESS NOTES
Department of Internal Medicine  Nephrology Piedad Donahue MD  Progress Note    Reason for consultation: Management of acute kidney injury. Consulting physician: Nichol Salmeron MD.    Interval history: Patient was seen and examined today in the intensive care unit and she is scheduled for tracheostomy placement this morning. She remains oliguric but is hemodynamically stable. She had 2 L of fluid removed with ultrafiltration yesterday. History of present illness: This is a 66 y.o. female with a significant past medical history of Type 2 diabetes mellitus, coronary artery disease, lumbar radiculopathy, Hyperlipidemia and chronic kidney disease stage III [baseline serum creatinine 1.5 mg/dL], who presented from [facility] for further evaluation of acute kidney injury. She was sent to [facility] after developing nonhealing wound s/p cardiac catheterization via right refill Levophed has been discontinued approach with right groin wound. She had been on Bumex in the ECF and creatinine and BUN have been rising and Bumex was decreased to a lowest dose of 0.5 mg p.o. twice daily. She has had regular blood work performed in the [facility] and ECF contacted nephrologist after finding abnormal laboratory studies. She was noted to have a BUN/creatinine 104/2.28 mg/dL with hemoglobin 8 g/dL. She has mild confusion but is not in obvious respiratory distress.     Scheduled Meds:   [MAR Hold] cefTRIAXone (ROCEPHIN) IV  1,000 mg IntraVENous Q24H    [MAR Hold] chlorhexidine  15 mL Mouth/Throat BID    [MAR Hold] polyvinyl alcohol  1 drop Both Eyes Q4H    And    [MAR Hold] artificial tears   Both Eyes Q4H    [Held by provider] carvedilol  3.125 mg Oral BID WC    [MAR Hold] pantoprazole  40 mg IntraVENous BID    And    [MAR Hold] sodium chloride flush  10 mL IntraVENous BID    [MAR Hold] sodium polystyrene  15 g Oral Once    [MAR Hold] sertraline  25 mg Oral Daily    [MAR Hold] ferrous sulfate  325 mg Oral tenderness. Extremities: 1-2+ generalized edema    Neuro:  Grossly normal    CBC:   Recent Labs     12/01/21  0454 12/02/21  0507 12/03/21  0425   WBC 6.9 5.5 5.6   HGB 7.8* 7.7* 7.3*   PLT 22* 16* 15*     BMP:    Recent Labs     12/01/21  0454 12/02/21  0507 12/03/21  0425   * 132* 136   K 3.8 3.8 3.7   CL 97* 97* 98   CO2 24 26 25   BUN 58* 39* 47*   CREATININE 3.66* 2.96* 3.46*   GLUCOSE 149* 148* 139*     Lab Results   Component Value Date    NITRU NEGATIVE 12/02/2021    COLORU Yellow 12/02/2021    PHUR 7.5 12/02/2021    WBCUA TOO NUMEROUS TO COUNT 12/02/2021    RBCUA 20 TO 50 12/02/2021    MUCUS NOT REPORTED 12/02/2021    TRICHOMONAS NOT REPORTED 12/02/2021    YEAST NOT REPORTED 12/02/2021    BACTERIA MODERATE 12/02/2021    CLARITYU Clear 09/07/2016    SPECGRAV 1.025 12/02/2021    LEUKOCYTESUR LARGE 12/02/2021    UROBILINOGEN Normal 12/02/2021    BILIRUBINUR NEGATIVE 12/02/2021    BLOODU Positive 09/07/2016    GLUCOSEU NEGATIVE 12/02/2021    KETUA NEGATIVE 12/02/2021    AMORPHOUS NOT REPORTED 12/02/2021     IMPRESSION/RECOMMENDATIONS:      1. Acute kidney injury - most consistent with ischemic acute tubular necrosis and obstructive nephropathy secondary to bilateral hydronephrosis. She remains borderline oliguric despite repeat renal ultrasound [11/18/2021] showing improvement in hydronephrosis. No plans for hemodialysis or ultrafiltration today. Continue to monitor urine output closely. Avoid nephrotoxic agents. Basic metabolic profile daily. 2.  Heart failure with reduced ejection fraction [HFrEF with LVEF 30 to 35% 11/10/2021] - We will treat with Ultrafiltration as tolerated and as needed diuretics. 3.  Acute hypoxic and hypercapnic respiratory failure - patient remains intubated and on ventilator support. For tracheostomy today. 4.  Normocytic anemia and thrombocytopenia - No evidence of microangiopathy and HIT panel was negative. He has required platelet transfusion as needed. Will defer treatment to hematology/oncology service. 5.  Bilateral hydronephrosis - status post cystoscopy with right ureteral  stent placement. Prognosis is guarded to poor.     Dione Balderas MD FACP  Attending Nephrologist  12/3/2021 8:25 AM

## 2021-12-03 NOTE — PROGRESS NOTES
Writer spoke to surgery, pre-op in regards to platelets transfusion. Informed that Dr. Mikey Wheatley would like 2 platelets order. Writer informed blood bank. 1 order is ready, will work on second order.

## 2021-12-03 NOTE — PLAN OF CARE
Problem: SAFETY  Goal: Free from accidental physical injury  Outcome: Ongoing  Goal: Free from intentional harm  Outcome: Ongoing     Problem: DAILY CARE  Goal: Daily care needs are met  Outcome: Ongoing     Problem: PAIN  Goal: Patient's pain/discomfort is manageable  Outcome: Ongoing     Problem: SKIN INTEGRITY  Goal: Skin integrity is maintained or improved  Outcome: Ongoing     Problem: KNOWLEDGE DEFICIT  Goal: Patient/S.O. demonstrates understanding of disease process, treatment plan, medications, and discharge instructions.   Outcome: Ongoing     Problem: DISCHARGE BARRIERS  Goal: Patient's continuum of care needs are met  Outcome: Ongoing     Problem: Falls - Risk of:  Goal: Will remain free from falls  Outcome: Ongoing  Goal: Absence of physical injury  Outcome: Ongoing     Problem: Skin Integrity:  Goal: Will show no infection signs and symptoms  Outcome: Ongoing  Goal: Absence of new skin breakdown  Outcome: Ongoing     Problem: Musculor/Skeletal Functional Status  Goal: Highest potential functional level  Outcome: Ongoing  Goal: Absence of falls  Outcome: Ongoing     Problem: Musculor/Skeletal Functional Status  Goal: Highest potential functional level  Outcome: Ongoing  Goal: Absence of falls  Outcome: Ongoing     Problem: Nutrition  Goal: Optimal nutrition therapy  Outcome: Ongoing     Problem: Pain:  Goal: Pain level will decrease  Outcome: Ongoing  Goal: Control of acute pain  Outcome: Ongoing  Goal: Control of chronic pain  Outcome: Ongoing     Problem: Gas Exchange - Impaired:  Goal: Levels of oxygenation will improve  Outcome: Ongoing     Problem: Fluid Volume - Imbalance:  Goal: Absence of imbalanced fluid volume signs and symptoms  Outcome: Ongoing     Problem: Non-Violent Restraints  Goal: Removal from restraints as soon as assessed to be safe  Outcome: Ongoing  Goal: No harm/injury to patient while restraints in use  Outcome: Ongoing  Goal: Patient's dignity will be maintained  Outcome: Ongoing     Problem: Airway Clearance - Ineffective:  Goal: Ability to maintain a clear airway will improve  Outcome: Ongoing     Problem:  Bowel Function - Altered:  Goal: Bowel elimination is within specified parameters  Outcome: Ongoing     Problem: Fluid Volume - Imbalance:  Goal: Absence of imbalanced fluid volume signs and symptoms  Outcome: Ongoing     Problem: Gas Exchange - Impaired:  Goal: Levels of oxygenation will improve  Outcome: Ongoing     Problem: Mental Status - Impaired:  Goal: Mental status will be restored to baseline  Outcome: Ongoing     Problem: Nutrition Deficit:  Goal: Ability to achieve adequate nutritional intake will improve  Outcome: Ongoing     Problem: OXYGENATION/RESPIRATORY FUNCTION  Goal: Patient will maintain patent airway  12/3/2021 0707 by Stephan Reyes RN  Outcome: Ongoing  12/2/2021 1932 by Chino Rosales RCP  Outcome: Ongoing  Goal: Patient will achieve/maintain normal respiratory rate/effort  12/3/2021 0707 by Stephan Reyes RN  Outcome: Ongoing  12/2/2021 1932 by Chino Rosales RCP  Outcome: Ongoing     Problem: MECHANICAL VENTILATION  Goal: Patient will maintain patent airway  12/3/2021 0707 by Stephan Reyes RN  Outcome: Ongoing  12/2/2021 1932 by Chino Rosales RCP  Outcome: Ongoing  Goal: Oral health is maintained or improved  12/3/2021 0707 by Stephan Reyes RN  Outcome: Ongoing  12/2/2021 1932 by Chino Rosales RCP  Outcome: Ongoing  Goal: ET tube will be managed safely  12/3/2021 0707 by Stephan Reyes RN  Outcome: Ongoing  12/2/2021 1932 by Chino Rosales RCP  Outcome: Ongoing

## 2021-12-03 NOTE — PROGRESS NOTES
Physical Therapy        Physical Therapy Cancel Note      DATE: 12/3/2021    NAME: Stephanie Harden  MRN: [id number]   : 1950      Patient not seen this date for Physical Therapy due to:     Other: Pt has PEG/Trach today, patient still paralyzed under general anesthesia      Electronically signed by Cristofer Norris PT on 12/3/2021 at 10:57 AM

## 2021-12-04 LAB
ABSOLUTE EOS #: 0.1 K/UL (ref 0–0.4)
ABSOLUTE IMMATURE GRANULOCYTE: ABNORMAL K/UL (ref 0–0.3)
ABSOLUTE LYMPH #: 1.3 K/UL (ref 1–4.8)
ABSOLUTE MONO #: 0.7 K/UL (ref 0.1–1.3)
ANION GAP SERPL CALCULATED.3IONS-SCNC: 14 MMOL/L (ref 9–17)
BASOPHILS # BLD: 0 % (ref 0–2)
BASOPHILS ABSOLUTE: 0 K/UL (ref 0–0.2)
BLD PROD TYP BPU: NORMAL
BUN BLDV-MCNC: 56 MG/DL (ref 8–23)
BUN/CREAT BLD: ABNORMAL (ref 9–20)
CALCIUM SERPL-MCNC: 9 MG/DL (ref 8.6–10.4)
CHLORIDE BLD-SCNC: 96 MMOL/L (ref 98–107)
CO2: 23 MMOL/L (ref 20–31)
CREAT SERPL-MCNC: 4.25 MG/DL (ref 0.5–0.9)
DIFFERENTIAL TYPE: ABNORMAL
DISPENSE STATUS BLOOD BANK: NORMAL
EOSINOPHILS RELATIVE PERCENT: 1 % (ref 0–4)
GFR AFRICAN AMERICAN: 12 ML/MIN
GFR NON-AFRICAN AMERICAN: 10 ML/MIN
GFR SERPL CREATININE-BSD FRML MDRD: ABNORMAL ML/MIN/{1.73_M2}
GFR SERPL CREATININE-BSD FRML MDRD: ABNORMAL ML/MIN/{1.73_M2}
GLUCOSE BLD-MCNC: 102 MG/DL (ref 65–105)
GLUCOSE BLD-MCNC: 117 MG/DL (ref 70–99)
GLUCOSE BLD-MCNC: 124 MG/DL (ref 65–105)
GLUCOSE BLD-MCNC: 139 MG/DL (ref 65–105)
GLUCOSE BLD-MCNC: 140 MG/DL (ref 65–105)
HCT VFR BLD CALC: 21.3 % (ref 36–46)
HCT VFR BLD CALC: 22.1 % (ref 36–46)
HCT VFR BLD CALC: 22.2 % (ref 36–46)
HEMOGLOBIN: 7 G/DL (ref 12–16)
HEMOGLOBIN: 7.2 G/DL (ref 12–16)
HEMOGLOBIN: 7.2 G/DL (ref 12–16)
IMMATURE GRANULOCYTES: ABNORMAL %
LYMPHOCYTES # BLD: 20 % (ref 24–44)
MAGNESIUM: 1.9 MG/DL (ref 1.6–2.6)
MCH RBC QN AUTO: 31.3 PG (ref 26–34)
MCHC RBC AUTO-ENTMCNC: 32.9 G/DL (ref 31–37)
MCV RBC AUTO: 95.3 FL (ref 80–100)
MONOCYTES # BLD: 11 % (ref 1–7)
NRBC AUTOMATED: ABNORMAL PER 100 WBC
PDW BLD-RTO: 15.8 % (ref 11.5–14.9)
PLATELET # BLD: 48 K/UL (ref 150–450)
PLATELET ESTIMATE: ABNORMAL
PMV BLD AUTO: 10.6 FL (ref 6–12)
POTASSIUM SERPL-SCNC: 4.2 MMOL/L (ref 3.7–5.3)
RBC # BLD: 2.23 M/UL (ref 4–5.2)
RBC # BLD: ABNORMAL 10*6/UL
SEG NEUTROPHILS: 68 % (ref 36–66)
SEGMENTED NEUTROPHILS ABSOLUTE COUNT: 4.3 K/UL (ref 1.3–9.1)
SODIUM BLD-SCNC: 133 MMOL/L (ref 135–144)
TRANSFUSION STATUS: NORMAL
UNIT DIVISION: 0
UNIT NUMBER: NORMAL
WBC # BLD: 6.4 K/UL (ref 3.5–11)
WBC # BLD: ABNORMAL 10*3/UL

## 2021-12-04 PROCEDURE — 2580000003 HC RX 258: Performed by: SURGERY

## 2021-12-04 PROCEDURE — 94003 VENT MGMT INPAT SUBQ DAY: CPT

## 2021-12-04 PROCEDURE — 6370000000 HC RX 637 (ALT 250 FOR IP): Performed by: SURGERY

## 2021-12-04 PROCEDURE — 94660 CPAP INITIATION&MGMT: CPT

## 2021-12-04 PROCEDURE — 6360000002 HC RX W HCPCS: Performed by: SURGERY

## 2021-12-04 PROCEDURE — C9113 INJ PANTOPRAZOLE SODIUM, VIA: HCPCS | Performed by: SURGERY

## 2021-12-04 PROCEDURE — 36415 COLL VENOUS BLD VENIPUNCTURE: CPT

## 2021-12-04 PROCEDURE — P9047 ALBUMIN (HUMAN), 25%, 50ML: HCPCS | Performed by: SURGERY

## 2021-12-04 PROCEDURE — 82947 ASSAY GLUCOSE BLOOD QUANT: CPT

## 2021-12-04 PROCEDURE — 80048 BASIC METABOLIC PNL TOTAL CA: CPT

## 2021-12-04 PROCEDURE — 85018 HEMOGLOBIN: CPT

## 2021-12-04 PROCEDURE — 90935 HEMODIALYSIS ONE EVALUATION: CPT

## 2021-12-04 PROCEDURE — 2000000000 HC ICU R&B

## 2021-12-04 PROCEDURE — 2500000003 HC RX 250 WO HCPCS: Performed by: INTERNAL MEDICINE

## 2021-12-04 PROCEDURE — 83735 ASSAY OF MAGNESIUM: CPT

## 2021-12-04 PROCEDURE — 6370000000 HC RX 637 (ALT 250 FOR IP): Performed by: INTERNAL MEDICINE

## 2021-12-04 PROCEDURE — 85014 HEMATOCRIT: CPT

## 2021-12-04 PROCEDURE — 85025 COMPLETE CBC W/AUTO DIFF WBC: CPT

## 2021-12-04 RX ORDER — METOPROLOL TARTRATE 5 MG/5ML
5 INJECTION INTRAVENOUS EVERY 6 HOURS PRN
Status: DISCONTINUED | OUTPATIENT
Start: 2021-12-04 | End: 2021-12-14 | Stop reason: HOSPADM

## 2021-12-04 RX ORDER — SODIUM CITRATE 4 % (5 ML)
1.2 SYRINGE (ML) MISCELLANEOUS ONCE
Status: COMPLETED | OUTPATIENT
Start: 2021-12-04 | End: 2021-12-04

## 2021-12-04 RX ORDER — SODIUM CITRATE 4 % (5 ML)
1.3 SYRINGE (ML) MISCELLANEOUS ONCE
Status: COMPLETED | OUTPATIENT
Start: 2021-12-04 | End: 2021-12-04

## 2021-12-04 RX ADMIN — FLUCONAZOLE 100 MG: 100 TABLET ORAL at 00:00

## 2021-12-04 RX ADMIN — MIDODRINE HYDROCHLORIDE 5 MG: 5 TABLET ORAL at 16:12

## 2021-12-04 RX ADMIN — CHLORHEXIDINE GLUCONATE 0.12% ORAL RINSE 15 ML: 1.2 LIQUID ORAL at 07:46

## 2021-12-04 RX ADMIN — PANTOPRAZOLE SODIUM 40 MG: 40 INJECTION, POWDER, FOR SOLUTION INTRAVENOUS at 07:46

## 2021-12-04 RX ADMIN — FLUCONAZOLE 100 MG: 100 TABLET ORAL at 07:46

## 2021-12-04 RX ADMIN — ALBUMIN (HUMAN) 25 G: 0.25 INJECTION, SOLUTION INTRAVENOUS at 17:02

## 2021-12-04 RX ADMIN — MIDODRINE HYDROCHLORIDE 5 MG: 5 TABLET ORAL at 10:14

## 2021-12-04 RX ADMIN — FERROUS SULFATE TAB 325 MG (65 MG ELEMENTAL FE) 325 MG: 325 (65 FE) TAB at 07:46

## 2021-12-04 RX ADMIN — INSULIN GLARGINE 22 UNITS: 100 INJECTION, SOLUTION SUBCUTANEOUS at 21:58

## 2021-12-04 RX ADMIN — ATORVASTATIN CALCIUM 40 MG: 40 TABLET, FILM COATED ORAL at 07:46

## 2021-12-04 RX ADMIN — OXYCODONE HYDROCHLORIDE AND ACETAMINOPHEN 1 TABLET: 5; 325 TABLET ORAL at 07:46

## 2021-12-04 RX ADMIN — FENTANYL CITRATE 50 MCG: 0.05 INJECTION, SOLUTION INTRAMUSCULAR; INTRAVENOUS at 00:53

## 2021-12-04 RX ADMIN — SERTRALINE HYDROCHLORIDE 25 MG: 50 TABLET ORAL at 07:46

## 2021-12-04 RX ADMIN — SODIUM CHLORIDE, PRESERVATIVE FREE 10 ML: 5 INJECTION INTRAVENOUS at 21:56

## 2021-12-04 RX ADMIN — FLUCONAZOLE 100 MG: 100 TABLET ORAL at 21:58

## 2021-12-04 RX ADMIN — Medication 1.3 ML: at 17:47

## 2021-12-04 RX ADMIN — SODIUM CHLORIDE, PRESERVATIVE FREE 10 ML: 5 INJECTION INTRAVENOUS at 07:47

## 2021-12-04 RX ADMIN — SODIUM CHLORIDE, PRESERVATIVE FREE 10 ML: 5 INJECTION INTRAVENOUS at 10:13

## 2021-12-04 RX ADMIN — SODIUM CHLORIDE, PRESERVATIVE FREE 10 ML: 5 INJECTION INTRAVENOUS at 22:04

## 2021-12-04 RX ADMIN — INSULIN LISPRO 1 UNITS: 100 INJECTION, SOLUTION INTRAVENOUS; SUBCUTANEOUS at 21:58

## 2021-12-04 RX ADMIN — PANTOPRAZOLE SODIUM 40 MG: 40 INJECTION, POWDER, FOR SOLUTION INTRAVENOUS at 21:59

## 2021-12-04 RX ADMIN — CEFTRIAXONE SODIUM 1000 MG: 1 INJECTION, POWDER, FOR SOLUTION INTRAMUSCULAR; INTRAVENOUS at 18:24

## 2021-12-04 RX ADMIN — Medication 1.2 ML: at 17:46

## 2021-12-04 RX ADMIN — OXYCODONE HYDROCHLORIDE AND ACETAMINOPHEN 1 TABLET: 5; 325 TABLET ORAL at 23:34

## 2021-12-04 RX ADMIN — ACETAMINOPHEN 650 MG: 325 TABLET, FILM COATED ORAL at 04:26

## 2021-12-04 RX ADMIN — CHLORHEXIDINE GLUCONATE 0.12% ORAL RINSE 15 ML: 1.2 LIQUID ORAL at 21:58

## 2021-12-04 ASSESSMENT — PULMONARY FUNCTION TESTS
PIF_VALUE: 27
PIF_VALUE: 30
PIF_VALUE: 24
PIF_VALUE: 23
PIF_VALUE: 29
PIF_VALUE: 28
PIF_VALUE: 26
PIF_VALUE: 25
PIF_VALUE: 28
PIF_VALUE: 15
PIF_VALUE: 27
PIF_VALUE: 28
PIF_VALUE: 25
PIF_VALUE: 27
PIF_VALUE: 26
PIF_VALUE: 24
PIF_VALUE: 30
PIF_VALUE: 25
PIF_VALUE: 26
PIF_VALUE: 25
PIF_VALUE: 26
PIF_VALUE: 25
PIF_VALUE: 30
PIF_VALUE: 26
PIF_VALUE: 29
PIF_VALUE: 30
PIF_VALUE: 26
PIF_VALUE: 25
PIF_VALUE: 35
PIF_VALUE: 26
PIF_VALUE: 29
PIF_VALUE: 26
PIF_VALUE: 24

## 2021-12-04 ASSESSMENT — ENCOUNTER SYMPTOMS
SHORTNESS OF BREATH: 0
VOMITING: 0
EYE ITCHING: 0
COLOR CHANGE: 0
ABDOMINAL PAIN: 0
CHEST TIGHTNESS: 0
NAUSEA: 0
COUGH: 0
EYE REDNESS: 0
BLOOD IN STOOL: 0
TROUBLE SWALLOWING: 0

## 2021-12-04 ASSESSMENT — PAIN SCALES - WONG BAKER

## 2021-12-04 ASSESSMENT — PAIN DESCRIPTION - ONSET: ONSET: GRADUAL

## 2021-12-04 ASSESSMENT — PAIN SCALES - GENERAL
PAINLEVEL_OUTOF10: 6
PAINLEVEL_OUTOF10: 0
PAINLEVEL_OUTOF10: 6
PAINLEVEL_OUTOF10: 4
PAINLEVEL_OUTOF10: 6
PAINLEVEL_OUTOF10: 0

## 2021-12-04 ASSESSMENT — PAIN DESCRIPTION - PAIN TYPE: TYPE: ACUTE PAIN;SURGICAL PAIN

## 2021-12-04 NOTE — PROGRESS NOTES
Paged placed to  Dr. Solange Nicolas in regards to IV diflucan that was placed. Per Pharmacy possible interaction with Lipitor 80 mg, kidney function and age. Consider decrease Lipitor. Dr. Don Lugo returned call, message given to provider, order received to d/c IV diflucan and start oral Diflucan 100 mg BID. Decrease Lipitor to 40 mg while on diflucan. Pharmacists notifed.

## 2021-12-04 NOTE — PROGRESS NOTES
Progress Note    12/4/2021   1:32 PM    Name:  Adonay Cherry  MRN:    [id number]     Acct:     [de-identified]   Room:  2004/2004-01  IP Day: 24     Admit Date: 11/10/2021  4:23 PM  PCP: Shakira Mota DO    Subjective:     C/C:   Chief Complaint   Patient presents with    Abnormal Lab     low hemoglobin and high CR       Interval History: Status: not changed. Patient is s/p PEG and trach. On ventilator. Patient answers questions. Tolerating tube feeds well. vital signs reviewed heart rate in 100s. Blood pressure stable. Recent labs reviewed sodium 133 potassium 4.2 hemoglobin 7.2. Urine culture shows Candida albicans. ROS:   all 10 systems reviewed and are negative except as noted    Review of Systems   Constitutional: Negative for chills and fatigue. HENT: Negative for drooling, mouth sores, sneezing and trouble swallowing. Eyes: Negative for redness and itching. Respiratory: Negative for cough, chest tightness and shortness of breath. Cardiovascular: Negative for chest pain, palpitations and leg swelling. Gastrointestinal: Negative for abdominal pain, blood in stool, nausea and vomiting. Endocrine: Negative for heat intolerance and polyphagia. Genitourinary: Negative for difficulty urinating, flank pain and pelvic pain. Musculoskeletal: Negative for arthralgias, joint swelling and neck stiffness. Skin: Negative for color change and pallor. Allergic/Immunologic: Negative for food allergies. Neurological: Negative for dizziness, seizures and headaches. Hematological: Does not bruise/bleed easily. Psychiatric/Behavioral: Negative for agitation, behavioral problems and suicidal ideas. The patient is not hyperactive. Medications:      Allergies: No Known Allergies    Current Meds: metoprolol (LOPRESSOR) injection 5 mg, Q6H PRN  polyethylene glycol (GLYCOLAX) packet 17 g, Daily PRN  fluconazole (DIFLUCAN) tablet 100 mg, BID  atorvastatin (LIPITOR) tablet 40 mg, Daily  cefTRIAXone (ROCEPHIN) 1000 mg IVPB in 50 mL D5W minibag, Q24H  0.9 % sodium chloride infusion, PRN  sodium citrate 4 % injection 1.3 mL, PRN   And  sodium citrate 4 % injection 1.2 mL, PRN  midodrine (PROAMATINE) tablet 10 mg, TID PRN  albumin human 25 % IV solution 25 g, PRN  midodrine (PROAMATINE) tablet 5 mg, PRN  norepinephrine (LEVOPHED) 16 mg in sodium chloride 0.9 % 250 mL infusion, Continuous  chlorhexidine (PERIDEX) 0.12 % solution 15 mL, BID  fentaNYL (SUBLIMAZE) injection 50 mcg, Q30 Min PRN  propofol injection, Continuous  [Held by provider] carvedilol (COREG) tablet 3.125 mg, BID WC  pantoprazole (PROTONIX) injection 40 mg, BID   And  sodium chloride flush 0.9 % injection 10 mL, BID  magnesium sulfate 1000 mg in dextrose 5% 100 mL IVPB, PRN  glucose (GLUTOSE) 40 % oral gel 15 g, PRN  dextrose 50 % IV solution, PRN  glucagon (rDNA) injection 1 mg, PRN  dextrose 5 % solution, PRN  sodium polystyrene (KAYEXALATE) 15 GM/60ML suspension 15 g, Once  sertraline (ZOLOFT) tablet 25 mg, Daily  ferrous sulfate (IRON 325) tablet 325 mg, Daily with breakfast  [Held by provider] aspirin chewable tablet 81 mg, Daily  insulin glargine (LANTUS) injection vial 22 Units, Nightly  insulin lispro (HUMALOG) injection vial 0-12 Units, TID WC  insulin lispro (HUMALOG) injection vial 0-6 Units, Nightly  sodium chloride flush 0.9 % injection 5-40 mL, 2 times per day  sodium chloride flush 0.9 % injection 5-40 mL, PRN  0.9 % sodium chloride infusion, PRN  acetaminophen (TYLENOL) tablet 650 mg, Q4H PRN  ondansetron (ZOFRAN-ODT) disintegrating tablet 4 mg, Q8H PRN   Or  ondansetron (ZOFRAN) injection 4 mg, Q6H PRN  oxyCODONE-acetaminophen (PERCOCET) 5-325 MG per tablet 1 tablet, Q6H PRN        Data:     Code Status:  DNR-CCA    Family History   Problem Relation Age of Onset    High Blood Pressure Other        Social History     Socioeconomic History    Marital status:      Spouse name: Not on file    Number of children: Not on file    Years of education: Not on file    Highest education level: Not on file   Occupational History    Not on file   Tobacco Use    Smoking status: Never Smoker    Smokeless tobacco: Not on file   Substance and Sexual Activity    Alcohol use: No     Alcohol/week: 0.0 standard drinks    Drug use: No    Sexual activity: Not on file   Other Topics Concern    Not on file   Social History Narrative    Not on file     Social Determinants of Health     Financial Resource Strain:     Difficulty of Paying Living Expenses: Not on file   Food Insecurity:     Worried About Running Out of Food in the Last Year: Not on file    Katya of Food in the Last Year: Not on file   Transportation Needs:     Lack of Transportation (Medical): Not on file    Lack of Transportation (Non-Medical): Not on file   Physical Activity:     Days of Exercise per Week: Not on file    Minutes of Exercise per Session: Not on file   Stress:     Feeling of Stress : Not on file   Social Connections:     Frequency of Communication with Friends and Family: Not on file    Frequency of Social Gatherings with Friends and Family: Not on file    Attends [employer] Services: Not on file    Active Member of [address] [place] [address] [employer] or Organizations: Not on file    Attends Club or Organization Meetings: Not on file    Marital Status: Not on file   Intimate Partner Violence:     Fear of Current or Ex-Partner: Not on file    Emotionally Abused: Not on file    Physically Abused: Not on file    Sexually Abused: Not on file   Housing Stability:     Unable to Pay for Housing in the Last Year: Not on file    Number of [place] in the Last Year: Not on file    Unstable Housing in the Last Year: Not on file       I/O (24Hr):     Intake/Output Summary (Last 24 hours) at 12/4/2021 1332  Last data filed at 12/4/2021 0800  Gross per 24 hour   Intake 60 ml   Output 375 ml   Net -315 ml     Radiology:  XR CHEST (SINGLE VIEW FRONTAL)    Result Basophils 0 0 - 2 %    Segs Absolute 4.30 1.3 - 9.1 k/uL    Absolute Lymph # 1.30 1.0 - 4.8 k/uL    Absolute Mono # 0.70 0.1 - 1.3 k/uL    Absolute Eos # 0.10 0.0 - 0.4 k/uL    Basophils Absolute 0.00 0.0 - 0.2 k/uL   Magnesium    Collection Time: 21  5:25 AM   Result Value Ref Range    Magnesium 1.9 1.6 - 2.6 mg/dL   Basic Metabolic Panel w/ Reflex to MG    Collection Time: 21  5:25 AM   Result Value Ref Range    Glucose 117 (H) 70 - 99 mg/dL    BUN 56 (H) 8 - 23 mg/dL    CREATININE 4.25 (H) 0.50 - 0.90 mg/dL    Bun/Cre Ratio NOT REPORTED 9 - 20    Calcium 9.0 8.6 - 10.4 mg/dL    Sodium 133 (L) 135 - 144 mmol/L    Potassium 4.2 3.7 - 5.3 mmol/L    Chloride 96 (L) 98 - 107 mmol/L    CO2 23 20 - 31 mmol/L    Anion Gap 14 9 - 17 mmol/L    GFR Non-African American 10 (L) >60 mL/min    GFR  12 (L) >60 mL/min    GFR Comment          GFR Staging NOT REPORTED    POC Glucose Fingerstick    Collection Time: 21  7:36 AM   Result Value Ref Range    POC Glucose 102 65 - 105 mg/dL   Hgb/Hct    Collection Time: 21 12:15 PM   Result Value Ref Range    Hemoglobin 7.2 (L) 12.0 - 16.0 g/dL    Hematocrit 22.1 (L) 36 - 46 %       Physical Examination:        Vitals:  BP (!) 125/59   Pulse 106   Temp 99.8 °F (37.7 °C) (Oral)   Resp 12   Ht 5' 2.99\" (1.6 m)   Wt 217 lb 9.5 oz (98.7 kg)   SpO2 93%   BMI 38.55 kg/m²   Temp (24hrs), Av.1 °F (37.3 °C), Min:97.8 °F (36.6 °C), Max:100.5 °F (38.1 °C)    Recent Labs     21  1221 21  1601 21  0736   POCGLU 142* 121* 124* 102         Physical Exam  Vitals reviewed. HENT:      Head: Normocephalic. Right Ear: External ear normal.      Left Ear: External ear normal.      Nose: Nose normal.      Mouth/Throat:      Mouth: Mucous membranes are moist.      Pharynx: Oropharynx is clear. Eyes:      Conjunctiva/sclera: Conjunctivae normal.   Cardiovascular:      Rate and Rhythm: Normal rate and regular rhythm. Pulses: Normal pulses. Heart sounds: Normal heart sounds. Pulmonary:      Effort: Pulmonary effort is normal.      Breath sounds: Normal breath sounds. Comments: Trach in place on ventilator  Abdominal:      General: Bowel sounds are normal.      Palpations: Abdomen is soft. Comments: G-tube in place   Musculoskeletal:         General: No deformity. Cervical back: Normal range of motion and neck supple. Skin:     General: Skin is warm. Capillary Refill: Capillary refill takes less than 2 seconds. Coloration: Skin is not jaundiced. Neurological:      General: No focal deficit present. Mental Status: She is alert. Mental status is at baseline. Psychiatric:         Mood and Affect: Mood normal.         Behavior: Behavior normal.         Assessment:        Primary Problem  JAYLYN (acute kidney injury) ([facility] [address]     Principal Problem:    JAYLYN (acute kidney injury) ([facility] [address]  Active Problems:    Type 2 diabetes mellitus with kidney complication, with long-term current use of insulin (HCC)    CKD (chronic kidney disease) stage 3, GFR 30-59 ml/min ([facility])    HTN (hypertension)    ASCVD (arteriosclerotic cardiovascular disease)    Iron deficiency anemia    Hydronephrosis determined by ultrasound    Longstanding persistent atrial fibrillation (HCC)    Acute metabolic encephalopathy    Acute cystitis without hematuria    Acute respiratory failure with hypoxia and hypercapnia (HCC)    Dilated cardiomyopathy ([facility] [address]  Resolved Problems:    * No resolved hospital problems.  *      Past Medical History:   Diagnosis Date    Anemia, unspecified     CAD (coronary artery disease)     Chronic kidney disease     CKD (chronic kidney disease) stage 3, GFR 30-59 ml/min ([facility])     Closed fracture of dorsal (thoracic) vertebra without mention of spinal cord injury     Coronary atherosclerosis of unspecified type of vessel, native or graft     Depression with anxiety     Esophageal reflux     HTN (hypertension)     Hyperkalemia     Hyperlipidemia     Intervertebral cervical disc disorder with myelopathy, cervical region     Lower back pain     Lumbar radiculopathy     Microalbuminuria     Muscle weakness     Pain in limb     Stenosis of cervical spine     Tethered cord syndrome (HCC)     Type II or unspecified type diabetes mellitus without mention of complication, not stated as uncontrolled     Urinary calculus     UTI (lower urinary tract infection)         Plan:        1. Start Diflucan 100 mg twice daily per PEG tube  2. IV Rocephin  1. Monitor H&H every 8 hours  2. CMP  3. Discussed with floor nurse  4. Lopressor IV 5 mg every 6 hours as needed for heart rate above 115.  5. Midodrine 5 mg p.o. 3 times a day as needed for systolic blood pressure less than 90.  6. DVT Prophylaxis no pharmacological DVT prophylaxis due to low hemoglobin  7. EPCs  8. PT/OT to evaluate and treat  9. Pain control  10. Replace electrolytes as per sliding scale  11. Home medications reviewed and appropriate medications continued  12.  Reviewed labs and imaging studies from last 24 hours and results explained to patient      Electronically signed by Celso Anderson MD

## 2021-12-04 NOTE — PROGRESS NOTES
Today's Date: 12/3/2021  Patient Name: Tobi Finch  Date of admission: 11/10/2021  4:23 PM  Patient's age: 66 y.o., 1950  Admission Dx: JAYLYN (acute kidney injury) ([facility] [address] [N17.9]  Urinary tract infection in elderly patient [N39.0]  Anemia, unspecified type [D64.9]    Reason for Consult: TCP  Requesting Physician: Neeraj Burger MD    CHIEF COMPLAINT:    Chief Complaint   Patient presents with    Abnormal Lab     low hemoglobin and high CR     SUBJECTIVE:    Patient seen and examined  Patient status post trach and PEG today. Hemoglobin 7.3 this morning. Tolerated procedure without complications  Remains on ventilation    HISTORY OF PRESENT ILLNESS:    Tobi Finch is a 66 y.o. female who is admitted to the hospital on 11/10/2021  for acute kidney injury and anemia. Patient has history of chronic kidney disease, chronic anemia and recent history of GI bleed with negative endoscopies at [facility]. She was transferred from nursing home for elevated creatinine and drop in her hemoglobin. Her stool for occult blood on 11/14/2021 was positive. She was evaluated by gastroenterology. Underwent upper endoscopy which showed gastritis. She also was noted to have hydronephrosis on the right side therefore was evaluated by urology and underwent cystoscopy with ureteral stent placement on 11/16/2021. Patient was evaluated by nephrology for her acute kidney injury and was started on acute dialysis on 11/17/2021. Her hemoglobin is stable at 8.0 however her platelets started dropping since admission and today it dropped to 49 therefore hematology was consulted for evaluation of her low platelets. Her iron studies suggest anemia of chronic disease. Her haptoglobin is 125 and LDH is not elevated  Her HIT panel came back negative.     Past Medical History:   has a past medical history of Anemia, unspecified, CAD (coronary artery disease), Chronic kidney disease, CKD (chronic kidney disease) stage 3, GFR 30-59 ml/min ([facility]), Closed fracture of dorsal (thoracic) vertebra without mention of spinal cord injury, Coronary atherosclerosis of unspecified type of vessel, native or graft, Depression with anxiety, Esophageal reflux, HTN (hypertension), Hyperkalemia, Hyperlipidemia, Intervertebral cervical disc disorder with myelopathy, cervical region, Lower back pain, Lumbar radiculopathy, Microalbuminuria, Muscle weakness, Pain in limb, Stenosis of cervical spine, Tethered cord syndrome (HCC), Type II or unspecified type diabetes mellitus without mention of complication, not stated as uncontrolled, Urinary calculus, and UTI (lower urinary tract infection). Past Surgical History:   has a past surgical history that includes Colon surgery; angioplasty; joint replacement; Upper gastrointestinal endoscopy (N/A, 11/15/2021); Cystoscopy (Right, 11/16/2021); IR NONTUNNELED VASCULAR CATHETER > 5 YEARS (11/17/2021); tracheostomy (N/A, 12/3/2021); and Upper gastrointestinal endoscopy (N/A, 12/3/2021). Medications:    Prior to Admission medications    Medication Sig Start Date End Date Taking? Authorizing Provider   acetaminophen (TYLENOL) 325 MG tablet Take 650 mg by mouth every 4 hours as needed for Pain (do not exceed 4000 mg daily)   Yes Historical Provider, MD   albuterol (PROVENTIL) (2.5 MG/3ML) 0.083% nebulizer solution Take 2.5 mg by nebulization every 6 hours as needed for Shortness of Breath   Yes Historical Provider, MD   atorvastatin (LIPITOR) 80 MG tablet Take 80 mg by mouth daily   Yes Historical Provider, MD   famotidine (PEPCID) 20 MG tablet Take 20 mg by mouth daily   Yes Historical Provider, MD   ferrous sulfate (IRON 325) 325 (65 Fe) MG tablet Take 325 mg by mouth daily (with breakfast)   Yes Historical Provider, MD   HYDROcodone-acetaminophen (NORCO) 5-325 MG per tablet Take 1 tablet by mouth every 6 hours as needed for Pain.  Indications: until 11/13/21   Yes Historical Provider, MD   insulin lispro infusion  2-100 mcg/min IntraVENous Continuous Angela Calderon MD   Stopped at 11/29/21 2306    chlorhexidine (PERIDEX) 0.12 % solution 15 mL  15 mL Mouth/Throat BID Angela Calderon MD   15 mL at 12/02/21 2127    fentaNYL (SUBLIMAZE) injection 50 mcg  50 mcg IntraVENous Q30 Min PRN Angela Calderon MD   50 mcg at 11/27/21 1443    propofol injection  10 mcg/kg/min IntraVENous Continuous Angela Calderon MD   Stopped at 12/03/21 1030    polyvinyl alcohol (LIQUIFILM TEARS) 1.4 % ophthalmic solution 1 drop  1 drop Both Eyes Q4H Angela Calderon MD   1 drop at 12/03/21 0013    And    lubrifresh P.M. (artificial tears) ophthalmic ointment   Both Eyes Q4H Angela Calderon MD   Given at 12/01/21 2130    [Held by provider] carvedilol (COREG) tablet 3.125 mg  3.125 mg Oral BID WC Angela Calderon MD   3.125 mg at 11/24/21 1735    pantoprazole (PROTONIX) injection 40 mg  40 mg IntraVENous BID Angela Calderon MD   40 mg at 12/02/21 2126    And    sodium chloride flush 0.9 % injection 10 mL  10 mL IntraVENous BID Angela Calderon MD   10 mL at 12/02/21 2126    magnesium sulfate 1000 mg in dextrose 5% 100 mL IVPB  1,000 mg IntraVENous PRN Angela Calderon MD   Stopped at 11/21/21 1815    glucose (GLUTOSE) 40 % oral gel 15 g  15 g Oral PRN Angela Calderon MD        dextrose 50 % IV solution  12.5 g IntraVENous PRN Angela Calderon MD        glucagon (rDNA) injection 1 mg  1 mg IntraMUSCular PRN Angela Calderon MD        dextrose 5 % solution  100 mL/hr IntraVENous PRN Angela Calderon MD        sodium polystyrene (KAYEXALATE) 15 GM/60ML suspension 15 g  15 g Oral Once Angela Calderon MD        sertraline (ZOLOFT) tablet 25 mg  25 mg Oral Daily Angela Calderon MD   25 mg at 12/02/21 8782    ferrous sulfate (IRON 325) tablet 325 mg  325 mg Oral Daily with breakfast Angela Calderon MD   325 mg at 12/02/21 1869    [Held by provider] aspirin chewable tablet 81 mg  81 mg Oral Daily Angela Calderon MD   81 mg at 21 1015    atorvastatin (LIPITOR) tablet 80 mg  80 mg Oral Daily Reta Carter MD   80 mg at 21 3392    insulin glargine (LANTUS) injection vial 22 Units  22 Units SubCUTAneous Nightly Reta Carter MD   22 Units at 21    insulin lispro (HUMALOG) injection vial 0-12 Units  0-12 Units SubCUTAneous TID WC Reta Carter MD   2 Units at 21 1705    insulin lispro (HUMALOG) injection vial 0-6 Units  0-6 Units SubCUTAneous Nightly Reta Carter MD   2 Units at 21    sodium chloride flush 0.9 % injection 5-40 mL  5-40 mL IntraVENous 2 times per day Reta aCrter MD   10 mL at 21    sodium chloride flush 0.9 % injection 5-40 mL  5-40 mL IntraVENous PRN Reta Carter MD        0.9 % sodium chloride infusion  25 mL IntraVENous PRN Reta Carter MD        acetaminophen (TYLENOL) tablet 650 mg  650 mg Oral Q4H PRN Reta Carter MD   650 mg at 21 2233    ondansetron (ZOFRAN-ODT) disintegrating tablet 4 mg  4 mg Oral Q8H PRN Reta Carter MD        Or    ondansetron [facility]) injection 4 mg  4 mg IntraVENous Q6H PRN Reta Carter MD        oxyCODONE-acetaminophen (PERCOCET) 5-325 MG per tablet 1 tablet  1 tablet Oral Q6H PRN Reta Carter MD           Allergies:  Patient has no known allergies. Social History:   reports that she has never smoked. She does not have any smokeless tobacco history on file. She reports that she does not drink alcohol and does not use drugs. Family History: family history includes High Blood Pressure in an other family member.     REVIEW OF SYSTEMS:    Could not be obtained as patient is intubated    PHYSICAL EXAM:      BP (!) 141/57   Pulse 98   Temp 98.5 °F (36.9 °C) (Oral)   Resp 16   Ht 5' 2.99\" (1.6 m)   Wt 217 lb 9.5 oz (98.7 kg)   SpO2 100%   BMI 38.55 kg/m²    Temp (24hrs), Av °F (37.2 °C), Min:97.6 °F (36.4 °C), Max:100.4 °F (38 °C)    General appearance -ill appearing, no in pain or distress   Mental status -intubated  Mouth - mucous membranes moist, pharynx normal without lesions   Neck - supple, no significant adenopathy   Lymphatics - no palpable lymphadenopathy, no hepatosplenomegaly   Chest - clear to auscultation, no wheezes, rales or rhonchi, symmetric air entry   Heart - normal rate, regular rhythm, normal S1, S2, no murmurs  Abdomen - soft, nontender, nondistended, no masses or organomegaly   Neurological -intubated nonfocal  Musculoskeletal - no joint tenderness, deformity or swelling   Extremities - peripheral pulses normal,+++ pedal edema, no clubbing or cyanosis   Skin -multiple skin bruises noted    DATA:    Labs:   CBC:   Recent Labs     12/02/21  0507 12/02/21  0507 12/03/21  0425 12/03/21  1025   WBC 5.5  --  5.6  --    HGB 7.7*  --  7.3*  --    HCT 23.8*  --  22.2*  --    PLT 16*   < > 15* 30*    < > = values in this interval not displayed. BMP:   Recent Labs     12/02/21  0507 12/03/21  0425   * 136   K 3.8 3.7   CO2 26 25   BUN 39* 47*   CREATININE 2.96* 3.46*   LABGLOM 16* 13*   GLUCOSE 148* 139*     PT/INR:   No results for input(s): PROTIME, INR in the last 72 hours.   SURGICAL PATHOLOGY REPORT [[id number]]    Collected: 11/20/21 1535    Updated: 11/23/21 0804     --Surgical Pathology Report   [id number]   [facility]   [facility]   ANATOMIC PATHOLOGY   [address] [place] [address]. [place], [address]   [phone]   Fax: [phone]   SURGICAL PATHOLOGY CONSULTATION     Patient Name: Jonah Landrum   MR#: [id number]   Specimen #[id number]     Procedures/Addenda   FLOW CYTOMETRY REPORT     Date Ordered:     11/22/2021     Status:   Signed Out        Date Complete:     11/22/2021     By: Sandra Corona M.D.        Date Reported:     11/23/2021       INTERPRETATION   PERIPHERAL BLOOD:      -SEVERE THROMBOCYTOPENIA.   -MODERATE NORMOCYTIC ANEMIA.   -FLOW   CYTOMETRY POSITIVE FOR MONOCLONAL B-LYMPHOCYTES WITH IMMUNOPHENOTYPE     FEATURES MOST SUGGESTIVE OF IMMUNOPHENOTYPICALLY ATYPICAL   B-CHRONIC     LYMPHOCYTIC LEUKEMIA VS MONOCLONAL B-LYMPHOCYTOSIS.  BONE MARROW OR OTHER     TISSUE BIOPSY IS RECOMMENDED FOR FURTHER ELUCIDATION. RESULTS-COMMENTS   PERIPHERAL BLOODWBC 7.5Hgb 8. 0HCT 24.5MCV 94. 8PLT 49Neutrophil   57%Lymphocyte 26%Monocyte 9%nRBC as 2/100 WBCABS Neut 4.31K/ULABS   lymph 1.96K/ULMorphology: Anisocytosis,       ANALYSIS   Flow cytometric immunophenotyping analysis is performed on peripheral   blood following red blood cell lysis procedure.  The cells are labeled   by direct ten color immunostaining procedure and analyzed on 6APT   flow cytometer. Cytocentrifuge differential cell count:   31% Lymphocytes   3% Monocytes   66% Granulocytes     Viability:  99%     Marker panels:   B-cell tube:  CD5, CD10, CD19, CD20, CD22, CD23, CD45, , Kappa,   lambda   T-cell tube:  CD2, CD3, CD4, CD5, CD7, CD8, CD16, CD45, CD56, CD57     Flow cytometric immunophenotyping analysis of peripheral blood   lymphocyte population is positive for B-cell monoclonality;   immunophenotype strong positive for CD20, CD22,  lambda light   chain; and weakly positive for CD5 and CD23.  The immunophenotypic   features suggest immunophenotypically atypical chronic lymphocytic   leukemia, however, other B-chronic lymphoproliferative neoplasm cannot   be excluded.  The absolute monoclonal B-lymphocyte count is   approximately 0.98K/UL (by WHO criteria, absolute monoclonal B   lymphocyte count of >5000 is required to diagnose chronic lymphocytic   leukemia).  Bone marrow or other tissue biopsy is recommended for        IMAGING DATA:  XR CHEST PORTABLE   Final Result   1. Interval placement of a tracheostomy tube. 2. No pneumothorax. 3. Persistent pulmonary vascular congestion and bilateral pleural effusions.          XR CHEST PORTABLE   Final Result   Mild pulmonary vascular congestion and bilateral pleural effusions, right   greater appropriately above the kristina and below the   clavicular heads. The nasogastric tube ends in the stomach. The right   jugular vein hemodialysis catheter ends in the distal SVC, unchanged. Cardiomegaly. Bibasilar airspace disease and small right pleural effusion,   unchanged. Lungs otherwise grossly clear. No pneumothorax. VL Renal Arterial Duplex Complete   Final Result      XR CHEST PORTABLE   Final Result   Basilar opacities likely represent atelectasis with small pleural effusions. MRI BRAIN WO CONTRAST   Final Result      There is no acute infarction, intracranial hemorrhage, or intracranial mass   lesion. Mild chronic microangiopathic ischemic disease. Mild generalized volume loss. Mild mucosal thickening of bilateral mastoid air cells and right sphenoid   sinus. XR CHEST PORTABLE   Final Result   1. Tip of the newly inserted NG tube overlies the gastric body. .   2. The right IJ CVC and right arm PICC line remain well positioned. 3.  No acute cardiopulmonary abnormality. US RETROPERITONEAL COMPLETE   Final Result   No acute findings. No hydronephrosis. Small right pleural effusion. CT HEAD WO CONTRAST   Final Result   No acute intracranial abnormality. Chronic microvascular disease and chronic lacunar infarcts. Critical results were called by Dr. Miriam Wagner to Dr Kylah Garcia on 11/17/2021 at   22:0. XR CHEST PORTABLE   Final Result   1. No pneumothorax evident following thoracentesis. 2. Low lung volumes. Significantly improved pneumatization lower right lung   with minimal pleural effusion bilateral evident. 3. Calcific atherosclerosis aorta. 4. Cardiomegaly. IR GUIDED THORACENTESIS PLEURAL   Final Result   Successful ultrasound guided right thoracentesis.          IR NONTUNNELED VASCULAR CATHETER > 5 YEARS   Final Result   Successful ultrasound guided non-tunneled right IJ temporary hemodialysis   catheter placement. Catheter is ready for dialysis use at this time. FLUORO FOR SURGICAL PROCEDURES   Final Result      XR CHEST PORTABLE   Final Result   Moderate right pleural effusion and associated airspace disease. Pneumonia   cannot be excluded. US RENAL COMPLETE   Final Result   1. Mild to moderate right-sided hydronephrosis. 2. Limited renal ultrasound. No left-sided hydronephrosis. CT ABDOMEN PELVIS WO CONTRAST Additional Contrast? None   Final Result   Severe right and mild left hydroureteronephrosis. No obstructing ureteral   stone is seen. Wall thickening of the urinary bladder without significant   urinary bladder wall distension. Outlet obstruction is a consideration. Right-sided ostomy. There is bowel wall thickening proximal to the ostomy,   may suggest infectious or inflammatory enteritis. No drainable fluid   collections or free air. No evidence of bowel obstruction. Moderate right pleural effusion. Bibasilar infiltrates or atelectasis. Clinical correlation to exclude   pneumonia. US RETROPERITONEAL COMPLETE   Final Result   1. Moderate to severe right and mild-to-moderate left hydronephrosis. 2. Exam limited due to patient's body habitus.              Primary Problem  JAYLYN (acute kidney injury) [facility])    Active Hospital Problems    Diagnosis Date Noted    Acute respiratory failure ([address] [J96.00]     Acute metabolic encephalopathy [P27.36]     Longstanding persistent atrial fibrillation ([address] [I48.11] 11/16/2021    Hydronephrosis determined by ultrasound [N13.30] 11/14/2021    Iron deficiency anemia [D50.9] 11/12/2021    JAYYLN (acute kidney injury) ([address] [N17.9] 11/10/2021    CKD (chronic kidney disease) stage 3, GFR 30-59 ml/min ([facility]) [N18.30]     HTN (hypertension) [I10]     Type 2 diabetes mellitus with kidney complication, with long-term current use of insulin ([facility]) [E11.29, Z79.4]     ASCVD (arteriosclerotic cardiovascular disease) [I25.10]        IMPRESSION:   1. Acute kidney injury and started on dialysis by nephrology  2. Anemia, stool occult positive for blood and had upper endoscopy showing gastritis. Anemia could be related to chronic disease and also renal disease and aggravated by GI bleed  3. Thrombocytopenia  4. Acute encephalopathy  5. Bilateral hydronephrosis  6. Cardiomyopathy with EF of 30-35%  7. Generalized anasarca  8. S/p PEA, resucitated    RECOMMENDATIONS:  1. I reviewed the laboratory data, imaging studies, diagnosis, other treatment recommendations  2. Anemia is multifactorial secondary to renal dysfunction myelosuppression from acute illness as well as possible primary bone marrow disorder  3. Thrombocytopenia also likely due to myelosuppression from acute illness polypharmacy and peripheral consumption. 4. Primary bone marrow disorder is also in the differential.  Flow cytometry raises concern regarding atypical CLL or lymphoproliferative disorder however patient too sick for a bone marrow biopsy  5. S/p trach and PEG. 6. Prognosis remains guarded to poor  7. Monitor cell counts and transfuse to keep hemoglobin above 7 and platelet count above 10,000  8. We will follow    Discussed with pt and Nurse. Thank you for asking us to see this patient. Arnaldo Pegeuro MD    This note is created with the assistance of a speech recognition program.  While intending to generate a document that actually reflects the content of the visit, the document can still have some errors including those of syntax and sound a like substitutions which may escape proof reading. It such instances, actual meaning can be extrapolated by contextual diversion.

## 2021-12-04 NOTE — PROGRESS NOTES
Dr. Solange Rios updated on patient current status. Hemoglobin 7.0 this morning. No sign of bleeding noted, slight oozing noted at [facility]. Order received to restart TF at 20 ml/hr no changes to rate today. Repeat Hemoglobin at noon.

## 2021-12-04 NOTE — CARE COORDINATION
LETTY continuing to follow waiting for when it will be time for Jason Chan to start this pre-cert. [facility] following.

## 2021-12-04 NOTE — PLAN OF CARE
Problem: SAFETY  Goal: Free from accidental physical injury  Outcome: Met This Shift  Note: Patient free from accidental injury      Problem: DAILY CARE  Goal: Daily care needs are met  Outcome: Ongoing  Note: Daily care needs met      Problem: PAIN  Goal: Patient's pain/discomfort is manageable  Outcome: Ongoing     Problem: SKIN INTEGRITY  Goal: Skin integrity is maintained or improved  Outcome: Ongoing     Problem: KNOWLEDGE DEFICIT  Goal: Patient/S.O. demonstrates understanding of disease process, treatment plan, medications, and discharge instructions. Outcome: Ongoing     Problem: DISCHARGE BARRIERS  Goal: Patient's continuum of care needs are met  Outcome: Ongoing     Problem: Falls - Risk of:  Goal: Will remain free from falls  Description: Will remain free from falls  Outcome: Ongoing  Note: Bed in lowest position.  Call light within reach      Problem: Skin Integrity:  Goal: Will show no infection signs and symptoms  Description: Will show no infection signs and symptoms  Outcome: Ongoing     Problem: Skin Integrity:  Goal: Will show no infection signs and symptoms  Description: Will show no infection signs and symptoms  Outcome: Ongoing     Problem: Musculor/Skeletal Functional Status  Goal: Highest potential functional level  Outcome: Ongoing     Problem: Musculor/Skeletal Functional Status  Goal: Highest potential functional level  Outcome: Ongoing     Problem: Nutrition  Goal: Optimal nutrition therapy  Outcome: Ongoing     Problem: Pain:  Goal: Pain level will decrease  Description: Pain level will decrease  Outcome: Ongoing     Problem: Gas Exchange - Impaired:  Goal: Levels of oxygenation will improve  Description: Levels of oxygenation will improve  Outcome: Ongoing     Problem: Fluid Volume - Imbalance:  Goal: Absence of imbalanced fluid volume signs and symptoms  Description: Absence of imbalanced fluid volume signs and symptoms  Outcome: Ongoing     Problem: Non-Violent Restraints  Goal: Removal from restraints as soon as assessed to be safe  Outcome: Ongoing     Problem: Airway Clearance - Ineffective:  Goal: Ability to maintain a clear airway will improve  Description: Ability to maintain a clear airway will improve  Outcome: Ongoing     Problem:  Bowel Function - Altered:  Goal: Bowel elimination is within specified parameters  Description: Bowel elimination is within specified parameters  Outcome: Ongoing     Problem: Gas Exchange - Impaired:  Goal: Levels of oxygenation will improve  Description: Levels of oxygenation will improve  Outcome: Ongoing     Problem: Mental Status - Impaired:  Goal: Mental status will be restored to baseline  Description: Mental status will be restored to baseline  Outcome: Ongoing     Problem: Nutrition Deficit:  Goal: Ability to achieve adequate nutritional intake will improve  Description: Ability to achieve adequate nutritional intake will improve  Outcome: Ongoing     Problem: OXYGENATION/RESPIRATORY FUNCTION  Goal: Patient will maintain patent airway  Outcome: Ongoing     Problem: MECHANICAL VENTILATION  Goal: Patient will maintain patent airway  Outcome: Ongoing

## 2021-12-04 NOTE — PLAN OF CARE
MECHANICAL VENTILATION     [x]  PROVIDE OPTIMAL VENTILATION  [x]   ASSESS FOR EXTUBATION READINESS  [x]   ASSESS FOR WEANING READINESS  []  EXTUBATE AS TOLERATED  [x]  IMPLEMENT ADULT MECHANICAL VENTILATION PROTOCOL  [x]  MAINTAIN ADEQUATE OXYGENATION  [x]  PERFORM SPONTANEOUS WEANING TRIAL AS TOLERATED

## 2021-12-04 NOTE — PROGRESS NOTES
Pulmonary Progress Note  [facility]      Patient - Smitha Cavazos,  Age - 66 y.o.    - 1950      Room Number -    MRN -  [id number]   [facility]  Date of Admission -  11/10/2021  4:23 PM        Pilar Simon MD  Primary Care Physician - DO JOSEPH Reed   Patient currently trached on vent support. She was able to interact. A weaning trial this morning was not successful    OBJECTIVE   VITALS    height is 5' 2.99\" (1.6 m) and weight is 217 lb 9.5 oz (98.7 kg). Her oral temperature is 98.4 °F (36.9 °C). Her blood pressure is 119/63 and her pulse is 109. Her respiration is 0 (abnormal) and oxygen saturation is 91%. Body mass index is 38.55 kg/m². Temperature Range: Temp: 98.4 °F (36.9 °C) Temp  Av °F (37.2 °C)  Min: 97.8 °F (36.6 °C)  Max: 100.5 °F (38.1 °C)  BP Range:  Systolic (54WHL), GGS:474 , Min:88 , NSI:444     Diastolic (18UHQ), ZEX:29, Min:39, Max:99    Pulse Range: Pulse  Av.4  Min: 93  Max: 124  Respiration Range: Resp  Av.4  Min: 0  Max: 30  Current Pulse Ox[de-identified]  SpO2: 91 %  24HR Pulse Ox Range:  SpO2  Av.6 %  Min: 84 %  Max: 100 %  Oxygen Amount and Delivery: O2 Flow Rate (L/min): 2 L/min    Wt Readings from Last 3 Encounters:   21 217 lb 9.5 oz (98.7 kg)   16 178 lb (80.7 kg)   16 184 lb 12.8 oz (83.8 kg)       I/O (24 Hours)    Intake/Output Summary (Last 24 hours) at 2021 1750  Last data filed at 2021 0800  Gross per 24 hour   Intake 60 ml   Output 375 ml   Net -315 ml       EXAM     General Appearance  Awake, alert, oriented, in no acute distress  HEENT - normocephalic, atraumatic. Neck - Supple,  trachea midline   Lungs -coarse breath sounds anterior laterally  Heart Exam:PMI normal. No lifts, heaves, or thrills. Abdomen Exam: Abdomen soft, non-tender. BS normal.  Extremity Exam: No signs of cyanosis.     MEDS      fluconazole  100 mg Oral BID    atorvastatin  40 mg Oral Daily    cefTRIAXone (ROCEPHIN) IV  1,000 mg IntraVENous Q24H    chlorhexidine  15 mL Mouth/Throat BID    [Held by provider] carvedilol  3.125 mg Oral BID WC    pantoprazole  40 mg IntraVENous BID    And    sodium chloride flush  10 mL IntraVENous BID    sodium polystyrene  15 g Oral Once    sertraline  25 mg Oral Daily    ferrous sulfate  325 mg Oral Daily with breakfast    [Held by provider] aspirin  81 mg Oral Daily    insulin glargine  22 Units SubCUTAneous Nightly    insulin lispro  0-12 Units SubCUTAneous TID WC    insulin lispro  0-6 Units SubCUTAneous Nightly    sodium chloride flush  5-40 mL IntraVENous 2 times per day      sodium chloride      norepinephrine Stopped (11/29/21 2306)    propofol Stopped (12/03/21 1030)    dextrose      sodium chloride       metoprolol, polyethylene glycol, sodium chloride, sodium citrate **AND** sodium citrate, midodrine, albumin human, midodrine, fentanNYL, magnesium sulfate, glucose, dextrose, glucagon (rDNA), dextrose, sodium chloride flush, sodium chloride, acetaminophen, ondansetron **OR** ondansetron, oxyCODONE-acetaminophen    LABS   CBC   Recent Labs     12/04/21  0525 12/04/21  0525 12/04/21  1215   WBC 6.4  --   --    HGB 7.0*   < > 7.2*   HCT 21.3*   < > 22.1*   MCV 95.3  --   --    PLT 48*  --   --     < > = values in this interval not displayed.      BMP:   Lab Results   Component Value Date     12/04/2021    K 4.2 12/04/2021    CL 96 12/04/2021    CO2 23 12/04/2021    BUN 56 12/04/2021    LABALBU 2.6 11/18/2021    CREATININE 4.25 12/04/2021    CALCIUM 9.0 12/04/2021    GFRAA 12 12/04/2021    LABGLOM 10 12/04/2021     ABGs:  Lab Results   Component Value Date    PHART 7.412 12/03/2021    PO2ART 101.0 12/03/2021    SDV8AMT 42.3 12/03/2021      Lab Results   Component Value Date    MODE PRVC 12/03/2021     Ionized Calcium:  No results found for: IONCA  Magnesium:    Lab Results Component Value Date    MG 1.9 12/04/2021     Phosphorus:    Lab Results   Component Value Date    PHOS 2.6 11/26/2021        LIVER PROFILE No results for input(s): AST, ALT, LIPASE, BILIDIR, BILITOT, ALKPHOS in the last 72 hours. Invalid input(s): AMYLASE,  ALB  INR No results for input(s): INR in the last 72 hours.   PTT   Lab Results   Component Value Date    APTT 34.9 11/25/2021         RADIOLOGY     (See actual reports for details)    ASSESSMENT/PLAN     Patient Active Problem List   Diagnosis    Chronic kidney disease (CKD), stage III (moderate) (HCC)    Hyperkalemia    Type 2 diabetes mellitus with kidney complication, with long-term current use of insulin (HCC)    Hyperlipidemia    CKD (chronic kidney disease) stage 3, GFR 30-59 ml/min (HCC)    Microalbuminuria    Urinary calculus    Tethered cord syndrome (HCC)    Stenosis of cervical spine    Lumbar radiculopathy    Lower back pain    HTN (hypertension)    Esophageal reflux    Depression with anxiety    ASCVD (arteriosclerotic cardiovascular disease)    Closed fracture of thoracic vertebra (HCC)    Intervertebral cervical disc disorder with myelopathy, cervical region    Pain in limb    Anemia    JAYLYN (acute kidney injury) ([facility] [address]    Iron deficiency anemia    Hydronephrosis determined by ultrasound    Longstanding persistent atrial fibrillation (HCC)    Acute metabolic encephalopathy    Acute respiratory failure (HCC)    Acute cystitis without hematuria    Acute respiratory failure with hypoxia and hypercapnia (HCC)    Dilated cardiomyopathy (HCC)     Acute on chronic hypoxic and hypercapnic respiratory failure  Bradycardic arrest, intubated 11/20  Right lower lobe infiltrate/pleural effusion  Acute kidney injury-requiring hemodialysis  Morbid obesity  Anemia, chronic disease and possible rectal bleed  Hypoalbuminemia  Thrombocytopenia  Nonischemic cardiomyopathy with EF of 30 to 35% possible right to left interatrial shunt  Atrial fibrillation  Possible CLL/lymphoproliferative disorder  DNR CCA    Trying to see we if we can wean as tolerated. She does have a tracheostomy that appears to be stable. She is on FiO2 of 21%. Weaning attempts were not successful.    currently on enteric feeds  On EPC cuffs  Recheck chest x-ray in am    Electronically signed by Remy Vee MD on 12/4/2021 at 5:50 PM

## 2021-12-04 NOTE — PROGRESS NOTES
Department of Internal Medicine  Nephrology Harpreet Chowdhury MD  Progress Note    Reason for consultation: Management of acute kidney injury. Consulting physician: Corrina Marie MD.    Interval history: Patient is s/p tracheostomy and PEG tube placement yesterday 12/3/2021. She is hemodynamically stable and continues to require acute intermittent hemodialysis. History of present illness: This is a 66 y.o. female with a significant past medical history of Type 2 diabetes mellitus, coronary artery disease, lumbar radiculopathy, Hyperlipidemia and chronic kidney disease stage III [baseline serum creatinine 1.5 mg/dL], who presented from [facility] for further evaluation of acute kidney injury. She was sent to [facility] after developing nonhealing wound s/p cardiac catheterization via right refill Levophed has been discontinued approach with right groin wound. She had been on Bumex in the ECF and creatinine and BUN have been rising and Bumex was decreased to a lowest dose of 0.5 mg p.o. twice daily. She has had regular blood work performed in the [facility] and F contacted nephrologist after finding abnormal laboratory studies. She was noted to have a BUN/creatinine 104/2.28 mg/dL with hemoglobin 8 g/dL. She has mild confusion but is not in obvious respiratory distress.     Scheduled Meds:   fluconazole  100 mg Oral BID    atorvastatin  40 mg Oral Daily    cefTRIAXone (ROCEPHIN) IV  1,000 mg IntraVENous Q24H    chlorhexidine  15 mL Mouth/Throat BID    polyvinyl alcohol  1 drop Both Eyes Q4H    And    artificial tears   Both Eyes Q4H    [Held by provider] carvedilol  3.125 mg Oral BID WC    pantoprazole  40 mg IntraVENous BID    And    sodium chloride flush  10 mL IntraVENous BID    sodium polystyrene  15 g Oral Once    sertraline  25 mg Oral Daily    ferrous sulfate  325 mg Oral Daily with breakfast    [Held by provider] aspirin  81 mg Oral Daily    insulin glargine  22 Units SubCUTAneous Nightly    insulin lispro  0-12 Units SubCUTAneous TID WC    insulin lispro  0-6 Units SubCUTAneous Nightly    sodium chloride flush  5-40 mL IntraVENous 2 times per day     Continuous Infusions:   sodium chloride      norepinephrine Stopped (11/29/21 2306)    propofol Stopped (12/03/21 1030)    dextrose      sodium chloride       PRN Meds:.polyethylene glycol, sodium chloride, sodium citrate **AND** sodium citrate, midodrine, albumin human, midodrine, fentanNYL, magnesium sulfate, glucose, dextrose, glucagon (rDNA), dextrose, sodium chloride flush, sodium chloride, acetaminophen, ondansetron **OR** ondansetron, oxyCODONE-acetaminophen    Physical Exam:    VITALS:  /73   Pulse 104   Temp 99.4 °F (37.4 °C)   Resp 23   Ht 5' 2.99\" (1.6 m)   Wt 217 lb 9.5 oz (98.7 kg)   SpO2 94%   BMI 38.55 kg/m²   24HR INTAKE/OUTPUT:      Intake/Output Summary (Last 24 hours) at 12/4/2021 0953  Last data filed at 12/4/2021 0616  Gross per 24 hour   Intake --   Output 375 ml   Net -375 ml     Constitutional: On ventilator support via tracheostomy; awake. Skin: Skin color, texture, turgor normal. No rashes or lesions    Head: Normocephalic, without obvious abnormality, atraumatic     Cardiovascular/Edema: S1, S2 with no pericardial rub or gallop. Respiratory: Diminished air entry basal rales intubated on ventilator    Abdomen: soft, non-tender; bowel sounds normal; no masses,  no organomegaly    Extremities: 1-2+ generalized edema    Neuro:  Grossly normal    CBC:   Recent Labs     12/02/21  0507 12/02/21  0507 12/03/21  0425 12/03/21  1025 12/04/21  0525   WBC 5.5  --  5.6  --  6.4   HGB 7.7*  --  7.3*  --  7.0*   PLT 16*   < > 15* 30* 48*    < > = values in this interval not displayed.      BMP:    Recent Labs     12/02/21  0507 12/03/21  0425 12/04/21  0525   * 136 133*   K 3.8 3.7 4.2   CL 97* 98 96*   CO2 26 25 23   BUN 39* 47* 56*   CREATININE 2.96* 3.46* 4.25*   GLUCOSE 148* 139* 117*

## 2021-12-04 NOTE — PROGRESS NOTES
SBT attempted CPAP 5 PS 10    Vt's 100's, RR increases to 30's, SpO2 decreases to mid 70's    Placed back on full support within 2 minutes. Vital signs quickly normalize.

## 2021-12-05 ENCOUNTER — APPOINTMENT (OUTPATIENT)
Dept: GENERAL RADIOLOGY | Age: 71
DRG: 004 | End: 2021-12-05
Payer: COMMERCIAL

## 2021-12-05 LAB
ABSOLUTE EOS #: 0.1 K/UL (ref 0–0.4)
ABSOLUTE IMMATURE GRANULOCYTE: ABNORMAL K/UL (ref 0–0.3)
ABSOLUTE LYMPH #: 1.6 K/UL (ref 1–4.8)
ABSOLUTE MONO #: 0.7 K/UL (ref 0.1–1.3)
ALLEN TEST: ABNORMAL
ANION GAP SERPL CALCULATED.3IONS-SCNC: 19 MMOL/L (ref 9–17)
BASOPHILS # BLD: 0 % (ref 0–2)
BASOPHILS ABSOLUTE: 0 K/UL (ref 0–0.2)
BUN BLDV-MCNC: 63 MG/DL (ref 8–23)
BUN/CREAT BLD: ABNORMAL (ref 9–20)
CALCIUM SERPL-MCNC: 9.5 MG/DL (ref 8.6–10.4)
CARBOXYHEMOGLOBIN: 1.3 % (ref 0–5)
CHLORIDE BLD-SCNC: 96 MMOL/L (ref 98–107)
CO2: 21 MMOL/L (ref 20–31)
CREAT SERPL-MCNC: 4.76 MG/DL (ref 0.5–0.9)
DIFFERENTIAL TYPE: ABNORMAL
EOSINOPHILS RELATIVE PERCENT: 1 % (ref 0–4)
FIO2: 21
GFR AFRICAN AMERICAN: 11 ML/MIN
GFR NON-AFRICAN AMERICAN: 9 ML/MIN
GFR SERPL CREATININE-BSD FRML MDRD: ABNORMAL ML/MIN/{1.73_M2}
GFR SERPL CREATININE-BSD FRML MDRD: ABNORMAL ML/MIN/{1.73_M2}
GLUCOSE BLD-MCNC: 141 MG/DL (ref 65–105)
GLUCOSE BLD-MCNC: 141 MG/DL (ref 65–105)
GLUCOSE BLD-MCNC: 143 MG/DL (ref 65–105)
GLUCOSE BLD-MCNC: 172 MG/DL (ref 65–105)
GLUCOSE BLD-MCNC: 184 MG/DL (ref 70–99)
GLUCOSE BLD-MCNC: 192 MG/DL (ref 65–105)
HCO3 ARTERIAL: 23.1 MMOL/L (ref 22–26)
HCT VFR BLD CALC: 21.4 % (ref 36–46)
HCT VFR BLD CALC: 21.6 % (ref 36–46)
HCT VFR BLD CALC: 22.4 % (ref 36–46)
HEMOGLOBIN: 7 G/DL (ref 12–16)
HEMOGLOBIN: 7.1 G/DL (ref 12–16)
HEMOGLOBIN: 7.2 G/DL (ref 12–16)
IMMATURE GRANULOCYTES: ABNORMAL %
LYMPHOCYTES # BLD: 22 % (ref 24–44)
MAGNESIUM: 2.1 MG/DL (ref 1.6–2.6)
MCH RBC QN AUTO: 30.5 PG (ref 26–34)
MCHC RBC AUTO-ENTMCNC: 32 G/DL (ref 31–37)
MCV RBC AUTO: 95.5 FL (ref 80–100)
METHEMOGLOBIN: 1.4 % (ref 0–1.9)
MODE: ABNORMAL
MONOCYTES # BLD: 9 % (ref 1–7)
NEGATIVE BASE EXCESS, ART: 2.4 MMOL/L (ref 0–2)
NOTIFICATION TIME: ABNORMAL
NOTIFICATION: ABNORMAL
NRBC AUTOMATED: ABNORMAL PER 100 WBC
O2 DEVICE/FLOW/%: ABNORMAL
O2 SAT, ARTERIAL: 83.5 % (ref 95–98)
OXYHEMOGLOBIN: ABNORMAL % (ref 95–98)
PATIENT TEMP: 37.1
PCO2 ARTERIAL: 41.2 MMHG (ref 35–45)
PCO2, ART, TEMP ADJ: ABNORMAL (ref 35–45)
PDW BLD-RTO: 16.3 % (ref 11.5–14.9)
PEEP/CPAP: 5
PH ARTERIAL: 7.36 (ref 7.35–7.45)
PH, ART, TEMP ADJ: ABNORMAL (ref 7.35–7.45)
PLATELET # BLD: 75 K/UL (ref 150–450)
PLATELET ESTIMATE: ABNORMAL
PMV BLD AUTO: 10.4 FL (ref 6–12)
PO2 ARTERIAL: 50.6 MMHG (ref 80–100)
PO2, ART, TEMP ADJ: ABNORMAL MMHG (ref 80–100)
POSITIVE BASE EXCESS, ART: ABNORMAL MMOL/L (ref 0–2)
POTASSIUM SERPL-SCNC: 4.3 MMOL/L (ref 3.7–5.3)
PSV: ABNORMAL
PT. POSITION: ABNORMAL
RBC # BLD: 2.34 M/UL (ref 4–5.2)
RBC # BLD: ABNORMAL 10*6/UL
RESPIRATORY RATE: 20
SAMPLE SITE: ABNORMAL
SEG NEUTROPHILS: 68 % (ref 36–66)
SEGMENTED NEUTROPHILS ABSOLUTE COUNT: 4.7 K/UL (ref 1.3–9.1)
SET RATE: 20
SODIUM BLD-SCNC: 136 MMOL/L (ref 135–144)
TEXT FOR RESPIRATORY: ABNORMAL
TOTAL HB: ABNORMAL G/DL (ref 12–16)
TOTAL RATE: 20
VT: 450
WBC # BLD: 7.1 K/UL (ref 3.5–11)
WBC # BLD: ABNORMAL 10*3/UL

## 2021-12-05 PROCEDURE — 2000000000 HC ICU R&B

## 2021-12-05 PROCEDURE — 85014 HEMATOCRIT: CPT

## 2021-12-05 PROCEDURE — C9113 INJ PANTOPRAZOLE SODIUM, VIA: HCPCS | Performed by: SURGERY

## 2021-12-05 PROCEDURE — 6370000000 HC RX 637 (ALT 250 FOR IP): Performed by: SURGERY

## 2021-12-05 PROCEDURE — 6370000000 HC RX 637 (ALT 250 FOR IP): Performed by: INTERNAL MEDICINE

## 2021-12-05 PROCEDURE — 85025 COMPLETE CBC W/AUTO DIFF WBC: CPT

## 2021-12-05 PROCEDURE — 85018 HEMOGLOBIN: CPT

## 2021-12-05 PROCEDURE — 36415 COLL VENOUS BLD VENIPUNCTURE: CPT

## 2021-12-05 PROCEDURE — 82805 BLOOD GASES W/O2 SATURATION: CPT

## 2021-12-05 PROCEDURE — 80048 BASIC METABOLIC PNL TOTAL CA: CPT

## 2021-12-05 PROCEDURE — 6360000002 HC RX W HCPCS: Performed by: SURGERY

## 2021-12-05 PROCEDURE — 71045 X-RAY EXAM CHEST 1 VIEW: CPT

## 2021-12-05 PROCEDURE — 2580000003 HC RX 258: Performed by: SURGERY

## 2021-12-05 PROCEDURE — 36600 WITHDRAWAL OF ARTERIAL BLOOD: CPT

## 2021-12-05 PROCEDURE — 94003 VENT MGMT INPAT SUBQ DAY: CPT

## 2021-12-05 PROCEDURE — 83735 ASSAY OF MAGNESIUM: CPT

## 2021-12-05 RX ADMIN — CHLORHEXIDINE GLUCONATE 0.12% ORAL RINSE 15 ML: 1.2 LIQUID ORAL at 20:40

## 2021-12-05 RX ADMIN — INSULIN LISPRO 2 UNITS: 100 INJECTION, SOLUTION INTRAVENOUS; SUBCUTANEOUS at 13:05

## 2021-12-05 RX ADMIN — FLUCONAZOLE 100 MG: 100 TABLET ORAL at 20:40

## 2021-12-05 RX ADMIN — SODIUM CHLORIDE, PRESERVATIVE FREE 10 ML: 5 INJECTION INTRAVENOUS at 20:41

## 2021-12-05 RX ADMIN — FERROUS SULFATE TAB 325 MG (65 MG ELEMENTAL FE) 325 MG: 325 (65 FE) TAB at 08:27

## 2021-12-05 RX ADMIN — PANTOPRAZOLE SODIUM 40 MG: 40 INJECTION, POWDER, FOR SOLUTION INTRAVENOUS at 20:41

## 2021-12-05 RX ADMIN — INSULIN LISPRO 1 UNITS: 100 INJECTION, SOLUTION INTRAVENOUS; SUBCUTANEOUS at 20:51

## 2021-12-05 RX ADMIN — CHLORHEXIDINE GLUCONATE 0.12% ORAL RINSE 15 ML: 1.2 LIQUID ORAL at 08:27

## 2021-12-05 RX ADMIN — CEFTRIAXONE SODIUM 1000 MG: 1 INJECTION, POWDER, FOR SOLUTION INTRAMUSCULAR; INTRAVENOUS at 18:45

## 2021-12-05 RX ADMIN — SERTRALINE HYDROCHLORIDE 25 MG: 50 TABLET ORAL at 08:27

## 2021-12-05 RX ADMIN — FLUCONAZOLE 100 MG: 100 TABLET ORAL at 08:27

## 2021-12-05 RX ADMIN — SODIUM CHLORIDE, PRESERVATIVE FREE 10 ML: 5 INJECTION INTRAVENOUS at 20:40

## 2021-12-05 RX ADMIN — SODIUM CHLORIDE, PRESERVATIVE FREE 10 ML: 5 INJECTION INTRAVENOUS at 08:28

## 2021-12-05 RX ADMIN — SODIUM CHLORIDE, PRESERVATIVE FREE 10 ML: 5 INJECTION INTRAVENOUS at 08:27

## 2021-12-05 RX ADMIN — INSULIN LISPRO 2 UNITS: 100 INJECTION, SOLUTION INTRAVENOUS; SUBCUTANEOUS at 18:45

## 2021-12-05 RX ADMIN — INSULIN LISPRO 2 UNITS: 100 INJECTION, SOLUTION INTRAVENOUS; SUBCUTANEOUS at 08:29

## 2021-12-05 RX ADMIN — INSULIN GLARGINE 22 UNITS: 100 INJECTION, SOLUTION SUBCUTANEOUS at 20:43

## 2021-12-05 RX ADMIN — ATORVASTATIN CALCIUM 40 MG: 40 TABLET, FILM COATED ORAL at 08:27

## 2021-12-05 RX ADMIN — PANTOPRAZOLE SODIUM 40 MG: 40 INJECTION, POWDER, FOR SOLUTION INTRAVENOUS at 08:27

## 2021-12-05 ASSESSMENT — PULMONARY FUNCTION TESTS
PIF_VALUE: 26
PIF_VALUE: 17
PIF_VALUE: 5
PIF_VALUE: 24
PIF_VALUE: 23
PIF_VALUE: 24
PIF_VALUE: 24
PIF_VALUE: 25
PIF_VALUE: 26
PIF_VALUE: 23
PIF_VALUE: 24
PIF_VALUE: 24
PIF_VALUE: 48
PIF_VALUE: 25
PIF_VALUE: 24
PIF_VALUE: 26
PIF_VALUE: 24
PIF_VALUE: 15
PIF_VALUE: 17
PIF_VALUE: 22
PIF_VALUE: 24
PIF_VALUE: 23
PIF_VALUE: 27
PIF_VALUE: 24
PIF_VALUE: 26
PIF_VALUE: 24
PIF_VALUE: 27
PIF_VALUE: 23
PIF_VALUE: 24
PIF_VALUE: 15
PIF_VALUE: 24
PIF_VALUE: 25
PIF_VALUE: 24
PIF_VALUE: 24
PIF_VALUE: 25
PIF_VALUE: 23
PIF_VALUE: 26
PIF_VALUE: 24
PIF_VALUE: 24
PIF_VALUE: 25
PIF_VALUE: 11
PIF_VALUE: 23

## 2021-12-05 ASSESSMENT — ENCOUNTER SYMPTOMS
ABDOMINAL PAIN: 0
EYE ITCHING: 0
TROUBLE SWALLOWING: 0
BLOOD IN STOOL: 0
CHEST TIGHTNESS: 0
SHORTNESS OF BREATH: 0
NAUSEA: 0
EYE REDNESS: 0
COLOR CHANGE: 0
COUGH: 0
VOMITING: 0

## 2021-12-05 ASSESSMENT — PAIN SCALES - GENERAL
PAINLEVEL_OUTOF10: 0
PAINLEVEL_OUTOF10: 2
PAINLEVEL_OUTOF10: 0

## 2021-12-05 ASSESSMENT — PAIN SCALES - WONG BAKER
WONGBAKER_NUMERICALRESPONSE: 0

## 2021-12-05 NOTE — FLOWSHEET NOTE
No family present; got medical update from [facility]     12/05/21 1219   Encounter Summary   Services provided to: Patient   Referral/Consult From: Palliative Care   Complexity of Encounter Low   Length of Encounter 15 minutes   Spiritual/Yarsani   Type Spiritual support   Assessment Sleeping   Intervention Prayer   Outcome Did not respond

## 2021-12-05 NOTE — PROGRESS NOTES
Department of Internal Medicine  Nephrology Kim Chiang MD  Progress Note    Reason for consultation: Management of acute kidney injury. Consulting physician: Ramses Ruggiero MD.    Interval history: Patient was seen and examined today in the intensive care unit. She is awake and responsive. She is s/p tracheostomy and PEG tube placement 12/3/2021. She is hemodynamically stable and continues to require acute intermittent hemodialysis. History of present illness: This is a 66 y.o. female with a significant past medical history of Type 2 diabetes mellitus, coronary artery disease, lumbar radiculopathy, Hyperlipidemia and chronic kidney disease stage III [baseline serum creatinine 1.5 mg/dL], who presented from [facility] for further evaluation of acute kidney injury. She was sent to [facility] after developing nonhealing wound s/p cardiac catheterization via right refill Levophed has been discontinued approach with right groin wound. She had been on Bumex in the ECF and creatinine and BUN have been rising and Bumex was decreased to a lowest dose of 0.5 mg p.o. twice daily. She has had regular blood work performed in the [facility] and ECF contacted nephrologist after finding abnormal laboratory studies. She was noted to have a BUN/creatinine 104/2.28 mg/dL with hemoglobin 8 g/dL. She has mild confusion but is not in obvious respiratory distress.     Scheduled Meds:   fluconazole  100 mg Oral BID    atorvastatin  40 mg Oral Daily    cefTRIAXone (ROCEPHIN) IV  1,000 mg IntraVENous Q24H    chlorhexidine  15 mL Mouth/Throat BID    [Held by provider] carvedilol  3.125 mg Oral BID WC    pantoprazole  40 mg IntraVENous BID    And    sodium chloride flush  10 mL IntraVENous BID    sodium polystyrene  15 g Oral Once    sertraline  25 mg Oral Daily    ferrous sulfate  325 mg Oral Daily with breakfast    [Held by provider] aspirin  81 mg Oral Daily    insulin glargine  22 Units SubCUTAneous Nightly    insulin lispro  0-12 Units SubCUTAneous TID WC    insulin lispro  0-6 Units SubCUTAneous Nightly    sodium chloride flush  5-40 mL IntraVENous 2 times per day     Continuous Infusions:   sodium chloride      norepinephrine Stopped (11/29/21 2306)    propofol Stopped (12/03/21 1030)    dextrose      sodium chloride       PRN Meds:.metoprolol, polyethylene glycol, sodium chloride, sodium citrate **AND** sodium citrate, midodrine, albumin human, midodrine, fentanNYL, magnesium sulfate, glucose, dextrose, glucagon (rDNA), dextrose, sodium chloride flush, sodium chloride, acetaminophen, ondansetron **OR** ondansetron, oxyCODONE-acetaminophen    Physical Exam:    VITALS:  /67   Pulse 112   Temp 98.7 °F (37.1 °C)   Resp 12   Ht 5' 2.99\" (1.6 m)   Wt 210 lb 15.7 oz (95.7 kg)   SpO2 95%   BMI 37.38 kg/m²   24HR INTAKE/OUTPUT:      Intake/Output Summary (Last 24 hours) at 12/5/2021 1114  Last data filed at 12/5/2021 0400  Gross per 24 hour   Intake 418 ml   Output 150 ml   Net 268 ml     Constitutional: On ventilator support via tracheostomy; awake. Skin: Skin color, texture, turgor normal. No rashes or lesions    Head: Normocephalic, without obvious abnormality, atraumatic     Cardiovascular/Edema: S1, S2 with no pericardial rub or gallop. Respiratory: Diminished air entry basal rales intubated on ventilator    Abdomen: soft, non-tender; bowel sounds normal; no masses,  no organomegaly    Extremities: 1-2+ generalized edema    Neuro:  Grossly normal    CBC:   Recent Labs     12/03/21  0425 12/03/21  0425 12/03/21  1025 12/04/21  0525 12/04/21  0525 12/04/21  1215 12/04/21  2137 12/05/21  0430   WBC 5.6  --   --  6.4  --   --   --  7.1   HGB 7.3*   < >  --  7.0*   < > 7.2* 7.2* 7.2*  7.1*   PLT 15*   < > 30* 48*  --   --   --  75*    < > = values in this interval not displayed.      BMP:    Recent Labs     12/03/21  0425 12/04/21  0525 12/05/21  0430    133* 136   K 3.7 4.2 4.3   CL 98 96* 96*   CO2 25 23 21   BUN 47* 56* 63*   CREATININE 3.46* 4.25* 4.76*   GLUCOSE 139* 117* 184*     Lab Results   Component Value Date    NITRU NEGATIVE 12/02/2021    COLORU Yellow 12/02/2021    PHUR 7.5 12/02/2021    WBCUA TOO NUMEROUS TO COUNT 12/02/2021    RBCUA 20 TO 50 12/02/2021    MUCUS NOT REPORTED 12/02/2021    TRICHOMONAS NOT REPORTED 12/02/2021    YEAST NOT REPORTED 12/02/2021    BACTERIA MODERATE 12/02/2021    CLARITYU Clear 09/07/2016    SPECGRAV 1.025 12/02/2021    LEUKOCYTESUR LARGE 12/02/2021    UROBILINOGEN Normal 12/02/2021    BILIRUBINUR NEGATIVE 12/02/2021    BLOODU Positive 09/07/2016    GLUCOSEU NEGATIVE 12/02/2021    KETUA NEGATIVE 12/02/2021    AMORPHOUS NOT REPORTED 12/02/2021     IMPRESSION/RECOMMENDATIONS:      1. Acute kidney injury - most consistent with ischemic acute tubular necrosis and obstructive nephropathy secondary to bilateral hydronephrosis. She remains borderline oliguric despite repeat renal ultrasound [11/18/2021] showing improvement in hydronephrosis. We will place on a MWF hemodialysis schedule. Continue to monitor urine output closely. Avoid nephrotoxic agents. Basic metabolic profile daily. 2.  Heart failure with reduced ejection fraction [HFrEF with LVEF 30 to 35% 11/10/2021] - We will treat with Ultrafiltration as tolerated and as needed diuretics. 3.  Acute hypoxic and hypercapnic respiratory failure - Ventilator dependent and s/p tracheostomy on 12/3/2021. Patient failed weaning trial yesterday    4. Normocytic anemia and thrombocytopenia - No evidence of microangiopathy and HIT panel was negative. He has required platelet transfusion as needed. Will defer treatment to hematology/oncology service. 5.  Bilateral hydronephrosis - status post cystoscopy with right ureteral  stent placement. 6.  Hypertension - controlled    Prognosis is guarded to poor.     Kylah Patel MD FACP  Attending Nephrologist  12/5/2021 11:14 AM

## 2021-12-05 NOTE — FLOWSHEET NOTE
12/04/21 1825   Vital Signs   Temp 98.1 °F (36.7 °C)   Resp 26   Weight 210 lb 15.7 oz (95.7 kg)   Weight Method Bed scale   Percent Weight Change -3.04   Post-Hemodialysis Assessment   Post-Treatment Procedures Blood returned; Catheter capped, clamped with Citrate x 2 ports   Machine Disinfection Process Exterior Machine Disinfection   Rinseback Volume (ml) 300 ml   Dialyzer Clearance Lightly streaked   Duration of Treatment (minutes) 180 minutes   NET Removed (ml) 3000 ml   Tolerated Treatment Fair   Bilateral Breath Sounds Diminished   Edema Right upper extremity; Left upper extremity; Right lower extremity; Left lower extremity   RUE Edema +2; Pitting   LUE Edema +2; Pitting   RLE Edema +2; Non-pitting   LLE Edema +2; Non-pitting   completed 3 hr UF HD TX and removed 3 Liters of fluid. pt tolerated HD TX fair. pt given x2 doses Albumin for hypotension during [address]. cvc dressing is clean, dry and intact. report given to primary nurse, Carlos Sanz RN.

## 2021-12-05 NOTE — PROGRESS NOTES
Progress Note    12/5/2021   3:20 PM    Name:  Weston Martinez  MRN:    [id number]     Florencioide:     [de-identified]   Room:  2004/2004-01  IP Day: 22     Admit Date: 11/10/2021  4:23 PM  PCP: Araceli Marinelli, DO    Subjective:     C/C:   Chief Complaint   Patient presents with    Abnormal Lab     low hemoglobin and high CR       Interval History: Status: not changed. Patient s/p trach and PEG on ventilator. Patient continues to fail weaning trial.  Blood pressure stable heart rate in 90s-100. Recent labs reviewed hemoglobin 7.1  ROS:   all 10 systems reviewed and are negative except as noted    Review of Systems   Constitutional: Negative for chills and fatigue. HENT: Negative for drooling, mouth sores, sneezing and trouble swallowing. Eyes: Negative for redness and itching. Respiratory: Negative for cough, chest tightness and shortness of breath. Cardiovascular: Negative for chest pain, palpitations and leg swelling. Gastrointestinal: Negative for abdominal pain, blood in stool, nausea and vomiting. Endocrine: Negative for heat intolerance and polyphagia. Genitourinary: Negative for difficulty urinating, flank pain and pelvic pain. Musculoskeletal: Negative for arthralgias, joint swelling and neck stiffness. Skin: Negative for color change and pallor. Allergic/Immunologic: Negative for food allergies. Neurological: Negative for dizziness, seizures and headaches. Hematological: Does not bruise/bleed easily. Psychiatric/Behavioral: Negative for agitation, behavioral problems and suicidal ideas. The patient is not hyperactive. Medications:      Allergies: No Known Allergies    Current Meds: metoprolol (LOPRESSOR) injection 5 mg, Q6H PRN  polyethylene glycol (GLYCOLAX) packet 17 g, Daily PRN  fluconazole (DIFLUCAN) tablet 100 mg, BID  atorvastatin (LIPITOR) tablet 40 mg, Daily  cefTRIAXone (ROCEPHIN) 1000 mg IVPB in 50 mL D5W minibag, Q24H  0.9 % sodium chloride infusion, PRN  sodium citrate 4 % injection 1.3 mL, PRN   And  sodium citrate 4 % injection 1.2 mL, PRN  midodrine (PROAMATINE) tablet 10 mg, TID PRN  albumin human 25 % IV solution 25 g, PRN  midodrine (PROAMATINE) tablet 5 mg, PRN  norepinephrine (LEVOPHED) 16 mg in sodium chloride 0.9 % 250 mL infusion, Continuous  chlorhexidine (PERIDEX) 0.12 % solution 15 mL, BID  fentaNYL (SUBLIMAZE) injection 50 mcg, Q30 Min PRN  propofol injection, Continuous  [Held by provider] carvedilol (COREG) tablet 3.125 mg, BID WC  pantoprazole (PROTONIX) injection 40 mg, BID   And  sodium chloride flush 0.9 % injection 10 mL, BID  magnesium sulfate 1000 mg in dextrose 5% 100 mL IVPB, PRN  glucose (GLUTOSE) 40 % oral gel 15 g, PRN  dextrose 50 % IV solution, PRN  glucagon (rDNA) injection 1 mg, PRN  dextrose 5 % solution, PRN  sodium polystyrene (KAYEXALATE) 15 GM/60ML suspension 15 g, Once  sertraline (ZOLOFT) tablet 25 mg, Daily  ferrous sulfate (IRON 325) tablet 325 mg, Daily with breakfast  [Held by provider] aspirin chewable tablet 81 mg, Daily  insulin glargine (LANTUS) injection vial 22 Units, Nightly  insulin lispro (HUMALOG) injection vial 0-12 Units, TID WC  insulin lispro (HUMALOG) injection vial 0-6 Units, Nightly  sodium chloride flush 0.9 % injection 5-40 mL, 2 times per day  sodium chloride flush 0.9 % injection 5-40 mL, PRN  0.9 % sodium chloride infusion, PRN  acetaminophen (TYLENOL) tablet 650 mg, Q4H PRN  ondansetron (ZOFRAN-ODT) disintegrating tablet 4 mg, Q8H PRN   Or  ondansetron (ZOFRAN) injection 4 mg, Q6H PRN  oxyCODONE-acetaminophen (PERCOCET) 5-325 MG per tablet 1 tablet, Q6H PRN        Data:     Code Status:  DNR-CCA    Family History   Problem Relation Age of Onset    High Blood Pressure Other        Social History     Socioeconomic History    Marital status:      Spouse name: Not on file    Number of children: Not on file    Years of education: Not on file    Highest education level: Not on file   Occupational History    Not on file   Tobacco Use    Smoking status: Never Smoker    Smokeless tobacco: Not on file   Substance and Sexual Activity    Alcohol use: No     Alcohol/week: 0.0 standard drinks    Drug use: No    Sexual activity: Not on file   Other Topics Concern    Not on file   Social History Narrative    Not on file     Social Determinants of Health     Financial Resource Strain:     Difficulty of Paying Living Expenses: Not on file   Food Insecurity:     Worried About Running Out of Food in the Last Year: Not on file    Katya of Food in the Last Year: Not on file   Transportation Needs:     Lack of Transportation (Medical): Not on file    Lack of Transportation (Non-Medical): Not on file   Physical Activity:     Days of Exercise per Week: Not on file    Minutes of Exercise per Session: Not on file   Stress:     Feeling of Stress : Not on file   Social Connections:     Frequency of Communication with Friends and Family: Not on file    Frequency of Social Gatherings with Friends and Family: Not on file    Attends [employer] Services: Not on file    Active Member of [address] [place] [address] or Organizations: Not on file    Attends Club or Organization Meetings: Not on file    Marital Status: Not on file   Intimate Partner Violence:     Fear of Current or Ex-Partner: Not on file    Emotionally Abused: Not on file    Physically Abused: Not on file    Sexually Abused: Not on file   Housing Stability:     Unable to Pay for Housing in the Last Year: Not on file    Number of [place] in the Last Year: Not on file    Unstable Housing in the Last Year: Not on file       I/O (24Hr): Intake/Output Summary (Last 24 hours) at 12/5/2021 1520  Last data filed at 12/5/2021 0400  Gross per 24 hour   Intake 418 ml   Output 150 ml   Net 268 ml     Radiology:  XR CHEST (SINGLE VIEW FRONTAL)    Result Date: 11/30/2021  Cardiomegaly with bilateral effusions and perihilar infiltrates. Support tubes as described above. XR CHEST PORTABLE    Result Date: 12/5/2021  Cardiomegaly with bibasilar effusions and scattered pulmonary infiltrates. XR CHEST PORTABLE    Result Date: 12/3/2021  1. Interval placement of a tracheostomy tube. 2. No pneumothorax. 3. Persistent pulmonary vascular congestion and bilateral pleural effusions. XR CHEST PORTABLE    Result Date: 12/3/2021  Mild pulmonary vascular congestion and bilateral pleural effusions, right greater than left, similar to the previous exam.     XR CHEST PORTABLE    Result Date: 11/29/2021  1. Endotracheal tube terminates in appropriate position. 2. No significant change in pleural effusions and basilar atelectasis.        Labs:  Recent Results (from the past 24 hour(s))   POC Glucose Fingerstick    Collection Time: 12/04/21  4:59 PM   Result Value Ref Range    POC Glucose 140 (H) 65 - 105 mg/dL   Hgb/Hct    Collection Time: 12/04/21  9:37 PM   Result Value Ref Range    Hemoglobin 7.2 (L) 12.0 - 16.0 g/dL    Hematocrit 22.2 (L) 36 - 46 %   Hgb/Hct    Collection Time: 12/05/21  4:30 AM   Result Value Ref Range    Hemoglobin 7.1 (L) 12.0 - 16.0 g/dL    Hematocrit 21.6 (L) 36 - 46 %   CBC with DIFF    Collection Time: 12/05/21  4:30 AM   Result Value Ref Range    WBC 7.1 3.5 - 11.0 k/uL    RBC 2.34 (L) 4.0 - 5.2 m/uL    Hemoglobin 7.2 (L) 12.0 - 16.0 g/dL    Hematocrit 22.4 (L) 36 - 46 %    MCV 95.5 80 - 100 fL    MCH 30.5 26 - 34 pg    MCHC 32.0 31 - 37 g/dL    RDW 16.3 (H) 11.5 - 14.9 %    Platelets 75 (L) 203 - 450 k/uL    MPV 10.4 6.0 - 12.0 fL    NRBC Automated NOT REPORTED per 100 WBC    Differential Type NOT REPORTED     Immature Granulocytes NOT REPORTED 0 %    Absolute Immature Granulocyte NOT REPORTED 0.00 - 0.30 k/uL    WBC Morphology NOT REPORTED     RBC Morphology NOT REPORTED     Platelet Estimate NOT REPORTED     Seg Neutrophils 68 (H) 36 - 66 %    Lymphocytes 22 (L) 24 - 44 %    Monocytes 9 (H) 1 - 7 %    Eosinophils % 1 0 - 4 %    Basophils 0 0 - 2 %    Segs Absolute 4.70 1.3 - 9.1 k/uL    Absolute Lymph # 1.60 1.0 - 4.8 k/uL    Absolute Mono # 0.70 0.1 - 1.3 k/uL    Absolute Eos # 0.10 0.0 - 0.4 k/uL    Basophils Absolute 0.00 0.0 - 0.2 k/uL   Magnesium    Collection Time: 12/05/21  4:30 AM   Result Value Ref Range    Magnesium 2.1 1.6 - 2.6 mg/dL   Basic Metabolic Panel w/ Reflex to MG    Collection Time: 12/05/21  4:30 AM   Result Value Ref Range    Glucose 184 (H) 70 - 99 mg/dL    BUN 63 (H) 8 - 23 mg/dL    CREATININE 4.76 (H) 0.50 - 0.90 mg/dL    Bun/Cre Ratio NOT REPORTED 9 - 20    Calcium 9.5 8.6 - 10.4 mg/dL    Sodium 136 135 - 144 mmol/L    Potassium 4.3 3.7 - 5.3 mmol/L    Chloride 96 (L) 98 - 107 mmol/L    CO2 21 20 - 31 mmol/L    Anion Gap 19 (H) 9 - 17 mmol/L    GFR Non-African American 9 (L) >60 mL/min    GFR  11 (L) >60 mL/min    GFR Comment          GFR Staging NOT REPORTED    Blood gas, arterial    Collection Time: 12/05/21  4:52 AM   Result Value Ref Range    pH, Arterial 7.356 7.350 - 7.450    pCO2, Arterial 41.2 35.0 - 45.0 mmHg    pO2, Arterial 50.6 (LL) 80.0 - 100.0 mmHg    HCO3, Arterial 23.1 22.0 - 26.0 mmol/L    Positive Base Excess, Art NOT REPORTED 0.0 - 2.0 mmol/L    Negative Base Excess, Art 2.4 (H) 0.0 - 2.0 mmol/L    O2 Sat, Arterial 83.5 (LL) 95 - 98 %    Total Hb NOT REPORTED 12.0 - 16.0 g/dl    Oxyhemoglobin NOT REPORTED 95.0 - 98.0 %    Carboxyhemoglobin 1.3 0 - 5 %    Methemoglobin 1.4 0.0 - 1.9 %    Pt Temp 37.1     pH, Art, Temp Adj NOT REPORTED 7.350 - 7.450    pCO2, Art, Temp Adj NOT REPORTED 35.0 - 45.0    pO2, Art, Temp Adj NOT REPORTED 80.0 - 100.0 mmHg    O2 Device/Flow/% VENTILATOR     Respiratory Rate 20     Rolf Test PASS     Sample Site Right Radial Artery     Pt.  Position SEMI-FOWLERS     Mode PRVC     Set Rate 20     Total Rate 20          FIO2 21     Peep/Cpap 5     PSV NOT REPORTED     Text for Respiratory RESULT TO RN     NOTIFICATION NOT REPORTED     NOTIFICATION TIME NOT REPORTED    POC Glucose Fingerstick    Collection Time: 21  8:00 AM   Result Value Ref Range    POC Glucose 141 (H) 65 - 105 mg/dL   POC Glucose Fingerstick    Collection Time: 21 11:20 AM   Result Value Ref Range    POC Glucose 143 (H) 65 - 105 mg/dL       Physical Examination:        Vitals:  /67   Pulse 98   Temp 98.1 °F (36.7 °C) (Oral)   Resp 20   Ht 5' 2.99\" (1.6 m)   Wt 210 lb 15.7 oz (95.7 kg)   SpO2 94%   BMI 37.38 kg/m²   Temp (24hrs), Av.8 °F (37.1 °C), Min:98.1 °F (36.7 °C), Max:100 °F (37.8 °C)    Recent Labs     21  1147 21  1659 21  0800 21  1120   POCGLU 139* 140* 141* 143*         Physical Exam  Vitals reviewed. HENT:      Head: Normocephalic. Right Ear: External ear normal.      Left Ear: External ear normal.      Nose: Nose normal.      Mouth/Throat:      Mouth: Mucous membranes are moist.      Pharynx: Oropharynx is clear. Eyes:      Conjunctiva/sclera: Conjunctivae normal.   Cardiovascular:      Rate and Rhythm: Normal rate and regular rhythm. Pulses: Normal pulses. Heart sounds: Normal heart sounds. Pulmonary:      Effort: Pulmonary effort is normal.      Breath sounds: Examination of the right-lower field reveals rhonchi. Rhonchi present. Comments: trach in place on ventilator  Abdominal:      General: Bowel sounds are normal.      Palpations: Abdomen is soft. Comments: G-tube in place   Musculoskeletal:         General: No deformity. Cervical back: Normal range of motion and neck supple. Right lower leg: Edema (1+) present. Left lower leg: Edema (1+) present. Skin:     General: Skin is warm. Capillary Refill: Capillary refill takes less than 2 seconds. Coloration: Skin is not jaundiced. Neurological:      General: No focal deficit present. Mental Status: She is alert. Mental status is at baseline.    Psychiatric:         Mood and Affect: Mood normal.         Behavior: Behavior normal. Assessment:        Primary Problem  JAYLYN (acute kidney [facility]) ([facility]     Principal Problem:    JAYLYN (acute kidney injury) ([facility]  Active Problems:    Type 2 diabetes mellitus with kidney complication, with long-term current use of insulin ([facility])    CKD (chronic kidney disease) stage 3, GFR 30-59 ml/min ([facility])    HTN (hypertension)    ASCVD (arteriosclerotic cardiovascular disease)    Iron deficiency anemia    Hydronephrosis determined by ultrasound    Longstanding persistent atrial fibrillation ([facility])    Acute metabolic encephalopathy    Acute cystitis without hematuria    Acute respiratory failure with hypoxia and hypercapnia ([facility])    Dilated cardiomyopathy ([facility]  Resolved Problems:    * No resolved hospital problems. *      Past Medical History:   Diagnosis Date    Anemia, unspecified     CAD (coronary artery disease)     Chronic kidney disease     CKD (chronic kidney disease) stage 3, GFR 30-59 ml/min ([facility])     Closed fracture of dorsal (thoracic) vertebra without mention of spinal cord injury     Coronary atherosclerosis of unspecified type of vessel, native or graft     Depression with anxiety     Esophageal reflux     HTN (hypertension)     Hyperkalemia     Hyperlipidemia     Intervertebral cervical disc disorder with myelopathy, cervical region     Lower back pain     Lumbar radiculopathy     Microalbuminuria     Muscle weakness     Pain in limb     Stenosis of cervical spine     Tethered cord syndrome ([facility])     Type II or unspecified type diabetes mellitus without mention of complication, not stated as uncontrolled     Urinary calculus     UTI (lower urinary tract infection)         Plan:        1. Diflucan 100 mg twice daily per PEG tube  2. IV Rocephin  1. Monitor H&H every 8 hours  2. Dialysis in a.m. per nephrology  3. Tunneled cath for hemodialysis tomorrow  4. LTAC discharge planning in progress  5. Discussed with Dr. Jolene Councilman general surgery on the floor  6. CMP  7.  Discussed with floor nurse  8. Lopressor IV 5 mg every 6 hours as needed for heart rate above 115.  9. Midodrine 5 mg p.o. 3 times a day as needed for systolic blood pressure less than 90.  10. DVT Prophylaxis no pharmacological DVT prophylaxis due to low hemoglobin  11. EPCs  12. PT/OT to evaluate and treat  13. Pain control  14. Replace electrolytes as per sliding scale  15. Home medications reviewed and appropriate medications continued  16.  Reviewed labs and imaging studies from last 24 hours and results explained to patient      Electronically signed by Mari Bennett MD

## 2021-12-05 NOTE — PROGRESS NOTES
Patient was seen and examined. Awake alert in no acute distress. Afebrile vital signs are stable. Tolerating tube feeds at 20 mL per hour. Trach site clean. Abdomen is soft. PEG site is clean. blood work reviewed. Hemoglobin stable. WBC count normal. Platelet count 75. Sodium potassium normal. Creatinine 4.7. Advanced to feeds to 40 mL per hour. Local PEG site and trach site care. No acute issues from my standpoint. Family updated.

## 2021-12-05 NOTE — PROGRESS NOTES
Writer spoke with patient's spouse. He was updated on her condition, labs and plan of care. All questions and concerns were addressed.

## 2021-12-05 NOTE — PROGRESS NOTES
ICU Progress Note (Vent)   [facility]    Patient - Trinidad Stephens,  Age - 66 y.o.    - 1950      Room Number -    N -  [id number]   Acct # - [de-identified]  Date of Admission -  11/10/2021  4:23 PM    Events of Past 24 Hours   Patient resting quietly on vent support    Vitals    height is 5' 2.99\" (1.6 m) and weight is 210 lb 15.7 oz (95.7 kg). Her oral temperature is 98.1 °F (36.7 °C). Her blood pressure is 123/67 and her pulse is 110. Her respiration is 19 and oxygen saturation is 92%. Temperature Range: Temp: 98.1 °F (36.7 °C) Temp  Av.8 °F (37.1 °C)  Min: 98.1 °F (36.7 °C)  Max: 100 °F (37.8 °C)  BP Range:  Systolic (03PXO), JHK:773 , Min:88 , TMB:172     Diastolic (17FWN), WDW:50, Min:39, Max:92    Pulse Range: Pulse  Av.5  Min: 86  Max: 118  Respiration Range: Resp  Av.7  Min: 0  Max: 30  Current Pulse Ox[de-identified]  SpO2: 92 %  24HR Pulse Ox Range:  SpO2  Av.5 %  Min: 90 %  Max: 99 %  Oxygen Amount and Delivery: O2 Flow Rate (L/min): 2 L/min      Wt Readings from Last 3 Encounters:   21 210 lb 15.7 oz (95.7 kg)   16 178 lb (80.7 kg)   16 184 lb 12.8 oz (83.8 kg)     I/O       Intake/Output Summary (Last 24 hours) at 2021 1506  Last data filed at 2021 0400  Gross per 24 hour   Intake 418 ml   Output 150 ml   Net 268 ml     I/O last 3 completed shifts:   In: 418 [NG/GT:418]  Out: 150 [Stool:150]     DRAIN/TUBE OUTPUT:     Invasive Lines   Tracheostomy         Mechanical Ventilation Data   SETTINGS (Comprehensive)  Vent Information  $Ventilation: $Subsequent Day  Skin Assessment: Clean, dry, & intact  Suction Catheter Diameter: 14  Equipment Changed: Suction catheter  Vent Type: Servo i  Vent Mode: PRVC  Vt Ordered: 450 mL  Rate Set: 20 bmp  Pressure Support: 12 cmH20  FiO2 : 21 %  SpO2: 92 %  SpO2/FiO2 ratio: 438.1  Sensitivity: 5  PEEP/CPAP: 5  I Time/ I Time %: 0.86 s  Humidification Source: [facility]  Nitric Oxide/Epoprostenol In Use?: No  Mask Type: Full face mask  Mask Size: Medium  Additional Respiratory  Assessments  Pulse: 110  Resp: 19  SpO2: 92 %  End Tidal CO2: 33 (%)  Position: Semi-Lagunas's  Humidification Source: E  Oral Care: Mouth suctioned, Suction toothette, Mouthwash  Cuff Pressure (cm H2O): 30 cm H2O       ABGs:   Lab Results   Component Value Date    PHART 7.356 12/05/2021    PO2ART 50.6 12/05/2021    JDC8BJG 41.2 12/05/2021       Lab Results   Component Value Date    MODE PRVC 12/05/2021         Medications   IV   sodium chloride      norepinephrine Stopped (11/29/21 2306)    propofol Stopped (12/03/21 1030)    dextrose      sodium chloride        fluconazole  100 mg Oral BID    atorvastatin  40 mg Oral Daily    cefTRIAXone (ROCEPHIN) IV  1,000 mg IntraVENous Q24H    chlorhexidine  15 mL Mouth/Throat BID    [Held by provider] carvedilol  3.125 mg Oral BID WC    pantoprazole  40 mg IntraVENous BID    And    sodium chloride flush  10 mL IntraVENous BID    sodium polystyrene  15 g Oral Once    sertraline  25 mg Oral Daily    ferrous sulfate  325 mg Oral Daily with breakfast    [Held by provider] aspirin  81 mg Oral Daily    insulin glargine  22 Units SubCUTAneous Nightly    insulin lispro  0-12 Units SubCUTAneous TID WC    insulin lispro  0-6 Units SubCUTAneous Nightly    sodium chloride flush  5-40 mL IntraVENous 2 times per day       Diet/Nutrition   ADULT TUBE FEEDING; Nasogastric; Renal Formula; Continuous; 35; No; 30; Q 8 hours; Protein; 1 Protein at 12 noon daily. Flush with 30 mLwater before and after administration. Exam   VITALS    height is 5' 2.99\" (1.6 m) and weight is 210 lb 15.7 oz (95.7 kg). Her oral temperature is 98.1 °F (36.7 °C). Her blood pressure is 123/67 and her pulse is 110. Her respiration is 19 and oxygen saturation is 92%.    Ventilator Settings (Basic)  Vent Mode: PRVC Rate Set: 20 bmp/Vt Ordered: 450 mL/ /FiO2 : 21 %    Constitutional - Sedated  General Appearance  well developed  HEENT - Life support devices in place   Lungs - Chest expands equally, no wheezes, rales or rhonchi. Cardiovascular - Heart sounds are normal.  normal rate and rhythm regular, no murmur, gallop or rub. Abdomen - soft, nontender, nondistended,   Extremities - no cyanosis    Lab Results   CBC     Lab Results   Component Value Date    WBC 7.1 12/05/2021    RBC 2.34 12/05/2021    RBC 4.14 08/31/2016    HGB 7.1 12/05/2021    HGB 7.2 12/05/2021    HCT 21.6 12/05/2021    HCT 22.4 12/05/2021    PLT 75 12/05/2021    MCV 95.5 12/05/2021    MCH 30.5 12/05/2021    MCHC 32.0 12/05/2021    RDW 16.3 12/05/2021    NRBC 4 11/22/2021    METASPCT 3 12/02/2021    LYMPHOPCT 22 12/05/2021    LYMPHOPCT 32.8 08/31/2016    MONOPCT 9 12/05/2021    EOSPCT 4.3 08/31/2016    BASOPCT 0 12/05/2021    BASOPCT 0.7 08/31/2016    MONOSABS 0.70 12/05/2021    LYMPHSABS 1.60 12/05/2021    EOSABS 0.10 12/05/2021    BASOSABS 0.00 12/05/2021    DIFFTYPE NOT REPORTED 12/05/2021       BMP   Lab Results   Component Value Date     12/05/2021    K 4.3 12/05/2021    CL 96 12/05/2021    CO2 21 12/05/2021    BUN 63 12/05/2021    CREATININE 4.76 12/05/2021    GLUCOSE 184 12/05/2021    GLUCOSE 77 08/31/2016    CALCIUM 9.5 12/05/2021       LFTS  Lab Results   Component Value Date    ALKPHOS 175 11/18/2021    ALT 31 11/18/2021    AST 21 11/18/2021    PROT 5.3 11/20/2021    BILITOT 0.27 11/18/2021    BILIDIR <0.08 11/13/2021    IBILI Connot be calculated 11/13/2021    LABALBU 2.6 11/18/2021       INR  No results for input(s): PROTIME, INR in the last 72 hours. APTT  No results for input(s): APTT in the last 72 hours. Lactic Acid  Lab Results   Component Value Date    LACTA 1.3 11/26/2021    LACTA 0.6 11/16/2021    LACTA 0.6 11/16/2021        BNP   No results for input(s): BNP in the last 72 hours.      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 Otherwise stable on vent support with trach. Plans for dialysis catheter for tomorrow. On EPC cuffs. X-ray looks stable with a tracheostomy position in fair position and a pleural effusion on right. Patient's , Nohemi Manzo, [phone]  Updated. Multiple questions were answered.   He voiced understanding    Critical Care Time   30 min    Electronically signed by Lela Patton MD on 12/5/2021 at 3:06 PM

## 2021-12-05 NOTE — CARE COORDINATION
ONGOING DISCHARGE PLANNING NOTE:    Writer reviewed LSW notes, and discharge plan is [employer]. LSW Shad notified to start precert. Pt will get tunneled HD cath on Monday along with HD tx. Pt would be ready to go to [facility] by Tues per Dr Castillo Castro. Pt has trach and peg on vent.      Electronically signed by Ashli Mcintosh RN on 12/5/2021 at 2:15 PM

## 2021-12-05 NOTE — PROGRESS NOTES
SBT attempted CPAP 5 PS 12    Vt 200's, RR increased to 30's, EtCO2 increased, HR increased.     Trial ended after 4 minutes

## 2021-12-05 NOTE — CARE COORDINATION
Writer janey de león at [employer] and informed her that the pt will be d/c Thursday and to start pre cert

## 2021-12-06 LAB
ABSOLUTE EOS #: 0.14 K/UL (ref 0–0.4)
ABSOLUTE IMMATURE GRANULOCYTE: ABNORMAL K/UL (ref 0–0.3)
ABSOLUTE LYMPH #: 1.35 K/UL (ref 1–4.8)
ABSOLUTE MONO #: 0.78 K/UL (ref 0.1–1.3)
ANION GAP SERPL CALCULATED.3IONS-SCNC: 17 MMOL/L (ref 9–17)
BASOPHILS # BLD: 0 % (ref 0–2)
BASOPHILS ABSOLUTE: 0 K/UL (ref 0–0.2)
BLD PROD TYP BPU: NORMAL
BUN BLDV-MCNC: 71 MG/DL (ref 8–23)
BUN/CREAT BLD: ABNORMAL (ref 9–20)
CALCIUM SERPL-MCNC: 9.3 MG/DL (ref 8.6–10.4)
CHLORIDE BLD-SCNC: 94 MMOL/L (ref 98–107)
CO2: 21 MMOL/L (ref 20–31)
CREAT SERPL-MCNC: 5.36 MG/DL (ref 0.5–0.9)
DIFFERENTIAL TYPE: ABNORMAL
DISPENSE STATUS BLOOD BANK: NORMAL
EOSINOPHILS RELATIVE PERCENT: 2 % (ref 0–4)
GFR AFRICAN AMERICAN: 9 ML/MIN
GFR NON-AFRICAN AMERICAN: 8 ML/MIN
GFR SERPL CREATININE-BSD FRML MDRD: ABNORMAL ML/MIN/{1.73_M2}
GFR SERPL CREATININE-BSD FRML MDRD: ABNORMAL ML/MIN/{1.73_M2}
GLUCOSE BLD-MCNC: 105 MG/DL (ref 65–105)
GLUCOSE BLD-MCNC: 125 MG/DL (ref 65–105)
GLUCOSE BLD-MCNC: 131 MG/DL (ref 65–105)
GLUCOSE BLD-MCNC: 138 MG/DL (ref 65–105)
GLUCOSE BLD-MCNC: 157 MG/DL (ref 70–99)
HCT VFR BLD CALC: 21.7 % (ref 36–46)
HCT VFR BLD CALC: 21.8 % (ref 36–46)
HCT VFR BLD CALC: 21.9 % (ref 36–46)
HCT VFR BLD CALC: 21.9 % (ref 36–46)
HEMOGLOBIN: 7 G/DL (ref 12–16)
HEMOGLOBIN: 7 G/DL (ref 12–16)
HEMOGLOBIN: 7.1 G/DL (ref 12–16)
HEMOGLOBIN: 7.2 G/DL (ref 12–16)
IMMATURE GRANULOCYTES: ABNORMAL %
INR BLD: 1.3
LYMPHOCYTES # BLD: 19 % (ref 24–44)
MAGNESIUM: 2.1 MG/DL (ref 1.6–2.6)
MCH RBC QN AUTO: 30.5 PG (ref 26–34)
MCHC RBC AUTO-ENTMCNC: 32.4 G/DL (ref 31–37)
MCV RBC AUTO: 94.2 FL (ref 80–100)
MONOCYTES # BLD: 11 % (ref 1–7)
MORPHOLOGY: ABNORMAL
MORPHOLOGY: ABNORMAL
NRBC AUTOMATED: ABNORMAL PER 100 WBC
PARTIAL THROMBOPLASTIN TIME: 34.4 SEC (ref 24–36)
PDW BLD-RTO: 16.7 % (ref 11.5–14.9)
PLATELET # BLD: 104 K/UL (ref 150–450)
PLATELET ESTIMATE: ABNORMAL
PMV BLD AUTO: 9.7 FL (ref 6–12)
POTASSIUM SERPL-SCNC: 4.1 MMOL/L (ref 3.7–5.3)
PROTHROMBIN TIME: 15.9 SEC (ref 11.8–14.6)
RBC # BLD: 2.31 M/UL (ref 4–5.2)
RBC # BLD: ABNORMAL 10*6/UL
SEG NEUTROPHILS: 68 % (ref 36–66)
SEGMENTED NEUTROPHILS ABSOLUTE COUNT: 4.83 K/UL (ref 1.3–9.1)
SODIUM BLD-SCNC: 132 MMOL/L (ref 135–144)
TRANSFUSION STATUS: NORMAL
UNIT DIVISION: 0
UNIT NUMBER: NORMAL
WBC # BLD: 7.1 K/UL (ref 3.5–11)
WBC # BLD: ABNORMAL 10*3/UL

## 2021-12-06 PROCEDURE — 85018 HEMOGLOBIN: CPT

## 2021-12-06 PROCEDURE — C9113 INJ PANTOPRAZOLE SODIUM, VIA: HCPCS | Performed by: SURGERY

## 2021-12-06 PROCEDURE — 6360000002 HC RX W HCPCS: Performed by: INTERNAL MEDICINE

## 2021-12-06 PROCEDURE — 83735 ASSAY OF MAGNESIUM: CPT

## 2021-12-06 PROCEDURE — 2580000003 HC RX 258: Performed by: SURGERY

## 2021-12-06 PROCEDURE — 6360000002 HC RX W HCPCS: Performed by: SURGERY

## 2021-12-06 PROCEDURE — 82947 ASSAY GLUCOSE BLOOD QUANT: CPT

## 2021-12-06 PROCEDURE — 2500000003 HC RX 250 WO HCPCS: Performed by: FAMILY MEDICINE

## 2021-12-06 PROCEDURE — 6370000000 HC RX 637 (ALT 250 FOR IP): Performed by: INTERNAL MEDICINE

## 2021-12-06 PROCEDURE — 36415 COLL VENOUS BLD VENIPUNCTURE: CPT

## 2021-12-06 PROCEDURE — P9047 ALBUMIN (HUMAN), 25%, 50ML: HCPCS | Performed by: SURGERY

## 2021-12-06 PROCEDURE — 85014 HEMATOCRIT: CPT

## 2021-12-06 PROCEDURE — 6370000000 HC RX 637 (ALT 250 FOR IP): Performed by: SURGERY

## 2021-12-06 PROCEDURE — 94003 VENT MGMT INPAT SUBQ DAY: CPT

## 2021-12-06 PROCEDURE — 80048 BASIC METABOLIC PNL TOTAL CA: CPT

## 2021-12-06 PROCEDURE — 85610 PROTHROMBIN TIME: CPT

## 2021-12-06 PROCEDURE — 85730 THROMBOPLASTIN TIME PARTIAL: CPT

## 2021-12-06 PROCEDURE — 85025 COMPLETE CBC W/AUTO DIFF WBC: CPT

## 2021-12-06 PROCEDURE — 2500000003 HC RX 250 WO HCPCS: Performed by: SURGERY

## 2021-12-06 PROCEDURE — 2000000000 HC ICU R&B

## 2021-12-06 PROCEDURE — 90935 HEMODIALYSIS ONE EVALUATION: CPT

## 2021-12-06 RX ORDER — FLUCONAZOLE 100 MG/1
150 TABLET ORAL DAILY
Status: COMPLETED | OUTPATIENT
Start: 2021-12-06 | End: 2021-12-09

## 2021-12-06 RX ADMIN — Medication 1.2 ML: at 13:40

## 2021-12-06 RX ADMIN — PANTOPRAZOLE SODIUM 40 MG: 40 INJECTION, POWDER, FOR SOLUTION INTRAVENOUS at 20:12

## 2021-12-06 RX ADMIN — INSULIN GLARGINE 22 UNITS: 100 INJECTION, SOLUTION SUBCUTANEOUS at 22:45

## 2021-12-06 RX ADMIN — SODIUM CHLORIDE, PRESERVATIVE FREE 10 ML: 5 INJECTION INTRAVENOUS at 20:12

## 2021-12-06 RX ADMIN — ALBUMIN (HUMAN) 25 G: 0.25 INJECTION, SOLUTION INTRAVENOUS at 10:15

## 2021-12-06 RX ADMIN — INSULIN LISPRO 2 UNITS: 100 INJECTION, SOLUTION INTRAVENOUS; SUBCUTANEOUS at 08:56

## 2021-12-06 RX ADMIN — SERTRALINE HYDROCHLORIDE 25 MG: 50 TABLET ORAL at 15:30

## 2021-12-06 RX ADMIN — ANTI-FUNGAL POWDER MICONAZOLE NITRATE TALC FREE: 1.42 POWDER TOPICAL at 17:00

## 2021-12-06 RX ADMIN — PANTOPRAZOLE SODIUM 40 MG: 40 INJECTION, POWDER, FOR SOLUTION INTRAVENOUS at 08:55

## 2021-12-06 RX ADMIN — EPOETIN ALFA-EPBX 5000 UNITS: 10000 INJECTION, SOLUTION INTRAVENOUS; SUBCUTANEOUS at 20:11

## 2021-12-06 RX ADMIN — Medication 1.3 ML: at 13:40

## 2021-12-06 RX ADMIN — ANTI-FUNGAL POWDER MICONAZOLE NITRATE TALC FREE: 1.42 POWDER TOPICAL at 20:12

## 2021-12-06 RX ADMIN — MIDODRINE HYDROCHLORIDE 10 MG: 10 TABLET ORAL at 10:12

## 2021-12-06 RX ADMIN — FLUCONAZOLE 150 MG: 100 TABLET ORAL at 15:29

## 2021-12-06 RX ADMIN — FERROUS SULFATE TAB 325 MG (65 MG ELEMENTAL FE) 325 MG: 325 (65 FE) TAB at 15:30

## 2021-12-06 RX ADMIN — CHLORHEXIDINE GLUCONATE 0.12% ORAL RINSE 15 ML: 1.2 LIQUID ORAL at 20:11

## 2021-12-06 RX ADMIN — ATORVASTATIN CALCIUM 40 MG: 40 TABLET, FILM COATED ORAL at 15:29

## 2021-12-06 RX ADMIN — SODIUM CHLORIDE, PRESERVATIVE FREE 10 ML: 5 INJECTION INTRAVENOUS at 08:56

## 2021-12-06 RX ADMIN — CHLORHEXIDINE GLUCONATE 0.12% ORAL RINSE 15 ML: 1.2 LIQUID ORAL at 08:56

## 2021-12-06 RX ADMIN — OXYCODONE HYDROCHLORIDE AND ACETAMINOPHEN 1 TABLET: 5; 325 TABLET ORAL at 15:30

## 2021-12-06 ASSESSMENT — ENCOUNTER SYMPTOMS
BLOOD IN STOOL: 0
EYE REDNESS: 0
VOMITING: 0
COLOR CHANGE: 0
CHEST TIGHTNESS: 0
SHORTNESS OF BREATH: 0
COUGH: 0
TROUBLE SWALLOWING: 0
ABDOMINAL PAIN: 0
NAUSEA: 0
EYE ITCHING: 0

## 2021-12-06 ASSESSMENT — PULMONARY FUNCTION TESTS
PIF_VALUE: 25
PIF_VALUE: 23
PIF_VALUE: 24
PIF_VALUE: 31
PIF_VALUE: 24
PIF_VALUE: 23
PIF_VALUE: 25
PIF_VALUE: 23
PIF_VALUE: 25
PIF_VALUE: 24
PIF_VALUE: 28
PIF_VALUE: 24
PIF_VALUE: 23
PIF_VALUE: 24
PIF_VALUE: 27
PIF_VALUE: 26
PIF_VALUE: 30
PIF_VALUE: 24
PIF_VALUE: 23
PIF_VALUE: 26
PIF_VALUE: 27
PIF_VALUE: 25
PIF_VALUE: 25
PIF_VALUE: 43
PIF_VALUE: 24
PIF_VALUE: 23
PIF_VALUE: 34
PIF_VALUE: 25
PIF_VALUE: 24
PIF_VALUE: 26
PIF_VALUE: 24
PIF_VALUE: 25

## 2021-12-06 ASSESSMENT — PAIN DESCRIPTION - FREQUENCY: FREQUENCY: INTERMITTENT

## 2021-12-06 ASSESSMENT — PAIN DESCRIPTION - ONSET: ONSET: GRADUAL

## 2021-12-06 ASSESSMENT — PAIN SCALES - GENERAL
PAINLEVEL_OUTOF10: 0
PAINLEVEL_OUTOF10: 4
PAINLEVEL_OUTOF10: 0
PAINLEVEL_OUTOF10: 0

## 2021-12-06 ASSESSMENT — PAIN DESCRIPTION - DESCRIPTORS: DESCRIPTORS: OTHER (COMMENT)

## 2021-12-06 ASSESSMENT — PAIN DESCRIPTION - LOCATION: LOCATION: THROAT;OTHER (COMMENT)

## 2021-12-06 NOTE — PLAN OF CARE
Problem: SAFETY  Goal: Free from accidental physical injury  Outcome: Met This Shift  Goal: Free from intentional harm  Outcome: Met This Shift     Problem: DAILY CARE  Goal: Daily care needs are met  Outcome: Met This Shift     Problem: PAIN  Goal: Patient's pain/discomfort is manageable  Outcome: Met This Shift     Problem: SKIN INTEGRITY  Goal: Skin integrity is maintained or improved  Outcome: Met This Shift     Problem: DISCHARGE BARRIERS  Goal: Patient's continuum of care needs are met  Outcome: Met This Shift     Problem: Falls - Risk of:  Goal: Will remain free from falls  Outcome: Met This Shift  Goal: Absence of physical injury  Outcome: Met This Shift     Problem: Musculor/Skeletal Functional Status  Goal: Absence of falls  Outcome: Met This Shift     Problem: Musculor/Skeletal Functional Status  Goal: Absence of falls  Outcome: Met This Shift     Problem: Nutrition  Goal: Optimal nutrition therapy  Outcome: Met This Shift     Problem: Pain:  Goal: Pain level will decrease  Outcome: Met This Shift  Goal: Control of acute pain  Outcome: Met This Shift  Goal: Control of chronic pain  Outcome: Met This Shift     Problem: Airway Clearance - Ineffective:  Goal: Ability to maintain a clear airway will improve  Outcome: Met This Shift     Problem: OXYGENATION/RESPIRATORY FUNCTION  Goal: Patient will maintain patent airway  Outcome: Met This Shift

## 2021-12-06 NOTE — PROGRESS NOTES
Comprehensive Nutrition Assessment    Type and Reason for Visit:  Reassess    Nutrition Recommendations/Plan:   Recommend continue Nepro at 40 ml per hour + 1 protein modular to provide 1836 kcal, 104 g protein    Nutrition Assessment:  Pt was tolerating tube feed at 40 ml per hour prior to tube feed hold for HD. Pt is s/p PEG (12/3). Plan is for LTAC. Malnutrition Assessment:  Malnutrition Status: At risk for malnutrition (Comment)    Context:  Acute Illness     Findings of the 6 clinical characteristics of malnutrition:  Energy Intake:   (Previous mild energy deficit)  Weight Loss:  Unable to assess (Edema)     Body Fat Loss:  Unable to assess     Muscle Mass Loss:  Unable to assess    Fluid Accumulation:  7 - Moderate to Severe Extremities, Generalized (Anasarca)   Strength:  Not Performed    Estimated Daily Nutrient Needs:  Energy (kcal):  15 kcal/kg= 1476-5915 kcal; Weight Used for Energy Requirements:   (92kg)     Protein (g):  92 based on 1 gm per kg (may vary with healing needs and renal status);  Weight Used for Protein Requirements:   (92kg)          Nutrition Related Findings:  Edema: +2 pitting BUE, mild BLE, Labs/Meds: Reviewed, 48 l out ostomy      Wounds:  Venous Stasis, Skin Tears, Multiple       Current Nutrition Therapies:    Current Tube Feeding (TF) Orders:  · Feeding Route: Nasogastric  · Formula: Renal Formula  · Schedule: Continuous  · Additives/Modulars: Protein  · Current TF & Flush Orders Provides: Nepro at 40 ml per hour + 1 Protein modular provides 1836 kcal, 104 g protein    Anthropometric Measures:  · Height: 5' 2.99\" (160 cm)  · Current Body Weight: 210 lb (95.3 kg)   · Admission Body Weight: 204 lb (92.5 kg)    Ideal Body Weight: 115 lbs;     Nutrition Diagnosis:   · Increased nutrient needs related to  (healing) as evidenced by wounds    Nutrition Interventions:   Food and/or Nutrient Delivery:  Continue Current Tube Feeding  Nutrition Education/Counseling:  No

## 2021-12-06 NOTE — PROGRESS NOTES
[facility]                 PATIENT NAME: Trinidad Stephens     TODAY'S DATE: 12/6/2021, 11:14 AM    SUBJECTIVE:  POD#3  Pt seen and examined. Afebrile, VSS. No leukocytosis, hemoglobin low but stable at 7.0. patient is awake and alert. Denies abdominal pain. colostomy functioning. PEG site clean. Tolerating tube feeds at 35mL/hr. No N/V. Trach site with moderate amount of tan secretions but otherwise looks good. IR to exchange Jose Carlos catheter for tunneled HD catheter today. OBJECTIVE:   VITALS:  /63   Pulse 118   Temp 98.5 °F (36.9 °C) (Oral)   Resp 20   Ht 5' 2.99\" (1.6 m)   Wt 210 lb 15.7 oz (95.7 kg)   SpO2 94%   BMI 37.38 kg/m²      INTAKE/OUTPUT:      Intake/Output Summary (Last 24 hours) at 12/6/2021 1114  Last data filed at 12/6/2021 0400  Gross per 24 hour   Intake 871 ml   Output 135 ml   Net 736 ml                 CONSTITUTIONAL:  awake and alert.   No acute distress  HEART:   Mild tachycardia   LUNGS:   Trach to vent, failed weaning trial today  ABDOMEN:   Abdomen soft, non-tender, non-distended  EXTREMITIES:   Trace pedal edema    Data:  CBC:   Lab Results   Component Value Date    WBC 7.1 12/06/2021    RBC 2.31 12/06/2021    RBC 4.14 08/31/2016    HGB 7.0 12/06/2021    HCT 21.7 12/06/2021    MCV 94.2 12/06/2021    MCH 30.5 12/06/2021    MCHC 32.4 12/06/2021    RDW 16.7 12/06/2021     12/06/2021    MPV 9.7 12/06/2021     BMP:    Lab Results   Component Value Date     12/06/2021    K 4.1 12/06/2021    CL 94 12/06/2021    CO2 21 12/06/2021    BUN 71 12/06/2021    LABALBU 2.6 11/18/2021    CREATININE 5.36 12/06/2021    CALCIUM 9.3 12/06/2021    GFRAA 9 12/06/2021    LABGLOM 8 12/06/2021    GLUCOSE 157 12/06/2021    GLUCOSE 77 08/31/2016       Radiology Review:       XR CHEST PORTABLE [[id number]] Collected: 12/05/21 0709     Order Status: Completed Updated: 12/05/21 0907     Narrative:       EXAMINATION:   ONE XRAY VIEW OF THE CHEST 12/5/2021 7:07 am     COMPARISON:   Chest radiograph performed 12/03/2021. HISTORY:   ORDERING SYSTEM PROVIDED HISTORY: Acute respiratory failure with hypoxia   TECHNOLOGIST PROVIDED HISTORY:   Acute respiratory failure with hypoxia   Reason for Exam: Acute respiratory failure with hypoxia   Acuity: Acute   Type of Exam: Subsequent/Follow-up   Additional signs and symptoms: Acute respiratory failure with hypoxia     FINDINGS:   There are bilateral effusions.  There are scattered infiltrates.  There is no   pneumothorax.  The heart is enlarged.  The upper abdomen is unremarkable. There is a tracheostomy tube.  There is a right internal jugular line.      Impression:       Cardiomegaly with bibasilar effusions and scattered pulmonary infiltrates. ASSESSMENT     Principal Problem:    JAYLYN (acute kidney injury) ([facility] [address]  Active Problems:    Type 2 diabetes mellitus with kidney complication, with long-term current use of insulin ([facility])    CKD (chronic kidney disease) stage 3, GFR 30-59 ml/min ([facility])    HTN (hypertension)    ASCVD (arteriosclerotic cardiovascular disease)    Iron deficiency anemia    Hydronephrosis determined by ultrasound    Longstanding persistent atrial fibrillation ([facility])    Acute metabolic encephalopathy    Acute cystitis without hematuria    Acute respiratory failure with hypoxia and hypercapnia ([facility])    Dilated cardiomyopathy ([facility] [address]  Resolved Problems:    * No resolved hospital problems. *      Plan  1. Gradually advance tube feeds to goal  2. Colostomy functioning  3. Continue local Trach and PEG tube site care  4. Surgically stable  5.  Continue medical management       Electronically signed by Carissa Rainey PA-C  [address] [address]

## 2021-12-06 NOTE — PROGRESS NOTES
Progress Note    12/6/2021   2:05 PM    Name:  Jean Marie Cheek  MRN:    [id number]     Florencioide:     [de-identified]   Room:  2004/2004-01  IP Day: 32     Admit Date: 11/10/2021  4:23 PM  PCP: Raya Carter DO    Subjective:     C/C:   Chief Complaint   Patient presents with    Abnormal Lab     low hemoglobin and high CR       Interval History: Status: not changed. Patient examined during hemodialysis. Alert and answers questions. Patient is s/p trach and PEG on ventilator. Blood pressure heart rate stable. Recent labs reviewed hemoglobin 7. Blood sugar readings stable    ROS:   all 10 systems reviewed and are negative except as noted    Review of Systems   Constitutional: Negative for chills and fatigue. HENT: Negative for drooling, mouth sores, sneezing and trouble swallowing. Eyes: Negative for redness and itching. Respiratory: Negative for cough, chest tightness and shortness of breath. Cardiovascular: Negative for chest pain, palpitations and leg swelling. Gastrointestinal: Negative for abdominal pain, blood in stool, nausea and vomiting. Endocrine: Negative for heat intolerance and polyphagia. Genitourinary: Negative for difficulty urinating, flank pain and pelvic pain. Musculoskeletal: Negative for arthralgias, joint swelling and neck stiffness. Skin: Negative for color change and pallor. Allergic/Immunologic: Negative for food allergies. Neurological: Negative for dizziness, seizures and headaches. Hematological: Does not bruise/bleed easily. Psychiatric/Behavioral: Negative for agitation, behavioral problems and suicidal ideas. The patient is not hyperactive. Medications:      Allergies: No Known Allergies    Current Meds: fluconazole (DIFLUCAN) tablet 150 mg, Daily  miconazole (MICOTIN) 2 % powder, BID  metoprolol (LOPRESSOR) injection 5 mg, Q6H PRN  polyethylene glycol (GLYCOLAX) packet 17 g, Daily PRN  atorvastatin (LIPITOR) tablet 40 mg, Daily  0.9 % sodium chloride infusion, PRN  sodium citrate 4 % injection 1.3 mL, PRN   And  sodium citrate 4 % injection 1.2 mL, PRN  midodrine (PROAMATINE) tablet 10 mg, TID PRN  albumin human 25 % IV solution 25 g, PRN  midodrine (PROAMATINE) tablet 5 mg, PRN  norepinephrine (LEVOPHED) 16 mg in sodium chloride 0.9 % 250 mL infusion, Continuous  chlorhexidine (PERIDEX) 0.12 % solution 15 mL, BID  fentaNYL (SUBLIMAZE) injection 50 mcg, Q30 Min PRN  propofol injection, Continuous  [Held by provider] carvedilol (COREG) tablet 3.125 mg, BID WC  pantoprazole (PROTONIX) injection 40 mg, BID   And  sodium chloride flush 0.9 % injection 10 mL, BID  magnesium sulfate 1000 mg in dextrose 5% 100 mL IVPB, PRN  glucose (GLUTOSE) 40 % oral gel 15 g, PRN  dextrose 50 % IV solution, PRN  glucagon (rDNA) injection 1 mg, PRN  dextrose 5 % solution, PRN  sodium polystyrene (KAYEXALATE) 15 GM/60ML suspension 15 g, Once  sertraline (ZOLOFT) tablet 25 mg, Daily  ferrous sulfate (IRON 325) tablet 325 mg, Daily with breakfast  [Held by provider] aspirin chewable tablet 81 mg, Daily  insulin glargine (LANTUS) injection vial 22 Units, Nightly  insulin lispro (HUMALOG) injection vial 0-12 Units, TID WC  insulin lispro (HUMALOG) injection vial 0-6 Units, Nightly  sodium chloride flush 0.9 % injection 5-40 mL, 2 times per day  sodium chloride flush 0.9 % injection 5-40 mL, PRN  0.9 % sodium chloride infusion, PRN  acetaminophen (TYLENOL) tablet 650 mg, Q4H PRN  ondansetron (ZOFRAN-ODT) disintegrating tablet 4 mg, Q8H PRN   Or  ondansetron (ZOFRAN) injection 4 mg, Q6H PRN  oxyCODONE-acetaminophen (PERCOCET) 5-325 MG per tablet 1 tablet, Q6H PRN        Data:     Code Status:  DNR-CCA    Family History   Problem Relation Age of Onset    High Blood Pressure Other        Social History     Socioeconomic History    Marital status:      Spouse name: Not on file    Number of children: Not on file    Years of education: Not on file    Highest education level: Not on file   Occupational History    Not on file   Tobacco Use    Smoking status: Never Smoker    Smokeless tobacco: Not on file   Substance and Sexual Activity    Alcohol use: No     Alcohol/week: 0.0 standard drinks    Drug use: No    Sexual activity: Not on file   Other Topics Concern    Not on file   Social History Narrative    Not on file     Social Determinants of Health     Financial Resource Strain:     Difficulty of Paying Living Expenses: Not on file   Food Insecurity:     Worried About Running Out of Food in the Last Year: Not on file    Katya of Food in the Last Year: Not on file   Transportation Needs:     Lack of Transportation (Medical): Not on file    Lack of Transportation (Non-Medical): Not on file   Physical Activity:     Days of Exercise per Week: Not on file    Minutes of Exercise per Session: Not on file   Stress:     Feeling of Stress : Not on file   Social Connections:     Frequency of Communication with Friends and Family: Not on file    Frequency of Social Gatherings with Friends and Family: Not on file    Attends Yazidi Services: Not on file    Active Member of [address] [place] [address] or Organizations: Not on file    Attends Club or Organization Meetings: Not on file    Marital Status: Not on file   Intimate Partner Violence:     Fear of Current or Ex-Partner: Not on file    Emotionally Abused: Not on file    Physically Abused: Not on file    Sexually Abused: Not on file   Housing Stability:     Unable to Pay for Housing in the Last Year: Not on file    Number of [place] in the Last Year: Not on file    Unstable Housing in the Last Year: Not on file       I/O (24Hr): Intake/Output Summary (Last 24 hours) at 12/6/2021 1405  Last data filed at 12/6/2021 0400  Gross per 24 hour   Intake 841 ml   Output 135 ml   Net 706 ml     Radiology:  XR CHEST (SINGLE VIEW FRONTAL)    Result Date: 11/30/2021  Cardiomegaly with bilateral effusions and perihilar infiltrates.  Support tubes as described above. XR CHEST PORTABLE    Result Date: 12/5/2021  Cardiomegaly with bibasilar effusions and scattered pulmonary infiltrates. XR CHEST PORTABLE    Result Date: 12/3/2021  1. Interval placement of a tracheostomy tube. 2. No pneumothorax. 3. Persistent pulmonary vascular congestion and bilateral pleural effusions.      XR CHEST PORTABLE    Result Date: 12/3/2021  Mild pulmonary vascular congestion and bilateral pleural effusions, right greater than left, similar to the previous exam.       Labs:  Recent Results (from the past 24 hour(s))   POC Glucose Fingerstick    Collection Time: 12/05/21  4:19 PM   Result Value Ref Range    POC Glucose 141 (H) 65 - 105 mg/dL   Hgb/Hct    Collection Time: 12/05/21  6:41 PM   Result Value Ref Range    Hemoglobin 7.0 (LL) 12.0 - 16.0 g/dL    Hematocrit 21.4 (L) 36 - 46 %   POC Glucose Fingerstick    Collection Time: 12/05/21  8:39 PM   Result Value Ref Range    POC Glucose 192 (H) 65 - 105 mg/dL   Hgb/Hct    Collection Time: 12/06/21 12:32 AM   Result Value Ref Range    Hemoglobin 7.1 (L) 12.0 - 16.0 g/dL    Hematocrit 21.9 (L) 36 - 46 %   CBC with DIFF    Collection Time: 12/06/21  5:33 AM   Result Value Ref Range    WBC 7.1 3.5 - 11.0 k/uL    RBC 2.31 (L) 4.0 - 5.2 m/uL    Hemoglobin 7.0 (LL) 12.0 - 16.0 g/dL    Hematocrit 21.7 (L) 36 - 46 %    MCV 94.2 80 - 100 fL    MCH 30.5 26 - 34 pg    MCHC 32.4 31 - 37 g/dL    RDW 16.7 (H) 11.5 - 14.9 %    Platelets 692 (L) 357 - 450 k/uL    MPV 9.7 6.0 - 12.0 fL    NRBC Automated NOT REPORTED per 100 WBC    Differential Type NOT REPORTED     Immature Granulocytes NOT REPORTED 0 %    Absolute Immature Granulocyte NOT REPORTED 0.00 - 0.30 k/uL    WBC Morphology NOT REPORTED     RBC Morphology NOT REPORTED     Platelet Estimate NOT REPORTED     Seg Neutrophils 68 (H) 36 - 66 %    Lymphocytes 19 (L) 24 - 44 %    Monocytes 11 (H) 1 - 7 %    Eosinophils % 2 0 - 4 %    Basophils 0 0 - 2 %    Segs Absolute 4.83 1.3 - 9.1 k/uL    Absolute Lymph # 1.35 1.0 - 4.8 k/uL    Absolute Mono # 0.78 0.1 - 1.3 k/uL    Absolute Eos # 0.14 0.0 - 0.4 k/uL    Basophils Absolute 0.00 0.0 - 0.2 k/uL    Morphology ANISOCYTOSIS PRESENT     Morphology 1+ HYPOCHROMIA PRESENT    Magnesium    Collection Time: 21  5:33 AM   Result Value Ref Range    Magnesium 2.1 1.6 - 2.6 mg/dL   Basic Metabolic Panel w/ Reflex to MG    Collection Time: 21  5:33 AM   Result Value Ref Range    Glucose 157 (H) 70 - 99 mg/dL    BUN 71 (H) 8 - 23 mg/dL    CREATININE 5.36 (HH) 0.50 - 0.90 mg/dL    Bun/Cre Ratio NOT REPORTED 9 - 20    Calcium 9.3 8.6 - 10.4 mg/dL    Sodium 132 (L) 135 - 144 mmol/L    Potassium 4.1 3.7 - 5.3 mmol/L    Chloride 94 (L) 98 - 107 mmol/L    CO2 21 20 - 31 mmol/L    Anion Gap 17 9 - 17 mmol/L    GFR Non-African American 8 (L) >60 mL/min    GFR  9 (L) >60 mL/min    GFR Comment          GFR Staging NOT REPORTED    PROTIME-INR    Collection Time: 21  5:33 AM   Result Value Ref Range    Protime 15.9 (H) 11.8 - 14.6 sec    INR 1.3    APTT    Collection Time: 21  5:33 AM   Result Value Ref Range    PTT 34.4 24.0 - 36.0 sec   POC Glucose Fingerstick    Collection Time: 21 11:32 AM   Result Value Ref Range    POC Glucose 138 (H) 65 - 105 mg/dL   POC Glucose Fingerstick    Collection Time: 21  1:21 PM   Result Value Ref Range    POC Glucose 125 (H) 65 - 105 mg/dL       Physical Examination:        Vitals:  /69   Pulse 99   Temp 98.5 °F (36.9 °C) (Oral)   Resp 20   Ht 5' 2.99\" (1.6 m)   Wt 210 lb 15.7 oz (95.7 kg)   SpO2 (!) 87%   BMI 37.38 kg/m²   Temp (24hrs), Av.5 °F (36.9 °C), Min:98.2 °F (36.8 °C), Max:98.9 °F (37.2 °C)    Recent Labs     21  1619 21  2039 21  1132 21  1321   POCGLU 141* 192* 138* 125*         Physical Exam  Vitals reviewed. HENT:      Head: Normocephalic.       Right Ear: External ear normal.      Left Ear: External ear normal.      Nose: Nose normal.      Mouth/Throat:      Mouth: Mucous membranes are moist.      Pharynx: Oropharynx is clear. Eyes:      Conjunctiva/sclera: Conjunctivae normal.   Cardiovascular:      Rate and Rhythm: Normal rate and regular rhythm. Pulses: Normal pulses. Heart sounds: Normal heart sounds. Pulmonary:      Effort: Pulmonary effort is normal.      Breath sounds: Normal breath sounds. Comments: Trach in place, on ventilator  Abdominal:      General: Bowel sounds are normal.      Palpations: Abdomen is soft. Comments: G-tube in place   Musculoskeletal:         General: No deformity. Cervical back: Normal range of motion and neck supple. Right lower leg: Edema (1+) present. Left lower leg: Edema (1+) present. Skin:     General: Skin is warm. Capillary Refill: Capillary refill takes less than 2 seconds. Coloration: Skin is not jaundiced. Neurological:      General: No focal deficit present. Mental Status: She is alert. Mental status is at baseline. Psychiatric:         Mood and Affect: Mood normal.         Behavior: Behavior normal.         Assessment:        Primary Problem  JAYLYN (acute kidney injury) ([facility] [address]     Principal Problem:    JAYLYN (acute kidney injury) ([facility] [address]  Active Problems:    Type 2 diabetes mellitus with kidney complication, with long-term current use of insulin ([facility])    CKD (chronic kidney disease) stage 3, GFR 30-59 ml/min ([facility])    HTN (hypertension)    ASCVD (arteriosclerotic cardiovascular disease)    Iron deficiency anemia    Hydronephrosis determined by ultrasound    Longstanding persistent atrial fibrillation ([facility])    Acute metabolic encephalopathy    Acute cystitis without hematuria    Acute respiratory failure with hypoxia and hypercapnia ([facility])    Dilated cardiomyopathy ([facility] [address]  Resolved Problems:    * No resolved hospital problems.  *      Past Medical History:   Diagnosis Date    Anemia, unspecified     CAD (coronary artery disease)     Chronic

## 2021-12-06 NOTE — CARE COORDINATION
LETTY spoke with Lisbeth Perez in admissions with [facility] and she reported that they had started pre-cert on this date, this AM.  LETTY waiting for the authorization. LETTY updated Vy Remy in admissions with [facility].

## 2021-12-06 NOTE — PROGRESS NOTES
extremities. Data/  Recent Labs     12/04/21 0525 12/04/21  1215 12/05/21  0430 12/05/21  0430 12/05/21  1841 12/06/21  0032 12/06/21 0533   WBC 6.4  --  7.1  --   --   --  7.1   HGB 7.0*   < > 7.2*  7.1*   < > 7.0* 7.1* 7.0*   HCT 21.3*   < > 22.4*  21.6*   < > 21.4* 21.9* 21.7*   MCV 95.3  --  95.5  --   --   --  94.2   PLT 48*  --  75*  --   --   --  104*    < > = values in this interval not displayed. Recent Labs     12/04/21 0525 12/05/21 0430 12/06/21 0533   * 136 132*   K 4.2 4.3 4.1   CL 96* 96* 94*   CO2 23 21 21   GLUCOSE 117* 184* 157*   MG 1.9 2.1 2.1   BUN 56* 63* 71*   CREATININE 4.25* 4.76* 5.36*   LABGLOM 10* 9* 8*   GFRAA 12* 11* 9*       DATA:    CBC with Differential:    Lab Results   Component Value Date    WBC 7.1 12/06/2021    RBC 2.31 12/06/2021    RBC 4.14 08/31/2016    HGB 7.0 12/06/2021    HCT 21.7 12/06/2021     12/06/2021    MCV 94.2 12/06/2021    MCH 30.5 12/06/2021    MCHC 32.4 12/06/2021    RDW 16.7 12/06/2021    NRBC 4 11/22/2021    METASPCT 3 12/02/2021    LYMPHOPCT 19 12/06/2021    LYMPHOPCT 32.8 08/31/2016    MONOPCT 11 12/06/2021    EOSPCT 4.3 08/31/2016    BASOPCT 0 12/06/2021    BASOPCT 0.7 08/31/2016    MONOSABS 0.78 12/06/2021    LYMPHSABS 1.35 12/06/2021    EOSABS 0.14 12/06/2021    BASOSABS 0.00 12/06/2021    DIFFTYPE NOT REPORTED 12/06/2021     BMP:    Lab Results   Component Value Date     12/06/2021    K 4.1 12/06/2021    CL 94 12/06/2021    CO2 21 12/06/2021    BUN 71 12/06/2021    LABALBU 2.6 11/18/2021    CREATININE 5.36 12/06/2021    CALCIUM 9.3 12/06/2021    GFRAA 9 12/06/2021    LABGLOM 8 12/06/2021    GLUCOSE 157 12/06/2021    GLUCOSE 77 08/31/2016     Sodium:    Lab Results   Component Value Date     12/06/2021     Potassium:    Lab Results   Component Value Date    K 4.1 12/06/2021     Assessment/     1.  Acute Kidney Injury secondary to ischemic acute tubular necrosis and obstructive nephropathy secondary to bilateral hydronephrosis    2. Heart failure with reduced ejection fraction [HFrEF with LVEF 30 to 35% 11/10/2021]    3. Acute hypoxic and hypercapnic respiratory failure      4. Normocytic anemia and thrombocytopenia     5. Bilateral hydronephrosis - status post cystoscopy with right ureteral  stent placement.     6. Hypertension     Plan/     MWF hemodialysis schedule  Hemodialysis today  Continue to monitor urine output closely  Avoid Nephrotoxic agents  BMP daily    Austin Joyce MD    Addendum to Resident Progres note:   Pt seen,examined and evaluated. I have reviewed the current history, physical findings, labs and assessment and plan and agree with note as documented by resident physician. Patient seen and examined during dialysis, tolerating well. Continue HD as per orders, UF Gaol 2-3 L as tolerated. Retacrit 3 times per week. tunneled catheter placement is pending. Discharge planning is in progress.     Philomena Rivas MD

## 2021-12-06 NOTE — FLOWSHEET NOTE
12/06/21 1826   Encounter Summary   Services provided to: Patient   Referral/Consult From: Palliative Care   Complexity of Encounter Low   Length of Encounter 15 minutes   Spiritual/Adventism   Type Spiritual support   Assessment Sleeping   Intervention Prayer

## 2021-12-06 NOTE — PROGRESS NOTES
Physical Therapy        Physical Therapy Cancel Note      DATE: 2021    NAME: Citlali Juarez  MRN: [id number]   : 1950      Patient not seen this date for Physical Therapy due to:    Hemodialysis:      Electronically signed by Kurt Oliveira PT on 2021 at 12:55 PM

## 2021-12-06 NOTE — PROGRESS NOTES
[facility]   OCCUPATIONAL THERAPY MISSED TREATMENT NOTE   INPATIENT   Date: 21  Patient Name: Stephanie Harden       Room: 8329/7114-90  MRN: [id number]   Account #: [de-identified]    : 1950  (66 y.o.)  Gender: female   Referring Practitioner: Garland Luna MD  Diagnosis: JAYLYN, UTI, anemia             REASON FOR MISSED TREATMENT:  Patient unable to participate   -   Hemodialysis         EBONIE Murillo

## 2021-12-06 NOTE — PLAN OF CARE
Problem: SAFETY  Goal: Free from accidental physical injury  12/6/2021 0256 by Romi Sr RN  Outcome: Ongoing  Goal: Free from intentional harm  12/6/2021 0256 by Romi Sr RN  Outcome: Ongoing     Problem: DAILY CARE  Goal: Daily care needs are met  12/6/2021 0256 by Romi Sr RN  Outcome: Ongoing  Note: Daily care needs have been met this shift. Hygiene care needs has been assessed this shift. Will continue to monitor. Problem: PAIN  Goal: Patient's pain/discomfort is manageable  12/6/2021 0256 by Romi Sr RN  Outcome: Ongoing  12/5/2021 1921 by Hien Rod RN  Outcome: Met This Shift     Problem: SKIN INTEGRITY  Goal: Skin integrity is maintained or improved  12/6/2021 0256 by Romi Sr RN  Outcome: Ongoing     Problem: KNOWLEDGE DEFICIT  Goal: Patient/S.O. demonstrates understanding of disease process, treatment plan, medications, and discharge instructions. 12/6/2021 0256 by Romi Sr RN  Outcome: Ongoing     Problem: DISCHARGE BARRIERS  Goal: Patient's continuum of care needs are met  12/6/2021 0256 by Romi Sr RN  Outcome: Ongoing     Problem: Falls - Risk of:  Goal: Will remain free from falls  Description: Will remain free from falls  12/6/2021 0256 by Romi Sr RN  Outcome: Ongoing  Note: Patient has trach to vent. Patient remains free from falls. Goal: Absence of physical injury  Description: Absence of physical injury  12/6/2021 0256 by Romi Sr RN  Outcome: Ongoing     Problem: Skin Integrity:  Goal: Will show no infection signs and symptoms  Description: Will show no infection signs and symptoms  12/6/2021 0256 by Romi Sr RN  Outcome: Ongoing  Note: Patient afebrile. No signs or symptoms of infection noted.    Goal: Absence of new skin breakdown  Description: Absence of new skin breakdown  12/6/2021 0256 by Romi Sr RN  Outcome: Ongoing     Problem: Musculor/Skeletal Functional Status  Goal: Highest potential functional level  12/6/2021 0256 by Romi Sr RN  Outcome: Ongoing  Note: Patient has generalized weakness. Goal: Absence of falls  12/6/2021 0256 by Sonja Churchill RN  Outcome: Ongoing     Problem: Musculor/Skeletal Functional Status  Goal: Highest potential functional level  12/6/2021 0256 by Sonja Churchill RN  Outcome: Ongoing  Goal: Absence of falls  12/6/2021 0256 by Sonja Churchill RN  Outcome: Ongoing     Problem: Nutrition  Goal: Optimal nutrition therapy  12/6/2021 0256 by Sonja Churchill RN  Outcome: Ongoing  Note: Patient has tube feed running at goal with no residuals. Problem: Pain:  Goal: Pain level will decrease  Description: Pain level will decrease  12/6/2021 0256 by Sonja Churchill RN  Outcome: Ongoing  Goal: Control of acute pain  Description: Control of acute pain  12/6/2021 0256 by Sonja Churchill RN  Outcome: Ongoing  Note: Patient denies having any pain. Goal: Control of chronic pain  Description: Control of chronic pain  12/6/2021 0256 by Sonja Churchill RN  Outcome: Ongoing     Problem: Gas Exchange - Impaired:  Goal: Levels of oxygenation will improve  Description: Levels of oxygenation will improve  12/6/2021 0256 by Sonja Churchill RN  Outcome: Ongoing  Note: Oxygen saturation remains above goal on current vent settings. Problem: Fluid Volume - Imbalance:  Goal: Absence of imbalanced fluid volume signs and symptoms  Description: Absence of imbalanced fluid volume signs and symptoms  12/6/2021 0256 by Sonja Churchill RN  Outcome: Ongoing  Note: Monitoring I&O's.   12/5/2021 1921 by Rajat Hawkins RN  Outcome: Ongoing     Problem: Airway Clearance - Ineffective:  Goal: Ability to maintain a clear airway will improve  Description: Ability to maintain a clear airway will improve  12/6/2021 0256 by Sonja Churchill RN  Outcome: Ongoing     Problem:  Bowel Function - Altered:  Goal: Bowel elimination is within specified parameters  Description: Bowel elimination is within specified parameters  12/6/2021 0256 by Sonja Churchill RN  Outcome: Ongoing  Note: Patient has active bowel sounds. Colostomy has adequate output. Problem: Fluid Volume - Imbalance:  Goal: Absence of imbalanced fluid volume signs and symptoms  Description: Absence of imbalanced fluid volume signs and symptoms  12/6/2021 0256 by Dorita Gilman RN  Outcome: Ongoing     Problem: Gas Exchange - Impaired:  Goal: Levels of oxygenation will improve  Description: Levels of oxygenation will improve  12/6/2021 0256 by Dorita Gilman RN  Outcome: Ongoing  Note: Oxygen saturation remains above goal on current vent settings.       Problem: Mental Status - Impaired:  Goal: Mental status will be restored to baseline  Description: Mental status will be restored to baseline  12/6/2021 0256 by Dorita Gilman RN  Outcome: Ongoing     Problem: Nutrition Deficit:  Goal: Ability to achieve adequate nutritional intake will improve  Description: Ability to achieve adequate nutritional intake will improve  12/6/2021 0256 by Dorita Gilman RN  Outcome: Ongoing     Problem: OXYGENATION/RESPIRATORY FUNCTION  Goal: Patient will maintain patent airway  12/6/2021 0256 by Dorita Gilman RN  Outcome: Ongoing  Goal: Patient will achieve/maintain normal respiratory rate/effort  Description: Respiratory rate and effort will be within normal limits for the patient  12/6/2021 0256 by Dorita Gilman RN  Outcome: Ongoing     Problem: MECHANICAL VENTILATION  Goal: Patient will maintain patent airway  Outcome: Ongoing  Goal: Oral health is maintained or improved  12/6/2021 0256 by Dorita Gilman RN  Outcome: Ongoing

## 2021-12-06 NOTE — FLOWSHEET NOTE
12/05/21 1909   Provider Notification   Reason for Communication Evaluate   Provider Name Dr. Lorraine Sanchez. Provider Notification Physician   Method of Communication Secure Message   Response Waiting for response   Dr. Lorraine Sanchez notified of patient's hemoglobin of 7.0. Awaiting response.

## 2021-12-06 NOTE — FLOWSHEET NOTE
12/06/21 1350   Vital Signs   /67   Pulse 101   Post-Hemodialysis Assessment   Post-Treatment Procedures Blood returned; Catheter capped, clamped with Citrate x 2 ports   Machine Disinfection Process Acid/Vinegar Clean; Heat Disinfect   Rinseback Volume (ml) 300 ml   Total Liters Processed (l/min) 46.2 l/min   Dialyzer Clearance Heavily streaked   Duration of Treatment (minutes) 210 minutes   Hemodialysis Intake (ml) 500 ml   Hemodialysis Output (ml) 3500 ml   NET Removed (ml) 3000 ml   Tolerated Treatment Fair   Patient Response to Treatment Patient tolerated treatment fairly well. Albumin and midorine given at start of treatmemt. 3000mL removed. Report given to [facility] and [address] [place] [address].

## 2021-12-07 ENCOUNTER — APPOINTMENT (OUTPATIENT)
Dept: GENERAL RADIOLOGY | Age: 71
DRG: 004 | End: 2021-12-07
Payer: COMMERCIAL

## 2021-12-07 ENCOUNTER — APPOINTMENT (OUTPATIENT)
Dept: INTERVENTIONAL RADIOLOGY/VASCULAR | Age: 71
DRG: 004 | End: 2021-12-07
Payer: COMMERCIAL

## 2021-12-07 LAB
ABSOLUTE EOS #: 0.15 K/UL (ref 0–0.4)
ABSOLUTE IMMATURE GRANULOCYTE: ABNORMAL K/UL (ref 0–0.3)
ABSOLUTE LYMPH #: 1.23 K/UL (ref 1–4.8)
ABSOLUTE MONO #: 0.77 K/UL (ref 0.1–1.3)
ALLEN TEST: ABNORMAL
ANION GAP SERPL CALCULATED.3IONS-SCNC: 14 MMOL/L (ref 9–17)
BASOPHILS # BLD: 0 % (ref 0–2)
BASOPHILS ABSOLUTE: 0 K/UL (ref 0–0.2)
BUN BLDV-MCNC: 49 MG/DL (ref 8–23)
BUN/CREAT BLD: ABNORMAL (ref 9–20)
CALCIUM SERPL-MCNC: 8.8 MG/DL (ref 8.6–10.4)
CARBOXYHEMOGLOBIN: 1.3 % (ref 0–5)
CHLORIDE BLD-SCNC: 96 MMOL/L (ref 98–107)
CO2: 23 MMOL/L (ref 20–31)
CREAT SERPL-MCNC: 3.72 MG/DL (ref 0.5–0.9)
DIFFERENTIAL TYPE: ABNORMAL
EOSINOPHILS RELATIVE PERCENT: 2 % (ref 0–4)
FIO2: 21
GFR AFRICAN AMERICAN: 14 ML/MIN
GFR NON-AFRICAN AMERICAN: 12 ML/MIN
GFR SERPL CREATININE-BSD FRML MDRD: ABNORMAL ML/MIN/{1.73_M2}
GFR SERPL CREATININE-BSD FRML MDRD: ABNORMAL ML/MIN/{1.73_M2}
GLUCOSE BLD-MCNC: 129 MG/DL (ref 65–105)
GLUCOSE BLD-MCNC: 139 MG/DL (ref 65–105)
GLUCOSE BLD-MCNC: 159 MG/DL (ref 70–99)
GLUCOSE BLD-MCNC: 165 MG/DL (ref 65–105)
GLUCOSE BLD-MCNC: 205 MG/DL (ref 65–105)
HCO3 ARTERIAL: 24.6 MMOL/L (ref 22–26)
HCT VFR BLD CALC: 21.5 % (ref 36–46)
HCT VFR BLD CALC: 21.6 % (ref 36–46)
HEMOGLOBIN: 7 G/DL (ref 12–16)
HEMOGLOBIN: 7 G/DL (ref 12–16)
IMMATURE GRANULOCYTES: ABNORMAL %
LYMPHOCYTES # BLD: 16 % (ref 24–44)
MAGNESIUM: 1.9 MG/DL (ref 1.6–2.6)
MCH RBC QN AUTO: 30.7 PG (ref 26–34)
MCHC RBC AUTO-ENTMCNC: 32.5 G/DL (ref 31–37)
MCV RBC AUTO: 94.3 FL (ref 80–100)
METHEMOGLOBIN: 1.6 % (ref 0–1.9)
MODE: ABNORMAL
MONOCYTES # BLD: 10 % (ref 1–7)
MORPHOLOGY: ABNORMAL
NEGATIVE BASE EXCESS, ART: ABNORMAL MMOL/L (ref 0–2)
NOTIFICATION TIME: ABNORMAL
NOTIFICATION: ABNORMAL
NRBC AUTOMATED: ABNORMAL PER 100 WBC
O2 DEVICE/FLOW/%: ABNORMAL
O2 SAT, ARTERIAL: 91.4 % (ref 95–98)
OXYHEMOGLOBIN: ABNORMAL % (ref 95–98)
PATIENT TEMP: 37
PCO2 ARTERIAL: 38.3 MMHG (ref 35–45)
PCO2, ART, TEMP ADJ: ABNORMAL (ref 35–45)
PDW BLD-RTO: 16.9 % (ref 11.5–14.9)
PEEP/CPAP: 5
PH ARTERIAL: 7.42 (ref 7.35–7.45)
PH, ART, TEMP ADJ: ABNORMAL (ref 7.35–7.45)
PLATELET # BLD: 95 K/UL (ref 150–450)
PLATELET ESTIMATE: ABNORMAL
PMV BLD AUTO: 9.7 FL (ref 6–12)
PO2 ARTERIAL: 65.8 MMHG (ref 80–100)
PO2, ART, TEMP ADJ: ABNORMAL MMHG (ref 80–100)
POSITIVE BASE EXCESS, ART: 0.1 MMOL/L (ref 0–2)
POTASSIUM SERPL-SCNC: 3.7 MMOL/L (ref 3.7–5.3)
PSV: ABNORMAL
PT. POSITION: ABNORMAL
RBC # BLD: 2.3 M/UL (ref 4–5.2)
RBC # BLD: ABNORMAL 10*6/UL
RESPIRATORY RATE: 20
SAMPLE SITE: ABNORMAL
SEG NEUTROPHILS: 72 % (ref 36–66)
SEGMENTED NEUTROPHILS ABSOLUTE COUNT: 5.55 K/UL (ref 1.3–9.1)
SET RATE: 20
SODIUM BLD-SCNC: 133 MMOL/L (ref 135–144)
TEXT FOR RESPIRATORY: ABNORMAL
TOTAL HB: ABNORMAL G/DL (ref 12–16)
TOTAL RATE: 22
VT: 450
WBC # BLD: 7.7 K/UL (ref 3.5–11)
WBC # BLD: ABNORMAL 10*3/UL

## 2021-12-07 PROCEDURE — 6370000000 HC RX 637 (ALT 250 FOR IP): Performed by: INTERNAL MEDICINE

## 2021-12-07 PROCEDURE — C9113 INJ PANTOPRAZOLE SODIUM, VIA: HCPCS | Performed by: SURGERY

## 2021-12-07 PROCEDURE — 80048 BASIC METABOLIC PNL TOTAL CA: CPT

## 2021-12-07 PROCEDURE — 2000000000 HC ICU R&B

## 2021-12-07 PROCEDURE — 6360000002 HC RX W HCPCS: Performed by: SURGERY

## 2021-12-07 PROCEDURE — 2580000003 HC RX 258: Performed by: SURGERY

## 2021-12-07 PROCEDURE — 6370000000 HC RX 637 (ALT 250 FOR IP): Performed by: SURGERY

## 2021-12-07 PROCEDURE — 94761 N-INVAS EAR/PLS OXIMETRY MLT: CPT

## 2021-12-07 PROCEDURE — 85014 HEMATOCRIT: CPT

## 2021-12-07 PROCEDURE — 77001 FLUOROGUIDE FOR VEIN DEVICE: CPT | Performed by: RADIOLOGY

## 2021-12-07 PROCEDURE — 82805 BLOOD GASES W/O2 SATURATION: CPT

## 2021-12-07 PROCEDURE — 6360000002 HC RX W HCPCS: Performed by: RADIOLOGY

## 2021-12-07 PROCEDURE — 02PYX3Z REMOVAL OF INFUSION DEVICE FROM GREAT VESSEL, EXTERNAL APPROACH: ICD-10-PCS | Performed by: FAMILY MEDICINE

## 2021-12-07 PROCEDURE — 0JH63XZ INSERTION OF TUNNELED VASCULAR ACCESS DEVICE INTO CHEST SUBCUTANEOUS TISSUE AND FASCIA, PERCUTANEOUS APPROACH: ICD-10-PCS | Performed by: FAMILY MEDICINE

## 2021-12-07 PROCEDURE — 6370000000 HC RX 637 (ALT 250 FOR IP): Performed by: FAMILY MEDICINE

## 2021-12-07 PROCEDURE — 85025 COMPLETE CBC W/AUTO DIFF WBC: CPT

## 2021-12-07 PROCEDURE — 36415 COLL VENOUS BLD VENIPUNCTURE: CPT

## 2021-12-07 PROCEDURE — 36558 INSERT TUNNELED CV CATH: CPT | Performed by: RADIOLOGY

## 2021-12-07 PROCEDURE — 85018 HEMOGLOBIN: CPT

## 2021-12-07 PROCEDURE — 71045 X-RAY EXAM CHEST 1 VIEW: CPT

## 2021-12-07 PROCEDURE — 36600 WITHDRAWAL OF ARTERIAL BLOOD: CPT

## 2021-12-07 PROCEDURE — 02HV33Z INSERTION OF INFUSION DEVICE INTO SUPERIOR VENA CAVA, PERCUTANEOUS APPROACH: ICD-10-PCS | Performed by: FAMILY MEDICINE

## 2021-12-07 PROCEDURE — 82947 ASSAY GLUCOSE BLOOD QUANT: CPT

## 2021-12-07 PROCEDURE — 2580000003 HC RX 258: Performed by: INTERNAL MEDICINE

## 2021-12-07 PROCEDURE — 83735 ASSAY OF MAGNESIUM: CPT

## 2021-12-07 PROCEDURE — 99212 OFFICE O/P EST SF 10 MIN: CPT

## 2021-12-07 PROCEDURE — 94003 VENT MGMT INPAT SUBQ DAY: CPT

## 2021-12-07 PROCEDURE — C1769 GUIDE WIRE: HCPCS

## 2021-12-07 RX ORDER — HEPARIN SODIUM 5000 [USP'U]/ML
INJECTION, SOLUTION INTRAVENOUS; SUBCUTANEOUS
Status: COMPLETED | OUTPATIENT
Start: 2021-12-07 | End: 2021-12-07

## 2021-12-07 RX ORDER — ACETAMINOPHEN 325 MG/1
650 TABLET ORAL EVERY 4 HOURS PRN
Status: DISCONTINUED | OUTPATIENT
Start: 2021-12-07 | End: 2021-12-09 | Stop reason: SDUPTHER

## 2021-12-07 RX ORDER — 0.9 % SODIUM CHLORIDE 0.9 %
500 INTRAVENOUS SOLUTION INTRAVENOUS ONCE
Status: COMPLETED | OUTPATIENT
Start: 2021-12-07 | End: 2021-12-07

## 2021-12-07 RX ADMIN — NYSTATIN 500000 UNITS: 500000 SUSPENSION ORAL at 20:22

## 2021-12-07 RX ADMIN — CHLORHEXIDINE GLUCONATE 0.12% ORAL RINSE 15 ML: 1.2 LIQUID ORAL at 20:22

## 2021-12-07 RX ADMIN — SODIUM CHLORIDE 500 ML: 9 INJECTION, SOLUTION INTRAVENOUS at 11:54

## 2021-12-07 RX ADMIN — SODIUM CHLORIDE, PRESERVATIVE FREE 10 ML: 5 INJECTION INTRAVENOUS at 09:00

## 2021-12-07 RX ADMIN — INSULIN LISPRO 2 UNITS: 100 INJECTION, SOLUTION INTRAVENOUS; SUBCUTANEOUS at 21:32

## 2021-12-07 RX ADMIN — ANTI-FUNGAL POWDER MICONAZOLE NITRATE TALC FREE: 1.42 POWDER TOPICAL at 07:57

## 2021-12-07 RX ADMIN — FERROUS SULFATE TAB 325 MG (65 MG ELEMENTAL FE) 325 MG: 325 (65 FE) TAB at 07:57

## 2021-12-07 RX ADMIN — PANTOPRAZOLE SODIUM 40 MG: 40 INJECTION, POWDER, FOR SOLUTION INTRAVENOUS at 20:22

## 2021-12-07 RX ADMIN — SERTRALINE HYDROCHLORIDE 25 MG: 50 TABLET ORAL at 07:57

## 2021-12-07 RX ADMIN — CHLORHEXIDINE GLUCONATE 0.12% ORAL RINSE 15 ML: 1.2 LIQUID ORAL at 07:57

## 2021-12-07 RX ADMIN — SODIUM CHLORIDE, PRESERVATIVE FREE 10 ML: 5 INJECTION INTRAVENOUS at 20:23

## 2021-12-07 RX ADMIN — PANTOPRAZOLE SODIUM 40 MG: 40 INJECTION, POWDER, FOR SOLUTION INTRAVENOUS at 07:57

## 2021-12-07 RX ADMIN — FENTANYL CITRATE 50 MCG: 0.05 INJECTION, SOLUTION INTRAMUSCULAR; INTRAVENOUS at 04:21

## 2021-12-07 RX ADMIN — ANTI-FUNGAL POWDER MICONAZOLE NITRATE TALC FREE: 1.42 POWDER TOPICAL at 20:22

## 2021-12-07 RX ADMIN — SODIUM CHLORIDE, PRESERVATIVE FREE 10 ML: 5 INJECTION INTRAVENOUS at 20:22

## 2021-12-07 RX ADMIN — NYSTATIN 500000 UNITS: 500000 SUSPENSION ORAL at 16:55

## 2021-12-07 RX ADMIN — ATORVASTATIN CALCIUM 40 MG: 40 TABLET, FILM COATED ORAL at 07:57

## 2021-12-07 RX ADMIN — INSULIN LISPRO 2 UNITS: 100 INJECTION, SOLUTION INTRAVENOUS; SUBCUTANEOUS at 16:55

## 2021-12-07 RX ADMIN — INSULIN GLARGINE 22 UNITS: 100 INJECTION, SOLUTION SUBCUTANEOUS at 21:32

## 2021-12-07 RX ADMIN — HEPARIN SODIUM 1.9 ML: 5000 INJECTION INTRAVENOUS; SUBCUTANEOUS at 11:29

## 2021-12-07 RX ADMIN — FLUCONAZOLE 150 MG: 100 TABLET ORAL at 07:57

## 2021-12-07 ASSESSMENT — PULMONARY FUNCTION TESTS
PIF_VALUE: 25
PIF_VALUE: 22
PIF_VALUE: 24
PIF_VALUE: 50
PIF_VALUE: 26
PIF_VALUE: 22
PIF_VALUE: 23
PIF_VALUE: 28
PIF_VALUE: 24
PIF_VALUE: 22

## 2021-12-07 ASSESSMENT — ENCOUNTER SYMPTOMS
CHEST TIGHTNESS: 0
ABDOMINAL PAIN: 0
BLOOD IN STOOL: 0
VOMITING: 0
NAUSEA: 0
EYE ITCHING: 0
COUGH: 0
TROUBLE SWALLOWING: 0
COLOR CHANGE: 0
EYE REDNESS: 0
SHORTNESS OF BREATH: 0

## 2021-12-07 ASSESSMENT — PAIN SCALES - GENERAL: PAINLEVEL_OUTOF10: 0

## 2021-12-07 NOTE — PROGRESS NOTES
Nephrology Progress Note    Subjective/   66y.o. year old female who we are seeing in consultation for JAYLYN. Patient has a past medical history of Type 2 diabetes mellitus, coronary artery disease, lumbar radiculopathy, Hyperlipidemia and chronic kidney disease stage III [baseline serum creatinine 1.5 mg/dL], who presented from [facility] for further evaluation of acute kidney injury. She was sent to [facility] after developing nonhealing wound s/p cardiac catheterization via right refill Levophed has been discontinued approach with right groin wound. She had been on Bumex in the ECF and creatinine and BUN have been rising and Bumex was decreased to a lowest dose of 0.5 mg p.o. twice daily. Patient was found to have JAYLYN likely secondary to ischemic acute tubular necrosis and obstructive nephropathy secondary to bilateral hydronephrosis. Patient received acute dialysis on 12/04. Will be started on hemodialysis MWF schedule. Patient seen and examined at bedside. Resting comfortably. Alert and Oriented, trying to speak. Vitals are within normal limits. Tolerated HD yesterday. Objective/     Vitals:    12/07/21 0400 12/07/21 0500 12/07/21 0508 12/07/21 0600   BP: (!) 117/59 (!) 131/91  124/72   Pulse: 105 99  99   Resp: 20 20 20 20   Temp: 98.6 °F (37 °C)      TempSrc: Oral      SpO2: 93% 99% (!) 81%    Weight:       Height:         24HR INTAKE/OUTPUT:      Intake/Output Summary (Last 24 hours) at 12/7/2021 0817  Last data filed at 12/7/2021 0422  Gross per 24 hour   Intake 1458 ml   Output 3680 ml   Net -2222 ml     Patient Vitals for the past 96 hrs (Last 3 readings):   Weight   12/06/21 1000 210 lb 15.7 oz (95.7 kg)   12/04/21 1825 210 lb 15.7 oz (95.7 kg)   12/04/21 1500 217 lb 9.5 oz (98.7 kg)       Constitutional:  On ventilatory support via tracheostomy. Awake and alert. Cardiovascular:  S1, S2 without m/r/g  Respiratory:  Decreased breath sounds bilaterally.   Abdomen: +bs, soft, nt  Ext: +2 Pitting edema bilaterally lower extremities. Data/  Recent Labs     12/05/21  0430 12/05/21  1841 12/06/21  0533 12/06/21  0533 12/06/21  1514 12/06/21  2103 12/07/21  0330   WBC 7.1  --  7.1  --   --   --  7.7   HGB 7.2*  7.1*   < > 7.0*   < > 7.2* 7.0* 7.0*   HCT 22.4*  21.6*   < > 21.7*   < > 21.9* 21.8* 21.6*   MCV 95.5  --  94.2  --   --   --  94.3   PLT 75*  --  104*  --   --   --  95*    < > = values in this interval not displayed. Recent Labs     12/05/21  0430 12/06/21  0533 12/07/21  0330    132* 133*   K 4.3 4.1 3.7   CL 96* 94* 96*   CO2 21 21 23   GLUCOSE 184* 157* 159*   MG 2.1 2.1 1.9   BUN 63* 71* 49*   CREATININE 4.76* 5.36* 3.72*   LABGLOM 9* 8* 12*   GFRAA 11* 9* 14*       DATA:    CBC with Differential:    Lab Results   Component Value Date    WBC 7.7 12/07/2021    RBC 2.30 12/07/2021    RBC 4.14 08/31/2016    HGB 7.0 12/07/2021    HCT 21.6 12/07/2021    PLT 95 12/07/2021    MCV 94.3 12/07/2021    MCH 30.7 12/07/2021    MCHC 32.5 12/07/2021    RDW 16.9 12/07/2021    NRBC 4 11/22/2021    METASPCT 3 12/02/2021    LYMPHOPCT 16 12/07/2021    LYMPHOPCT 32.8 08/31/2016    MONOPCT 10 12/07/2021    EOSPCT 4.3 08/31/2016    BASOPCT 0 12/07/2021    BASOPCT 0.7 08/31/2016    MONOSABS 0.77 12/07/2021    LYMPHSABS 1.23 12/07/2021    EOSABS 0.15 12/07/2021    BASOSABS 0.00 12/07/2021    DIFFTYPE NOT REPORTED 12/07/2021     BMP:    Lab Results   Component Value Date     12/07/2021    K 3.7 12/07/2021    CL 96 12/07/2021    CO2 23 12/07/2021    BUN 49 12/07/2021    LABALBU 2.6 11/18/2021    CREATININE 3.72 12/07/2021    CALCIUM 8.8 12/07/2021    GFRAA 14 12/07/2021    LABGLOM 12 12/07/2021    GLUCOSE 159 12/07/2021    GLUCOSE 77 08/31/2016     BUN/Creatinine:    Lab Results   Component Value Date    BUN 49 12/07/2021    CREATININE 3.72 12/07/2021     Assessment/     1.  Acute Kidney Injury secondary to ischemic acute tubular necrosis and obstructive nephropathy secondary to

## 2021-12-07 NOTE — PROGRESS NOTES
Page out to primary to discuss Critical lab value:    Results for Nahun Aguirre (MRN [id number]) as of 12/7/2021 17:05   Ref. Range 12/7/2021 16:33   Hemoglobin Quant Latest Ref Range: 12.0 - 16.0 g/dL 7.0 (LL)       Awaiting response at this time.

## 2021-12-07 NOTE — PROGRESS NOTES
(37 °C). Her blood pressure is 124/72 and her pulse is 102. Her respiration is 22 and oxygen saturation is 95%. Ventilator Settings (Basic)  Vent Mode: PRVC Rate Set: 20 bmp/Vt Ordered: 450 mL/ /FiO2 : 21 %    Constitutional -alert  General Appearance  well developed, well nourished obese  HEENT -tracheostomy in place normocephalic, atraumatic. PERRLA  Lungs - Chest expands equally, no wheezes,   Diminished breath sounds right  Cardiovascular - Heart sounds are normal.  normal rate and rhythm regular, no murmur, gallop or rub. Abdomen - soft, nontender, nondistended, no masses or organomegaly  Neurologic - CN II-XII are grossly intact.  There are no focal motor deficits  Skin - no bruising or bleeding  Extremities - no cyanosis, clubbing; generalized edema    Lab Results   CBC     Lab Results   Component Value Date    WBC 7.7 12/07/2021    RBC 2.30 12/07/2021    RBC 4.14 08/31/2016    HGB 7.0 12/07/2021    HCT 21.6 12/07/2021    PLT 95 12/07/2021    MCV 94.3 12/07/2021    MCH 30.7 12/07/2021    MCHC 32.5 12/07/2021    RDW 16.9 12/07/2021    NRBC 4 11/22/2021    METASPCT 3 12/02/2021    LYMPHOPCT 16 12/07/2021    LYMPHOPCT 32.8 08/31/2016    MONOPCT 10 12/07/2021    EOSPCT 4.3 08/31/2016    BASOPCT 0 12/07/2021    BASOPCT 0.7 08/31/2016    MONOSABS 0.77 12/07/2021    LYMPHSABS 1.23 12/07/2021    EOSABS 0.15 12/07/2021    BASOSABS 0.00 12/07/2021    DIFFTYPE NOT REPORTED 12/07/2021       BMP   Lab Results   Component Value Date     12/07/2021    K 3.7 12/07/2021    CL 96 12/07/2021    CO2 23 12/07/2021    BUN 49 12/07/2021    CREATININE 3.72 12/07/2021    GLUCOSE 159 12/07/2021    GLUCOSE 77 08/31/2016    CALCIUM 8.8 12/07/2021       LFTS  Lab Results   Component Value Date    ALKPHOS 175 11/18/2021    ALT 31 11/18/2021    AST 21 11/18/2021    PROT 5.3 11/20/2021    BILITOT 0.27 11/18/2021    BILIDIR <0.08 11/13/2021    IBILI Connot be calculated 11/13/2021    LABALBU 2.6 11/18/2021       INR  Recent Labs 12/06/21  0533   PROTIME 15.9*   INR 1.3       APTT  Recent Labs     12/06/21  0533   APTT 34.4       Lactic Acid  Lab Results   Component Value Date    LACTA 1.3 11/26/2021    LACTA 0.6 11/16/2021    LACTA 0.6 11/16/2021        BNP   No results for input(s): BNP in the last 72 hours.      Cultures       Radiology     Plain Films         Chest x-ray once again shows persistent right pleural effusion when she does not get extra dialysis      SYSTEM ASSESSMENT    Acute on chronic hypoxic and hypercapnic respiratory failure  Bradycardic arrest, intubated 11/20; tracheostomy 12/3  Right lower lobe infiltrate/pleural effusion  Acute kidney injury-requiring hemodialysis  Morbid obesity  Anemia, chronic disease and possible rectal bleed  Hypoalbuminemia  Thrombocytopenia  Nonischemic cardiomyopathy with EF of 30 to 35% possible right to left interatrial shunt  Atrial fibrillation  Possible CLL/lymphoproliferative disorder  DNR CCA    Has been failing weaning trials due to respiratory muscle weakness  Chest x-ray shows persistent effusion that goes away when she gets more aggressive dialysis  Transfuse if hemoglobin is less than 7, no obvious active bleeding  Blood sugars are stable  Tolerating tube feeds  Still awaiting tunnel catheter placement  I have no objection to transfer to [address] Time   0 min    Electronically signed by Alexsandra Tidwell MD on 12/7/2021 at 8:58 AM

## 2021-12-07 NOTE — PROGRESS NOTES
Patient's spouse was called and updated on her condition, treatment and plan of care. Writer discussed need for long term dialysis catheter. Spouse stated he will be coming to see his wife in the am and can discuss the dialysis catheter then. All questions and concerns were addressed.

## 2021-12-07 NOTE — PLAN OF CARE
Problem: SAFETY  Goal: Free from accidental physical injury  Outcome: Met This Shift  Note: Patient has remained free from any accidental injury at this time. Goal: Free from intentional harm  Outcome: Met This Shift  Note: Patient has remained free from any intentional harm at this time     Problem: Falls - Risk of:  Goal: Will remain free from falls  Description: Will remain free from falls  Outcome: Met This Shift  Note: Patient has remained free from falls at this time   Goal: Absence of physical injury  Description: Absence of physical injury  Outcome: Met This Shift  Note: Patient has remained free from any physical injury at this time     Problem: MECHANICAL VENTILATION  Goal: Patient will maintain patent airway  Outcome: Met This Shift  Note: Patient has maintained a patent airway at this time via trach to vent       Problem: DAILY CARE  Goal: Daily care needs are met  Outcome: Ongoing     Problem: PAIN  Goal: Patient's pain/discomfort is manageable  Outcome: Ongoing     Problem: SKIN INTEGRITY  Goal: Skin integrity is maintained or improved  Outcome: Ongoing     Problem: KNOWLEDGE DEFICIT  Goal: Patient/S.O. demonstrates understanding of disease process, treatment plan, medications, and discharge instructions.   Outcome: Ongoing     Problem: DISCHARGE BARRIERS  Goal: Patient's continuum of care needs are met  Outcome: Ongoing     Problem: Skin Integrity:  Goal: Will show no infection signs and symptoms  Description: Will show no infection signs and symptoms  Outcome: Ongoing  Goal: Absence of new skin breakdown  Description: Absence of new skin breakdown  Outcome: Ongoing     Problem: Musculor/Skeletal Functional Status  Goal: Highest potential functional level  Outcome: Ongoing  Goal: Absence of falls  Outcome: Ongoing     Problem: Musculor/Skeletal Functional Status  Goal: Highest potential functional level  Outcome: Ongoing  Goal: Absence of falls  Outcome: Ongoing     Problem: Nutrition  Goal: Optimal nutrition therapy  Outcome: Ongoing     Problem: Pain:  Goal: Pain level will decrease  Description: Pain level will decrease  Outcome: Ongoing  Goal: Control of acute pain  Description: Control of acute pain  Outcome: Ongoing  Goal: Control of chronic pain  Description: Control of chronic pain  Outcome: Ongoing     Problem: Gas Exchange - Impaired:  Goal: Levels of oxygenation will improve  Description: Levels of oxygenation will improve  Outcome: Ongoing     Problem: Fluid Volume - Imbalance:  Goal: Absence of imbalanced fluid volume signs and symptoms  Description: Absence of imbalanced fluid volume signs and symptoms  Outcome: Ongoing     Problem: Airway Clearance - Ineffective:  Goal: Ability to maintain a clear airway will improve  Description: Ability to maintain a clear airway will improve  Outcome: Ongoing     Problem:  Bowel Function - Altered:  Goal: Bowel elimination is within specified parameters  Description: Bowel elimination is within specified parameters  Outcome: Ongoing     Problem: Fluid Volume - Imbalance:  Goal: Absence of imbalanced fluid volume signs and symptoms  Description: Absence of imbalanced fluid volume signs and symptoms  Outcome: Ongoing     Problem: Gas Exchange - Impaired:  Goal: Levels of oxygenation will improve  Description: Levels of oxygenation will improve  Outcome: Ongoing     Problem: Mental Status - Impaired:  Goal: Mental status will be restored to baseline  Description: Mental status will be restored to baseline  Outcome: Ongoing     Problem: Nutrition Deficit:  Goal: Ability to achieve adequate nutritional intake will improve  Description: Ability to achieve adequate nutritional intake will improve  Outcome: Ongoing     Problem: OXYGENATION/RESPIRATORY FUNCTION  Goal: Patient will maintain patent airway  Outcome: Ongoing  Goal: Patient will achieve/maintain normal respiratory rate/effort  Description: Respiratory rate and effort will be within normal limits for the patient  Outcome: Ongoing     Problem: MECHANICAL VENTILATION  Goal: Oral health is maintained or improved  Outcome: Ongoing

## 2021-12-07 NOTE — CARE COORDINATION
Pre-cert was started yesterday for [facility] AT [facility]. Pre-cert is still in process, no answer as of yet.

## 2021-12-07 NOTE — PROGRESS NOTES
Nephrology Progress Note    Subjective/   66y.o. year old female who we are seeing in consultation for JAYLYN. Patient has a past medical history of Type 2 diabetes mellitus, coronary artery disease, lumbar radiculopathy, Hyperlipidemia and chronic kidney disease stage III [baseline serum creatinine 1.5 mg/dL], who presented from [facility] for further evaluation of acute kidney injury. She was sent to [facility] after developing nonhealing wound s/p cardiac catheterization via right refill Levophed has been discontinued approach with right groin wound. She had been on Bumex in the ECF and creatinine and BUN have been rising and Bumex was decreased to a lowest dose of 0.5 mg p.o. twice daily. Patient was found to have JAYLYN likely secondary to ischemic acute tubular necrosis and obstructive nephropathy secondary to bilateral hydronephrosis. Patient received acute dialysis on 12/04. Will be started on hemodialysis MWF schedule. Subjective/interval history    Patient was seen and examined at bedside. She is alert and responsive. S/P Trach and PEG on 12/03/2021. Status post tunnel dialysis catheter placement today   vitals are within normal limits.    Chest x-ray vascular congestion lower lung field opacities and pleural effusion right greater than left    Objective/     Vitals:    12/07/21 1118 12/07/21 1131 12/07/21 1200 12/07/21 1232   BP: 124/73 122/78     Pulse: 104 102  99   Resp: 22 22  20   Temp:   97.8 °F (36.6 °C)    TempSrc:   Oral    SpO2: 100% 98%     Weight:       Height:         24HR INTAKE/OUTPUT:      Intake/Output Summary (Last 24 hours) at 12/7/2021 1433  Last data filed at 12/7/2021 0800  Gross per 24 hour   Intake 958 ml   Output 180 ml   Net 778 ml     Patient Vitals for the past 96 hrs (Last 3 readings):   Weight   12/06/21 1000 210 lb 15.7 oz (95.7 kg)   12/04/21 1825 210 lb 15.7 oz (95.7 kg)   12/04/21 1500 217 lb 9.5 oz (98.7 kg)       Constitutional:  On ventilatory support via tracheostomy. Awake and alert. Cardiovascular:  S1, S2 without m/r/g  Respiratory: Bilateral chest rise, tracheostomy on ventilator  Abdomen: Soft nontender  Ext: +2 Pitting edema bilaterally lower extremities. Data/  Recent Labs     12/05/21  0430 12/05/21  1841 12/06/21  0533 12/06/21  0533 12/06/21  1514 12/06/21  2103 12/07/21  0330   WBC 7.1  --  7.1  --   --   --  7.7   HGB 7.2*  7.1*   < > 7.0*   < > 7.2* 7.0* 7.0*   HCT 22.4*  21.6*   < > 21.7*   < > 21.9* 21.8* 21.6*   MCV 95.5  --  94.2  --   --   --  94.3   PLT 75*  --  104*  --   --   --  95*    < > = values in this interval not displayed.      Recent Labs     12/05/21  0430 12/06/21  0533 12/07/21  0330    132* 133*   K 4.3 4.1 3.7   CL 96* 94* 96*   CO2 21 21 23   GLUCOSE 184* 157* 159*   MG 2.1 2.1 1.9   BUN 63* 71* 49*   CREATININE 4.76* 5.36* 3.72*   LABGLOM 9* 8* 12*   GFRAA 11* 9* 14*       DATA:    CBC with Differential:    Lab Results   Component Value Date    WBC 7.7 12/07/2021    RBC 2.30 12/07/2021    RBC 4.14 08/31/2016    HGB 7.0 12/07/2021    HCT 21.6 12/07/2021    PLT 95 12/07/2021    MCV 94.3 12/07/2021    MCH 30.7 12/07/2021    MCHC 32.5 12/07/2021    RDW 16.9 12/07/2021    NRBC 4 11/22/2021    METASPCT 3 12/02/2021    LYMPHOPCT 16 12/07/2021    LYMPHOPCT 32.8 08/31/2016    MONOPCT 10 12/07/2021    EOSPCT 4.3 08/31/2016    BASOPCT 0 12/07/2021    BASOPCT 0.7 08/31/2016    MONOSABS 0.77 12/07/2021    LYMPHSABS 1.23 12/07/2021    EOSABS 0.15 12/07/2021    BASOSABS 0.00 12/07/2021    DIFFTYPE NOT REPORTED 12/07/2021     BMP:    Lab Results   Component Value Date     12/07/2021    K 3.7 12/07/2021    CL 96 12/07/2021    CO2 23 12/07/2021    BUN 49 12/07/2021    LABALBU 2.6 11/18/2021    CREATININE 3.72 12/07/2021    CALCIUM 8.8 12/07/2021    GFRAA 14 12/07/2021    LABGLOM 12 12/07/2021    GLUCOSE 159 12/07/2021    GLUCOSE 77 08/31/2016     Sodium:    Lab Results   Component Value Date     12/07/2021 Potassium:    Lab Results   Component Value Date    K 3.7 12/07/2021     Assessment/     1. Acute Kidney Injury secondary to ischemic acute tubular necrosis and obstructive nephropathy secondary to bilateral hydronephrosis    2. Heart failure with reduced ejection fraction [HFrEF with LVEF 30 to 35% 11/10/2021]    3. Acute hypoxic and hypercapnic respiratory failure      4. Normocytic anemia and thrombocytopenia     5. Bilateral hydronephrosis - status post cystoscopy with right ureteral  stent placement.     6.   Hypertension     Plan/     MWF hemodialysis schedule  Extra sequential ultrafiltration today  Continue to monitor urine output closely  Avoid Nephrotoxic agents  BMP daily    Félix Sky MD

## 2021-12-07 NOTE — PROGRESS NOTES
Progress Note    12/7/2021   1:02 PM    Name:  Nicole Abdi  MRN:    [id number]     Florencioide:     [de-identified]   Room:  2004/2004-01  IP Day: 32     Admit Date: 11/10/2021  4:23 PM  PCP: Roxann Barry DO    Subjective:     C/C:   Chief Complaint   Patient presents with    Abnormal Lab     low hemoglobin and high CR       Interval History: Status: not changed. Patient is s/p tunnel cath . Has trach on ventilator. Patient understands all questions appropriately. Patient denies chest pain nausea vomiting. Monitoring tube feeds well. Vital signs reviewed and stable. Recent labs reviewed hemoglobin 7    ROS:   all 10 systems reviewed and are negative except as noted    Review of Systems   Constitutional: Negative for chills and fatigue. HENT: Negative for drooling, mouth sores, sneezing and trouble swallowing. Eyes: Negative for redness and itching. Respiratory: Negative for cough, chest tightness and shortness of breath. Cardiovascular: Negative for chest pain, palpitations and leg swelling. Gastrointestinal: Negative for abdominal pain, blood in stool, nausea and vomiting. Endocrine: Negative for heat intolerance and polyphagia. Genitourinary: Negative for difficulty urinating, flank pain and pelvic pain. Musculoskeletal: Negative for arthralgias, joint swelling and neck stiffness. Skin: Negative for color change and pallor. Allergic/Immunologic: Negative for food allergies. Neurological: Negative for dizziness, seizures and headaches. Hematological: Does not bruise/bleed easily. Psychiatric/Behavioral: Negative for agitation, behavioral problems and suicidal ideas. The patient is not hyperactive. Medications:      Allergies: No Known Allergies    Current Meds: acetaminophen (TYLENOL) tablet 650 mg, Q4H PRN  nystatin (MYCOSTATIN) 615481 UNIT/ML suspension 500,000 Units, 4x Daily  fluconazole (DIFLUCAN) tablet 150 mg, Daily  miconazole (MICOTIN) 2 % powder, BID  epoetin tyshawn-epbx (RETACRIT) injection 5,000 Units, Once per day on Mon Wed Fri  metoprolol (LOPRESSOR) injection 5 mg, Q6H PRN  polyethylene glycol (GLYCOLAX) packet 17 g, Daily PRN  atorvastatin (LIPITOR) tablet 40 mg, Daily  0.9 % sodium chloride infusion, PRN  sodium citrate 4 % injection 1.3 mL, PRN   And  sodium citrate 4 % injection 1.2 mL, PRN  midodrine (PROAMATINE) tablet 10 mg, TID PRN  albumin human 25 % IV solution 25 g, PRN  midodrine (PROAMATINE) tablet 5 mg, PRN  norepinephrine (LEVOPHED) 16 mg in sodium chloride 0.9 % 250 mL infusion, Continuous  chlorhexidine (PERIDEX) 0.12 % solution 15 mL, BID  fentaNYL (SUBLIMAZE) injection 50 mcg, Q30 Min PRN  propofol injection, Continuous  [Held by provider] carvedilol (COREG) tablet 3.125 mg, BID WC  pantoprazole (PROTONIX) injection 40 mg, BID   And  sodium chloride flush 0.9 % injection 10 mL, BID  magnesium sulfate 1000 mg in dextrose 5% 100 mL IVPB, PRN  glucose (GLUTOSE) 40 % oral gel 15 g, PRN  dextrose 50 % IV solution, PRN  glucagon (rDNA) injection 1 mg, PRN  dextrose 5 % solution, PRN  sodium polystyrene (KAYEXALATE) 15 GM/60ML suspension 15 g, Once  sertraline (ZOLOFT) tablet 25 mg, Daily  ferrous sulfate (IRON 325) tablet 325 mg, Daily with breakfast  [Held by provider] aspirin chewable tablet 81 mg, Daily  insulin glargine (LANTUS) injection vial 22 Units, Nightly  insulin lispro (HUMALOG) injection vial 0-12 Units, TID WC  insulin lispro (HUMALOG) injection vial 0-6 Units, Nightly  sodium chloride flush 0.9 % injection 5-40 mL, 2 times per day  sodium chloride flush 0.9 % injection 5-40 mL, PRN  0.9 % sodium chloride infusion, PRN  acetaminophen (TYLENOL) tablet 650 mg, Q4H PRN  ondansetron (ZOFRAN-ODT) disintegrating tablet 4 mg, Q8H PRN   Or  ondansetron (ZOFRAN) injection 4 mg, Q6H PRN  oxyCODONE-acetaminophen (PERCOCET) 5-325 MG per tablet 1 tablet, Q6H PRN        Data:     Code Status:  DNR-CCA    Family History   Problem Relation Age of Onset    High Blood Pressure Other        Social History     Socioeconomic History    Marital status:      Spouse name: Not on file    Number of children: Not on file    Years of education: Not on file    Highest education level: Not on file   Occupational History    Not on file   Tobacco Use    Smoking status: Never Smoker    Smokeless tobacco: Not on file   Substance and Sexual Activity    Alcohol use: No     Alcohol/week: 0.0 standard drinks    Drug use: No    Sexual activity: Not on file   Other Topics Concern    Not on file   Social History Narrative    Not on file     Social Determinants of Health     Financial Resource Strain:     Difficulty of Paying Living Expenses: Not on file   Food Insecurity:     Worried About Running Out of Food in the Last Year: Not on file    Katya of Food in the Last Year: Not on file   Transportation Needs:     Lack of Transportation (Medical): Not on file    Lack of Transportation (Non-Medical): Not on file   Physical Activity:     Days of Exercise per Week: Not on file    Minutes of Exercise per Session: Not on file   Stress:     Feeling of Stress : Not on file   Social Connections:     Frequency of Communication with Friends and Family: Not on file    Frequency of Social Gatherings with Friends and Family: Not on file    Attends [employer] Services: Not on file    Active Member of [address] [place] [address] F[employer] or Organizations: Not on file    Attends Club or Organization Meetings: Not on file    Marital Status: Not on file   Intimate Partner Violence:     Fear of Current or Ex-Partner: Not on file    Emotionally Abused: Not on file    Physically Abused: Not on file    Sexually Abused: Not on file   Housing Stability:     Unable to Pay for Housing in the Last Year: Not on file    Number of [place] in the Last Year: Not on file    Unstable Housing in the Last Year: Not on file       I/O (24Hr):     Intake/Output Summary (Last 24 hours) at 12/7/2021 1302  Last data filed at 12/7/2021 0800  Gross per 24 hour   Intake 1458 ml   Output 3680 ml   Net -2222 ml     Radiology:  XR CHEST PORTABLE    Result Date: 12/7/2021  No significant interval change. XR CHEST PORTABLE    Result Date: 12/5/2021  Cardiomegaly with bibasilar effusions and scattered pulmonary infiltrates. XR CHEST PORTABLE    Result Date: 12/3/2021  1. Interval placement of a tracheostomy tube. 2. No pneumothorax. 3. Persistent pulmonary vascular congestion and bilateral pleural effusions.      XR CHEST PORTABLE    Result Date: 12/3/2021  Mild pulmonary vascular congestion and bilateral pleural effusions, right greater than left, similar to the previous exam.       Labs:  Recent Results (from the past 24 hour(s))   POC Glucose Fingerstick    Collection Time: 12/06/21  1:21 PM   Result Value Ref Range    POC Glucose 125 (H) 65 - 105 mg/dL   Hgb/Hct    Collection Time: 12/06/21  3:14 PM   Result Value Ref Range    Hemoglobin 7.2 (L) 12.0 - 16.0 g/dL    Hematocrit 21.9 (L) 36 - 46 %   POC Glucose Fingerstick    Collection Time: 12/06/21  4:34 PM   Result Value Ref Range    POC Glucose 105 65 - 105 mg/dL   Hgb/Hct    Collection Time: 12/06/21  9:03 PM   Result Value Ref Range    Hemoglobin 7.0 (LL) 12.0 - 16.0 g/dL    Hematocrit 21.8 (L) 36 - 46 %   POC Glucose Fingerstick    Collection Time: 12/06/21  9:05 PM   Result Value Ref Range    POC Glucose 131 (H) 65 - 105 mg/dL   CBC with DIFF    Collection Time: 12/07/21  3:30 AM   Result Value Ref Range    WBC 7.7 3.5 - 11.0 k/uL    RBC 2.30 (L) 4.0 - 5.2 m/uL    Hemoglobin 7.0 (LL) 12.0 - 16.0 g/dL    Hematocrit 21.6 (L) 36 - 46 %    MCV 94.3 80 - 100 fL    MCH 30.7 26 - 34 pg    MCHC 32.5 31 - 37 g/dL    RDW 16.9 (H) 11.5 - 14.9 %    Platelets 95 (L) 510 - 450 k/uL    MPV 9.7 6.0 - 12.0 fL    NRBC Automated NOT REPORTED per 100 WBC    Differential Type NOT REPORTED     Immature Granulocytes NOT REPORTED 0 %    Absolute Immature Granulocyte NOT REPORTED 0.00 - 0.30 k/uL    WBC Morphology NOT REPORTED     RBC Morphology NOT REPORTED     Platelet Estimate NOT REPORTED     Seg Neutrophils 72 (H) 36 - 66 %    Lymphocytes 16 (L) 24 - 44 %    Monocytes 10 (H) 1 - 7 %    Eosinophils % 2 0 - 4 %    Basophils 0 0 - 2 %    Segs Absolute 5.55 1.3 - 9.1 k/uL    Absolute Lymph # 1.23 1.0 - 4.8 k/uL    Absolute Mono # 0.77 0.1 - 1.3 k/uL    Absolute Eos # 0.15 0.0 - 0.4 k/uL    Basophils Absolute 0.00 0.0 - 0.2 k/uL    Morphology ANISOCYTOSIS PRESENT     Morphology 1+ POLYCHROMASIA     Morphology 1+ TEARDROPS     Morphology HYPOCHROMIA PRESENT     Morphology FEW TARGET CELLS    Magnesium    Collection Time: 12/07/21  3:30 AM   Result Value Ref Range    Magnesium 1.9 1.6 - 2.6 mg/dL   Basic Metabolic Panel w/ Reflex to MG    Collection Time: 12/07/21  3:30 AM   Result Value Ref Range    Glucose 159 (H) 70 - 99 mg/dL    BUN 49 (H) 8 - 23 mg/dL    CREATININE 3.72 (H) 0.50 - 0.90 mg/dL    Bun/Cre Ratio NOT REPORTED 9 - 20    Calcium 8.8 8.6 - 10.4 mg/dL    Sodium 133 (L) 135 - 144 mmol/L    Potassium 3.7 3.7 - 5.3 mmol/L    Chloride 96 (L) 98 - 107 mmol/L    CO2 23 20 - 31 mmol/L    Anion Gap 14 9 - 17 mmol/L    GFR Non-African American 12 (L) >60 mL/min    GFR  14 (L) >60 mL/min    GFR Comment          GFR Staging NOT REPORTED    Blood gas, arterial    Collection Time: 12/07/21  5:08 AM   Result Value Ref Range    pH, Arterial 7.416 7.350 - 7.450    pCO2, Arterial 38.3 35.0 - 45.0 mmHg    pO2, Arterial 65.8 (L) 80.0 - 100.0 mmHg    HCO3, Arterial 24.6 22.0 - 26.0 mmol/L    Positive Base Excess, Art 0.1 0.0 - 2.0 mmol/L    Negative Base Excess, Art NOT REPORTED 0.0 - 2.0 mmol/L    O2 Sat, Arterial 91.4 (L) 95 - 98 %    Total Hb NOT REPORTED 12.0 - 16.0 g/dl    Oxyhemoglobin NOT REPORTED 95.0 - 98.0 %    Carboxyhemoglobin 1.3 0 - 5 %    Methemoglobin 1.6 0.0 - 1.9 %    Pt Temp 37.0     pH, Art, Temp Adj NOT REPORTED 7.350 - 7.450    pCO2, Art, Temp Adj NOT REPORTED 35.0 - 45.0 pO2, Art, Temp Adj NOT REPORTED 80.0 - 100.0 mmHg    O2 Device/Flow/% VENTILATOR     Respiratory Rate 20     Rolf Test PASS     Sample Site Right Radial Artery     Pt. Position SEMI-FOWLERS     Mode PRVC     Set Rate 20     Total Rate 22          FIO2 21     Peep/Cpap 5     PSV NOT REPORTED     Text for Respiratory RESULTED TO RN     NOTIFICATION NOT REPORTED     NOTIFICATION TIME NOT REPORTED    POC Glucose Fingerstick    Collection Time: 21  7:31 AM   Result Value Ref Range    POC Glucose 129 (H) 65 - 105 mg/dL   POC Glucose Fingerstick    Collection Time: 21 12:01 PM   Result Value Ref Range    POC Glucose 139 (H) 65 - 105 mg/dL       Physical Examination:        Vitals:  /78   Pulse 99   Temp 98.7 °F (37.1 °C) (Oral)   Resp 20   Ht 5' 2.99\" (1.6 m)   Wt 210 lb 15.7 oz (95.7 kg)   SpO2 98%   BMI 37.38 kg/m²   Temp (24hrs), Av.6 °F (37 °C), Min:98.4 °F (36.9 °C), Max:98.7 °F (37.1 °C)    Recent Labs     21  1634 21  2105 21  0731 21  1201   POCGLU 105 131* 129* 139*         Physical Exam  Vitals reviewed. HENT:      Head: Normocephalic. Right Ear: External ear normal.      Left Ear: External ear normal.      Nose: Nose normal.      Mouth/Throat:      Mouth: Mucous membranes are moist.      Pharynx: Oropharynx is clear. Eyes:      Conjunctiva/sclera: Conjunctivae normal.   Cardiovascular:      Rate and Rhythm: Normal rate and regular rhythm. Pulses: Normal pulses. Heart sounds: Normal heart sounds. Pulmonary:      Effort: Pulmonary effort is normal.      Breath sounds: Normal breath sounds. Comments: Trach on ventilator  Abdominal:      General: Bowel sounds are normal.      Palpations: Abdomen is soft. Comments: PEG tube in place   Musculoskeletal:         General: No deformity. Cervical back: Normal range of motion and neck supple. Skin:     General: Skin is warm.       Capillary Refill: Capillary refill takes less than 2 seconds. Coloration: Skin is not jaundiced. Neurological:      General: No focal deficit present. Mental Status: She is alert. Mental status is at baseline. Psychiatric:         Mood and Affect: Mood normal.         Behavior: Behavior normal.         Assessment:        Primary Problem  JAYLYN (acute kidney injury) ([facility]     Principal Problem:    JAYLYN (acute kidney injury) ([facility]  Active Problems:    Type 2 diabetes mellitus with kidney complication, with long-term current use of insulin ([facility])    CKD (chronic kidney disease) stage 3, GFR 30-59 ml/min ([facility])    HTN (hypertension)    ASCVD (arteriosclerotic cardiovascular disease)    Iron deficiency anemia    Hydronephrosis determined by ultrasound    Longstanding persistent atrial fibrillation ([facility])    Acute metabolic encephalopathy    Acute cystitis without hematuria    Acute respiratory failure with hypoxia and hypercapnia ([facility])    Dilated cardiomyopathy ([facility]  Resolved Problems:    * No resolved hospital problems. *      Past Medical History:   Diagnosis Date    Anemia, unspecified     CAD (coronary artery disease)     Chronic kidney disease     CKD (chronic kidney disease) stage 3, GFR 30-59 ml/min ([facility])     Closed fracture of dorsal (thoracic) vertebra without mention of spinal cord injury     Coronary atherosclerosis of unspecified type of vessel, native or graft     Depression with anxiety     Esophageal reflux     HTN (hypertension)     Hyperkalemia     Hyperlipidemia     Intervertebral cervical disc disorder with myelopathy, cervical region     Lower back pain     Lumbar radiculopathy     Microalbuminuria     Muscle weakness     Pain in limb     Stenosis of cervical spine     Tethered cord syndrome ([facility])     Type II or unspecified type diabetes mellitus without mention of complication, not stated as uncontrolled     Urinary calculus     UTI (lower urinary tract infection)         Plan:        1.  Diflucan 100 mg  daily per PEG tube  1. Monitor H&H every 8 hour  2. Tunneled cath for hemodialysis today per IR  3. LTAC discharge planning in progress  4. CMP  5. Continue tube feed  6. With statin powder twice daily for skin fold for candidal intertrigo  7. Lopressor IV 5 mg every 6 hours as needed for heart rate above 115.  8. Midodrine 5 mg p.o. 3 times a day as needed for systolic blood pressure less than 90.  9. DVT Prophylaxis no pharmacological DVT prophylaxis due to low hemoglobin  10. EPCs  11. PT/OT to evaluate and treat  12. Pain control  13. Replace electrolytes as per sliding scale  14. Home medications reviewed and appropriate medications continued  15.  Reviewed labs and imaging studies from last 24 hours and results explained to patient      Electronically signed by Alejandra Bruce MD

## 2021-12-07 NOTE — BRIEF OP NOTE
Brief Postoperative Note    Jean Marie Cheek  YOB: 1950  [id number]    Pre-operative Diagnosis: Renal Failure; conversion temp to permcath for dialysis      Post-operative Diagnosis: Same    Procedure: Tunneled Dialysis Catheter    Medication Given: none    Anesthesia: 2%Lidocaine Local Injection     Surgeons/Assistants: Aniya Rich MD    Estimated Blood Loss: Minimal    Complications: none    Existing rtIJ tempcath removed over a wire; tunneled 14 Fr x 23 cm (tip to cuff) Palindrome HD Catheter then placed Site:  Right Internal Jugular Vein. Dressing applied. May use HD cath for dialysis. Vital signs were reviewed and were stable after the procedure. Discharged to floor.     Electronically signed by Aniya Rich MD on 12/7/2021 at 11:34 AM

## 2021-12-07 NOTE — FLOWSHEET NOTE
12/06/21 2222   Provider Notification   Reason for Communication Evaluate; Critical Value (comment)   Provider Name Dr. Parker Almazan   Provider Notification Physician   Method of Communication Secure Message   Response No new orders   Notified of Hgb 7.0.  No new orders received

## 2021-12-07 NOTE — PLAN OF CARE
Problem: SAFETY  Goal: Free from accidental physical injury  Outcome: Ongoing  Goal: Free from intentional harm  Outcome: Ongoing     Problem: DAILY CARE  Goal: Daily care needs are met  Outcome: Ongoing     Problem: PAIN  Goal: Patient's pain/discomfort is manageable  Outcome: Ongoing     Problem: SKIN INTEGRITY  Goal: Skin integrity is maintained or improved  Outcome: Ongoing  Note: Patient turned and repositioned every 2 hours and as needed for comfort. Skin assessment as charted. No new skin breakdown noted. Problem: KNOWLEDGE DEFICIT  Goal: Patient/S.O. demonstrates understanding of disease process, treatment plan, medications, and discharge instructions. Outcome: Ongoing     Problem: DISCHARGE BARRIERS  Goal: Patient's continuum of care needs are met  Outcome: Ongoing     Problem: Falls - Risk of:  Goal: Will remain free from falls  Outcome: Ongoing  Goal: Absence of physical injury  Outcome: Ongoing     Problem: Skin Integrity:  Goal: Will show no infection signs and symptoms  Outcome: Ongoing  Goal: Absence of new skin breakdown  Outcome: Ongoing     Problem: Musculor/Skeletal Functional Status  Goal: Highest potential functional level  Outcome: Ongoing  Goal: Absence of falls  Outcome: Ongoing     Problem: Musculor/Skeletal Functional Status  Goal: Highest potential functional level  Outcome: Ongoing  Goal: Absence of falls  Outcome: Ongoing     Problem: Nutrition  Goal: Optimal nutrition therapy  Outcome: Ongoing     Problem: Pain:  Goal: Pain level will decrease  Outcome: Ongoing  Note: No complaints of pain  Goal: Control of acute pain  Outcome: Ongoing  Goal: Control of chronic pain  Outcome: Ongoing     Problem: Gas Exchange - Impaired:  Goal: Levels of oxygenation will improve  Outcome: Ongoing  Note: Patient remains trach to vent.  Oxygen levels remain WDL     Problem: Fluid Volume - Imbalance:  Goal: Absence of imbalanced fluid volume signs and symptoms  Outcome: Ongoing     Problem: Airway Clearance - Ineffective:  Goal: Ability to maintain a clear airway will improve  Outcome: Ongoing     Problem: Bowel Function - Altered:  Goal: Bowel elimination is within specified parameters  Outcome: Ongoing     Problem: Fluid Volume - Imbalance:  Goal: Absence of imbalanced fluid volume signs and symptoms  Outcome: Ongoing     Problem: Gas Exchange - Impaired:  Goal: Levels of oxygenation will improve  Outcome: Ongoing  Note: Patient remains trach to vent.  Oxygen levels remain WDL     Problem: Mental Status - Impaired:  Goal: Mental status will be restored to baseline  Outcome: Ongoing     Problem: Nutrition Deficit:  Goal: Ability to achieve adequate nutritional intake will improve  Outcome: Ongoing     Problem: OXYGENATION/RESPIRATORY FUNCTION  Goal: Patient will maintain patent airway  Outcome: Ongoing  Goal: Patient will achieve/maintain normal respiratory rate/effort  Outcome: Ongoing     Problem: MECHANICAL VENTILATION  Goal: Patient will maintain patent airway  Outcome: Ongoing  Goal: Oral health is maintained or improved  Outcome: Ongoing

## 2021-12-07 NOTE — PROGRESS NOTES
Physical Therapy  DATE: 2021    NAME: Molly Hernandez  MRN: [id number]   : 1950    Patient not seen this date for Physical Therapy due to:      [] Cancel by RN or physician due to:    [] Hemodialysis    [] Critical Lab Value Level     [] Blood transfusion in progress    [] Acute or unstable cardiovascular status   _MAP < 55 or more than >115  _HR < 40 or > 130    [] Acute or unstable pulmonary status   -FiO2 > 60%   _RR < 5 or >40    _O2 sats < 85%    [] Strict Bedrest    [] Off Unit for surgery or procedure    [] Off Unit for testing       [] Pending imaging to R/O fracture    [] Refusal by Patient      [x] Other; checked on patient @1359. Wound care with patient. Will continue to follow. [] PT being discontinued at this time. Patient independent. No further needs. [] PT being discontinued at this time as the patient has been transferred to hospice care. No further needs.       Electronically signed by Jamal Lund PTA on 21 at 2:16 PM EST

## 2021-12-07 NOTE — PROGRESS NOTES
[facility]  Follow Up      NAME:  Libby Ibanez RECORD NUMBER:  [id number]  AGE: 66 y.o. GENDER: female  : 1950  TODAY'S DATE:  2021    [facility] nurse follow up visit for right groin, bilateral arm, and left foot wounds. Right groin wound is clean and red. Epithelialization noted from medial edge of wound. Depth of wound improving. Bilateral arm wounds closed. Edema has gone down significantly since last visit. Left medial and lateral foot wounds are stable with dry eschar present. Left heel wounds have closed over. No fluctuance or drainage noted from heel wounds. Rooke boots placed on bilateral legs.        Measurements:  Wound 21 Femoral Anterior; Right (Active)   Wound Image   21 1545   Wound Etiology Non-Healing Surgical 21 1545   Dressing Status Old drainage noted; New dressing applied 21 1545   Wound Cleansed Cleansed with saline 21 1545   Dressing/Treatment Hydrofiber Ag; Silicone border 60/89/66 1545   Offloading for Diabetic Foot Ulcers No 21 0400   Dressing Change Due 21 0400   Wound Length (cm) 1.8 cm 21 1545   Wound Width (cm) 5.4 cm 21 1545   Wound Depth (cm) 1.5 cm 21 1545   Wound Surface Area (cm^2) 9.72 cm^2 21 1545   Change in Wound Size % (l*w) 8.73 21 1545   Wound Volume (cm^3) 14.58 cm^3 21 1545   Wound Healing % 40 21 1545   Wound Assessment Pink/red; Epithelialization 21 1545   Drainage Amount Moderate 21 1545   Drainage Description Serosanguinous 21 1545   Odor None 21 1545   Yudith-wound Assessment Dry/flaky 21 1545   Margins Defined edges 21 1545   Wound Thickness Description not for Pressure Injury Partial thickness 21 1800   Number of days: 26       Wound 21 Foot Left; Lateral (Active)   Wound Image   21 1545   Wound Etiology Arterial 21 1545   Dressing Status Dry; New dressing applied 21 1545   Wound Cleansed Betadine/povidone iodine 12/07/21 1545   Dressing/Treatment Betadine swabs/povidone iodine; ABD; Roll gauze 12/07/21 1545   Offloading for Diabetic Foot Ulcers Yes (type) 11/30/21 0400   Dressing Change Due 12/07/21 12/07/21 1200   Wound Length (cm) 1.1 cm 12/07/21 1545   Wound Width (cm) 1.2 cm 12/07/21 1545   Wound Depth (cm) 0 cm 12/07/21 1545   Wound Surface Area (cm^2) 1.32 cm^2 12/07/21 1545   Change in Wound Size % (l*w) 21.43 12/07/21 1545   Wound Volume (cm^3) 0 cm^3 12/07/21 1545   Wound Healing % 100 12/07/21 1545   Wound Assessment Eschar dry 12/07/21 1545   Drainage Amount None 12/07/21 1545   Drainage Description Other (Comment) 12/05/21 0400   Odor None 12/07/21 1545   Yudith-wound Assessment Dry/flaky 12/07/21 1545   Margins Attached edges 12/07/21 1545   Wound Thickness Description not for Pressure Injury Partial thickness 12/06/21 1800   Number of days: 25       Wound 11/12/21 Foot Left; Medial (Active)   Wound Image   12/07/21 1545   Wound Etiology Arterial 12/07/21 1545   Dressing Status Dry; New dressing applied 12/07/21 1545   Wound Cleansed Betadine/povidone iodine 12/07/21 1545   Dressing/Treatment Betadine swabs/povidone iodine; ABD; Roll gauze 12/07/21 1545   Offloading for Diabetic Foot Ulcers Yes (type) 11/30/21 0400   Dressing Change Due 12/05/21 12/06/21 1800   Wound Length (cm) 2.5 cm 12/07/21 1545   Wound Width (cm) 3.5 cm 12/07/21 1545   Wound Depth (cm) 0 cm 12/07/21 1545   Wound Surface Area (cm^2) 8.75 cm^2 12/07/21 1545   Change in Wound Size % (l*w) -4.17 12/07/21 1545   Wound Volume (cm^3) 0 cm^3 12/07/21 1545   Wound Healing % 100 12/07/21 1545   Wound Assessment Eschar dry 12/07/21 1545   Drainage Amount None 12/07/21 1545   Drainage Description Other (Comment) 12/05/21 0400   Odor None 12/07/21 1545   Yudith-wound Assessment Dry/flaky 12/07/21 1545   Margins Attached edges 12/07/21 1545   Wound Thickness Description not for Pressure Injury Partial thickness 12/06/21 1800   Number of days: 25       Wound 11/12/21 Heel Left; Proximal (Active)   Wound Image   12/07/21 1545   Wound Etiology Arterial 12/07/21 1545   Dressing Status Dry; New dressing applied 12/07/21 1545   Wound Cleansed Betadine/povidone iodine 12/07/21 1545   Dressing/Treatment Betadine swabs/povidone iodine 12/07/21 1545   Offloading for Diabetic Foot Ulcers Yes (type) 12/06/21 1800   Dressing Change Due 12/07/21 12/07/21 1200   Wound Length (cm) 0 cm 12/07/21 1545   Wound Width (cm) 0 cm 12/07/21 1545   Wound Depth (cm) 0 cm 12/07/21 1545   Wound Surface Area (cm^2) 0 cm^2 12/07/21 1545   Change in Wound Size % (l*w) 100 12/07/21 1545   Wound Volume (cm^3) 0 cm^3 12/07/21 1545   Wound Healing % 100 12/07/21 1545   Wound Assessment Other (Comment) 12/07/21 1545   Drainage Amount None 12/07/21 1545   Drainage Description Serosanguinous;  Yellow 12/06/21 1800   Odor None 12/07/21 1545   Yudith-wound Assessment Dry/flaky 12/07/21 1545   Margins Attached edges 12/07/21 1545   Wound Thickness Description not for Pressure Injury Partial thickness 12/06/21 1800   Number of days: 25       Wound 11/22/21 Heel Left; Distal (Active)   Wound Etiology Arterial 12/07/21 1545   Dressing Status Dry; New dressing applied 12/07/21 1545   Wound Cleansed Betadine/povidone iodine 12/07/21 1545   Dressing/Treatment Betadine swabs/povidone iodine; ABD; Roll gauze 12/07/21 1545   Offloading for Diabetic Foot Ulcers Yes (type) 12/06/21 1800   Dressing Change Due 12/07/21 12/07/21 1200   Wound Length (cm) 0 cm 12/07/21 1545   Wound Width (cm) 0 cm 12/07/21 1545   Wound Depth (cm) 0 cm 12/07/21 1545   Wound Surface Area (cm^2) 0 cm^2 12/07/21 1545   Change in Wound Size % (l*w) 100 12/07/21 1545   Wound Volume (cm^3) 0 cm^3 12/07/21 1545   Wound Healing % 100 12/07/21 1545   Wound Assessment Other (Comment) 12/07/21 1545   Drainage Amount None 12/07/21 1545   Drainage Description Purulent 12/06/21 1800   Odor None 12/07/21 1545 Yudith-wound Assessment Dry/flaky 12/07/21 1545   Margins Attached edges 12/07/21 1545   Number of days: [address] Louis Saini, BSN, RN-WCC, CSWS, [address], and Continence Nursing  [phone]

## 2021-12-07 NOTE — PROGRESS NOTES
[facility]                 PATIENT NAME: Smitha Cavazos     TODAY'S DATE: 12/7/2021, 9:04 AM    SUBJECTIVE:  POD#4  Pt seen and examined. Afebrile, VSS. No leukocytosis, hemoglobin low but stable at 7.0. Patient denies abdominal pain. PEG site clean. Tolerating tube feeds, no N/V. Trach site with small amount of clear/tan secretions. Colostomy functioning. OBJECTIVE:   VITALS:  /72   Pulse 102   Temp 98.6 °F (37 °C) (Oral)   Resp 22   Ht 5' 2.99\" (1.6 m)   Wt 210 lb 15.7 oz (95.7 kg)   SpO2 95%   BMI 37.38 kg/m²      INTAKE/OUTPUT:      Intake/Output Summary (Last 24 hours) at 12/7/2021 0904  Last data filed at 12/7/2021 0422  Gross per 24 hour   Intake 1458 ml   Output 3680 ml   Net -2222 ml                 CONSTITUTIONAL:  awake and alert.   No acute distress  HEART:   RRR  LUNGS:   Trach to vent   ABDOMEN:   Abdomen soft, non-tender, non-distended  EXTREMITIES:   Pedal edema    Data:  CBC:   Lab Results   Component Value Date    WBC 7.7 12/07/2021    RBC 2.30 12/07/2021    RBC 4.14 08/31/2016    HGB 7.0 12/07/2021    HCT 21.6 12/07/2021    MCV 94.3 12/07/2021    MCH 30.7 12/07/2021    MCHC 32.5 12/07/2021    RDW 16.9 12/07/2021    PLT 95 12/07/2021    MPV 9.7 12/07/2021     BMP:    Lab Results   Component Value Date     12/07/2021    K 3.7 12/07/2021    CL 96 12/07/2021    CO2 23 12/07/2021    BUN 49 12/07/2021    LABALBU 2.6 11/18/2021    CREATININE 3.72 12/07/2021    CALCIUM 8.8 12/07/2021    GFRAA 14 12/07/2021    LABGLOM 12 12/07/2021    GLUCOSE 159 12/07/2021    GLUCOSE 77 08/31/2016       Radiology Review:      XR CHEST PORTABLE [[id number]] Collected: 12/07/21 0703      Order Status: Completed Updated: 12/07/21 0847     Narrative:       EXAMINATION:   ONE XRAY VIEW OF THE CHEST     12/7/2021 6:55 am     COMPARISON:   12/05/2021, 12/03/2021     HISTORY:   ORDERING SYSTEM PROVIDED HISTORY: Respiratory failure   TECHNOLOGIST PROVIDED HISTORY: Respiratory failure   Reason for Exam: Resp Failure   Acuity: Acute   Type of Exam: Ongoing     FINDINGS:   Tracheostomy tube remains in place.  Right internal jugular dual lumen   catheter and right PICC line remain in position.  Vascular congestion, lower   lung field opacities and pleural effusions, right greater than left, appear   without significant change.  No pneumothorax identified.      Impression:       No significant interval change. ASSESSMENT     Principal Problem:    JAYLYN (acute kidney injury) ([facility] [address]  Active Problems:    Type 2 diabetes mellitus with kidney complication, with long-term current use of insulin ([facility])    CKD (chronic kidney disease) stage 3, GFR 30-59 ml/min ([facility])    HTN (hypertension)    ASCVD (arteriosclerotic cardiovascular disease)    Iron deficiency anemia    Hydronephrosis determined by ultrasound    Longstanding persistent atrial fibrillation ([facility])    Acute metabolic encephalopathy    Acute cystitis without hematuria    Acute respiratory failure with hypoxia and hypercapnia ([facility])    Dilated cardiomyopathy ([facility] [address]  Resolved Problems:    * No resolved hospital problems. *      Plan  1. Continue tube feeds as tolerated  2. Local Trach and PEG site care  3. Surgically stable  4. Continue medical management   5.  Discharge to [facility] planning       Electronically signed by Gris Resendez PA-C  [address] [address]

## 2021-12-08 LAB
ABSOLUTE EOS #: 0.2 K/UL (ref 0–0.4)
ABSOLUTE IMMATURE GRANULOCYTE: ABNORMAL K/UL (ref 0–0.3)
ABSOLUTE LYMPH #: 1.2 K/UL (ref 1–4.8)
ABSOLUTE MONO #: 0.7 K/UL (ref 0.1–1.3)
ANION GAP SERPL CALCULATED.3IONS-SCNC: 15 MMOL/L (ref 9–17)
BASOPHILS # BLD: 0 % (ref 0–2)
BASOPHILS ABSOLUTE: 0 K/UL (ref 0–0.2)
BUN BLDV-MCNC: 64 MG/DL (ref 8–23)
BUN/CREAT BLD: ABNORMAL (ref 9–20)
CALCIUM SERPL-MCNC: 9.1 MG/DL (ref 8.6–10.4)
CHLORIDE BLD-SCNC: 94 MMOL/L (ref 98–107)
CO2: 24 MMOL/L (ref 20–31)
CREAT SERPL-MCNC: 4.88 MG/DL (ref 0.5–0.9)
DIFFERENTIAL TYPE: ABNORMAL
EOSINOPHILS RELATIVE PERCENT: 3 % (ref 0–4)
GFR AFRICAN AMERICAN: 11 ML/MIN
GFR NON-AFRICAN AMERICAN: 9 ML/MIN
GFR SERPL CREATININE-BSD FRML MDRD: ABNORMAL ML/MIN/{1.73_M2}
GFR SERPL CREATININE-BSD FRML MDRD: ABNORMAL ML/MIN/{1.73_M2}
GLUCOSE BLD-MCNC: 141 MG/DL (ref 65–105)
GLUCOSE BLD-MCNC: 189 MG/DL (ref 65–105)
GLUCOSE BLD-MCNC: 195 MG/DL (ref 65–105)
GLUCOSE BLD-MCNC: 218 MG/DL (ref 70–99)
GLUCOSE BLD-MCNC: 219 MG/DL (ref 65–105)
HCT VFR BLD CALC: 22.5 % (ref 36–46)
HCT VFR BLD CALC: 22.7 % (ref 36–46)
HEMOGLOBIN: 7.3 G/DL (ref 12–16)
HEMOGLOBIN: 7.3 G/DL (ref 12–16)
IMMATURE GRANULOCYTES: ABNORMAL %
LYMPHOCYTES # BLD: 14 % (ref 24–44)
MAGNESIUM: 2.1 MG/DL (ref 1.6–2.6)
MCH RBC QN AUTO: 30.3 PG (ref 26–34)
MCHC RBC AUTO-ENTMCNC: 32 G/DL (ref 31–37)
MCV RBC AUTO: 94.5 FL (ref 80–100)
MONOCYTES # BLD: 8 % (ref 1–7)
NRBC AUTOMATED: ABNORMAL PER 100 WBC
PDW BLD-RTO: 16.7 % (ref 11.5–14.9)
PLATELET # BLD: 121 K/UL (ref 150–450)
PLATELET ESTIMATE: ABNORMAL
PMV BLD AUTO: 9.2 FL (ref 6–12)
POTASSIUM SERPL-SCNC: 3.8 MMOL/L (ref 3.7–5.3)
RBC # BLD: 2.4 M/UL (ref 4–5.2)
RBC # BLD: ABNORMAL 10*6/UL
SEG NEUTROPHILS: 75 % (ref 36–66)
SEGMENTED NEUTROPHILS ABSOLUTE COUNT: 6.2 K/UL (ref 1.3–9.1)
SODIUM BLD-SCNC: 133 MMOL/L (ref 135–144)
WBC # BLD: 8.3 K/UL (ref 3.5–11)
WBC # BLD: ABNORMAL 10*3/UL

## 2021-12-08 PROCEDURE — 6370000000 HC RX 637 (ALT 250 FOR IP): Performed by: INTERNAL MEDICINE

## 2021-12-08 PROCEDURE — 6360000002 HC RX W HCPCS: Performed by: SURGERY

## 2021-12-08 PROCEDURE — 2580000003 HC RX 258: Performed by: SURGERY

## 2021-12-08 PROCEDURE — 85014 HEMATOCRIT: CPT

## 2021-12-08 PROCEDURE — 6370000000 HC RX 637 (ALT 250 FOR IP): Performed by: SURGERY

## 2021-12-08 PROCEDURE — 36415 COLL VENOUS BLD VENIPUNCTURE: CPT

## 2021-12-08 PROCEDURE — 85018 HEMOGLOBIN: CPT

## 2021-12-08 PROCEDURE — 83735 ASSAY OF MAGNESIUM: CPT

## 2021-12-08 PROCEDURE — 82947 ASSAY GLUCOSE BLOOD QUANT: CPT

## 2021-12-08 PROCEDURE — 6370000000 HC RX 637 (ALT 250 FOR IP): Performed by: FAMILY MEDICINE

## 2021-12-08 PROCEDURE — 6360000002 HC RX W HCPCS: Performed by: INTERNAL MEDICINE

## 2021-12-08 PROCEDURE — 94003 VENT MGMT INPAT SUBQ DAY: CPT

## 2021-12-08 PROCEDURE — 2000000000 HC ICU R&B

## 2021-12-08 PROCEDURE — 80048 BASIC METABOLIC PNL TOTAL CA: CPT

## 2021-12-08 PROCEDURE — 2500000003 HC RX 250 WO HCPCS: Performed by: INTERNAL MEDICINE

## 2021-12-08 PROCEDURE — 90935 HEMODIALYSIS ONE EVALUATION: CPT

## 2021-12-08 PROCEDURE — 85025 COMPLETE CBC W/AUTO DIFF WBC: CPT

## 2021-12-08 PROCEDURE — C9113 INJ PANTOPRAZOLE SODIUM, VIA: HCPCS | Performed by: SURGERY

## 2021-12-08 RX ORDER — SODIUM CITRATE 4 % (5 ML)
1.9 SYRINGE (ML) MISCELLANEOUS PRN
Status: DISCONTINUED | OUTPATIENT
Start: 2021-12-08 | End: 2021-12-14 | Stop reason: HOSPADM

## 2021-12-08 RX ORDER — FLUCONAZOLE 150 MG/1
150 TABLET ORAL DAILY
Qty: 2 TABLET | Refills: 0 | Status: CANCELLED | OUTPATIENT
Start: 2021-12-08 | End: 2021-12-10

## 2021-12-08 RX ADMIN — SODIUM CHLORIDE, PRESERVATIVE FREE 10 ML: 5 INJECTION INTRAVENOUS at 10:36

## 2021-12-08 RX ADMIN — NYSTATIN 500000 UNITS: 500000 SUSPENSION ORAL at 20:46

## 2021-12-08 RX ADMIN — INSULIN GLARGINE 22 UNITS: 100 INJECTION, SOLUTION SUBCUTANEOUS at 20:47

## 2021-12-08 RX ADMIN — FERROUS SULFATE TAB 325 MG (65 MG ELEMENTAL FE) 325 MG: 325 (65 FE) TAB at 10:25

## 2021-12-08 RX ADMIN — EPOETIN ALFA-EPBX 3000 UNITS: 3000 INJECTION, SOLUTION INTRAVENOUS; SUBCUTANEOUS at 15:53

## 2021-12-08 RX ADMIN — CHLORHEXIDINE GLUCONATE 0.12% ORAL RINSE 15 ML: 1.2 LIQUID ORAL at 20:46

## 2021-12-08 RX ADMIN — ATORVASTATIN CALCIUM 40 MG: 40 TABLET, FILM COATED ORAL at 10:26

## 2021-12-08 RX ADMIN — EPOETIN ALFA-EPBX 2000 UNITS: 2000 INJECTION, SOLUTION INTRAVENOUS; SUBCUTANEOUS at 15:53

## 2021-12-08 RX ADMIN — PANTOPRAZOLE SODIUM 40 MG: 40 INJECTION, POWDER, FOR SOLUTION INTRAVENOUS at 10:25

## 2021-12-08 RX ADMIN — Medication 1.9 ML: at 16:42

## 2021-12-08 RX ADMIN — INSULIN LISPRO 2 UNITS: 100 INJECTION, SOLUTION INTRAVENOUS; SUBCUTANEOUS at 20:47

## 2021-12-08 RX ADMIN — Medication 1.9 ML: at 16:43

## 2021-12-08 RX ADMIN — FLUCONAZOLE 150 MG: 100 TABLET ORAL at 10:25

## 2021-12-08 RX ADMIN — CHLORHEXIDINE GLUCONATE 0.12% ORAL RINSE 15 ML: 1.2 LIQUID ORAL at 10:25

## 2021-12-08 RX ADMIN — SODIUM CHLORIDE, PRESERVATIVE FREE 10 ML: 5 INJECTION INTRAVENOUS at 20:46

## 2021-12-08 RX ADMIN — SERTRALINE HYDROCHLORIDE 25 MG: 50 TABLET ORAL at 10:26

## 2021-12-08 RX ADMIN — NYSTATIN 500000 UNITS: 500000 SUSPENSION ORAL at 10:25

## 2021-12-08 RX ADMIN — PANTOPRAZOLE SODIUM 40 MG: 40 INJECTION, POWDER, FOR SOLUTION INTRAVENOUS at 20:46

## 2021-12-08 RX ADMIN — NYSTATIN 500000 UNITS: 500000 SUSPENSION ORAL at 18:32

## 2021-12-08 RX ADMIN — SODIUM CHLORIDE, PRESERVATIVE FREE 10 ML: 5 INJECTION INTRAVENOUS at 20:47

## 2021-12-08 RX ADMIN — ANTI-FUNGAL POWDER MICONAZOLE NITRATE TALC FREE: 1.42 POWDER TOPICAL at 20:46

## 2021-12-08 RX ADMIN — ANTI-FUNGAL POWDER MICONAZOLE NITRATE TALC FREE: 1.42 POWDER TOPICAL at 10:25

## 2021-12-08 RX ADMIN — INSULIN LISPRO 4 UNITS: 100 INJECTION, SOLUTION INTRAVENOUS; SUBCUTANEOUS at 10:27

## 2021-12-08 ASSESSMENT — PULMONARY FUNCTION TESTS
PIF_VALUE: 22
PIF_VALUE: 22
PIF_VALUE: 23
PIF_VALUE: 15
PIF_VALUE: 15
PIF_VALUE: 23
PIF_VALUE: 23
PIF_VALUE: 24
PIF_VALUE: 22
PIF_VALUE: 22
PIF_VALUE: 26
PIF_VALUE: 24
PIF_VALUE: 25
PIF_VALUE: 18
PIF_VALUE: 24
PIF_VALUE: 15

## 2021-12-08 ASSESSMENT — ENCOUNTER SYMPTOMS
EYE ITCHING: 0
TROUBLE SWALLOWING: 0
COLOR CHANGE: 0
VOMITING: 0
ABDOMINAL PAIN: 0
NAUSEA: 0
CHEST TIGHTNESS: 0
COUGH: 0
SHORTNESS OF BREATH: 0
BLOOD IN STOOL: 0
EYE REDNESS: 0

## 2021-12-08 ASSESSMENT — PAIN SCALES - GENERAL
PAINLEVEL_OUTOF10: 0

## 2021-12-08 NOTE — PROGRESS NOTES
Physician Progress Note      Mali Banuelos  CSN #:                  [id number]  :                       1950  ADMIT DATE:       11/10/2021 4:23 PM  [address] [place] DATE:  RESPONDING  PROVIDER #:        Shanelle Hatch MD        QUERY TEXT:    Type of Shock: Please provide further specificity, if known. Clinical indicators include: blood, 6 units, bleeding, rbc, hgb, hct, shock,   bleed, fluid boluses, hemoglobin, hematocrit, platelet, broad spectrum,   antibiotics  Options provided:  -- Cardiogenic shock  -- Septic shock  -- Hypovolemic shock  -- Hemorrhagic shock due to trauma  -- Hemorrhagic shock related to surgery  -- Anaphylactic shock  -- Other - I will add my own diagnosis  -- Disagree - Not applicable / Not valid  -- Disagree - Clinically Unable to determine / Unknown        PROVIDER RESPONSE TEXT:    The patient has septic shock.       Electronically signed by:  Shanelle Hatch MD 2021 10:57 AM

## 2021-12-08 NOTE — CARE COORDINATION
LETTY called Sharla and asked her about this pre-cert and if they had heard anything. Sharla reported that they would call the insurance to check on the status, Currently waiting on a call back from Crawford in admissions with [place].

## 2021-12-08 NOTE — PROGRESS NOTES
Dialysis Post Treatment Report:     -Access Assessment  WNL     -Ultrafiltration   UF Goal 3500   Prime (-) 800   NS Flush (-) 900   Other (-/+)    Total UF Removed 1800     -Medications / Blood Administration  Medications Given (Y/N) YES   Blood Products (Y/N)      Narrative:  Difficulty with UF due to BP, despite albumin in beginning of tx. Ended 14 min early due to low BP at end. Ordered UF 2-3L, VSS after blood returned.

## 2021-12-08 NOTE — PROGRESS NOTES
Comprehensive Nutrition Assessment    Type and Reason for Visit:  Reassess    Nutrition Recommendations/Plan: Continue tube regimen as ordered. Follow for results of video swallow study tomorrow. Nutrition Assessment:  Pt is alert understanding all questions asking if she can drink. MD ordered bedside swallow evaluation, speech recommends video swallow since pt is vent to trach. Video swallow study would have been done this afternoon but pt taken to dialysis, will be done tomorrow. Pt is tolerating Nepro at 40 ml/hr, increase water flushes to 50 ml every 4 hours. Malnutrition Assessment:  Malnutrition Status: At risk for malnutrition (Comment)    Context:  Acute Illness     Findings of the 6 clinical characteristics of malnutrition:  Energy Intake:   (Previous mild energy deficit)  Weight Loss:  Unable to assess (Edema)     Body Fat Loss:  Unable to assess     Muscle Mass Loss:  Unable to assess    Fluid Accumulation:  7 - Moderate to Severe Extremities, Generalized (Anasarca)   Strength:  Not Performed    Estimated Daily Nutrient Needs:  Energy (kcal):  15 kcal/kg= 0545-6512 kcal; Weight Used for Energy Requirements:   (92kg)     Protein (g):  92 based on 1 gm per kg (may vary with healing needs and renal status); Weight Used for Protein Requirements:   (92kg)          Nutrition Related Findings:  Edema: +2 pitting BUE's, +1 non-pitting BLE's. Labs & meds reviewed.  Colosotmy output 150 ml 12-8      Wounds:  Venous Stasis, Skin Tears, Multiple       Current Nutrition Therapies:    ADULT TUBE FEEDING; PEG; Renal Formula; Continuous; 15; Yes; 15; Q 4 hours; 40; 50; Q 4 hours  Current Tube Feeding (TF) Orders:  · Feeding Route: Nasogastric  · Formula: Renal Formula  · Schedule: Continuous  · Additives/Modulars: Protein  · Current TF & Flush Orders Provides: Nepro at 40 ml per hour + 1 Protein modular provides 1836 kcal, 104 g protein      Anthropometric Measures:  · Height: 5' 2.99\" (160 cm)  · Current Body Weight: 210 lb (95.3 kg)   · Admission Body Weight: 204 lb (92.5 kg)    · Ideal Body Weight: 115 lbs; % Ideal Body Weight 195 %   · BMI: 37.2  · BMI Categories: Obese Class 2 (BMI 35.0 -39.9)       Nutrition Diagnosis:   · Increased nutrient needs related to  (healing) as evidenced by wounds      Nutrition Interventions:   Food and/or Nutrient Delivery:  Continue Current Tube Feeding  Nutrition Education/Counseling:  No recommendation at this time   Coordination of Nutrition Care:  Continue to monitor while inpatient    Goals:  Nutrition support will meet approximately 100% of estimated needs       Nutrition Monitoring and Evaluation:   Behavioral-Environmental Outcomes:  None Identified   Food/Nutrient Intake Outcomes:  Diet Advancement/Tolerance, Enteral Nutrition Intake/Tolerance  Physical Signs/Symptoms Outcomes:  Biochemical Data, GI Status, Fluid Status or Edema, Skin, Weight     Discharge Planning:    Enteral Nutrition     Some areas of assessment may be incomplete due to COVID-19 precautions. Marisel Garcia R.D., L.D.   Clinical Dietitian  Office: [phone]

## 2021-12-08 NOTE — PLAN OF CARE
Problem: OXYGENATION/RESPIRATORY FUNCTION  Goal: Patient will maintain patent airway  12/7/2021 1933 by Aleida Wiley RCP  Outcome: Ongoing     Problem: OXYGENATION/RESPIRATORY FUNCTION  Goal: Patient will achieve/maintain normal respiratory rate/effort  Description: Respiratory rate and effort will be within normal limits for the patient  12/7/2021 1933 by Aleida Wiley RCP  Outcome: Ongoing     Problem: MECHANICAL VENTILATION  Goal: Patient will maintain patent airway  12/7/2021 1933 by Aleida Wiley RCP  Outcome: Ongoing     Problem: MECHANICAL VENTILATION  Goal: Oral health is maintained or improved  12/7/2021 1933 by Aleida Wiley RCP  Outcome: Ongoing

## 2021-12-08 NOTE — PROGRESS NOTES
ICU Progress Note (Vent)   [facility]    Patient - James Quarles,  Age - 66 y.o.    - 1950      Room Number -    N -  [id number]   [facility]  Date of Admission -  11/10/2021  4:23 PM    Events of Past 24 Hours   Received tunneled dialysis catheter yesterday, currently on full support, alert and understands what I am saying    Vitals    height is 5' 2.99\" (1.6 m) and weight is 210 lb 15.7 oz (95.7 kg). Her oral temperature is 98 °F (36.7 °C). Her blood pressure is 128/76 and her pulse is 103. Her respiration is 27 and oxygen saturation is 98%. Temperature Range: Temp: 98 °F (36.7 °C) Temp  Av.1 °F (36.7 °C)  Min: 97.8 °F (36.6 °C)  Max: 98.5 °F (36.9 °C)  BP Range:  Systolic (01VCM), VGM:059 , Min:102 , MAYRA:783     Diastolic (62ORD), HPF:34, Min:47, Max:84    Pulse Range: Pulse  Av.6  Min: 83  Max: 112  Respiration Range: Resp  Av.4  Min: 8  Max: 27  Current Pulse Ox[de-identified]  SpO2: 98 %  24HR Pulse Ox Range:  SpO2  Av.7 %  Min: 78 %  Max: 100 %  Oxygen Amount and Delivery: O2 Flow Rate (L/min): 2 L/min      Wt Readings from Last 3 Encounters:   21 210 lb 15.7 oz (95.7 kg)   16 178 lb (80.7 kg)   16 184 lb 12.8 oz (83.8 kg)     I/O       Intake/Output Summary (Last 24 hours) at 2021 0942  Last data filed at 2021 0504  Gross per 24 hour   Intake 946 ml   Output 150 ml   Net 796 ml     I/O last 3 completed shifts:   In: 976 [NG/GT:976]  Out: 150 [Stool:150]     DRAIN/TUBE OUTPUT:     Invasive Lines   ETT Day -   ventilator day 18, tracheostomy day 5  Lines -PICC line day 23    ICP PRESSURE RANGE:  No data recorded  CVP PRESSURE RANGE:  No data recorded  Mechanical Ventilation Data   SETTINGS (Comprehensive)  Vent Information  $Ventilation: $Subsequent Day  Skin Assessment: Clean, dry, & intact  Suction Catheter Diameter: 14  Equipment Changed: HME  Vent Type: Servo i  Vent Mode: PRVC  Vt Ordered: 450 mL  Rate Set: 20 bmp  Pressure Support: 12 cmH20  FiO2 : 21 %  SpO2: 98 %  SpO2/FiO2 ratio: 466.67  Sensitivity: 5  PEEP/CPAP: 5  I Time/ I Time %: 0.86 s  Humidification Source: HME  Nitric Oxide/Epoprostenol In Use?: No  Mask Type: Full face mask  Mask Size: Medium  Additional Respiratory  Assessments  Pulse: 103  Resp: 27  SpO2: 98 %  End Tidal CO2: 32 (%)  Position: Semi-Lagunas's  Humidification Source: HME  Oral Care: Mouth swabbed, Suction toothette, Mouth suctioned  Cuff Pressure (cm H2O): 30 cm H2O       ABGs:   Lab Results   Component Value Date    PHART 7.416 12/07/2021    PO2ART 65.8 12/07/2021    WIM7LYN 38.3 12/07/2021       Lab Results   Component Value Date    MODE PRVC 12/07/2021         Medications   IV   sodium chloride      norepinephrine Stopped (11/29/21 2306)    propofol Stopped (12/03/21 1030)    dextrose      sodium chloride        nystatin  5 mL Oral 4x Daily    fluconazole  150 mg Oral Daily    miconazole   Topical BID    epoetin tyshawn-epbx  5,000 Units SubCUTAneous Once per day on Mon Wed Fri    atorvastatin  40 mg Oral Daily    chlorhexidine  15 mL Mouth/Throat BID    [Held by provider] carvedilol  3.125 mg Oral BID WC    pantoprazole  40 mg IntraVENous BID    And    sodium chloride flush  10 mL IntraVENous BID    sodium polystyrene  15 g Oral Once    sertraline  25 mg Oral Daily    ferrous sulfate  325 mg Oral Daily with breakfast    [Held by provider] aspirin  81 mg Oral Daily    insulin glargine  22 Units SubCUTAneous Nightly    insulin lispro  0-12 Units SubCUTAneous TID WC    insulin lispro  0-6 Units SubCUTAneous Nightly    sodium chloride flush  5-40 mL IntraVENous 2 times per day       Diet/Nutrition   ADULT TUBE FEEDING; PEG; Renal Formula; Continuous; 15; Yes; 15; Q 4 hours; 40; 30; Q 8 hours    Exam   VITALS    height is 5' 2.99\" (1.6 m) and weight is 210 lb 15.7 oz (95.7 kg). Her oral temperature is 98 °F (36.7 °C).  Her blood pressure is INR 1.3       APTT  Recent Labs     12/06/21  0533   APTT 34.4       Lactic Acid  Lab Results   Component Value Date    LACTA 1.3 11/26/2021    LACTA 0.6 11/16/2021    LACTA 0.6 11/16/2021        BNP   No results for input(s): BNP in the last 72 hours.      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

## 2021-12-08 NOTE — PROGRESS NOTES
Physical Therapy        Physical Therapy Cancel Note      DATE: 2021    NAME: Vilma Warren  MRN: [id number]   : 1950      Patient not seen this date for Physical Therapy due to: Other: Defer PT at this time. Pt is currently getting dialysis.        Electronically signed by Donaldo Billy PTA on 2021 at 3:25 PM

## 2021-12-08 NOTE — PROGRESS NOTES
[facility]                 PATIENT NAME: Edd Pritchard     TODAY'S DATE: 12/8/2021, 9:37 AM    SUBJECTIVE:  POD#5  Pt seen and examined. Afebrile, VSS. No leukocytosis, hemoglobin low but stable at 7.3. Patient denies abdominal pain. PEG site clean. Tolerating tube feeds at goal. Colostomy functioning. Trach site with moderate amount of tan/clear secretions but otherwise looks good. No functional issues with trach. Patient is asking if she can drink, will check with speech therapy. OBJECTIVE:   VITALS:  /76   Pulse 103   Temp 98 °F (36.7 °C) (Oral)   Resp 27   Ht 5' 2.99\" (1.6 m)   Wt 210 lb 15.7 oz (95.7 kg)   SpO2 98%   BMI 37.38 kg/m²      INTAKE/OUTPUT:      Intake/Output Summary (Last 24 hours) at 12/8/2021 0937  Last data filed at 12/8/2021 0504  Gross per 24 hour   Intake 946 ml   Output 150 ml   Net 796 ml                 CONSTITUTIONAL:  awake and alert.   No acute distress  HEART:   RRR  LUNGS:   Trach to vent   ABDOMEN:   Abdomen soft, non-tender, non-distended  EXTREMITIES:   Trace pedal edema    Data:  CBC:   Lab Results   Component Value Date    WBC 8.3 12/08/2021    RBC 2.40 12/08/2021    RBC 4.14 08/31/2016    HGB 7.3 12/08/2021    HCT 22.7 12/08/2021    MCV 94.5 12/08/2021    MCH 30.3 12/08/2021    MCHC 32.0 12/08/2021    RDW 16.7 12/08/2021     12/08/2021    MPV 9.2 12/08/2021     BMP:    Lab Results   Component Value Date     12/08/2021    K 3.8 12/08/2021    CL 94 12/08/2021    CO2 24 12/08/2021    BUN 64 12/08/2021    LABALBU 2.6 11/18/2021    CREATININE 4.88 12/08/2021    CALCIUM 9.1 12/08/2021    GFRAA 11 12/08/2021    LABGLOM 9 12/08/2021    GLUCOSE 218 12/08/2021    GLUCOSE 77 08/31/2016       Radiology Review:  No new images to review      ASSESSMENT     Principal Problem:    JAYLYN (acute kidney injury) ([facility] [address]  Active Problems:    Type 2 diabetes mellitus with kidney complication, with long-term current use of insulin ([facility] [address]    CKD (chronic kidney disease) stage 3, GFR 30-59 ml/min ([facility])    HTN (hypertension)    ASCVD (arteriosclerotic cardiovascular disease)    Iron deficiency anemia    Hydronephrosis determined by ultrasound    Longstanding persistent atrial fibrillation ([facility])    Acute metabolic encephalopathy    Acute cystitis without hematuria    Acute respiratory failure with hypoxia and hypercapnia ([facility])    Dilated cardiomyopathy ([facility] [address]  Resolved Problems:    * No resolved hospital problems. *      Plan  1. Tube feeds as tolerated  2. Colostomy functioning  3. Continue local Trach and PEG site care  4. Surgically stable  5. Continue medical management   6.  Discharge planning      Electronically signed by Carissa Rainey PA-C  [address] [address]

## 2021-12-08 NOTE — PROGRESS NOTES
[place]  Speech Language Pathology    Date: 12/8/2021  Patient Name: Citlali Juarez  YOB: 1950   AGE: 66 y.o. MRN: [id number]        Patient Not Available for Speech Therapy     Due to:  [] Testing  [] Hemodialysis  [] Cancelled by RN  [] Surgery   [] Intubation/Sedation/Pain Medication  [] Medical instability  [x] Other:  Order for bedside swallow assessment received. Pt. Is vent to trach, would not be able to r/o or confirm aspiration at bedside. Recommend video swallow study to assess oropharyngeal swallow. RNLurdes, updated with recommendation. Completed by: Tani Crawford A.CCC/SLP

## 2021-12-08 NOTE — FLOWSHEET NOTE
Maria R, pt's spouse, called and began asking questions about what the pt's sisters had told him after their visit this morning. Writer encouraged him to ask the nurse questions when he gets the update call later. He expressed loneliness and helplessness because the pt apparently did everything for him. Writer suggested he follow up with some of the resources the chaplains have given him especially since it will probably be some time before the patient will be able to go home.       12/08/21 1430   Encounter Summary   Services provided to: Family   Referral/Consult From: Palliative Care   Support System Spouse   Continue Visiting   (12-8-21)   Complexity of Encounter Moderate   Length of Encounter 15 minutes   Spiritual/Church   Type Spiritual support   Assessment Anxious   Intervention Active listening; Explored feelings, thoughts, concerns; Sustaining presence/ Ministry of presence   Outcome Expressed gratitude; Engaged in conversation; Expressed feelings/needs/concerns; Venting emotion

## 2021-12-08 NOTE — PROGRESS NOTES
Dialysis Post Treatment Report:     -Access Assessment  WNL     -Ultrafiltration   UF Goal 3500   Prime (-) 800   NS Flush (-) 900   Other (-/+)    Total UF Removed 1800     -Medications / Blood Administration  Medications Given (Y/N) Yes   Blood Products (Y/N)      Narrative:    BP issues throughout tx, despite albumin beginning of tx. VSS after blood returned. UF ordered 2-3L with 1.8L attained.

## 2021-12-08 NOTE — FLOWSHEET NOTE
12/08/21 1036   Encounter Summary   Services provided to: Patient   Referral/Consult From: Palliative Care   Complexity of Encounter Low   Length of Encounter 15 minutes   Spiritual/[place]ondoism   Type Spiritual support   Assessment Sleeping   Intervention Prayer

## 2021-12-08 NOTE — PROGRESS NOTES
[place]  Speech Language Pathology    Date: 12/8/2021  Patient Name: Janette Everett  YOB: 1950   AGE: 66 y.o. MRN: [id number]        Patient Not Available for Speech Therapy     Due to:  [] Testing  [] Hemodialysis  [] Cancelled by RN  [] Surgery   [] Intubation/Sedation/Pain Medication  [] Medical instability  [x] Other:  Order received for video swallow study, test scheduled for 026 848 14 90, however, unable to complete test on this date as pt. Just got to dialysis per RN. Will attempt to complete tomorrow, 12/09. Completed by: Meliza Parker A.CCC/SLP

## 2021-12-08 NOTE — PROGRESS NOTES
Dialysis Safety Checks:    Patient ID Verified (Y/N) YES     -Hepatitis Test                   Date      Result  Hepatitis B Surface Antigen   21 NEG     Hepatitis B Surface Antibody 21 NEG     Hepatitis B Core Antibody        -Treatment Initiation  Blood Vol Processed Goal (Liters)  Pump Speed x Treatment Hours x 60 Minutes    Target Fluid Removal 3000     * Intra-treatment documented Safety Checks include the followin) Access and face visible at all times. 2) All connections and blood lines are secure with no kinks. 3) NVL alarm engaged. 4) Hemosafe device applied (if applicable). 5) No collapse of Arterial or Venous blood chambers. 6) All blood lines / pump segments in the air detectors. --------------------------------------------------------------------------------    VSS. On CPAP per staff. Does not answer questions.

## 2021-12-08 NOTE — CARE COORDINATION
Writer Tried Calling mansoor mann contact Regi at [phone] to see how much longer is needed to get pre cert approved for LT[facility].  Had to leave a voice mail message to have her call writer back    Electronically signed by Ronen Petersen RN on 12/8/2021 at 1:42 PM

## 2021-12-08 NOTE — PROGRESS NOTES
support via tracheostomy. Awake and alert. Cardiovascular:  S1, S2 without m/r/g  Respiratory: Bilateral chest rise, tracheostomy on ventilator  Abdomen: Soft nontender  Ext: +2 Pitting edema bilaterally lower extremities. Data/  Recent Labs     12/06/21  0533 12/06/21  1514 12/07/21  0330 12/07/21  1633 12/08/21  0436   WBC 7.1  --  7.7  --  8.3   HGB 7.0*   < > 7.0* 7.0* 7.3*   HCT 21.7*   < > 21.6* 21.5* 22.7*   MCV 94.2  --  94.3  --  94.5   *  --  95*  --  121*    < > = values in this interval not displayed. Recent Labs     12/06/21  0533 12/07/21  0330 12/08/21  0436   * 133* 133*   K 4.1 3.7 3.8   CL 94* 96* 94*   CO2 21 23 24   GLUCOSE 157* 159* 218*   MG 2.1 1.9 2.1   BUN 71* 49* 64*   CREATININE 5.36* 3.72* 4.88*   LABGLOM 8* 12* 9*   GFRAA 9* 14* 11*       DATA:    CBC with Differential:    Lab Results   Component Value Date    WBC 8.3 12/08/2021    RBC 2.40 12/08/2021    RBC 4.14 08/31/2016    HGB 7.3 12/08/2021    HCT 22.7 12/08/2021     12/08/2021    MCV 94.5 12/08/2021    MCH 30.3 12/08/2021    MCHC 32.0 12/08/2021    RDW 16.7 12/08/2021    NRBC 4 11/22/2021    METASPCT 3 12/02/2021    LYMPHOPCT 14 12/08/2021    LYMPHOPCT 32.8 08/31/2016    MONOPCT 8 12/08/2021    EOSPCT 4.3 08/31/2016    BASOPCT 0 12/08/2021    BASOPCT 0.7 08/31/2016    MONOSABS 0.70 12/08/2021    LYMPHSABS 1.20 12/08/2021    EOSABS 0.20 12/08/2021    BASOSABS 0.00 12/08/2021    DIFFTYPE NOT REPORTED 12/08/2021     BMP:    Lab Results   Component Value Date     12/08/2021    K 3.8 12/08/2021    CL 94 12/08/2021    CO2 24 12/08/2021    BUN 64 12/08/2021    LABALBU 2.6 11/18/2021    CREATININE 4.88 12/08/2021    CALCIUM 9.1 12/08/2021    GFRAA 11 12/08/2021    LABGLOM 9 12/08/2021    GLUCOSE 218 12/08/2021    GLUCOSE 77 08/31/2016     Sodium:    Lab Results   Component Value Date     12/08/2021     Potassium:    Lab Results   Component Value Date    K 3.8 12/08/2021     Assessment/     1.  Acute Kidney Injury secondary to ischemic acute tubular necrosis and obstructive nephropathy secondary to bilateral hydronephrosis    2. Heart failure with reduced ejection fraction [HFrEF with LVEF 30 to 35% 11/10/2021]    3. Acute hypoxic and hypercapnic respiratory failure      4. Normocytic anemia and thrombocytopenia     5. Bilateral hydronephrosis - status post cystoscopy with right ureteral  stent placement.     6.   Hypertension     Plan/     MWF hemodialysis schedule  Hemodialysis today  Continue to monitor urine output closely  Avoid Nephrotoxic agents  BMP daily  Retacrit weekly  Continue tube feeding    Hernán Augustin MD

## 2021-12-08 NOTE — PROGRESS NOTES
Phlebotomist notified RN that she was unable to get blood for H&H. Patient's PICC line currently not drawing back blood. Will attempt again for AM labs.

## 2021-12-08 NOTE — PROGRESS NOTES
(TYLENOL) tablet 650 mg, Q4H PRN  nystatin (MYCOSTATIN) 213125 UNIT/ML suspension 500,000 Units, 4x Daily  fluconazole (DIFLUCAN) tablet 150 mg, Daily  miconazole (MICOTIN) 2 % powder, BID  metoprolol (LOPRESSOR) injection 5 mg, Q6H PRN  polyethylene glycol (GLYCOLAX) packet 17 g, Daily PRN  atorvastatin (LIPITOR) tablet 40 mg, Daily  0.9 % sodium chloride infusion, PRN  sodium citrate 4 % injection 1.3 mL, PRN   And  sodium citrate 4 % injection 1.2 mL, PRN  midodrine (PROAMATINE) tablet 10 mg, TID PRN  albumin human 25 % IV solution 25 g, PRN  midodrine (PROAMATINE) tablet 5 mg, PRN  norepinephrine (LEVOPHED) 16 mg in sodium chloride 0.9 % 250 mL infusion, Continuous  chlorhexidine (PERIDEX) 0.12 % solution 15 mL, BID  fentaNYL (SUBLIMAZE) injection 50 mcg, Q30 Min PRN  propofol injection, Continuous  [Held by provider] carvedilol (COREG) tablet 3.125 mg, BID WC  pantoprazole (PROTONIX) injection 40 mg, BID   And  sodium chloride flush 0.9 % injection 10 mL, BID  magnesium sulfate 1000 mg in dextrose 5% 100 mL IVPB, PRN  glucose (GLUTOSE) 40 % oral gel 15 g, PRN  dextrose 50 % IV solution, PRN  glucagon (rDNA) injection 1 mg, PRN  dextrose 5 % solution, PRN  sodium polystyrene (KAYEXALATE) 15 GM/60ML suspension 15 g, Once  sertraline (ZOLOFT) tablet 25 mg, Daily  ferrous sulfate (IRON 325) tablet 325 mg, Daily with breakfast  [Held by provider] aspirin chewable tablet 81 mg, Daily  insulin glargine (LANTUS) injection vial 22 Units, Nightly  insulin lispro (HUMALOG) injection vial 0-12 Units, TID WC  insulin lispro (HUMALOG) injection vial 0-6 Units, Nightly  sodium chloride flush 0.9 % injection 5-40 mL, 2 times per day  sodium chloride flush 0.9 % injection 5-40 mL, PRN  0.9 % sodium chloride infusion, PRN  acetaminophen (TYLENOL) tablet 650 mg, Q4H PRN  ondansetron (ZOFRAN-ODT) disintegrating tablet 4 mg, Q8H PRN   Or  ondansetron (ZOFRAN) injection 4 mg, Q6H PRN  oxyCODONE-acetaminophen (PERCOCET) 5-325 MG per tablet 1 tablet, Q6H PRN        Data:     Code Status:  DNR-CCA    Family History   Problem Relation Age of Onset    High Blood Pressure Other        Social History     Socioeconomic History    Marital status:      Spouse name: Not on file    Number of children: Not on file    Years of education: Not on file    Highest education level: Not on file   Occupational History    Not on file   Tobacco Use    Smoking status: Never Smoker    Smokeless tobacco: Not on file   Substance and Sexual Activity    Alcohol use: No     Alcohol/week: 0.0 standard drinks    Drug use: No    Sexual activity: Not on file   Other Topics Concern    Not on file   Social History Narrative    Not on file     Social Determinants of Health     Financial Resource Strain:     Difficulty of Paying Living Expenses: Not on file   Food Insecurity:     Worried About Running Out of Food in the Last Year: Not on file    Katya of Food in the Last Year: Not on file   Transportation Needs:     Lack of Transportation (Medical): Not on file    Lack of Transportation (Non-Medical):  Not on file   Physical Activity:     Days of Exercise per Week: Not on file    Minutes of Exercise per Session: Not on file   Stress:     Feeling of Stress : Not on file   Social Connections:     Frequency of Communication with Friends and Family: Not on file    Frequency of Social Gatherings with Friends and Family: Not on file    Attends Mandaen Services: Not on file    Active Member of Clubs or Organizations: Not on file    Attends Club or Organization Meetings: Not on file    Marital Status: Not on file   Intimate Partner Violence:     Fear of Current or Ex-Partner: Not on file    Emotionally Abused: Not on file    Physically Abused: Not on file    Sexually Abused: Not on file   Housing Stability:     Unable to Pay for Housing in the Last Year: Not on file    Number of [place] in the Last Year: Not on file    Unstable Housing in the Last Year: Not on file       I/O (24Hr): Intake/Output Summary (Last 24 hours) at 12/8/2021 1222  Last data filed at 12/8/2021 0504  Gross per 24 hour   Intake 916 ml   Output 150 ml   Net 766 ml     Radiology:  IR FLUORO GUIDED CVA DEVICE PLMT/REPLACE/REMOVAL    Result Date: 12/7/2021  Successful fluoroscopy guided conversion non tunneled to tunneled hemodialysis catheter placement, as above. Catheter is ready for dialysis use at this time. XR CHEST PORTABLE    Result Date: 12/7/2021  No significant interval change. XR CHEST PORTABLE    Result Date: 12/5/2021  Cardiomegaly with bibasilar effusions and scattered pulmonary infiltrates. XR CHEST PORTABLE    Result Date: 12/3/2021  1. Interval placement of a tracheostomy tube. 2. No pneumothorax. 3. Persistent pulmonary vascular congestion and bilateral pleural effusions.      XR CHEST PORTABLE    Result Date: 12/3/2021  Mild pulmonary vascular congestion and bilateral pleural effusions, right greater than left, similar to the previous exam.       Labs:  Recent Results (from the past 24 hour(s))   POC Glucose Fingerstick    Collection Time: 12/07/21  4:15 PM   Result Value Ref Range    POC Glucose 165 (H) 65 - 105 mg/dL   Hgb/Hct    Collection Time: 12/07/21  4:33 PM   Result Value Ref Range    Hemoglobin 7.0 (LL) 12.0 - 16.0 g/dL    Hematocrit 21.5 (L) 36 - 46 %   POC Glucose Fingerstick    Collection Time: 12/07/21  9:25 PM   Result Value Ref Range    POC Glucose 205 (H) 65 - 105 mg/dL   CBC with DIFF    Collection Time: 12/08/21  4:36 AM   Result Value Ref Range    WBC 8.3 3.5 - 11.0 k/uL    RBC 2.40 (L) 4.0 - 5.2 m/uL    Hemoglobin 7.3 (L) 12.0 - 16.0 g/dL    Hematocrit 22.7 (L) 36 - 46 %    MCV 94.5 80 - 100 fL    MCH 30.3 26 - 34 pg    MCHC 32.0 31 - 37 g/dL    RDW 16.7 (H) 11.5 - 14.9 %    Platelets 891 (L) 935 - 450 k/uL    MPV 9.2 6.0 - 12.0 fL    NRBC Automated NOT REPORTED per 100 WBC    Differential Type NOT REPORTED     Immature Granulocytes NOT REPORTED 0 %    Absolute Immature Granulocyte NOT REPORTED 0.00 - 0.30 k/uL    WBC Morphology NOT REPORTED     RBC Morphology NOT REPORTED     Platelet Estimate NOT REPORTED     Seg Neutrophils 75 (H) 36 - 66 %    Lymphocytes 14 (L) 24 - 44 %    Monocytes 8 (H) 1 - 7 %    Eosinophils % 3 0 - 4 %    Basophils 0 0 - 2 %    Segs Absolute 6.20 1.3 - 9.1 k/uL    Absolute Lymph # 1.20 1.0 - 4.8 k/uL    Absolute Mono # 0.70 0.1 - 1.3 k/uL    Absolute Eos # 0.20 0.0 - 0.4 k/uL    Basophils Absolute 0.00 0.0 - 0.2 k/uL   Magnesium    Collection Time: 21  4:36 AM   Result Value Ref Range    Magnesium 2.1 1.6 - 2.6 mg/dL   Basic Metabolic Panel w/ Reflex to MG    Collection Time: 21  4:36 AM   Result Value Ref Range    Glucose 218 (H) 70 - 99 mg/dL    BUN 64 (H) 8 - 23 mg/dL    CREATININE 4.88 (H) 0.50 - 0.90 mg/dL    Bun/Cre Ratio NOT REPORTED 9 - 20    Calcium 9.1 8.6 - 10.4 mg/dL    Sodium 133 (L) 135 - 144 mmol/L    Potassium 3.8 3.7 - 5.3 mmol/L    Chloride 94 (L) 98 - 107 mmol/L    CO2 24 20 - 31 mmol/L    Anion Gap 15 9 - 17 mmol/L    GFR Non-African American 9 (L) >60 mL/min    GFR  11 (L) >60 mL/min    GFR Comment          GFR Staging NOT REPORTED    POC Glucose Fingerstick    Collection Time: 21  8:09 AM   Result Value Ref Range    POC Glucose 189 (H) 65 - 105 mg/dL   POC Glucose Fingerstick    Collection Time: 21 11:49 AM   Result Value Ref Range    POC Glucose 195 (H) 65 - 105 mg/dL       Physical Examination:        Vitals:  /76   Pulse 103   Temp 98.2 °F (36.8 °C) (Axillary)   Resp 27   Ht 5' 2.99\" (1.6 m)   Wt 210 lb 15.7 oz (95.7 kg)   SpO2 98%   BMI 37.38 kg/m²   Temp (24hrs), Av.2 °F (36.8 °C), Min:98 °F (36.7 °C), Max:98.5 °F (36.9 °C)    Recent Labs     21  1615 21  2125 21  0809 21  1149   POCGLU 165* 205* 189* 195*         Physical Exam  Vitals reviewed. HENT:      Head: Normocephalic.       Right Ear: External ear normal.      Left Ear: External ear normal.      Nose: Nose normal.      Mouth/Throat:      Mouth: Mucous membranes are moist.      Pharynx: Oropharynx is clear. Eyes:      Conjunctiva/sclera: Conjunctivae normal.   Cardiovascular:      Rate and Rhythm: Normal rate and regular rhythm. Pulses: Normal pulses. Heart sounds: Normal heart sounds. Pulmonary:      Effort: Pulmonary effort is normal.      Breath sounds: Normal breath sounds. Comments: Trach in place on ventilator  Abdominal:      General: Bowel sounds are normal.      Palpations: Abdomen is soft. Comments: G-tube in place   Musculoskeletal:         General: No deformity. Cervical back: Normal range of motion and neck supple. Skin:     General: Skin is warm. Capillary Refill: Capillary refill takes less than 2 seconds. Coloration: Skin is not jaundiced. Neurological:      General: No focal deficit present. Mental Status: She is alert. Mental status is at baseline. Psychiatric:         Mood and Affect: Mood normal.         Behavior: Behavior normal.         Assessment:        Primary Problem  JAYLYN (acute kidney injury) ([facility] [address]     Principal Problem:    JAYLYN (acute kidney injury) ([facility] [address]  Active Problems:    Type 2 diabetes mellitus with kidney complication, with long-term current use of insulin ([facility])    CKD (chronic kidney disease) stage 3, GFR 30-59 ml/min ([facility])    HTN (hypertension)    ASCVD (arteriosclerotic cardiovascular disease)    Iron deficiency anemia    Hydronephrosis determined by ultrasound    Longstanding persistent atrial fibrillation ([facility])    Acute metabolic encephalopathy    Acute cystitis without hematuria    Acute respiratory failure with hypoxia and hypercapnia ([facility])    Dilated cardiomyopathy ([facility] [address]  Resolved Problems:    * No resolved hospital problems.  *      Past Medical History:   Diagnosis Date    Anemia, unspecified     CAD (coronary artery disease)     Chronic kidney disease  CKD (chronic kidney disease) stage 3, GFR 30-59 ml/min ([facility])     Closed fracture of dorsal (thoracic) vertebra without mention of spinal cord injury     Coronary atherosclerosis of unspecified type of vessel, native or graft     Depression with anxiety     Esophageal reflux     HTN (hypertension)     Hyperkalemia     Hyperlipidemia     Intervertebral cervical disc disorder with myelopathy, cervical region     Lower back pain     Lumbar radiculopathy     Microalbuminuria     Muscle weakness     Pain in limb     Stenosis of cervical spine     Tethered cord syndrome ([facility])     Type II or unspecified type diabetes mellitus without mention of complication, not stated as uncontrolled     Urinary calculus     UTI (lower urinary tract infection)         Plan:        1. Diflucan 150 mg  daily per PEG tube  1. Monitor H&H every 8 hour  2. Tunneled cath placed yesterday  3. LTAC discharge planning in progress  4. CMP  5. Continue tube feed  6. With statin powder twice daily for skin fold for candidal intertrigo  7. Lopressor IV 5 mg every 6 hours as needed for heart rate above 115.  8. Midodrine 5 mg p.o. 3 times a day as needed for systolic blood pressure less than 90.  9. DVT Prophylaxis no pharmacological DVT prophylaxis due to low hemoglobin  10. EPCs  11. PT/OT to evaluate and treat  12. Pain control  13. Replace electrolytes as per sliding scale  14. Home medications reviewed and appropriate medications continued  15.  Reviewed labs and imaging studies from last 24 hours and results explained to patient      Electronically signed by Almedia Merlin, MD

## 2021-12-09 ENCOUNTER — APPOINTMENT (OUTPATIENT)
Dept: GENERAL RADIOLOGY | Age: 71
DRG: 004 | End: 2021-12-09
Payer: COMMERCIAL

## 2021-12-09 ENCOUNTER — APPOINTMENT (OUTPATIENT)
Dept: INTERVENTIONAL RADIOLOGY/VASCULAR | Age: 71
DRG: 004 | End: 2021-12-09
Payer: COMMERCIAL

## 2021-12-09 LAB
ABSOLUTE EOS #: 0.1 K/UL (ref 0–0.4)
ABSOLUTE IMMATURE GRANULOCYTE: ABNORMAL K/UL (ref 0–0.3)
ABSOLUTE LYMPH #: 1.3 K/UL (ref 1–4.8)
ABSOLUTE MONO #: 0.8 K/UL (ref 0.1–1.3)
ALLEN TEST: ABNORMAL
ANION GAP SERPL CALCULATED.3IONS-SCNC: 12 MMOL/L (ref 9–17)
APPEARANCE FLUID: NORMAL
BASOPHILS # BLD: 0 % (ref 0–2)
BASOPHILS ABSOLUTE: 0 K/UL (ref 0–0.2)
BUN BLDV-MCNC: 40 MG/DL (ref 8–23)
BUN/CREAT BLD: ABNORMAL (ref 9–20)
CALCIUM SERPL-MCNC: 8.7 MG/DL (ref 8.6–10.4)
CARBOXYHEMOGLOBIN: 1.1 % (ref 0–5)
CHLORIDE BLD-SCNC: 97 MMOL/L (ref 98–107)
CO2: 25 MMOL/L (ref 20–31)
COLOR FLUID: YELLOW
CREAT SERPL-MCNC: 3.26 MG/DL (ref 0.5–0.9)
DIFFERENTIAL TYPE: ABNORMAL
EOSINOPHILS RELATIVE PERCENT: 2 % (ref 0–4)
FIO2: 21
GFR AFRICAN AMERICAN: 17 ML/MIN
GFR NON-AFRICAN AMERICAN: 14 ML/MIN
GFR SERPL CREATININE-BSD FRML MDRD: ABNORMAL ML/MIN/{1.73_M2}
GFR SERPL CREATININE-BSD FRML MDRD: ABNORMAL ML/MIN/{1.73_M2}
GLUCOSE BLD-MCNC: 172 MG/DL (ref 65–105)
GLUCOSE BLD-MCNC: 180 MG/DL (ref 65–105)
GLUCOSE BLD-MCNC: 181 MG/DL (ref 65–105)
GLUCOSE BLD-MCNC: 192 MG/DL (ref 65–105)
GLUCOSE BLD-MCNC: 206 MG/DL (ref 70–99)
GLUCOSE BLD-MCNC: 218 MG/DL (ref 65–105)
GLUCOSE, FLUID: 225 MG/DL
HCO3 ARTERIAL: 27.4 MMOL/L (ref 22–26)
HCT VFR BLD CALC: 21.1 % (ref 36–46)
HCT VFR BLD CALC: 21.9 % (ref 36–46)
HCT VFR BLD CALC: 22 % (ref 36–46)
HEMOGLOBIN: 6.7 G/DL (ref 12–16)
HEMOGLOBIN: 7.1 G/DL (ref 12–16)
HEMOGLOBIN: 7.1 G/DL (ref 12–16)
IMMATURE GRANULOCYTES: ABNORMAL %
LACTATE DEHYDROGENASE, FLUID: 85 U/L
LACTATE DEHYDROGENASE: 179 U/L (ref 135–214)
LYMPHOCYTES # BLD: 14 % (ref 24–44)
MAGNESIUM: 1.8 MG/DL (ref 1.6–2.6)
MCH RBC QN AUTO: 30.8 PG (ref 26–34)
MCHC RBC AUTO-ENTMCNC: 32.7 G/DL (ref 31–37)
MCV RBC AUTO: 94.3 FL (ref 80–100)
METHEMOGLOBIN: 1 % (ref 0–1.9)
MODE: ABNORMAL
MONOCYTES # BLD: 9 % (ref 1–7)
NEGATIVE BASE EXCESS, ART: ABNORMAL MMOL/L (ref 0–2)
NOTIFICATION TIME: ABNORMAL
NOTIFICATION: ABNORMAL
NRBC AUTOMATED: ABNORMAL PER 100 WBC
O2 DEVICE/FLOW/%: ABNORMAL
O2 SAT, ARTERIAL: 95.2 % (ref 95–98)
OXYHEMOGLOBIN: ABNORMAL % (ref 95–98)
PATIENT TEMP: 37
PCO2 ARTERIAL: 36.3 MMHG (ref 35–45)
PCO2, ART, TEMP ADJ: ABNORMAL (ref 35–45)
PDW BLD-RTO: 16.8 % (ref 11.5–14.9)
PEEP/CPAP: 5
PH ARTERIAL: 7.49 (ref 7.35–7.45)
PH FLUID: 8
PH, ART, TEMP ADJ: ABNORMAL (ref 7.35–7.45)
PLATELET # BLD: 76 K/UL (ref 150–450)
PLATELET ESTIMATE: ABNORMAL
PMV BLD AUTO: 9.8 FL (ref 6–12)
PO2 ARTERIAL: 76.4 MMHG (ref 80–100)
PO2, ART, TEMP ADJ: ABNORMAL MMHG (ref 80–100)
POSITIVE BASE EXCESS, ART: 4 MMOL/L (ref 0–2)
POTASSIUM SERPL-SCNC: 3.8 MMOL/L (ref 3.7–5.3)
PSV: ABNORMAL
PT. POSITION: ABNORMAL
RBC # BLD: 2.32 M/UL (ref 4–5.2)
RBC # BLD: ABNORMAL 10*6/UL
RBC FLUID: 242 /MM3
RESPIRATORY RATE: 22
SAMPLE SITE: ABNORMAL
SEG NEUTROPHILS: 75 % (ref 36–66)
SEGMENTED NEUTROPHILS ABSOLUTE COUNT: 7 K/UL (ref 1.3–9.1)
SET RATE: 20
SODIUM BLD-SCNC: 134 MMOL/L (ref 135–144)
SPECIMEN TYPE: NORMAL
TEXT FOR RESPIRATORY: ABNORMAL
TOTAL HB: ABNORMAL G/DL (ref 12–16)
TOTAL PROTEIN, BODY FLUID: 3.5 G/DL
TOTAL PROTEIN: 5.9 G/DL (ref 6.4–8.3)
TOTAL RATE: 22
VT: 450
WBC # BLD: 9.3 K/UL (ref 3.5–11)
WBC # BLD: ABNORMAL 10*3/UL
WBC FLUID: 185 /MM3

## 2021-12-09 PROCEDURE — 6370000000 HC RX 637 (ALT 250 FOR IP): Performed by: INTERNAL MEDICINE

## 2021-12-09 PROCEDURE — 84157 ASSAY OF PROTEIN OTHER: CPT

## 2021-12-09 PROCEDURE — 84155 ASSAY OF PROTEIN SERUM: CPT

## 2021-12-09 PROCEDURE — 85014 HEMATOCRIT: CPT

## 2021-12-09 PROCEDURE — 2580000003 HC RX 258: Performed by: SURGERY

## 2021-12-09 PROCEDURE — 80048 BASIC METABOLIC PNL TOTAL CA: CPT

## 2021-12-09 PROCEDURE — 85018 HEMOGLOBIN: CPT

## 2021-12-09 PROCEDURE — 85025 COMPLETE CBC W/AUTO DIFF WBC: CPT

## 2021-12-09 PROCEDURE — C9113 INJ PANTOPRAZOLE SODIUM, VIA: HCPCS | Performed by: SURGERY

## 2021-12-09 PROCEDURE — 71045 X-RAY EXAM CHEST 1 VIEW: CPT

## 2021-12-09 PROCEDURE — 86901 BLOOD TYPING SEROLOGIC RH(D): CPT

## 2021-12-09 PROCEDURE — 36584 COMPL RPLCMT PICC RS&I: CPT | Performed by: RADIOLOGY

## 2021-12-09 PROCEDURE — 36415 COLL VENOUS BLD VENIPUNCTURE: CPT

## 2021-12-09 PROCEDURE — 83615 LACTATE (LD) (LDH) ENZYME: CPT

## 2021-12-09 PROCEDURE — 74230 X-RAY XM SWLNG FUNCJ C+: CPT

## 2021-12-09 PROCEDURE — 6370000000 HC RX 637 (ALT 250 FOR IP): Performed by: FAMILY MEDICINE

## 2021-12-09 PROCEDURE — P9016 RBC LEUKOCYTES REDUCED: HCPCS

## 2021-12-09 PROCEDURE — 94003 VENT MGMT INPAT SUBQ DAY: CPT

## 2021-12-09 PROCEDURE — 32555 ASPIRATE PLEURA W/ IMAGING: CPT | Performed by: RADIOLOGY

## 2021-12-09 PROCEDURE — 87075 CULTR BACTERIA EXCEPT BLOOD: CPT

## 2021-12-09 PROCEDURE — 6360000002 HC RX W HCPCS: Performed by: SURGERY

## 2021-12-09 PROCEDURE — 82805 BLOOD GASES W/O2 SATURATION: CPT

## 2021-12-09 PROCEDURE — 83986 ASSAY PH BODY FLUID NOS: CPT

## 2021-12-09 PROCEDURE — 87205 SMEAR GRAM STAIN: CPT

## 2021-12-09 PROCEDURE — 6370000000 HC RX 637 (ALT 250 FOR IP): Performed by: SURGERY

## 2021-12-09 PROCEDURE — 88305 TISSUE EXAM BY PATHOLOGIST: CPT

## 2021-12-09 PROCEDURE — 87070 CULTURE OTHR SPECIMN AEROBIC: CPT

## 2021-12-09 PROCEDURE — 86850 RBC ANTIBODY SCREEN: CPT

## 2021-12-09 PROCEDURE — 2709999900 IR GUIDED THORACENTESIS PLEURAL

## 2021-12-09 PROCEDURE — 99232 SBSQ HOSP IP/OBS MODERATE 35: CPT | Performed by: NURSE PRACTITIONER

## 2021-12-09 PROCEDURE — 82945 GLUCOSE OTHER FLUID: CPT

## 2021-12-09 PROCEDURE — 82947 ASSAY GLUCOSE BLOOD QUANT: CPT

## 2021-12-09 PROCEDURE — 92611 MOTION FLUOROSCOPY/SWALLOW: CPT

## 2021-12-09 PROCEDURE — 6360000004 HC RX CONTRAST MEDICATION: Performed by: FAMILY MEDICINE

## 2021-12-09 PROCEDURE — 86900 BLOOD TYPING SEROLOGIC ABO: CPT

## 2021-12-09 PROCEDURE — 02HV33Z INSERTION OF INFUSION DEVICE INTO SUPERIOR VENA CAVA, PERCUTANEOUS APPROACH: ICD-10-PCS | Performed by: FAMILY MEDICINE

## 2021-12-09 PROCEDURE — 2000000000 HC ICU R&B

## 2021-12-09 PROCEDURE — 86920 COMPATIBILITY TEST SPIN: CPT

## 2021-12-09 PROCEDURE — 88112 CYTOPATH CELL ENHANCE TECH: CPT

## 2021-12-09 PROCEDURE — 36600 WITHDRAWAL OF ARTERIAL BLOOD: CPT

## 2021-12-09 PROCEDURE — 2709999900 IR FLUORO GUIDED CVA DEVICE PLMT/REPLACE/REMOVAL

## 2021-12-09 PROCEDURE — 83735 ASSAY OF MAGNESIUM: CPT

## 2021-12-09 PROCEDURE — 0W993ZZ DRAINAGE OF RIGHT PLEURAL CAVITY, PERCUTANEOUS APPROACH: ICD-10-PCS | Performed by: FAMILY MEDICINE

## 2021-12-09 RX ORDER — SODIUM CHLORIDE 9 MG/ML
INJECTION, SOLUTION INTRAVENOUS PRN
Status: DISCONTINUED | OUTPATIENT
Start: 2021-12-09 | End: 2021-12-14 | Stop reason: HOSPADM

## 2021-12-09 RX ORDER — ACETAMINOPHEN 325 MG/1
650 TABLET ORAL EVERY 4 HOURS PRN
Status: DISCONTINUED | OUTPATIENT
Start: 2021-12-09 | End: 2021-12-14 | Stop reason: HOSPADM

## 2021-12-09 RX ADMIN — NYSTATIN 500000 UNITS: 500000 SUSPENSION ORAL at 21:03

## 2021-12-09 RX ADMIN — FERROUS SULFATE TAB 325 MG (65 MG ELEMENTAL FE) 325 MG: 325 (65 FE) TAB at 08:00

## 2021-12-09 RX ADMIN — INSULIN LISPRO 2 UNITS: 100 INJECTION, SOLUTION INTRAVENOUS; SUBCUTANEOUS at 16:48

## 2021-12-09 RX ADMIN — SODIUM CHLORIDE, PRESERVATIVE FREE 10 ML: 5 INJECTION INTRAVENOUS at 09:00

## 2021-12-09 RX ADMIN — NYSTATIN 500000 UNITS: 500000 SUSPENSION ORAL at 16:48

## 2021-12-09 RX ADMIN — CHLORHEXIDINE GLUCONATE 0.12% ORAL RINSE 15 ML: 1.2 LIQUID ORAL at 21:03

## 2021-12-09 RX ADMIN — INSULIN LISPRO 1 UNITS: 100 INJECTION, SOLUTION INTRAVENOUS; SUBCUTANEOUS at 21:02

## 2021-12-09 RX ADMIN — PANTOPRAZOLE SODIUM 40 MG: 40 INJECTION, POWDER, FOR SOLUTION INTRAVENOUS at 07:52

## 2021-12-09 RX ADMIN — INSULIN GLARGINE 22 UNITS: 100 INJECTION, SOLUTION SUBCUTANEOUS at 21:02

## 2021-12-09 RX ADMIN — PANTOPRAZOLE SODIUM 40 MG: 40 INJECTION, POWDER, FOR SOLUTION INTRAVENOUS at 21:03

## 2021-12-09 RX ADMIN — FLUCONAZOLE 150 MG: 100 TABLET ORAL at 07:52

## 2021-12-09 RX ADMIN — SERTRALINE HYDROCHLORIDE 25 MG: 50 TABLET ORAL at 07:52

## 2021-12-09 RX ADMIN — NYSTATIN 500000 UNITS: 500000 SUSPENSION ORAL at 07:52

## 2021-12-09 RX ADMIN — SODIUM CHLORIDE, PRESERVATIVE FREE 10 ML: 5 INJECTION INTRAVENOUS at 21:04

## 2021-12-09 RX ADMIN — IOPAMIDOL 10 ML: 612 INJECTION, SOLUTION INTRAVENOUS at 15:14

## 2021-12-09 RX ADMIN — ANTI-FUNGAL POWDER MICONAZOLE NITRATE TALC FREE: 1.42 POWDER TOPICAL at 07:53

## 2021-12-09 RX ADMIN — ANTI-FUNGAL POWDER MICONAZOLE NITRATE TALC FREE: 1.42 POWDER TOPICAL at 21:02

## 2021-12-09 RX ADMIN — INSULIN LISPRO 4 UNITS: 100 INJECTION, SOLUTION INTRAVENOUS; SUBCUTANEOUS at 07:51

## 2021-12-09 RX ADMIN — CHLORHEXIDINE GLUCONATE 0.12% ORAL RINSE 15 ML: 1.2 LIQUID ORAL at 07:51

## 2021-12-09 RX ADMIN — ATORVASTATIN CALCIUM 40 MG: 40 TABLET, FILM COATED ORAL at 07:52

## 2021-12-09 RX ADMIN — SODIUM CHLORIDE, PRESERVATIVE FREE 10 ML: 5 INJECTION INTRAVENOUS at 21:03

## 2021-12-09 ASSESSMENT — PULMONARY FUNCTION TESTS
PIF_VALUE: 23
PIF_VALUE: 24
PIF_VALUE: 15
PIF_VALUE: 15
PIF_VALUE: 22
PIF_VALUE: 21
PIF_VALUE: 26
PIF_VALUE: 24
PIF_VALUE: 23

## 2021-12-09 ASSESSMENT — ENCOUNTER SYMPTOMS
TROUBLE SWALLOWING: 0
ABDOMINAL PAIN: 0
EYE ITCHING: 0
COLOR CHANGE: 0
BLOOD IN STOOL: 0
SHORTNESS OF BREATH: 0
EYE REDNESS: 0
VOMITING: 0
CHEST TIGHTNESS: 0
COUGH: 0
NAUSEA: 0

## 2021-12-09 ASSESSMENT — PAIN SCALES - GENERAL: PAINLEVEL_OUTOF10: 0

## 2021-12-09 ASSESSMENT — PAIN SCALES - WONG BAKER: WONGBAKER_NUMERICALRESPONSE: 0

## 2021-12-09 NOTE — PROGRESS NOTES
[facility] [address]                 PATIENT NAME: Edd Pritchard     TODAY'S DATE: 12/9/2021, 9:32 AM    SUBJECTIVE:  POD#6  Pt seen and examined. Afebrile, VSS. No leukocytosis, hemoglobin low but stable at 7.1. Patient denies abdominal pain. PEG site clean. Tolerating tube feeds at goal. Colostomy functioning. Trach site with moderate amount of clear secretions. Slight erythema around left-sided suture on trach base, triple antibiotics ointment applied. OBJECTIVE:   VITALS:  /61   Pulse 99   Temp 98.6 °F (37 °C) (Axillary)   Resp (!) 6   Ht 5' 2.99\" (1.6 m)   Wt (!) 3.5 oz (0.1 kg) Comment: -1.8kg  SpO2 94%   BMI 0.04 kg/m²      INTAKE/OUTPUT:      Intake/Output Summary (Last 24 hours) at 12/9/2021 0932  Last data filed at 12/9/2021 0515  Gross per 24 hour   Intake 1400 ml   Output 1925 ml   Net -525 ml                 CONSTITUTIONAL:  awake and alert.   No acute distress  HEART:   RRR  LUNGS:   Trach to vent  ABDOMEN:   Abdomen soft, non-tender, non-distended  EXTREMITIES:   Trace pedal edema    Data:  CBC:   Lab Results   Component Value Date    WBC 9.3 12/09/2021    RBC 2.32 12/09/2021    RBC 4.14 08/31/2016    HGB 7.1 12/09/2021    HCT 21.9 12/09/2021    MCV 94.3 12/09/2021    MCH 30.8 12/09/2021    MCHC 32.7 12/09/2021    RDW 16.8 12/09/2021    PLT 76 12/09/2021    MPV 9.8 12/09/2021     BMP:    Lab Results   Component Value Date     12/09/2021    K 3.8 12/09/2021    CL 97 12/09/2021    CO2 25 12/09/2021    BUN 40 12/09/2021    LABALBU 2.6 11/18/2021    CREATININE 3.26 12/09/2021    CALCIUM 8.7 12/09/2021    GFRAA 17 12/09/2021    LABGLOM 14 12/09/2021    GLUCOSE 206 12/09/2021    GLUCOSE 77 08/31/2016       Radiology Review:      XR CHEST PORTABLE [[id number]] Collected: 12/09/21 0611      Order Status: Completed Updated: 12/09/21 0837     Narrative:       EXAMINATION:   ONE XRAY VIEW OF THE CHEST     12/9/2021 6:02 am     COMPARISON:   12/07/2021 HISTORY:   ORDERING SYSTEM PROVIDED HISTORY: Respiratory failure   TECHNOLOGIST PROVIDED HISTORY:   Respiratory failure   Reason for Exam: Respiratory failure   Additional signs and symptoms: Respiratory failure   Relevant Medical/Surgical History: Respiratory failure     FINDINGS:   Cardiomegaly is present. Robertha Christopher is a moderate right pleural effusion similar   to the prior study.  No pneumothorax.  Probable passive atelectasis of the   right lower lobe versus pneumonia.  Tracheostomy tube is in good position. There is a right IJ central venous dual lumen catheter in good position with   its tip in the junction of SVC and right atrium.  PICC line catheter on the   right has its tip in the mid SVC.  Osseous structures are stable.      Impression:       Cardiomegaly and moderate right pleural effusion with right passive   atelectasis or pneumonia.  Support tubes and catheters are stable. ASSESSMENT     Principal Problem:    JAYLYN (acute kidney injury) ([facility] [address]  Active Problems:    Type 2 diabetes mellitus with kidney complication, with long-term current use of insulin ([facility])    CKD (chronic kidney disease) stage 3, GFR 30-59 ml/min ([facility])    HTN (hypertension)    ASCVD (arteriosclerotic cardiovascular disease)    Iron deficiency anemia    Hydronephrosis determined by ultrasound    Longstanding persistent atrial fibrillation ([facility])    Acute metabolic encephalopathy    Acute cystitis without hematuria    Acute respiratory failure with hypoxia and hypercapnia ([facility])    Dilated cardiomyopathy ([address]  Resolved Problems:    * No resolved hospital problems. *      Plan  1. Tube feeds as tolerated  2. Continue local Trach and PEG site care  3. Surgically stable  4. Continue medical management   5.  Discharge to [address]       Electronically signed by Khanh Sanchez PA-C  [id number] [address]

## 2021-12-09 NOTE — PROCEDURES
INSTRUMENTAL SWALLOW REPORT  MODIFIED BARIUM SWALLOW    NAME: Vilma Warren   : 1950  MRN: [id number]       Date of Eval: 2021              Referring Diagnosis(es):  dysphagia    Past Medical History:  has a past medical history of Anemia, unspecified, CAD (coronary artery disease), Chronic kidney disease, CKD (chronic kidney disease) stage 3, GFR 30-59 ml/min ([facility] [address] Closed fracture of dorsal (thoracic) vertebra without mention of spinal cord injury, Coronary atherosclerosis of unspecified type of vessel, native or graft, Depression with anxiety, Esophageal reflux, HTN (hypertension), Hyperkalemia, Hyperlipidemia, Intervertebral cervical disc disorder with myelopathy, cervical region, Lower back pain, Lumbar radiculopathy, Microalbuminuria, Muscle weakness, Pain in limb, Stenosis of cervical spine, Tethered cord syndrome ([facility] [address] Type II or unspecified type diabetes mellitus without mention of complication, not stated as uncontrolled, Urinary calculus, and UTI (lower urinary tract infection). Past Surgical History:  has a past surgical history that includes Colon surgery; angioplasty; joint replacement; Upper gastrointestinal endoscopy (N/A, 11/15/2021); Cystoscopy (Right, 2021); IR NONTUNNELED VASCULAR CATHETER > 5 YEARS (2021); tracheostomy (N/A, 12/3/2021); and Upper gastrointestinal endoscopy (N/A, 12/3/2021). Current Diet Solid Consistency: NPO  Current Diet Liquid Consistency: NPO       Type of Study: Initial MBS       Recent CXR/CT of Chest: Date - CXR- Cardiomegaly with bibasilar effusions and scattered pulmonary infiltrates. Patient Complaints/Reason for Referral:  Vilma Warren was referred for a MBS to assess the efficiency of his/her swallow function, assess for aspiration, and to make recommendations regarding safe dietary consistencies, effective compensatory strategies, and safe eating environment. Onset of problem:    Pt. Had trach placed on .    Pt. Is currently ventillator dependent. RN and respiratory therapy accompanied pt. To test.       Pt. Initially sent to hospital from [facility] on 11/10 d/t elevated creatinine and low hemoglobin. Behavior/Cognition/Vision/Hearing:  Behavior/Cognition: Alert; Cooperative  Vision: Impaired  Vision Exceptions: Wears glasses at all times; Cataracts  Hearing: Exceptions to [facility]  Hearing Exceptions: Hard of hearing/hearing concerns    Impressions:     Patient Position: Lateral     Consistencies Administered: Pudding teaspoon                   Pharyngeal: mod vallecular and pyriform sinus cavity residue. Deep penetration and +GROSS aspiration with delayed cough. Pudding was only consistency tested d/t risk of aspiration of all PO intake. RN and respiratory therapy present to suction pt. Dysphagia Outcome Severity Scale: Level 1: Severe dysphagia- NPO. Unable to tolerate any PO safely  Penetration-Aspiration Scale (PAS): 7 - Material enters the airway, passes below the vocal folds, and is not ejected from the trachea despite effort    Recommended Diet:  Solid consistency: NPO  Liquid consistency: NPO       Medication administration: Via NG/PEG              Recommendations/Treatment  Requires SLP Intervention: Yes                  Recommended Exercises:    Therapeutic Interventions: Tongue base strengthening         Education: Results and recommendations were reviewed with pt.  following this exam.      Patient Education Response: Demonstrated understanding; Needs reinforcement           Goals:    Long Term:    Diet at least restrictive consistency           Short Term:     Goal 1:  Increase pt. tongue base strength to improve pt. swallow function                          Oral Preparation / Oral Phase  Oral Phase: WNL        Pharyngeal Phase   IMPAIRED      Esophageal Phase  Esophageal Screen: WNL        Pain   Patient Currently in Pain: Denies  Pain Level: 0  Pain Type: Acute pain, Surgical pain (Trach)  Pain Location: Throat, Other (Comment) (trach site)      Therapy Time:   Individual Concurrent Group Co-treatment   Time In 1330         Time Out 1343         Minutes [address].CCC/SLP     12/9/2021, 2:24 PM

## 2021-12-09 NOTE — PLAN OF CARE
Problem: SAFETY  Goal: Free from accidental physical injury  Outcome: Ongoing  Goal: Free from intentional harm  Outcome: Ongoing     Problem: DAILY CARE  Goal: Daily care needs are met  Outcome: Ongoing     Problem: PAIN  Goal: Patient's pain/discomfort is manageable  Outcome: Ongoing     Problem: SKIN INTEGRITY  Goal: Skin integrity is maintained or improved  Outcome: Ongoing     Problem: KNOWLEDGE DEFICIT  Goal: Patient/S.O. demonstrates understanding of disease process, treatment plan, medications, and discharge instructions.   Outcome: Ongoing     Problem: DISCHARGE BARRIERS  Goal: Patient's continuum of care needs are met  Outcome: Ongoing     Problem: Falls - Risk of:  Goal: Will remain free from falls  Description: Will remain free from falls  Outcome: Ongoing  Goal: Absence of physical injury  Description: Absence of physical injury  Outcome: Ongoing     Problem: Skin Integrity:  Goal: Will show no infection signs and symptoms  Description: Will show no infection signs and symptoms  Outcome: Ongoing  Goal: Absence of new skin breakdown  Description: Absence of new skin breakdown  Outcome: Ongoing     Problem: Musculor/Skeletal Functional Status  Goal: Highest potential functional level  Outcome: Ongoing  Goal: Absence of falls  Outcome: Ongoing     Problem: Musculor/Skeletal Functional Status  Goal: Highest potential functional level  Outcome: Ongoing  Goal: Absence of falls  Outcome: Ongoing     Problem: Nutrition  Goal: Optimal nutrition therapy  Outcome: Ongoing     Problem: Pain:  Goal: Pain level will decrease  Description: Pain level will decrease  Outcome: Ongoing  Goal: Control of acute pain  Description: Control of acute pain  Outcome: Ongoing  Goal: Control of chronic pain  Description: Control of chronic pain  Outcome: Ongoing     Problem: Gas Exchange - Impaired:  Goal: Levels of oxygenation will improve  Description: Levels of oxygenation will improve  Outcome: Ongoing     Problem: Fluid Volume - Imbalance:  Goal: Absence of imbalanced fluid volume signs and symptoms  Description: Absence of imbalanced fluid volume signs and symptoms  Outcome: Ongoing     Problem: Airway Clearance - Ineffective:  Goal: Ability to maintain a clear airway will improve  Description: Ability to maintain a clear airway will improve  Outcome: Ongoing     Problem:  Bowel Function - Altered:  Goal: Bowel elimination is within specified parameters  Description: Bowel elimination is within specified parameters  Outcome: Ongoing     Problem: Fluid Volume - Imbalance:  Goal: Absence of imbalanced fluid volume signs and symptoms  Description: Absence of imbalanced fluid volume signs and symptoms  Outcome: Ongoing     Problem: Gas Exchange - Impaired:  Goal: Levels of oxygenation will improve  Description: Levels of oxygenation will improve  Outcome: Ongoing     Problem: Mental Status - Impaired:  Goal: Mental status will be restored to baseline  Description: Mental status will be restored to baseline  Outcome: Ongoing     Problem: Nutrition Deficit:  Goal: Ability to achieve adequate nutritional intake will improve  Description: Ability to achieve adequate nutritional intake will improve  Outcome: Ongoing     Problem: OXYGENATION/RESPIRATORY FUNCTION  Goal: Patient will maintain patent airway  Outcome: Ongoing  Goal: Patient will achieve/maintain normal respiratory rate/effort  Description: Respiratory rate and effort will be within normal limits for the patient  Outcome: Ongoing     Problem: MECHANICAL VENTILATION  Goal: Patient will maintain patent airway  Outcome: Ongoing  Goal: Oral health is maintained or improved  Outcome: Ongoing

## 2021-12-09 NOTE — PLAN OF CARE
MECHANICAL VENTILATION     [x]  PROVIDE OPTIMAL VENTILATION  []   ASSESS FOR EXTUBATION READINESS  [x]   ASSESS FOR WEANING READINESS  []  EXTUBATE AS TOLERATED  []  IMPLEMENT ADULT MECHANICAL VENTILATION PROTOCOL  [x]  MAINTAIN ADEQUATE OXYGENATION  [x]  PERFORM SPONTANEOUS WEANING TRIAL AS TOLERATED

## 2021-12-09 NOTE — PROGRESS NOTES
ICU Progress Note (Vent)   [facility]    Patient - Sanju Stern,  Age - 66 y.o.    - 1950      Room Number -    MRN -  [id number]   Acct # - [de-identified]  Date of Admission -  11/10/2021  4:23 PM    Events of Past 24 Hours   Received dialysis, uneventful night    Vitals    height is 5' 2.99\" (1.6 m) and weight is 3.5 oz (0.1 kg) (abnormal). Her axillary temperature is 98.6 °F (37 °C). Her blood pressure is 131/61 and her pulse is 101. Her respiration is 23 and oxygen saturation is 99%. Temperature Range: Temp: 98.6 °F (37 °C) Temp  Av.5 °F (36.9 °C)  Min: 97.7 °F (36.5 °C)  Max: 99.3 °F (37.4 °C)  BP Range:  Systolic (41ESL), TAW:163 , Min:72 , OXH:873     Diastolic (37WCA), ECP:36, Min:34, Max:92    Pulse Range: Pulse  Av.8  Min: 92  Max: 120  Respiration Range: Resp  Av  Min: 6  Max: 28  Current Pulse Ox[de-identified]  SpO2: 99 %  24HR Pulse Ox Range:  SpO2  Av.3 %  Min: 84 %  Max: 100 %  Oxygen Amount and Delivery: O2 Flow Rate (L/min): 2 L/min      Wt Readings from Last 3 Encounters:   21 (!) 3.5 oz (0.1 kg)   16 178 lb (80.7 kg)   16 184 lb 12.8 oz (83.8 kg)     I/O       Intake/Output Summary (Last 24 hours) at 2021 0844  Last data filed at 2021 0515  Gross per 24 hour   Intake 1400 ml   Output 1925 ml   Net -525 ml     I/O last 3 completed shifts:   In: 1430 [NG/GT:1430]  Out: 1925 [Stool:125]     DRAIN/TUBE OUTPUT:     Invasive Lines   ETT Day -   day #18 ventilator day #6 tracheostomy  Lines -PICC line day 24    ICP PRESSURE RANGE:  No data recorded  CVP PRESSURE RANGE:  No data recorded  Mechanical Ventilation Data   SETTINGS (Comprehensive)  Vent Information  $Ventilation: $Subsequent Day  Skin Assessment: Clean, dry, & intact  Suction Catheter Diameter: 14  Equipment Changed: HME  Vent Type: Servo i  Vent Mode: PRVC  Vt Ordered: 450 mL  Rate Set: 20 bmp  Pressure Support: 10 cmH20  FiO2 : 21 %  SpO2: 99 %  SpO2/FiO2 ratio: 471.43  Sensitivity: 5  PEEP/CPAP: 5  I Time/ I Time %: 0.86 s  Humidification Source: HME  Nitric Oxide/Epoprostenol In Use?: No  Mask Type: Full face mask  Mask Size: Medium  Additional Respiratory  Assessments  Pulse: 101  Resp: 23  SpO2: 99 %  End Tidal CO2: 34 (%)  Position: Semi-Lagunas's  Humidification Source: HME  Oral Care: Mouth swabbed, Suction toothette, Mouth suctioned  Cuff Pressure (cm H2O): 30 cm H2O       ABGs:   Lab Results   Component Value Date    PHART 7.487 12/09/2021    PO2ART 76.4 12/09/2021    KIR7WVZ 36.3 12/09/2021       Lab Results   Component Value Date    MODE [facility] 12/09/2021         Medications   IV   sodium chloride      norepinephrine Stopped (11/29/21 2306)    propofol Stopped (12/03/21 1030)    dextrose      sodium chloride        epoetin tyshawn-epbx  2,000 Units SubCUTAneous Once per day on Mon Wed Fri    And    epoetin tyshawn-epbx  3,000 Units SubCUTAneous Once per day on Mon Wed Fri    nystatin  5 mL Oral 4x Daily    miconazole   Topical BID    atorvastatin  40 mg Oral Daily    chlorhexidine  15 mL Mouth/Throat BID    [Held by provider] carvedilol  3.125 mg Oral BID WC    pantoprazole  40 mg IntraVENous BID    And    sodium chloride flush  10 mL IntraVENous BID    sodium polystyrene  15 g Oral Once    sertraline  25 mg Oral Daily    ferrous sulfate  325 mg Oral Daily with breakfast    [Held by provider] aspirin  81 mg Oral Daily    insulin glargine  22 Units SubCUTAneous Nightly    insulin lispro  0-12 Units SubCUTAneous TID WC    insulin lispro  0-6 Units SubCUTAneous Nightly    sodium chloride flush  5-40 mL IntraVENous 2 times per day       Diet/Nutrition   ADULT TUBE FEEDING; PEG; Renal Formula; Continuous; 15; Yes; 15; Q 4 hours; 40; 50; Q 4 hours    Exam   VITALS    height is 5' 2.99\" (1.6 m) and weight is 3.5 oz (0.1 kg) (abnormal). Her axillary temperature is 98.6 °F (37 °C).  Her blood pressure is 131/61 and her pulse is 101. Her respiration is 23 and oxygen saturation is 99%. Ventilator Settings (Basic)  Vent Mode: PRVC Rate Set: 20 bmp/Vt Ordered: 450 mL/ /FiO2 : 21 %    Constitutional - Sedated  General Appearance  well developed, well nourished  HEENT - Life support devices in place (ET, OG),normocephalic, atraumatic. PERRLA  Lungs - Chest expands equally, no wheezes, rales or rhonchi. Cardiovascular - Heart sounds are normal.  normal rate and rhythm regular, no murmur, gallop or rub. Abdomen - soft, nontender, nondistended, no masses or organomegaly  Neurologic - CN II-XII are grossly intact. There are no focal motor deficits  Skin - no bruising or bleeding  Extremities - no cyanosis, clubbing or edema    Lab Results   CBC     Lab Results   Component Value Date    WBC 9.3 12/09/2021    RBC 2.32 12/09/2021    RBC 4.14 08/31/2016    HGB 7.1 12/09/2021    HCT 21.9 12/09/2021    PLT 76 12/09/2021    MCV 94.3 12/09/2021    MCH 30.8 12/09/2021    MCHC 32.7 12/09/2021    RDW 16.8 12/09/2021    NRBC 4 11/22/2021    METASPCT 3 12/02/2021    LYMPHOPCT 14 12/09/2021    LYMPHOPCT 32.8 08/31/2016    MONOPCT 9 12/09/2021    EOSPCT 4.3 08/31/2016    BASOPCT 0 12/09/2021    BASOPCT 0.7 08/31/2016    MONOSABS 0.80 12/09/2021    LYMPHSABS 1.30 12/09/2021    EOSABS 0.10 12/09/2021    BASOSABS 0.00 12/09/2021    DIFFTYPE NOT REPORTED 12/09/2021       BMP   Lab Results   Component Value Date     12/09/2021    K 3.8 12/09/2021    CL 97 12/09/2021    CO2 25 12/09/2021    BUN 40 12/09/2021    CREATININE 3.26 12/09/2021    GLUCOSE 206 12/09/2021    GLUCOSE 77 08/31/2016    CALCIUM 8.7 12/09/2021       LFTS  Lab Results   Component Value Date    ALKPHOS 175 11/18/2021    ALT 31 11/18/2021    AST 21 11/18/2021    PROT 5.3 11/20/2021    BILITOT 0.27 11/18/2021    BILIDIR <0.08 11/13/2021    IBILI Connot be calculated 11/13/2021    LABALBU 2.6 11/18/2021       INR  No results for input(s): PROTIME, INR in the last 72 hours.     APTT  No results for input(s): APTT in the last 72 hours. Lactic Acid  Lab Results   Component Value Date    LACTA 1.3 11/26/2021    LACTA 0.6 11/16/2021    LACTA 0.6 11/16/2021        BNP   No results for input(s): BNP in the last 72 hours. Cultures       Radiology     Plain Films         Chest x-ray shows no significant change in right pleural effusion      SYSTEM ASSESSMENT    Acute on chronic hypoxic and hypercapnic respiratory failure  Bradycardic arrest, intubated 11/20; tracheostomy 12/3  Right lower lobe infiltrate/pleural effusion  Acute kidney injury-requiring hemodialysis  Morbid obesity  Anemia, chronic disease and possible rectal bleed  Hypoalbuminemia  Thrombocytopenia  Nonischemic cardiomyopathy with EF of 30 to 35% possible right to left interatrial shunt  Atrial fibrillation  Possible CLL/lymphoproliferative disorder  DNR CCA      Continue daily weaning trials, so far unsuccessful  PICC line needs to be changed out  Will have IR do thoracentesis on the right side  Stable enough to transfer out of ICU to intermediate ICU  Still awaiting beds at [facility], no objection to discharge  Continue to monitor hemoglobin and platelet count, not on any anticoagulation at this time due to thrombocytopenia as well as low hemoglobin that is required transfusions  Once stabilizes, resume DVT prophylaxis at the very least with subcu heparin, she has high risk for DVT/pulmonary embolism.   Family updated on a daily basis  Electronically signed by Jose Angel Berger MD on 12/9/2021 at 8:44 AM

## 2021-12-09 NOTE — BRIEF OP NOTE
Brief Postoperative Note    Trinidad Stephens  YOB: 1950  [id number]    Pre-operative Diagnosis: Rt UE PICC in need of exchange for dual lumen    Post-operative Diagnosis: Same    Procedure: Picc exchange. Existing 4 F single lumen RUE PICC replaced with a 5 Fr dual lumen Power Picc.  43 cm. May use picc.     Anesthesia: Local      Surgeons/Assistants: Cristi Baker MD    Estimated Blood Loss: Minimal    Complications: none    Specimens: were not obtained      Cristi Baker MD

## 2021-12-09 NOTE — PROGRESS NOTES
Physical Therapy        Physical Therapy Cancel Note      DATE: 2021    NAME: Adonay Cherry  MRN: [id number]   : 1950      Patient not seen this date for Physical Therapy due to: Pt unavailable having procedure done. Will continue to follow up with patient.           Electronically signed by Breann Bai PTA on 2021 at 3:07 PM

## 2021-12-09 NOTE — PROGRESS NOTES
In room, [facility] ICU present. Turned on Lt,  Rt posterior chest prepped draped . Numbed and accessed by Dr Lane Post. Obtained 400 ml of clear yellow fluid. Tolerated well. Access removed 2x2 guaze and OpSite applied.  Picc exchanged

## 2021-12-09 NOTE — PROGRESS NOTES
Progress Note    12/9/2021   4:18 PM    Name:  Dev Espitia  MRN:    [id number]     Acct:     [de-identified]   Room:  2014/2014-01  IP Day: 34     Admit Date: 11/10/2021  4:23 PM  PCP: Rylan Monteiro DO    Subjective:     C/C:   Chief Complaint   Patient presents with    Abnormal Lab     low hemoglobin and high CR       Interval History: Status: not changed. Patient is alert. Patient has a PEG and trach in place. Patient answers questions by nodding head understands all questions. Denies nausea vomiting chest pain or abdominal pain. Tolerating tube feed well. Vital signs reviewed and stable. Vital signs reviewed hemoglobin 7.1 blood sugar readings stable platelet count 76    ROS:   all 10 systems reviewed and are negative except as noted    Review of Systems   Constitutional: Negative for chills and fatigue. HENT: Negative for drooling, mouth sores, sneezing and trouble swallowing. Eyes: Negative for redness and itching. Respiratory: Negative for cough, chest tightness and shortness of breath. Cardiovascular: Negative for chest pain, palpitations and leg swelling. Gastrointestinal: Negative for abdominal pain, blood in stool, nausea and vomiting. Endocrine: Negative for heat intolerance and polyphagia. Genitourinary: Negative for difficulty urinating, flank pain and pelvic pain. Musculoskeletal: Negative for arthralgias, joint swelling and neck stiffness. Skin: Negative for color change and pallor. Allergic/Immunologic: Negative for food allergies. Neurological: Negative for dizziness, seizures and headaches. Hematological: Does not bruise/bleed easily. Psychiatric/Behavioral: Negative for agitation, behavioral problems and suicidal ideas. The patient is not hyperactive. Medications:      Allergies: No Known Allergies    Current Meds: [START ON 12/10/2021] epoetin tyshawn-epbx (RETACRIT) injection 10,000 Units, Once per day on Mon Wed Fri  acetaminophen (TYLENOL) tablet 650 mg, Q4H PRN  sodium citrate 4 % injection 1.9 mL, PRN   And  sodium citrate 4 % injection 1.9 mL, PRN  nystatin (MYCOSTATIN) 852638 UNIT/ML suspension 500,000 Units, 4x Daily  miconazole (MICOTIN) 2 % powder, BID  metoprolol (LOPRESSOR) injection 5 mg, Q6H PRN  polyethylene glycol (GLYCOLAX) packet 17 g, Daily PRN  atorvastatin (LIPITOR) tablet 40 mg, Daily  0.9 % sodium chloride infusion, PRN  midodrine (PROAMATINE) tablet 10 mg, TID PRN  albumin human 25 % IV solution 25 g, PRN  midodrine (PROAMATINE) tablet 5 mg, PRN  norepinephrine (LEVOPHED) 16 mg in sodium chloride 0.9 % 250 mL infusion, Continuous  chlorhexidine (PERIDEX) 0.12 % solution 15 mL, BID  fentaNYL (SUBLIMAZE) injection 50 mcg, Q30 Min PRN  propofol injection, Continuous  [Held by provider] carvedilol (COREG) tablet 3.125 mg, BID WC  pantoprazole (PROTONIX) injection 40 mg, BID   And  sodium chloride flush 0.9 % injection 10 mL, BID  magnesium sulfate 1000 mg in dextrose 5% 100 mL IVPB, PRN  glucose (GLUTOSE) 40 % oral gel 15 g, PRN  dextrose 50 % IV solution, PRN  glucagon (rDNA) injection 1 mg, PRN  dextrose 5 % solution, PRN  sodium polystyrene (KAYEXALATE) 15 GM/60ML suspension 15 g, Once  sertraline (ZOLOFT) tablet 25 mg, Daily  ferrous sulfate (IRON 325) tablet 325 mg, Daily with breakfast  [Held by provider] aspirin chewable tablet 81 mg, Daily  insulin glargine (LANTUS) injection vial 22 Units, Nightly  insulin lispro (HUMALOG) injection vial 0-12 Units, TID WC  insulin lispro (HUMALOG) injection vial 0-6 Units, Nightly  sodium chloride flush 0.9 % injection 5-40 mL, 2 times per day  sodium chloride flush 0.9 % injection 5-40 mL, PRN  0.9 % sodium chloride infusion, PRN  ondansetron (ZOFRAN-ODT) disintegrating tablet 4 mg, Q8H PRN   Or  ondansetron (ZOFRAN) injection 4 mg, Q6H PRN  oxyCODONE-acetaminophen (PERCOCET) 5-325 MG per tablet 1 tablet, Q6H PRN        Data:     Code Status:  DNR-CCA    Family History   Problem Relation Age of Onset    filed at 12/9/2021 1037  Gross per 24 hour   Intake 1370 ml   Output 2025 ml   Net -655 ml     Radiology:  IR FLUORO GUIDED CVA DEVICE PLMT/REPLACE/REMOVAL    Result Date: 12/7/2021  Successful fluoroscopy guided conversion non tunneled to tunneled hemodialysis catheter placement, as above. Catheter is ready for dialysis use at this time. XR CHEST PORTABLE    Result Date: 12/9/2021  Cardiomegaly and moderate right pleural effusion with right passive atelectasis or pneumonia. Support tubes and catheters are stable. XR CHEST PORTABLE    Result Date: 12/7/2021  No significant interval change. XR CHEST PORTABLE    Result Date: 12/5/2021  Cardiomegaly with bibasilar effusions and scattered pulmonary infiltrates. XR CHEST PORTABLE    Result Date: 12/3/2021  1. Interval placement of a tracheostomy tube. 2. No pneumothorax. 3. Persistent pulmonary vascular congestion and bilateral pleural effusions.      XR CHEST PORTABLE    Result Date: 12/3/2021  Mild pulmonary vascular congestion and bilateral pleural effusions, right greater than left, similar to the previous exam.       Labs:  Recent Results (from the past 24 hour(s))   POC Glucose Fingerstick    Collection Time: 12/08/21  8:10 PM   Result Value Ref Range    POC Glucose 219 (H) 65 - 105 mg/dL   Hgb/Hct    Collection Time: 12/08/21  9:59 PM   Result Value Ref Range    Hemoglobin 7.3 (L) 12.0 - 16.0 g/dL    Hematocrit 22.5 (L) 36 - 46 %   CBC with DIFF    Collection Time: 12/09/21  4:22 AM   Result Value Ref Range    WBC 9.3 3.5 - 11.0 k/uL    RBC 2.32 (L) 4.0 - 5.2 m/uL    Hemoglobin 7.1 (L) 12.0 - 16.0 g/dL    Hematocrit 21.9 (L) 36 - 46 %    MCV 94.3 80 - 100 fL    MCH 30.8 26 - 34 pg    MCHC 32.7 31 - 37 g/dL    RDW 16.8 (H) 11.5 - 14.9 %    Platelets 76 (L) 333 - 450 k/uL    MPV 9.8 6.0 - 12.0 fL    NRBC Automated NOT REPORTED per 100 WBC    Differential Type NOT REPORTED     Immature Granulocytes NOT REPORTED 0 %    Absolute Immature Granulocyte NOT REPORTED 0.00 - 0.30 k/uL    WBC Morphology NOT REPORTED     RBC Morphology NOT REPORTED     Platelet Estimate NOT REPORTED     Seg Neutrophils 75 (H) 36 - 66 %    Lymphocytes 14 (L) 24 - 44 %    Monocytes 9 (H) 1 - 7 %    Eosinophils % 2 0 - 4 %    Basophils 0 0 - 2 %    Segs Absolute 7.00 1.3 - 9.1 k/uL    Absolute Lymph # 1.30 1.0 - 4.8 k/uL    Absolute Mono # 0.80 0.1 - 1.3 k/uL    Absolute Eos # 0.10 0.0 - 0.4 k/uL    Basophils Absolute 0.00 0.0 - 0.2 k/uL   Magnesium    Collection Time: 12/09/21  4:22 AM   Result Value Ref Range    Magnesium 1.8 1.6 - 2.6 mg/dL   Basic Metabolic Panel w/ Reflex to MG    Collection Time: 12/09/21  4:22 AM   Result Value Ref Range    Glucose 206 (H) 70 - 99 mg/dL    BUN 40 (H) 8 - 23 mg/dL    CREATININE 3.26 (H) 0.50 - 0.90 mg/dL    Bun/Cre Ratio NOT REPORTED 9 - 20    Calcium 8.7 8.6 - 10.4 mg/dL    Sodium 134 (L) 135 - 144 mmol/L    Potassium 3.8 3.7 - 5.3 mmol/L    Chloride 97 (L) 98 - 107 mmol/L    CO2 25 20 - 31 mmol/L    Anion Gap 12 9 - 17 mmol/L    GFR Non-African American 14 (L) >60 mL/min    GFR  17 (L) >60 mL/min    GFR Comment          GFR Staging NOT REPORTED    Lactate Dehydrogenase    Collection Time: 12/09/21  4:22 AM   Result Value Ref Range     135 - 214 U/L   Protein, Total    Collection Time: 12/09/21  4:22 AM   Result Value Ref Range    Total Protein 5.9 (L) 6.4 - 8.3 g/dL   Blood gas, arterial    Collection Time: 12/09/21  5:03 AM   Result Value Ref Range    pH, Arterial 7.487 (H) 7.350 - 7.450    pCO2, Arterial 36.3 35.0 - 45.0 mmHg    pO2, Arterial 76.4 (L) 80.0 - 100.0 mmHg    HCO3, Arterial 27.4 (H) 22.0 - 26.0 mmol/L    Positive Base Excess, Art 4.0 (H) 0.0 - 2.0 mmol/L    Negative Base Excess, Art NOT REPORTED 0.0 - 2.0 mmol/L    O2 Sat, Arterial 95.2 95 - 98 %    Total Hb NOT REPORTED 12.0 - 16.0 g/dl    Oxyhemoglobin NOT REPORTED 95.0 - 98.0 %    Carboxyhemoglobin 1.1 0 - 5 %    Methemoglobin 1.0 0.0 - 1.9 %    Pt Temp 37.0 Breath sounds: Normal breath sounds. Comments: Trach in place on ventilator  Abdominal:      General: Bowel sounds are normal.      Palpations: Abdomen is soft. Tenderness: There is no abdominal tenderness. Comments: PEG tube in place   Musculoskeletal:         General: No deformity. Cervical back: Normal range of motion and neck supple. Right lower leg: No edema. Left lower leg: No edema. Skin:     General: Skin is warm. Capillary Refill: Capillary refill takes less than 2 seconds. Coloration: Skin is not jaundiced. Neurological:      General: No focal deficit present. Mental Status: She is alert. Mental status is at baseline. Psychiatric:         Mood and Affect: Mood normal.         Behavior: Behavior normal.         Assessment:        Primary Problem  JAYLYN (acute kidney injury) ([facility] [address]     Principal Problem:    JAYLYN (acute kidney injury) ([facility] [address]  Active Problems:    Type 2 diabetes mellitus with kidney complication, with long-term current use of insulin (HCC)    CKD (chronic kidney disease) stage 3, GFR 30-59 ml/min ([facility])    HTN (hypertension)    ASCVD (arteriosclerotic cardiovascular disease)    Iron deficiency anemia    Hydronephrosis determined by ultrasound    Longstanding persistent atrial fibrillation (HCC)    Acute metabolic encephalopathy    Acute cystitis without hematuria    Acute respiratory failure with hypoxia and hypercapnia (HCC)    Dilated cardiomyopathy ([facility] [address]  Resolved Problems:    * No resolved hospital problems.  *      Past Medical History:   Diagnosis Date    Anemia, unspecified     CAD (coronary artery disease)     Chronic kidney disease     CKD (chronic kidney disease) stage 3, GFR 30-59 ml/min ([facility])     Closed fracture of dorsal (thoracic) vertebra without mention of spinal cord injury     Coronary atherosclerosis of unspecified type of vessel, native or graft     Depression with anxiety     Esophageal reflux     HTN (hypertension)    

## 2021-12-09 NOTE — BRIEF OP NOTE
Brief Postoperative Note for Thoracentesis    Alyson Montiel  YOB: 1950  [id number]    Pre-operative Diagnosis: Right Pleural effusion      Post-operative Diagnosis: Same    Procedure: Ultrasound guided Thoracentesis    Anesthesia: 1% Lidocaine     Surgeons/Assistants: Reilly Luque MD    Complications: None    Specimens: were obtained    Ultrasound guided right thoracentesis performed. 400 ml clear yellow fluid obtained. Dressing applied. Chest x-ray ordered.         Electronically signed by Reilly Luque MD on 12/9/2021 at 2:22 PM

## 2021-12-09 NOTE — CARE COORDINATION
Spoke with Mindee with [facility] and they have been calling more than one time per day to attempt to get this pre-cert. She reported that the person who does the pre-certs at that facility, was looking into what is taking so long for this pre-cert to come through. At this writing, there is no pre-cert.

## 2021-12-09 NOTE — PROGRESS NOTES
Writer, transport and respiratory transport patient down to Radiology for swallow study, then to IR for thoracentesis, and PICC line replacement. Patient tolerated procedures well. Patient taken back up to room 2014 and placed back on telemetry. Trach site cleansed, inner cannula changed. Assessment completed.

## 2021-12-09 NOTE — PROGRESS NOTES
Nephrology Progress Note    Interval history: Patient was seen and examined today in the intensive care unit where she is on ventilator support via tracheostomy. She is awake but incoherent. She remains oligoanuric and Kaur catheter was removed 12/8/2021. She is s/p trach and PEG on 12/03/2021 and tunneled dialysis catheter placement 12/7/2021. History of present illness:  66y.o. year old female who we are seeing in consultation for JAYLYN. Patient has a past medical history of Type 2 diabetes mellitus, coronary artery disease, lumbar radiculopathy, Hyperlipidemia and chronic kidney disease stage III [baseline serum creatinine 1.5 mg/dL], who presented from [facility] for further evaluation of acute kidney injury. She was sent to [facility] after developing nonhealing wound s/p cardiac catheterization via right refill Levophed has been discontinued approach with right groin wound. She had been on Bumex in the ECF and creatinine and BUN have been rising and Bumex was decreased to a lowest dose of 0.5 mg p.o. twice daily. Patient was found to have JAYLYN likely secondary to ischemic acute tubular necrosis and obstructive nephropathy secondary to bilateral hydronephrosis. Patient received acute dialysis on 12/04. Will be started on hemodialysis MWF schedule.      Objective/     Vitals:    12/09/21 0659 12/09/21 0700 12/09/21 0851 12/09/21 0853   BP:  131/61     Pulse: 114 101 106 99   Resp: 20 23 15 (!) 6   Temp:       TempSrc:       SpO2: 98% 99%  94%   Weight:       Height:         24HR INTAKE/OUTPUT:      Intake/Output Summary (Last 24 hours) at 12/9/2021 0907  Last data filed at 12/9/2021 0515  Gross per 24 hour   Intake 1400 ml   Output 1925 ml   Net -525 ml     Patient Vitals for the past 96 hrs (Last 3 readings):   Weight   12/08/21 1638 (!) 3.5 oz (0.1 kg)   12/08/21 1258 (!) 3.5 oz (0.1 kg)   12/06/21 1000 210 lb 15.7 oz (95.7 kg)     Constitutional:  On ventilatory support via tracheostomy. Awake and alert. Cardiovascular:  S1, S2 without m/r/g  Respiratory: Bilateral chest rise, tracheostomy on ventilator  Abdomen: Soft nontender  Ext: +2 Pitting edema bilaterally lower extremities. Data/  Recent Labs     12/07/21  0330 12/07/21  1633 12/08/21  0436 12/08/21  2159 12/09/21  0422   WBC 7.7  --  8.3  --  9.3   HGB 7.0*   < > 7.3* 7.3* 7.1*   HCT 21.6*   < > 22.7* 22.5* 21.9*   MCV 94.3  --  94.5  --  94.3   PLT 95*  --  121*  --  76*    < > = values in this interval not displayed. Recent Labs     12/07/21  0330 12/08/21  0436 12/09/21  0422   * 133* 134*   K 3.7 3.8 3.8   CL 96* 94* 97*   CO2 23 24 25   GLUCOSE 159* 218* 206*   MG 1.9 2.1 1.8   BUN 49* 64* 40*   CREATININE 3.72* 4.88* 3.26*   LABGLOM 12* 9* 14*   GFRAA 14* 11* 17*     DATA:    CBC with Differential:    Lab Results   Component Value Date    WBC 9.3 12/09/2021    RBC 2.32 12/09/2021    RBC 4.14 08/31/2016    HGB 7.1 12/09/2021    HCT 21.9 12/09/2021    PLT 76 12/09/2021    MCV 94.3 12/09/2021    MCH 30.8 12/09/2021    MCHC 32.7 12/09/2021    RDW 16.8 12/09/2021    NRBC 4 11/22/2021    METASPCT 3 12/02/2021    LYMPHOPCT 14 12/09/2021    LYMPHOPCT 32.8 08/31/2016    MONOPCT 9 12/09/2021    EOSPCT 4.3 08/31/2016    BASOPCT 0 12/09/2021    BASOPCT 0.7 08/31/2016    MONOSABS 0.80 12/09/2021    LYMPHSABS 1.30 12/09/2021    EOSABS 0.10 12/09/2021    BASOSABS 0.00 12/09/2021    DIFFTYPE NOT REPORTED 12/09/2021     BMP:    Lab Results   Component Value Date     12/09/2021    K 3.8 12/09/2021    CL 97 12/09/2021    CO2 25 12/09/2021    BUN 40 12/09/2021    LABALBU 2.6 11/18/2021    CREATININE 3.26 12/09/2021    CALCIUM 8.7 12/09/2021    GFRAA 17 12/09/2021    LABGLOM 14 12/09/2021    GLUCOSE 206 12/09/2021    GLUCOSE 77 08/31/2016     Sodium:    Lab Results   Component Value Date     12/09/2021     Potassium:    Lab Results   Component Value Date    K 3.8 12/09/2021     Assessment/Plan:     1.  Acute Kidney Injury - secondary to ischemic acute tubular necrosis and obstructive nephropathy secondary to bilateral hydronephrosis. She remains dialysis dependent and is on a MWF schedule. We will continue to monitor GFR and urine output closely for evidence of renal function recovery. 2. Heart failure with reduced ejection fraction [HFrEF with LVEF 30 to 35% 11/10/2021]. Chest x-ray shows moderate right pleural effusion and will continue ultrafiltration. 3.    Normocytic anemia - partly secondary to chronic kidney disease. We will increase Retacrit to 10,000 units 3 times a week. 4.  Bilateral hydronephrosis - status post cystoscopy with right ureteral  stent placement.     5. Systemic hypertension - blood pressure control is adequate. Prognosis is guarded.     Jackie Garcia  Attending Clinical Nephrologist

## 2021-12-09 NOTE — PLAN OF CARE
Problem: Gas Exchange - Impaired:  Goal: Levels of oxygenation will improve  Description: Levels of oxygenation will improve  Outcome: Ongoing     Problem: Airway Clearance - Ineffective:  Goal: Ability to maintain a clear airway will improve  Description: Ability to maintain a clear airway will improve  Outcome: Ongoing     Problem: Gas Exchange - Impaired:  Goal: Levels of oxygenation will improve  Description: Levels of oxygenation will improve  Outcome: Ongoing     Problem: OXYGENATION/RESPIRATORY FUNCTION  Goal: Patient will maintain patent airway  Outcome: Ongoing     Problem: OXYGENATION/RESPIRATORY FUNCTION  Goal: Patient will achieve/maintain normal respiratory rate/effort  Description: Respiratory rate and effort will be within normal limits for the patient  Outcome: Ongoing     Problem: MECHANICAL VENTILATION  Goal: Patient will maintain patent airway  Outcome: Ongoing     Problem: MECHANICAL VENTILATION  Goal: Oral health is maintained or improved  Outcome: Ongoing

## 2021-12-09 NOTE — PROGRESS NOTES
Palliative Care Progress Note    NAME:  Libby Ibanez RECORD NUMBER:  [id number]  AGE: 66 y.o. GENDER: female  : 1950  TODAY'S DATE:  2021    Reason for Consult:  goals of care  History of Present Illness     The patient is a 66 y.o. Non- / non  female who presents with Abnormal Lab (low hemoglobin and high CR)      Referred to Palliative Care by  [x] Physician   [] Nursing  [] Family Request   [] Other:       She was admitted to the ICU service for JAYLYN (acute kidney injury) ([facility] [address] [N17.9]  Urinary tract infection in elderly patient [N39.0]  Anemia, unspecified type [D64.9]. Her hospital course has been associated with JAYLYN, dialysis, code blue, intubation, tracheostomy, gtube. The patient has a complicated medical history and has been hospitalized since 11/10/2021  4:23 PM. Patient awake nodding head and attempting to mouth words. Patient remains on ventilator with FIO2 30 and Peep 5. She tolerated weaning trail for 2.5 hours today. Patient tolerating tube feedings to gtube. Patient is getting thoracentesis done today. Patient labs are glucose 206, BUN 40, creatinine 3.26, calcium 8.7, sodium 134, potassium 3.8, chloride 97, sodium 134, potassium 3.8, chloride 97, magnesium 1.8, WBC 9.3, hemoglobin 7.1, RBC 2.32, hematocrit 21.9, platelets 76. Patient underwent chest x-ray today and results are cardiomegaly and moderate right pleural effusion with right passive atelectasis or pneumonia. Support tubes and catheters are stable. Palliative care will continue to follow by patient and reach out to patient's family to provide updates and emotional support as needed.     OVERNIGHT EVENTS:  Patient is a DNR CC arrest  With ventilator to trach tolerating well  Patient to have thoracentesis today     /61   Pulse 101   Temp 99.1 °F (37.3 °C) (Oral)   Resp 27   Ht 5' 2.99\" (1.6 m)   Wt (!) 3.5 oz (0.1 kg) Comment: -1.8kg  SpO2 96%   BMI 0.04 kg/m²     Assessment REVIEW OF SYSTEMS    []   UNABLE TO OBTAIN:     Constitutional:  []   Chills   [x]  Fatigue   []  Fevers   [x]  Malaise   []  Weight loss   [] Other:     Respiratory:   []  Cough    []  Shortness of breath    []  Chest pain    [x] Other:   Patient with ventilator to tracheostomy lungs diminished respirations relaxed   Cardiovascular:   []  Chest pain  []  Dyspnea    []  Exertional chest pressure/discomfort     [] Fatigue      []  Palpitations    []  Syncope   [x] Other: no S/S cp or pressure   Gastrointestinal:   []  Abdominal pain   []  Constipation    []  Diarrhea    []   Dysphagia   []  Reflux             []  Vomiting   [x] Other: Patient with colostomy and gtube, tolerating TF  Genitourinary:  []  Dysuria     []  Frequency   []  Hematuria   [] Nocturia   []  Urinary incontinence   [x] Other: Dialysis patient     Musculoskeletal:   [] Back pain    [x]  Muscle weakness   [x]  Myalgias    []  Neck pain   []  Stiff joints   []  Other:     Behavioral/Psych:   [] Anxiety    []  Depression     []  Mood swings   [] Other:     PHYSICAL ASSESSMENT:     General: []  Oriented x3      [] well appearing      [] Intubated      [] ill appearing      [x] Other:awake nods head yes or no to questions and attempts to mouth words   Mental Status: [] normal mental status exam      [] drowsy      [] Confused      [x] Other: awake   Cardiovascular: [x]  Regular rate/rhythm      [] Arrhythmia      [] Other: generalized edema     Chest: [] Effort normal      [] lungs clear      [] respiratory distress      [] Tachypnea      [x]  Other:ventilator to tracheostomy lungs diminished respirations relaxed   Abdomen: [x] Soft/non-tender      []  Normal appearance      [] Distended      [] Ascites      [x] Other:gtube and colostomy   Neurological: [] Normal Speech      [] Normal Sensation      []  Deficits present:  Awake nods head yes or no and mouths words     Extremity:  [] normal skin color/temp      [] clubbing/cyanosis      []  No edema [x] Other: generalized edema and ecchymosis noted     Palliative Performance Scale:  ___60%  Ambulation reduced; Significant disease; Can't do hobbies/housework; intake normal or reduced; occasional assist; LOC full/confusion  ___50%  Mainly sit/lie; Extensive disease; Can't do any work; Considerable assist; intake normal or reduced; LOC full/confusion  __x_40%  Mainly in bed; Extensive disease; Mainly assist; intake normal or reduced; LOC full/confusion   ___30%  Bed Bound; Extensive disease; Total care; intake reduced; LOCfull/confusion  ___20%  Bed Bound; Extensive disease; Total care; intake minimal; Drowsy/coma  ___10%  Bed Bound; Extensive disease; Total care; Mouth care only; Drowsy/coma  ___0       Death      Plan      Palliative Interaction:  Spoke with patient  and sister at bedside. I updated them that patient was attempting to wean again today. Awaiting acceptance for Ltach, and going to have thoracentesis today to drain pleural effusion. Patient  remains very focused on his loneliness and complaints emotional support provided.     Palliative care will continue to follow     Education/support to family  Discharge planning/helping to coordinate care  Communications with primary service  Pharmacologic pain management  Providing support for coping/adaptation/distress of family  Discussing meaning/purpose   Caregiver support/education  Continue with current plan of care  Code status clarified: DNRCCA  Principle Problem/Diagnosis:  JAYLYN (acute kidney injury) ([facility] [address]    Additional Assessments:  Principal Problem:    JAYLYN (acute kidney injury) ([facility] [address]  Active Problems:    Type 2 diabetes mellitus with kidney complication, with long-term current use of insulin ([facility])    CKD (chronic kidney disease) stage 3, GFR 30-59 ml/min ([facility])    HTN (hypertension)    ASCVD (arteriosclerotic cardiovascular disease)    Iron deficiency anemia    Hydronephrosis determined by ultrasound    Longstanding persistent atrial fibrillation (HCC)    Acute metabolic encephalopathy    Acute cystitis without hematuria    Acute respiratory failure with hypoxia and hypercapnia (HCC)    Dilated cardiomyopathy (HCC)  Resolved Problems:    * No resolved hospital problems. *    1- Symptom management/ pain control     Pain Assessment:  Tylenol and fentanyl                Anxiety:  none                          Dyspnea:  ventilator to tracheostomy                           Fatigue:  generalized weakness     Other:      2- Goals of care evaluation   The patient goals of care are improve or maintain function/quality of life   Goals of care discussed with:    [] Patient independently    [x] Patient and Family    [] Family or Healthcare DPOA independently    [] Unable to discuss with patient, family/DPOA not present    3- Code Status  DNR-CCA    4- Other recommendations  - We will continue to provide comfort and support to the patient and the family      Palliative Care will continue to follow Ms. Franklin's care as needed. The note has been dictated by dragon, typing errors may be a possibility     Thank you for allowing Palliative Care to participate in the care of Ms. Franklin . Electronically signed by   JUANITA Wiggins NP  Palliative Care Team  on 12/9/2021 at 1:17 PM    Palliative care number [phone]    Please call with any palliative questions or concerns. Palliative Care Team is available via perfect serve or via phone.

## 2021-12-09 NOTE — FLOWSHEET NOTE
SC visit with patient, her  and her sister; patient tried to engage in conversation, but writer and family could not understand what she was saying; family provided medical update; patient declined prayer; listening presence and support;     12/09/21 1128   Encounter Summary   Referral/Consult From: Palliative Care   Support System Spouse; Family members   Continue Visiting   (12/9/21)   Complexity of Encounter Moderate   Length of Encounter 15 minutes   Spiritual Assessment Completed Yes   Grief and Life Adjustment   Type Adjustment to illness; Palliative care   Assessment Approachable; Anxious; Hopeful; Coping; Helplessness   Intervention Active listening; Explored feelings, thoughts, concerns; Sustaining presence/ Ministry of presence; Discussed illness/injury and it's impact   Outcome Expressed gratitude; Engaged in conversation; Expressed feelings/needs/concerns;  Hopeful; Receptive

## 2021-12-09 NOTE — PROGRESS NOTES
Comprehensive Nutrition Assessment    Type and Reason for Visit:  Reassess    Nutrition Recommendations/Plan: Continue tube feeding regimen as ordered. Nutrition Assessment:  Pt went for video swallow study today with recommendation to continue NPO with feedings per PEG tube. Malnutrition Assessment:  Malnutrition Status: At risk for malnutrition (Comment)    Context:  Acute Illness     Findings of the 6 clinical characteristics of malnutrition:  Energy Intake:   (Previous mild energy deficit)  Weight Loss:  Unable to assess (Edema)     Body Fat Loss:  Unable to assess     Muscle Mass Loss:  Unable to assess    Fluid Accumulation:  7 - Moderate to Severe Extremities, Generalized (Anasarca)   Strength:  Not Performed    Estimated Daily Nutrient Needs:  Energy (kcal):  15 kcal/kg= 3258-2616 kcal; Weight Used for Energy Requirements:   (92kg)     Protein (g):  92 based on 1 gm per kg (may vary with healing needs and renal status); Weight Used for Protein Requirements:   (92kg)        Fluid (ml/day):   ; Method Used for Fluid Requirements:         Nutrition Related Findings:  Edema: +2 pitting BUE's, +1 non-pitting BLE's. Labs & meds reviewed.  Colosotmy output 150 ml 12-8      Wounds:  Venous Stasis, Skin Tears, Multiple       Current Nutrition Therapies:    ADULT TUBE FEEDING; PEG; Renal Formula; Continuous; 15; Yes; 15; Q 4 hours; 40; 50; Q 4 hours  Current Tube Feeding (TF) Orders:  · Feeding Route: Nasogastric  · Formula: Renal Formula  · Schedule: Continuous  · Additives/Modulars: Protein  · Water Flushes:    · Current TF & Flush Orders Provides: Nepro at 40 ml per hour + 1 Protein modular provides 1836 kcal, 104 g protein  · Goal TF & Flush Orders Provides:        Anthropometric Measures:  · Height: 5' 2.99\" (160 cm)  · Current Body Weight: 210 lb (95.3 kg)   · Admission Body Weight: 204 lb (92.5 kg)       · Ideal Body Weight: 115 lbs; % Ideal Body Weight 195 %   · BMI: 37.2  · BMI Categories: Obese Class 2 (BMI 35.0 -39.9)       Nutrition Diagnosis:   · Increased nutrient needs related to  (healing) as evidenced by wounds      Nutrition Interventions:   Food and/or Nutrient Delivery:  Continue Current Tube Feeding  Nutrition Education/Counseling:  No recommendation at this time   Coordination of Nutrition Care:  Continue to monitor while inpatient    Goals:  Nutrition support will meet approximately 100% of estimated needs       Nutrition Monitoring and Evaluation:   Behavioral-Environmental Outcomes:  None Identified   Food/Nutrient Intake Outcomes:  Diet Advancement/Tolerance, Enteral Nutrition Intake/Tolerance  Physical Signs/Symptoms Outcomes:  Biochemical Data, GI Status, Fluid Status or Edema, Skin, Weight     Discharge Planning:    Enteral Nutrition     Some areas of assessment may be incomplete due to COVID-19 precautions. Evan Leung R.D. L.D.   Clinical Dietitian  Office: [phone]

## 2021-12-10 LAB
ABO/RH: NORMAL
ABSOLUTE EOS #: 0.2 K/UL (ref 0–0.4)
ABSOLUTE IMMATURE GRANULOCYTE: ABNORMAL K/UL (ref 0–0.3)
ABSOLUTE LYMPH #: 1.4 K/UL (ref 1–4.8)
ABSOLUTE MONO #: 0.8 K/UL (ref 0.1–1.3)
ANION GAP SERPL CALCULATED.3IONS-SCNC: 11 MMOL/L (ref 9–17)
ANTIBODY SCREEN: NEGATIVE
ARM BAND NUMBER: NORMAL
BASOPHILS # BLD: 1 % (ref 0–2)
BASOPHILS ABSOLUTE: 0.1 K/UL (ref 0–0.2)
BLD PROD TYP BPU: NORMAL
BUN BLDV-MCNC: 52 MG/DL (ref 8–23)
BUN/CREAT BLD: ABNORMAL (ref 9–20)
CALCIUM SERPL-MCNC: 8.6 MG/DL (ref 8.6–10.4)
CHLORIDE BLD-SCNC: 96 MMOL/L (ref 98–107)
CO2: 25 MMOL/L (ref 20–31)
CREAT SERPL-MCNC: 3.91 MG/DL (ref 0.5–0.9)
CROSSMATCH RESULT: NORMAL
DIFFERENTIAL TYPE: ABNORMAL
DISPENSE STATUS BLOOD BANK: NORMAL
EOSINOPHILS RELATIVE PERCENT: 3 % (ref 0–4)
EXPIRATION DATE: NORMAL
GFR AFRICAN AMERICAN: 14 ML/MIN
GFR NON-AFRICAN AMERICAN: 11 ML/MIN
GFR SERPL CREATININE-BSD FRML MDRD: ABNORMAL ML/MIN/{1.73_M2}
GFR SERPL CREATININE-BSD FRML MDRD: ABNORMAL ML/MIN/{1.73_M2}
GLUCOSE BLD-MCNC: 194 MG/DL (ref 65–105)
GLUCOSE BLD-MCNC: 211 MG/DL (ref 65–105)
GLUCOSE BLD-MCNC: 214 MG/DL (ref 65–105)
GLUCOSE BLD-MCNC: 215 MG/DL (ref 70–99)
HCT VFR BLD CALC: 24.5 % (ref 36–46)
HCT VFR BLD CALC: 25.9 % (ref 36–46)
HCT VFR BLD CALC: 27.6 % (ref 36–46)
HEMOGLOBIN: 7.9 G/DL (ref 12–16)
HEMOGLOBIN: 8.3 G/DL (ref 12–16)
HEMOGLOBIN: 9 G/DL (ref 12–16)
IMMATURE GRANULOCYTES: ABNORMAL %
LYMPHOCYTES # BLD: 18 % (ref 24–44)
MAGNESIUM: 1.8 MG/DL (ref 1.6–2.6)
MCH RBC QN AUTO: 30 PG (ref 26–34)
MCHC RBC AUTO-ENTMCNC: 32.1 G/DL (ref 31–37)
MCV RBC AUTO: 93.3 FL (ref 80–100)
MONOCYTES # BLD: 10 % (ref 1–7)
NRBC AUTOMATED: ABNORMAL PER 100 WBC
PDW BLD-RTO: 16.9 % (ref 11.5–14.9)
PLATELET # BLD: 103 K/UL (ref 150–450)
PLATELET ESTIMATE: ABNORMAL
PMV BLD AUTO: 9.4 FL (ref 6–12)
POTASSIUM SERPL-SCNC: 4.1 MMOL/L (ref 3.7–5.3)
RBC # BLD: 2.63 M/UL (ref 4–5.2)
RBC # BLD: ABNORMAL 10*6/UL
SEG NEUTROPHILS: 68 % (ref 36–66)
SEGMENTED NEUTROPHILS ABSOLUTE COUNT: 5.5 K/UL (ref 1.3–9.1)
SODIUM BLD-SCNC: 132 MMOL/L (ref 135–144)
TRANSFUSION STATUS: NORMAL
UNIT DIVISION: 0
UNIT NUMBER: NORMAL
WBC # BLD: 8 K/UL (ref 3.5–11)
WBC # BLD: ABNORMAL 10*3/UL

## 2021-12-10 PROCEDURE — 87184 SC STD DISK METHOD PER PLATE: CPT

## 2021-12-10 PROCEDURE — 83735 ASSAY OF MAGNESIUM: CPT

## 2021-12-10 PROCEDURE — 87186 SC STD MICRODIL/AGAR DIL: CPT

## 2021-12-10 PROCEDURE — 6370000000 HC RX 637 (ALT 250 FOR IP): Performed by: SURGERY

## 2021-12-10 PROCEDURE — 87205 SMEAR GRAM STAIN: CPT

## 2021-12-10 PROCEDURE — 85025 COMPLETE CBC W/AUTO DIFF WBC: CPT

## 2021-12-10 PROCEDURE — 2580000003 HC RX 258: Performed by: SURGERY

## 2021-12-10 PROCEDURE — 2000000000 HC ICU R&B

## 2021-12-10 PROCEDURE — 94003 VENT MGMT INPAT SUBQ DAY: CPT

## 2021-12-10 PROCEDURE — 2580000003 HC RX 258: Performed by: INTERNAL MEDICINE

## 2021-12-10 PROCEDURE — 90935 HEMODIALYSIS ONE EVALUATION: CPT

## 2021-12-10 PROCEDURE — 6360000002 HC RX W HCPCS: Performed by: INTERNAL MEDICINE

## 2021-12-10 PROCEDURE — 85014 HEMATOCRIT: CPT

## 2021-12-10 PROCEDURE — 6360000002 HC RX W HCPCS: Performed by: SURGERY

## 2021-12-10 PROCEDURE — 2500000003 HC RX 250 WO HCPCS: Performed by: INTERNAL MEDICINE

## 2021-12-10 PROCEDURE — 80048 BASIC METABOLIC PNL TOTAL CA: CPT

## 2021-12-10 PROCEDURE — 6360000002 HC RX W HCPCS: Performed by: FAMILY MEDICINE

## 2021-12-10 PROCEDURE — 87075 CULTR BACTERIA EXCEPT BLOOD: CPT

## 2021-12-10 PROCEDURE — 6370000000 HC RX 637 (ALT 250 FOR IP): Performed by: FAMILY MEDICINE

## 2021-12-10 PROCEDURE — 36415 COLL VENOUS BLD VENIPUNCTURE: CPT

## 2021-12-10 PROCEDURE — 82947 ASSAY GLUCOSE BLOOD QUANT: CPT

## 2021-12-10 PROCEDURE — 94761 N-INVAS EAR/PLS OXIMETRY MLT: CPT

## 2021-12-10 PROCEDURE — 99223 1ST HOSP IP/OBS HIGH 75: CPT | Performed by: INTERNAL MEDICINE

## 2021-12-10 PROCEDURE — 85018 HEMOGLOBIN: CPT

## 2021-12-10 PROCEDURE — 86403 PARTICLE AGGLUT ANTBDY SCRN: CPT

## 2021-12-10 PROCEDURE — 87040 BLOOD CULTURE FOR BACTERIA: CPT

## 2021-12-10 PROCEDURE — 87070 CULTURE OTHR SPECIMN AEROBIC: CPT

## 2021-12-10 PROCEDURE — 87077 CULTURE AEROBIC IDENTIFY: CPT

## 2021-12-10 PROCEDURE — 2580000003 HC RX 258: Performed by: FAMILY MEDICINE

## 2021-12-10 PROCEDURE — 6370000000 HC RX 637 (ALT 250 FOR IP): Performed by: INTERNAL MEDICINE

## 2021-12-10 PROCEDURE — C9113 INJ PANTOPRAZOLE SODIUM, VIA: HCPCS | Performed by: SURGERY

## 2021-12-10 RX ADMIN — MIDODRINE HYDROCHLORIDE 5 MG: 5 TABLET ORAL at 10:17

## 2021-12-10 RX ADMIN — FERROUS SULFATE TAB 325 MG (65 MG ELEMENTAL FE) 325 MG: 325 (65 FE) TAB at 08:22

## 2021-12-10 RX ADMIN — ANTI-FUNGAL POWDER MICONAZOLE NITRATE TALC FREE: 1.42 POWDER TOPICAL at 20:53

## 2021-12-10 RX ADMIN — NYSTATIN 500000 UNITS: 500000 SUSPENSION ORAL at 12:23

## 2021-12-10 RX ADMIN — VANCOMYCIN HYDROCHLORIDE 1250 MG: 1.25 INJECTION, POWDER, LYOPHILIZED, FOR SOLUTION INTRAVENOUS at 15:49

## 2021-12-10 RX ADMIN — SODIUM CHLORIDE, PRESERVATIVE FREE 10 ML: 5 INJECTION INTRAVENOUS at 08:21

## 2021-12-10 RX ADMIN — INSULIN LISPRO 2 UNITS: 100 INJECTION, SOLUTION INTRAVENOUS; SUBCUTANEOUS at 08:21

## 2021-12-10 RX ADMIN — SODIUM CHLORIDE, PRESERVATIVE FREE 10 ML: 5 INJECTION INTRAVENOUS at 09:00

## 2021-12-10 RX ADMIN — PANTOPRAZOLE SODIUM 40 MG: 40 INJECTION, POWDER, FOR SOLUTION INTRAVENOUS at 20:54

## 2021-12-10 RX ADMIN — EPOETIN ALFA-EPBX 10000 UNITS: 10000 INJECTION, SOLUTION INTRAVENOUS; SUBCUTANEOUS at 14:26

## 2021-12-10 RX ADMIN — Medication 1.9 ML: at 13:50

## 2021-12-10 RX ADMIN — NYSTATIN 500000 UNITS: 500000 SUSPENSION ORAL at 20:54

## 2021-12-10 RX ADMIN — ANTI-FUNGAL POWDER MICONAZOLE NITRATE TALC FREE: 1.42 POWDER TOPICAL at 08:22

## 2021-12-10 RX ADMIN — SODIUM CHLORIDE, PRESERVATIVE FREE 10 ML: 5 INJECTION INTRAVENOUS at 20:55

## 2021-12-10 RX ADMIN — NYSTATIN 500000 UNITS: 500000 SUSPENSION ORAL at 17:09

## 2021-12-10 RX ADMIN — ATORVASTATIN CALCIUM 40 MG: 40 TABLET, FILM COATED ORAL at 08:22

## 2021-12-10 RX ADMIN — INSULIN GLARGINE 22 UNITS: 100 INJECTION, SOLUTION SUBCUTANEOUS at 20:55

## 2021-12-10 RX ADMIN — CHLORHEXIDINE GLUCONATE 0.12% ORAL RINSE 15 ML: 1.2 LIQUID ORAL at 20:54

## 2021-12-10 RX ADMIN — SODIUM CHLORIDE, PRESERVATIVE FREE 10 ML: 5 INJECTION INTRAVENOUS at 20:54

## 2021-12-10 RX ADMIN — MIDODRINE HYDROCHLORIDE 10 MG: 10 TABLET ORAL at 10:16

## 2021-12-10 RX ADMIN — INSULIN LISPRO 2 UNITS: 100 INJECTION, SOLUTION INTRAVENOUS; SUBCUTANEOUS at 20:55

## 2021-12-10 RX ADMIN — INSULIN LISPRO 2 UNITS: 100 INJECTION, SOLUTION INTRAVENOUS; SUBCUTANEOUS at 12:23

## 2021-12-10 RX ADMIN — PANTOPRAZOLE SODIUM 40 MG: 40 INJECTION, POWDER, FOR SOLUTION INTRAVENOUS at 08:21

## 2021-12-10 RX ADMIN — SERTRALINE HYDROCHLORIDE 25 MG: 50 TABLET ORAL at 08:22

## 2021-12-10 RX ADMIN — VANCOMYCIN HYDROCHLORIDE 1000 MG: 1 INJECTION, POWDER, LYOPHILIZED, FOR SOLUTION INTRAVENOUS at 12:50

## 2021-12-10 RX ADMIN — NYSTATIN 500000 UNITS: 500000 SUSPENSION ORAL at 08:22

## 2021-12-10 RX ADMIN — CHLORHEXIDINE GLUCONATE 0.12% ORAL RINSE 15 ML: 1.2 LIQUID ORAL at 08:22

## 2021-12-10 RX ADMIN — INSULIN LISPRO 4 UNITS: 100 INJECTION, SOLUTION INTRAVENOUS; SUBCUTANEOUS at 17:09

## 2021-12-10 ASSESSMENT — PAIN SCALES - WONG BAKER
WONGBAKER_NUMERICALRESPONSE: 0

## 2021-12-10 ASSESSMENT — ENCOUNTER SYMPTOMS
NAUSEA: 0
COUGH: 0
EYE ITCHING: 0
SHORTNESS OF BREATH: 0
EYE REDNESS: 0
CHEST TIGHTNESS: 0
BLOOD IN STOOL: 0
TROUBLE SWALLOWING: 0
VOMITING: 0
ABDOMINAL PAIN: 0
COLOR CHANGE: 0

## 2021-12-10 ASSESSMENT — PAIN SCALES - GENERAL
PAINLEVEL_OUTOF10: 0

## 2021-12-10 ASSESSMENT — PULMONARY FUNCTION TESTS
PIF_VALUE: 21
PIF_VALUE: 21
PIF_VALUE: 25

## 2021-12-10 NOTE — PLAN OF CARE
Problem: SAFETY  Goal: Free from accidental physical injury  12/10/2021 1605 by Mayra Rivera RN  Outcome: Ongoing  12/10/2021 0352 by Jaci Trevino RN  Outcome: Ongoing  Goal: Free from intentional harm  12/10/2021 1605 by Mayra Rivera RN  Outcome: Ongoing  12/10/2021 0352 by Jaci Trevino RN  Outcome: Ongoing     Problem: DAILY CARE  Goal: Daily care needs are met  12/10/2021 1605 by Mayra Rivera RN  Outcome: Ongoing  12/10/2021 0352 by Jaci Trevino RN  Outcome: Ongoing     Problem: PAIN  Goal: Patient's pain/discomfort is manageable  12/10/2021 1605 by Mayra Rivera RN  Outcome: Ongoing  12/10/2021 0352 by Jaci Trevino RN  Outcome: Ongoing     Problem: SKIN INTEGRITY  Goal: Skin integrity is maintained or improved  12/10/2021 1605 by Mayra Rivera RN  Outcome: Ongoing  12/10/2021 0352 by Jaci Trevino RN  Outcome: Ongoing     Problem: KNOWLEDGE DEFICIT  Goal: Patient/S.O. demonstrates understanding of disease process, treatment plan, medications, and discharge instructions.   12/10/2021 1605 by Mayra Rivera RN  Outcome: Ongoing  12/10/2021 0352 by Jaci Trevino RN  Outcome: Ongoing     Problem: DISCHARGE BARRIERS  Goal: Patient's continuum of care needs are met  12/10/2021 1605 by Mayra Rivera RN  Outcome: Ongoing  12/10/2021 0352 by Jaci Trevino RN  Outcome: Ongoing     Problem: Falls - Risk of:  Goal: Will remain free from falls  Description: Will remain free from falls  12/10/2021 1605 by Mayra Rivera RN  Outcome: Ongoing  12/10/2021 0352 by Jaci Trevino RN  Outcome: Ongoing  Goal: Absence of physical injury  Description: Absence of physical injury  12/10/2021 1605 by Mayra Rivera RN  Outcome: Ongoing  12/10/2021 0352 by Jaci Trevino RN  Outcome: Ongoing     Problem: Skin Integrity:  Goal: Will show no infection signs and symptoms  Description: Will show no infection signs and symptoms  12/10/2021 1605 by Mayra Rivera RN  Outcome: Ongoing  12/10/2021 0352 by Brenda Becerra RN  Outcome: Ongoing  Goal: Absence of new skin breakdown  Description: Absence of new skin breakdown  12/10/2021 1605 by Carlie Alcantara RN  Outcome: Ongoing  12/10/2021 0352 by Brenda Becerra RN  Outcome: Ongoing     Problem: Musculor/Skeletal Functional Status  Goal: Highest potential functional level  12/10/2021 1605 by Carlie Alcantara RN  Outcome: Ongoing  12/10/2021 0352 by Brenda Becerra RN  Outcome: Ongoing  Goal: Absence of falls  12/10/2021 1605 by Carlie Alcantara RN  Outcome: Ongoing  12/10/2021 0352 by Brenda Becerra RN  Outcome: Ongoing     Problem: Musculor/Skeletal Functional Status  Goal: Highest potential functional level  12/10/2021 1605 by Carlie Alcantara RN  Outcome: Ongoing  12/10/2021 0352 by Brenda Becerra RN  Outcome: Ongoing  Goal: Absence of falls  12/10/2021 1605 by Carlie Alcantara RN  Outcome: Ongoing  12/10/2021 0352 by Brenda Becerra RN  Outcome: Ongoing     Problem: Nutrition  Goal: Optimal nutrition therapy  12/10/2021 1605 by Carlie Alcantara RN  Outcome: Ongoing  12/10/2021 0352 by Brenda Becerra RN  Outcome: Ongoing     Problem: Pain:  Goal: Pain level will decrease  Description: Pain level will decrease  12/10/2021 1605 by Carlie Alcantara RN  Outcome: Ongoing  12/10/2021 0352 by Brenda Becerra RN  Outcome: Ongoing  Goal: Control of acute pain  Description: Control of acute pain  12/10/2021 1605 by Carlie Alcantara RN  Outcome: Ongoing  12/10/2021 0352 by Brenda Becerra RN  Outcome: Ongoing  Goal: Control of chronic pain  Description: Control of chronic pain  12/10/2021 1605 by Carlie Alcantara RN  Outcome: Ongoing  12/10/2021 0352 by Brenda Becerra RN  Outcome: Ongoing     Problem: Gas Exchange - Impaired:  Goal: Levels of oxygenation will improve  Description: Levels of oxygenation will improve  12/10/2021 1605 by Carlie Alcantara RN  Outcome: Ongoing  12/10/2021 0352 by Evan Valencia Ayleen Ling RN  Outcome: Ongoing     Problem: Fluid Volume - Imbalance:  Goal: Absence of imbalanced fluid volume signs and symptoms  Description: Absence of imbalanced fluid volume signs and symptoms  12/10/2021 1605 by Rafat Cuenca RN  Outcome: Ongoing  12/10/2021 0352 by Danis Liu RN  Outcome: Ongoing     Problem: Airway Clearance - Ineffective:  Goal: Ability to maintain a clear airway will improve  Description: Ability to maintain a clear airway will improve  12/10/2021 1605 by Rafat Cuenca RN  Outcome: Ongoing  12/10/2021 0352 by Danis Liu RN  Outcome: Ongoing     Problem:  Bowel Function - Altered:  Goal: Bowel elimination is within specified parameters  Description: Bowel elimination is within specified parameters  12/10/2021 1605 by Rafat Cuenca RN  Outcome: Ongoing  12/10/2021 0352 by Danis Liu RN  Outcome: Ongoing     Problem: Fluid Volume - Imbalance:  Goal: Absence of imbalanced fluid volume signs and symptoms  Description: Absence of imbalanced fluid volume signs and symptoms  12/10/2021 1605 by Rafat Cuenca RN  Outcome: Ongoing  12/10/2021 0352 by Danis Liu RN  Outcome: Ongoing     Problem: Gas Exchange - Impaired:  Goal: Levels of oxygenation will improve  Description: Levels of oxygenation will improve  12/10/2021 1605 by Rafat Cuenca RN  Outcome: Ongoing  12/10/2021 0352 by Danis Liu RN  Outcome: Ongoing     Problem: Mental Status - Impaired:  Goal: Mental status will be restored to baseline  Description: Mental status will be restored to baseline  12/10/2021 1605 by Rafat Cuenca RN  Outcome: Ongoing  12/10/2021 0352 by Danis Liu RN  Outcome: Ongoing     Problem: Nutrition Deficit:  Goal: Ability to achieve adequate nutritional intake will improve  Description: Ability to achieve adequate nutritional intake will improve  12/10/2021 1605 by Rafat Cuenca RN  Outcome: Ongoing  12/10/2021 0352 by Danis Liu RN  Outcome: Ongoing     Problem: OXYGENATION/RESPIRATORY FUNCTION  Goal: Patient will maintain patent airway  12/10/2021 1605 by Pedro Najera RN  Outcome: Ongoing  12/10/2021 0352 by Salazar Howard RN  Outcome: Ongoing  Goal: Patient will achieve/maintain normal respiratory rate/effort  Description: Respiratory rate and effort will be within normal limits for the patient  12/10/2021 1605 by Pedro Najera RN  Outcome: Ongoing  12/10/2021 0352 by Salazar Howard RN  Outcome: Ongoing     Problem: MECHANICAL VENTILATION  Goal: Patient will maintain patent airway  12/10/2021 1605 by Pedro Najera RN  Outcome: Ongoing  12/10/2021 0352 by Salazar Howard RN  Outcome: Ongoing  Goal: Oral health is maintained or improved  12/10/2021 1605 by Pedro Najera RN  Outcome: Ongoing  12/10/2021 0352 by Salazar Howard RN  Outcome: Ongoing

## 2021-12-10 NOTE — PROGRESS NOTES
Speech Language Pathology  [facility]  Spe[facility]    Date: 12/10/2021  Patient Name: Trinidad Stephens  YOB: 1950   AGE: 66 y.o. MRN: [id number]        Patient Not Available for Speech Therapy     Due to:  [] Testing  [] Hemodialysis  [] Cancelled by RN  [] Surgery   [] Intubation/Sedation/Pain Medication  [] Medical instability  [x] Other:   1067- pt. Getting washed up by nsg  1300- dialysis    Completed by: Sofia Beasley A.CCC/SLP

## 2021-12-10 NOTE — PROGRESS NOTES
LETTY spoke with Mindee with [facility], who suggested we expedite the pre-cert request. If expedite request denied team would be unable to request a peer to peer. However, [facility] is confident that the request would be approved. SW agreed and should hear back on results later today or tomorrow.

## 2021-12-10 NOTE — PROGRESS NOTES
Patient's  Jackelin Castro calls for patient update. Writer talks with him at length. All questions and concerns answered. Writer informs him that if patient is awake when Elena Keller goes into patient's room and if she is up to calling him, that writer will have him call her. He reports understanding.

## 2021-12-10 NOTE — PROGRESS NOTES
Progress Note    12/10/2021   2:29 PM    Name:  Nicole Abdi  MRN:    [id number]     Acct:     [de-identified]   Room:  2014/2014-01  IP Day: 27     Admit Date: 11/10/2021  4:23 PM  PCP: Roxann Barry DO    Subjective:     C/C:   Chief Complaint   Patient presents with    Abnormal Lab     low hemoglobin and high CR       Interval History: Status: not changed. Patient had a drop in hemoglobin yesterday requiring 1 unit of packed RBC transfusion. Trach site noted with thick secretions and surrounding erythema. Patient is alert and answers questions by nodding head tolerating tube feed well. Vital signs reviewed and stable. Recent labs reviewed hemoglobin post transfusion is 7.9, platelet count 051    ROS:   all 10 systems reviewed and are negative except as noted    Review of Systems   Constitutional: Negative for chills and fatigue. HENT: Negative for drooling, mouth sores, sneezing and trouble swallowing. Eyes: Negative for redness and itching. Respiratory: Negative for cough, chest tightness and shortness of breath. Cardiovascular: Negative for chest pain, palpitations and leg swelling. Gastrointestinal: Negative for abdominal pain, blood in stool, nausea and vomiting. Endocrine: Negative for heat intolerance and polyphagia. Genitourinary: Negative for difficulty urinating, flank pain and pelvic pain. Musculoskeletal: Negative for arthralgias, joint swelling and neck stiffness. Skin: Negative for color change and pallor. Allergic/Immunologic: Negative for food allergies. Neurological: Negative for dizziness, seizures and headaches. Hematological: Does not bruise/bleed easily. Psychiatric/Behavioral: Negative for agitation, behavioral problems and suicidal ideas. The patient is not hyperactive. Medications:      Allergies: No Known Allergies    Current Meds: epoetin tyshawn-epbx (RETACRIT) injection 10,000 Units, Once per day on Mon Wed Fri  acetaminophen (TYLENOL) tablet 650 mg, Q4H PRN  0.9 % sodium chloride infusion, PRN  sodium citrate 4 % injection 1.9 mL, PRN   And  sodium citrate 4 % injection 1.9 mL, PRN  nystatin (MYCOSTATIN) 142544 UNIT/ML suspension 500,000 Units, 4x Daily  miconazole (MICOTIN) 2 % powder, BID  metoprolol (LOPRESSOR) injection 5 mg, Q6H PRN  polyethylene glycol (GLYCOLAX) packet 17 g, Daily PRN  atorvastatin (LIPITOR) tablet 40 mg, Daily  0.9 % sodium chloride infusion, PRN  midodrine (PROAMATINE) tablet 10 mg, TID PRN  albumin human 25 % IV solution 25 g, PRN  midodrine (PROAMATINE) tablet 5 mg, PRN  norepinephrine (LEVOPHED) 16 mg in sodium chloride 0.9 % 250 mL infusion, Continuous  chlorhexidine (PERIDEX) 0.12 % solution 15 mL, BID  fentaNYL (SUBLIMAZE) injection 50 mcg, Q30 Min PRN  propofol injection, Continuous  [Held by provider] carvedilol (COREG) tablet 3.125 mg, BID WC  pantoprazole (PROTONIX) injection 40 mg, BID   And  sodium chloride flush 0.9 % injection 10 mL, BID  magnesium sulfate 1000 mg in dextrose 5% 100 mL IVPB, PRN  glucose (GLUTOSE) 40 % oral gel 15 g, PRN  dextrose 50 % IV solution, PRN  glucagon (rDNA) injection 1 mg, PRN  dextrose 5 % solution, PRN  sodium polystyrene (KAYEXALATE) 15 GM/60ML suspension 15 g, Once  sertraline (ZOLOFT) tablet 25 mg, Daily  ferrous sulfate (IRON 325) tablet 325 mg, Daily with breakfast  [Held by provider] aspirin chewable tablet 81 mg, Daily  insulin glargine (LANTUS) injection vial 22 Units, Nightly  insulin lispro (HUMALOG) injection vial 0-12 Units, TID WC  insulin lispro (HUMALOG) injection vial 0-6 Units, Nightly  sodium chloride flush 0.9 % injection 5-40 mL, 2 times per day  sodium chloride flush 0.9 % injection 5-40 mL, PRN  0.9 % sodium chloride infusion, PRN  ondansetron (ZOFRAN-ODT) disintegrating tablet 4 mg, Q8H PRN   Or  ondansetron (ZOFRAN) injection 4 mg, Q6H PRN  oxyCODONE-acetaminophen (PERCOCET) 5-325 MG per tablet 1 tablet, Q6H PRN        Data:     Code Status:  DNR-CCA    Family 24 hours) at 12/10/2021 1429  Last data filed at 12/10/2021 0600  Gross per 24 hour   Intake 1538 ml   Output 100 ml   Net 1438 ml     Radiology:  IR FLUORO GUIDED CVA DEVICE PLMT/REPLACE/REMOVAL    Result Date: 12/9/2021  Successful fluoroscopy guided placement right upper extremity PICC, with new dual-lumen tip position in the central circulation and satisfactory. PICC is ready for use at this time. IR FLUORO GUIDED CVA DEVICE PLMT/REPLACE/REMOVAL    Result Date: 12/7/2021  Successful fluoroscopy guided conversion non tunneled to tunneled hemodialysis catheter placement, as above. Catheter is ready for dialysis use at this time. XR CHEST PORTABLE    Result Date: 12/9/2021  Cardiomegaly and moderate right pleural effusion with right passive atelectasis or pneumonia. Support tubes and catheters are stable. XR CHEST PORTABLE    Result Date: 12/7/2021  No significant interval change. XR CHEST PORTABLE    Result Date: 12/5/2021  Cardiomegaly with bibasilar effusions and scattered pulmonary infiltrates. XR CHEST PORTABLE    Result Date: 12/3/2021  1. Interval placement of a tracheostomy tube. 2. No pneumothorax. 3. Persistent pulmonary vascular congestion and bilateral pleural effusions. FL MODIFIED BARIUM SWALLOW W VIDEO    Result Date: 12/9/2021  Aspiration of pudding consistency and delayed cough, as described. Please see separate speech pathology report for full discussion of findings and recommendations. IR GUIDED THORACENTESIS PLEURAL    Result Date: 12/9/2021  Successful ultrasound guided therapeutic thoracentesis.        Labs:  Recent Results (from the past 24 hour(s))   POC Glucose Fingerstick    Collection Time: 12/09/21  4:04 PM   Result Value Ref Range    POC Glucose 180 (H) 65 - 105 mg/dL   POC Glucose Fingerstick    Collection Time: 12/09/21  4:20 PM   Result Value Ref Range    POC Glucose 181 (H) 65 - 105 mg/dL   POC Glucose Fingerstick    Collection Time: 12/09/21  7:54 PM Result Value Ref Range    POC Glucose 172 (H) 65 - 105 mg/dL   Hgb/Hct    Collection Time: 12/09/21  9:10 PM   Result Value Ref Range    Hemoglobin 6.7 (LL) 12.0 - 16.0 g/dL    Hematocrit 21.1 (L) 36 - 46 %   TYPE AND SCREEN    Collection Time: 12/09/21 10:14 PM   Result Value Ref Range    Expiration Date 12/12/2021,2359     Arm Band Number [id number]     ABO/Rh O POSITIVE     Antibody Screen NEGATIVE     Unit Number [id number]     Product Code Leukocyte Reduced Red Cell     Unit Divison 00     Dispense Status TRANSFUSED     Transfusion Status OK TO TRANSFUSE     Crossmatch Result COMPATIBLE    CBC Auto Differential    Collection Time: 12/10/21  3:31 AM   Result Value Ref Range    WBC 8.0 3.5 - 11.0 k/uL    RBC 2.63 (L) 4.0 - 5.2 m/uL    Hemoglobin 7.9 (L) 12.0 - 16.0 g/dL    Hematocrit 24.5 (L) 36 - 46 %    MCV 93.3 80 - 100 fL    MCH 30.0 26 - 34 pg    MCHC 32.1 31 - 37 g/dL    RDW 16.9 (H) 11.5 - 14.9 %    Platelets 427 (L) 084 - 450 k/uL    MPV 9.4 6.0 - 12.0 fL    NRBC Automated NOT REPORTED per 100 WBC    Differential Type NOT REPORTED     Immature Granulocytes NOT REPORTED 0 %    Absolute Immature Granulocyte NOT REPORTED 0.00 - 0.30 k/uL    WBC Morphology NOT REPORTED     RBC Morphology NOT REPORTED     Platelet Estimate NOT REPORTED     Seg Neutrophils 68 (H) 36 - 66 %    Lymphocytes 18 (L) 24 - 44 %    Monocytes 10 (H) 1 - 7 %    Eosinophils % 3 0 - 4 %    Basophils 1 0 - 2 %    Segs Absolute 5.50 1.3 - 9.1 k/uL    Absolute Lymph # 1.40 1.0 - 4.8 k/uL    Absolute Mono # 0.80 0.1 - 1.3 k/uL    Absolute Eos # 0.20 0.0 - 0.4 k/uL    Basophils Absolute 0.10 0.0 - 0.2 k/uL   Basic Metabolic Panel w/ Reflex to MG    Collection Time: 12/10/21  3:31 AM   Result Value Ref Range    Glucose 215 (H) 70 - 99 mg/dL    BUN 52 (H) 8 - 23 mg/dL    CREATININE 3.91 (H) 0.50 - 0.90 mg/dL    Bun/Cre Ratio NOT REPORTED 9 - 20    Calcium 8.6 8.6 - 10.4 mg/dL    Sodium 132 (L) 135 - 144 mmol/L    Potassium 4.1 3.7 - 5.3 mmol/L Chloride 96 (L) 98 - 107 mmol/L    CO2 25 20 - 31 mmol/L    Anion Gap 11 9 - 17 mmol/L    GFR Non-African American 11 (L) >60 mL/min    GFR  14 (L) >60 mL/min    GFR Comment          GFR Staging NOT REPORTED    Magnesium    Collection Time: 12/10/21  3:31 AM   Result Value Ref Range    Magnesium 1.8 1.6 - 2.6 mg/dL   Culture, Blood 1    Collection Time: 12/10/21 10:34 AM    Specimen: Blood   Result Value Ref Range    Specimen Description . BLOOD LEFT HAND     Special Requests NOT REPORTED     Culture NO GROWTH <24 HRS    Culture, Blood 1    Collection Time: 12/10/21 10:34 AM    Specimen: Blood   Result Value Ref Range    Specimen Description . BLOOD LEFT WRIST     Special Requests NOT REPORTED     Culture NO GROWTH <24 HRS    Hgb/Hct    Collection Time: 12/10/21  1:30 PM   Result Value Ref Range    Hemoglobin 9.0 (L) 12.0 - 16.0 g/dL    Hematocrit 27.6 (L) 36 - 46 %       Physical Examination:        Vitals:  /73   Pulse 103   Temp 98.6 °F (37 °C)   Resp 29   Ht 5' 2.99\" (1.6 m)   Wt 199 lb 11.8 oz (90.6 kg)   SpO2 92%   BMI 35.39 kg/m²   Temp (24hrs), Av.5 °F (36.9 °C), Min:98.1 °F (36.7 °C), Max:99.4 °F (37.4 °C)    Recent Labs     21  1137 21  1604 21  1620 21  195   POCGLU 192* 180* 181* 172*         Physical Exam  Vitals reviewed. HENT:      Head: Normocephalic. Right Ear: External ear normal.      Left Ear: External ear normal.      Nose: Nose normal.      Mouth/Throat:      Mouth: Mucous membranes are moist.      Pharynx: Oropharynx is clear. Eyes:      Conjunctiva/sclera: Conjunctivae normal.   Cardiovascular:      Rate and Rhythm: Normal rate and regular rhythm. Pulses: Normal pulses. Heart sounds: Normal heart sounds. Pulmonary:      Effort: Pulmonary effort is normal.      Breath sounds: Normal breath sounds. Comments: Lethea Maser  In place.  Erythema  surrounding trach site noted  Abdominal:      General: Bowel sounds are normal.      Palpations: Abdomen is soft. Comments: Peg in place   Musculoskeletal:         General: No deformity. Cervical back: Normal range of motion and neck supple. Right lower leg: No edema. Left lower leg: No edema. Skin:     General: Skin is warm. Capillary Refill: Capillary refill takes less than 2 seconds. Coloration: Skin is not jaundiced. Neurological:      General: No focal deficit present. Mental Status: She is alert. Mental status is at baseline. Psychiatric:         Mood and Affect: Mood normal.         Behavior: Behavior normal.         Assessment:        Primary Problem  JAYLYN (acute kidney injury) ([facility] [address]     Principal Problem:    JAYLYN (acute kidney injury) ([facility] [address]  Active Problems:    Type 2 diabetes mellitus with kidney complication, with long-term current use of insulin ([facility])    CKD (chronic kidney disease) stage 3, GFR 30-59 ml/min ([facility])    HTN (hypertension)    ASCVD (arteriosclerotic cardiovascular disease)    Iron deficiency anemia    Hydronephrosis determined by ultrasound    Longstanding persistent atrial fibrillation ([facility])    Acute metabolic encephalopathy    Acute cystitis without hematuria    Acute respiratory failure with hypoxia and hypercapnia ([facility])    Dilated cardiomyopathy ([facility] [address]  Resolved Problems:    * No resolved hospital problems.  *      Past Medical History:   Diagnosis Date    Anemia, unspecified     CAD (coronary artery disease)     Chronic kidney disease     CKD (chronic kidney disease) stage 3, GFR 30-59 ml/min ([facility])     Closed fracture of dorsal (thoracic) vertebra without mention of spinal cord injury     Coronary atherosclerosis of unspecified type of vessel, native or graft     Depression with anxiety     Esophageal reflux     HTN (hypertension)     Hyperkalemia     Hyperlipidemia     Intervertebral cervical disc disorder with myelopathy, cervical region     Lower back pain     Lumbar radiculopathy     Microalbuminuria  Muscle weakness     Pain in limb     Stenosis of cervical spine     Tethered cord syndrome (HCC)     Type II or unspecified type diabetes mellitus without mention of complication, not stated as uncontrolled     Urinary calculus     UTI (lower urinary tract infection)         Plan:        1. IV vancomycin for tracheostomy site infection  2.  consult ID  3.  monitor H&H every 8 hours  4. pharmacy to dose vancomycin  5. LTAC discharge planning in progress  6. CMP, CBC  7. Continue tube feed  8.  discussed with nurse sister and hasband at bedside  9.  nystatin statin powder twice daily for skin fold for candidal intertrigo  10. Lopressor IV 5 mg every 6 hours as needed for heart rate above 115. 11. Midodrine 5 mg p.o. 3 times a day as needed for systolic blood pressure less than 90. 12. DVT Prophylaxis no pharmacological DVT prophylaxis due to low hemoglobin and thrombocytopenia  13. EPCs  14. PT/OT to evaluate and treat  15. Pain control  16. Replace electrolytes as per sliding scale  17. Home medications reviewed and appropriate medications continued  18.  Reviewed labs and imaging studies from last 24 hours and results explained to patient      Electronically signed by Tania Reyes MD

## 2021-12-10 NOTE — PROGRESS NOTES
Nephrology Progress Note    Interval history: Patient was seen and examined today in the intensive care unit she has a low-grade fever. She has increased drainage from around the tracheostomy site. PICC line was exchanged yesterday. She also underwent right thoracentesis yesterday with removal of 400 mL of pleural fluid. She is s/p trach and PEG on 12/03/2021 and tunneled dialysis catheter placement 12/7/2021. History of present illness:  66y.o. year old female who we are seeing in consultation for JAYLYN. Patient has a past medical history of Type 2 diabetes mellitus, coronary artery disease, lumbar radiculopathy, Hyperlipidemia and chronic kidney disease stage III [baseline serum creatinine 1.5 mg/dL], who presented from [facility] for further evaluation of acute kidney injury. She was sent to [facility] after developing nonhealing wound s/p cardiac catheterization via right refill Levophed has been discontinued approach with right groin wound. She had been on Bumex in the ECF and creatinine and BUN have been rising and Bumex was decreased to a lowest dose of 0.5 mg p.o. twice daily. Patient was found to have JAYLYN likely secondary to ischemic acute tubular necrosis and obstructive nephropathy secondary to bilateral hydronephrosis. Patient received acute dialysis on 12/04. Will be started on hemodialysis MWF schedule.      Objective/     Vitals:    12/10/21 1045 12/10/21 1100 12/10/21 1103 12/10/21 1115   BP: 119/60 (!) 123/58  (!) 134/57   Pulse: 98 114  99   Resp:   24    Temp:       TempSrc:       SpO2:       Weight:       Height:         24HR INTAKE/OUTPUT:      Intake/Output Summary (Last 24 hours) at 12/10/2021 1117  Last data filed at 12/10/2021 0600  Gross per 24 hour   Intake 1538 ml   Output 100 ml   Net 1438 ml     Patient Vitals for the past 96 hrs (Last 3 readings):   Weight   12/10/21 1000 206 lb 5.6 oz (93.6 kg)   12/08/21 1638 (!) 3.5 oz (0.1 kg)   12/08/21 1258 (!) 3.5 oz (0.1 kg)     Constitutional:  On ventilatory support via tracheostomy. Awake and alert. Cardiovascular:  S1, S2 without m/r/g  Respiratory: Bilateral chest rise, tracheostomy on ventilator  Abdomen: Soft nontender  Ext: +2 pitting edema bilaterally lower extremities. Data/  Recent Labs     12/08/21 0436 12/08/21 2159 12/09/21 0422 12/09/21  0422 12/09/21  1326 12/09/21  2110 12/10/21  0331   WBC 8.3  --  9.3  --   --   --  8.0   HGB 7.3*   < > 7.1*   < > 7.1* 6.7* 7.9*   HCT 22.7*   < > 21.9*   < > 22.0* 21.1* 24.5*   MCV 94.5  --  94.3  --   --   --  93.3   *  --  76*  --   --   --  103*    < > = values in this interval not displayed.      Recent Labs     12/08/21  0436 12/09/21  0422 12/10/21  0331   * 134* 132*   K 3.8 3.8 4.1   CL 94* 97* 96*   CO2 24 25 25   GLUCOSE 218* 206* 215*   MG 2.1 1.8 1.8   BUN 64* 40* 52*   CREATININE 4.88* 3.26* 3.91*   LABGLOM 9* 14* 11*   GFRAA 11* 17* 14*     DATA:    CBC with Differential:    Lab Results   Component Value Date    WBC 8.0 12/10/2021    RBC 2.63 12/10/2021    RBC 4.14 08/31/2016    HGB 7.9 12/10/2021    HCT 24.5 12/10/2021     12/10/2021    MCV 93.3 12/10/2021    MCH 30.0 12/10/2021    MCHC 32.1 12/10/2021    RDW 16.9 12/10/2021    NRBC 4 11/22/2021    METASPCT 3 12/02/2021    LYMPHOPCT 18 12/10/2021    LYMPHOPCT 32.8 08/31/2016    MONOPCT 10 12/10/2021    EOSPCT 4.3 08/31/2016    BASOPCT 1 12/10/2021    BASOPCT 0.7 08/31/2016    MONOSABS 0.80 12/10/2021    LYMPHSABS 1.40 12/10/2021    EOSABS 0.20 12/10/2021    BASOSABS 0.10 12/10/2021    DIFFTYPE NOT REPORTED 12/10/2021     BMP:    Lab Results   Component Value Date     12/10/2021    K 4.1 12/10/2021    CL 96 12/10/2021    CO2 25 12/10/2021    BUN 52 12/10/2021    LABALBU 2.6 11/18/2021    CREATININE 3.91 12/10/2021    CALCIUM 8.6 12/10/2021    GFRAA 14 12/10/2021    LABGLOM 11 12/10/2021    GLUCOSE 215 12/10/2021    GLUCOSE 77 08/31/2016     Sodium:    Lab Results   Component Value

## 2021-12-10 NOTE — CONSULTS
[facility] -   Infectious diseases evaluation  admission date 11/10/2021    reason for consultation:   Tracheostomy site drainage    Impression :   Current:  · Possible infection at the tracheostomy site  · MRSA colonization  · Acute renal failure on hemodialysis tunneled hemodialysis catheter was placed on 12/7/2021  · Respiratory failure   · Status post tracheostomy and PEG tube placement 12/3/2021  · Diabetes mellitus  · Coronary artery disease  · Cardiomyopathy with ejection fraction of 30 to 35%   · Atrial fibrillation  · Morbid obesity      Recommendations   · IV vancomycin   · Tracheostomy site culture pending  · Diflucan was started 12/4/2021 completed 12/9/2021  · Follow pleural fluid culture  · Repeat blood cultures sent earlier today  · Follow CBC  · Continue supportive care        History of Present Illness:   Initial history:  Citlali Juarez is a 66y.o.-year-old female  presented initially on 11/10/2021 with acute respiratory failure. Patient had code blue on 11/20/2021 secondary to bradycardia. CPR performed and patient was intubated, subsequently had tracheostomy and PEG tube placement on 12/3/2021  She had right lower lobe infiltrate with pleural effusion status post thoracentesis on 12/11/2021 was transudate .   She developed acute renal failure requiring hemodialysis through tunneled hemodialysis catheter that was placed 12/7/2021  She had a fever with a temperature max of 100.5 on 12/4/2021  Urine culture grew Candida albicans on 12/2/21  COVID-19 rapid test was negative on 11/15/2021 and 12/1/2021  No growth on blood culture from 11/16/2021  Nasal swab for MRSA was + 11/21/2021  The patient received a course of ceftriaxone 11/10/2021 through 11/15/2021 through 11/2721  She received IV cefepime 11/16/2021  Zyvox was started 11/21/2021 through 11/27/2021  Ceftriaxone was restarted 12/2/21 through 12/5/2021  Interval changes  12/10/2021   The patient unable to provide history, tracheostomy in place with yellow secretions, some redness surrounding the tracheostomy site. She is receiving hemodialysis, afebrile, tolerating tube feed. Temperature max 99.4 in the last 24 hours  PICC line was placed 12/9/2021 pending culture  Hemoglobin 7.9  WBC 8  Patient Vitals for the past 8 hrs:   BP Temp Temp src Pulse Resp SpO2 Weight   12/10/21 1245 137/82 -- -- 112 -- -- --   12/10/21 1230 (!) 138/48 -- -- 95 -- -- --   12/10/21 1215 131/71 -- -- 98 -- -- --   12/10/21 1200 131/64 -- -- 101 -- -- --   12/10/21 1145 (!) 125/58 -- -- 97 -- -- --   12/10/21 1130 133/62 -- -- 110 -- -- --   12/10/21 1115 (!) 134/57 -- -- 99 -- -- --   12/10/21 1103 -- -- -- -- 24 -- --   12/10/21 1100 (!) 123/58 -- -- 114 -- -- --   12/10/21 1045 119/60 -- -- 98 -- -- --   12/10/21 1030 (!) 104/57 -- -- 109 -- -- --   12/10/21 1024 126/60 -- -- 114 -- -- --   12/10/21 1000 134/79 99.1 °F (37.3 °C) -- 114 27 94 % 206 lb 5.6 oz (93.6 kg)   12/10/21 0730 (!) 120/56 99.4 °F (37.4 °C) Axillary 108 16 96 % --   12/10/21 0720 -- -- -- 111 13 96 % --   12/10/21 0700 (!) 140/72 -- -- 97 (!) 0 96 % --   12/10/21 0600 (!) 143/53 -- -- 112 (!) 0 95 % --   12/10/21 0500 (!) 140/53 -- -- 95 (!) 0 96 % --           I have personally reviewed the past medical history, past surgical history, medications, social history, and family history, and I haveupdated the database accordingly. Allergies:   Patient has no known allergies. Review of Systems:     Review of Systems  Status post tracheostomy, nonverbal, unable to provide  Physical Examination :       Physical Exam  Constitutional:       General: She is not in acute distress. HENT:      Right Ear: External ear normal.      Left Ear: External ear normal.   Eyes:      General: No scleral icterus.      Conjunctiva/sclera: Conjunctivae normal.   Neck:      Comments: Tracheostomy in place with yellow drainage, surrounding erythema, no fluctuation or crepitance  Cardiovascular:      Rate and Rhythm: Normal rate. Heart sounds: No murmur heard. Pulmonary:      Effort: No respiratory distress. Breath sounds: Normal breath sounds. No wheezing. Abdominal:      General: There is no distension. Palpations: Abdomen is soft. Tenderness: There is no abdominal tenderness. Musculoskeletal:      Cervical back: Neck supple. No rigidity. Right lower leg: Edema present. Left lower leg: Edema present. Skin:     General: Skin is warm. Coloration: Skin is not jaundiced. Findings: No bruising. Neurological:      Mental Status: She is alert.          Past Medical History:     Past Medical History:   Diagnosis Date    Anemia, unspecified     CAD (coronary artery disease)     Chronic kidney disease     CKD (chronic kidney disease) stage 3, GFR 30-59 ml/min ([facility])     Closed fracture of dorsal (thoracic) vertebra without mention of spinal cord injury     Coronary atherosclerosis of unspecified type of vessel, native or graft     Depression with anxiety     Esophageal reflux     HTN (hypertension)     Hyperkalemia     Hyperlipidemia     Intervertebral cervical disc disorder with myelopathy, cervical region     Lower back pain     Lumbar radiculopathy     Microalbuminuria     Muscle weakness     Pain in limb     Stenosis of cervical spine     Tethered cord syndrome (HCC)     Type II or unspecified type diabetes mellitus without mention of complication, not stated as uncontrolled     Urinary calculus     UTI (lower urinary tract infection)        Past Surgical  History:     Past Surgical History:   Procedure Laterality Date    ANGIOPLASTY      COLON SURGERY      CYSTOSCOPY Right 11/16/2021    CYSTOSCOPY URETERAL STENT INSERTION performed by Sunitha Patterson MD at [address] IR NONTUNNELED VASCULAR CATHETER  11/17/2021    IR NONTUNNELED VASCULAR CATHETER 11/17/2021 [facility] PROCEDURES    JOINT REPLACEMENT  TRACHEOSTOMY N/A 12/3/2021    TRACHEOTOMY performed by Vincenzo Bardales MD at [place] [id number] N/A 11/15/2021    EGD BIOPSY performed by Lizz Cardenas MD at [place] [address] N/A 12/3/2021    EGD ESOPHAGOGASTRODUODENOSCOPY PEG TUBE INSERTION performed by Vincenzo Bardales MD at [address] [place] [address] OR       Medications:      vancomycin  1,000 mg IntraVENous Once    epoetin tyshawn-epbx  10,000 Units SubCUTAneous Once per day on Mon Wed Fri    nystatin  5 mL Oral 4x Daily    miconazole   Topical BID    atorvastatin  40 mg Oral Daily    chlorhexidine  15 mL Mouth/Throat BID    [Held by provider] carvedilol  3.125 mg Oral BID WC    pantoprazole  40 mg IntraVENous BID    And    sodium chloride flush  10 mL IntraVENous BID    sodium polystyrene  15 g Oral Once    sertraline  25 mg Oral Daily    ferrous sulfate  325 mg Oral Daily with breakfast    [Held by provider] aspirin  81 mg Oral Daily    insulin glargine  22 Units SubCUTAneous Nightly    insulin lispro  0-12 Units SubCUTAneous TID WC    insulin lispro  0-6 Units SubCUTAneous Nightly    sodium chloride flush  5-40 mL IntraVENous 2 times per day       Social History:     Social History     Socioeconomic History    Marital status:      Spouse name: Not on file    Number of children: Not on file    Years of education: Not on file    Highest education level: Not on file   Occupational History    Not on file   Tobacco Use    Smoking status: Never Smoker    Smokeless tobacco: Not on file   Substance and Sexual Activity    Alcohol use: No     Alcohol/week: 0.0 standard drinks    Drug use: No    Sexual activity: Not on file   Other Topics Concern    Not on file   Social History Narrative    Not on file     Social Determinants of Health     Financial Resource Strain:     Difficulty of Paying Living Expenses: Not on file   Food Insecurity:     Worried About Running Out of Food in the Last Year: Not on file    Ran Out of Food in the Last Year: Not on file   Transportation Needs:     Lack of Transportation (Medical): Not on file    Lack of Transportation (Non-Medical): Not on file   Physical Activity:     Days of Exercise per Week: Not on file    Minutes of Exercise per Session: Not on file   Stress:     Feeling of Stress : Not on file   Social Connections:     Frequency of Communication with Friends and Family: Not on file    Frequency of Social Gatherings with Friends and Family: Not on file    Attends Quaker Services: Not on file    Active Member of [employer] or Organizations: Not on file    Attends Club or Organization Meetings: Not on file    Marital Status: Not on file   Intimate Partner Violence:     Fear of Current or Ex-Partner: Not on file    Emotionally Abused: Not on file    Physically Abused: Not on file    Sexually Abused: Not on file   Housing Stability:     Unable to Pay for Housing in the Last Year: Not on file    Number of [place] in the Last Year: Not on file    Unstable Housing in the Last Year: Not on file       Family History:     Family History   Problem Relation Age of Onset    High Blood Pressure Other       Medical Decision Making:   I have independently reviewed/ordered the following labs:    CBC with Differential:   Recent Labs     12/09/21 0422 12/09/21  1326 12/09/21  2110 12/10/21  0331   WBC 9.3  --   --  8.0   HGB 7.1*   < > 6.7* 7.9*   HCT 21.9*   < > 21.1* 24.5*   PLT 76*  --   --  103*   LYMPHOPCT 14*  --   --  18*   MONOPCT 9*  --   --  10*    < > = values in this interval not displayed. BMP:  Recent Labs     12/09/21  0422 12/10/21  0331   * 132*   K 3.8 4.1   CL 97* 96*   CO2 25 25   BUN 40* 52*   CREATININE 3.26* 3.91*   MG 1.8 1.8     Hepatic Function Panel:   Recent Labs     12/09/21 0422   PROT 5.9*     No results for input(s): RPR in the last 72 hours. No results for input(s): HIV in the last 72 hours.   No results for input(s): BC in the last 72 hours. Lab Results   Component Value Date    CREATININE 3.91 12/10/2021    GLUCOSE 215 12/10/2021    GLUCOSE 77 08/31/2016       Detailed results: Thank you for allowing us to participate in the care of this patient. Please call with questions. This note is created with the assistance of a speech recognition program.  While intending to generate adocument that actually reflects the content of the visit, the document can still have some errors including those of syntax and sound a like substitutions which may escape proof reading. It such instances, actual meaningcan be extrapolated by contextual diversion.     Carole Summers MD  Office: [phone]  Perfect serve / office [phone]

## 2021-12-10 NOTE — PLAN OF CARE
Problem: SAFETY  Goal: Free from accidental physical injury  12/10/2021 0352 by Rosario Castillo RN  Outcome: Ongoing  12/9/2021 1800 by Chrissy Gautam RN  Outcome: Ongoing  Goal: Free from intentional harm  12/10/2021 0352 by Rosario Castillo RN  Outcome: Ongoing  12/9/2021 1800 by Chrissy Gautam RN  Outcome: Ongoing     Problem: DAILY CARE  Goal: Daily care needs are met  12/10/2021 0352 by Rosario Castillo RN  Outcome: Ongoing  12/9/2021 1800 by Chrissy Gautam RN  Outcome: Ongoing     Problem: PAIN  Goal: Patient's pain/discomfort is manageable  12/10/2021 0352 by Rosario Castillo RN  Outcome: Ongoing  12/9/2021 1800 by Chrissy Gautam RN  Outcome: Ongoing     Problem: SKIN INTEGRITY  Goal: Skin integrity is maintained or improved  12/10/2021 0352 by Rosario Castillo RN  Outcome: Ongoing  12/9/2021 1800 by Chrissy Gautam RN  Outcome: Ongoing

## 2021-12-10 NOTE — PROGRESS NOTES
ICU Progress Note (Vent)   [facility]    Patient - Nicole Abdi,  Age - 66 y.o.    - 1950      Room Number -    N -  [id number]   [facility] # - [de-identified]  Date of Admission -  11/10/2021  4:23 PM    Events of Past 24 Hours   Patient appears to be stable, has some secretions around tracheostomy denies any chest pain or worsening dyspnea    Vitals    height is 5' 2.99\" (1.6 m) and weight is 3.5 oz (0.1 kg) (abnormal). Her axillary temperature is 99.4 °F (37.4 °C). Her blood pressure is 120/56 (abnormal) and her pulse is 108. Her respiration is 16 and oxygen saturation is 96%. Temperature Range: Temp: 99.4 °F (37.4 °C) Temp  Av.5 °F (36.9 °C)  Min: 98.1 °F (36.7 °C)  Max: 99.4 °F (37.4 °C)  BP Range:  Systolic (57PJK), IZL:079 , Min:99 , UEM:572     Diastolic (24XZV), QCO:69, Min:37, Max:99    Pulse Range: Pulse  Av.3  Min: 78  Max: 114  Respiration Range: Resp  Av.2  Min: 0  Max: 28  Current Pulse Ox[de-identified]  SpO2: 96 %  24HR Pulse Ox Range:  SpO2  Av.8 %  Min: 94 %  Max: 100 %  Oxygen Amount and Delivery: O2 Flow Rate (L/min): 2 L/min      Wt Readings from Last 3 Encounters:   21 (!) 3.5 oz (0.1 kg)   16 178 lb (80.7 kg)   16 184 lb 12.8 oz (83.8 kg)     I/O       Intake/Output Summary (Last 24 hours) at 12/10/2021 0953  Last data filed at 12/10/2021 0600  Gross per 24 hour   Intake 1538 ml   Output 200 ml   Net 1338 ml     I/O last 3 completed shifts:   In: 200 [Blood:294; NG/GT:1274]  Out: 200 [Stool:200]     DRAIN/TUBE OUTPUT:     Invasive Lines   ETT Day -   day #19 ventilator, day #7 tracheostomy  Lines -PICC line day 1    ICP PRESSURE RANGE:  No data recorded  CVP PRESSURE RANGE:  No data recorded  Mechanical Ventilation Data   SETTINGS (Comprehensive)  Vent Information  $Ventilation: $Subsequent Day  Skin Assessment: Clean, dry, & intact  Suction Catheter Diameter: 14  Equipment Changed: HME  Vent Type: Servo i  Vent Mode: PRVC  Vt Ordered: 450 mL  Rate Set: 20 bmp  Pressure Support: 10 cmH20  FiO2 : 21 %  SpO2: 96 %  SpO2/FiO2 ratio: 457.14  Sensitivity: 5  PEEP/CPAP: 5  I Time/ I Time %: 0.86 s  Humidification Source: HME  Nitric Oxide/Epoprostenol In Use?: No  Mask Type: Full face mask  Mask Size: Medium  Additional Respiratory  Assessments  Pulse: 108  Resp: 16  SpO2: 96 %  End Tidal CO2: 36 (%)  Position: Semi-Lagunas's  Humidification Source: HME  Oral Care: Mouth swabbed, Suction toothette, Mouth suctioned  Cuff Pressure (cm H2O): 30 cm H2O       ABGs:   Lab Results   Component Value Date    PHART 7.487 12/09/2021    PO2ART 76.4 12/09/2021    INC5WGG 36.3 12/09/2021       Lab Results   Component Value Date    MODE PRVC 12/09/2021         Medications   IV   sodium chloride      sodium chloride      norepinephrine Stopped (11/29/21 2306)    propofol Stopped (12/03/21 1030)    dextrose      sodium chloride        vancomycin  1,000 mg IntraVENous Once    epoetin tyshawn-epbx  10,000 Units SubCUTAneous Once per day on Mon Wed Fri    nystatin  5 mL Oral 4x Daily    miconazole   Topical BID    atorvastatin  40 mg Oral Daily    chlorhexidine  15 mL Mouth/Throat BID    [Held by provider] carvedilol  3.125 mg Oral BID WC    pantoprazole  40 mg IntraVENous BID    And    sodium chloride flush  10 mL IntraVENous BID    sodium polystyrene  15 g Oral Once    sertraline  25 mg Oral Daily    ferrous sulfate  325 mg Oral Daily with breakfast    [Held by provider] aspirin  81 mg Oral Daily    insulin glargine  22 Units SubCUTAneous Nightly    insulin lispro  0-12 Units SubCUTAneous TID     insulin lispro  0-6 Units SubCUTAneous Nightly    sodium chloride flush  5-40 mL IntraVENous 2 times per day       Diet/Nutrition   ADULT TUBE FEEDING; PEG; Renal Formula; Continuous; 15; Yes; 15; Q 4 hours; 40; 50; Q 4 hours    Exam   VITALS    height is 5' 2.99\" (1.6 m) and weight is 3.5 oz (0.1 kg) (abnormal). Her axillary temperature is 99.4 °F (37.4 °C). Her blood pressure is 120/56 (abnormal) and her pulse is 108. Her respiration is 16 and oxygen saturation is 96%. Ventilator Settings (Basic)  Vent Mode: PRVC Rate Set: 20 bmp/Vt Ordered: 450 mL/ /FiO2 : 21 %    Constitutional -alert  General Appearance obese  HEENT -tracheostomy in good position with some yellowish secretions around trach normocephalic, atraumatic. PERRLA  Lungs - Chest expands equally, no wheezes, crackles right base which is unchanged Cardiovascular - Heart sounds are normal.  normal rate and rhythm regular, no murmur, gallop or rub. Abdomen - soft, nontender, nondistended, no masses or organomegaly  Neurologic - CN II-XII are grossly intact.  There are no focal motor deficits  Skin - no bruising or bleeding  Extremities - no cyanosis, clubbing or edema    Lab Results   CBC     Lab Results   Component Value Date    WBC 8.0 12/10/2021    RBC 2.63 12/10/2021    RBC 4.14 08/31/2016    HGB 7.9 12/10/2021    HCT 24.5 12/10/2021     12/10/2021    MCV 93.3 12/10/2021    MCH 30.0 12/10/2021    MCHC 32.1 12/10/2021    RDW 16.9 12/10/2021    NRBC 4 11/22/2021    METASPCT 3 12/02/2021    LYMPHOPCT 18 12/10/2021    LYMPHOPCT 32.8 08/31/2016    MONOPCT 10 12/10/2021    EOSPCT 4.3 08/31/2016    BASOPCT 1 12/10/2021    BASOPCT 0.7 08/31/2016    MONOSABS 0.80 12/10/2021    LYMPHSABS 1.40 12/10/2021    EOSABS 0.20 12/10/2021    BASOSABS 0.10 12/10/2021    DIFFTYPE NOT REPORTED 12/10/2021       BMP   Lab Results   Component Value Date     12/10/2021    K 4.1 12/10/2021    CL 96 12/10/2021    CO2 25 12/10/2021    BUN 52 12/10/2021    CREATININE 3.91 12/10/2021    GLUCOSE 215 12/10/2021    GLUCOSE 77 08/31/2016    CALCIUM 8.6 12/10/2021       LFTS  Lab Results   Component Value Date    ALKPHOS 175 11/18/2021    ALT 31 11/18/2021    AST 21 11/18/2021    PROT 5.9 12/09/2021    BILITOT 0.27 11/18/2021    BILIDIR <0.08 11/13/2021    IBILI Connot be calculated 11/13/2021    LABALBU 2.6 11/18/2021       INR  No results for input(s): PROTIME, INR in the last 72 hours. APTT  No results for input(s): APTT in the last 72 hours. Lactic Acid  Lab Results   Component Value Date    LACTA 1.3 11/26/2021    LACTA 0.6 11/16/2021    LACTA 0.6 11/16/2021        BNP   No results for input(s): BNP in the last 72 hours.      Cultures       Radiology     Plain Films         CT Scans    See actual reports for details    SYSTEM ASSESSMENT  Acute on chronic hypoxic and hypercapnic respiratory failure  Bradycardic arrest, intubated 11/20; tracheostomy 12/3  Right lower lobe infiltrate/pleural effusion-status post thoracentesis 12/11 appears to be transudative  Acute kidney injury-requiring hemodialysis  Morbid obesity  Anemia, chronic disease and possible rectal bleed  Hypoalbuminemia  Thrombocytopenia  Nonischemic cardiomyopathy with EF of 30 to 35% possible right to left interatrial shunt  Atrial fibrillation  Possible CLL/lymphoproliferative disorder  DNR CCA    Thoracentesis consistent with transudative effusion  New PICC line placed yesterday  I am not too concerned with fevers, she has not had a white count or appear to be septic  Continue to wean as tolerated  Required blood transfusion again yesterday  Monitor hemoglobin and platelet count, if stable resume subcu heparin  Have been updating family when they come in  [place] for LTAC at any time, patient hemodynamically stable        Critical Care Time   0 min    Electronically signed by Ana M Sorto MD on 12/10/2021 at 9:53 AM

## 2021-12-10 NOTE — PROGRESS NOTES
Patient was seen and examined. Getting bedside dialysis. Afebrile, VSS currently. No leukocytosis, hemoglobin low but stable at 7.9. POD#7 Trach and PEG. Patient denies abdominal pain. PEG site clean. Tolerating tube feeds at goal. Trach site with moderate amount of clear/tan secretions. Trach site erythematous around left suture due to irritation from secretions. Mildly tender with suctioning. Surgically stable. Continue local Trach and PEG site care. Continue medical management. Waiting on precert for discharge to [facility].     Gris Resendez PA-C  [address] [facility]

## 2021-12-10 NOTE — PROGRESS NOTES
HEMODIALYSIS POST TREATMENT NOTE    Treatment time ordered: 3.5 Hours    Actual treatment time: 3.5 Hours    UltraFiltration Goal: 2334-1708 ml as tolerated  UltraFiltration Removed: 3000 ml      Pre Treatment weight: 93.6 kg  Post Treatment weight: 90.6 kg  Estimated Dry Weight:     Access used:     Central Venous Catheter:          Tunneled or Non-tunneled: Tunneled           Site: Right chest wall          Access Flow: Good      Internal Access:       AV Fistula or AV Graft:          Site:        Access Flow:        Sign and symptoms of infection:        If YES:     Medications or blood products given: Midodrine 15 mg pre treatment    Chronic outpatient schedule: [facility]    Chronic outpatient unit: TBD    Summary of response to treatment: Treatment ran well. Patient was medicated with 15 mg of Midodrine pre treatment which was effective for [place] support. No other interventions required for BP support this treatment. Target fluid goal met. Vancomycin infused without complications. Patient awake and stable post treatment. No concerns to report. Explain if orders NOT met, was physician notified:      Anne Pompa faxed to patient unit/ placed in patient chart: Yes    Post assessment completed: Yes    Report given to: Dutch Robles RN      * Intra-treatment documented Safety Checks include the followin) Access and face visible at all times. 2) All connections and blood lines are secure with no kinks. 3) NVL alarm engaged. 4) Hemosafe device applied (if applicable). 5) No collapse of Arterial or Venous blood chambers. 6) All blood lines / pump segments in the air detectors.

## 2021-12-10 NOTE — PROGRESS NOTES
HEMODIALYSIS PRE-TREATMENT NOTE    Patient Identifiers prior to treatment: Name, , MRN    Isolation Required:  Yes                      Isolation Type: Contact, MRSA       (please document if patient is being managed as a PUI/COVID-19 patient)        Hepatitis status:                           Date Drawn                             Result  Hepatitis B Surface Antigen 21     Negative                     Hepatitis B Surface Antibody 21 Negative        Hepatitis B Core Antibody 21 Negative          How was Hepatitis Status verified: Epic     Was a copy of the labs you documented provided to facility for the patient's chart:     Hemodialysis orders verified: Yes, Dr. Samuel Rojas Within normal limits ( I.e. s/s of infection,...): Tunneled CVC right chest wall    Pre-Assessment completed: Yes    Pre-dialysis report received from: Bhupinder Harris RN                      Time: [id number]

## 2021-12-11 ENCOUNTER — APPOINTMENT (OUTPATIENT)
Dept: GENERAL RADIOLOGY | Age: 71
DRG: 004 | End: 2021-12-11
Payer: COMMERCIAL

## 2021-12-11 ENCOUNTER — APPOINTMENT (OUTPATIENT)
Dept: CT IMAGING | Age: 71
DRG: 004 | End: 2021-12-11
Payer: COMMERCIAL

## 2021-12-11 LAB
ABSOLUTE EOS #: 0.2 K/UL (ref 0–0.4)
ABSOLUTE IMMATURE GRANULOCYTE: ABNORMAL K/UL (ref 0–0.3)
ABSOLUTE LYMPH #: 1.3 K/UL (ref 1–4.8)
ABSOLUTE MONO #: 0.9 K/UL (ref 0.1–1.3)
ALLEN TEST: ABNORMAL
ANION GAP SERPL CALCULATED.3IONS-SCNC: 12 MMOL/L (ref 9–17)
BASOPHILS # BLD: 0 % (ref 0–2)
BASOPHILS ABSOLUTE: 0 K/UL (ref 0–0.2)
BUN BLDV-MCNC: 35 MG/DL (ref 8–23)
BUN/CREAT BLD: ABNORMAL (ref 9–20)
CALCIUM SERPL-MCNC: 8.4 MG/DL (ref 8.6–10.4)
CARBOXYHEMOGLOBIN: 1.1 % (ref 0–5)
CHLORIDE BLD-SCNC: 97 MMOL/L (ref 98–107)
CO2: 26 MMOL/L (ref 20–31)
CREAT SERPL-MCNC: 2.99 MG/DL (ref 0.5–0.9)
CULTURE: ABNORMAL
DIFFERENTIAL TYPE: ABNORMAL
DIRECT EXAM: ABNORMAL
EOSINOPHILS RELATIVE PERCENT: 3 % (ref 0–4)
FIO2: 21
GFR AFRICAN AMERICAN: 19 ML/MIN
GFR NON-AFRICAN AMERICAN: 15 ML/MIN
GFR SERPL CREATININE-BSD FRML MDRD: ABNORMAL ML/MIN/{1.73_M2}
GFR SERPL CREATININE-BSD FRML MDRD: ABNORMAL ML/MIN/{1.73_M2}
GLUCOSE BLD-MCNC: 195 MG/DL (ref 65–105)
GLUCOSE BLD-MCNC: 208 MG/DL (ref 65–105)
GLUCOSE BLD-MCNC: 214 MG/DL (ref 65–105)
GLUCOSE BLD-MCNC: 221 MG/DL (ref 65–105)
GLUCOSE BLD-MCNC: 231 MG/DL (ref 70–99)
HCO3 ARTERIAL: 28.1 MMOL/L (ref 22–26)
HCT VFR BLD CALC: 24.8 % (ref 36–46)
HCT VFR BLD CALC: 26.2 % (ref 36–46)
HEMOGLOBIN: 8 G/DL (ref 12–16)
HEMOGLOBIN: 8.6 G/DL (ref 12–16)
IMMATURE GRANULOCYTES: ABNORMAL %
LYMPHOCYTES # BLD: 17 % (ref 24–44)
Lab: ABNORMAL
MAGNESIUM: 1.9 MG/DL (ref 1.6–2.6)
MCH RBC QN AUTO: 30.6 PG (ref 26–34)
MCHC RBC AUTO-ENTMCNC: 32.4 G/DL (ref 31–37)
MCV RBC AUTO: 94.4 FL (ref 80–100)
METHEMOGLOBIN: 0.9 % (ref 0–1.9)
MODE: ABNORMAL
MONOCYTES # BLD: 12 % (ref 1–7)
NEGATIVE BASE EXCESS, ART: ABNORMAL MMOL/L (ref 0–2)
NOTIFICATION TIME: ABNORMAL
NOTIFICATION: ABNORMAL
NRBC AUTOMATED: ABNORMAL PER 100 WBC
O2 DEVICE/FLOW/%: ABNORMAL
O2 SAT, ARTERIAL: 93.8 % (ref 95–98)
OXYHEMOGLOBIN: ABNORMAL % (ref 95–98)
PATIENT TEMP: 37
PCO2 ARTERIAL: 36.5 MMHG (ref 35–45)
PCO2, ART, TEMP ADJ: ABNORMAL (ref 35–45)
PDW BLD-RTO: 17 % (ref 11.5–14.9)
PEEP/CPAP: 5
PH ARTERIAL: 7.5 (ref 7.35–7.45)
PH, ART, TEMP ADJ: ABNORMAL (ref 7.35–7.45)
PLATELET # BLD: 81 K/UL (ref 150–450)
PLATELET ESTIMATE: ABNORMAL
PMV BLD AUTO: 10.2 FL (ref 6–12)
PO2 ARTERIAL: 65.9 MMHG (ref 80–100)
PO2, ART, TEMP ADJ: ABNORMAL MMHG (ref 80–100)
POSITIVE BASE EXCESS, ART: 4.9 MMOL/L (ref 0–2)
POTASSIUM SERPL-SCNC: 3.5 MMOL/L (ref 3.7–5.3)
PSV: ABNORMAL
PT. POSITION: ABNORMAL
RBC # BLD: 2.62 M/UL (ref 4–5.2)
RBC # BLD: ABNORMAL 10*6/UL
RESPIRATORY RATE: 20
SAMPLE SITE: ABNORMAL
SEG NEUTROPHILS: 68 % (ref 36–66)
SEGMENTED NEUTROPHILS ABSOLUTE COUNT: 5.2 K/UL (ref 1.3–9.1)
SET RATE: 20
SODIUM BLD-SCNC: 135 MMOL/L (ref 135–144)
SPECIMEN DESCRIPTION: ABNORMAL
TEXT FOR RESPIRATORY: ABNORMAL
TOTAL HB: ABNORMAL G/DL (ref 12–16)
TOTAL RATE: 20
VT: 450
WBC # BLD: 7.7 K/UL (ref 3.5–11)
WBC # BLD: ABNORMAL 10*3/UL

## 2021-12-11 PROCEDURE — 6370000000 HC RX 637 (ALT 250 FOR IP): Performed by: SURGERY

## 2021-12-11 PROCEDURE — 92526 ORAL FUNCTION THERAPY: CPT

## 2021-12-11 PROCEDURE — 6360000002 HC RX W HCPCS: Performed by: FAMILY MEDICINE

## 2021-12-11 PROCEDURE — 80048 BASIC METABOLIC PNL TOTAL CA: CPT

## 2021-12-11 PROCEDURE — 85025 COMPLETE CBC W/AUTO DIFF WBC: CPT

## 2021-12-11 PROCEDURE — 94761 N-INVAS EAR/PLS OXIMETRY MLT: CPT

## 2021-12-11 PROCEDURE — 70490 CT SOFT TISSUE NECK W/O DYE: CPT

## 2021-12-11 PROCEDURE — 36600 WITHDRAWAL OF ARTERIAL BLOOD: CPT

## 2021-12-11 PROCEDURE — 2000000000 HC ICU R&B

## 2021-12-11 PROCEDURE — 85014 HEMATOCRIT: CPT

## 2021-12-11 PROCEDURE — 82947 ASSAY GLUCOSE BLOOD QUANT: CPT

## 2021-12-11 PROCEDURE — 6370000000 HC RX 637 (ALT 250 FOR IP): Performed by: RADIOLOGY

## 2021-12-11 PROCEDURE — 6370000000 HC RX 637 (ALT 250 FOR IP): Performed by: FAMILY MEDICINE

## 2021-12-11 PROCEDURE — 82805 BLOOD GASES W/O2 SATURATION: CPT

## 2021-12-11 PROCEDURE — 83735 ASSAY OF MAGNESIUM: CPT

## 2021-12-11 PROCEDURE — 99233 SBSQ HOSP IP/OBS HIGH 50: CPT | Performed by: INTERNAL MEDICINE

## 2021-12-11 PROCEDURE — 85018 HEMOGLOBIN: CPT

## 2021-12-11 PROCEDURE — 36415 COLL VENOUS BLD VENIPUNCTURE: CPT

## 2021-12-11 PROCEDURE — 6360000002 HC RX W HCPCS: Performed by: SURGERY

## 2021-12-11 PROCEDURE — 94003 VENT MGMT INPAT SUBQ DAY: CPT

## 2021-12-11 PROCEDURE — 6370000000 HC RX 637 (ALT 250 FOR IP): Performed by: INTERNAL MEDICINE

## 2021-12-11 PROCEDURE — C9113 INJ PANTOPRAZOLE SODIUM, VIA: HCPCS | Performed by: SURGERY

## 2021-12-11 PROCEDURE — 2580000003 HC RX 258: Performed by: SURGERY

## 2021-12-11 PROCEDURE — 71045 X-RAY EXAM CHEST 1 VIEW: CPT

## 2021-12-11 PROCEDURE — 2580000003 HC RX 258: Performed by: FAMILY MEDICINE

## 2021-12-11 RX ADMIN — SODIUM CHLORIDE, PRESERVATIVE FREE 10 ML: 5 INJECTION INTRAVENOUS at 20:13

## 2021-12-11 RX ADMIN — NYSTATIN 500000 UNITS: 500000 SUSPENSION ORAL at 13:01

## 2021-12-11 RX ADMIN — ANTI-FUNGAL POWDER MICONAZOLE NITRATE TALC FREE: 1.42 POWDER TOPICAL at 08:48

## 2021-12-11 RX ADMIN — ATORVASTATIN CALCIUM 40 MG: 40 TABLET, FILM COATED ORAL at 08:47

## 2021-12-11 RX ADMIN — INSULIN LISPRO 2 UNITS: 100 INJECTION, SOLUTION INTRAVENOUS; SUBCUTANEOUS at 13:02

## 2021-12-11 RX ADMIN — PANTOPRAZOLE SODIUM 40 MG: 40 INJECTION, POWDER, FOR SOLUTION INTRAVENOUS at 20:13

## 2021-12-11 RX ADMIN — CEFEPIME HYDROCHLORIDE 1000 MG: 1 INJECTION, POWDER, FOR SOLUTION INTRAMUSCULAR; INTRAVENOUS at 16:36

## 2021-12-11 RX ADMIN — PANTOPRAZOLE SODIUM 40 MG: 40 INJECTION, POWDER, FOR SOLUTION INTRAVENOUS at 08:48

## 2021-12-11 RX ADMIN — SODIUM CHLORIDE, PRESERVATIVE FREE 10 ML: 5 INJECTION INTRAVENOUS at 08:56

## 2021-12-11 RX ADMIN — SERTRALINE HYDROCHLORIDE 25 MG: 50 TABLET ORAL at 08:47

## 2021-12-11 RX ADMIN — SODIUM CHLORIDE, PRESERVATIVE FREE 10 ML: 5 INJECTION INTRAVENOUS at 08:57

## 2021-12-11 RX ADMIN — NYSTATIN 500000 UNITS: 500000 SUSPENSION ORAL at 11:18

## 2021-12-11 RX ADMIN — NYSTATIN 500000 UNITS: 500000 SUSPENSION ORAL at 20:13

## 2021-12-11 RX ADMIN — OXYCODONE HYDROCHLORIDE AND ACETAMINOPHEN 1 TABLET: 5; 325 TABLET ORAL at 13:02

## 2021-12-11 RX ADMIN — ANTI-FUNGAL POWDER MICONAZOLE NITRATE TALC FREE: 1.42 POWDER TOPICAL at 20:13

## 2021-12-11 RX ADMIN — CHLORHEXIDINE GLUCONATE 0.12% ORAL RINSE 15 ML: 1.2 LIQUID ORAL at 08:48

## 2021-12-11 RX ADMIN — NYSTATIN 500000 UNITS: 500000 SUSPENSION ORAL at 16:42

## 2021-12-11 RX ADMIN — FERROUS SULFATE TAB 325 MG (65 MG ELEMENTAL FE) 325 MG: 325 (65 FE) TAB at 08:48

## 2021-12-11 RX ADMIN — CHLORHEXIDINE GLUCONATE 0.12% ORAL RINSE 15 ML: 1.2 LIQUID ORAL at 20:13

## 2021-12-11 RX ADMIN — INSULIN LISPRO 4 UNITS: 100 INJECTION, SOLUTION INTRAVENOUS; SUBCUTANEOUS at 08:48

## 2021-12-11 RX ADMIN — INSULIN LISPRO 4 UNITS: 100 INJECTION, SOLUTION INTRAVENOUS; SUBCUTANEOUS at 18:12

## 2021-12-11 RX ADMIN — INSULIN LISPRO 2 UNITS: 100 INJECTION, SOLUTION INTRAVENOUS; SUBCUTANEOUS at 20:14

## 2021-12-11 RX ADMIN — ACETAMINOPHEN 650 MG: 325 TABLET ORAL at 04:16

## 2021-12-11 RX ADMIN — INSULIN GLARGINE 22 UNITS: 100 INJECTION, SOLUTION SUBCUTANEOUS at 20:14

## 2021-12-11 ASSESSMENT — ENCOUNTER SYMPTOMS
ABDOMINAL PAIN: 0
VOMITING: 0
BLOOD IN STOOL: 0
SHORTNESS OF BREATH: 0
EYE REDNESS: 0
NAUSEA: 0
COLOR CHANGE: 0
TROUBLE SWALLOWING: 0
COUGH: 0
CHEST TIGHTNESS: 0
EYE ITCHING: 0

## 2021-12-11 ASSESSMENT — PAIN SCALES - WONG BAKER
WONGBAKER_NUMERICALRESPONSE: 0

## 2021-12-11 ASSESSMENT — PAIN SCALES - GENERAL
PAINLEVEL_OUTOF10: 0
PAINLEVEL_OUTOF10: 2
PAINLEVEL_OUTOF10: 0
PAINLEVEL_OUTOF10: 3
PAINLEVEL_OUTOF10: 0

## 2021-12-11 ASSESSMENT — PULMONARY FUNCTION TESTS
PIF_VALUE: 24
PIF_VALUE: 21
PIF_VALUE: 22
PIF_VALUE: 21

## 2021-12-11 NOTE — PROGRESS NOTES
Patient was seen and examined. Awake alert. Vital signs are stable. Afebrile. Tolerating tube feeds. Abdomen is soft. PEG site is clean. Tracheostomy site is clean. Minimal drainage. Dressing is dry. Continue supportive care.

## 2021-12-11 NOTE — PROGRESS NOTES
Nephrology Progress Note    Interval history:   Patient was seen and examined today in the intensive care unit, no acute events overnight. Patient had dialysis yesterday   she also underwent right thoracentesis on Thursday with removal of 400 mL of pleural fluid. She is s/p trach and PEG on 12/03/2021 and tunneled dialysis catheter placement 12/7/2021. Patient is on tube feeds    History of present illness:  66y.o. year old female who we are seeing in consultation for JAYLYN. Patient has a past medical history of Type 2 diabetes mellitus, coronary artery disease, lumbar radiculopathy, Hyperlipidemia and chronic kidney disease stage III [baseline serum creatinine 1.5 mg/dL], who presented from [facility] for further evaluation of acute kidney injury. She was sent to [facility] after developing nonhealing wound s/p cardiac catheterization via right refill Levophed has been discontinued approach with right groin wound. She had been on Bumex in the ECF and creatinine and BUN have been rising and Bumex was decreased to a lowest dose of 0.5 mg p.o. twice daily. Patient was found to have JAYLYN likely secondary to ischemic acute tubular necrosis and obstructive nephropathy secondary to bilateral hydronephrosis. Patient received acute dialysis on 12/04. Will be started on hemodialysis MWF schedule.      Objective/     Vitals:    12/11/21 0700 12/11/21 0715 12/11/21 0733 12/11/21 1100   BP: (!) 143/80 (!) 134/93     Pulse: 97 96 96 99   Resp: 22 (!) 33 20 10   Temp:  99.5 °F (37.5 °C)     TempSrc:  Oral     SpO2: 95% 95% 96% 95%   Weight:       Height:         24HR INTAKE/OUTPUT:      Intake/Output Summary (Last 24 hours) at 12/11/2021 1315  Last data filed at 12/11/2021 0330  Gross per 24 hour   Intake 1088 ml   Output 3700 ml   Net -2612 ml     Patient Vitals for the past 96 hrs (Last 3 readings):   Weight   12/10/21 1400 199 lb 11.8 oz (90.6 kg)   12/10/21 1000 206 lb 5.6 oz (93.6 kg)   12/08/21 1638 (!) 3.5 oz (0.1 kg)     Constitutional:  On ventilatory support via tracheostomy. Awake and alert. Cardiovascular:  S1, S2 without m/r/g  Respiratory: Bilateral chest rise, tracheostomy on ventilator  Abdomen: Soft nontender  Ext: +2 pitting edema bilaterally lower extremities. Data/  Recent Labs     12/09/21  0422 12/09/21  1326 12/10/21  0331 12/10/21  0331 12/10/21  1330 12/10/21  2100 12/11/21  0509   WBC 9.3  --  8.0  --   --   --  7.7   HGB 7.1*   < > 7.9*   < > 9.0* 8.3* 8.0*   HCT 21.9*   < > 24.5*   < > 27.6* 25.9* 24.8*   MCV 94.3  --  93.3  --   --   --  94.4   PLT 76*  --  103*  --   --   --  81*    < > = values in this interval not displayed.      Recent Labs     12/09/21  0422 12/10/21  0331 12/11/21  0509   * 132* 135   K 3.8 4.1 3.5*   CL 97* 96* 97*   CO2 25 25 26   GLUCOSE 206* 215* 231*   MG 1.8 1.8 1.9   BUN 40* 52* 35*   CREATININE 3.26* 3.91* 2.99*   LABGLOM 14* 11* 15*   GFRAA 17* 14* 19*     DATA:    CBC with Differential:    Lab Results   Component Value Date    WBC 7.7 12/11/2021    RBC 2.62 12/11/2021    RBC 4.14 08/31/2016    HGB 8.0 12/11/2021    HCT 24.8 12/11/2021    PLT 81 12/11/2021    MCV 94.4 12/11/2021    MCH 30.6 12/11/2021    MCHC 32.4 12/11/2021    RDW 17.0 12/11/2021    NRBC 4 11/22/2021    METASPCT 3 12/02/2021    LYMPHOPCT 17 12/11/2021    LYMPHOPCT 32.8 08/31/2016    MONOPCT 12 12/11/2021    EOSPCT 4.3 08/31/2016    BASOPCT 0 12/11/2021    BASOPCT 0.7 08/31/2016    MONOSABS 0.90 12/11/2021    LYMPHSABS 1.30 12/11/2021    EOSABS 0.20 12/11/2021    BASOSABS 0.00 12/11/2021    DIFFTYPE NOT REPORTED 12/11/2021     BMP:    Lab Results   Component Value Date     12/11/2021    K 3.5 12/11/2021    CL 97 12/11/2021    CO2 26 12/11/2021    BUN 35 12/11/2021    LABALBU 2.6 11/18/2021    CREATININE 2.99 12/11/2021    CALCIUM 8.4 12/11/2021    GFRAA 19 12/11/2021    LABGLOM 15 12/11/2021    GLUCOSE 231 12/11/2021    GLUCOSE 77 08/31/2016     Sodium:    Lab Results Component Value Date     12/11/2021     Potassium:    Lab Results   Component Value Date    K 3.5 12/11/2021     Assessment/Plan:     1. Acute Kidney Injury - secondary to ischemic acute tubular necrosis and obstructive nephropathy secondary to bilateral hydronephrosis. She remains dialysis dependent and is on a MWF schedule. We will continue to monitor GFR and urine output closely for evidence of renal function recovery. 2. Heart failure with reduced ejection fraction [HFrEF with LVEF 30 to 35% 11/10/2021] - We will continue ultrafiltration with dialysis as tolerated. She is s/p right thoracentesis 12/9/2021.    3.    Normocytic anemia - partly secondary to chronic kidney disease. Hemoglobin is improved from 6.7 g/dL yesterday to 7.9 g/dL today. Continue Retacrit 10,000 units 3 times a week. 4.  Bilateral hydronephrosis - status post cystoscopy with right ureteral  stent placement.     5. Systemic hypertension - blood pressure control is adequate. 6.  Low-grade fever - panculture and give 1 g of vancomycin. Plan  Continue hemodialysis Monday Wednesday Friday Next dialysis Monday  Charge planning in progress    Prognosis is guarded.     Mallika Shipman MD    Attending Clinical Nephrologist

## 2021-12-11 NOTE — PROGRESS NOTES
ICU Progress Note (Vent)   [facility]    Patient - Nicole Abdi,  Age - 66 y.o.    - 1950      Room Number -    MRN -  [id number]   Acct # - [de-identified]  Date of Admission -  11/10/2021  4:23 PM    Events of Past 24 Hours   Alert and oriented still has some yellow frothy secretions around trach site    Vitals    height is 5' 2.99\" (1.6 m) and weight is 199 lb 11.8 oz (90.6 kg). Her oral temperature is 99.5 °F (37.5 °C). Her blood pressure is 134/93 (abnormal) and her pulse is 96. Her respiration is 20 and oxygen saturation is 96%. Temperature Range: Temp: 99.5 °F (37.5 °C) Temp  Av °F (37.2 °C)  Min: 98.1 °F (36.7 °C)  Max: 99.7 °F (37.6 °C)  BP Range:  Systolic (57ERQ), DVN:923 , Min:97 , BWZ:030     Diastolic (51IMY), PRL:12, Min:48, Max:94    Pulse Range: Pulse  Av.7  Min: 92  Max: 114  Respiration Range: Resp  Av.9  Min: 0  Max: 33  Current Pulse Ox[de-identified]  SpO2: 96 %  24HR Pulse Ox Range:  SpO2  Av.8 %  Min: 88 %  Max: 97 %  Oxygen Amount and Delivery: O2 Flow Rate (L/min): 2 L/min      Wt Readings from Last 3 Encounters:   12/10/21 199 lb 11.8 oz (90.6 kg)   16 178 lb (80.7 kg)   16 184 lb 12.8 oz (83.8 kg)     I/O       Intake/Output Summary (Last 24 hours) at 2021 0847  Last data filed at 2021 0330  Gross per 24 hour   Intake 1088 ml   Output 3700 ml   Net -2612 ml     I/O last 3 completed shifts:   In: 1088 [NG/GT:588]  Out: 3700 [Stool:200]     DRAIN/TUBE OUTPUT:     Invasive Lines   ETT Day -   day #20 ventilator, tracheostomy day 8  Lines -PICC line day 3    ICP PRESSURE RANGE:  No data recorded  CVP PRESSURE RANGE:  No data recorded  Mechanical Ventilation Data   SETTINGS (Comprehensive)  Vent Information  $Ventilation: $Subsequent Day  Skin Assessment: Clean, dry, & intact  Suction Catheter Diameter: 14  Equipment Changed: HME  Vent Type: Servo i  Vent Mode: PRVC  Vt Ordered: 450 mL  Rate Set: 20 bmp  Pressure Support: 10 cmH20  FiO2 : 21 %  SpO2: 96 %  SpO2/FiO2 ratio: 457.14  Sensitivity: 5  PEEP/CPAP: 5  I Time/ I Time %: 0.86 s  Humidification Source: HME  Nitric Oxide/Epoprostenol In Use?: No  Mask Type: Full face mask  Mask Size: Medium  Additional Respiratory  Assessments  Pulse: 96  Resp: 20  SpO2: 96 %  End Tidal CO2: 35 (%)  Position: Semi-Lagunas's  Humidification Source: HME  Oral Care: Mouthwash, Lip moisturizer applied, Mouth swabbed, Mouth suctioned  Cuff Pressure (cm H2O): 30 cm H2O       ABGs:   Lab Results   Component Value Date    PHART 7.495 12/11/2021    PO2ART 65.9 12/11/2021    CVK4LHF 36.5 12/11/2021       Lab Results   Component Value Date    MODE PRVC 12/11/2021         Medications   IV   sodium chloride      sodium chloride      norepinephrine Stopped (11/29/21 2306)    propofol Stopped (12/03/21 1030)    dextrose      sodium chloride        vancomycin (VANCOCIN) intermittent dosing (placeholder)   Other RX Placeholder    [START ON 12/13/2021] vancomycin  1,000 mg IntraVENous Q MWF    epoetin tyshawn-epbx  10,000 Units SubCUTAneous Once per day on Mon Wed Fri    nystatin  5 mL Oral 4x Daily    miconazole   Topical BID    atorvastatin  40 mg Oral Daily    chlorhexidine  15 mL Mouth/Throat BID    [Held by provider] carvedilol  3.125 mg Oral BID WC    pantoprazole  40 mg IntraVENous BID    And    sodium chloride flush  10 mL IntraVENous BID    sodium polystyrene  15 g Oral Once    sertraline  25 mg Oral Daily    ferrous sulfate  325 mg Oral Daily with breakfast    [Held by provider] aspirin  81 mg Oral Daily    insulin glargine  22 Units SubCUTAneous Nightly    insulin lispro  0-12 Units SubCUTAneous TID WC    insulin lispro  0-6 Units SubCUTAneous Nightly    sodium chloride flush  5-40 mL IntraVENous 2 times per day       Diet/Nutrition   ADULT TUBE FEEDING; PEG; Renal Formula; Continuous; 15; Yes; 15; Q 4 hours; 40; 50; Q 4 hours    Exam   VITALS height is 5' 2.99\" (1.6 m) and weight is 199 lb 11.8 oz (90.6 kg). Her oral temperature is 99.5 °F (37.5 °C). Her blood pressure is 134/93 (abnormal) and her pulse is 96. Her respiration is 20 and oxygen saturation is 96%. Ventilator Settings (Basic)  Vent Mode: PRVC Rate Set: 20 bmp/Vt Ordered: 450 mL/ /FiO2 : 21 %    Constitutional - Sedated  General Appearance  well developed, well nourished  HEENT -tracheostomy in place,normocephalic, atraumatic. PERRLA  Lungs - Chest expands equally, no wheezes, rales or rhonchi. Cardiovascular - Heart sounds are normal.  normal rate and rhythm regular, no murmur, gallop or rub. Abdomen - soft, nontender, nondistended, no masses or organomegaly  Neurologic - CN II-XII are grossly intact.  There are no focal motor deficits  Skin - no bruising or bleeding  Extremities - no cyanosis, clubbing, generalized edema    Lab Results   CBC     Lab Results   Component Value Date    WBC 7.7 12/11/2021    RBC 2.62 12/11/2021    RBC 4.14 08/31/2016    HGB 8.0 12/11/2021    HCT 24.8 12/11/2021    PLT 81 12/11/2021    MCV 94.4 12/11/2021    MCH 30.6 12/11/2021    MCHC 32.4 12/11/2021    RDW 17.0 12/11/2021    NRBC 4 11/22/2021    METASPCT 3 12/02/2021    LYMPHOPCT 17 12/11/2021    LYMPHOPCT 32.8 08/31/2016    MONOPCT 12 12/11/2021    EOSPCT 4.3 08/31/2016    BASOPCT 0 12/11/2021    BASOPCT 0.7 08/31/2016    MONOSABS 0.90 12/11/2021    LYMPHSABS 1.30 12/11/2021    EOSABS 0.20 12/11/2021    BASOSABS 0.00 12/11/2021    DIFFTYPE NOT REPORTED 12/11/2021       BMP   Lab Results   Component Value Date     12/11/2021    K 3.5 12/11/2021    CL 97 12/11/2021    CO2 26 12/11/2021    BUN 35 12/11/2021    CREATININE 2.99 12/11/2021    GLUCOSE 231 12/11/2021    GLUCOSE 77 08/31/2016    CALCIUM 8.4 12/11/2021       LFTS  Lab Results   Component Value Date    ALKPHOS 175 11/18/2021    ALT 31 11/18/2021    AST 21 11/18/2021    PROT 5.9 12/09/2021    BILITOT 0.27 11/18/2021    BILIDIR <0.08 11/13/2021    IBILI Connot be calculated 11/13/2021    LABALBU 2.6 11/18/2021       INR  No results for input(s): PROTIME, INR in the last 72 hours. APTT  No results for input(s): APTT in the last 72 hours. Lactic Acid  Lab Results   Component Value Date    LACTA 1.3 11/26/2021    LACTA 0.6 11/16/2021    LACTA 0.6 11/16/2021        BNP   No results for input(s): BNP in the last 72 hours.      Cultures       Radiology     Plain Films         CT Scans    See actual reports for details    SYSTEM ASSESSMENT    Acute on chronic hypoxic and hypercapnic respiratory failure  Bradycardic arrest, intubated 11/20; tracheostomy 12/3  Right lower lobe infiltrate/pleural effusion-status post thoracentesis 12/11 appears to be transudative  Acute kidney injury-requiring hemodialysis  Morbid obesity  Anemia, chronic disease and possible rectal bleed  Hypoalbuminemia  Thrombocytopenia  Nonischemic cardiomyopathy with EF of 30 to 35% possible right to left interatrial shunt  Atrial fibrillation  Possible CLL/lymphoproliferative disorder  DNR CCA      Continue to make daily weaning attempts  No need for the need for ABGs other than prn for respiratory  Antibiotics per ID awaiting cultures so far pleural fluid is not growing anything  Would keep her on the dry side if possible  Awaiting placement at [facility], no objection to discharge at any time    Critical Care Time   0 min    Electronically signed by Xavi Bradley MD on 12/11/2021 at 8:47 AM

## 2021-12-11 NOTE — PROGRESS NOTES
[facility] -   Infectious diseases evaluation  admission date 11/10/2021    reason for consultation:   Tracheostomy site drainage    Impression :   Current:  · Infection at the tracheostomy site staph aureus and gram-negative rods growth on culture  · MRSA colonization  · Acute renal failure on hemodialysis tunneled hemodialysis catheter was placed on 12/7/2021  · Respiratory failure   · Status post tracheostomy and PEG tube placement 12/3/2021  · Diabetes mellitus  · Coronary artery disease  · Cardiomyopathy with ejection fraction of 30 to 35%   · Atrial fibrillation  · Morbid obesity  · Right abdominal wall wound with no signs of infection      Recommendations   · IV vancomycin and add cefepime  · Follow tracheostomy site culture /adjust antibiotics as needed  · Diflucan was started 12/4/2021 completed 12/9/2021  · Follow pleural fluid culture no growth to date  · Repeat blood cultures from 12/10/2021 no growth to date  · Follow CBC  · Continue supportive care  · Discussed with nursing staff        History of Present Illness:   Initial history:  Chidi May is a 66y.o.-year-old female  presented initially on 11/10/2021 with acute respiratory failure. Patient had code blue on 11/20/2021 secondary to bradycardia. CPR performed and patient was intubated, subsequently had tracheostomy and PEG tube placement on 12/3/2021  She had right lower lobe infiltrate with pleural effusion status post thoracentesis on 12/11/2021 was transudate .   She developed acute renal failure requiring hemodialysis through tunneled hemodialysis catheter that was placed 12/7/2021  She had a fever with a temperature max of 100.5 on 12/4/2021  Urine culture grew Candida albicans on 12/2/21  COVID-19 rapid test was negative on 11/15/2021 and 12/1/2021  No growth on blood culture from 11/16/2021  Nasal swab for MRSA was + 11/21/2021  The patient received a course of ceftriaxone 11/10/2021 through 11/15/2021 through 11/2721  She received IV cefepime 11/16/2021  Zyvox was started 11/21/2021 through 11/27/2021  Ceftriaxone was restarted 12/2/21 through 12/5/2021  Interval changes  12/11/2021   The patient unable to provide history, tracheostomy in place with yellow secretions and surrounding redness   Afebrile, tolerating tube feed, no acute events  PICC line was placed 12/9/2021   Hemoglobin 7.9  WBC 8  Patient Vitals for the past 8 hrs:   BP Temp Temp src Pulse Resp SpO2   12/11/21 1315 -- 99.2 °F (37.3 °C) Oral -- -- --   12/11/21 1300 (!) 141/69 -- -- 102 28 93 %   12/11/21 1200 123/61 -- -- 106 21 97 %   12/11/21 1100 (!) 136/59 -- -- 99 10 95 %   12/11/21 1000 132/70 -- -- 102 (!) 6 (!) 83 %   12/11/21 0900 (!) 127/55 -- -- 102 21 94 %   12/11/21 0800 (!) 149/125 -- -- 92 25 96 %   12/11/21 0733 -- -- -- 96 20 96 %   12/11/21 0715 (!) 134/93 99.5 °F (37.5 °C) Oral 96 (!) 33 95 %   12/11/21 0700 (!) 143/80 -- -- 97 22 95 %   12/11/21 0543 -- -- -- -- 20 --           I have personally reviewed the past medical history, past surgical history, medications, social history, and family history, and I haveupdated the database accordingly. Allergies:   Patient has no known allergies. Review of Systems:     Review of Systems  Status post tracheostomy, nonverbal, unable to provide  Physical Examination :       Physical Exam  Constitutional:       General: She is not in acute distress. HENT:      Right Ear: External ear normal.      Left Ear: External ear normal.   Eyes:      General: No scleral icterus. Conjunctiva/sclera: Conjunctivae normal.   Neck:      Comments: Tracheostomy in place with yellow drainage, surrounding erythema, no fluctuation or crepitance  Cardiovascular:      Rate and Rhythm: Normal rate. Heart sounds: No murmur heard. Pulmonary:      Effort: No respiratory distress. Breath sounds: Normal breath sounds. No wheezing.    Abdominal:      General: There is no distension. Palpations: Abdomen is soft. Tenderness: There is no abdominal tenderness. Musculoskeletal:      Cervical back: Neck supple. No rigidity. Right lower leg: Edema present. Left lower leg: Edema present. Skin:     General: Skin is warm. Coloration: Skin is not jaundiced. Findings: No bruising. Comments: Right abdominal wall wound with no signs of infection   Neurological:      Mental Status: She is alert.          Past Medical History:     Past Medical History:   Diagnosis Date    Anemia, unspecified     CAD (coronary artery disease)     Chronic kidney disease     CKD (chronic kidney disease) stage 3, GFR 30-59 ml/min ([facility])     Closed fracture of dorsal (thoracic) vertebra without mention of spinal cord injury     Coronary atherosclerosis of unspecified type of vessel, native or graft     Depression with anxiety     Esophageal reflux     HTN (hypertension)     Hyperkalemia     Hyperlipidemia     Intervertebral cervical disc disorder with myelopathy, cervical region     Lower back pain     Lumbar radiculopathy     Microalbuminuria     Muscle weakness     Pain in limb     Stenosis of cervical spine     Tethered cord syndrome (HCC)     Type II or unspecified type diabetes mellitus without mention of complication, not stated as uncontrolled     Urinary calculus     UTI (lower urinary tract infection)        Past Surgical  History:     Past Surgical History:   Procedure Laterality Date    ANGIOPLASTY      COLON SURGERY      CYSTOSCOPY Right 11/16/2021    CYSTOSCOPY URETERAL STENT INSERTION performed by Elizabeth Peoples MD at [facility] IR NONTUNNELED VASCULAR CATHETER  11/17/2021    IR NONTUNNELED VASCULAR CATHETER 11/17/2021 [facility]    JOINT REPLACEMENT      TRACHEOSTOMY N/A 12/3/2021    TRACHEOTOMY performed by Leonel Thapa MD at [address] N/A 11/15/2021    EGD [facility] N/A 12/3/2021    EGD ESOPHAGOGASTRODUODENOSCOPY PEG TUBE INSERTION performed by Reina Munguia MD at [address] [place] [address] OR       Medications:      vancomycin (VANCOCIN) intermittent dosing (placeholder)   Other RX Placeholder    [START ON 12/13/2021] vancomycin  1,000 mg IntraVENous Q MWF    epoetin tyshawn-epbx  10,000 Units SubCUTAneous Once per day on Mon Wed Fri    nystatin  5 mL Oral 4x Daily    miconazole   Topical BID    atorvastatin  40 mg Oral Daily    chlorhexidine  15 mL Mouth/Throat BID    [Held by provider] carvedilol  3.125 mg Oral BID WC    pantoprazole  40 mg IntraVENous BID    And    sodium chloride flush  10 mL IntraVENous BID    sodium polystyrene  15 g Oral Once    sertraline  25 mg Oral Daily    ferrous sulfate  325 mg Oral Daily with breakfast    [Held by provider] aspirin  81 mg Oral Daily    insulin glargine  22 Units SubCUTAneous Nightly    insulin lispro  0-12 Units SubCUTAneous TID WC    insulin lispro  0-6 Units SubCUTAneous Nightly    sodium chloride flush  5-40 mL IntraVENous 2 times per day       Social History:     Social History     Socioeconomic History    Marital status:      Spouse name: Not on file    Number of children: Not on file    Years of education: Not on file    Highest education level: Not on file   Occupational History    Not on file   Tobacco Use    Smoking status: Never Smoker    Smokeless tobacco: Not on file   Substance and Sexual Activity    Alcohol use: No     Alcohol/week: 0.0 standard drinks    Drug use: No    Sexual activity: Not on file   Other Topics Concern    Not on file   Social History Narrative    Not on file     Social Determinants of Health     Financial Resource Strain:     Difficulty of Paying Living Expenses: Not on file   Food Insecurity:     Worried About Running Out of Food in the Last Year: Not on file    Katya of Food in the Last Year: Not on file Transportation Needs:     Lack of Transportation (Medical): Not on file    Lack of Transportation (Non-Medical): Not on file   Physical Activity:     Days of Exercise per Week: Not on file    Minutes of Exercise per Session: Not on file   Stress:     Feeling of Stress : Not on file   Social Connections:     Frequency of Communication with Friends and Family: Not on file    Frequency of Social Gatherings with Friends and Family: Not on file    Attends Hinduism Services: Not on file    Active Member of [employer] or Organizations: Not on file    Attends Club or Organization Meetings: Not on file    Marital Status: Not on file   Intimate Partner Violence:     Fear of Current or Ex-Partner: Not on file    Emotionally Abused: Not on file    Physically Abused: Not on file    Sexually Abused: Not on file   Housing Stability:     Unable to Pay for Housing in the Last Year: Not on file    Number of [place] in the Last Year: Not on file    Unstable Housing in the Last Year: Not on file       Family History:     Family History   Problem Relation Age of Onset    High Blood Pressure Other       Medical Decision Making:   I have independently reviewed/ordered the following labs:    CBC with Differential:   Recent Labs     12/10/21  0331 12/10/21  1330 12/10/21  2100 12/11/21  0509   WBC 8.0  --   --  7.7   HGB 7.9*   < > 8.3* 8.0*   HCT 24.5*   < > 25.9* 24.8*   *  --   --  81*   LYMPHOPCT 18*  --   --  17*   MONOPCT 10*  --   --  12*    < > = values in this interval not displayed. BMP:  Recent Labs     12/10/21  0331 12/11/21  0509   * 135   K 4.1 3.5*   CL 96* 97*   CO2 25 26   BUN 52* 35*   CREATININE 3.91* 2.99*   MG 1.8 1.9     Hepatic Function Panel:   Recent Labs     12/09/21  0422   PROT 5.9*     No results for input(s): RPR in the last 72 hours. No results for input(s): HIV in the last 72 hours. No results for input(s): BC in the last 72 hours.   Lab Results   Component Value Date CREATININE 2.99 12/11/2021    GLUCOSE 231 12/11/2021    GLUCOSE 77 08/31/2016       Detailed results: Thank you for allowing us to participate in the care of this patient. Please call with questions. This note is created with the assistance of a speech recognition program.  While intending to generate adocument that actually reflects the content of the visit, the document can still have some errors including those of syntax and sound a like substitutions which may escape proof reading. It such instances, actual meaningcan be extrapolated by contextual diversion.     Jorge Russell MD  Office: [phone]  Perfect serve / office [phone]

## 2021-12-11 NOTE — PROGRESS NOTES
Writer talks with Dr. Steven Villalta and he prefers patient to have CT of the neck without contrast.

## 2021-12-11 NOTE — PROGRESS NOTES
Progress Note    12/11/2021   3:53 PM    Name:  Eddie Novak  MRN:    [id number]     Acct:     [de-identified]   Room:  2014/2014-01  IP Day: 32     Admit Date: 11/10/2021  4:23 PM  PCP: Darinel Jordan DO    Subjective:     C/C:   Chief Complaint   Patient presents with    Abnormal Lab     low hemoglobin and high CR       Interval History: Status: not changed. Patient is alert and answering questions by nodding had. Patient has a low-grade fever since last night. S/p trach on ventilator. Tolerating tube feeds well. Recent labs reviewed blood sugar readings stable hemoglobin stable at 8.6, platelet count 81. Trach wound culture shows staph aureus heavy growth and nonlactose fermenting gram-negative rods moderate growth. ROS:   all 10 systems reviewed and are negative except as noted    Review of Systems   Constitutional: Negative for chills and fatigue. HENT: Negative for drooling, mouth sores, sneezing and trouble swallowing. Eyes: Negative for redness and itching. Respiratory: Negative for cough, chest tightness and shortness of breath. Cardiovascular: Negative for chest pain, palpitations and leg swelling. Gastrointestinal: Negative for abdominal pain, blood in stool, nausea and vomiting. Endocrine: Negative for heat intolerance and polyphagia. Genitourinary: Negative for difficulty urinating, flank pain and pelvic pain. Musculoskeletal: Negative for arthralgias, joint swelling and neck stiffness. Skin: Negative for color change and pallor. Allergic/Immunologic: Negative for food allergies. Neurological: Negative for dizziness, seizures and headaches. Hematological: Does not bruise/bleed easily. Psychiatric/Behavioral: Negative for agitation, behavioral problems and suicidal ideas. The patient is not hyperactive. Medications:      Allergies: No Known Allergies    Current Meds: cefepime (MAXIPIME) 2000 mg IVPB minibag, Q12H  vancomycin (VANCOCIN) intermittent dosing (placeholder), RX Placeholder  [START ON 12/13/2021] vancomycin 1000 mg IVPB in 250 mL D5W addavial, Q MWF  epoetin tyshawn-epbx (RETACRIT) injection 10,000 Units, Once per day on Mon Wed Fri  acetaminophen (TYLENOL) tablet 650 mg, Q4H PRN  0.9 % sodium chloride infusion, PRN  sodium citrate 4 % injection 1.9 mL, PRN   And  sodium citrate 4 % injection 1.9 mL, PRN  nystatin (MYCOSTATIN) 725387 UNIT/ML suspension 500,000 Units, 4x Daily  miconazole (MICOTIN) 2 % powder, BID  metoprolol (LOPRESSOR) injection 5 mg, Q6H PRN  polyethylene glycol (GLYCOLAX) packet 17 g, Daily PRN  atorvastatin (LIPITOR) tablet 40 mg, Daily  0.9 % sodium chloride infusion, PRN  midodrine (PROAMATINE) tablet 10 mg, TID PRN  albumin human 25 % IV solution 25 g, PRN  midodrine (PROAMATINE) tablet 5 mg, PRN  chlorhexidine (PERIDEX) 0.12 % solution 15 mL, BID  fentaNYL (SUBLIMAZE) injection 50 mcg, Q30 Min PRN  propofol injection, Continuous  [Held by provider] carvedilol (COREG) tablet 3.125 mg, BID WC  pantoprazole (PROTONIX) injection 40 mg, BID   And  sodium chloride flush 0.9 % injection 10 mL, BID  magnesium sulfate 1000 mg in dextrose 5% 100 mL IVPB, PRN  glucose (GLUTOSE) 40 % oral gel 15 g, PRN  dextrose 50 % IV solution, PRN  glucagon (rDNA) injection 1 mg, PRN  dextrose 5 % solution, PRN  sodium polystyrene (KAYEXALATE) 15 GM/60ML suspension 15 g, Once  sertraline (ZOLOFT) tablet 25 mg, Daily  ferrous sulfate (IRON 325) tablet 325 mg, Daily with breakfast  [Held by provider] aspirin chewable tablet 81 mg, Daily  insulin glargine (LANTUS) injection vial 22 Units, Nightly  insulin lispro (HUMALOG) injection vial 0-12 Units, TID WC  insulin lispro (HUMALOG) injection vial 0-6 Units, Nightly  sodium chloride flush 0.9 % injection 5-40 mL, 2 times per day  sodium chloride flush 0.9 % injection 5-40 mL, PRN  0.9 % sodium chloride infusion, PRN  ondansetron (ZOFRAN-ODT) disintegrating tablet 4 mg, Q8H PRN   Or  ondansetron (ZOFRAN) injection 4 mg, Q6H PRN  oxyCODONE-acetaminophen (PERCOCET) 5-325 MG per tablet 1 tablet, Q6H PRN        Data:     Code Status:  DNR-CCA    Family History   Problem Relation Age of Onset    High Blood Pressure Other        Social History     Socioeconomic History    Marital status:      Spouse name: Not on file    Number of children: Not on file    Years of education: Not on file    Highest education level: Not on file   Occupational History    Not on file   Tobacco Use    Smoking status: Never Smoker    Smokeless tobacco: Not on file   Substance and Sexual Activity    Alcohol use: No     Alcohol/week: 0.0 standard drinks    Drug use: No    Sexual activity: Not on file   Other Topics Concern    Not on file   Social History Narrative    Not on file     Social Determinants of Health     Financial Resource Strain:     Difficulty of Paying Living Expenses: Not on file   Food Insecurity:     Worried About Running Out of Food in the Last Year: Not on file    Katya of Food in the Last Year: Not on file   Transportation Needs:     Lack of Transportation (Medical): Not on file    Lack of Transportation (Non-Medical):  Not on file   Physical Activity:     Days of Exercise per Week: Not on file    Minutes of Exercise per Session: Not on file   Stress:     Feeling of Stress : Not on file   Social Connections:     Frequency of Communication with Friends and Family: Not on file    Frequency of Social Gatherings with Friends and Family: Not on file    Attends Orthodoxy Services: Not on file    Active Member of Clubs or Organizations: Not on file    Attends Club or Organization Meetings: Not on file    Marital Status: Not on file   Intimate Partner Violence:     Fear of Current or Ex-Partner: Not on file    Emotionally Abused: Not on file    Physically Abused: Not on file    Sexually Abused: Not on file   Housing Stability:     Unable to Pay for Housing in the Last Year: Not on file    Number of Places mg/dL   Culture, Wound    Collection Time: 12/10/21  5:00 PM    Specimen: Tracheostomy-site   Result Value Ref Range    Specimen Description . TRACHEOSTOMY SITE     Special Requests NOT REPORTED     Direct Exam SEE GRAM STAIN RESULT FROM AEROBE/ANAEROBE CULTURE     Culture STAPHYLOCOCCUS AUREUS HEAVY GROWTH (A)     Culture (A)      NON LACTOSE FERMENTING GRAM NEGATIVE RODS MODERATE GROWTH    Culture LACTOSE FERMENTING GRAM NEGATIVE RODS LIGHT GROWTH (A)    POC Glucose Fingerstick    Collection Time: 12/10/21  7:54 PM   Result Value Ref Range    POC Glucose 211 (H) 65 - 105 mg/dL   Hgb/Hct    Collection Time: 12/10/21  9:00 PM   Result Value Ref Range    Hemoglobin 8.3 (L) 12.0 - 16.0 g/dL    Hematocrit 25.9 (L) 36 - 46 %   CBC with DIFF    Collection Time: 12/11/21  5:09 AM   Result Value Ref Range    WBC 7.7 3.5 - 11.0 k/uL    RBC 2.62 (L) 4.0 - 5.2 m/uL    Hemoglobin 8.0 (L) 12.0 - 16.0 g/dL    Hematocrit 24.8 (L) 36 - 46 %    MCV 94.4 80 - 100 fL    MCH 30.6 26 - 34 pg    MCHC 32.4 31 - 37 g/dL    RDW 17.0 (H) 11.5 - 14.9 %    Platelets 81 (L) 638 - 450 k/uL    MPV 10.2 6.0 - 12.0 fL    NRBC Automated NOT REPORTED per 100 WBC    Differential Type NOT REPORTED     Immature Granulocytes NOT REPORTED 0 %    Absolute Immature Granulocyte NOT REPORTED 0.00 - 0.30 k/uL    WBC Morphology NOT REPORTED     RBC Morphology NOT REPORTED     Platelet Estimate NOT REPORTED     Seg Neutrophils 68 (H) 36 - 66 %    Lymphocytes 17 (L) 24 - 44 %    Monocytes 12 (H) 1 - 7 %    Eosinophils % 3 0 - 4 %    Basophils 0 0 - 2 %    Segs Absolute 5.20 1.3 - 9.1 k/uL    Absolute Lymph # 1.30 1.0 - 4.8 k/uL    Absolute Mono # 0.90 0.1 - 1.3 k/uL    Absolute Eos # 0.20 0.0 - 0.4 k/uL    Basophils Absolute 0.00 0.0 - 0.2 k/uL   Magnesium    Collection Time: 12/11/21  5:09 AM   Result Value Ref Range    Magnesium 1.9 1.6 - 2.6 mg/dL   Basic Metabolic Panel w/ Reflex to MG    Collection Time: 12/11/21  5:09 AM   Result Value Ref Range    Glucose 231 (H) 70 - 99 mg/dL    BUN 35 (H) 8 - 23 mg/dL    CREATININE 2.99 (H) 0.50 - 0.90 mg/dL    Bun/Cre Ratio NOT REPORTED 9 - 20    Calcium 8.4 (L) 8.6 - 10.4 mg/dL    Sodium 135 135 - 144 mmol/L    Potassium 3.5 (L) 3.7 - 5.3 mmol/L    Chloride 97 (L) 98 - 107 mmol/L    CO2 26 20 - 31 mmol/L    Anion Gap 12 9 - 17 mmol/L    GFR Non-African American 15 (L) >60 mL/min    GFR  19 (L) >60 mL/min    GFR Comment          GFR Staging NOT REPORTED    Blood gas, arterial    Collection Time: 12/11/21  5:44 AM   Result Value Ref Range    pH, Arterial 7.495 (H) 7.350 - 7.450    pCO2, Arterial 36.5 35.0 - 45.0 mmHg    pO2, Arterial 65.9 (L) 80.0 - 100.0 mmHg    HCO3, Arterial 28.1 (H) 22.0 - 26.0 mmol/L    Positive Base Excess, Art 4.9 (H) 0.0 - 2.0 mmol/L    Negative Base Excess, Art NOT REPORTED 0.0 - 2.0 mmol/L    O2 Sat, Arterial 93.8 (L) 95 - 98 %    Total Hb NOT REPORTED 12.0 - 16.0 g/dl    Oxyhemoglobin NOT REPORTED 95.0 - 98.0 %    Carboxyhemoglobin 1.1 0 - 5 %    Methemoglobin 0.9 0.0 - 1.9 %    Pt Temp 37.0     pH, Art, Temp Adj NOT REPORTED 7.350 - 7.450    pCO2, Art, Temp Adj NOT REPORTED 35.0 - 45.0    pO2, Art, Temp Adj NOT REPORTED 80.0 - 100.0 mmHg    O2 Device/Flow/% VENTILATOR     Respiratory Rate 20     Rolf Test PASS     Sample Site Right Radial Artery     Pt.  Position SEMI-FOWLERS     Mode PRVC     Set Rate 20     Total Rate 20          FIO2 21     Peep/Cpap 5     PSV NOT REPORTED     Text for Respiratory RESULTED TO RN     NOTIFICATION NOT REPORTED     NOTIFICATION TIME NOT REPORTED    POC Glucose Fingerstick    Collection Time: 12/11/21  8:03 AM   Result Value Ref Range    POC Glucose 221 (H) 65 - 105 mg/dL   POC Glucose Fingerstick    Collection Time: 12/11/21 11:31 AM   Result Value Ref Range    POC Glucose 195 (H) 65 - 105 mg/dL   Hgb/Hct    Collection Time: 12/11/21  2:14 PM   Result Value Ref Range    Hemoglobin 8.6 (L) 12.0 - 16.0 g/dL    Hematocrit 26.2 (L) 36 - 46 % Physical Examination:        Vitals:  BP (!) 141/69   Pulse 95   Temp 99.2 °F (37.3 °C) (Oral)   Resp 20   Ht 5' 2.99\" (1.6 m)   Wt 199 lb 11.8 oz (90.6 kg)   SpO2 94%   BMI 35.39 kg/m²   Temp (24hrs), Av.5 °F (37.5 °C), Min:99.2 °F (37.3 °C), Max:99.7 °F (37.6 °C)    Recent Labs     12/10/21  1635 12/10/21  1954 21  0803 21  1131   POCGLU 214* 211* 221* 195*         Physical Exam  Vitals reviewed. HENT:      Head: Normocephalic. Right Ear: External ear normal.      Left Ear: External ear normal.      Nose: Nose normal.      Mouth/Throat:      Mouth: Mucous membranes are moist.      Pharynx: Oropharynx is clear. Eyes:      Conjunctiva/sclera: Conjunctivae normal.   Cardiovascular:      Rate and Rhythm: Normal rate and regular rhythm. Pulses: Normal pulses. Heart sounds: Normal heart sounds. Pulmonary:      Effort: Pulmonary effort is normal.      Breath sounds: Normal breath sounds. Comments: Trach in place. Erythema and purulent drainage surrounding the trach site  Abdominal:      General: Bowel sounds are normal.      Palpations: Abdomen is soft. Tenderness: There is no abdominal tenderness. Comments: PEG tube in place no erythema noted   Musculoskeletal:         General: No deformity. Cervical back: Normal range of motion and neck supple. Right lower leg: No edema. Left lower leg: No edema. Skin:     General: Skin is warm. Capillary Refill: Capillary refill takes less than 2 seconds. Coloration: Skin is not jaundiced. Neurological:      General: No focal deficit present. Mental Status: She is alert. Mental status is at baseline.    Psychiatric:         Mood and Affect: Mood normal.         Behavior: Behavior normal.         Assessment:        Primary Problem  JAYLYN (acute kidney injury) ([facility] [address]     Principal Problem:    JAYLYN (acute kidney injury) ([facility] [address]  Active Problems:    Type 2 diabetes mellitus with kidney complication, with long-term current use of insulin (HCC)    CKD (chronic kidney disease) stage 3, GFR 30-59 ml/min ([facility])    HTN (hypertension)    ASCVD (arteriosclerotic cardiovascular disease)    Iron deficiency anemia    Hydronephrosis determined by ultrasound    Longstanding persistent atrial fibrillation (HCC)    Acute metabolic encephalopathy    Acute cystitis without hematuria    Acute respiratory failure with hypoxia and hypercapnia ([facility])    Dilated cardiomyopathy ([address]  Resolved Problems:    * No resolved hospital problems. *      Past Medical History:   Diagnosis Date    Anemia, unspecified     CAD (coronary artery disease)     Chronic kidney disease     CKD (chronic kidney disease) stage 3, GFR 30-59 ml/min ([facility])     Closed fracture of dorsal (thoracic) vertebra without mention of spinal cord injury     Coronary atherosclerosis of unspecified type of vessel, native or graft     Depression with anxiety     Esophageal reflux     HTN (hypertension)     Hyperkalemia     Hyperlipidemia     Intervertebral cervical disc disorder with myelopathy, cervical region     Lower back pain     Lumbar radiculopathy     Microalbuminuria     Muscle weakness     Pain in limb     Stenosis of cervical spine     Tethered cord syndrome ([facility])     Type II or unspecified type diabetes mellitus without mention of complication, not stated as uncontrolled     Urinary calculus     UTI (lower urinary tract infection)         Plan:        1. IV vancomycin and cefepime for tracheostomy site infection  2. Wound culture tracheostomy site pending  3. Blood culture pending  4.   pharmacy to dose vancomycin  5. CT soft tissue neck to rule out abscess at trach site  6. LTAC discharge planning in progress  7. CMP, CBC  8. Continue tube feed  9.  discussed with nurse sister and hasband at bedside  10.  nystatin statin powder twice daily for skin fold for candidal intertrigo  11.  Lopressor IV 5 mg every 6 hours as needed for heart rate above 115. 12. Midodrine 5 mg p.o. 3 times a day as needed for systolic blood pressure less than 90. 13. DVT Prophylaxis no pharmacological DVT prophylaxis due to low hemoglobin and thrombocytopenia  14. EPCs  15. PT/OT to evaluate and treat  16. Pain control  17. Replace electrolytes as per sliding scale  18. Home medications reviewed and appropriate medications continued  19.  Reviewed labs and imaging studies from last 24 hours and results explained to patient      Electronically signed by Rashmi Collins MD

## 2021-12-11 NOTE — PROGRESS NOTES
Speech Language Pathology  Speech Language Pathology  [facility]    Dysphagia Treatment Note    Date: 12/11/2021  Patients Name: Citlali Juarez  MRN: [id number]  Diagnosis: Dysphagia  Patient Active Problem List   Diagnosis Code    Chronic kidney disease (CKD), stage III (moderate) ([facility] [address] N18.30    Hyperkalemia E87.5    Type 2 diabetes mellitus with kidney complication, with long-term current use of insulin ([facility]) E11.29, Z79.4    Hyperlipidemia E78.5    CKD (chronic kidney disease) stage 3, GFR 30-59 ml/min ([facility]) N18.30    Microalbuminuria R80.9    Urinary calculus N20.9    Tethered cord syndrome ([facility]) Q06.8    Stenosis of cervical spine M48.02    Lumbar radiculopathy M54.16    Lower back pain M54.50    HTN (hypertension) I10    Esophageal reflux K21.9    Depression with anxiety F41.8    ASCVD (arteriosclerotic cardiovascular disease) I25.10    Closed fracture of thoracic vertebra ([facility] [address] S22.009A    Intervertebral cervical disc disorder with myelopathy, cervical region M50.00    Pain in limb M79.609    Anemia D64.9    JAYLYN (acute kidney injury) ([facility] [address] N17.9    Iron deficiency anemia D50.9    Hydronephrosis determined by ultrasound N13.30    Longstanding persistent atrial fibrillation ([facility]) I48.11    Acute metabolic encephalopathy Z40.31    Acute respiratory failure ([facility]) J96.00    Acute cystitis without hematuria N30.00    Acute respiratory failure with hypoxia and hypercapnia ([facility]) J96.01, J96.02    Dilated cardiomyopathy ([facility]) I42.0       Pain: no    Dysphagia Treatment  Treatment time: 3448-5557    Subjective: [x] Alert [x] Cooperative     [] Confused     [] Agitated    [] Lethargic    Objective/Assessment:    Pt. Seen for dysphagia therapy. Pt's 2 sisters present in room during session. Education provided to Pt. and family re: results and recommendations of 12/09 MBS, NPO status, and rationale for ST.  Pt's family had several questions about swallow and MBS results.   All questions answered by Robinson Wilhelm. Pt. and family verbalized understanding. Pt. Able to complete BOT strengthening exercises x4 sets in reps of 10 c mod cues. Pt. Able to complete volitional swallow x5 c max cues (wincing during swallows, reporting painful). Oral swab used to increase moisture in oral cavity. Pt. Initially sucking water from swab. Education provided re: risk for aspiration. Pt. verbalized understanding and allowed writer to swab mouth without sucking liquid. SLP encouraged Pt. And family to complete exercises throughout day. Pt. Family reporting will complete them with her when present. Continue with NPO recommendation. Plan:  [x] Continue ST services    [] Discharge from ST:        Discharge recommendations: [] Inpatient Rehab   [] [place]   [] Outpatient Therapy  [] Follow up at trauma clinic   [] Other:         Treatment completed by:  Viola Ward M.A., CCC-SLP

## 2021-12-11 NOTE — PROGRESS NOTES
SW left voicemail for Mindee at [facility] and did not hear back. Mal Lemus from [facility] informed LETTY that there has been no updates on pt's expedited request on this day.

## 2021-12-11 NOTE — PROGRESS NOTES
Pharmacy Note  Vancomycin Consult - Daily note   Vancomycin Therapy Day: 2  Current Dosing: pulse dosing  Current diagnosis for which MRSA is suspected/confirmed: SSTI  ONLY for suspected pneumonia or COPD: MRSA nasal swab   N/A: Non respiratory infection. .        Last Temp: 99.7  Actual Weight:   Wt Readings from Last 1 Encounters:   12/10/21 199 lb 11.8 oz (90.6 kg)     Recent Labs     12/09/21  0422 12/10/21  0331   CREATININE 3.26* 3.91*     CrCl:  Patient is on dialysis. Recent Labs     12/09/21  0422 12/10/21  0331   WBC 9.3 8.0       Intake/Output Summary (Last 24 hours) at 12/11/2021 0650  Last data filed at 12/11/2021 0330  Gross per 24 hour   Intake 1088 ml   Output 3700 ml   Net -2612 ml       Recent vancomycin administrations                     vancomycin (VANCOCIN) intermittent dosing (placeholder) ()  Given 12/10/21 1551    vancomycin (VANCOCIN) 1,250 mg in dextrose 5 % 250 mL IVPB (ADDAVIAL) (mg) 1,250 mg New Bag 12/10/21 1549    vancomycin 1000 mg IVPB in 250 mL D5W addavial (mg) 1,000 mg New Bag 12/10/21 1250                    Vancomycin Concentrations:   TROUGH:  No results for input(s): VANCOTROUGH in the last 72 hours. RANDOM:  No results for input(s): VANCORANDOM in the last 72 hours. ASSESSMENT/PLAN    Continue current dose        Pharmacy will Continue to follow. Thank you. Agusto Lacey, [facility], [facility]/PharmD  12/11/2021  6:50 AM    Intermittent Hemodialysis: Not using bayesian software for dosing  Maintenance Dosing = 10-20 mg/kg after HD sessions (max 2,000 mg per dose).   Timing of concentration monitoring:  Critically ill - pre-HD level after the loading dose       Stable/stable dialysis:  pre-HD concentration after 1st or 2nd MD on dialysis 3 times/week  Pre-dialysis level Invasive MRSA Infection or Sepsis Non-Invasive Infection or Non-MRSA Infection   ? 25 mg/L Hold vancomycin dose, ? subsequent dose by 250 mg Hold vancomycin dose, ? subsequent dose by 250-500 mg   21-24 mg/L Continue current dose Decrease dose by 250 mg   15-20 mg/L Increase dose by 250 mg Continue current dose   ? 14 mg/L Increase dose by 250-500 mg Increase dose by 250 mg   Note: Pre-HD concentration monitoring is preferred, if concentrations are drawn after HD, the level must be collected no sooner than 2-4 hours after the end of the session to allow for maximum redistribution and plasma rebound.

## 2021-12-11 NOTE — PLAN OF CARE
Problem: SAFETY  Goal: Free from accidental physical injury  12/11/2021 1620 by Bhupinder Harris RN  Outcome: Ongoing  12/11/2021 0343 by Carolin Smith RN  Outcome: Ongoing  Goal: Free from intentional harm  12/11/2021 1620 by Bhupinder Harris RN  Outcome: Ongoing  12/11/2021 0343 by Carolin Smith RN  Outcome: Ongoing     Problem: DAILY CARE  Goal: Daily care needs are met  12/11/2021 1620 by Bhupinder Harris RN  Outcome: Ongoing  12/11/2021 0343 by Carolin Smith RN  Outcome: Ongoing     Problem: PAIN  Goal: Patient's pain/discomfort is manageable  12/11/2021 1620 by Bhupinder Harris RN  Outcome: Ongoing  12/11/2021 0343 by Carolin Smith RN  Outcome: Ongoing     Problem: SKIN INTEGRITY  Goal: Skin integrity is maintained or improved  12/11/2021 1620 by Bhupinder Harris RN  Outcome: Ongoing  12/11/2021 0343 by Carolin Smith RN  Outcome: Ongoing     Problem: KNOWLEDGE DEFICIT  Goal: Patient/S.O. demonstrates understanding of disease process, treatment plan, medications, and discharge instructions.   12/11/2021 1620 by Bhupinder Harris RN  Outcome: Ongoing  12/11/2021 0343 by Carolin Smith RN  Outcome: Ongoing     Problem: DISCHARGE BARRIERS  Goal: Patient's continuum of care needs are met  12/11/2021 1620 by Bhupinder Harris RN  Outcome: Ongoing  12/11/2021 0343 by Carolin Smith RN  Outcome: Ongoing     Problem: Falls - Risk of:  Goal: Will remain free from falls  Description: Will remain free from falls  12/11/2021 1620 by Bhupinder Harris RN  Outcome: Ongoing  12/11/2021 0343 by Carolin Smith RN  Outcome: Ongoing  Goal: Absence of physical injury  Description: Absence of physical injury  12/11/2021 1620 by Bhupinder Harris RN  Outcome: Ongoing  12/11/2021 0343 by Carolin Smith RN  Outcome: Ongoing     Problem: Skin Integrity:  Goal: Will show no infection signs and symptoms  Description: Will show no infection signs and symptoms  12/11/2021 1620 by Bhupinder Harris RN  Outcome: Ongoing  12/11/2021 0343 by Suyapa Shaver RN  Outcome: Ongoing  Goal: Absence of new skin breakdown  Description: Absence of new skin breakdown  12/11/2021 1620 by Carlos Eduardo Pittman RN  Outcome: Ongoing  12/11/2021 0343 by Suyapa Shaver RN  Outcome: Ongoing     Problem: Musculor/Skeletal Functional Status  Goal: Highest potential functional level  12/11/2021 1620 by Carlos Eduardo Pittman RN  Outcome: Ongoing  12/11/2021 0343 by Suyapa Shaver RN  Outcome: Ongoing  Goal: Absence of falls  12/11/2021 1620 by Carlos Eduardo Pittman RN  Outcome: Ongoing  12/11/2021 0343 by Suyapa Shaver RN  Outcome: Ongoing     Problem: Musculor/Skeletal Functional Status  Goal: Highest potential functional level  12/11/2021 1620 by Carlos Eduardo Pittman RN  Outcome: Ongoing  12/11/2021 0343 by Suyapa Shaver RN  Outcome: Ongoing  Goal: Absence of falls  12/11/2021 1620 by Carlos Eduardo Pittman RN  Outcome: Ongoing  12/11/2021 0343 by Suyapa Shaver RN  Outcome: Ongoing     Problem: Nutrition  Goal: Optimal nutrition therapy  12/11/2021 1620 by Carlos Eduardo Pittman RN  Outcome: Ongoing  12/11/2021 0343 by Suyapa Shaver RN  Outcome: Ongoing     Problem: Pain:  Goal: Pain level will decrease  Description: Pain level will decrease  12/11/2021 1620 by Carlos Eduardo Pittman RN  Outcome: Ongoing  12/11/2021 0343 by Suyapa Shaver RN  Outcome: Ongoing  Goal: Control of acute pain  Description: Control of acute pain  12/11/2021 1620 by Carlos Eduardo Pittman RN  Outcome: Ongoing  12/11/2021 0343 by Suyapa Shaver RN  Outcome: Ongoing  Goal: Control of chronic pain  Description: Control of chronic pain  12/11/2021 1620 by Carlos Eduardo Pittman RN  Outcome: Ongoing  12/11/2021 0343 by Suyapa Shaver RN  Outcome: Ongoing     Problem: Gas Exchange - Impaired:  Goal: Levels of oxygenation will improve  Description: Levels of oxygenation will improve  12/11/2021 1620 by Carlos Eduardo Pittman RN  Outcome: Ongoing  12/11/2021 0343 by Gideon Dumont Luh Yusuf RN  Outcome: Ongoing     Problem: Fluid Volume - Imbalance:  Goal: Absence of imbalanced fluid volume signs and symptoms  Description: Absence of imbalanced fluid volume signs and symptoms  12/11/2021 1620 by Daniel Rodriguez RN  Outcome: Ongoing  12/11/2021 0343 by Gena Siemens, RN  Outcome: Ongoing     Problem: Airway Clearance - Ineffective:  Goal: Ability to maintain a clear airway will improve  Description: Ability to maintain a clear airway will improve  12/11/2021 1620 by Daniel Rodriguez RN  Outcome: Ongoing  12/11/2021 0343 by Gena Siemens, RN  Outcome: Ongoing     Problem:  Bowel Function - Altered:  Goal: Bowel elimination is within specified parameters  Description: Bowel elimination is within specified parameters  12/11/2021 1620 by Daniel Rodriguez RN  Outcome: Ongoing  12/11/2021 0343 by Gena Siemens, RN  Outcome: Ongoing     Problem: Fluid Volume - Imbalance:  Goal: Absence of imbalanced fluid volume signs and symptoms  Description: Absence of imbalanced fluid volume signs and symptoms  12/11/2021 1620 by Daniel Rodriguez RN  Outcome: Ongoing  12/11/2021 0343 by Gena Siemens, RN  Outcome: Ongoing     Problem: Gas Exchange - Impaired:  Goal: Levels of oxygenation will improve  Description: Levels of oxygenation will improve  12/11/2021 1620 by Daniel Rodriguez RN  Outcome: Ongoing  12/11/2021 0343 by Gena Siemens, RN  Outcome: Ongoing     Problem: Mental Status - Impaired:  Goal: Mental status will be restored to baseline  Description: Mental status will be restored to baseline  12/11/2021 1620 by Daniel Rodriguez RN  Outcome: Ongoing  12/11/2021 0343 by Gena Siemens, RN  Outcome: Ongoing     Problem: Nutrition Deficit:  Goal: Ability to achieve adequate nutritional intake will improve  Description: Ability to achieve adequate nutritional intake will improve  12/11/2021 1620 by Daniel Rodriguez RN  Outcome: Ongoing  12/11/2021 0343 by Gena Siemens, RN  Outcome: Ongoing     Problem: OXYGENATION/RESPIRATORY FUNCTION  Goal: Patient will maintain patent airway  12/11/2021 1620 by Dutch Robles RN  Outcome: Ongoing  12/11/2021 0343 by Reyes Moore RN  Outcome: Ongoing  Goal: Patient will achieve/maintain normal respiratory rate/effort  Description: Respiratory rate and effort will be within normal limits for the patient  12/11/2021 1620 by Dutch Robles RN  Outcome: Ongoing  12/11/2021 0343 by Reyes Moore RN  Outcome: Ongoing     Problem: MECHANICAL VENTILATION  Goal: Patient will maintain patent airway  12/11/2021 1620 by Dutch Robles RN  Outcome: Ongoing  12/11/2021 0343 by Reyes Moore RN  Outcome: Ongoing  Goal: Oral health is maintained or improved  12/11/2021 1620 by Dutch Robles RN  Outcome: Ongoing  12/11/2021 0343 by Reyes Moore RN  Outcome: Ongoing

## 2021-12-11 NOTE — PLAN OF CARE
Problem: SAFETY  Goal: Free from accidental physical injury  12/11/2021 0343 by Robin Lay RN  Outcome: Ongoing  12/10/2021 1605 by Mary Martinez RN  Outcome: Ongoing  Goal: Free from intentional harm  12/11/2021 0343 by Robin Lay RN  Outcome: Ongoing  12/10/2021 1605 by Mary Martinez RN  Outcome: Ongoing     Problem: DAILY CARE  Goal: Daily care needs are met  12/11/2021 0343 by Robin Lay RN  Outcome: Ongoing  12/10/2021 1605 by Mary Martinez RN  Outcome: Ongoing     Problem: PAIN  Goal: Patient's pain/discomfort is manageable  12/11/2021 0343 by Robin Lay RN  Outcome: Ongoing  12/10/2021 1605 by Mary Martinez RN  Outcome: Ongoing     Problem: SKIN INTEGRITY  Goal: Skin integrity is maintained or improved  12/11/2021 0343 by Robin Lay RN  Outcome: Ongoing  12/10/2021 1605 by Mary Martinez RN  Outcome: Ongoing

## 2021-12-12 LAB
ABSOLUTE EOS #: 0.3 K/UL (ref 0–0.4)
ABSOLUTE IMMATURE GRANULOCYTE: ABNORMAL K/UL (ref 0–0.3)
ABSOLUTE LYMPH #: 1.1 K/UL (ref 1–4.8)
ABSOLUTE MONO #: 0.8 K/UL (ref 0.1–1.3)
ANION GAP SERPL CALCULATED.3IONS-SCNC: 12 MMOL/L (ref 9–17)
BASOPHILS # BLD: 1 % (ref 0–2)
BASOPHILS ABSOLUTE: 0 K/UL (ref 0–0.2)
BUN BLDV-MCNC: 45 MG/DL (ref 8–23)
BUN/CREAT BLD: ABNORMAL (ref 9–20)
CALCIUM SERPL-MCNC: 8.5 MG/DL (ref 8.6–10.4)
CHLORIDE BLD-SCNC: 96 MMOL/L (ref 98–107)
CO2: 26 MMOL/L (ref 20–31)
CREAT SERPL-MCNC: 3.44 MG/DL (ref 0.5–0.9)
DIFFERENTIAL TYPE: ABNORMAL
EOSINOPHILS RELATIVE PERCENT: 4 % (ref 0–4)
GFR AFRICAN AMERICAN: 16 ML/MIN
GFR NON-AFRICAN AMERICAN: 13 ML/MIN
GFR SERPL CREATININE-BSD FRML MDRD: ABNORMAL ML/MIN/{1.73_M2}
GFR SERPL CREATININE-BSD FRML MDRD: ABNORMAL ML/MIN/{1.73_M2}
GLUCOSE BLD-MCNC: 197 MG/DL (ref 65–105)
GLUCOSE BLD-MCNC: 202 MG/DL (ref 65–105)
GLUCOSE BLD-MCNC: 209 MG/DL (ref 70–99)
GLUCOSE BLD-MCNC: 217 MG/DL (ref 65–105)
GLUCOSE BLD-MCNC: 218 MG/DL (ref 65–105)
HCT VFR BLD CALC: 26.1 % (ref 36–46)
HEMOGLOBIN: 8.4 G/DL (ref 12–16)
IMMATURE GRANULOCYTES: ABNORMAL %
LYMPHOCYTES # BLD: 16 % (ref 24–44)
MAGNESIUM: 1.9 MG/DL (ref 1.6–2.6)
MCH RBC QN AUTO: 30.4 PG (ref 26–34)
MCHC RBC AUTO-ENTMCNC: 32.2 G/DL (ref 31–37)
MCV RBC AUTO: 94.6 FL (ref 80–100)
MONOCYTES # BLD: 11 % (ref 1–7)
NRBC AUTOMATED: ABNORMAL PER 100 WBC
PDW BLD-RTO: 17.4 % (ref 11.5–14.9)
PLATELET # BLD: 114 K/UL (ref 150–450)
PLATELET ESTIMATE: ABNORMAL
PMV BLD AUTO: 9.6 FL (ref 6–12)
POTASSIUM SERPL-SCNC: 3.7 MMOL/L (ref 3.7–5.3)
RBC # BLD: 2.77 M/UL (ref 4–5.2)
RBC # BLD: ABNORMAL 10*6/UL
SEG NEUTROPHILS: 68 % (ref 36–66)
SEGMENTED NEUTROPHILS ABSOLUTE COUNT: 4.7 K/UL (ref 1.3–9.1)
SODIUM BLD-SCNC: 134 MMOL/L (ref 135–144)
WBC # BLD: 6.9 K/UL (ref 3.5–11)
WBC # BLD: ABNORMAL 10*3/UL

## 2021-12-12 PROCEDURE — 6370000000 HC RX 637 (ALT 250 FOR IP): Performed by: SURGERY

## 2021-12-12 PROCEDURE — 99233 SBSQ HOSP IP/OBS HIGH 50: CPT | Performed by: INTERNAL MEDICINE

## 2021-12-12 PROCEDURE — 6360000002 HC RX W HCPCS: Performed by: SURGERY

## 2021-12-12 PROCEDURE — 94003 VENT MGMT INPAT SUBQ DAY: CPT

## 2021-12-12 PROCEDURE — C9113 INJ PANTOPRAZOLE SODIUM, VIA: HCPCS | Performed by: SURGERY

## 2021-12-12 PROCEDURE — 6360000002 HC RX W HCPCS: Performed by: INTERNAL MEDICINE

## 2021-12-12 PROCEDURE — 6370000000 HC RX 637 (ALT 250 FOR IP): Performed by: FAMILY MEDICINE

## 2021-12-12 PROCEDURE — 85025 COMPLETE CBC W/AUTO DIFF WBC: CPT

## 2021-12-12 PROCEDURE — 2580000003 HC RX 258: Performed by: INTERNAL MEDICINE

## 2021-12-12 PROCEDURE — 2000000000 HC ICU R&B

## 2021-12-12 PROCEDURE — 6370000000 HC RX 637 (ALT 250 FOR IP): Performed by: INTERNAL MEDICINE

## 2021-12-12 PROCEDURE — 36415 COLL VENOUS BLD VENIPUNCTURE: CPT

## 2021-12-12 PROCEDURE — 80048 BASIC METABOLIC PNL TOTAL CA: CPT

## 2021-12-12 PROCEDURE — 2580000003 HC RX 258: Performed by: SURGERY

## 2021-12-12 PROCEDURE — 83735 ASSAY OF MAGNESIUM: CPT

## 2021-12-12 PROCEDURE — 82947 ASSAY GLUCOSE BLOOD QUANT: CPT

## 2021-12-12 PROCEDURE — 92526 ORAL FUNCTION THERAPY: CPT

## 2021-12-12 RX ADMIN — AMIKACIN SULFATE 350 MG: 250 INJECTION, SOLUTION INTRAMUSCULAR; INTRAVENOUS at 21:06

## 2021-12-12 RX ADMIN — SODIUM CHLORIDE, PRESERVATIVE FREE 10 ML: 5 INJECTION INTRAVENOUS at 08:36

## 2021-12-12 RX ADMIN — FERROUS SULFATE TAB 325 MG (65 MG ELEMENTAL FE) 325 MG: 325 (65 FE) TAB at 08:35

## 2021-12-12 RX ADMIN — ATORVASTATIN CALCIUM 40 MG: 40 TABLET, FILM COATED ORAL at 08:35

## 2021-12-12 RX ADMIN — SODIUM CHLORIDE, PRESERVATIVE FREE 10 ML: 5 INJECTION INTRAVENOUS at 20:52

## 2021-12-12 RX ADMIN — PANTOPRAZOLE SODIUM 40 MG: 40 INJECTION, POWDER, FOR SOLUTION INTRAVENOUS at 20:56

## 2021-12-12 RX ADMIN — SODIUM CHLORIDE, PRESERVATIVE FREE 10 ML: 5 INJECTION INTRAVENOUS at 20:55

## 2021-12-12 RX ADMIN — CHLORHEXIDINE GLUCONATE 0.12% ORAL RINSE 15 ML: 1.2 LIQUID ORAL at 08:35

## 2021-12-12 RX ADMIN — ANTI-FUNGAL POWDER MICONAZOLE NITRATE TALC FREE: 1.42 POWDER TOPICAL at 08:36

## 2021-12-12 RX ADMIN — INSULIN GLARGINE 22 UNITS: 100 INJECTION, SOLUTION SUBCUTANEOUS at 21:03

## 2021-12-12 RX ADMIN — CHLORHEXIDINE GLUCONATE 0.12% ORAL RINSE 15 ML: 1.2 LIQUID ORAL at 20:52

## 2021-12-12 RX ADMIN — PANTOPRAZOLE SODIUM 40 MG: 40 INJECTION, POWDER, FOR SOLUTION INTRAVENOUS at 08:35

## 2021-12-12 RX ADMIN — NYSTATIN 500000 UNITS: 500000 SUSPENSION ORAL at 20:56

## 2021-12-12 RX ADMIN — INSULIN LISPRO 4 UNITS: 100 INJECTION, SOLUTION INTRAVENOUS; SUBCUTANEOUS at 08:35

## 2021-12-12 RX ADMIN — NYSTATIN 500000 UNITS: 500000 SUSPENSION ORAL at 08:35

## 2021-12-12 RX ADMIN — INSULIN LISPRO 4 UNITS: 100 INJECTION, SOLUTION INTRAVENOUS; SUBCUTANEOUS at 16:52

## 2021-12-12 RX ADMIN — ANTI-FUNGAL POWDER MICONAZOLE NITRATE TALC FREE: 1.42 POWDER TOPICAL at 20:55

## 2021-12-12 RX ADMIN — SERTRALINE HYDROCHLORIDE 25 MG: 50 TABLET ORAL at 08:35

## 2021-12-12 RX ADMIN — NYSTATIN 500000 UNITS: 500000 SUSPENSION ORAL at 12:53

## 2021-12-12 RX ADMIN — NYSTATIN 500000 UNITS: 500000 SUSPENSION ORAL at 16:54

## 2021-12-12 RX ADMIN — SODIUM CHLORIDE, PRESERVATIVE FREE 10 ML: 5 INJECTION INTRAVENOUS at 08:50

## 2021-12-12 RX ADMIN — INSULIN LISPRO 1 UNITS: 100 INJECTION, SOLUTION INTRAVENOUS; SUBCUTANEOUS at 21:04

## 2021-12-12 RX ADMIN — INSULIN LISPRO 4 UNITS: 100 INJECTION, SOLUTION INTRAVENOUS; SUBCUTANEOUS at 11:32

## 2021-12-12 ASSESSMENT — PAIN SCALES - WONG BAKER

## 2021-12-12 ASSESSMENT — PAIN SCALES - GENERAL
PAINLEVEL_OUTOF10: 0

## 2021-12-12 ASSESSMENT — ENCOUNTER SYMPTOMS
NAUSEA: 0
BLOOD IN STOOL: 0
EYE REDNESS: 0
COLOR CHANGE: 0
ABDOMINAL PAIN: 0
EYE ITCHING: 0
CHEST TIGHTNESS: 0
COUGH: 0
SHORTNESS OF BREATH: 0
VOMITING: 0
TROUBLE SWALLOWING: 0

## 2021-12-12 ASSESSMENT — PULMONARY FUNCTION TESTS
PIF_VALUE: 24
PIF_VALUE: 24
PIF_VALUE: 21
PIF_VALUE: 28
PIF_VALUE: 24
PIF_VALUE: 22

## 2021-12-12 NOTE — PROGRESS NOTES
11/16/2021  Nasal swab for MRSA was + 11/21/2021  The patient received a course of ceftriaxone 11/10/2021 through 11/15/2021 through 11/2721  She received IV cefepime 11/16/2021  Zyvox was started 11/21/2021 through 11/27/2021  Ceftriaxone was restarted 12/2/21 through 12/5/2021  Interval changes  12/12/2021   The patient unable to provide history, tracheostomy in place with purulent secretions , less surrounding erythema, no reported fever, tolerating tube feed. 12/10/21MRSA and multidrug resistant Acinetobacter growth at  tracheostomy site  PICC line was placed 12/9/2021     Patient Vitals for the past 8 hrs:   BP Temp Temp src Pulse Resp SpO2   12/12/21 1233 108/62 98.6 °F (37 °C) Axillary 102 18 96 %   12/12/21 1052 -- -- -- 97 12 97 %   12/12/21 0715 (!) 125/91 98.3 °F (36.8 °C) Oral 106 21 96 %           I have personally reviewed the past medical history, past surgical history, medications, social history, and family history, and I haveupdated the database accordingly. Allergies:   Patient has no known allergies. Review of Systems:     Review of Systems  Status post tracheostomy, nonverbal, unable to provide  Physical Examination :       Physical Exam  Constitutional:       General: She is not in acute distress. HENT:      Right Ear: External ear normal.      Left Ear: External ear normal.   Eyes:      General: No scleral icterus. Conjunctiva/sclera: Conjunctivae normal.   Neck:      Comments: Tracheostomy in place with yellow drainage, surrounding erythema, no fluctuation or crepitance  Cardiovascular:      Rate and Rhythm: Normal rate. Heart sounds: No murmur heard. Pulmonary:      Effort: No respiratory distress. Breath sounds: Normal breath sounds. No wheezing. Abdominal:      General: There is no distension. Palpations: Abdomen is soft. Tenderness: There is no abdominal tenderness. Musculoskeletal:      Cervical back: Neck supple. No rigidity.       Right lower leg: Edema present. Left lower leg: Edema present. Skin:     General: Skin is warm. Coloration: Skin is not jaundiced. Findings: No bruising. Comments: Right abdominal wall wound with no signs of infection   Neurological:      Mental Status: She is alert.          Past Medical History:     Past Medical History:   Diagnosis Date    Anemia, unspecified     CAD (coronary artery disease)     Chronic kidney disease     CKD (chronic kidney disease) stage 3, GFR 30-59 ml/min ([facility])     Closed fracture of dorsal (thoracic) vertebra without mention of spinal cord injury     Coronary atherosclerosis of unspecified type of vessel, native or graft     Depression with anxiety     Esophageal reflux     HTN (hypertension)     Hyperkalemia     Hyperlipidemia     Intervertebral cervical disc disorder with myelopathy, cervical region     Lower back pain     Lumbar radiculopathy     Microalbuminuria     Muscle weakness     Pain in limb     Stenosis of cervical spine     Tethered cord syndrome (HCC)     Type II or unspecified type diabetes mellitus without mention of complication, not stated as uncontrolled     Urinary calculus     UTI (lower urinary tract infection)        Past Surgical  History:     Past Surgical History:   Procedure Laterality Date    ANGIOPLASTY      COLON SURGERY      CYSTOSCOPY Right 11/16/2021    CYSTOSCOPY URETERAL STENT INSERTION performed by Shanel Miramontes MD at [address] IR NONTUNNELED VASCULAR CATHETER  11/17/2021    IR NONTUNNELED VASCULAR CATHETER 11/17/2021 [facility]ED[facility]    JOINT REPLACEMENT      TRACHEOSTOMY N/A 12/3/2021    TRACHEOTOMY performed by Mgagie Daly MD at [address] N/A 11/15/2021    EGD BIOPSY performed by Joelle Mckeon MD at [address] N/A 12/3/2021    EGD ESOPHAGOGASTRODUODENOSCOPY PEG TUBE INSERTION performed by Maggie Daly MD at [address] Medications:      cefepime  1,000 mg IntraVENous Q24H    vancomycin (VANCOCIN) intermittent dosing (placeholder)   Other RX Placeholder    [START ON 12/13/2021] vancomycin  1,000 mg IntraVENous Q MWF    epoetin tyshawn-epbx  10,000 Units SubCUTAneous Once per day on Mon Wed Fri    nystatin  5 mL Oral 4x Daily    miconazole   Topical BID    atorvastatin  40 mg Oral Daily    chlorhexidine  15 mL Mouth/Throat BID    [Held by provider] carvedilol  3.125 mg Oral BID WC    pantoprazole  40 mg IntraVENous BID    And    sodium chloride flush  10 mL IntraVENous BID    sodium polystyrene  15 g Oral Once    sertraline  25 mg Oral Daily    ferrous sulfate  325 mg Oral Daily with breakfast    [Held by provider] aspirin  81 mg Oral Daily    insulin glargine  22 Units SubCUTAneous Nightly    insulin lispro  0-12 Units SubCUTAneous TID WC    insulin lispro  0-6 Units SubCUTAneous Nightly    sodium chloride flush  5-40 mL IntraVENous 2 times per day       Social History:     Social History     Socioeconomic History    Marital status:      Spouse name: Not on file    Number of children: Not on file    Years of education: Not on file    Highest education level: Not on file   Occupational History    Not on file   Tobacco Use    Smoking status: Never Smoker    Smokeless tobacco: Not on file   Substance and Sexual Activity    Alcohol use: No     Alcohol/week: 0.0 standard drinks    Drug use: No    Sexual activity: Not on file   Other Topics Concern    Not on file   Social History Narrative    Not on file     Social Determinants of Health     Financial Resource Strain:     Difficulty of Paying Living Expenses: Not on file   Food Insecurity:     Worried About Running Out of Food in the Last Year: Not on file    Katya of Food in the Last Year: Not on file   Transportation Needs:     Lack of Transportation (Medical): Not on file    Lack of Transportation (Non-Medical):  Not on file   Physical Activity:     Days of Exercise per Week: Not on file    Minutes of Exercise per Session: Not on file   Stress:     Feeling of Stress : Not on file   Social Connections:     Frequency of Communication with Friends and Family: Not on file    Frequency of Social Gatherings with Friends and Family: Not on file    Attends Faith Services: Not on file    Active Member of [address] [place] [address] [employer] or Organizations: Not on file    Attends Club or Organization Meetings: Not on file    Marital Status: Not on file   Intimate Partner Violence:     Fear of Current or Ex-Partner: Not on file    Emotionally Abused: Not on file    Physically Abused: Not on file    Sexually Abused: Not on file   Housing Stability:     Unable to Pay for Housing in the Last Year: Not on file    Number of [place] in the Last Year: Not on file    Unstable Housing in the Last Year: Not on file       Family History:     Family History   Problem Relation Age of Onset    High Blood Pressure Other       Medical Decision Making:   I have independently reviewed/ordered the following labs:    CBC with Differential:   Recent Labs     12/11/21  0509 12/11/21  0509 12/11/21  1414 12/12/21  0501   WBC 7.7  --   --  6.9   HGB 8.0*   < > 8.6* 8.4*   HCT 24.8*   < > 26.2* 26.1*   PLT 81*  --   --  114*   LYMPHOPCT 17*  --   --  16*   MONOPCT 12*  --   --  11*    < > = values in this interval not displayed. BMP:  Recent Labs     12/11/21  0509 12/12/21  0501    134*   K 3.5* 3.7   CL 97* 96*   CO2 26 26   BUN 35* 45*   CREATININE 2.99* 3.44*   MG 1.9 1.9     Hepatic Function Panel:   No results for input(s): PROT, LABALBU, BILIDIR, IBILI, BILITOT, ALKPHOS, ALT, AST in the last 72 hours. No results for input(s): RPR in the last 72 hours. No results for input(s): HIV in the last 72 hours. No results for input(s): BC in the last 72 hours.   Lab Results   Component Value Date    CREATININE 3.44 12/12/2021    GLUCOSE 209 12/12/2021    GLUCOSE 77 08/31/2016 Detailed results: Thank you for allowing us to participate in the care of this patient. Please call with questions. This note is created with the assistance of a speech recognition program.  While intending to generate adocument that actually reflects the content of the visit, the document can still have some errors including those of syntax and sound a like substitutions which may escape proof reading. It such instances, actual meaningcan be extrapolated by contextual diversion.     Penelope Trammell MD  Office: [phone]  Perfect serve / office [phone]

## 2021-12-12 NOTE — PROGRESS NOTES
Patient was seen and examined. Afebrile vital signs are stable. Tolerating tube feeds at goal.  Lungs decreased air entry at bases. Bethanie Oddi site is clean. Abdomen is soft. PEG site is clean. Continue local care. Tube feeds at goal.  No acute general surgical issues. Discharge planning.

## 2021-12-12 NOTE — PROGRESS NOTES
Speech Language Pathology  Speech Language Pathology  [facility]    Dysphagia Treatment Note    Date: 12/12/2021  Patients Name: Smitha Cavazos  MRN: [id number]  Diagnosis: Dysphagia  Patient Active Problem List   Diagnosis Code    Chronic kidney disease (CKD), stage III (moderate) ([facility] [address] N18.30    Hyperkalemia E87.5    Type 2 diabetes mellitus with kidney complication, with long-term current use of insulin ([facility]) E11.29, Z79.4    Hyperlipidemia E78.5    CKD (chronic kidney disease) stage 3, GFR 30-59 ml/min ([facility]) N18.30    Microalbuminuria R80.9    Urinary calculus N20.9    Tethered cord syndrome ([facility]) Q06.8    Stenosis of cervical spine M48.02    Lumbar radiculopathy M54.16    Lower back pain M54.50    HTN (hypertension) I10    Esophageal reflux K21.9    Depression with anxiety F41.8    ASCVD (arteriosclerotic cardiovascular disease) I25.10    Closed fracture of thoracic vertebra ([facility] [address] S22.009A    Intervertebral cervical disc disorder with myelopathy, cervical region M50.00    Pain in limb M79.609    Anemia D64.9    JAYLYN (acute kidney injury) ([facility] [address] N17.9    Iron deficiency anemia D50.9    Hydronephrosis determined by ultrasound N13.30    Longstanding persistent atrial fibrillation ([facility]) I48.11    Acute metabolic encephalopathy J75.20    Acute respiratory failure ([facility]) J96.00    Acute cystitis without hematuria N30.00    Acute respiratory failure with hypoxia and hypercapnia ([facility]) J96.01, J96.02    Dilated cardiomyopathy ([facility]) I42.0       Pain: nodding head (yes)    Dysphagia Treatment  Treatment time: 0026-8700    Subjective: [x] Alert [x] Cooperative     [] Confused     [] Agitated    [] Lethargic    Objective/Assessment:    Pt. seen for dysphagia therapy. Per RN, discussed rationale for ST and NPO status with Pt. earlier today. SLP continued Pt. education re: results and recommendations of 12/09 MBS, NPO status, and rationale for ST.  Pt. agreeable to dysphagia therapy. Pt. able to complete BOT strengthening exercises x5 sets in reps of 10 c mod cues (reduced lingual ROM noted during elevation and lateralization). Pt. noted to tire easily with exercises, frequent breaks incorporated in session. Pt. able to complete volitional swallow x7 c max cues (wincing during swallows, reporting painful). Oral swab used to increase moisture in oral cavity. Pt. Initially sucking water from swab. Education provided re: risk for aspiration. Pt. verbalized understanding and allowed writer to swab mouth without sucking liquid. SLP encouraged Pt. to complete exercises throughout day. Continue with NPO recommendation. Plan:  [x] Continue ST services    [] Discharge from ST:        Discharge recommendations: [] Inpatient Rehab   [] [place]   [] Outpatient Therapy  [] Follow up at trauma clinic   [] Other:         Treatment completed by:  Leah Medrano M.A., CCC-SLP

## 2021-12-12 NOTE — PROGRESS NOTES
Nephrology Progress Note    Interval history:   Patient was seen and examined today in the intensive care unit, no acute events overnight. Patient had dialysis on Friday  Tracheal secretions positive for staph aureus and gram-negative rods, ID following   she also underwent right thoracentesis on Thursday with removal of 400 mL of pleural fluid. She is s/p trach and PEG on 12/03/2021 and tunneled dialysis catheter placement 12/7/2021. Patient is on tube feeds  Antibiotics adjusted by ID    History of present illness:  66y.o. year old female who we are seeing in consultation for JAYLYN. Patient has a past medical history of Type 2 diabetes mellitus, coronary artery disease, lumbar radiculopathy, Hyperlipidemia and chronic kidney disease stage III [baseline serum creatinine 1.5 mg/dL], who presented from [facility] for further evaluation of acute kidney injury. She was sent to [place] after developing nonhealing wound s/p cardiac catheterization via right refill Levophed has been discontinued approach with right groin wound. She had been on Bumex in the ECF and creatinine and BUN have been rising and Bumex was decreased to a lowest dose of 0.5 mg p.o. twice daily. Patient was found to have JAYLYN likely secondary to ischemic acute tubular necrosis and obstructive nephropathy secondary to bilateral hydronephrosis. Patient received acute dialysis on 12/04. Will be started on hemodialysis MWF schedule.      Objective/     Vitals:    12/12/21 0655 12/12/21 0715 12/12/21 1052 12/12/21 1233   BP:  (!) 125/91  108/62   Pulse: 96 106 97 102   Resp: 20 21 12 18   Temp:  98.3 °F (36.8 °C)  98.6 °F (37 °C)   TempSrc:  Oral  Axillary   SpO2: 95% 96% 97% 96%   Weight:       Height:         24HR INTAKE/OUTPUT:      Intake/Output Summary (Last 24 hours) at 12/12/2021 1543  Last data filed at 12/12/2021 0400  Gross per 24 hour   Intake 512 ml   Output 250 ml   Net 262 ml     Patient Vitals for the past 96 hrs (Last 3 readings):   Weight   12/10/21 1400 199 lb 11.8 oz (90.6 kg)   12/10/21 1000 206 lb 5.6 oz (93.6 kg)   12/08/21 1638 (!) 3.5 oz (0.1 kg)     Constitutional:  On ventilatory support via tracheostomy. Awake and alert. Cardiovascular:  S1, S2 without m/r/g  Respiratory: Bilateral chest rise, tracheostomy on ventilator  Abdomen: Soft nontender  Ext: +2 pitting edema bilaterally lower extremities. Data/  Recent Labs     12/10/21  0331 12/10/21  1330 12/11/21  0509 12/11/21  1414 12/12/21  0501   WBC 8.0  --  7.7  --  6.9   HGB 7.9*   < > 8.0* 8.6* 8.4*   HCT 24.5*   < > 24.8* 26.2* 26.1*   MCV 93.3  --  94.4  --  94.6   *  --  81*  --  114*    < > = values in this interval not displayed.      Recent Labs     12/10/21  0331 12/11/21  0509 12/12/21  0501   * 135 134*   K 4.1 3.5* 3.7   CL 96* 97* 96*   CO2 25 26 26   GLUCOSE 215* 231* 209*   MG 1.8 1.9 1.9   BUN 52* 35* 45*   CREATININE 3.91* 2.99* 3.44*   LABGLOM 11* 15* 13*   GFRAA 14* 19* 16*     DATA:    CBC with Differential:    Lab Results   Component Value Date    WBC 6.9 12/12/2021    RBC 2.77 12/12/2021    RBC 4.14 08/31/2016    HGB 8.4 12/12/2021    HCT 26.1 12/12/2021     12/12/2021    MCV 94.6 12/12/2021    MCH 30.4 12/12/2021    MCHC 32.2 12/12/2021    RDW 17.4 12/12/2021    NRBC 4 11/22/2021    METASPCT 3 12/02/2021    LYMPHOPCT 16 12/12/2021    LYMPHOPCT 32.8 08/31/2016    MONOPCT 11 12/12/2021    EOSPCT 4.3 08/31/2016    BASOPCT 1 12/12/2021    BASOPCT 0.7 08/31/2016    MONOSABS 0.80 12/12/2021    LYMPHSABS 1.10 12/12/2021    EOSABS 0.30 12/12/2021    BASOSABS 0.00 12/12/2021    DIFFTYPE NOT REPORTED 12/12/2021     BMP:    Lab Results   Component Value Date     12/12/2021    K 3.7 12/12/2021    CL 96 12/12/2021    CO2 26 12/12/2021    BUN 45 12/12/2021    LABALBU 2.6 11/18/2021    CREATININE 3.44 12/12/2021    CALCIUM 8.5 12/12/2021    GFRAA 16 12/12/2021    LABGLOM 13 12/12/2021    GLUCOSE 209 12/12/2021    GLUCOSE 77 08/31/2016     Sodium:    Lab Results   Component Value Date     12/12/2021     Potassium:    Lab Results   Component Value Date    K 3.7 12/12/2021     Assessment/Plan:     1. Acute Kidney Injury - secondary to ischemic acute tubular necrosis and obstructive nephropathy secondary to bilateral hydronephrosis. She remains dialysis dependent and is on a MWF schedule. We will continue to monitor GFR and urine output closely for evidence of renal function recovery. 2. Heart failure with reduced ejection fraction [HFrEF with LVEF 30 to 35% 11/10/2021] - We will continue ultrafiltration with dialysis as tolerated. She is s/p right thoracentesis 12/9/2021.    3.    Normocytic anemia - partly secondary to chronic kidney disease. Hemoglobin is improved  today. Continue Retacrit 10,000 units 3 times a week. 4.  Bilateral hydronephrosis - status post cystoscopy with right ureteral  stent placement.     5. Systemic hypertension - blood pressure control is adequate. 6.  Low-grade fever -cultures positive for tracheal aspirate on IV antibiotics    Plan  Continue hemodialysis Monday Wednesday Friday Next dialysis Monday  Antibiotics adjusted by ID  Considering IV amikacin and Tygacil    Prognosis is guarded.     Félix Sky MD    Attending Clinical Nephrologist

## 2021-12-12 NOTE — PROGRESS NOTES
Progress Note    12/12/2021   2:21 PM    Name:  Seth Ibrahim  MRN:    [id number]     Acct:     [de-identified]   Room:  2014/2014-01  IP Day: 28     Admit Date: 11/10/2021  4:23 PM  PCP: Jarret Holbrook DO    Subjective:     C/C:   Chief Complaint   Patient presents with    Abnormal Lab     low hemoglobin and high CR       Interval History: Status: not changed. Patient is alert s/p trach and PEG on ventilator afebrile overnight. Purulent secretions surrounding the trach site. Vital signs reviewed and stable. Hemoglobin 8.4    ROS:   all 10 systems reviewed and are negative except as noted    Review of Systems   Constitutional: Negative for chills and fatigue. HENT: Negative for drooling, mouth sores, sneezing and trouble swallowing. Eyes: Negative for redness and itching. Respiratory: Negative for cough, chest tightness and shortness of breath. Cardiovascular: Negative for chest pain, palpitations and leg swelling. Gastrointestinal: Negative for abdominal pain, blood in stool, nausea and vomiting. Endocrine: Negative for heat intolerance and polyphagia. Genitourinary: Negative for difficulty urinating, flank pain and pelvic pain. Musculoskeletal: Negative for arthralgias, joint swelling and neck stiffness. Skin: Negative for color change and pallor. Allergic/Immunologic: Negative for food allergies. Neurological: Negative for dizziness, seizures and headaches. Hematological: Does not bruise/bleed easily. Psychiatric/Behavioral: Negative for agitation, behavioral problems and suicidal ideas. The patient is not hyperactive. Medications:      Allergies: No Known Allergies    Current Meds: cefepime (MAXIPIME) 1000 mg IVPB minibag, Q24H  vancomycin (VANCOCIN) intermittent dosing (placeholder), RX Placeholder  [START ON 12/13/2021] vancomycin 1000 mg IVPB in 250 mL D5W addavial, Q MWF  epoetin tyshawn-epbx (RETACRIT) injection 10,000 Units, Once per day on Mon Wed Fri  acetaminophen (TYLENOL) tablet 650 mg, Q4H PRN  0.9 % sodium chloride infusion, PRN  sodium citrate 4 % injection 1.9 mL, PRN   And  sodium citrate 4 % injection 1.9 mL, PRN  nystatin (MYCOSTATIN) 754549 UNIT/ML suspension 500,000 Units, 4x Daily  miconazole (MICOTIN) 2 % powder, BID  metoprolol (LOPRESSOR) injection 5 mg, Q6H PRN  polyethylene glycol (GLYCOLAX) packet 17 g, Daily PRN  atorvastatin (LIPITOR) tablet 40 mg, Daily  0.9 % sodium chloride infusion, PRN  midodrine (PROAMATINE) tablet 10 mg, TID PRN  albumin human 25 % IV solution 25 g, PRN  midodrine (PROAMATINE) tablet 5 mg, PRN  chlorhexidine (PERIDEX) 0.12 % solution 15 mL, BID  fentaNYL (SUBLIMAZE) injection 50 mcg, Q30 Min PRN  propofol injection, Continuous  [Held by provider] carvedilol (COREG) tablet 3.125 mg, BID WC  pantoprazole (PROTONIX) injection 40 mg, BID   And  sodium chloride flush 0.9 % injection 10 mL, BID  magnesium sulfate 1000 mg in dextrose 5% 100 mL IVPB, PRN  glucose (GLUTOSE) 40 % oral gel 15 g, PRN  dextrose 50 % IV solution, PRN  glucagon (rDNA) injection 1 mg, PRN  dextrose 5 % solution, PRN  sodium polystyrene (KAYEXALATE) 15 GM/60ML suspension 15 g, Once  sertraline (ZOLOFT) tablet 25 mg, Daily  ferrous sulfate (IRON 325) tablet 325 mg, Daily with breakfast  [Held by provider] aspirin chewable tablet 81 mg, Daily  insulin glargine (LANTUS) injection vial 22 Units, Nightly  insulin lispro (HUMALOG) injection vial 0-12 Units, TID WC  insulin lispro (HUMALOG) injection vial 0-6 Units, Nightly  sodium chloride flush 0.9 % injection 5-40 mL, 2 times per day  sodium chloride flush 0.9 % injection 5-40 mL, PRN  0.9 % sodium chloride infusion, PRN  ondansetron (ZOFRAN-ODT) disintegrating tablet 4 mg, Q8H PRN   Or  ondansetron (ZOFRAN) injection 4 mg, Q6H PRN  oxyCODONE-acetaminophen (PERCOCET) 5-325 MG per tablet 1 tablet, Q6H PRN        Data:     Code Status:  DNR-CCA    Family History   Problem Relation Age of Onset    High Blood Pressure Other        Social History     Socioeconomic History    Marital status:      Spouse name: Not on file    Number of children: Not on file    Years of education: Not on file    Highest education level: Not on file   Occupational History    Not on file   Tobacco Use    Smoking status: Never Smoker    Smokeless tobacco: Not on file   Substance and Sexual Activity    Alcohol use: No     Alcohol/week: 0.0 standard drinks    Drug use: No    Sexual activity: Not on file   Other Topics Concern    Not on file   Social History Narrative    Not on file     Social Determinants of Health     Financial Resource Strain:     Difficulty of Paying Living Expenses: Not on file   Food Insecurity:     Worried About Running Out of Food in the Last Year: Not on file    Katya of Food in the Last Year: Not on file   Transportation Needs:     Lack of Transportation (Medical): Not on file    Lack of Transportation (Non-Medical): Not on file   Physical Activity:     Days of Exercise per Week: Not on file    Minutes of Exercise per Session: Not on file   Stress:     Feeling of Stress : Not on file   Social Connections:     Frequency of Communication with Friends and Family: Not on file    Frequency of Social Gatherings with Friends and Family: Not on file    Attends [employer] Services: Not on file    Active Member of [address] [place] [address] [employer] or Organizations: Not on file    Attends Club or Organization Meetings: Not on file    Marital Status: Not on file   Intimate Partner Violence:     Fear of Current or Ex-Partner: Not on file    Emotionally Abused: Not on file    Physically Abused: Not on file    Sexually Abused: Not on file   Housing Stability:     Unable to Pay for Housing in the Last Year: Not on file    Number of [place] in the Last Year: Not on file    Unstable Housing in the Last Year: Not on file       I/O (24Hr):     Intake/Output Summary (Last 24 hours) at 12/12/2021 1421  Last data filed at 12/12/2021 0400  Gross per 24 hour   Intake 512 ml   Output 250 ml   Net 262 ml     Radiology:  CT SOFT TISSUE NECK WO CONTRAST    Result Date: 12/11/2021  Trach tube is noted above. No abscess noted. Small right effusion. IR FLUORO GUIDED CVA DEVICE PLMT/REPLACE/REMOVAL    Result Date: 12/9/2021  Successful fluoroscopy guided placement right upper extremity PICC, with new dual-lumen tip position in the central circulation and satisfactory. PICC is ready for use at this time. IR FLUORO GUIDED CVA DEVICE PLMT/REPLACE/REMOVAL    Result Date: 12/7/2021  Successful fluoroscopy guided conversion non tunneled to tunneled hemodialysis catheter placement, as above. Catheter is ready for dialysis use at this time. XR CHEST PORTABLE    Result Date: 12/11/2021  Tracheostomy tube and right-sided PermCath/PICC stable/satisfactory. Decreased right pleural effusion and likely improved mild central vascular congestion. Residual findings, as above. No pneumothorax. XR CHEST PORTABLE    Result Date: 12/9/2021  Cardiomegaly and moderate right pleural effusion with right passive atelectasis or pneumonia. Support tubes and catheters are stable. XR CHEST PORTABLE    Result Date: 12/7/2021  No significant interval change. FL MODIFIED BARIUM SWALLOW W VIDEO    Result Date: 12/9/2021  Aspiration of pudding consistency and delayed cough, as described. Please see separate speech pathology report for full discussion of findings and recommendations. IR GUIDED THORACENTESIS PLEURAL    Result Date: 12/9/2021  Successful ultrasound guided therapeutic thoracentesis.        Labs:  Recent Results (from the past 24 hour(s))   POC Glucose Fingerstick    Collection Time: 12/11/21  5:57 PM   Result Value Ref Range    POC Glucose 214 (H) 65 - 105 mg/dL   POC Glucose Fingerstick    Collection Time: 12/11/21  7:50 PM   Result Value Ref Range    POC Glucose 208 (H) 65 - 105 mg/dL   CBC with DIFF    Collection Time: 12/12/21  5:01 AM Result Value Ref Range    WBC 6.9 3.5 - 11.0 k/uL    RBC 2.77 (L) 4.0 - 5.2 m/uL    Hemoglobin 8.4 (L) 12.0 - 16.0 g/dL    Hematocrit 26.1 (L) 36 - 46 %    MCV 94.6 80 - 100 fL    MCH 30.4 26 - 34 pg    MCHC 32.2 31 - 37 g/dL    RDW 17.4 (H) 11.5 - 14.9 %    Platelets 207 (L) 356 - 450 k/uL    MPV 9.6 6.0 - 12.0 fL    NRBC Automated NOT REPORTED per 100 WBC    Differential Type NOT REPORTED     Seg Neutrophils 68 (H) 36 - 66 %    Lymphocytes 16 (L) 24 - 44 %    Monocytes 11 (H) 1 - 7 %    Eosinophils % 4 0 - 4 %    Basophils 1 0 - 2 %    Immature Granulocytes NOT REPORTED 0 %    Segs Absolute 4.70 1.3 - 9.1 k/uL    Absolute Lymph # 1.10 1.0 - 4.8 k/uL    Absolute Mono # 0.80 0.1 - 1.3 k/uL    Absolute Eos # 0.30 0.0 - 0.4 k/uL    Basophils Absolute 0.00 0.0 - 0.2 k/uL    Absolute Immature Granulocyte NOT REPORTED 0.00 - 0.30 k/uL    WBC Morphology NOT REPORTED     RBC Morphology NOT REPORTED     Platelet Estimate NOT REPORTED    Magnesium    Collection Time: 12/12/21  5:01 AM   Result Value Ref Range    Magnesium 1.9 1.6 - 2.6 mg/dL   Basic Metabolic Panel w/ Reflex to MG    Collection Time: 12/12/21  5:01 AM   Result Value Ref Range    Glucose 209 (H) 70 - 99 mg/dL    BUN 45 (H) 8 - 23 mg/dL    CREATININE 3.44 (H) 0.50 - 0.90 mg/dL    Bun/Cre Ratio NOT REPORTED 9 - 20    Calcium 8.5 (L) 8.6 - 10.4 mg/dL    Sodium 134 (L) 135 - 144 mmol/L    Potassium 3.7 3.7 - 5.3 mmol/L    Chloride 96 (L) 98 - 107 mmol/L    CO2 26 20 - 31 mmol/L    Anion Gap 12 9 - 17 mmol/L    GFR Non-African American 13 (L) >60 mL/min    GFR  16 (L) >60 mL/min    GFR Comment          GFR Staging NOT REPORTED    POC Glucose Fingerstick    Collection Time: 12/12/21  8:04 AM   Result Value Ref Range    POC Glucose 218 (H) 65 - 105 mg/dL   POC Glucose Fingerstick    Collection Time: 12/12/21 11:21 AM   Result Value Ref Range    POC Glucose 202 (H) 65 - 105 mg/dL       Physical Examination:        Vitals:  /62   Pulse 102 Temp 98.6 °F (37 °C) (Axillary)   Resp 18   Ht 5' 2.99\" (1.6 m)   Wt 199 lb 11.8 oz (90.6 kg)   SpO2 96%   BMI 35.39 kg/m²   Temp (24hrs), Av.3 °F (36.8 °C), Min:97.8 °F (36.6 °C), Max:99.1 °F (37.3 °C)    Recent Labs     21  1757 21  1950 21  0804 21  1121   POCGLU 214* 208* 218* 202*         Physical Exam  Vitals reviewed. HENT:      Head: Normocephalic. Right Ear: External ear normal.      Left Ear: External ear normal.      Nose: Nose normal.      Mouth/Throat:      Mouth: Mucous membranes are moist.      Pharynx: Oropharynx is clear. Eyes:      Conjunctiva/sclera: Conjunctivae normal.   Cardiovascular:      Rate and Rhythm: Normal rate and regular rhythm. Pulses: Normal pulses. Heart sounds: Normal heart sounds. Pulmonary:      Effort: Pulmonary effort is normal.      Breath sounds: Normal breath sounds. Comments: Trach in place with purulent secretions surrounding the trach site. Abdominal:      General: Bowel sounds are normal.      Palpations: Abdomen is soft. Comments: PEG tube in place with no surrounding erythema   Musculoskeletal:         General: No deformity. Cervical back: Normal range of motion and neck supple. Right lower leg: No edema. Left lower leg: No edema. Skin:     General: Skin is warm. Capillary Refill: Capillary refill takes less than 2 seconds. Coloration: Skin is not jaundiced. Neurological:      General: No focal deficit present. Mental Status: She is alert. Mental status is at baseline.    Psychiatric:         Mood and Affect: Mood normal.         Behavior: Behavior normal.         Assessment:        Primary Problem  JAYLYN (acute kidney injury) ([facility] [address]     Principal Problem:    JAYLYN (acute kidney injury) ([facility] [address]  Active Problems:    Type 2 diabetes mellitus with kidney complication, with long-term current use of insulin (HCC)    CKD (chronic kidney disease) stage 3, GFR 30-59 ml/min ([facility])    HTN (hypertension)    ASCVD (arteriosclerotic cardiovascular disease)    Iron deficiency anemia    Hydronephrosis determined by ultrasound    Longstanding persistent atrial fibrillation (HCC)    Acute metabolic encephalopathy    Acute cystitis without hematuria    Acute respiratory failure with hypoxia and hypercapnia ([facility])    Dilated cardiomyopathy ([facility] [address]  Resolved Problems:    * No resolved hospital problems. *      Past Medical History:   Diagnosis Date    Anemia, unspecified     CAD (coronary artery disease)     Chronic kidney disease     CKD (chronic kidney disease) stage 3, GFR 30-59 ml/min ([facility])     Closed fracture of dorsal (thoracic) vertebra without mention of spinal cord injury     Coronary atherosclerosis of unspecified type of vessel, native or graft     Depression with anxiety     Esophageal reflux     HTN (hypertension)     Hyperkalemia     Hyperlipidemia     Intervertebral cervical disc disorder with myelopathy, cervical region     Lower back pain     Lumbar radiculopathy     Microalbuminuria     Muscle weakness     Pain in limb     Stenosis of cervical spine     Tethered cord syndrome ([facility])     Type II or unspecified type diabetes mellitus without mention of complication, not stated as uncontrolled     Urinary calculus     UTI (lower urinary tract infection)         Plan:        1. IV vancomycin and cefepime for tracheostomy site infection. Will follow ID recommendation for IV antibiotic  2.  Wound culture tracheostomy site positive for Acinetobacter baumanni and MRSA  3. blood culture no growth for 2 days  4. CT soft tissue neck report is negative for abscess  5.   pharmacy to dose vancomycin  6. CT soft tissue neck to rule out abscess at trach site  7. LTAC discharge planning in progress  8. CMP, CBC  9. Continue tube feed  10.  discussed with nurse sister and hasband at bedside  11.  nystatin statin powder twice daily for skin fold for candidal intertrigo  12.  Lopressor IV 5 mg every 6 hours as needed for heart rate above 115. 13. Midodrine 5 mg p.o. 3 times a day as needed for systolic blood pressure less than 90. 14. DVT Prophylaxis no pharmacological DVT prophylaxis due to low hemoglobin and thrombocytopenia  15. EPCs  16. PT/OT to evaluate and treat  17. Pain control  18. Replace electrolytes as per sliding scale  19. Home medications reviewed and appropriate medications continued  20.  Reviewed labs and imaging studies from last 24 hours and results explained to patient      Electronically signed by Landon Muller MD

## 2021-12-12 NOTE — PLAN OF CARE
Problem: SAFETY  Goal: Free from accidental physical injury  12/12/2021 1727 by Carlie Alcantara RN  Outcome: Ongoing  12/12/2021 0624 by Brenda Becerra RN  Outcome: Ongoing  Goal: Free from intentional harm  12/12/2021 1727 by Carlie Alcantara RN  Outcome: Ongoing  12/12/2021 0624 by Brenda Becerra RN  Outcome: Ongoing     Problem: DAILY CARE  Goal: Daily care needs are met  12/12/2021 1727 by Carlie Alcantara RN  Outcome: Ongoing  12/12/2021 0624 by Brenda Becerra RN  Outcome: Ongoing     Problem: PAIN  Goal: Patient's pain/discomfort is manageable  12/12/2021 1727 by Carlie Alcantara RN  Outcome: Ongoing  12/12/2021 0624 by Brenda Becerra RN  Outcome: Ongoing     Problem: SKIN INTEGRITY  Goal: Skin integrity is maintained or improved  12/12/2021 1727 by Carlie Alcantara RN  Outcome: Ongoing  12/12/2021 0624 by Brenda Becerra RN  Outcome: Ongoing     Problem: KNOWLEDGE DEFICIT  Goal: Patient/S.O. demonstrates understanding of disease process, treatment plan, medications, and discharge instructions.   12/12/2021 1727 by Carlie Alcantara RN  Outcome: Ongoing  12/12/2021 0624 by Brenda Becerra RN  Outcome: Ongoing     Problem: DISCHARGE BARRIERS  Goal: Patient's continuum of care needs are met  12/12/2021 1727 by Carlie Alcantara RN  Outcome: Ongoing  12/12/2021 0624 by Brenda Becerra RN  Outcome: Ongoing     Problem: Falls - Risk of:  Goal: Will remain free from falls  Description: Will remain free from falls  12/12/2021 1727 by Carlie Alcnatara RN  Outcome: Ongoing  12/12/2021 0624 by Brenda Becerra RN  Outcome: Ongoing  Goal: Absence of physical injury  Description: Absence of physical injury  12/12/2021 1727 by Carlie Alcantara RN  Outcome: Ongoing  12/12/2021 0624 by Brenda Becerra RN  Outcome: Ongoing     Problem: Skin Integrity:  Goal: Will show no infection signs and symptoms  Description: Will show no infection signs and symptoms  12/12/2021 1727 by Carlie Alcantara RN  Outcome: Ongoing  12/12/2021 0624 by Erum Tapia RN  Outcome: Ongoing  Goal: Absence of new skin breakdown  Description: Absence of new skin breakdown  12/12/2021 1727 by Antoinette Perez RN  Outcome: Ongoing  12/12/2021 0624 by Erum Tapia RN  Outcome: Ongoing     Problem: Musculor/Skeletal Functional Status  Goal: Highest potential functional level  12/12/2021 1727 by Antoinette Perez RN  Outcome: Ongoing  12/12/2021 0624 by Erum Tapia RN  Outcome: Ongoing  Goal: Absence of falls  12/12/2021 1727 by Antoinette Perez RN  Outcome: Ongoing  12/12/2021 0624 by Erum Tapia RN  Outcome: Ongoing     Problem: Musculor/Skeletal Functional Status  Goal: Highest potential functional level  12/12/2021 1727 by Antoinette Perez RN  Outcome: Ongoing  12/12/2021 0624 by Erum Tapia RN  Outcome: Ongoing  Goal: Absence of falls  12/12/2021 1727 by Antoinette Perez RN  Outcome: Ongoing  12/12/2021 0624 by Erum Tapia RN  Outcome: Ongoing     Problem: Nutrition  Goal: Optimal nutrition therapy  12/12/2021 1727 by Antoinette Perez RN  Outcome: Ongoing  12/12/2021 0624 by Erum Tapia RN  Outcome: Ongoing     Problem: Pain:  Goal: Pain level will decrease  Description: Pain level will decrease  12/12/2021 1727 by Antoinette Perez RN  Outcome: Ongoing  12/12/2021 0624 by Erum Tapia RN  Outcome: Ongoing  Goal: Control of acute pain  Description: Control of acute pain  12/12/2021 1727 by Antoinette Perez RN  Outcome: Ongoing  12/12/2021 0624 by Erum Tapia RN  Outcome: Ongoing  Goal: Control of chronic pain  Description: Control of chronic pain  12/12/2021 1727 by Antoinette Perez RN  Outcome: Ongoing  12/12/2021 0624 by Erum Tapia RN  Outcome: Ongoing     Problem: Gas Exchange - Impaired:  Goal: Levels of oxygenation will improve  Description: Levels of oxygenation will improve  12/12/2021 1727 by Antoinette [place], RN  Outcome: Ongoing  12/12/2021 0624 by Alysia Momin Courtney Motta RN  Outcome: Ongoing     Problem: Fluid Volume - Imbalance:  Goal: Absence of imbalanced fluid volume signs and symptoms  Description: Absence of imbalanced fluid volume signs and symptoms  12/12/2021 1727 by Mary Martinez RN  Outcome: Ongoing  12/12/2021 0624 by Robin Lay RN  Outcome: Ongoing     Problem: Airway Clearance - Ineffective:  Goal: Ability to maintain a clear airway will improve  Description: Ability to maintain a clear airway will improve  12/12/2021 1727 by Mary Martinez RN  Outcome: Ongoing  12/12/2021 0624 by Robin Lay RN  Outcome: Ongoing     Problem:  Bowel Function - Altered:  Goal: Bowel elimination is within specified parameters  Description: Bowel elimination is within specified parameters  12/12/2021 1727 by Mary Martinez RN  Outcome: Ongoing  12/12/2021 0624 by Robin Lay RN  Outcome: Ongoing     Problem: Fluid Volume - Imbalance:  Goal: Absence of imbalanced fluid volume signs and symptoms  Description: Absence of imbalanced fluid volume signs and symptoms  12/12/2021 1727 by Mary Martinez RN  Outcome: Ongoing  12/12/2021 0624 by Robin Lay RN  Outcome: Ongoing     Problem: Gas Exchange - Impaired:  Goal: Levels of oxygenation will improve  Description: Levels of oxygenation will improve  12/12/2021 1727 by Mary Martinez RN  Outcome: Ongoing  12/12/2021 0624 by Robin Lay RN  Outcome: Ongoing     Problem: Mental Status - Impaired:  Goal: Mental status will be restored to baseline  Description: Mental status will be restored to baseline  12/12/2021 1727 by Mary Martinez RN  Outcome: Ongoing  12/12/2021 0624 by Robin Lay RN  Outcome: Ongoing     Problem: Nutrition Deficit:  Goal: Ability to achieve adequate nutritional intake will improve  Description: Ability to achieve adequate nutritional intake will improve  12/12/2021 1727 by Mary Martinez RN  Outcome: Ongoing  12/12/2021 0624 by Robin Lay

## 2021-12-12 NOTE — PROGRESS NOTES
ICU Progress Note (Vent)   [facility]    Patient - Jj Prajapati,  Age - 66 y.o.    - 1950      Room Number -    N -  [id number]   Acct # - [de-identified]  Date of Admission -  11/10/2021  4:23 PM    Events of Past 24 Hours   Looks comfortable on the ventilator still with frothy secretions from tracheostomy    Vitals    height is 5' 2.99\" (1.6 m) and weight is 199 lb 11.8 oz (90.6 kg). Her oral temperature is 98.3 °F (36.8 °C). Her blood pressure is 125/91 (abnormal) and her pulse is 106. Her respiration is 21 and oxygen saturation is 96%. Temperature Range: Temp: 98.3 °F (36.8 °C) Temp  Av.4 °F (36.9 °C)  Min: 97.8 °F (36.6 °C)  Max: 99.2 °F (37.3 °C)  BP Range:  Systolic (12ESD), VVI:538 , Min:92 , XAJ:667     Diastolic (55EEZ), BAY:92, Min:46, Max:98    Pulse Range: Pulse  Av.8  Min: 81  Max: 115  Respiration Range: Resp  Av.6  Min: 7  Max: 33  Current Pulse Ox[de-identified]  SpO2: 96 %  24HR Pulse Ox Range:  SpO2  Av.2 %  Min: 83 %  Max: 100 %  Oxygen Amount and Delivery: O2 Flow Rate (L/min): 2 L/min      Wt Readings from Last 3 Encounters:   12/10/21 199 lb 11.8 oz (90.6 kg)   16 178 lb (80.7 kg)   16 184 lb 12.8 oz (83.8 kg)     I/O       Intake/Output Summary (Last 24 hours) at 2021 1004  Last data filed at 2021 0400  Gross per 24 hour   Intake 512 ml   Output 250 ml   Net 262 ml     I/O last 3 completed shifts:   In: 512 [NG/GT:512]  Out: 250 [Stool:250]     DRAIN/TUBE OUTPUT:     Invasive Lines   ETT Day -   day #21 ventilator tracheostomy day 9  Lines -PICC line day 4    ICP PRESSURE RANGE:  No data recorded  CVP PRESSURE RANGE:  No data recorded  Mechanical Ventilation Data   SETTINGS (Comprehensive)  Vent Information  $Ventilation: $Subsequent Day  Skin Assessment: Clean, dry, & intact  Suction Catheter Diameter: 14  Equipment Changed: HME  Vent Type: Servo i  Vent Mode: PRVC  Vt Ordered: 450 mL  Rate Set: 20 bmp  Pressure Support: 10 cmH20  FiO2 : 21 %  SpO2: 96 %  SpO2/FiO2 ratio: 457.14  Sensitivity: 5  PEEP/CPAP: 5  I Time/ I Time %: 0.86 s  Humidification Source: HME  Nitric Oxide/Epoprostenol In Use?: No  Mask Type: Full face mask  Mask Size: Medium  Additional Respiratory  Assessments  Pulse: 106  Resp: 21  SpO2: 96 %  End Tidal CO2: 35 (%)  Position: Semi-Lagunas's  Humidification Source: HME  Oral Care: Mouthwash, Lip moisturizer applied, Mouth swabbed, Mouth suctioned  Cuff Pressure (cm H2O): 30 cm H2O       ABGs:   Lab Results   Component Value Date    PHART 7.495 12/11/2021    PO2ART 65.9 12/11/2021    FXI9XKO 36.5 12/11/2021       Lab Results   Component Value Date    MODE PRVC 12/11/2021         Medications   IV   sodium chloride      sodium chloride      propofol Stopped (12/03/21 1030)    dextrose      sodium chloride        cefepime  1,000 mg IntraVENous Q24H    vancomycin (VANCOCIN) intermittent dosing (placeholder)   Other RX Placeholder    [START ON 12/13/2021] vancomycin  1,000 mg IntraVENous Q MWF    epoetin tyshawn-epbx  10,000 Units SubCUTAneous Once per day on Mon Wed Fri    nystatin  5 mL Oral 4x Daily    miconazole   Topical BID    atorvastatin  40 mg Oral Daily    chlorhexidine  15 mL Mouth/Throat BID    [Held by provider] carvedilol  3.125 mg Oral BID WC    pantoprazole  40 mg IntraVENous BID    And    sodium chloride flush  10 mL IntraVENous BID    sodium polystyrene  15 g Oral Once    sertraline  25 mg Oral Daily    ferrous sulfate  325 mg Oral Daily with breakfast    [Held by provider] aspirin  81 mg Oral Daily    insulin glargine  22 Units SubCUTAneous Nightly    insulin lispro  0-12 Units SubCUTAneous TID WC    insulin lispro  0-6 Units SubCUTAneous Nightly    sodium chloride flush  5-40 mL IntraVENous 2 times per day       Diet/Nutrition   ADULT TUBE FEEDING; PEG; Renal Formula; Continuous; 15; Yes; 15; Q 4 hours; 40; 50; Q 4 hours    Exam   VITALS height is 5' 2.99\" (1.6 m) and weight is 199 lb 11.8 oz (90.6 kg). Her oral temperature is 98.3 °F (36.8 °C). Her blood pressure is 125/91 (abnormal) and her pulse is 106. Her respiration is 21 and oxygen saturation is 96%. Ventilator Settings (Basic)  Vent Mode: PRVC Rate Set: 20 bmp/Vt Ordered: 450 mL/ /FiO2 : 21 %    Constitutional -arousable, alert, understands what we are seeing  General Appearance, obese   HEENT -, tracheostomy in place,normocephalic, atraumatic. PERRLA  Lungs - Chest expands equally, no wheezes, rales or rhonchi. Diminished breath sounds  Cardiovascular - Heart sounds are normal.  normal rate and rhythm regular, no murmur, gallop or rub. Abdomen - soft, nontender, nondistended, no masses or organomegaly  Neurologic - CN II-XII are grossly intact.  There are no focal motor deficits  Skin - no bruising or bleeding  Extremities - no cyanosis, clubbing or edema    Lab Results   CBC     Lab Results   Component Value Date    WBC 6.9 12/12/2021    RBC 2.77 12/12/2021    RBC 4.14 08/31/2016    HGB 8.4 12/12/2021    HCT 26.1 12/12/2021     12/12/2021    MCV 94.6 12/12/2021    MCH 30.4 12/12/2021    MCHC 32.2 12/12/2021    RDW 17.4 12/12/2021    NRBC 4 11/22/2021    METASPCT 3 12/02/2021    LYMPHOPCT 16 12/12/2021    LYMPHOPCT 32.8 08/31/2016    MONOPCT 11 12/12/2021    EOSPCT 4.3 08/31/2016    BASOPCT 1 12/12/2021    BASOPCT 0.7 08/31/2016    MONOSABS 0.80 12/12/2021    LYMPHSABS 1.10 12/12/2021    EOSABS 0.30 12/12/2021    BASOSABS 0.00 12/12/2021    DIFFTYPE NOT REPORTED 12/12/2021       BMP   Lab Results   Component Value Date     12/12/2021    K 3.7 12/12/2021    CL 96 12/12/2021    CO2 26 12/12/2021    BUN 45 12/12/2021    CREATININE 3.44 12/12/2021    GLUCOSE 209 12/12/2021    GLUCOSE 77 08/31/2016    CALCIUM 8.5 12/12/2021       LFTS  Lab Results   Component Value Date    ALKPHOS 175 11/18/2021    ALT 31 11/18/2021    AST 21 11/18/2021    PROT 5.9 12/09/2021    BILITOT 0.27 11/18/2021    BILIDIR <0.08 11/13/2021    IBILI Connot be calculated 11/13/2021    LABALBU 2.6 11/18/2021       INR  No results for input(s): PROTIME, INR in the last 72 hours. APTT  No results for input(s): APTT in the last 72 hours. Lactic Acid  Lab Results   Component Value Date    LACTA 1.3 11/26/2021    LACTA 0.6 11/16/2021    LACTA 0.6 11/16/2021        BNP   No results for input(s): BNP in the last 72 hours.      Cultures       Radiology     Plain Films         CT Scans    See actual reports for details    SYSTEM ASSESSMENT    Acute on chronic hypoxic and hypercapnic respiratory failure  Bradycardic arrest, intubated 11/20; tracheostomy 12/3  Right lower lobe infiltrate/pleural effusion-status post thoracentesis 12/11 appears to be transudative  Acute kidney injury-requiring hemodialysis  Morbid obesity  Anemia, chronic disease and possible rectal bleed  Hypoalbuminemia  Thrombocytopenia  Nonischemic cardiomyopathy with EF of 30 to 35% possible right to left interatrial shunt  Atrial fibrillation  Possible CLL/lymphoproliferative disorder  DNR CCA        Daily weaning trials, so far no success  On vancomycin and cefepime added per infectious disease  CT scan of neck shows no abscess  Tolerating tube feeds  Dialysis per nephrology  Awaiting placement at [address] Time   0 min    Electronically signed by Noel Shea MD on 12/12/2021 at 10:04 AM

## 2021-12-13 ENCOUNTER — APPOINTMENT (OUTPATIENT)
Dept: GENERAL RADIOLOGY | Age: 71
DRG: 004 | End: 2021-12-13
Payer: COMMERCIAL

## 2021-12-13 LAB
ABSOLUTE EOS #: 0.3 K/UL (ref 0–0.4)
ABSOLUTE IMMATURE GRANULOCYTE: ABNORMAL K/UL (ref 0–0.3)
ABSOLUTE LYMPH #: 1.5 K/UL (ref 1–4.8)
ABSOLUTE MONO #: 1 K/UL (ref 0.1–1.3)
ANION GAP SERPL CALCULATED.3IONS-SCNC: 11 MMOL/L (ref 9–17)
BASO FLUID: ABNORMAL %
BASOPHILS # BLD: 1 % (ref 0–2)
BASOPHILS ABSOLUTE: 0.1 K/UL (ref 0–0.2)
BUN BLDV-MCNC: 62 MG/DL (ref 8–23)
BUN/CREAT BLD: ABNORMAL (ref 9–20)
CALCIUM SERPL-MCNC: 8.7 MG/DL (ref 8.6–10.4)
CHLORIDE BLD-SCNC: 97 MMOL/L (ref 98–107)
CO2: 26 MMOL/L (ref 20–31)
CREAT SERPL-MCNC: 4.32 MG/DL (ref 0.5–0.9)
DIFFERENTIAL TYPE: ABNORMAL
EOSINOPHIL FLUID: ABNORMAL %
EOSINOPHILS RELATIVE PERCENT: 3 % (ref 0–4)
FLUID DIFF COMMENT: ABNORMAL
GFR AFRICAN AMERICAN: 12 ML/MIN
GFR NON-AFRICAN AMERICAN: 10 ML/MIN
GFR SERPL CREATININE-BSD FRML MDRD: ABNORMAL ML/MIN/{1.73_M2}
GFR SERPL CREATININE-BSD FRML MDRD: ABNORMAL ML/MIN/{1.73_M2}
GLUCOSE BLD-MCNC: 135 MG/DL (ref 65–105)
GLUCOSE BLD-MCNC: 189 MG/DL (ref 65–105)
GLUCOSE BLD-MCNC: 215 MG/DL (ref 65–105)
GLUCOSE BLD-MCNC: 215 MG/DL (ref 65–105)
GLUCOSE BLD-MCNC: 244 MG/DL (ref 70–99)
HCT VFR BLD CALC: 25.7 % (ref 36–46)
HEMOGLOBIN: 8.2 G/DL (ref 12–16)
IMMATURE GRANULOCYTES: ABNORMAL %
LYMPHOCYTES # BLD: 17 % (ref 24–44)
LYMPHOCYTES, BODY FLUID: 44 %
MAGNESIUM: 2 MG/DL (ref 1.6–2.6)
MCH RBC QN AUTO: 30 PG (ref 26–34)
MCHC RBC AUTO-ENTMCNC: 31.9 G/DL (ref 31–37)
MCV RBC AUTO: 94 FL (ref 80–100)
MONOCYTE, FLUID: ABNORMAL %
MONOCYTES # BLD: 11 % (ref 1–7)
NEUTROPHIL, FLUID: 15 %
NRBC AUTOMATED: ABNORMAL PER 100 WBC
OTHER CELLS FLUID: 41 %
PDW BLD-RTO: 17.8 % (ref 11.5–14.9)
PLATELET # BLD: 148 K/UL (ref 150–450)
PLATELET ESTIMATE: ABNORMAL
PMV BLD AUTO: 8.9 FL (ref 6–12)
POTASSIUM SERPL-SCNC: 4.2 MMOL/L (ref 3.7–5.3)
RBC # BLD: 2.73 M/UL (ref 4–5.2)
RBC # BLD: ABNORMAL 10*6/UL
SEG NEUTROPHILS: 68 % (ref 36–66)
SEGMENTED NEUTROPHILS ABSOLUTE COUNT: 6.1 K/UL (ref 1.3–9.1)
SODIUM BLD-SCNC: 134 MMOL/L (ref 135–144)
SURGICAL PATHOLOGY REPORT: NORMAL
WBC # BLD: 8.9 K/UL (ref 3.5–11)
WBC # BLD: ABNORMAL 10*3/UL

## 2021-12-13 PROCEDURE — 2580000003 HC RX 258: Performed by: FAMILY MEDICINE

## 2021-12-13 PROCEDURE — 6360000002 HC RX W HCPCS: Performed by: SURGERY

## 2021-12-13 PROCEDURE — 82947 ASSAY GLUCOSE BLOOD QUANT: CPT

## 2021-12-13 PROCEDURE — 6360000002 HC RX W HCPCS: Performed by: INTERNAL MEDICINE

## 2021-12-13 PROCEDURE — 71045 X-RAY EXAM CHEST 1 VIEW: CPT

## 2021-12-13 PROCEDURE — 2580000003 HC RX 258: Performed by: SURGERY

## 2021-12-13 PROCEDURE — 6360000002 HC RX W HCPCS: Performed by: FAMILY MEDICINE

## 2021-12-13 PROCEDURE — P9047 ALBUMIN (HUMAN), 25%, 50ML: HCPCS | Performed by: SURGERY

## 2021-12-13 PROCEDURE — 94761 N-INVAS EAR/PLS OXIMETRY MLT: CPT

## 2021-12-13 PROCEDURE — 6370000000 HC RX 637 (ALT 250 FOR IP): Performed by: INTERNAL MEDICINE

## 2021-12-13 PROCEDURE — 36415 COLL VENOUS BLD VENIPUNCTURE: CPT

## 2021-12-13 PROCEDURE — 2580000003 HC RX 258: Performed by: INTERNAL MEDICINE

## 2021-12-13 PROCEDURE — C9113 INJ PANTOPRAZOLE SODIUM, VIA: HCPCS | Performed by: SURGERY

## 2021-12-13 PROCEDURE — 6370000000 HC RX 637 (ALT 250 FOR IP): Performed by: SURGERY

## 2021-12-13 PROCEDURE — 83735 ASSAY OF MAGNESIUM: CPT

## 2021-12-13 PROCEDURE — 90935 HEMODIALYSIS ONE EVALUATION: CPT

## 2021-12-13 PROCEDURE — 2500000003 HC RX 250 WO HCPCS: Performed by: INTERNAL MEDICINE

## 2021-12-13 PROCEDURE — 6370000000 HC RX 637 (ALT 250 FOR IP): Performed by: FAMILY MEDICINE

## 2021-12-13 PROCEDURE — 80048 BASIC METABOLIC PNL TOTAL CA: CPT

## 2021-12-13 PROCEDURE — 92526 ORAL FUNCTION THERAPY: CPT

## 2021-12-13 PROCEDURE — 2000000000 HC ICU R&B

## 2021-12-13 PROCEDURE — 94003 VENT MGMT INPAT SUBQ DAY: CPT

## 2021-12-13 PROCEDURE — 85025 COMPLETE CBC W/AUTO DIFF WBC: CPT

## 2021-12-13 PROCEDURE — 99233 SBSQ HOSP IP/OBS HIGH 50: CPT | Performed by: INTERNAL MEDICINE

## 2021-12-13 RX ADMIN — CHLORHEXIDINE GLUCONATE 0.12% ORAL RINSE 15 ML: 1.2 LIQUID ORAL at 21:31

## 2021-12-13 RX ADMIN — INSULIN LISPRO 4 UNITS: 100 INJECTION, SOLUTION INTRAVENOUS; SUBCUTANEOUS at 09:10

## 2021-12-13 RX ADMIN — SODIUM CHLORIDE, PRESERVATIVE FREE 10 ML: 5 INJECTION INTRAVENOUS at 09:07

## 2021-12-13 RX ADMIN — CHLORHEXIDINE GLUCONATE 0.12% ORAL RINSE 15 ML: 1.2 LIQUID ORAL at 09:07

## 2021-12-13 RX ADMIN — NYSTATIN 500000 UNITS: 500000 SUSPENSION ORAL at 12:14

## 2021-12-13 RX ADMIN — PANTOPRAZOLE SODIUM 40 MG: 40 INJECTION, POWDER, FOR SOLUTION INTRAVENOUS at 21:31

## 2021-12-13 RX ADMIN — ATORVASTATIN CALCIUM 40 MG: 40 TABLET, FILM COATED ORAL at 09:07

## 2021-12-13 RX ADMIN — ANTI-FUNGAL POWDER MICONAZOLE NITRATE TALC FREE: 1.42 POWDER TOPICAL at 21:32

## 2021-12-13 RX ADMIN — FERROUS SULFATE TAB 325 MG (65 MG ELEMENTAL FE) 325 MG: 325 (65 FE) TAB at 09:07

## 2021-12-13 RX ADMIN — ANTI-FUNGAL POWDER MICONAZOLE NITRATE TALC FREE: 1.42 POWDER TOPICAL at 09:10

## 2021-12-13 RX ADMIN — Medication 1.9 ML: at 14:55

## 2021-12-13 RX ADMIN — SERTRALINE HYDROCHLORIDE 25 MG: 50 TABLET ORAL at 09:10

## 2021-12-13 RX ADMIN — INSULIN GLARGINE 22 UNITS: 100 INJECTION, SOLUTION SUBCUTANEOUS at 21:33

## 2021-12-13 RX ADMIN — MEROPENEM 1000 MG: 1 INJECTION, POWDER, FOR SOLUTION INTRAVENOUS at 18:28

## 2021-12-13 RX ADMIN — INSULIN LISPRO 2 UNITS: 100 INJECTION, SOLUTION INTRAVENOUS; SUBCUTANEOUS at 21:44

## 2021-12-13 RX ADMIN — NYSTATIN 500000 UNITS: 500000 SUSPENSION ORAL at 09:10

## 2021-12-13 RX ADMIN — PANTOPRAZOLE SODIUM 40 MG: 40 INJECTION, POWDER, FOR SOLUTION INTRAVENOUS at 09:07

## 2021-12-13 RX ADMIN — INSULIN LISPRO 2 UNITS: 100 INJECTION, SOLUTION INTRAVENOUS; SUBCUTANEOUS at 12:14

## 2021-12-13 RX ADMIN — ALBUMIN (HUMAN) 25 G: 0.25 INJECTION, SOLUTION INTRAVENOUS at 12:05

## 2021-12-13 RX ADMIN — SODIUM CHLORIDE, PRESERVATIVE FREE 10 ML: 5 INJECTION INTRAVENOUS at 21:31

## 2021-12-13 RX ADMIN — SODIUM CHLORIDE 100 MG: 9 INJECTION, SOLUTION INTRAVENOUS at 21:54

## 2021-12-13 RX ADMIN — MIDODRINE HYDROCHLORIDE 10 MG: 10 TABLET ORAL at 12:14

## 2021-12-13 RX ADMIN — SODIUM CHLORIDE, PRESERVATIVE FREE 10 ML: 5 INJECTION INTRAVENOUS at 21:33

## 2021-12-13 RX ADMIN — NYSTATIN 500000 UNITS: 500000 SUSPENSION ORAL at 16:15

## 2021-12-13 RX ADMIN — NYSTATIN 500000 UNITS: 500000 SUSPENSION ORAL at 21:31

## 2021-12-13 RX ADMIN — VANCOMYCIN HYDROCHLORIDE 1000 MG: 1 INJECTION, POWDER, LYOPHILIZED, FOR SOLUTION INTRAVENOUS at 16:13

## 2021-12-13 ASSESSMENT — ENCOUNTER SYMPTOMS
EYE REDNESS: 0
VOMITING: 0
SHORTNESS OF BREATH: 0
BLOOD IN STOOL: 0
ABDOMINAL PAIN: 0
COLOR CHANGE: 0
NAUSEA: 0
CHEST TIGHTNESS: 0
TROUBLE SWALLOWING: 0
COUGH: 0
EYE ITCHING: 0

## 2021-12-13 ASSESSMENT — PAIN SCALES - WONG BAKER
WONGBAKER_NUMERICALRESPONSE: 0

## 2021-12-13 ASSESSMENT — PULMONARY FUNCTION TESTS
PIF_VALUE: 17
PIF_VALUE: 23
PIF_VALUE: 23
PIF_VALUE: 17
PIF_VALUE: 17

## 2021-12-13 NOTE — PROGRESS NOTES
Pharmacy Note  Aminoglycoside Consult    Weston Martinez is a 66 y.o. female ordered Amikacin for wound infection; consult received from Dr. Silvio Argueta to manage therapy. Also receiving Vancomycin with HD. Patient Active Problem List   Diagnosis    Chronic kidney disease (CKD), stage III (moderate) ([facility])    Hyperkalemia    Type 2 diabetes mellitus with kidney complication, with long-term current use of insulin (HCC)    Hyperlipidemia    CKD (chronic kidney disease) stage 3, GFR 30-59 ml/min (HCC)    Microalbuminuria    Urinary calculus    Tethered cord syndrome (HCC)    Stenosis of cervical spine    Lumbar radiculopathy    Lower back pain    HTN (hypertension)    Esophageal reflux    Depression with anxiety    ASCVD (arteriosclerotic cardiovascular disease)    Closed fracture of thoracic vertebra (HCC)    Intervertebral cervical disc disorder with myelopathy, cervical region    Pain in limb    Anemia    JAYLYN (acute kidney injury) ([facility] [address]    Iron deficiency anemia    Hydronephrosis determined by ultrasound    Longstanding persistent atrial fibrillation (HCC)    Acute metabolic encephalopathy    Acute respiratory failure (HCC)    Acute cystitis without hematuria    Acute respiratory failure with hypoxia and hypercapnia (HCC)    Dilated cardiomyopathy (HCC)       Allergies:  Patient has no known allergies.      Temp max: 99.2    Recent Labs     12/11/21  0509 12/12/21  0501   CREATININE 2.99* 3.44*       Recent Labs     12/11/21  0509 12/12/21  0501   WBC 7.7 6.9         Intake/Output Summary (Last 24 hours) at 12/12/2021 1807  Last data filed at 12/12/2021 1708  Gross per 24 hour   Intake 1227 ml   Output 350 ml   Net 877 ml       Culture Date      Source                 Results  See Micro    Height:   Ht Readings from Last 1 Encounters:   11/30/21 5' 2.99\" (1.6 m)     Weight:  Wt Readings from Last 1 Encounters:   12/10/21 199 lb 11.8 oz (90.6 kg)     Estimated Creatinine Clearance: 16 mL/min (A) (based on SCr of 3.44 mg/dL (H)). Assessment/Plan:  Administer Loading dose of 350mg IV (5mg/kg Adjusted Body weight) once. Monitor trough drug concentration prior to dialysis and re-dose when expected level or post-HD concentration is ? 4 mg/L for amikacin. Hemodialysis scheduled MWF, will continue to follow and adjust as needed. Thank you for the consult. Will continue to follow.      Thank you,  [place] PharmD, Joaquin Lentz [phone]  PGY-1 Pharmacy Resident

## 2021-12-13 NOTE — CARE COORDINATION
ONGOING DISCHARGE PLAN:    LOS 33, GMLOS 5.9      Writer sent emails to [facility], hoping to get a response for this pre cert that was filed on 12/6/21. An expedited appeal was filed on 12/10/21. Still waiting on response. Looking to get patient to [facility]. Once pre cert is received and approved, Patient is ready for discharge. Will continue to follow for additional discharge needs.     Electronically signed by Viv Gonzalez RN on 12/13/2021 at 2:06 PM

## 2021-12-13 NOTE — PROGRESS NOTES
Sharla at [facility] informed SW that they have not received any updates regarding pt's expedited appeal.

## 2021-12-13 NOTE — PROGRESS NOTES
Spontaneous Breathing trial started. Patient placed on CPAP 5, PS 12. FiO2 21%.  Patient tolerating well

## 2021-12-13 NOTE — PROGRESS NOTES
Nephrology Progress Note    Subjective/     Interval History:  Patient seen and examined at bedside  Has staph aureus and gram negative chloe growth on tracheal secretion culture  On Vancomycin and received 1 dose of IV Amikacin on 12/12, ID following  S/P trach and PEG on 12/03/2021 and tunneled dialysis catheter placement on 12/07/2021  On tube feeds  Resting comfortably in bed    History of present illness:  66y.o. year old female who we are seeing in consultation for JAYLYN. Patient has a past medical history of Type 2 diabetes mellitus, coronary artery disease, lumbar radiculopathy, Hyperlipidemia and chronic kidney disease stage III [baseline serum creatinine 1.5 mg/dL], who presented from [facility] for further evaluation of acute kidney injury.  She was sent to [facility] after developing nonhealing wound s/p cardiac catheterization via right refill Levophed has been discontinued approach with right groin wound. She had been on Bumex in the ECF and creatinine and BUN have been rising and Bumex was decreased to a lowest dose of 0.5 mg p.o. twice daily. Patient was found to have JAYLYN likely secondary to ischemic acute tubular necrosis and obstructive nephropathy secondary to bilateral hydronephrosis. Patient received acute dialysis on 12/04. Started on hemodialysis with MWF schedule.      Objective/     Vitals:    12/13/21 0730 12/13/21 0731 12/13/21 0739 12/13/21 0745   BP:  (!) 189/98     Pulse: 102 92 92 108   Resp: 19 20 22 20   Temp:  97.9 °F (36.6 °C)     TempSrc:  Oral     SpO2: 98% 100% 98% 97%   Weight:       Height:         24HR INTAKE/OUTPUT:      Intake/Output Summary (Last 24 hours) at 12/13/2021 0915  Last data filed at 12/12/2021 1708  Gross per 24 hour   Intake 715 ml   Output 100 ml   Net 615 ml     Patient Vitals for the past 96 hrs (Last 3 readings):   Weight   12/10/21 1400 199 lb 11.8 oz (90.6 kg)   12/10/21 1000 206 lb 5.6 oz (93.6 kg)       Constitutional:  Alert, awake, no apparent distress. On ventilatory support via tracheostomy. Cardiovascular:  S1, S2 without m/r/g  Respiratory:  Bilateral chest rise  Abdomen: +bs, soft, nt  Ext: +2 pitting edema noted on lower extremities bilaterally    Data/  Recent Labs     12/11/21  0509 12/11/21  0509 12/11/21  1414 12/12/21  0501 12/13/21  0548   WBC 7.7  --   --  6.9 8.9   HGB 8.0*   < > 8.6* 8.4* 8.2*   HCT 24.8*   < > 26.2* 26.1* 25.7*   MCV 94.4  --   --  94.6 94.0   PLT 81*  --   --  114* 148*    < > = values in this interval not displayed. Recent Labs     12/11/21  0509 12/12/21  0501 12/13/21  0548    134* 134*   K 3.5* 3.7 4.2   CL 97* 96* 97*   CO2 26 26 26   GLUCOSE 231* 209* 244*   MG 1.9 1.9 2.0   BUN 35* 45* 62*   CREATININE 2.99* 3.44* 4.32*   LABGLOM 15* 13* 10*   GFRAA 19* 16* 12*       DATA:    CBC with Differential:    Lab Results   Component Value Date    WBC 8.9 12/13/2021    RBC 2.73 12/13/2021    RBC 4.14 08/31/2016    HGB 8.2 12/13/2021    HCT 25.7 12/13/2021     12/13/2021    MCV 94.0 12/13/2021    MCH 30.0 12/13/2021    MCHC 31.9 12/13/2021    RDW 17.8 12/13/2021    NRBC 4 11/22/2021    METASPCT 3 12/02/2021    LYMPHOPCT 17 12/13/2021    LYMPHOPCT 32.8 08/31/2016    MONOPCT 11 12/13/2021    EOSPCT 4.3 08/31/2016    BASOPCT 1 12/13/2021    BASOPCT 0.7 08/31/2016    MONOSABS 1.00 12/13/2021    LYMPHSABS 1.50 12/13/2021    EOSABS 0.30 12/13/2021    BASOSABS 0.10 12/13/2021    DIFFTYPE NOT REPORTED 12/13/2021     Hemoglobin/Hematocrit:    Lab Results   Component Value Date    HGB 8.2 12/13/2021    HCT 25.7 12/13/2021     BMP:    Lab Results   Component Value Date     12/13/2021    K 4.2 12/13/2021    CL 97 12/13/2021    CO2 26 12/13/2021    BUN 62 12/13/2021    LABALBU 2.6 11/18/2021    CREATININE 4.32 12/13/2021    CALCIUM 8.7 12/13/2021    GFRAA 12 12/13/2021    LABGLOM 10 12/13/2021    GLUCOSE 244 12/13/2021    GLUCOSE 77 08/31/2016     Assessment/     1.  Acute Kidney Injury - secondary to ischemic acute tubular necrosis and obstructive nephropathy secondary to bilateral hydronephrosis. She remains dialysis dependent and is on a MWF schedule. We will continue to monitor GFR and urine output closely for evidence of renal function recovery.     2. Heart failure with reduced ejection fraction [HFrEF with LVEF 30 to 35% 11/10/2021] - We will continue ultrafiltration with dialysis as tolerated. She is s/p right thoracentesis 12/9/2021.     3. Normocytic anemia - partly secondary to chronic kidney disease. Hemoglobin isstable at 8.2 today. Continue Retacrit 10,000 units 3 times a week.     4.  Bilateral hydronephrosis - status post cystoscopy with right ureteral stent placement.     5. Systemic hypertension - blood pressure control is adequate.     6. Low-grade fever -cultures positive for tracheal aspirate on IV antibiotics      Plan/     Continue hemodialysis Monday Wednesday Friday  Dialysis today  S/P 1 dose Amikacin on 12/12    Jey Anders MD    Addendum to Resident Pato note:   Pt seen,examined and evaluated. I have reviewed the current history, physical findings, labs and assessment and plan and agree with note as documented by resident physician.     No acute events overnight    Patient is scheduled for dialysis today  Patient is on tube feeds  Continue Retacrit injection 3 times a week    Plan  Continue hemodialysis Monday Wednesday Friday  HD today as per orders    Maryse Starkey MD

## 2021-12-13 NOTE — PROGRESS NOTES
[facility] -   Infectious diseases evaluation  admission date 11/10/2021    reason for consultation:   Tracheostomy site drainage    Impression :   Current:  · Infection at the tracheostomy site   · MRSA colonization  · Acute renal failure on hemodialysis tunneled hemodialysis catheter was placed on 12/7/2021  · Respiratory failure   · Status post tracheostomy and PEG tube placement 12/3/2021  · Diabetes mellitus  · Coronary artery disease  · Cardiomyopathy with ejection fraction of 30 to 35%   · Atrial fibrillation  · Morbid obesity  · Right abdominal wall wound with no signs of infection      Recommendations   · Discontinue IV vancomycin and amikacin  · IV Tygacil and meropenem through 12/20/21  · Follow final tracheostomy site culture /adjust antibiotics as needed  · Diflucan was started 12/4/2021 completed 12/9/2021  · No growth on pleural fluid culture   · Repeat blood cultures from 12/10/2021 no growth to date  · Follow CBC  · Continue supportive care          History of Present Illness:   Initial history:  Rose Floyd is a 66y.o.-year-old female  presented initially on 11/10/2021 with acute respiratory failure. Patient had code blue on 11/20/2021 secondary to bradycardia. CPR performed and patient was intubated, subsequently had tracheostomy and PEG tube placement on 12/3/2021  She had right lower lobe infiltrate with pleural effusion status post thoracentesis on 12/11/2021 was transudate .   She developed acute renal failure requiring hemodialysis through tunneled hemodialysis catheter that was placed 12/7/2021  She had a fever with a temperature max of 100.5 on 12/4/2021  Urine culture grew Candida albicans on 12/2/21  COVID-19 rapid test was negative on 11/15/2021 and 12/1/2021  No growth on blood culture from 11/16/2021  Nasal swab for MRSA was + 11/21/2021  The patient received a course of ceftriaxone 11/10/2021 through 11/15/2021 through 11/2721  She received IV cefepime 11/16/2021  Zyvox was started 11/21/2021 through 11/27/2021  Ceftriaxone was restarted 12/2/21 through 12/5/2021  Interval changes  12/13/2021   The patient unable to provide history, tracheostomy in place,no fever,no new events. 12/10/21MRSA, ESBL producing Klebsiella pneumoniae and multidrug resistant Acinetobacter growth at  tracheostomy site  PICC line was placed 12/9/2021     Patient Vitals for the past 8 hrs:   BP Temp Temp src Pulse Resp SpO2 Weight   12/13/21 1217 (!) 97/59 -- -- 91 -- -- --   12/13/21 1207 (!) 99/52 -- -- 88 -- -- --   12/13/21 1132 -- -- -- 96 28 97 % --   12/13/21 1130 (!) 106/51 -- -- 94 -- -- --   12/13/21 1114 (!) 122/59 96.8 °F (36 °C) -- 94 -- -- --   12/13/21 1110 (!) 114/55 97.9 °F (36.6 °C) -- 101 18 -- 199 lb 11.8 oz (90.6 kg)   12/13/21 0745 -- -- -- 108 20 97 % --   12/13/21 0739 -- -- -- 92 22 98 % --   12/13/21 0731 (!) 189/98 97.9 °F (36.6 °C) Oral 92 20 100 % --   12/13/21 0730 -- -- -- 102 19 98 % --   12/13/21 0715 -- -- -- 96 20 99 % --   12/13/21 0700 -- -- -- 100 20 100 % --   12/13/21 0645 -- -- -- 99 17 94 % --   12/13/21 0630 -- -- -- 93 14 97 % --   12/13/21 0615 -- -- -- 87 10 97 % --   12/13/21 0600 -- -- -- 99 13 96 % --   12/13/21 0545 -- -- -- 91 16 97 % --   12/13/21 0530 -- -- -- 96 14 96 % --   12/13/21 0515 -- -- -- 99 16 97 % --   12/13/21 0500 -- -- -- 103 11 96 % --   12/13/21 0445 -- -- -- 104 18 95 % --   12/13/21 0430 -- -- -- 106 11 96 % --           I have personally reviewed the past medical history, past surgical history, medications, social history, and family history, and I haveupdated the database accordingly. Allergies:   Patient has no known allergies. Review of Systems:     Review of Systems  Status post tracheostomy, nonverbal, unable to provide  Physical Examination :       Physical Exam  Constitutional:       General: She is not in acute distress.   HENT:      Right Ear: External ear normal. Left Ear: External ear normal.   Eyes:      General: No scleral icterus. Conjunctiva/sclera: Conjunctivae normal.   Neck:      Comments: Tracheostomy in place with yellow drainage, surrounding erythema, no fluctuation or crepitance  Cardiovascular:      Rate and Rhythm: Normal rate. Heart sounds: No murmur heard. Pulmonary:      Effort: No respiratory distress. Breath sounds: Normal breath sounds. No wheezing. Abdominal:      General: There is no distension. Palpations: Abdomen is soft. Tenderness: There is no abdominal tenderness. Musculoskeletal:      Cervical back: Neck supple. No rigidity. Right lower leg: Edema present. Left lower leg: Edema present. Skin:     General: Skin is warm. Coloration: Skin is not jaundiced. Findings: No bruising. Comments: Right abdominal wall wound with no signs of infection   Neurological:      Mental Status: She is alert.          Past Medical History:     Past Medical History:   Diagnosis Date    Anemia, unspecified     CAD (coronary artery disease)     Chronic kidney disease     CKD (chronic kidney disease) stage 3, GFR 30-59 ml/min ([facility])     Closed fracture of dorsal (thoracic) vertebra without mention of spinal cord injury     Coronary atherosclerosis of unspecified type of vessel, native or graft     Depression with anxiety     Esophageal reflux     HTN (hypertension)     Hyperkalemia     Hyperlipidemia     Intervertebral cervical disc disorder with myelopathy, cervical region     Lower back pain     Lumbar radiculopathy     Microalbuminuria     Muscle weakness     Pain in limb     Stenosis of cervical spine     Tethered cord syndrome (HCC)     Type II or unspecified type diabetes mellitus without mention of complication, not stated as uncontrolled     Urinary calculus     UTI (lower urinary tract infection)        Past Surgical  History:     Past Surgical History:   Procedure Laterality Date     Smokeless tobacco: Not on file   Substance and Sexual Activity    Alcohol use: No     Alcohol/week: 0.0 standard drinks    Drug use: No    Sexual activity: Not on file   Other Topics Concern    Not on file   Social History Narrative    Not on file     Social Determinants of Health     Financial Resource Strain:     Difficulty of Paying Living Expenses: Not on file   Food Insecurity:     Worried About Running Out of Food in the Last Year: Not on file    Katya of Food in the Last Year: Not on file   Transportation Needs:     Lack of Transportation (Medical): Not on file    Lack of Transportation (Non-Medical):  Not on file   Physical Activity:     Days of Exercise per Week: Not on file    Minutes of Exercise per Session: Not on file   Stress:     Feeling of Stress : Not on file   Social Connections:     Frequency of Communication with Friends and Family: Not on file    Frequency of Social Gatherings with Friends and Family: Not on file    Attends Congregation Services: Not on file    Active Member of [address] [place] [address] [employer] or Organizations: Not on file    Attends Club or Organization Meetings: Not on file    Marital Status: Not on file   Intimate Partner Violence:     Fear of Current or Ex-Partner: Not on file    Emotionally Abused: Not on file    Physically Abused: Not on file    Sexually Abused: Not on file   Housing Stability:     Unable to Pay for Housing in the Last Year: Not on file    Number of [place] in the Last Year: Not on file    Unstable Housing in the Last Year: Not on file       Family History:     Family History   Problem Relation Age of Onset    High Blood Pressure Other       Medical Decision Making:   I have independently reviewed/ordered the following labs:    CBC with Differential:   Recent Labs     12/12/21  0501 12/13/21  0548   WBC 6.9 8.9   HGB 8.4* 8.2*   HCT 26.1* 25.7*   * 148*   LYMPHOPCT 16* 17*   MONOPCT 11* 11*     BMP:  Recent Labs     12/12/21  0501

## 2021-12-13 NOTE — PROGRESS NOTES
Progress Note    12/13/2021   1:08 PM    Name:  Alyson Montiel  MRN:    [id number]     Acct:     [de-identified]   Room:  2014/2014-01  IP Day: 35     Admit Date: 11/10/2021  4:23 PM  PCP: Oracio Ogden DO    Subjective:     C/C:   Chief Complaint   Patient presents with    Abnormal Lab     low hemoglobin and high CR       Interval History: Status: not changed. Patient is s/p trach and PEG on ventilator. Patient is trach site. Vital signs reviewed and stable. Reviewed hemoglobin 8.2 platelet 449    ROS:   all 10 systems reviewed and are negative except as noted    Review of Systems   Constitutional: Negative for chills and fatigue. HENT: Negative for drooling, mouth sores, sneezing and trouble swallowing. Eyes: Negative for redness and itching. Respiratory: Negative for cough, chest tightness and shortness of breath. Cardiovascular: Negative for chest pain, palpitations and leg swelling. Gastrointestinal: Negative for abdominal pain, blood in stool, nausea and vomiting. Endocrine: Negative for heat intolerance and polyphagia. Genitourinary: Negative for difficulty urinating, flank pain and pelvic pain. Musculoskeletal: Negative for arthralgias, joint swelling and neck stiffness. Skin: Negative for color change and pallor. Allergic/Immunologic: Negative for food allergies. Neurological: Negative for dizziness, seizures and headaches. Hematological: Does not bruise/bleed easily. Psychiatric/Behavioral: Negative for agitation, behavioral problems and suicidal ideas. The patient is not hyperactive. Medications:      Allergies: No Known Allergies    Current Meds: aminoglycoside intermittent dosing (placeholder), RX Placeholder  vancomycin (VANCOCIN) intermittent dosing (placeholder), RX Placeholder  vancomycin 1000 mg IVPB in 250 mL D5W addavial, Q MWF  epoetin tyshawn-epbx (RETACRIT) injection 10,000 Units, Once per day on Mon Wed Fri  acetaminophen (TYLENOL) tablet 650 mg, Q4H PRN  0.9 % sodium chloride infusion, PRN  sodium citrate 4 % injection 1.9 mL, PRN   And  sodium citrate 4 % injection 1.9 mL, PRN  nystatin (MYCOSTATIN) 554786 UNIT/ML suspension 500,000 Units, 4x Daily  miconazole (MICOTIN) 2 % powder, BID  metoprolol (LOPRESSOR) injection 5 mg, Q6H PRN  polyethylene glycol (GLYCOLAX) packet 17 g, Daily PRN  atorvastatin (LIPITOR) tablet 40 mg, Daily  0.9 % sodium chloride infusion, PRN  midodrine (PROAMATINE) tablet 10 mg, TID PRN  albumin human 25 % IV solution 25 g, PRN  midodrine (PROAMATINE) tablet 5 mg, PRN  chlorhexidine (PERIDEX) 0.12 % solution 15 mL, BID  fentaNYL (SUBLIMAZE) injection 50 mcg, Q30 Min PRN  propofol injection, Continuous  [Held by provider] carvedilol (COREG) tablet 3.125 mg, BID WC  pantoprazole (PROTONIX) injection 40 mg, BID   And  sodium chloride flush 0.9 % injection 10 mL, BID  magnesium sulfate 1000 mg in dextrose 5% 100 mL IVPB, PRN  glucose (GLUTOSE) 40 % oral gel 15 g, PRN  dextrose 50 % IV solution, PRN  glucagon (rDNA) injection 1 mg, PRN  dextrose 5 % solution, PRN  sodium polystyrene (KAYEXALATE) 15 GM/60ML suspension 15 g, Once  sertraline (ZOLOFT) tablet 25 mg, Daily  ferrous sulfate (IRON 325) tablet 325 mg, Daily with breakfast  [Held by provider] aspirin chewable tablet 81 mg, Daily  insulin glargine (LANTUS) injection vial 22 Units, Nightly  insulin lispro (HUMALOG) injection vial 0-12 Units, TID WC  insulin lispro (HUMALOG) injection vial 0-6 Units, Nightly  sodium chloride flush 0.9 % injection 5-40 mL, 2 times per day  sodium chloride flush 0.9 % injection 5-40 mL, PRN  0.9 % sodium chloride infusion, PRN  ondansetron (ZOFRAN-ODT) disintegrating tablet 4 mg, Q8H PRN   Or  ondansetron (ZOFRAN) injection 4 mg, Q6H PRN  oxyCODONE-acetaminophen (PERCOCET) 5-325 MG per tablet 1 tablet, Q6H PRN        Data:     Code Status:  DNR-CCA    Family History   Problem Relation Age of Onset    High Blood Pressure Other        Social History Socioeconomic History    Marital status:      Spouse name: Not on file    Number of children: Not on file    Years of education: Not on file    Highest education level: Not on file   Occupational History    Not on file   Tobacco Use    Smoking status: Never Smoker    Smokeless tobacco: Not on file   Substance and Sexual Activity    Alcohol use: No     Alcohol/week: 0.0 standard drinks    Drug use: No    Sexual activity: Not on file   Other Topics Concern    Not on file   Social History Narrative    Not on file     Social Determinants of Health     Financial Resource Strain:     Difficulty of Paying Living Expenses: Not on file   Food Insecurity:     Worried About Running Out of Food in the Last Year: Not on file    Katya of Food in the Last Year: Not on file   Transportation Needs:     Lack of Transportation (Medical): Not on file    Lack of Transportation (Non-Medical): Not on file   Physical Activity:     Days of Exercise per Week: Not on file    Minutes of Exercise per Session: Not on file   Stress:     Feeling of Stress : Not on file   Social Connections:     Frequency of Communication with Friends and Family: Not on file    Frequency of Social Gatherings with Friends and Family: Not on file    Attends Episcopal Services: Not on file    Active Member of [address] [place] [address] [employer] or Organizations: Not on file    Attends Club or Organization Meetings: Not on file    Marital Status: Not on file   Intimate Partner Violence:     Fear of Current or Ex-Partner: Not on file    Emotionally Abused: Not on file    Physically Abused: Not on file    Sexually Abused: Not on file   Housing Stability:     Unable to Pay for Housing in the Last Year: Not on file    Number of [place] in the Last Year: Not on file    Unstable Housing in the Last Year: Not on file       I/O (24Hr):     Intake/Output Summary (Last 24 hours) at 12/13/2021 1308  Last data filed at 12/13/2021 0917  Gross per 24 hour 217 (H) 65 - 105 mg/dL   POC Glucose Fingerstick    Collection Time: 12/12/21  8:48 PM   Result Value Ref Range    POC Glucose 197 (H) 65 - 105 mg/dL   CBC with DIFF    Collection Time: 12/13/21  5:48 AM   Result Value Ref Range    WBC 8.9 3.5 - 11.0 k/uL    RBC 2.73 (L) 4.0 - 5.2 m/uL    Hemoglobin 8.2 (L) 12.0 - 16.0 g/dL    Hematocrit 25.7 (L) 36 - 46 %    MCV 94.0 80 - 100 fL    MCH 30.0 26 - 34 pg    MCHC 31.9 31 - 37 g/dL    RDW 17.8 (H) 11.5 - 14.9 %    Platelets 937 (L) 953 - 450 k/uL    MPV 8.9 6.0 - 12.0 fL    NRBC Automated NOT REPORTED per 100 WBC    Differential Type NOT REPORTED     Seg Neutrophils 68 (H) 36 - 66 %    Lymphocytes 17 (L) 24 - 44 %    Monocytes 11 (H) 1 - 7 %    Eosinophils % 3 0 - 4 %    Basophils 1 0 - 2 %    Immature Granulocytes NOT REPORTED 0 %    Segs Absolute 6.10 1.3 - 9.1 k/uL    Absolute Lymph # 1.50 1.0 - 4.8 k/uL    Absolute Mono # 1.00 0.1 - 1.3 k/uL    Absolute Eos # 0.30 0.0 - 0.4 k/uL    Basophils Absolute 0.10 0.0 - 0.2 k/uL    Absolute Immature Granulocyte NOT REPORTED 0.00 - 0.30 k/uL    WBC Morphology NOT REPORTED     RBC Morphology NOT REPORTED     Platelet Estimate NOT REPORTED    Magnesium    Collection Time: 12/13/21  5:48 AM   Result Value Ref Range    Magnesium 2.0 1.6 - 2.6 mg/dL   Basic Metabolic Panel w/ Reflex to MG    Collection Time: 12/13/21  5:48 AM   Result Value Ref Range    Glucose 244 (H) 70 - 99 mg/dL    BUN 62 (H) 8 - 23 mg/dL    CREATININE 4.32 (H) 0.50 - 0.90 mg/dL    Bun/Cre Ratio NOT REPORTED 9 - 20    Calcium 8.7 8.6 - 10.4 mg/dL    Sodium 134 (L) 135 - 144 mmol/L    Potassium 4.2 3.7 - 5.3 mmol/L    Chloride 97 (L) 98 - 107 mmol/L    CO2 26 20 - 31 mmol/L    Anion Gap 11 9 - 17 mmol/L    GFR Non-African American 10 (L) >60 mL/min    GFR  12 (L) >60 mL/min    GFR Comment          GFR Staging NOT REPORTED    POC Glucose Fingerstick    Collection Time: 12/13/21  7:28 AM   Result Value Ref Range    POC Glucose 215 (H) 65 - 105 mg/dL   POC Glucose Fingerstick    Collection Time: 21 10:57 AM   Result Value Ref Range    POC Glucose 189 (H) 65 - 105 mg/dL       Physical Examination:        Vitals:  /77   Pulse 103   Temp 97.8 °F (36.6 °C) (Axillary)   Resp 20   Ht 5' 2.99\" (1.6 m)   Wt 199 lb 11.8 oz (90.6 kg)   SpO2 100%   BMI 35.39 kg/m²   Temp (24hrs), Av.9 °F (36.6 °C), Min:96.8 °F (36 °C), Max:99.2 °F (37.3 °C)    Recent Labs     21  1648 21  2048 21  0728 21  1057   POCGLU 217* 197* 215* 189*         Physical Exam  Vitals reviewed. HENT:      Head: Normocephalic. Right Ear: External ear normal.      Left Ear: External ear normal.      Nose: Nose normal.      Mouth/Throat:      Mouth: Mucous membranes are moist.      Pharynx: Oropharynx is clear. Eyes:      Conjunctiva/sclera: Conjunctivae normal.   Cardiovascular:      Rate and Rhythm: Normal rate and regular rhythm. Pulses: Normal pulses. Heart sounds: Normal heart sounds. Pulmonary:      Effort: Pulmonary effort is normal.      Breath sounds: Normal breath sounds. Comments: Trach in place  Wound discharge surrounding trach  Abdominal:      General: Bowel sounds are normal.      Palpations: Abdomen is soft. Comments: PEG tube in place skin is clear and intact   Musculoskeletal:         General: No deformity. Cervical back: Normal range of motion and neck supple. Right lower leg: No edema. Left lower leg: No edema. Skin:     General: Skin is warm. Capillary Refill: Capillary refill takes less than 2 seconds. Coloration: Skin is not jaundiced. Neurological:      General: No focal deficit present. Mental Status: She is alert. Mental status is at baseline.    Psychiatric:         Mood and Affect: Mood normal.         Behavior: Behavior normal.         Assessment:        Primary Problem  JAYLYN (acute kidney injury) ([facility]     Principal Problem:    JAYLYN (acute kidney injury) feed  10.  discussed with nurse sister and hasband at bedside  11.  nystatin statin powder twice daily for skin fold for candidal intertrigo  12. Lopressor IV 5 mg every 6 hours as needed for heart rate above 115. 13. Midodrine 5 mg p.o. 3 times a day as needed for systolic blood pressure less than 90. 14. DVT Prophylaxis no pharmacological DVT prophylaxis due to low hemoglobin and thrombocytopenia  15. EPCs  16. PT/OT to evaluate and treat  17. Pain control  18. Replace electrolytes as per sliding scale  19. Home medications reviewed and appropriate medications continued  20.  Reviewed labs and imaging studies from last 24 hours and results explained to patient      Electronically signed by Rowdy Guzman MD

## 2021-12-13 NOTE — PROGRESS NOTES
HEMODIALYSIS PRE-TREATMENT NOTE    Patient Identifiers prior to treatment: name and band    Isolation Required: ESBL                      Isolation Type: contact       (please document if patient is being managed as a PUI/COVID-19 patient)        Hepatitis status:                           Date Drawn                             Result  Hepatitis B Surface Antigen 11/16/2021     neg                     Hepatitis B Surface Antibody 11/16/2021 neg        Hepatitis B Core Antibody 11/16/2021 neg          How was Hepatitis Status verified: labs     Was a copy of the labs you documented provided to facility for the patient's chart: yes    Hemodialysis orders verified: yes    Access Within normal limits ( I.e. s/s of infection,...): yes     Pre-Assessment completed: yes    Pre-dialysis report received from: [id number]

## 2021-12-13 NOTE — PROGRESS NOTES
Physical Therapy        Physical Therapy Cancel Note      DATE: 2021    NAME: Jean Marie Cheek  MRN: [id number]   : 1950      Patient not seen this date for Physical Therapy due to:    Hemodialysis:Pt out of room at this time for dialysis. Will reattempt in PM as time permits.       Electronically signed by Sherri Trejo PTA on 2021 at 11:54 AM

## 2021-12-13 NOTE — PROGRESS NOTES
Comprehensive Nutrition Assessment    Type and Reason for Visit:  Reassess    Nutrition Recommendations/Plan:   Recommend continue Nepro at 40 ml per hour with 1 protein modular providing 1836 kcal, 104 g protein    Nutrition Assessment:  Pt tolerating tube feed at goal, meeting nutrition needs. Malnutrition Assessment:  Malnutrition Status: At risk for malnutrition (Comment)    Context:  Acute Illness     Findings of the 6 clinical characteristics of malnutrition:  Energy Intake:   (Previous mild energy deficit)  Weight Loss:  Unable to assess (Edema)     Body Fat Loss:  Unable to assess     Muscle Mass Loss:  Unable to assess    Fluid Accumulation:  7 - Moderate to Severe Extremities, Generalized (Anasarca)   Strength:  Not Performed    Estimated Daily Nutrient Needs:  Energy (kcal):  15 kcal/kg= 7870-2451 kcal; Weight Used for Energy Requirements:   (92kg)     Protein (g):  92 based on 1 gm per kg (may vary with healing needs and renal status);  Weight Used for Protein Requirements:   (92kg)          Nutrition Related Findings:  mild edema all extremities/generalized, Labs/Meds: Reviewed, 100 ml out ostomy, no residuals      Wounds:  Venous Stasis, Skin Tears, Multiple       Current Nutrition Therapies:    Current Tube Feeding (TF) Orders:  · Feeding Route: Nasogastric  · Formula: Renal Formula  · Schedule: Continuous  · Additives/Modulars: Protein  · Current TF & Flush Orders Provides: Nepro at 40 ml per hour + 1 Protein modular provides 1836 kcal, 104 g protein    Anthropometric Measures:  · Height: 5' 2.99\" (160 cm)  · Current Body Weight: 199 lb (90.3 kg)   · Admission Body Weight: 204 lb (92.5 kg)    Ideal Body Weight: 115 lbs;     Nutrition Diagnosis:   · Increased nutrient needs related to  (healing) as evidenced by wounds    Nutrition Interventions:   Food and/or Nutrient Delivery:  Continue Current Tube Feeding  Nutrition Education/Counseling:  No recommendation at this time   Coordination of Nutrition Care:  Continue to monitor while inpatient    Goals:  Nutrition support will meet approximately 100% of estimated needs       Nutrition Monitoring and Evaluation:   Food/Nutrient Intake Outcomes:  Enteral Nutrition Intake/Tolerance  Physical Signs/Symptoms Outcomes:  Biochemical Data, GI Status, Fluid Status or Edema, Skin, Weight     Discharge Planning:    Enteral Nutrition     Electronically signed by Norma Navarrete RD, LD on 12/13/21 at 1:12 PM EST    Contact: [phone]

## 2021-12-13 NOTE — PROGRESS NOTES
[facility]: [facility]   OCCUPATIONAL THERAPY MISSED TREATMENT NOTE   INPATIENT   Date: 21  Patient Name: Janette Everett       Room:   MRN: [id number]   Account #: [de-identified]    : 1950  (66 y.o.)  Gender: female   Referring Practitioner: Yvan Lewis MD  Diagnosis: JAYLYN, UTI, anemia             REASON FOR MISSED TREATMENT:  Patient at testing and/or off the floor  1157 -   Hemodialysis       Ashley Melgoza EBONIE

## 2021-12-13 NOTE — PROGRESS NOTES
Pharmacy Aminoglycoside Consult    Aminoglycoside: 350 mg LD  Day: 2  Current Dosing: Pulse dosing HD    Temp max: 97.9    Recent Labs     12/12/21  0501 12/13/21  0548   CREATININE 3.44* 4.32*     Estimated Creatinine Clearance: 12 mL/min (A) (based on SCr of 4.32 mg/dL (H)).     Recent Labs     12/12/21  0501 12/13/21  0548   WBC 6.9 8.9       Culture Date      Source                 Results  12/9                     Pleural Fluid      Pending  12/10                   Blood                 Pending  12/11                   Trach                 MRSA, Acinetobacter, ESBL Klebsiella  12/11                   Covid                 neg    ASSESSMENT/PLAN:  Next Level 12/15 at [address] [place] [address] 99, [place]   12/13/2021  9:46 AM

## 2021-12-13 NOTE — PROGRESS NOTES
Speech Language Pathology  Speech Language Pathology  [address] [place] [address]    Dysphagia Treatment Note    Date: 12/13/2021  Patients Name: Edd Pritchard  MRN: [id number]  Diagnosis: Dysphagia  Patient Active Problem List   Diagnosis Code    Chronic kidney disease (CKD), stage III (moderate) ([facility] N18.30    Hyperkalemia E87.5    Type 2 diabetes mellitus with kidney complication, with long-term current use of insulin ([facility]) E11.29, Z79.4    Hyperlipidemia E78.5    CKD (chronic kidney disease) stage 3, GFR 30-59 ml/min ([facility]) N18.30    Microalbuminuria R80.9    Urinary calculus N20.9    Tethered cord syndrome ([facility]) Q06.8    Stenosis of cervical spine M48.02    Lumbar radiculopathy M54.16    Lower back pain M54.50    HTN (hypertension) I10    Esophageal reflux K21.9    Depression with anxiety F41.8    ASCVD (arteriosclerotic cardiovascular disease) I25.10    Closed fracture of thoracic vertebra ([facility] S22.009A    Intervertebral cervical disc disorder with myelopathy, cervical region M50.00    Pain in limb M79.609    Anemia D64.9    JAYLYN (acute kidney injury) ([facility] N17.9    Iron deficiency anemia D50.9    Hydronephrosis determined by ultrasound N13.30    Longstanding persistent atrial fibrillation ([facility]) I48.11    Acute metabolic encephalopathy R23.75    Acute respiratory failure ([facility]) J96.00    Acute cystitis without hematuria N30.00    Acute respiratory failure with hypoxia and hypercapnia ([facility]) J96.01, J96.02    Dilated cardiomyopathy ([facility]) I42.0       Pain: nodding head (yes)    Dysphagia Treatment  Treatment time: 5365-6591    Subjective: [x] Alert [x] Cooperative     [] Confused     [] Agitated    [] Lethargic    Objective/Assessment:    Pt. seen for dysphagia therapy. Pt. remains trach to vent, mouthing words to communicate. Pt. able to complete BOT strengthening exercises x6 sets in reps of 10 c mod cues (reduced lingual ROM noted during elevation and lateralization).   Pt. noted to tire easily with exercises and cough during, frequent breaks incorporated in session. Pt. able to complete volitional swallow x5 c max cues (wincing and coughing during swallows, continues to report swallow to be painful). Pt. reports completing exercises throughout day. Reinforced this. Continue with NPO recommendation. Plan:  [x] Continue ST services    [] Discharge from ST:        Discharge recommendations: [] Inpatient Rehab   [] [place]   [] Outpatient Therapy  [] Follow up at trauma clinic   [] Other:         Treatment completed by:  Roya Butler M.A., CCC-SLP

## 2021-12-13 NOTE — PROGRESS NOTES
Patient seen and examined. Afebrile, VSS. No leukocytosis, hemoglobin stable at 8.2. POD#10. Patient denies abdominal pain. Colostomy functioning. PEG site clean. Tolerating tube feeds at goal. No N/V. NPO; failed swallow study, working with speech therapy. Trach site with small amount of tan secretions but otherwise clean. Abdomen soft, non-tender, non-distended. Trach to vent. Surgically stable. Continue local trach and PEG site care. Continue medical management. Discharge to [facility] soon; waiting on precert. Patient was seen and examined. No new changes. Some drainage around the trach site but CT of the neck was negative. Tolerating tube feeds. Abdomen is soft. PEG site is clean. Continue medical management.     Kory Mora PA-C  [facility]

## 2021-12-13 NOTE — PROGRESS NOTES
ICU Progress Note (Vent)   [facility]    Patient - Rashel Armas,  Age - 66 y.o.    - 1950      Room Number -    MRN -  [id number]   Acct # - [de-identified]  Date of Admission -  11/10/2021  4:23 PM    Events of Past 24 Hours   Unsuccessful weaning once again, tolerated only 7 minutes yesterday    Vitals    height is 5' 2.99\" (1.6 m) and weight is 199 lb 11.8 oz (90.6 kg). Her temperature is 96.8 °F (36 °C). Her blood pressure is 106/51 (abnormal) and her pulse is 96. Her respiration is 28 and oxygen saturation is 97%. Temperature Range: Temp: 96.8 °F (36 °C) Temp  Av.1 °F (36.7 °C)  Min: 96.8 °F (36 °C)  Max: 99.2 °F (37.3 °C)  BP Range:  Systolic (62JKT), OBB:512 , Min:106 , KCE:051     Diastolic (03KLZ), NOP:11, Min:51, Max:98    Pulse Range: Pulse  Av.4  Min: 87  Max: 117  Respiration Range: Resp  Av.8  Min: 0  Max: 38  Current Pulse Ox[de-identified]  SpO2: 97 %  24HR Pulse Ox Range:  SpO2  Av.3 %  Min: 91 %  Max: 100 %  Oxygen Amount and Delivery: O2 Flow Rate (L/min): 2 L/min      Wt Readings from Last 3 Encounters:   21 199 lb 11.8 oz (90.6 kg)   16 178 lb (80.7 kg)   16 184 lb 12.8 oz (83.8 kg)     I/O       Intake/Output Summary (Last 24 hours) at 2021 1139  Last data filed at 2021 0917  Gross per 24 hour   Intake 715 ml   Output 200 ml   Net 515 ml     I/O last 3 completed shifts:   In: 715 [NG/GT:715]  Out: 100 [Stool:100]     DRAIN/TUBE OUTPUT:     Invasive Lines   ETT Day -   #22 on a ventilator, tracheostomy day 10  Lines -PICC line day 5    ICP PRESSURE RANGE:  No data recorded  CVP PRESSURE RANGE:  No data recorded  Mechanical Ventilation Data   SETTINGS (Comprehensive)  Vent Information  $Ventilation: $Subsequent Day  Skin Assessment: Clean, dry, & intact  Suction Catheter Diameter: 14  Equipment Changed: HME  Vent Type: Servo i  Vent Mode: PRVC  Vt Ordered: 450 mL  Rate Set: 20 bmp  Pressure Support: 10 cmH20  FiO2 : 21 %  SpO2: 97 %  SpO2/FiO2 ratio: 461.9  Sensitivity: 5  PEEP/CPAP: 5  I Time/ I Time %: 0.86 s  Humidification Source: HME  Nitric Oxide/Epoprostenol In Use?: No  Mask Type: Full face mask  Mask Size: Medium  Additional Respiratory  Assessments  Pulse: 96  Resp: 28  SpO2: 97 %  End Tidal CO2: 33 (%)  Position: Semi-Lagunas's  Humidification Source: HME  Oral Care: Lip moisturizer applied, Mouth swabbed  Cuff Pressure (cm H2O): 30 cm H2O       ABGs:   Lab Results   Component Value Date    PHART 7.495 12/11/2021    PO2ART 65.9 12/11/2021    ARF6HFS 36.5 12/11/2021       Lab Results   Component Value Date    MODE PRV 12/11/2021         Medications   IV   sodium chloride      sodium chloride      propofol Stopped (12/03/21 1030)    dextrose      sodium chloride        aminoglycoside intermittent dosing (placeholder)   Other RX Placeholder    vancomycin (VANCOCIN) intermittent dosing (placeholder)   Other RX Placeholder    vancomycin  1,000 mg IntraVENous Q MWF    epoetin tyshawn-epbx  10,000 Units SubCUTAneous Once per day on Mon Wed Fri    nystatin  5 mL Oral 4x Daily    miconazole   Topical BID    atorvastatin  40 mg Oral Daily    chlorhexidine  15 mL Mouth/Throat BID    [Held by provider] carvedilol  3.125 mg Oral BID WC    pantoprazole  40 mg IntraVENous BID    And    sodium chloride flush  10 mL IntraVENous BID    sodium polystyrene  15 g Oral Once    sertraline  25 mg Oral Daily    ferrous sulfate  325 mg Oral Daily with breakfast    [Held by provider] aspirin  81 mg Oral Daily    insulin glargine  22 Units SubCUTAneous Nightly    insulin lispro  0-12 Units SubCUTAneous TID WC    insulin lispro  0-6 Units SubCUTAneous Nightly    sodium chloride flush  5-40 mL IntraVENous 2 times per day       Diet/Nutrition   ADULT TUBE FEEDING; PEG; Renal Formula; Continuous; 15; Yes; 15; Q 4 hours; 40; 50; Q 4 hours; Protein; give 1 protein modular at noon daily    Exam   VITALS    height is 5' 2.99\" (1.6 m) and weight is 199 lb 11.8 oz (90.6 kg). Her temperature is 96.8 °F (36 °C). Her blood pressure is 106/51 (abnormal) and her pulse is 96. Her respiration is 28 and oxygen saturation is 97%. Ventilator Settings (Basic)  Vent Mode: PRVC Rate Set: 20 bmp/Vt Ordered: 450 mL/ /FiO2 : 21 %    Constitutional -not on any sedation  General Appearance obese   HEENT -tracheostomy in,normocephalic, atraumatic. PERRLA  Lungs - Chest expands equally, no wheezes, rales or rhonchi. Diminished breath  Cardiovascular - Heart sounds are normal.  normal rate and rhythm regular, no murmur, gallop or rub. Abdomen - soft, nontender, nondistended, no masses or organomegaly  Neurologic - CN II-XII are grossly intact.  There are no focal motor deficits  Skin - no bruising or bleeding  Extremities - no cyanosis, clubbing or edema    Lab Results   CBC     Lab Results   Component Value Date    WBC 8.9 12/13/2021    RBC 2.73 12/13/2021    RBC 4.14 08/31/2016    HGB 8.2 12/13/2021    HCT 25.7 12/13/2021     12/13/2021    MCV 94.0 12/13/2021    MCH 30.0 12/13/2021    MCHC 31.9 12/13/2021    RDW 17.8 12/13/2021    NRBC 4 11/22/2021    METASPCT 3 12/02/2021    LYMPHOPCT 17 12/13/2021    LYMPHOPCT 32.8 08/31/2016    MONOPCT 11 12/13/2021    EOSPCT 4.3 08/31/2016    BASOPCT 1 12/13/2021    BASOPCT 0.7 08/31/2016    MONOSABS 1.00 12/13/2021    LYMPHSABS 1.50 12/13/2021    EOSABS 0.30 12/13/2021    BASOSABS 0.10 12/13/2021    DIFFTYPE NOT REPORTED 12/13/2021       BMP   Lab Results   Component Value Date     12/13/2021    K 4.2 12/13/2021    CL 97 12/13/2021    CO2 26 12/13/2021    BUN 62 12/13/2021    CREATININE 4.32 12/13/2021    GLUCOSE 244 12/13/2021    GLUCOSE 77 08/31/2016    CALCIUM 8.7 12/13/2021       LFTS  Lab Results   Component Value Date    ALKPHOS 175 11/18/2021    ALT 31 11/18/2021    AST 21 11/18/2021    PROT 5.9 12/09/2021    BILITOT 0.27 11/18/2021    BILIDIR <0.08 11/13/2021    IBILI Connot be calculated 11/13/2021    LABALBU 2.6 11/18/2021       INR  No results for input(s): PROTIME, INR in the last 72 hours. APTT  No results for input(s): APTT in the last 72 hours. Lactic Acid  Lab Results   Component Value Date    LACTA 1.3 11/26/2021    LACTA 0.6 11/16/2021    LACTA 0.6 11/16/2021        BNP   No results for input(s): BNP in the last 72 hours. Cultures       Radiology     Plain Films         S x-ray shows right pleural effusion otherwise no significant change    SYSTEM ASSESSMENT      Acute on chronic hypoxic and hypercapnic respiratory failure  Bradycardic arrest, intubated 11/20; tracheostomy 12/3  Right lower lobe infiltrate/pleural effusion-status post thoracentesis 12/11 appears to be transudative  Acinetobacter infection, from tracheostomy site requiring amikacin  Acute kidney injury-requiring hemodialysis  Morbid obesity  Anemia, chronic disease and possible rectal bleed  Hypoalbuminemia  Thrombocytopenia  Nonischemic cardiomyopathy with EF of 30 to 35% possible right to left interatrial shunt  Atrial fibrillation  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   In fact, probably represents colonization his white count is now normal and patient has not had any high fever  Awaiting placement to [facility], she can go anytime from our perspective    Critical Care Time   0 min    Electronically signed by Vilma Javier MD on 12/13/2021 at 11:39 AM

## 2021-12-13 NOTE — PROGRESS NOTES
Pharmacy Note  Vancomycin Consult - Daily note   Vancomycin Therapy Day: 4  Current Dosing: Pulse HD    Last Temp: 97.9  Actual Weight:   Wt Readings from Last 1 Encounters:   12/10/21 199 lb 11.8 oz (90.6 kg)     Recent Labs     12/12/21  0501 12/13/21  0548   CREATININE 3.44* 4.32*     CrCl:  Patient is on dialysis. Recent Labs     12/12/21  0501 12/13/21  0548   WBC 6.9 8.9       Intake/Output Summary (Last 24 hours) at 12/13/2021 0950  Last data filed at 12/13/2021 0917  Gross per 24 hour   Intake 715 ml   Output 200 ml   Net 515 ml       Recent vancomycin administrations                     vancomycin (VANCOCIN) intermittent dosing (placeholder) ()  Given 12/10/21 1551    vancomycin (VANCOCIN) 1,250 mg in dextrose 5 % 250 mL IVPB (ADDAVIAL) (mg) 1,250 mg New Bag 12/10/21 1549    vancomycin 1000 mg IVPB in 250 mL D5W addavial (mg) 1,000 mg New Bag 12/10/21 1250                    Vancomycin Concentrations:   TROUGH:  No results for input(s): VANCOTROUGH in the last 72 hours. RANDOM:  No results for input(s): VANCORANDOM in the last 72 hours. ASSESSMENT/PLAN    Plan level prior to HD on 12/15/21        Pharmacy will Continue to follow. Thank you. Kaye Richard RP, RPh/PharmD  12/13/2021  9:50 AM    Intermittent Hemodialysis: Not using bayesian software for dosing  Maintenance Dosing = 10-20 mg/kg after HD sessions (max 2,000 mg per dose). Timing of concentration monitoring:  Critically ill - pre-HD level after the loading dose       Stable/stable dialysis:  pre-HD concentration after 1st or 2nd MD on dialysis 3 times/week  Pre-dialysis level Invasive MRSA Infection or Sepsis Non-Invasive Infection or Non-MRSA Infection   ? 25 mg/L Hold vancomycin dose, ? subsequent dose by 250 mg Hold vancomycin dose, ? subsequent dose by 250-500 mg   21-24 mg/L Continue current dose Decrease dose by 250 mg   15-20 mg/L Increase dose by 250 mg Continue current dose   ?  14 mg/L Increase dose by 250-500 mg Increase dose by 250 mg   Note: Pre-HD concentration monitoring is preferred, if concentrations are drawn after HD, the level must be collected no sooner than 2-4 hours after the end of the session to allow for maximum redistribution and plasma rebound.

## 2021-12-13 NOTE — FLOWSHEET NOTE
12/13/21 3578   Encounter Summary   Services provided to: Patient   Referral/Consult From: Palliative Care   Complexity of Encounter Low   Length of Encounter 15 minutes   Spiritual/Adventist   Type Spiritual support   Assessment Sleeping   Intervention Prayer

## 2021-12-13 NOTE — PROGRESS NOTES
HEMODIALYSIS POST TREATMENT NOTE    Treatment time ordered: 210   Actual treatment time: 210    UltraFiltration Goal: 2500  UltraFiltration Removed: 2100      Pre Treatment weight: 90.6  Post Treatment weight: 88.5  Estimated Dry Weight: na    Access used:     Central Venous Catheter:          Tunneled or Non-tunneled: tunneled           Site: right chest          Access Flow: good      Internal Access:       AV Fistula or AV Graft: na         Site: na       Access Flow: na       Sign and symptoms of infection: none       If YES: na    Medications or blood products given: albumon    Chronic outpatient schedule: na    Chronic outpatient unit: na    Summary of response to treatment: pt did well    Explain if orders NOT met, was physician notified:charles      ACES flowsheet faxed to patient unit/ placed in patient chart: yes    Post assessment completed: yes    Report given to: Tabby Limon documented Safety Checks include the followin) Access and face visible at all times. 2) All connections and blood lines are secure with no kinks. 3) NVL alarm engaged. 4) Hemosafe device applied (if applicable). 5) No collapse of Arterial or Venous blood chambers. 6) All blood lines / pump segments in the air detectors.

## 2021-12-14 ENCOUNTER — HOSPITAL ENCOUNTER (OUTPATIENT)
Dept: ICU | Age: 71
Discharge: HOME | End: 2021-12-14
Attending: INTERNAL MEDICINE | Admitting: INTERNAL MEDICINE

## 2021-12-14 VITALS
HEIGHT: 63 IN | WEIGHT: 195.11 LBS | RESPIRATION RATE: 16 BRPM | BODY MASS INDEX: 34.57 KG/M2 | DIASTOLIC BLOOD PRESSURE: 62 MMHG | TEMPERATURE: 99 F | HEART RATE: 91 BPM | SYSTOLIC BLOOD PRESSURE: 131 MMHG | OXYGEN SATURATION: 100 %

## 2021-12-14 LAB
-: NORMAL
ANION GAP SERPL CALCULATED.3IONS-SCNC: 13 MMOL/L (ref 9–17)
BUN BLDV-MCNC: 41 MG/DL (ref 8–23)
BUN/CREAT BLD: ABNORMAL (ref 9–20)
CALCIUM SERPL-MCNC: 8.5 MG/DL (ref 8.6–10.4)
CHLORIDE BLD-SCNC: 95 MMOL/L (ref 98–107)
CO2: 24 MMOL/L (ref 20–31)
CREAT SERPL-MCNC: 3.05 MG/DL (ref 0.5–0.9)
GFR AFRICAN AMERICAN: 18 ML/MIN
GFR NON-AFRICAN AMERICAN: 15 ML/MIN
GFR SERPL CREATININE-BSD FRML MDRD: ABNORMAL ML/MIN/{1.73_M2}
GFR SERPL CREATININE-BSD FRML MDRD: ABNORMAL ML/MIN/{1.73_M2}
GLUCOSE BLD-MCNC: 133 MG/DL (ref 65–105)
GLUCOSE BLD-MCNC: 167 MG/DL (ref 65–105)
GLUCOSE BLD-MCNC: 208 MG/DL (ref 70–99)
MAGNESIUM: 1.9 MG/DL (ref 1.6–2.6)
POTASSIUM SERPL-SCNC: 4.1 MMOL/L (ref 3.7–5.3)
REASON FOR REJECTION: NORMAL
SODIUM BLD-SCNC: 132 MMOL/L (ref 135–144)
ZZ NTE CLEAN UP: ORDERED TEST: NORMAL
ZZ NTE WITH NAME CLEAN UP: SPECIMEN SOURCE: NORMAL

## 2021-12-14 PROCEDURE — 94003 VENT MGMT INPAT SUBQ DAY: CPT

## 2021-12-14 PROCEDURE — 6370000000 HC RX 637 (ALT 250 FOR IP): Performed by: INTERNAL MEDICINE

## 2021-12-14 PROCEDURE — 6360000002 HC RX W HCPCS: Performed by: SURGERY

## 2021-12-14 PROCEDURE — 2580000003 HC RX 258: Performed by: SURGERY

## 2021-12-14 PROCEDURE — 82947 ASSAY GLUCOSE BLOOD QUANT: CPT

## 2021-12-14 PROCEDURE — 99233 SBSQ HOSP IP/OBS HIGH 50: CPT | Performed by: INTERNAL MEDICINE

## 2021-12-14 PROCEDURE — 36415 COLL VENOUS BLD VENIPUNCTURE: CPT

## 2021-12-14 PROCEDURE — 6370000000 HC RX 637 (ALT 250 FOR IP): Performed by: SURGERY

## 2021-12-14 PROCEDURE — 97110 THERAPEUTIC EXERCISES: CPT

## 2021-12-14 PROCEDURE — 97530 THERAPEUTIC ACTIVITIES: CPT

## 2021-12-14 PROCEDURE — 6370000000 HC RX 637 (ALT 250 FOR IP): Performed by: FAMILY MEDICINE

## 2021-12-14 PROCEDURE — 80048 BASIC METABOLIC PNL TOTAL CA: CPT

## 2021-12-14 PROCEDURE — C9113 INJ PANTOPRAZOLE SODIUM, VIA: HCPCS | Performed by: SURGERY

## 2021-12-14 PROCEDURE — 92526 ORAL FUNCTION THERAPY: CPT

## 2021-12-14 PROCEDURE — 2580000003 HC RX 258: Performed by: INTERNAL MEDICINE

## 2021-12-14 PROCEDURE — 94761 N-INVAS EAR/PLS OXIMETRY MLT: CPT

## 2021-12-14 PROCEDURE — 2700000000 HC OXYGEN THERAPY PER DAY

## 2021-12-14 PROCEDURE — 6360000002 HC RX W HCPCS: Performed by: INTERNAL MEDICINE

## 2021-12-14 PROCEDURE — 83735 ASSAY OF MAGNESIUM: CPT

## 2021-12-14 RX ORDER — OXYCODONE HYDROCHLORIDE AND ACETAMINOPHEN 5; 325 MG/1; MG/1
1 TABLET ORAL EVERY 8 HOURS PRN
Qty: 9 TABLET | Refills: 0 | Status: SHIPPED | OUTPATIENT
Start: 2021-12-14 | End: 2021-12-17

## 2021-12-14 RX ORDER — CARVEDILOL 3.12 MG/1
3.12 TABLET ORAL 2 TIMES DAILY
Qty: 60 TABLET | Refills: 3 | Status: SHIPPED | OUTPATIENT
Start: 2021-12-14

## 2021-12-14 RX ORDER — ATORVASTATIN CALCIUM 40 MG/1
40 TABLET, FILM COATED ORAL DAILY
Qty: 30 TABLET | Refills: 3 | Status: CANCELLED | OUTPATIENT
Start: 2021-12-14

## 2021-12-14 RX ORDER — SERTRALINE HYDROCHLORIDE 25 MG/1
25 TABLET, FILM COATED ORAL DAILY
Qty: 30 TABLET | Refills: 3 | Status: SHIPPED | OUTPATIENT
Start: 2021-12-14

## 2021-12-14 RX ORDER — ATORVASTATIN CALCIUM 20 MG/1
20 TABLET, FILM COATED ORAL DAILY
Qty: 30 TABLET | Refills: 3 | Status: SHIPPED | OUTPATIENT
Start: 2021-12-14

## 2021-12-14 RX ADMIN — FERROUS SULFATE TAB 325 MG (65 MG ELEMENTAL FE) 325 MG: 325 (65 FE) TAB at 08:33

## 2021-12-14 RX ADMIN — SODIUM CHLORIDE 50 MG: 9 INJECTION, SOLUTION INTRAVENOUS at 10:59

## 2021-12-14 RX ADMIN — PANTOPRAZOLE SODIUM 40 MG: 40 INJECTION, POWDER, FOR SOLUTION INTRAVENOUS at 08:33

## 2021-12-14 RX ADMIN — OXYCODONE HYDROCHLORIDE AND ACETAMINOPHEN 1 TABLET: 5; 325 TABLET ORAL at 05:18

## 2021-12-14 RX ADMIN — ANTI-FUNGAL POWDER MICONAZOLE NITRATE TALC FREE: 1.42 POWDER TOPICAL at 08:34

## 2021-12-14 RX ADMIN — INSULIN LISPRO 4 UNITS: 100 INJECTION, SOLUTION INTRAVENOUS; SUBCUTANEOUS at 08:35

## 2021-12-14 RX ADMIN — MEROPENEM 500 MG: 500 INJECTION, POWDER, FOR SOLUTION INTRAVENOUS at 17:28

## 2021-12-14 RX ADMIN — NYSTATIN 500000 UNITS: 500000 SUSPENSION ORAL at 12:00

## 2021-12-14 RX ADMIN — NYSTATIN 500000 UNITS: 500000 SUSPENSION ORAL at 08:33

## 2021-12-14 RX ADMIN — INSULIN LISPRO 2 UNITS: 100 INJECTION, SOLUTION INTRAVENOUS; SUBCUTANEOUS at 11:58

## 2021-12-14 RX ADMIN — NYSTATIN 500000 UNITS: 500000 SUSPENSION ORAL at 17:28

## 2021-12-14 RX ADMIN — SODIUM CHLORIDE, PRESERVATIVE FREE 10 ML: 5 INJECTION INTRAVENOUS at 08:34

## 2021-12-14 RX ADMIN — CHLORHEXIDINE GLUCONATE 0.12% ORAL RINSE 15 ML: 1.2 LIQUID ORAL at 08:33

## 2021-12-14 RX ADMIN — SODIUM CHLORIDE, PRESERVATIVE FREE 10 ML: 5 INJECTION INTRAVENOUS at 08:33

## 2021-12-14 RX ADMIN — SERTRALINE HYDROCHLORIDE 25 MG: 50 TABLET ORAL at 08:32

## 2021-12-14 RX ADMIN — ATORVASTATIN CALCIUM 40 MG: 40 TABLET, FILM COATED ORAL at 08:33

## 2021-12-14 ASSESSMENT — PULMONARY FUNCTION TESTS
PIF_VALUE: 15
PIF_VALUE: 17
PIF_VALUE: 19
PIF_VALUE: 20
PIF_VALUE: 21

## 2021-12-14 ASSESSMENT — ENCOUNTER SYMPTOMS
TROUBLE SWALLOWING: 0
EYE REDNESS: 0
CHEST TIGHTNESS: 0
NAUSEA: 0
SHORTNESS OF BREATH: 0
ABDOMINAL PAIN: 0
VOMITING: 0
COLOR CHANGE: 0
BLOOD IN STOOL: 0
EYE ITCHING: 0
COUGH: 0

## 2021-12-14 ASSESSMENT — PAIN SCALES - GENERAL: PAINLEVEL_OUTOF10: 7

## 2021-12-14 NOTE — PROGRESS NOTES
Speech Language Pathology  Speech Language Pathology  [facility]    Dysphagia Treatment Note    Date: 12/14/2021  Patients Name: Saleem Cheatham  MRN: [id number]  Diagnosis: Dysphagia  Patient Active Problem List   Diagnosis Code    Chronic kidney disease (CKD), stage III (moderate) ([facility] N18.30    Hyperkalemia E87.5    Type 2 diabetes mellitus with kidney complication, with long-term current use of insulin ([facility]) E11.29, Z79.4    Hyperlipidemia E78.5    CKD (chronic kidney disease) stage 3, GFR 30-59 ml/min ([facility]) N18.30    Microalbuminuria R80.9    Urinary calculus N20.9    Tethered cord syndrome ([facility]) Q06.8    Stenosis of cervical spine M48.02    Lumbar radiculopathy M54.16    Lower back pain M54.50    HTN (hypertension) I10    Esophageal reflux K21.9    Depression with anxiety F41.8    ASCVD (arteriosclerotic cardiovascular disease) I25.10    Closed fracture of thoracic vertebra ([facility] S22.009A    Intervertebral cervical disc disorder with myelopathy, cervical region M50.00    Pain in limb M79.609    Anemia D64.9    JAYLYN (acute kidney injury) ([facility] N17.9    Iron deficiency anemia D50.9    Hydronephrosis determined by ultrasound N13.30    Longstanding persistent atrial fibrillation ([facility]) I48.11    Acute metabolic encephalopathy A98.72    Acute respiratory failure ([facility]) J96.00    Acute cystitis without hematuria N30.00    Acute respiratory failure with hypoxia and hypercapnia ([facility]) J96.01, J96.02    Dilated cardiomyopathy ([facility]) I42.0       Pain: nodding head (yes)    Dysphagia Treatment  Treatment time: 4358-0529    Subjective: [x] Alert [x] Cooperative     [] Confused     [] Agitated    [] Lethargic    Objective/Assessment:    Pt. seen for dysphagia therapy. Pt. remains trach to vent, mouthing words to communicate. Pt. able to complete BOT strengthening exercises x7 sets in reps of 10 c mod cues, improved performance on all exercises today.   Pt. continues to demonstrate reduced lingual ROM noted during elevation and lateralization. Pt. able to complete volitional swallow x7 c max cues. Pt. noted to tire easily with exercises, frequent breaks incorporated in session. Pt. reports completing exercises throughout day. Reinforced this. Continue with NPO recommendation. Plan:  [x] Continue ST services    [] Discharge from ST:        Discharge recommendations: [] Inpatient Rehab   [] [place]   [] Outpatient Therapy  [] Follow up at trauma clinic   [] Other:         Treatment completed by:  Bj Estrada M.A., CCC-SLP

## 2021-12-14 NOTE — PROGRESS NOTES
LETTY informed by Carolan Schilder at [facility] that pt received authorization. [facility] is able to accept pt today. LETTY will schedule transportation for this afternoon.

## 2021-12-14 NOTE — DISCHARGE SUMMARY
examined on day of discharge and this discharge summary is in conjunction with daily progress note from day of discharge. Code Status:  [facility] Course:   H&P Reviewed. Patient medical history of PAF, diabetes hyperlipidemia hypertension, dilated cardiomyopathy was admitted with anemia, JAYLYN with bilateral hydronephrosis, anemia pneumonia. He was started on IV fluid therapy, IV antibiotics. Patient developed acute respiratory failure during hospital stay requiring intubation patient symptoms continue to deteriorate requiring hemodialysis. Patient anticoagulation was discontinued due to symptomatic anemia requiring 1 unit of packed RBC transfusion during hospital stay. GI managed the patient conservatively. Patient had a prolonged hospital course. Patient failed intubated weaning trial requiring a trach. Patient received PEG tube during hospital stay for poor nutrition. Patient developed trach site infection requiring IV and IV Tygacil till 12/20/2021 per ID recommendation. Patient symptoms stabilized during hospital stay. Patient is being discharged in stable condition to LTAC. Discharge day progress note: Today   Patient was seen and examined at bedside today. Hemodynamically stable. No chest pain, shortness of breath, fever, chills, nausea, vomiting, palpitations, or abdominal pain reported. No events reported overnight.     Review of Systems    Physical Exam    Consults:  IP CONSULT TO NEPHROLOGY  IP CONSULT TO PRIMARY CARE PROVIDER  IP CONSULT TO GI  IP CONSULT TO UROLOGY  IP CONSULT TO DIETITIAN  IP CONSULT TO GI  IP CONSULT TO CARDIOLOGY  IP CONSULT TO PULMONOLOGY  IP CONSULT TO GENERAL SURGERY  IP CONSULT TO NEUROLOGY  IP CONSULT TO DIETITIAN  IP CONSULT TO ONCOLOGY  IP CONSULT TO DIETITIAN  IP CONSULT TO PALLIATIVE CARE  IP CONSULT TO SOCIAL WORK  IP CONSULT TO SPIRITUAL SERVICES  IP CONSULT TO GENERAL SURGERY  IP CONSULT TO INFECTIOUS DISEASES  PHARMACY TO DOSE MEDICATION    Significant Diagnostic Studies: as above, and as follows:    Treatments: as above    Disposition: long term care facility    Discharged Condition: Stable    Follow Up:  Eulalio Murillo DO in one week  Pulmonology in 1 week  Neurology in 1 week    Discharge Medications:      Medication List      START taking these medications    oxyCODONE-acetaminophen 5-325 MG per tablet  Commonly known as: PERCOCET  Take 1 tablet by mouth every 8 hours as needed for Pain for up to 3 days.      sertraline 25 MG tablet  Commonly known as: ZOLOFT  Take 1 tablet by mouth daily        CHANGE how you take these medications    atorvastatin 20 MG tablet  Commonly known as: Lipitor  Take 1 tablet by mouth daily  What changed:   · medication strength  · how much to take     carvedilol 3.125 MG tablet  Commonly known as: Coreg  Take 1 tablet by mouth 2 times daily  What changed:   · medication strength  · how much to take  · when to take this        CONTINUE taking these medications    famotidine 20 MG tablet  Commonly known as: PEPCID     ferrous sulfate 325 (65 Fe) MG tablet  Commonly known as: IRON 325     insulin lispro 100 UNIT/ML injection vial  Commonly known as: HUMALOG     Lantus SoloStar 100 UNIT/ML injection pen  Generic drug: insulin glargine        STOP taking these medications    acetaminophen 325 MG tablet  Commonly known as: TYLENOL     albuterol (2.5 MG/3ML) 0.083% nebulizer solution  Commonly known as: PROVENTIL     aspirin 81 MG chewable tablet     bumetanide 1 MG tablet  Commonly known as: BUMEX     HYDROcodone-acetaminophen 5-325 MG per tablet  Commonly known as: NORCO     meclizine 12.5 MG tablet  Commonly known as: ANTIVERT     warfarin 3 MG tablet  Commonly known as: COUMADIN           Where to Get Your Medications      You can get these medications from any pharmacy    Bring a paper prescription for each of these medications  · atorvastatin 20 MG tablet  · carvedilol 3.125 MG tablet  · oxyCODONE-acetaminophen 5-325 MG per tablet  · sertraline 25 MG tablet                  Time Spent on discharge is more than  35 min in the examination, evaluation, counseling and review of medications and discharge plan.       Electronically signed by Mame Patrick MD     Copy sent to Dr. Barbara Almazan DO

## 2021-12-14 NOTE — PROGRESS NOTES
Earliest transport time was 8:30PM. [facility] is able to accept pt at this time. LETTY spoke with Nikki Lao with [facility] to confirm pt's vent settings. Nikki Lao confirmed they had the settings noted. LETTY called Essie Tayo, pt's spouse to inform him on pt's discharge. Spouse was given [facility]'s number and thanked the hospital for caring for his wife.

## 2021-12-14 NOTE — PROGRESS NOTES
ICU Progress Note (Vent)   [facility]    Patient - Vilma Warren,  Age - 66 y.o.    - 1950      Room Number -    N -  [id number]   [facility]  Date of Admission -  11/10/2021  4:23 PM    Events of Past 24 Hours   Currently on CPAP of 5 pressure support of 12, tidal volumes around 350, respiratory rate in the 30s. She does not appear to be in any distress and she denies any distress/dyspnea    Vitals    height is 5' 2.99\" (1.6 m) and weight is 195 lb 1.7 oz (88.5 kg). Her oral temperature is 99 °F (37.2 °C). Her blood pressure is 131/62 and her pulse is 99. Her respiration is 6 (abnormal) and oxygen saturation is 100%. Temperature Range: Temp: 99 °F (37.2 °C) Temp  Av.5 °F (36.9 °C)  Min: 97.8 °F (36.6 °C)  Max: 99 °F (37.2 °C)  BP Range:  Systolic (85AYM), WCT:088 , Min:97 , BVE:408     Diastolic (04KOZ), UKB:15, Min:51, Max:96    Pulse Range: Pulse  Av.3  Min: 79  Max: 107  Respiration Range: Resp  Av.1  Min: 0  Max: 32  Current Pulse Ox[de-identified]  SpO2: 100 %  24HR Pulse Ox Range:  SpO2  Av.2 %  Min: 90 %  Max: 100 %  Oxygen Amount and Delivery: O2 Flow Rate (L/min): 2 L/min      Wt Readings from Last 3 Encounters:   21 195 lb 1.7 oz (88.5 kg)   16 178 lb (80.7 kg)   16 184 lb 12.8 oz (83.8 kg)     I/O       Intake/Output Summary (Last 24 hours) at 2021 1115  Last data filed at 2021 0833  Gross per 24 hour   Intake 1785 ml   Output 3150 ml   Net -1365 ml     I/O last 3 completed shifts:   In: 0216 [NG/GT:1185; IV Piggyback:100]  Out: 3125 [Emesis/NG output:200; Stool:325]     DRAIN/TUBE OUTPUT:     Invasive Lines   ETT Day -   #23 on the ventilator, tracheostomy day 11  Lines -PICC line day 6    ICP PRESSURE RANGE:  No data recorded  CVP PRESSURE RANGE:  No data recorded  Mechanical Ventilation Data   SETTINGS (Comprehensive)  Vent Information  $Ventilation: $Subsequent Day  Skin Assessment: Clean, dry, & intact  Suction Catheter Diameter: 14  Equipment Changed: HME  Vent Type: Servo i  Vent Mode: (P) CPAP  Vt Ordered: 450 mL  Rate Set: 20 bmp  Pressure Support: 10 cmH20  FiO2 : 21 %  SpO2: 100 %  SpO2/FiO2 ratio: 476.19  Sensitivity: 5  PEEP/CPAP: 5  I Time/ I Time %: 1 s  Humidification Source: (P) HME  Nitric Oxide/Epoprostenol In Use?: (P) No  Mask Type: Full face mask  Mask Size: Medium  Additional Respiratory  Assessments  Pulse: 99  Resp: (!) 6  SpO2: 100 %  End Tidal CO2: 35 (%)  Position: Semi-Lagunas's  Humidification Source: (P) HME  Oral Care: Lip moisturizer applied, Mouth swabbed  Cuff Pressure (cm H2O): 30 cm H2O       ABGs:   Lab Results   Component Value Date    PHART 7.495 12/11/2021    PO2ART 65.9 12/11/2021    PAN2DSD 36.5 12/11/2021       Lab Results   Component Value Date    MODE PRV 12/11/2021         Medications   IV   sodium chloride      sodium chloride      propofol Stopped (12/03/21 1030)    dextrose      sodium chloride        meropenem  500 mg IntraVENous Q24H    tigecycline (TYGACIL) IVPB  50 mg IntraVENous Q12H    epoetin tyshawn-epbx  10,000 Units SubCUTAneous Once per day on Mon Wed Fri    nystatin  5 mL Oral 4x Daily    miconazole   Topical BID    atorvastatin  40 mg Oral Daily    chlorhexidine  15 mL Mouth/Throat BID    [Held by provider] carvedilol  3.125 mg Oral BID WC    pantoprazole  40 mg IntraVENous BID    And    sodium chloride flush  10 mL IntraVENous BID    sodium polystyrene  15 g Oral Once    sertraline  25 mg Oral Daily    ferrous sulfate  325 mg Oral Daily with breakfast    [Held by provider] aspirin  81 mg Oral Daily    insulin glargine  22 Units SubCUTAneous Nightly    insulin lispro  0-12 Units SubCUTAneous TID WC    insulin lispro  0-6 Units SubCUTAneous Nightly    sodium chloride flush  5-40 mL IntraVENous 2 times per day       Diet/Nutrition   ADULT TUBE FEEDING; PEG; Renal Formula; Continuous; 15; Yes; 15; Q 4 hours; 40; 50; Q 4 hours; Protein; give 1 protein modular at noon daily    Exam   VITALS    height is 5' 2.99\" (1.6 m) and weight is 195 lb 1.7 oz (88.5 kg). Her oral temperature is 99 °F (37.2 °C). Her blood pressure is 131/62 and her pulse is 99. Her respiration is 6 (abnormal) and oxygen saturation is 100%. Ventilator Settings (Basic)  Vent Mode: (P) CPAP Rate Set: 20 bmp/Vt Ordered: 450 mL/ /FiO2 : 21 %    Constitutional -alert, oriented  General Appearance obese  HEENT -tracheostomy in place,normocephalic, atraumatic. PERRLA  Lungs - Chest expands equally, no wheezes, rales or rhonchi. Cardiovascular - Heart sounds are normal.  normal rate and rhythm regular, no murmur, gallop or rub. Abdomen - soft, nontender, nondistended, no masses or organomegaly  Neurologic - CN II-XII are grossly intact.  There are no focal motor deficits  Skin - no bruising or bleeding  Extremities - no cyanosis, clubbing; lower extremities are wrapped  Lab Results   CBC     Lab Results   Component Value Date    WBC 8.9 12/13/2021    RBC 2.73 12/13/2021    RBC 4.14 08/31/2016    HGB 8.2 12/13/2021    HCT 25.7 12/13/2021     12/13/2021    MCV 94.0 12/13/2021    MCH 30.0 12/13/2021    MCHC 31.9 12/13/2021    RDW 17.8 12/13/2021    NRBC 4 11/22/2021    METASPCT 3 12/02/2021    LYMPHOPCT 17 12/13/2021    LYMPHOPCT 32.8 08/31/2016    MONOPCT 11 12/13/2021    EOSPCT 4.3 08/31/2016    BASOPCT 1 12/13/2021    BASOPCT 0.7 08/31/2016    MONOSABS 1.00 12/13/2021    LYMPHSABS 1.50 12/13/2021    EOSABS 0.30 12/13/2021    BASOSABS 0.10 12/13/2021    DIFFTYPE NOT REPORTED 12/13/2021       BMP   Lab Results   Component Value Date     12/14/2021    K 4.1 12/14/2021    CL 95 12/14/2021    CO2 24 12/14/2021    BUN 41 12/14/2021    CREATININE 3.05 12/14/2021    GLUCOSE 208 12/14/2021    GLUCOSE 77 08/31/2016    CALCIUM 8.5 12/14/2021       LFTS  Lab Results   Component Value Date    ALKPHOS 175 11/18/2021    ALT 31 11/18/2021    AST 21 11/18/2021 PROT 5.9 12/09/2021    BILITOT 0.27 11/18/2021    BILIDIR <0.08 11/13/2021    IBILI Connot be calculated 11/13/2021    LABALBU 2.6 11/18/2021       INR  No results for input(s): PROTIME, INR in the last 72 hours. APTT  No results for input(s): APTT in the last 72 hours. Lactic Acid  Lab Results   Component Value Date    LACTA 1.3 11/26/2021    LACTA 0.6 11/16/2021    LACTA 0.6 11/16/2021        BNP   No results for input(s): BNP in the last 72 hours.      Cultures       Radiology         SYSTEM ASSESSMENT    Acute on chronic hypoxic and hypercapnic respiratory failure  Bradycardic arrest, intubated 11/20; tracheostomy 12/3  Right lower lobe infiltrate/pleural effusion-status post thoracentesis 12/11 appears to be transudative  Acinetobacter infection, from tracheostomy site requiring amikacin  Acute kidney injury-requiring hemodialysis  Morbid obesity  Anemia, chronic disease and possible rectal bleed  Hypoalbuminemia  Thrombocytopenia  Nonischemic cardiomyopathy with EF of 30 to 35% possible right to left interatrial shunt  Atrial fibrillation  Possible CLL/lymphoproliferative disorder  DNR CCA    Seems to be tolerating weaning better today, will decrease pressure support, if she can tolerate CPAP for about 2 hours we can try trach collar  Continue to rest on full support at night  She feels frustrated because is hard to communicate with her  Tube feeds are at goal  Still awaiting placement to LTAC  Updated family members        Critical Care Time   0 min    Electronically signed by Horacio Rios MD on 12/14/2021 at 11:15 AM

## 2021-12-14 NOTE — PROGRESS NOTES
[facility] -   Infectious diseases evaluation  admission date 11/10/2021    reason for consultation:   Tracheostomy site drainage    Impression :   Current:  · Infection at the tracheostomy site   · MRSA colonization  · Acute renal failure on hemodialysis tunneled hemodialysis catheter was placed on 12/7/2021  · Respiratory failure   · Status post tracheostomy and PEG tube placement 12/3/2021  · Diabetes mellitus  · Coronary artery disease  · Cardiomyopathy with ejection fraction of 30 to 35%   · Atrial fibrillation  · Morbid obesity  · Right abdominal wall wound with no signs of infection      Recommendations     · IV Tygacil and meropenem through 12/20/21  · Follow final tracheostomy site culture /adjust antibiotics as needed  · Diflucan was started 12/4/2021 completed 12/9/2021  · No growth on pleural fluid culture   · Repeat blood cultures from 12/10/2021 no growth   · Follow CBC  · [facility]  · Continue supportive care          History of Present Illness:   Initial history:  Citlali Juarez is a 66y.o.-year-old female  presented initially on 11/10/2021 with acute respiratory failure. Patient had code blue on 11/20/2021 secondary to bradycardia. CPR performed and patient was intubated, subsequently had tracheostomy and PEG tube placement on 12/3/2021  She had right lower lobe infiltrate with pleural effusion status post thoracentesis on 12/11/2021 was transudate .   She developed acute renal failure requiring hemodialysis through tunneled hemodialysis catheter that was placed 12/7/2021  She had a fever with a temperature max of 100.5 on 12/4/2021  Urine culture grew Candida albicans on 12/2/21  COVID-19 rapid test was negative on 11/15/2021 and 12/1/2021  No growth on blood culture from 11/16/2021  Nasal swab for MRSA was + 11/21/2021  The patient received a course of ceftriaxone 11/10/2021 through 11/15/2021 through 11/2721  She received IV cefepime 11/16/2021  Zyvox was started 11/21/2021 through 11/27/2021  Ceftriaxone was restarted 12/2/21 through 12/5/2021  Interval changes  12/14/2021   The patient awake, tracheostomy in place with less redness and drainage at the site, tolerating weaning, tolerating tube feed ,no fever,no new events. 12/10/21MRSA, ESBL producing Klebsiella pneumoniae and multidrug resistant Acinetobacter growth at  tracheostomy site  PICC line was placed 12/9/2021   CT soft tissue neck report is negative for abscess    Patient Vitals for the past 8 hrs:   Pulse Resp SpO2   12/14/21 1512 -- 16 100 %   12/14/21 1157 91 -- 100 %   12/14/21 1110 95 26 99 %           I have personally reviewed the past medical history, past surgical history, medications, social history, and family history, and I haveupdated the database accordingly. Allergies:   Patient has no known allergies. Review of Systems:     Review of Systems  Status post tracheostomy, limited, unable to provide  Physical Examination :       Physical Exam  Constitutional:       General: She is not in acute distress. HENT:      Right Ear: External ear normal.      Left Ear: External ear normal.   Eyes:      General: No scleral icterus. Conjunctiva/sclera: Conjunctivae normal.   Neck:      Comments: Tracheostomy in place with yellow drainage, surrounding erythema, no fluctuation or crepitance  Cardiovascular:      Rate and Rhythm: Normal rate. Heart sounds: No murmur heard. Pulmonary:      Effort: No respiratory distress. Breath sounds: Normal breath sounds. No wheezing. Abdominal:      General: There is no distension. Palpations: Abdomen is soft. Tenderness: There is no abdominal tenderness. Musculoskeletal:      Cervical back: Neck supple. No rigidity. Right lower leg: Edema present. Left lower leg: Edema present. Skin:     General: Skin is warm. Coloration: Skin is not jaundiced.       Findings: No bruising. Comments: Right abdominal wall wound with no signs of infection   Neurological:      Mental Status: She is alert.          Past Medical History:     Past Medical History:   Diagnosis Date    Anemia, unspecified     CAD (coronary artery disease)     Chronic kidney disease     CKD (chronic kidney disease) stage 3, GFR 30-59 ml/min ([facility])     Closed fracture of dorsal (thoracic) vertebra without mention of spinal cord injury     Coronary atherosclerosis of unspecified type of vessel, native or graft     Depression with anxiety     Esophageal reflux     HTN (hypertension)     Hyperkalemia     Hyperlipidemia     Intervertebral cervical disc disorder with myelopathy, cervical region     Lower back pain     Lumbar radiculopathy     Microalbuminuria     Muscle weakness     Pain in limb     Stenosis of cervical spine     Tethered cord syndrome ([facility])     Type II or unspecified type diabetes mellitus without mention of complication, not stated as uncontrolled     Urinary calculus     UTI (lower urinary tract infection)        Past Surgical  History:     Past Surgical History:   Procedure Laterality Date    ANGIOPLASTY      COLON SURGERY      CYSTOSCOPY Right 2021    CYSTOSCOPY URETERAL STENT INSERTION performed by John De La Torre MD at [address] [place] [address] IR NONTUNNELED VASCULAR CATHETER  2021    IR NONTUNNELED VASCULAR CATHETER 2021 [facility]    JOINT REPLACEMENT      TRACHEOSTOMY N/A 12/3/2021    TRACHEOTOMY performed by Kraig Abernathy MD at [facility] N/A 11/15/2021    EGD BIOPSY performed by La Nena Palafox MD at [facility] N/A 12/3/2021    EGD ESOPHAGOGASTRODUODENOSCOPY PEG TUBE INSERTION performed by Kraig Abernathy MD at [address] [place] [address] OR       Medications:      meropenem  500 mg IntraVENous Q24H    tigecycline (TYGACIL) IVPB  50 mg IntraVENous Q12H    epoetin tyshawn-epbx  10,000 Units SubCUTAneous Once per day on Mon Wed Fri    nystatin  5 mL Oral 4x Daily    miconazole   Topical BID    atorvastatin  40 mg Oral Daily    chlorhexidine  15 mL Mouth/Throat BID    [Held by provider] carvedilol  3.125 mg Oral BID WC    pantoprazole  40 mg IntraVENous BID    And    sodium chloride flush  10 mL IntraVENous BID    sodium polystyrene  15 g Oral Once    sertraline  25 mg Oral Daily    ferrous sulfate  325 mg Oral Daily with breakfast    [Held by provider] aspirin  81 mg Oral Daily    insulin glargine  22 Units SubCUTAneous Nightly    insulin lispro  0-12 Units SubCUTAneous TID WC    insulin lispro  0-6 Units SubCUTAneous Nightly    sodium chloride flush  5-40 mL IntraVENous 2 times per day       Social History:     Social History     Socioeconomic History    Marital status:      Spouse name: Not on file    Number of children: Not on file    Years of education: Not on file    Highest education level: Not on file   Occupational History    Not on file   Tobacco Use    Smoking status: Never Smoker    Smokeless tobacco: Not on file   Substance and Sexual Activity    Alcohol use: No     Alcohol/week: 0.0 standard drinks    Drug use: No    Sexual activity: Not on file   Other Topics Concern    Not on file   Social History Narrative    Not on file     Social Determinants of Health     Financial Resource Strain:     Difficulty of Paying Living Expenses: Not on file   Food Insecurity:     Worried About Running Out of Food in the Last Year: Not on file    Katya of Food in the Last Year: Not on file   Transportation Needs:     Lack of Transportation (Medical): Not on file    Lack of Transportation (Non-Medical):  Not on file   Physical Activity:     Days of Exercise per Week: Not on file    Minutes of Exercise per Session: Not on file   Stress:     Feeling of Stress : Not on file   Social Connections:     Frequency of Communication with Friends and Family: Not on file    Frequency of Social Gatherings with Friends and Family: Not on file    Attends Voodoo Services: Not on file    Active Member of Clubs or Organizations: Not on file    Attends Club or Organization Meetings: Not on file    Marital Status: Not on file   Intimate Partner Violence:     Fear of Current or Ex-Partner: Not on file    Emotionally Abused: Not on file    Physically Abused: Not on file    Sexually Abused: Not on file   Housing Stability:     Unable to Pay for Housing in the Last Year: Not on file    Number of Places Lived in the Last Year: Not on file    Unstable Housing in the Last Year: Not on file       Family History:     Family History   Problem Relation Age of Onset    High Blood Pressure Other       Medical Decision Making:   I have independently reviewed/ordered the following labs:    CBC with Differential:   Recent Labs     12/12/21  0501 12/13/21  0548   WBC 6.9 8.9   HGB 8.4* 8.2*   HCT 26.1* 25.7*   * 148*   LYMPHOPCT 16* 17*   MONOPCT 11* 11*     BMP:  Recent Labs     12/13/21  0548 12/14/21  0540   * 132*   K 4.2 4.1   CL 97* 95*   CO2 26 24   BUN 62* 41*   CREATININE 4.32* 3.05*   MG 2.0 1.9     Hepatic Function Panel:   No results for input(s): PROT, LABALBU, BILIDIR, IBILI, BILITOT, ALKPHOS, ALT, AST in the last 72 hours. No results for input(s): RPR in the last 72 hours. No results for input(s): HIV in the last 72 hours. No results for input(s): BC in the last 72 hours. Lab Results   Component Value Date    CREATININE 3.05 12/14/2021    GLUCOSE 208 12/14/2021    GLUCOSE 77 08/31/2016       Detailed results: Thank you for allowing us to participate in the care of this patient. Please call with questions.     This note is created with the assistance of a speech recognition program.  While intending to generate adocument that actually reflects the content of the visit, the document can still have some errors including those of syntax and sound a like substitutions which may escape proof reading. It such instances, actual meaningcan be extrapolated by contextual diversion.     Bartolo Ahumada, MD  Office: [phone]  Perfect serve / office [phone]

## 2021-12-14 NOTE — PROGRESS NOTES
to sit EOB for 5+ minutes with Min A while attempting to engage in self-care.     OT Individual Minutes  Time In: 9229  Time Out: 1427  Minutes: 28      Electronically signed by FABIANA Hardin on 12/14/21 at 4:04 PM EST

## 2021-12-14 NOTE — PLAN OF CARE
Problem: OXYGENATION/RESPIRATORY FUNCTION  Goal: Patient will maintain patent airway  Outcome: Ongoing     Problem: OXYGENATION/RESPIRATORY FUNCTION  Goal: Patient will achieve/maintain normal respiratory rate/effort  Description: Respiratory rate and effort will be within normal limits for the patient  Outcome: Ongoing     Problem: MECHANICAL VENTILATION  Goal: Patient will maintain patent airway  Outcome: Ongoing

## 2021-12-14 NOTE — PROGRESS NOTES
Progress Note    12/14/2021   12:38 PM    Name:  Leilani Golden  MRN:    [id number]     Acct:     [de-identified]   Room:  2014/2014-01  IP Day: 29     Admit Date: 11/10/2021  4:23 PM  PCP: Chuyita Harrell DO    Subjective:     C/C:   Chief Complaint   Patient presents with    Abnormal Lab     low hemoglobin and high CR       Interval History: Status: not changed. Patient is s/p trach and PEG. patient denies chest pain shortness of breath nausea vomiting abdominal pain. Blood pressure and heart rate stable. Max temp of 99 F in the last 24 hours. Recent labs reviewed    ROS:   all 10 systems reviewed and are negative except as noted    Review of Systems   Constitutional: Negative for chills and fatigue. HENT: Negative for drooling, mouth sores, sneezing and trouble swallowing. Eyes: Negative for redness and itching. Respiratory: Negative for cough, chest tightness and shortness of breath. Cardiovascular: Negative for chest pain, palpitations and leg swelling. Gastrointestinal: Negative for abdominal pain, blood in stool, nausea and vomiting. Endocrine: Negative for heat intolerance and polyphagia. Genitourinary: Negative for difficulty urinating, flank pain and pelvic pain. Musculoskeletal: Negative for arthralgias, joint swelling and neck stiffness. Skin: Negative for color change and pallor. Allergic/Immunologic: Negative for food allergies. Neurological: Negative for dizziness, seizures and headaches. Hematological: Does not bruise/bleed easily. Psychiatric/Behavioral: Negative for agitation, behavioral problems and suicidal ideas. The patient is not hyperactive. Medications:      Allergies: No Known Allergies    Current Meds: meropenem (MERREM) 500 mg IVPB extended (mini-bag), Q24H  tigecycline (TYGACIL) 50 mg in sodium chloride 0.9 % 100 mL IVPB, Q12H  epoetin tyshawn-epbx (RETACRIT) injection 10,000 Units, Once per day on Mon Wed Fri  acetaminophen (TYLENOL) tablet 650 mg, Q4H PRN  0.9 % sodium chloride infusion, PRN  sodium citrate 4 % injection 1.9 mL, PRN   And  sodium citrate 4 % injection 1.9 mL, PRN  nystatin (MYCOSTATIN) 902992 UNIT/ML suspension 500,000 Units, 4x Daily  miconazole (MICOTIN) 2 % powder, BID  metoprolol (LOPRESSOR) injection 5 mg, Q6H PRN  polyethylene glycol (GLYCOLAX) packet 17 g, Daily PRN  atorvastatin (LIPITOR) tablet 40 mg, Daily  0.9 % sodium chloride infusion, PRN  midodrine (PROAMATINE) tablet 10 mg, TID PRN  albumin human 25 % IV solution 25 g, PRN  midodrine (PROAMATINE) tablet 5 mg, PRN  chlorhexidine (PERIDEX) 0.12 % solution 15 mL, BID  fentaNYL (SUBLIMAZE) injection 50 mcg, Q30 Min PRN  propofol injection, Continuous  [Held by provider] carvedilol (COREG) tablet 3.125 mg, BID WC  pantoprazole (PROTONIX) injection 40 mg, BID   And  sodium chloride flush 0.9 % injection 10 mL, BID  magnesium sulfate 1000 mg in dextrose 5% 100 mL IVPB, PRN  glucose (GLUTOSE) 40 % oral gel 15 g, PRN  dextrose 50 % IV solution, PRN  glucagon (rDNA) injection 1 mg, PRN  dextrose 5 % solution, PRN  sodium polystyrene (KAYEXALATE) 15 GM/60ML suspension 15 g, Once  sertraline (ZOLOFT) tablet 25 mg, Daily  ferrous sulfate (IRON 325) tablet 325 mg, Daily with breakfast  [Held by provider] aspirin chewable tablet 81 mg, Daily  insulin glargine (LANTUS) injection vial 22 Units, Nightly  insulin lispro (HUMALOG) injection vial 0-12 Units, TID WC  insulin lispro (HUMALOG) injection vial 0-6 Units, Nightly  sodium chloride flush 0.9 % injection 5-40 mL, 2 times per day  sodium chloride flush 0.9 % injection 5-40 mL, PRN  0.9 % sodium chloride infusion, PRN  ondansetron (ZOFRAN-ODT) disintegrating tablet 4 mg, Q8H PRN   Or  ondansetron (ZOFRAN) injection 4 mg, Q6H PRN  oxyCODONE-acetaminophen (PERCOCET) 5-325 MG per tablet 1 tablet, Q6H PRN        Data:     Code Status:  DNR-CCA    Family History   Problem Relation Age of Onset    High Blood Pressure Other        Social History Intake 1835 ml   Output 3150 ml   Net -1315 ml     Radiology:  CT SOFT TISSUE NECK WO CONTRAST    Result Date: 12/11/2021  Trach tube is noted above. No abscess noted. Small right effusion. IR FLUORO GUIDED CVA DEVICE PLMT/REPLACE/REMOVAL    Result Date: 12/9/2021  Successful fluoroscopy guided placement right upper extremity PICC, with new dual-lumen tip position in the central circulation and satisfactory. PICC is ready for use at this time. IR FLUORO GUIDED CVA DEVICE PLMT/REPLACE/REMOVAL    Result Date: 12/7/2021  Successful fluoroscopy guided conversion non tunneled to tunneled hemodialysis catheter placement, as above. Catheter is ready for dialysis use at this time. XR CHEST PORTABLE    Result Date: 12/13/2021  Trach tube and right-sided lines stable; unchanged or perhaps slightly larger right pleural effusion. Venous hypertension but no radiographic CHF. Probable bibasilar atelectasis. XR CHEST PORTABLE    Result Date: 12/11/2021  Tracheostomy tube and right-sided PermCath/PICC stable/satisfactory. Decreased right pleural effusion and likely improved mild central vascular congestion. Residual findings, as above. No pneumothorax. XR CHEST PORTABLE    Result Date: 12/9/2021  Cardiomegaly and moderate right pleural effusion with right passive atelectasis or pneumonia. Support tubes and catheters are stable. FL MODIFIED BARIUM SWALLOW W VIDEO    Result Date: 12/9/2021  Aspiration of pudding consistency and delayed cough, as described. Please see separate speech pathology report for full discussion of findings and recommendations. IR GUIDED THORACENTESIS PLEURAL    Result Date: 12/9/2021  Successful ultrasound guided therapeutic thoracentesis.        Labs:  Recent Results (from the past 24 hour(s))   POC Glucose Fingerstick    Collection Time: 12/13/21  4:18 PM   Result Value Ref Range    POC Glucose 135 (H) 65 - 105 mg/dL   POC Glucose Fingerstick    Collection Time: 12/13/21 9:29 PM   Result Value Ref Range    POC Glucose 215 (H) 65 - 105 mg/dL   Magnesium    Collection Time: 21  5:40 AM   Result Value Ref Range    Magnesium 1.9 1.6 - 2.6 mg/dL   Basic Metabolic Panel w/ Reflex to MG    Collection Time: 21  5:40 AM   Result Value Ref Range    Glucose 208 (H) 70 - 99 mg/dL    BUN 41 (H) 8 - 23 mg/dL    CREATININE 3.05 (H) 0.50 - 0.90 mg/dL    Bun/Cre Ratio NOT REPORTED 9 - 20    Calcium 8.5 (L) 8.6 - 10.4 mg/dL    Sodium 132 (L) 135 - 144 mmol/L    Potassium 4.1 3.7 - 5.3 mmol/L    Chloride 95 (L) 98 - 107 mmol/L    CO2 24 20 - 31 mmol/L    Anion Gap 13 9 - 17 mmol/L    GFR Non-African American 15 (L) >60 mL/min    GFR  18 (L) >60 mL/min    GFR Comment          GFR Staging NOT REPORTED    SPECIMEN REJECTION    Collection Time: 21  5:40 AM   Result Value Ref Range    Specimen Source . BLOOD     Ordered Test CDP     Reason for Rejection       Unable to perform testing: Specimen quantity not sufficient.    - NOT REPORTED    POC Glucose Fingerstick    Collection Time: 21 11:05 AM   Result Value Ref Range    POC Glucose 167 (H) 65 - 105 mg/dL       Physical Examination:        Vitals:  /62   Pulse 91   Temp 99 °F (37.2 °C) (Oral)   Resp 26   Ht 5' 2.99\" (1.6 m)   Wt 195 lb 1.7 oz (88.5 kg)   SpO2 100%   BMI 34.57 kg/m²   Temp (24hrs), Av.5 °F (36.9 °C), Min:97.8 °F (36.6 °C), Max:99 °F (37.2 °C)    Recent Labs     21  1057 21  1618 219 21  1105   POCGLU 189* 135* 215* 167*         Physical Exam  Vitals reviewed. HENT:      Head: Normocephalic. Right Ear: External ear normal.      Left Ear: External ear normal.      Nose: Nose normal.      Mouth/Throat:      Mouth: Mucous membranes are moist.      Pharynx: Oropharynx is clear. Eyes:      Conjunctiva/sclera: Conjunctivae normal.   Cardiovascular:      Rate and Rhythm: Normal rate and regular rhythm. Pulses: Normal pulses.       Heart sounds: Normal heart sounds. Pulmonary:      Effort: Pulmonary effort is normal.      Breath sounds: Normal breath sounds. Comments: Trach in place extremity edema  Abdominal:      General: Bowel sounds are normal.      Palpations: Abdomen is soft. Comments: G-tube in place with surrounding clear skin   Musculoskeletal:         General: No deformity. Cervical back: Normal range of motion and neck supple. Skin:     General: Skin is warm. Capillary Refill: Capillary refill takes less than 2 seconds. Coloration: Skin is not jaundiced. Neurological:      General: No focal deficit present. Mental Status: She is alert. Mental status is at baseline. Psychiatric:         Mood and Affect: Mood normal.         Behavior: Behavior normal.         Assessment:        Primary Problem  JAYLYN (acute kidney injury) ([facility] [address]     Principal Problem:    JAYLYN (acute kidney injury) ([facility] [address]  Active Problems:    Type 2 diabetes mellitus with kidney complication, with long-term current use of insulin (HCC)    CKD (chronic kidney disease) stage 3, GFR 30-59 ml/min ([facility])    HTN (hypertension)    ASCVD (arteriosclerotic cardiovascular disease)    Iron deficiency anemia    Hydronephrosis determined by ultrasound    Longstanding persistent atrial fibrillation ([facility])    Acute metabolic encephalopathy    Acute cystitis without hematuria    Acute respiratory failure with hypoxia and hypercapnia (HCC)    Dilated cardiomyopathy ([facility] [address]  Resolved Problems:    * No resolved hospital problems.  *      Past Medical History:   Diagnosis Date    Anemia, unspecified     CAD (coronary artery disease)     Chronic kidney disease     CKD (chronic kidney disease) stage 3, GFR 30-59 ml/min (HCC)     Closed fracture of dorsal (thoracic) vertebra without mention of spinal cord injury     Coronary atherosclerosis of unspecified type of vessel, native or graft     Depression with anxiety     Esophageal reflux     HTN (hypertension)     Hyperkalemia     Hyperlipidemia     Intervertebral cervical disc disorder with myelopathy, cervical region     Lower back pain     Lumbar radiculopathy     Microalbuminuria     Muscle weakness     Pain in limb     Stenosis of cervical spine     Tethered cord syndrome (HCC)     Type II or unspecified type diabetes mellitus without mention of complication, not stated as uncontrolled     Urinary calculus     UTI (lower urinary tract infection)         Plan:        1. Wound culture tracheostomy site positive for Acinetobacter baumanni, ESBL and Klebsiella   2. IV meropenem, IV tigecycline till 12/20/2021 per ID recommendation  3. blood culture no growth for 3 days  4. CT soft tissue neck report is negative for abscess  5.   pharmacy to dose vancomycin  6. CT soft tissue neck to rule out abscess at trach site  7. LTAC discharge planning in progress  8. CMP, CBC  9. Continue tube feed  10.  discussed with nurse sister and hasband at bedside  11.  nystatin statin powder twice daily for skin fold for candidal intertrigo  12. Lopressor IV 5 mg every 6 hours as needed for heart rate above 115. 13. Midodrine 5 mg p.o. 3 times a day as needed for systolic blood pressure less than 90. 14. DVT Prophylaxis no pharmacological DVT prophylaxis due to low hemoglobin and thrombocytopenia  15. EPCs  16. PT/OT to evaluate and treat  17. Pain control  18. Replace electrolytes as per sliding scale  19. Home medications reviewed and appropriate medications continued  20.  Reviewed labs and imaging studies from last 24 hours and results explained to patient      Electronically signed by Moshe Romo MD

## 2021-12-14 NOTE — PROGRESS NOTES
Nephrology Progress Note    Subjective/     Interval history:  Patent seen and examined at bedside  She is alert and follows commands  On tube feeds  Trach in place on ventilation  BP is within normal limits  Resting comfortably in bed  Did get hemodialysis yesterday     History of present illness:  66 y. o. year old female who we are seeing in consultation for JAYLYN. Patient has a past medical history of Type 2 diabetes mellitus, coronary artery disease, lumbar radiculopathy, Hyperlipidemia and chronic kidney disease stage III [baseline serum creatinine 1.5 mg/dL], who [facility] for further evaluation of acute kidney injury.  She was sent to [facility] after developing nonhealing wound s/p cardiac catheterization via right refill Levophed has been discontinued approach with right groin wound. She had been on Bumex in the ECF and creatinine and BUN have been rising and Bumex was decreased to a lowest dose of 0.5 mg p.o. twice daily. Patient was found to have JAYLYN likely secondary to ischemic acute tubular necrosis and obstructive nephropathy secondary to bilateral hydronephrosis. Patient received acute dialysis on 12/04. Started on hemodialysis with MWF schedule. Objective/     Vitals:    12/14/21 0500 12/14/21 0515 12/14/21 0715 12/14/21 0729   BP:       Pulse: 97 97 87 99   Resp: 15 18 20 (!) 6   Temp:       TempSrc:       SpO2: 99% 99% 99% 100%   Weight:       Height:         24HR INTAKE/OUTPUT:      Intake/Output Summary (Last 24 hours) at 12/14/2021 1026  Last data filed at 12/13/2021 2336  Gross per 24 hour   Intake 1785 ml   Output 3025 ml   Net -1240 ml     Patient Vitals for the past 96 hrs (Last 3 readings):   Weight   12/13/21 1451 195 lb 1.7 oz (88.5 kg)   12/13/21 1110 199 lb 11.8 oz (90.6 kg)   12/10/21 1400 199 lb 11.8 oz (90.6 kg)       Constitutional:  Alert, awake, no apparent distress. On ventilatory support via tracheostomy.    Cardiovascular:  S1, S2 without m/r/g  Respiratory:  Bilateral chest rise  Abdomen: +bs, soft, nt  Ext: +2 pitting edema noted on lower extremities bilaterally    Data/  Recent Labs     12/11/21  1414 12/12/21  0501 12/13/21  0548   WBC  --  6.9 8.9   HGB 8.6* 8.4* 8.2*   HCT 26.2* 26.1* 25.7*   MCV  --  94.6 94.0   PLT  --  114* 148*     Recent Labs     12/12/21  0501 12/13/21  0548 12/14/21  0540   * 134* 132*   K 3.7 4.2 4.1   CL 96* 97* 95*   CO2 26 26 24   GLUCOSE 209* 244* 208*   MG 1.9 2.0 1.9   BUN 45* 62* 41*   CREATININE 3.44* 4.32* 3.05*   LABGLOM 13* 10* 15*   GFRAA 16* 12* 18*       DATA:    CBC with Differential:    Lab Results   Component Value Date    WBC 8.9 12/13/2021    RBC 2.73 12/13/2021    RBC 4.14 08/31/2016    HGB 8.2 12/13/2021    HCT 25.7 12/13/2021     12/13/2021    MCV 94.0 12/13/2021    MCH 30.0 12/13/2021    MCHC 31.9 12/13/2021    RDW 17.8 12/13/2021    NRBC 4 11/22/2021    METASPCT 3 12/02/2021    LYMPHOPCT 17 12/13/2021    LYMPHOPCT 32.8 08/31/2016    MONOPCT 11 12/13/2021    EOSPCT 4.3 08/31/2016    BASOPCT 1 12/13/2021    BASOPCT 0.7 08/31/2016    MONOSABS 1.00 12/13/2021    LYMPHSABS 1.50 12/13/2021    EOSABS 0.30 12/13/2021    BASOSABS 0.10 12/13/2021    DIFFTYPE NOT REPORTED 12/13/2021     BMP:    Lab Results   Component Value Date     12/14/2021    K 4.1 12/14/2021    CL 95 12/14/2021    CO2 24 12/14/2021    BUN 41 12/14/2021    LABALBU 2.6 11/18/2021    CREATININE 3.05 12/14/2021    CALCIUM 8.5 12/14/2021    GFRAA 18 12/14/2021    LABGLOM 15 12/14/2021    GLUCOSE 208 12/14/2021    GLUCOSE 77 08/31/2016     Sodium:    Lab Results   Component Value Date     12/14/2021     Potassium:    Lab Results   Component Value Date    K 4.1 12/14/2021     BUN/Creatinine:    Lab Results   Component Value Date    BUN 41 12/14/2021    CREATININE 3.05 12/14/2021     Assessment/     1.  Acute Kidney Injury - secondary to ischemic acute tubular necrosis and obstructive nephropathy secondary to bilateral hydronephrosis. She remains dialysis dependent and is on a MWF schedule. We will continue to monitor GFR and urine output closely for evidence of renal function recovery.     2. Heart failure with reduced ejection fraction [HFrEF with LVEF 30 to 35% 11/10/2021] - We will continue ultrafiltration with dialysis as tolerated.  She is s/p right thoracentesis 12/9/2021.     3. Normocytic anemia - partly secondary to chronic kidney disease. Hemoglobin isstable at 8.2 today. Continue Retacrit 10,000 units 3 times a week.     4.  Bilateral hydronephrosis - status post cystoscopy with right ureteral stent placement.     5.  Systemic hypertension - blood pressure control is adequate.     6.  Low-grade fever -cultures positive for tracheal aspirate on IV antibiotics    Plan/     Continue hemodialysis Monday Wednesday Friday  Did receive dialysis yesterday per schedule  Awaiting LTAC placement    Rolando Castillo MD    Addendum to Resident Pato note:   Pt seen,examined and evaluated. I have reviewed the current history, physical findings, labs and assessment and plan and agree with note as documented by resident physician.     No acute events overnight patient stable    Plan    Continue hemodialysis Monday Wednesday Friday  Next dialysis tomorrow      Rafia Mathis MD

## 2021-12-15 LAB
CULTURE: ABNORMAL
CULTURE: NORMAL
CULTURE: NORMAL
DIRECT EXAM: ABNORMAL
ESTIMATED AVERAGE GLUCOSE: 148 MG/DL
FERRITIN: 277 UG/L (ref 13–150)
FOLATE: 15.6 NG/ML
HAV IGM SER IA-ACNC: NONREACTIVE
HBA1C MFR BLD: 6.8 % (ref 4–6)
HBV SURFACE AB TITR SER: <3.5 MIU/ML
HEPATITIS B CORE IGM ANTIBODY: NONREACTIVE
HEPATITIS B SURFACE ANTIGEN: NONREACTIVE
HEPATITIS C ANTIBODY: NONREACTIVE
IRON SATURATION: 14 % (ref 20–55)
IRON: 24 UG/DL (ref 37–145)
Lab: ABNORMAL
Lab: NORMAL
Lab: NORMAL
SPECIMEN DESCRIPTION: ABNORMAL
SPECIMEN DESCRIPTION: NORMAL
SPECIMEN DESCRIPTION: NORMAL
TOTAL IRON BINDING CAPACITY: 176 UG/DL (ref 250–450)
UNSATURATED IRON BINDING CAPACITY: 152 UG/DL (ref 112–347)
VITAMIN B-12: 942 PG/ML (ref 232–1245)

## 2021-12-15 PROCEDURE — 83550 IRON BINDING TEST: CPT

## 2021-12-15 PROCEDURE — 82746 ASSAY OF FOLIC ACID SERUM: CPT

## 2021-12-15 PROCEDURE — 82728 ASSAY OF FERRITIN: CPT

## 2021-12-15 PROCEDURE — 80074 ACUTE HEPATITIS PANEL: CPT

## 2021-12-15 PROCEDURE — 86317 IMMUNOASSAY INFECTIOUS AGENT: CPT

## 2021-12-15 PROCEDURE — 83036 HEMOGLOBIN GLYCOSYLATED A1C: CPT

## 2021-12-15 PROCEDURE — 83540 ASSAY OF IRON: CPT

## 2021-12-15 PROCEDURE — 82607 VITAMIN B-12: CPT

## 2021-12-15 NOTE — PROGRESS NOTES
Patient picked up for transfer to [facility]. Nurse Emmy Melton at [facility] notified with report given. All belonging in room with patient.  Pat notifed.

## 2021-12-15 NOTE — PLAN OF CARE
Problem: SAFETY  Goal: Free from accidental physical injury  Outcome: Completed  Goal: Free from intentional harm  Outcome: Completed     Problem: DAILY CARE  Goal: Daily care needs are met  12/14/2021 2039 by Neisha Koenig RN  Outcome: Completed  12/14/2021 1126 by Maggie Tadeo RN  Outcome: Ongoing     Problem: PAIN  Goal: Patient's pain/discomfort is manageable  Outcome: Completed     Problem: SKIN INTEGRITY  Goal: Skin integrity is maintained or improved  12/14/2021 2039 by Neisha Koenig RN  Outcome: Completed  12/14/2021 1126 by Maggie Tadeo RN  Outcome: Ongoing     Problem: KNOWLEDGE DEFICIT  Goal: Patient/S.O. demonstrates understanding of disease process, treatment plan, medications, and discharge instructions.   Outcome: Completed     Problem: DISCHARGE BARRIERS  Goal: Patient's continuum of care needs are met  Outcome: Completed     Problem: Falls - Risk of:  Goal: Will remain free from falls  12/14/2021 2039 by Neisha Koenig RN  Outcome: Completed  12/14/2021 1126 by Maggie Tadeo RN  Outcome: Ongoing  Goal: Absence of physical injury  Outcome: Completed     Problem: Skin Integrity:  Goal: Will show no infection signs and symptoms  Outcome: Completed  Goal: Absence of new skin breakdown  Outcome: Completed     Problem: Musculor/Skeletal Functional Status  Goal: Highest potential functional level  Outcome: Completed  Goal: Absence of falls  Outcome: Completed     Problem: Musculor/Skeletal Functional Status  Goal: Highest potential functional level  Outcome: Completed  Goal: Absence of falls  Outcome: Completed     Problem: Nutrition  Goal: Optimal nutrition therapy  Outcome: Completed     Problem: Pain:  Goal: Pain level will decrease  Outcome: Completed  Goal: Control of acute pain  Outcome: Completed  Goal: Control of chronic pain  Outcome: Completed     Problem: Gas Exchange - Impaired:  Goal: Levels of oxygenation will improve  Outcome: Completed     Problem: Fluid Volume - Imbalance:  Goal: Absence of imbalanced fluid volume signs and symptoms  Outcome: Completed     Problem: Airway Clearance - Ineffective:  Goal: Ability to maintain a clear airway will improve  Outcome: Completed     Problem:  Bowel Function - Altered:  Goal: Bowel elimination is within specified parameters  Outcome: Completed     Problem: Fluid Volume - Imbalance:  Goal: Absence of imbalanced fluid volume signs and symptoms  Outcome: Completed     Problem: Gas Exchange - Impaired:  Goal: Levels of oxygenation will improve  Outcome: Completed     Problem: Mental Status - Impaired:  Goal: Mental status will be restored to baseline  Outcome: Completed     Problem: Nutrition Deficit:  Goal: Ability to achieve adequate nutritional intake will improve  Outcome: Completed     Problem: OXYGENATION/RESPIRATORY FUNCTION  Goal: Patient will maintain patent airway  Outcome: Completed  Goal: Patient will achieve/maintain normal respiratory rate/effort  Outcome: Completed     Problem: MECHANICAL VENTILATION  Goal: Patient will maintain patent airway  Outcome: Completed  Goal: Oral health is maintained or improved  Outcome: Completed

## 2021-12-16 LAB
CULTURE: ABNORMAL
DIRECT EXAM: ABNORMAL
FERRITIN: 266 UG/L (ref 13–150)
FOLATE: >20 NG/ML
IRON SATURATION: 19 % (ref 20–55)
IRON: 31 UG/DL (ref 37–145)
Lab: ABNORMAL
SPECIMEN DESCRIPTION: ABNORMAL
TOTAL IRON BINDING CAPACITY: 166 UG/DL (ref 250–450)
UNSATURATED IRON BINDING CAPACITY: 135 UG/DL (ref 112–347)
VITAMIN B-12: 1167 PG/ML (ref 232–1245)

## 2021-12-16 PROCEDURE — 82607 VITAMIN B-12: CPT

## 2021-12-16 PROCEDURE — 82746 ASSAY OF FOLIC ACID SERUM: CPT

## 2021-12-16 PROCEDURE — 83540 ASSAY OF IRON: CPT

## 2021-12-16 PROCEDURE — 83550 IRON BINDING TEST: CPT

## 2021-12-16 PROCEDURE — 82728 ASSAY OF FERRITIN: CPT

## 2021-12-31 LAB
HCT VFR BLD CALC: 27.5 % (ref 36.3–47.1)
HEMOGLOBIN: 7.9 G/DL (ref 11.9–15.1)
MCH RBC QN AUTO: 28.9 PG (ref 25.2–33.5)
MCHC RBC AUTO-ENTMCNC: 28.7 G/DL (ref 28.4–34.8)
MCV RBC AUTO: 100.7 FL (ref 82.6–102.9)
NRBC AUTOMATED: 0.2 PER 100 WBC
PDW BLD-RTO: 19.2 % (ref 11.8–14.4)
PLATELET # BLD: 242 K/UL (ref 138–453)
PMV BLD AUTO: 10.4 FL (ref 8.1–13.5)
RBC # BLD: 2.73 M/UL (ref 3.95–5.11)
WBC # BLD: 8.2 K/UL (ref 3.5–11.3)

## 2021-12-31 PROCEDURE — 85027 COMPLETE CBC AUTOMATED: CPT

## 2022-01-13 LAB
ABSOLUTE EOS #: 0 K/UL (ref 0–0.4)
ABSOLUTE IMMATURE GRANULOCYTE: 0 K/UL (ref 0–0.3)
ABSOLUTE LYMPH #: 2.79 K/UL (ref 1–4.8)
ABSOLUTE MONO #: 0.76 K/UL (ref 0.2–0.8)
ANION GAP SERPL CALCULATED.3IONS-SCNC: 17 MMOL/L (ref 9–17)
BASOPHILS # BLD: 0 %
BASOPHILS ABSOLUTE: 0 K/UL (ref 0–0.2)
BUN BLDV-MCNC: 68 MG/DL (ref 8–23)
BUN/CREAT BLD: 16 (ref 9–20)
CALCIUM SERPL-MCNC: 9.6 MG/DL (ref 8.6–10.4)
CHLORIDE BLD-SCNC: 97 MMOL/L (ref 98–107)
CO2: 21 MMOL/L (ref 20–31)
CREAT SERPL-MCNC: 4.25 MG/DL (ref 0.5–0.9)
DIFFERENTIAL TYPE: ABNORMAL
EOSINOPHILS RELATIVE PERCENT: 0 % (ref 1–4)
GFR AFRICAN AMERICAN: 12 ML/MIN
GFR NON-AFRICAN AMERICAN: 10 ML/MIN
GFR SERPL CREATININE-BSD FRML MDRD: ABNORMAL ML/MIN/{1.73_M2}
GFR SERPL CREATININE-BSD FRML MDRD: ABNORMAL ML/MIN/{1.73_M2}
GLUCOSE BLD-MCNC: 279 MG/DL (ref 70–99)
HCT VFR BLD CALC: 30.8 % (ref 36.3–47.1)
HEMOGLOBIN: 8.9 G/DL (ref 11.9–15.1)
IMMATURE GRANULOCYTES: 0 %
LACTIC ACID, WHOLE BLOOD: NORMAL MMOL/L (ref 0.7–2.1)
LACTIC ACID: 1.1 MMOL/L (ref 0.5–2.2)
LYMPHOCYTES # BLD: 22 % (ref 24–44)
MCH RBC QN AUTO: 28.3 PG (ref 25.2–33.5)
MCHC RBC AUTO-ENTMCNC: 28.9 G/DL (ref 28.4–34.8)
MCV RBC AUTO: 97.8 FL (ref 82.6–102.9)
MONOCYTES # BLD: 6 % (ref 1–7)
MORPHOLOGY: ABNORMAL
NRBC AUTOMATED: 0.3 PER 100 WBC
PDW BLD-RTO: 18.2 % (ref 11.8–14.4)
PHOSPHORUS: 2.4 MG/DL (ref 2.6–4.5)
PLATELET # BLD: 268 K/UL (ref 138–453)
PLATELET ESTIMATE: ABNORMAL
PMV BLD AUTO: 11.2 FL (ref 8.1–13.5)
POTASSIUM SERPL-SCNC: 4.1 MMOL/L (ref 3.7–5.3)
RBC # BLD: 3.15 M/UL (ref 3.95–5.11)
RBC # BLD: ABNORMAL 10*6/UL
SEG NEUTROPHILS: 72 % (ref 36–66)
SEGMENTED NEUTROPHILS ABSOLUTE COUNT: 9.15 K/UL (ref 1.8–7.7)
SODIUM BLD-SCNC: 135 MMOL/L (ref 135–144)
WBC # BLD: 12.7 K/UL (ref 3.5–11.3)
WBC # BLD: ABNORMAL 10*3/UL

## 2022-01-13 PROCEDURE — 85025 COMPLETE CBC W/AUTO DIFF WBC: CPT

## 2022-01-13 PROCEDURE — 87040 BLOOD CULTURE FOR BACTERIA: CPT

## 2022-01-13 PROCEDURE — 80048 BASIC METABOLIC PNL TOTAL CA: CPT

## 2022-01-13 PROCEDURE — 87150 DNA/RNA AMPLIFIED PROBE: CPT

## 2022-01-13 PROCEDURE — 87205 SMEAR GRAM STAIN: CPT

## 2022-01-13 PROCEDURE — 83605 ASSAY OF LACTIC ACID: CPT

## 2022-01-13 PROCEDURE — 84100 ASSAY OF PHOSPHORUS: CPT

## 2022-01-14 LAB
CULTURE: ABNORMAL
FERRITIN: 729 UG/L (ref 13–150)
HEPATITIS B CORE TOTAL ANTIBODY: NONREACTIVE
HEPATITIS B SURFACE ANTIGEN: NONREACTIVE
IRON SATURATION: 11 % (ref 20–55)
IRON: 18 UG/DL (ref 37–145)
Lab: ABNORMAL
SPECIMEN DESCRIPTION: ABNORMAL
TOTAL IRON BINDING CAPACITY: 167 UG/DL (ref 250–450)
TROPONIN INTERP: ABNORMAL
TROPONIN T: ABNORMAL NG/ML
TROPONIN, HIGH SENSITIVITY: 176 NG/L (ref 0–14)
UNSATURATED IRON BINDING CAPACITY: 149 UG/DL (ref 112–347)

## 2022-01-14 PROCEDURE — 82728 ASSAY OF FERRITIN: CPT

## 2022-01-14 PROCEDURE — 84484 ASSAY OF TROPONIN QUANT: CPT

## 2022-01-14 PROCEDURE — 87205 SMEAR GRAM STAIN: CPT

## 2022-01-14 PROCEDURE — 87150 DNA/RNA AMPLIFIED PROBE: CPT

## 2022-01-14 PROCEDURE — 87186 SC STD MICRODIL/AGAR DIL: CPT

## 2022-01-14 PROCEDURE — 83550 IRON BINDING TEST: CPT

## 2022-01-14 PROCEDURE — 87340 HEPATITIS B SURFACE AG IA: CPT

## 2022-01-14 PROCEDURE — 86704 HEP B CORE ANTIBODY TOTAL: CPT

## 2022-01-14 PROCEDURE — 87040 BLOOD CULTURE FOR BACTERIA: CPT

## 2022-01-14 PROCEDURE — 83540 ASSAY OF IRON: CPT

## 2022-01-14 PROCEDURE — 87077 CULTURE AEROBIC IDENTIFY: CPT

## 2022-01-17 LAB
CULTURE: ABNORMAL
Lab: ABNORMAL
Lab: ABNORMAL
SPECIMEN DESCRIPTION: ABNORMAL
SPECIMEN DESCRIPTION: ABNORMAL

## 2022-01-18 LAB
CALCIUM IONIZED: 1.24 MMOL/L (ref 1.13–1.33)
CULTURE: NORMAL
Lab: NORMAL
PTH INTACT: 26.06 PG/ML (ref 15–65)
SPECIMEN DESCRIPTION: NORMAL

## 2022-01-18 PROCEDURE — 84155 ASSAY OF PROTEIN SERUM: CPT

## 2022-01-18 PROCEDURE — 82330 ASSAY OF CALCIUM: CPT

## 2022-01-18 PROCEDURE — 83519 RIA NONANTIBODY: CPT

## 2022-01-18 PROCEDURE — 82306 VITAMIN D 25 HYDROXY: CPT

## 2022-01-18 PROCEDURE — 83970 ASSAY OF PARATHORMONE: CPT

## 2022-01-18 PROCEDURE — 84165 PROTEIN E-PHORESIS SERUM: CPT

## 2022-01-19 LAB — VITAMIN D 25-HYDROXY: 15.6 NG/ML (ref 30–100)

## 2022-01-20 LAB
ALBUMIN (CALCULATED): 3.4 G/DL (ref 3.2–5.2)
ALBUMIN PERCENT: 60 % (ref 45–65)
ALPHA 1 PERCENT: 5 % (ref 3–6)
ALPHA 2 PERCENT: 12 % (ref 6–13)
ALPHA-1-GLOBULIN: 0.3 G/DL (ref 0.1–0.4)
ALPHA-2-GLOBULIN: 0.7 G/DL (ref 0.5–0.9)
BETA GLOBULIN: 0.6 G/DL (ref 0.5–1.1)
BETA PERCENT: 10 % (ref 11–19)
GAMMA GLOBULIN %: 12 % (ref 9–20)
GAMMA GLOBULIN: 0.7 G/DL (ref 0.5–1.5)
PATHOLOGIST: ABNORMAL
PROTEIN ELECTROPHORESIS, SERUM: ABNORMAL
TOTAL PROT. SUM,%: 99 % (ref 98–102)
TOTAL PROT. SUM: 5.7 G/DL (ref 6.3–8.2)
TOTAL PROTEIN: 5.7 G/DL (ref 6.4–8.3)

## 2022-01-25 LAB
SARS-COV-2: NORMAL
SARS-COV-2: NOT DETECTED
SOURCE: NORMAL

## 2022-01-25 PROCEDURE — U0003 INFECTIOUS AGENT DETECTION BY NUCLEIC ACID (DNA OR RNA); SEVERE ACUTE RESPIRATORY SYNDROME CORONAVIRUS 2 (SARS-COV-2) (CORONAVIRUS DISEASE [COVID-19]), AMPLIFIED PROBE TECHNIQUE, MAKING USE OF HIGH THROUGHPUT TECHNOLOGIES AS DESCRIBED BY CMS-2020-01-R: HCPCS

## 2022-01-25 PROCEDURE — U0005 INFEC AGEN DETEC AMPLI PROBE: HCPCS

## 2022-01-29 PROCEDURE — U0003 INFECTIOUS AGENT DETECTION BY NUCLEIC ACID (DNA OR RNA); SEVERE ACUTE RESPIRATORY SYNDROME CORONAVIRUS 2 (SARS-COV-2) (CORONAVIRUS DISEASE [COVID-19]), AMPLIFIED PROBE TECHNIQUE, MAKING USE OF HIGH THROUGHPUT TECHNOLOGIES AS DESCRIBED BY CMS-2020-01-R: HCPCS

## 2022-01-29 PROCEDURE — U0005 INFEC AGEN DETEC AMPLI PROBE: HCPCS

## 2022-01-30 LAB
PTH RELATED PEPTIDE: 7.4 PMOL/L (ref 0–3.4)
SARS-COV-2: NORMAL
SARS-COV-2: NOT DETECTED
SOURCE: NORMAL

## 2022-01-31 LAB
HCT VFR BLD CALC: 34.5 % (ref 36.3–47.1)
HEMOGLOBIN: 9.8 G/DL (ref 11.9–15.1)
MCH RBC QN AUTO: 28.5 PG (ref 25.2–33.5)
MCHC RBC AUTO-ENTMCNC: 28.4 G/DL (ref 28.4–34.8)
MCV RBC AUTO: 100.3 FL (ref 82.6–102.9)
NRBC AUTOMATED: 0.3 PER 100 WBC
PDW BLD-RTO: 19.8 % (ref 11.8–14.4)
PLATELET # BLD: 239 K/UL (ref 138–453)
PMV BLD AUTO: 11.3 FL (ref 8.1–13.5)
RBC # BLD: 3.44 M/UL (ref 3.95–5.11)
WBC # BLD: 16 K/UL (ref 3.5–11.3)

## 2022-01-31 PROCEDURE — 83735 ASSAY OF MAGNESIUM: CPT

## 2022-01-31 PROCEDURE — 85027 COMPLETE CBC AUTOMATED: CPT

## 2022-01-31 PROCEDURE — 80048 BASIC METABOLIC PNL TOTAL CA: CPT

## 2022-02-01 LAB
ANION GAP SERPL CALCULATED.3IONS-SCNC: 16 MMOL/L (ref 9–17)
ANION GAP SERPL CALCULATED.3IONS-SCNC: 19 MMOL/L (ref 9–17)
BUN BLDV-MCNC: 40 MG/DL (ref 8–23)
BUN BLDV-MCNC: 55 MG/DL (ref 8–23)
BUN/CREAT BLD: 16 (ref 9–20)
BUN/CREAT BLD: ABNORMAL (ref 9–20)
CALCIUM SERPL-MCNC: 8.4 MG/DL (ref 8.6–10.4)
CALCIUM SERPL-MCNC: 9.1 MG/DL (ref 8.6–10.4)
CHLORIDE BLD-SCNC: 100 MMOL/L (ref 98–107)
CHLORIDE BLD-SCNC: 94 MMOL/L (ref 98–107)
CO2: 18 MMOL/L (ref 20–31)
CO2: 22 MMOL/L (ref 20–31)
CREAT SERPL-MCNC: 2.53 MG/DL (ref 0.5–0.9)
CREAT SERPL-MCNC: 3.16 MG/DL (ref 0.5–0.9)
GFR AFRICAN AMERICAN: 17 ML/MIN
GFR AFRICAN AMERICAN: 23 ML/MIN
GFR NON-AFRICAN AMERICAN: 14 ML/MIN
GFR NON-AFRICAN AMERICAN: 19 ML/MIN
GFR SERPL CREATININE-BSD FRML MDRD: ABNORMAL ML/MIN/{1.73_M2}
GLUCOSE BLD-MCNC: 176 MG/DL (ref 70–99)
GLUCOSE BLD-MCNC: 256 MG/DL (ref 70–99)
MAGNESIUM: 2.2 MG/DL (ref 1.6–2.6)
MAGNESIUM: 2.9 MG/DL (ref 1.6–2.6)
POTASSIUM SERPL-SCNC: 3.5 MMOL/L (ref 3.7–5.3)
POTASSIUM SERPL-SCNC: 4.6 MMOL/L (ref 3.7–5.3)
SODIUM BLD-SCNC: 132 MMOL/L (ref 135–144)
SODIUM BLD-SCNC: 137 MMOL/L (ref 135–144)

## 2022-02-01 PROCEDURE — 83735 ASSAY OF MAGNESIUM: CPT

## 2022-02-01 PROCEDURE — 80048 BASIC METABOLIC PNL TOTAL CA: CPT

## 2022-02-01 PROCEDURE — 87040 BLOOD CULTURE FOR BACTERIA: CPT

## 2022-02-02 ENCOUNTER — TELEPHONE (OUTPATIENT)
Dept: UROLOGY | Age: 72
End: 2022-02-02

## 2022-02-02 NOTE — TELEPHONE ENCOUNTER
Per Dr. Gillian Easley pt to have Cysto, Right URS, B/L Retro pyelogram, Right Stent Exchanged with possible removal of right stent. ... Dr. Gillian Easley requested Holmium Laser be on standby. .. to be done at [facility] if they have a urologist on staff or [facility]. .. Attempted to contact facility on 2/1/2022 @ 2:34pm to work on getting patient set up for surgery. . was transferred to 3 different times and eventually sent to a VM that stated I had reached ext. [phone] (no name of proivder or dept given in message). .. LM for someone to please call back. 2/2/2022 @ 3:07pm received a VM from Christopher Muniz NP @ [facility] stating she received our VM but the patient coded 2 nights ago and is back on a vent. Stated patient is currently awake however she is very unstable and on epi. Stated patient needs to wait a few weeks before having surgery. Tried to call Jearline Fleischer back at [phone] but was unable to reach her, another message was left requesting a call back.

## 2022-02-06 LAB
CULTURE: NORMAL
CULTURE: NORMAL
Lab: NORMAL
Lab: NORMAL
SPECIMEN DESCRIPTION: NORMAL
SPECIMEN DESCRIPTION: NORMAL

## 2022-02-07 NOTE — TELEPHONE ENCOUNTER
Spoke to [facility] -  They have a urologist on staff that comes once a month but he cant do procedure in facility. ..  pt is now stable and doing better but is being discharged at the end of this week to [facility].  Will follow up beginning of next week & contact w/ [facility] to schedule once patient is admitted there

## 2022-02-08 NOTE — TELEPHONE ENCOUNTER
Dr. Marquez Aid notified of patient condition - hold off on scheduling until patient is settled at [address] and is stable. Will touch base with [facility] middle of next week.

## 2022-02-12 PROCEDURE — 87070 CULTURE OTHR SPECIMN AEROBIC: CPT

## 2022-02-14 LAB — HEPATITIS B SURFACE ANTIGEN: NONREACTIVE

## 2022-02-14 PROCEDURE — 87340 HEPATITIS B SURFACE AG IA: CPT

## 2022-02-14 PROCEDURE — U0005 INFEC AGEN DETEC AMPLI PROBE: HCPCS

## 2022-02-14 PROCEDURE — U0003 INFECTIOUS AGENT DETECTION BY NUCLEIC ACID (DNA OR RNA); SEVERE ACUTE RESPIRATORY SYNDROME CORONAVIRUS 2 (SARS-COV-2) (CORONAVIRUS DISEASE [COVID-19]), AMPLIFIED PROBE TECHNIQUE, MAKING USE OF HIGH THROUGHPUT TECHNOLOGIES AS DESCRIBED BY CMS-2020-01-R: HCPCS

## 2022-02-15 LAB
CULTURE: ABNORMAL
Lab: ABNORMAL
SARS-COV-2: NORMAL
SARS-COV-2: NOT DETECTED
SOURCE: NORMAL
SPECIMEN DESCRIPTION: ABNORMAL

## 2022-02-18 LAB
PTH INTACT: 10.99 PG/ML (ref 15–65)
VITAMIN D 25-HYDROXY: 17.9 NG/ML (ref 30–100)

## 2022-02-18 PROCEDURE — 84165 PROTEIN E-PHORESIS SERUM: CPT

## 2022-02-18 PROCEDURE — 83970 ASSAY OF PARATHORMONE: CPT

## 2022-02-18 PROCEDURE — 84155 ASSAY OF PROTEIN SERUM: CPT

## 2022-02-18 PROCEDURE — 82306 VITAMIN D 25 HYDROXY: CPT

## 2022-02-22 LAB
ALBUMIN (CALCULATED): 2.4 G/DL (ref 3.2–5.2)
ALBUMIN PERCENT: 51 % (ref 45–65)
ALPHA 1 PERCENT: 9 % (ref 3–6)
ALPHA 2 PERCENT: 17 % (ref 6–13)
ALPHA-1-GLOBULIN: 0.4 G/DL (ref 0.1–0.4)
ALPHA-2-GLOBULIN: 0.8 G/DL (ref 0.5–0.9)
BETA GLOBULIN: 0.5 G/DL (ref 0.5–1.1)
BETA PERCENT: 11 % (ref 11–19)
GAMMA GLOBULIN %: 13 % (ref 9–20)
GAMMA GLOBULIN: 0.6 G/DL (ref 0.5–1.5)
PATHOLOGIST: ABNORMAL
PROTEIN ELECTROPHORESIS, SERUM: ABNORMAL
TOTAL PROT. SUM,%: 101 % (ref 98–102)
TOTAL PROT. SUM: 4.7 G/DL (ref 6.3–8.2)
TOTAL PROTEIN: 4.8 G/DL (ref 6.4–8.3)

## 2022-03-01 NOTE — TELEPHONE ENCOUNTER
Tried to contact [address] to verify patient was still there and obtain an update. Unable to reach anyone. I was transferred several times but phone just rang and rang, was never answered at any of the extensions the operater sent me to.

## 2022-03-08 NOTE — TELEPHONE ENCOUNTER
Tried to contact facility again to verify patient was still there and obtain an update. Unable to reach anyone. I was transferred several times but phone just rang and rang, was never answered.

## 2022-04-12 NOTE — TELEPHONE ENCOUNTER
Another attempt made to contact facility to obtain update on patient. Unable to reach anyone. .. was transferred several times but never able to speak to anyone or leave a message

## 2022-04-20 NOTE — TELEPHONE ENCOUNTER
As of 4/16/2022 pt admitted at [facility] for chronic respiratory failure & is currently on Pallative care

## 2022-04-26 NOTE — TELEPHONE ENCOUNTER
Dr. Carlyle Waterman notified pt currently admitted to [facility] on Palliative care.

## 2022-09-29 NOTE — PROGRESS NOTES
ICU Progress Note (Vent)   [facility]    Patient - Saleem Cheatham,  Age - 66 y.o.    - 1950      Room Number -    N -  [id number]   [facility]  Date of Admission -  11/10/2021  4:23 PM    Events of Past 24 Hours   Uneventful night, has not been placed on weaning trial but continues to fail them on a daily basis, patient alert    Vitals    height is 5' 2.99\" (1.6 m) and weight is 210 lb 15.7 oz (95.7 kg). Her oral temperature is 98.5 °F (36.9 °C). Her blood pressure is 117/63 and her pulse is 118. Her respiration is 20 and oxygen saturation is 94%. Temperature Range: Temp: 98.5 °F (36.9 °C) Temp  Av.4 °F (36.9 °C)  Min: 98.1 °F (36.7 °C)  Max: 98.9 °F (37.2 °C)  BP Range:  Systolic (91LOD), CHARLIE:338 , Min:102 , YZO:716     Diastolic (54ZIS), GKF:21, Min:50, Max:97    Pulse Range: Pulse  Av.2  Min: 95  Max: 124  Respiration Range: Resp  Avg: 15.1  Min: 0  Max: 25  Current Pulse Ox[de-identified]  SpO2: 94 %  24HR Pulse Ox Range:  SpO2  Av.9 %  Min: 91 %  Max: 98 %  Oxygen Amount and Delivery: O2 Flow Rate (L/min): 2 L/min      Wt Readings from Last 3 Encounters:   21 210 lb 15.7 oz (95.7 kg)   16 178 lb (80.7 kg)   16 184 lb 12.8 oz (83.8 kg)     I/O       Intake/Output Summary (Last 24 hours) at 2021 0908  Last data filed at 2021 0400  Gross per 24 hour   Intake 871 ml   Output 135 ml   Net 736 ml     I/O last 3 completed shifts:   In: 901 [I.V.:10; NG/GT:891]  Out: 135 [Urine:35; Stool:100]     DRAIN/TUBE OUTPUT:     Invasive Lines   ETT Day -   ventilator day 16, tracheostomy day 3  Lines -PICC line day 21    ICP PRESSURE RANGE:  No data recorded  CVP PRESSURE RANGE:  No data recorded  Mechanical Ventilation Data   SETTINGS (Comprehensive)  Vent Information  $Ventilation: $Subsequent Day  Skin Assessment: Clean, dry, & intact  Suction Catheter Diameter: 14  Equipment Changed: Suction catheter  Vent Type: Servo i  Vent Mode: PRVC  Vt Ordered: 450 mL  Rate Set: 20 bmp  Pressure Support: 12 cmH20  FiO2 : 21 %  SpO2: 94 %  SpO2/FiO2 ratio: 447.62  Sensitivity: 5  PEEP/CPAP: 5  I Time/ I Time %: 0.86 s  Humidification Source: HME  Nitric Oxide/Epoprostenol In Use?: No  Mask Type: Full face mask  Mask Size: Medium  Additional Respiratory  Assessments  Pulse: 118  Resp: 20  SpO2: 94 %  End Tidal CO2: 30 (%)  Position: Semi-Lagunas's  Humidification Source: HME  Oral Care: Mouth suctioned, Suction toothette, Mouth moisturizer  Cuff Pressure (cm H2O): 30 cm H2O       ABGs:   Lab Results   Component Value Date    PHART 7.356 12/05/2021    PO2ART 50.6 12/05/2021    SKJ6SKA 41.2 12/05/2021       Lab Results   Component Value Date    MODE PRVC 12/05/2021         Medications   IV   sodium chloride      norepinephrine Stopped (11/29/21 2306)    propofol Stopped (12/03/21 1030)    dextrose      sodium chloride        fluconazole  100 mg Oral BID    atorvastatin  40 mg Oral Daily    cefTRIAXone (ROCEPHIN) IV  1,000 mg IntraVENous Q24H    chlorhexidine  15 mL Mouth/Throat BID    [Held by provider] carvedilol  3.125 mg Oral BID WC    pantoprazole  40 mg IntraVENous BID    And    sodium chloride flush  10 mL IntraVENous BID    sodium polystyrene  15 g Oral Once    sertraline  25 mg Oral Daily    ferrous sulfate  325 mg Oral Daily with breakfast    [Held by provider] aspirin  81 mg Oral Daily    insulin glargine  22 Units SubCUTAneous Nightly    insulin lispro  0-12 Units SubCUTAneous TID WC    insulin lispro  0-6 Units SubCUTAneous Nightly    sodium chloride flush  5-40 mL IntraVENous 2 times per day       Diet/Nutrition   ADULT TUBE FEEDING; Nasogastric; Renal Formula; Continuous; 40; No; 30; Q 8 hours; Protein; 1 Protein at 12 noon daily. Flush with 30 mLwater before and after administration. Exam   VITALS    height is 5' 2.99\" (1.6 m) and weight is 210 lb 15.7 oz (95.7 kg).  Her oral temperature is 98.5 °F (2) well flexed (36.9 °C). Her blood pressure is 117/63 and her pulse is 118. Her respiration is 20 and oxygen saturation is 94%. Ventilator Settings (Basic)  Vent Mode: PRVC Rate Set: 20 bmp/Vt Ordered: 450 mL/ /FiO2 : 21 %    Constitutional -alert off sedation   General Appearance obese  HEENT -tracheostomy in place  Lungs - Chest expands equally, no wheezes, rales or rhonchi. Diminished breath sounds right side  Cardiovascular - Heart sounds are normal.  normal rate and rhythm regular, no murmur, gallop or rub. Abdomen - soft, nontender, nondistended, no masses or organomegaly  Neurologic - CN II-XII are grossly intact.  There are no focal motor deficits  Skin - no bruising or bleeding  Extremities - no cyanosis, clubbing or edema    Lab Results   CBC     Lab Results   Component Value Date    WBC 7.1 12/06/2021    RBC 2.31 12/06/2021    RBC 4.14 08/31/2016    HGB 7.0 12/06/2021    HCT 21.7 12/06/2021     12/06/2021    MCV 94.2 12/06/2021    MCH 30.5 12/06/2021    MCHC 32.4 12/06/2021    RDW 16.7 12/06/2021    NRBC 4 11/22/2021    METASPCT 3 12/02/2021    LYMPHOPCT 19 12/06/2021    LYMPHOPCT 32.8 08/31/2016    MONOPCT 11 12/06/2021    EOSPCT 4.3 08/31/2016    BASOPCT 0 12/06/2021    BASOPCT 0.7 08/31/2016    MONOSABS 0.78 12/06/2021    LYMPHSABS 1.35 12/06/2021    EOSABS 0.14 12/06/2021    BASOSABS 0.00 12/06/2021    DIFFTYPE NOT REPORTED 12/06/2021       BMP   Lab Results   Component Value Date     12/06/2021    K 4.1 12/06/2021    CL 94 12/06/2021    CO2 21 12/06/2021    BUN 71 12/06/2021    CREATININE 5.36 12/06/2021    GLUCOSE 157 12/06/2021    GLUCOSE 77 08/31/2016    CALCIUM 9.3 12/06/2021       LFTS  Lab Results   Component Value Date    ALKPHOS 175 11/18/2021    ALT 31 11/18/2021    AST 21 11/18/2021    PROT 5.3 11/20/2021    BILITOT 0.27 11/18/2021    BILIDIR <0.08 11/13/2021    IBILI Connot be calculated 11/13/2021    LABALBU 2.6 11/18/2021       INR  Recent Labs     12/06/21  0533   PROTIME 15.9*   INR 1. 3       APTT  Recent Labs     12/06/21  0533   APTT 34.4       Lactic Acid  Lab Results   Component Value Date    LACTA 1.3 11/26/2021    LACTA 0.6 11/16/2021    LACTA 0.6 11/16/2021        BNP   No results for input(s): BNP in the last 72 hours. Cultures       Radiology     Chest x-ray and ABG every other day    SYSTEM ASSESSMENT      Acute on chronic hypoxic and hypercapnic respiratory failure  Bradycardic arrest, intubated 11/20; tracheostomy 12/3  Right lower lobe infiltrate/pleural effusion  Acute kidney injury-requiring hemodialysis  Morbid obesity  Anemia, chronic disease and possible rectal bleed  Hypoalbuminemia  Thrombocytopenia  Nonischemic cardiomyopathy with EF of 30 to 35% possible right to left interatrial shunt  Atrial fibrillation  Possible CLL/lymphoproliferative disorder  DNR CCA    Neuro   Responds appropriately by shaking head yes or no    Respiratory   Wean oxygen as tolerated.  Keep O2 sat > 88%  Continue daily weaning trial  Chest x-ray and blood gases every other day     Hemodynamics   Appears to be fluid overloaded    Gastrointestinal/Nutrition   Tolerating tube feeds    Renal   Going to be getting a dialysis catheter day (tunneled)  Scheduled for dialysis today as well has not had complications from dialysis in terms of cardiac events for the last several treatments    Infectious Disease   Does not need ceftriaxone since urine is growing out Candida  Dose of Diflucan adjusted    Hematology/Oncology   Platelet count has rebounded  Continue to monitor hemoglobin, transfuse if less than 7    Endocrine   Blood sugars reasonable    Social/Spiritual/DNR/Disposition/Other     Plans are in place to [facility] [address] [employer] [address] Time   35 min    Electronically signed by Ana M Sorto MD on 12/6/2021 at 9:28 AM

## (undated) DEVICE — GLOVE ORANGE PI 7 1/2   MSG9075

## (undated) DEVICE — GUIDEWIRE URO L150CM DIA0.035IN STIFF NIT HYDRPHLC STR TIP

## (undated) DEVICE — COVER,MAYO STAND,STERILE: Brand: MEDLINE

## (undated) DEVICE — SYRINGE 20ML LL S/C 50

## (undated) DEVICE — GLOVE ORTHO 7 1/2   MSG9475

## (undated) DEVICE — TUBING, SUCTION, 3/16" X 10', STRAIGHT: Brand: MEDLINE

## (undated) DEVICE — SUTURE PROL SZ 2-0 L30IN NONABSORBABLE BLU L26MM CT-2 1/2 8411H

## (undated) DEVICE — SUTURE VCRL + SZ 3 0 L54IN ABSRB UD POLYGLACTIN 910 W VCP285G

## (undated) DEVICE — GLOVE ORTHO 8   MSG9480

## (undated) DEVICE — FORCEPS BX L240CM JAW DIA2.4MM ORNG L CAP W/ NDL DISP RAD

## (undated) DEVICE — OINTMENT ANTIBIOTIC TRIPLE 0.9G PK

## (undated) DEVICE — SYRINGE MED 10ML LUERLOCK TIP W/O SFTY DISP

## (undated) DEVICE — SPONGE DRN W4XL4IN RAYON/POLYESTER 6 PLY NONWOVEN PRECUT

## (undated) DEVICE — BITEBLOCK 54FR W/ DENT RIM BLOX

## (undated) DEVICE — GOWN,AURORA,NONREINFORCED,LARGE: Brand: MEDLINE

## (undated) DEVICE — ENDO KIT W/SYRINGE: Brand: MEDLINE INDUSTRIES, INC.

## (undated) DEVICE — SINGLE PORT MANIFOLD: Brand: NEPTUNE 2

## (undated) DEVICE — SOLUTION IRRIG 3000ML 0.9% SOD CHL USP UROMATIC PLAS CONT

## (undated) DEVICE — CATHETER URET 5FR L70CM OPN END SGL LUMN INJ HUB FLEXIMA

## (undated) DEVICE — SOLUTION IRRIG 1000ML STRL H2O USP PLAS POUR BTL

## (undated) DEVICE — ST CHARLES CYSTO PACK: Brand: MEDLINE INDUSTRIES, INC.

## (undated) DEVICE — SWABSTICK MEDICATED 10% POVIDONE IOD PVP SGL ANTISEP SAT

## (undated) DEVICE — MERCY HEALTH ST CHARLES: Brand: MEDLINE INDUSTRIES, INC.

## (undated) DEVICE — GLOVE SURG SZ 75 L12IN FNGR THK79MIL GRN LTX FREE

## (undated) DEVICE — DEFENDO AIR WATER SUCTION AND BIOPSY VALVE KIT FOR  OLYMPUS: Brand: DEFENDO AIR/WATER/SUCTION AND BIOPSY VALVE

## (undated) DEVICE — TOTAL TRAY, 16FR 10ML SIL FOLEY, URN: Brand: MEDLINE

## (undated) DEVICE — SUPERSET STRAIGHT CATHETER MOUNT 22F-22M/15F >= 70MM-150MM: Brand: SUPERSET STRAIGHT CATHETER MOUNT 22F-22M/15F >= 70MM-150MM

## (undated) DEVICE — SUTURE VCRL + SZ 3-0 L27IN ABSRB UD L26MM SH 1/2 CIR VCP416H

## (undated) DEVICE — PACK PROCEDURE SURG HD

## (undated) DEVICE — SUTURE ETHLN SZ 3-0 L18IN NONABSORBABLE BLK FS-1 L24MM 3/8 663H

## (undated) DEVICE — GEL US 20GM NONIRRITATING OVERWRAPPED FILE PCH TRNSMIT

## (undated) DEVICE — YANKAUER,POOLE TIP,STERILE,50/CS: Brand: MEDLINE

## (undated) DEVICE — DRAPE,T,LAPARO,TRANS,STERILE: Brand: MEDLINE

## (undated) DEVICE — Z DISCONTINUED BY MEDLINE USE 2280062 TUBE TRACH SZ 8 L79MM OD12.2MM ID7.6MM CUF DISP INNR CANN

## (undated) DEVICE — MIC* SAFETY PERCUTANEOUS ENDOSCOPIC GASTROSTOMY PEG KIT - 20 FR - PUSH OTW: Brand: MIC PEG TUBE

## (undated) DEVICE — PENCIL ES L3M BTTN SWCH HOLSTER W/ BLDE ELECTRD EDGE